# Patient Record
Sex: FEMALE | Race: WHITE | NOT HISPANIC OR LATINO | Employment: OTHER | URBAN - NONMETROPOLITAN AREA
[De-identification: names, ages, dates, MRNs, and addresses within clinical notes are randomized per-mention and may not be internally consistent; named-entity substitution may affect disease eponyms.]

---

## 2019-03-10 ENCOUNTER — APPOINTMENT (OUTPATIENT)
Dept: GENERAL RADIOLOGY | Facility: HOSPITAL | Age: 59
End: 2019-03-10

## 2019-03-10 ENCOUNTER — HOSPITAL ENCOUNTER (INPATIENT)
Facility: HOSPITAL | Age: 59
LOS: 15 days | Discharge: NURSING, REHAB OR LONG-TERM CARE | End: 2019-03-25
Attending: STUDENT IN AN ORGANIZED HEALTH CARE EDUCATION/TRAINING PROGRAM | Admitting: SURGERY

## 2019-03-10 ENCOUNTER — APPOINTMENT (OUTPATIENT)
Dept: CT IMAGING | Facility: HOSPITAL | Age: 59
End: 2019-03-10

## 2019-03-10 DIAGNOSIS — J96.01 ACUTE RESPIRATORY FAILURE WITH HYPOXIA AND HYPERCAPNIA (HCC): ICD-10-CM

## 2019-03-10 DIAGNOSIS — R13.12 OROPHARYNGEAL DYSPHAGIA: ICD-10-CM

## 2019-03-10 DIAGNOSIS — J96.02 ACUTE RESPIRATORY FAILURE WITH HYPOXIA AND HYPERCAPNIA (HCC): ICD-10-CM

## 2019-03-10 DIAGNOSIS — G93.1 ANOXIC ENCEPHALOPATHY (HCC): ICD-10-CM

## 2019-03-10 DIAGNOSIS — I46.9 CARDIOPULMONARY ARREST WITH SUCCESSFUL RESUSCITATION (HCC): Primary | ICD-10-CM

## 2019-03-10 PROBLEM — J93.83 OTHER PNEUMOTHORAX: Status: ACTIVE | Noted: 2019-03-10

## 2019-03-10 PROBLEM — J44.1 COPD WITH ACUTE EXACERBATION (HCC): Status: ACTIVE | Noted: 2019-03-10

## 2019-03-10 PROBLEM — E87.20 METABOLIC ACIDOSIS: Status: ACTIVE | Noted: 2019-03-10

## 2019-03-10 PROBLEM — R73.9 HYPERGLYCEMIA: Status: ACTIVE | Noted: 2019-03-10

## 2019-03-10 LAB
A-A DO2: ABNORMAL MMHG
A-A DO2: ABNORMAL MMHG
ALBUMIN SERPL-MCNC: 3.2 G/DL (ref 3.5–5)
ALBUMIN/GLOB SERPL: 1.5 G/DL (ref 1–2)
ALP SERPL-CCNC: 80 U/L (ref 38–126)
ALT SERPL W P-5'-P-CCNC: 134 U/L (ref 13–69)
AMPHET+METHAMPHET UR QL: NEGATIVE
AMPHETAMINES UR QL: NEGATIVE
ANION GAP SERPL CALCULATED.3IONS-SCNC: 22 MMOL/L (ref 10–20)
ARTERIAL PATENCY WRIST A: POSITIVE
ARTERIAL PATENCY WRIST A: POSITIVE
AST SERPL-CCNC: 309 U/L (ref 15–46)
ATMOSPHERIC PRESS: 730 MMHG
ATMOSPHERIC PRESS: 736 MMHG
BACTERIA UR QL AUTO: ABNORMAL /HPF
BARBITURATES UR QL SCN: NEGATIVE
BASE EXCESS BLDA CALC-SCNC: -1.3 MMOL/L (ref 0–2)
BASE EXCESS BLDA CALC-SCNC: -15 MMOL/L (ref 0–2)
BDY SITE: ABNORMAL
BDY SITE: ABNORMAL
BENZODIAZ UR QL SCN: NEGATIVE
BILIRUB SERPL-MCNC: 0.3 MG/DL (ref 0.2–1.3)
BILIRUB UR QL STRIP: NEGATIVE
BUN BLD-MCNC: 10 MG/DL (ref 7–20)
BUN/CREAT SERPL: 12.5 (ref 7.1–23.5)
BUPRENORPHINE SERPL-MCNC: POSITIVE NG/ML
CALCIUM SPEC-SCNC: 8.8 MG/DL (ref 8.4–10.2)
CANNABINOIDS SERPL QL: NEGATIVE
CHLORIDE SERPL-SCNC: 103 MMOL/L (ref 98–107)
CLARITY UR: CLEAR
CO2 SERPL-SCNC: 16 MMOL/L (ref 26–30)
COCAINE UR QL: NEGATIVE
COHGB MFR BLD: 1 % (ref 0–2)
COHGB MFR BLD: 2.2 % (ref 0–2)
COLOR UR: YELLOW
CREAT BLD-MCNC: 0.8 MG/DL (ref 0.6–1.3)
D-LACTATE SERPL-SCNC: 1.5 MMOL/L (ref 0.5–2)
D-LACTATE SERPL-SCNC: 13.6 MMOL/L (ref 0.5–2)
DEPRECATED RDW RBC AUTO: 59.7 FL (ref 37–54)
ERYTHROCYTE [DISTWIDTH] IN BLOOD BY AUTOMATED COUNT: 13.2 % (ref 11.5–14.5)
ETHANOL BLD-MCNC: <10 MG/DL
ETHANOL UR QL: <0.01 %
GFR SERPL CREATININE-BSD FRML MDRD: 59 ML/MIN/1.73
GFR SERPL CREATININE-BSD FRML MDRD: 71 ML/MIN/1.73
GLOBULIN UR ELPH-MCNC: 2.2 GM/DL
GLUCOSE BLD-MCNC: 384 MG/DL (ref 74–98)
GLUCOSE BLDC GLUCOMTR-MCNC: 107 MG/DL (ref 70–130)
GLUCOSE BLDC GLUCOMTR-MCNC: 154 MG/DL (ref 70–130)
GLUCOSE BLDC GLUCOMTR-MCNC: 194 MG/DL (ref 70–130)
GLUCOSE UR STRIP-MCNC: ABNORMAL MG/DL
HCO3 BLDA-SCNC: 14.5 MMOL/L (ref 22–28)
HCO3 BLDA-SCNC: 24.1 MMOL/L (ref 22–28)
HCT VFR BLD AUTO: 34.9 % (ref 37–47)
HCT VFR BLD CALC: 31 %
HCT VFR BLD CALC: 33 %
HGB BLD-MCNC: 10.7 G/DL (ref 12–16)
HGB BLDA-MCNC: 10.1 G/DL (ref 12–18)
HGB BLDA-MCNC: 10.8 G/DL (ref 12–18)
HGB UR QL STRIP.AUTO: ABNORMAL
HOLD SPECIMEN: NORMAL
HOROWITZ INDEX BLD+IHG-RTO: 40 %
HOROWITZ INDEX BLD+IHG-RTO: 50 %
HYALINE CASTS UR QL AUTO: ABNORMAL /LPF
KETONES UR QL STRIP: NEGATIVE
LEUKOCYTE ESTERASE UR QL STRIP.AUTO: NEGATIVE
LYMPHOCYTES # BLD MANUAL: 4.94 10*3/MM3 (ref 0.6–3.4)
LYMPHOCYTES NFR BLD MANUAL: 2 % (ref 5–12)
LYMPHOCYTES NFR BLD MANUAL: 66 % (ref 10–50)
MACROCYTES BLD QL SMEAR: ABNORMAL
MCH RBC QN AUTO: 37.3 PG (ref 27–31)
MCHC RBC AUTO-ENTMCNC: 30.7 G/DL (ref 30–37)
MCV RBC AUTO: 121.6 FL (ref 81–99)
METHADONE UR QL SCN: NEGATIVE
METHGB BLD QL: 0.9 % (ref 0–1.5)
METHGB BLD QL: 1.2 % (ref 0–1.5)
MODALITY: ABNORMAL
MODALITY: ABNORMAL
MONOCYTES # BLD AUTO: 0.15 10*3/MM3 (ref 0–0.9)
MUCOUS THREADS URNS QL MICRO: ABNORMAL /HPF
NEUTROPHILS # BLD AUTO: 2.39 10*3/MM3 (ref 2–6.9)
NEUTROPHILS NFR BLD MANUAL: 30 % (ref 37–80)
NEUTS BAND NFR BLD MANUAL: 2 % (ref 0–6)
NITRITE UR QL STRIP: NEGATIVE
NOTE: ABNORMAL
NOTE: ABNORMAL
OPIATES UR QL: NEGATIVE
OXYCODONE UR QL SCN: NEGATIVE
OXYHGB MFR BLDV: 94.9 % (ref 94–99)
OXYHGB MFR BLDV: 95.2 % (ref 94–99)
PCO2 BLDA: 42.2 MM HG (ref 35–45)
PCO2 BLDA: 48.6 MM HG (ref 35–45)
PCO2 TEMP ADJ BLD: ABNORMAL MM HG (ref 35–45)
PCO2 TEMP ADJ BLD: ABNORMAL MM HG (ref 35–45)
PCP UR QL SCN: NEGATIVE
PEEP RESPIRATORY: 5 CM[H2O]
PEEP RESPIRATORY: 5 CM[H2O]
PH BLDA: 7.08 PH UNITS (ref 7.3–7.5)
PH BLDA: 7.37 PH UNITS (ref 7.3–7.5)
PH UR STRIP.AUTO: 6.5 [PH] (ref 5–8)
PH, TEMP CORRECTED: ABNORMAL PH UNITS
PH, TEMP CORRECTED: ABNORMAL PH UNITS
PLATELET # BLD AUTO: 177 10*3/MM3 (ref 130–400)
PMV BLD AUTO: 11.8 FL (ref 6–12)
PO2 BLDA: 163 MM HG (ref 75–100)
PO2 BLDA: 78.4 MM HG (ref 75–100)
PO2 TEMP ADJ BLD: ABNORMAL MM HG (ref 83–108)
PO2 TEMP ADJ BLD: ABNORMAL MM HG (ref 83–108)
POTASSIUM BLD-SCNC: 5 MMOL/L (ref 3.5–5.1)
PROCALCITONIN SERPL-MCNC: 0.45 NG/ML
PROPOXYPH UR QL: NEGATIVE
PROT SERPL-MCNC: 5.4 G/DL (ref 6.3–8.2)
PROT UR QL STRIP: ABNORMAL
RBC # BLD AUTO: 2.87 10*6/MM3 (ref 4.2–5.4)
RBC # UR: ABNORMAL /HPF
REF LAB TEST METHOD: ABNORMAL
SAO2 % BLDCOA: 96.7 % (ref 94–100)
SAO2 % BLDCOA: 98.5 % (ref 94–100)
SCAN SLIDE: NORMAL
SET MECH RESP RATE: 16
SET MECH RESP RATE: 16
SMALL PLATELETS BLD QL SMEAR: ADEQUATE
SODIUM BLD-SCNC: 136 MMOL/L (ref 137–145)
SP GR UR STRIP: 1.01 (ref 1–1.03)
SQUAMOUS #/AREA URNS HPF: ABNORMAL /HPF
TRICYCLICS UR QL SCN: NEGATIVE
TROPONIN I SERPL-MCNC: 0.07 NG/ML (ref 0–0.03)
TROPONIN I SERPL-MCNC: 0.14 NG/ML (ref 0–0.03)
TROPONIN I SERPL-MCNC: 0.18 NG/ML (ref 0–0.03)
TROPONIN I SERPL-MCNC: 0.28 NG/ML (ref 0–0.03)
UROBILINOGEN UR QL STRIP: ABNORMAL
VENTILATOR MODE: AC
VENTILATOR MODE: AC
VT ON VENT VENT: 500 ML
VT ON VENT VENT: 500 ML
WBC MORPH BLD: NORMAL
WBC NRBC COR # BLD: 7.48 10*3/MM3 (ref 4.8–10.8)
WBC UR QL AUTO: ABNORMAL /HPF
WHOLE BLOOD HOLD SPECIMEN: NORMAL
WHOLE BLOOD HOLD SPECIMEN: NORMAL

## 2019-03-10 PROCEDURE — 06HY33Z INSERTION OF INFUSION DEVICE INTO LOWER VEIN, PERCUTANEOUS APPROACH: ICD-10-PCS | Performed by: INTERNAL MEDICINE

## 2019-03-10 PROCEDURE — 94799 UNLISTED PULMONARY SVC/PX: CPT

## 2019-03-10 PROCEDURE — 70450 CT HEAD/BRAIN W/O DYE: CPT

## 2019-03-10 PROCEDURE — C1751 CATH, INF, PER/CENT/MIDLINE: HCPCS

## 2019-03-10 PROCEDURE — 99291 CRITICAL CARE FIRST HOUR: CPT

## 2019-03-10 PROCEDURE — 25010000002 METHYLPREDNISOLONE PER 40 MG: Performed by: INTERNAL MEDICINE

## 2019-03-10 PROCEDURE — 99291 CRITICAL CARE FIRST HOUR: CPT | Performed by: INTERNAL MEDICINE

## 2019-03-10 PROCEDURE — 85060 BLOOD SMEAR INTERPRETATION: CPT | Performed by: STUDENT IN AN ORGANIZED HEALTH CARE EDUCATION/TRAINING PROGRAM

## 2019-03-10 PROCEDURE — 83605 ASSAY OF LACTIC ACID: CPT | Performed by: STUDENT IN AN ORGANIZED HEALTH CARE EDUCATION/TRAINING PROGRAM

## 2019-03-10 PROCEDURE — 94640 AIRWAY INHALATION TREATMENT: CPT

## 2019-03-10 PROCEDURE — 80307 DRUG TEST PRSMV CHEM ANLYZR: CPT | Performed by: STUDENT IN AN ORGANIZED HEALTH CARE EDUCATION/TRAINING PROGRAM

## 2019-03-10 PROCEDURE — 36600 WITHDRAWAL OF ARTERIAL BLOOD: CPT

## 2019-03-10 PROCEDURE — 87086 URINE CULTURE/COLONY COUNT: CPT | Performed by: INTERNAL MEDICINE

## 2019-03-10 PROCEDURE — 80053 COMPREHEN METABOLIC PANEL: CPT | Performed by: STUDENT IN AN ORGANIZED HEALTH CARE EDUCATION/TRAINING PROGRAM

## 2019-03-10 PROCEDURE — 99292 CRITICAL CARE ADDL 30 MIN: CPT

## 2019-03-10 PROCEDURE — 25010000003 LEVETIRACETAM IN NACL 0.75% 1000 MG/100ML SOLUTION: Performed by: INTERNAL MEDICINE

## 2019-03-10 PROCEDURE — 84484 ASSAY OF TROPONIN QUANT: CPT | Performed by: INTERNAL MEDICINE

## 2019-03-10 PROCEDURE — 25010000002 LORAZEPAM PER 2 MG

## 2019-03-10 PROCEDURE — 94002 VENT MGMT INPAT INIT DAY: CPT

## 2019-03-10 PROCEDURE — 80306 DRUG TEST PRSMV INSTRMNT: CPT | Performed by: STUDENT IN AN ORGANIZED HEALTH CARE EDUCATION/TRAINING PROGRAM

## 2019-03-10 PROCEDURE — 71045 X-RAY EXAM CHEST 1 VIEW: CPT

## 2019-03-10 PROCEDURE — 71275 CT ANGIOGRAPHY CHEST: CPT

## 2019-03-10 PROCEDURE — 25010000002 MIDAZOLAM 50 MG/10ML SOLUTION 10 ML VIAL: Performed by: INTERNAL MEDICINE

## 2019-03-10 PROCEDURE — 25010000002 FENTANYL CITRATE (PF) 100 MCG/2ML SOLUTION 20 ML VIAL: Performed by: STUDENT IN AN ORGANIZED HEALTH CARE EDUCATION/TRAINING PROGRAM

## 2019-03-10 PROCEDURE — 82375 ASSAY CARBOXYHB QUANT: CPT

## 2019-03-10 PROCEDURE — 84145 PROCALCITONIN (PCT): CPT | Performed by: INTERNAL MEDICINE

## 2019-03-10 PROCEDURE — 5A1955Z RESPIRATORY VENTILATION, GREATER THAN 96 CONSECUTIVE HOURS: ICD-10-PCS | Performed by: INTERNAL MEDICINE

## 2019-03-10 PROCEDURE — 83036 HEMOGLOBIN GLYCOSYLATED A1C: CPT | Performed by: INTERNAL MEDICINE

## 2019-03-10 PROCEDURE — 25010000002 VANCOMYCIN 1 G RECONSTITUTED SOLUTION 1 EACH VIAL: Performed by: STUDENT IN AN ORGANIZED HEALTH CARE EDUCATION/TRAINING PROGRAM

## 2019-03-10 PROCEDURE — 25010000002 PIPERACILLIN SOD-TAZOBACTAM PER 1 G: Performed by: INTERNAL MEDICINE

## 2019-03-10 PROCEDURE — 25010000002 VANCOMYCIN PER 500 MG: Performed by: INTERNAL MEDICINE

## 2019-03-10 PROCEDURE — 87040 BLOOD CULTURE FOR BACTERIA: CPT | Performed by: STUDENT IN AN ORGANIZED HEALTH CARE EDUCATION/TRAINING PROGRAM

## 2019-03-10 PROCEDURE — 83050 HGB METHEMOGLOBIN QUAN: CPT

## 2019-03-10 PROCEDURE — 94003 VENT MGMT INPAT SUBQ DAY: CPT

## 2019-03-10 PROCEDURE — 82805 BLOOD GASES W/O2 SATURATION: CPT

## 2019-03-10 PROCEDURE — 87205 SMEAR GRAM STAIN: CPT | Performed by: INTERNAL MEDICINE

## 2019-03-10 PROCEDURE — 25010000002 PIPERACILLIN SOD-TAZOBACTAM PER 1 G: Performed by: STUDENT IN AN ORGANIZED HEALTH CARE EDUCATION/TRAINING PROGRAM

## 2019-03-10 PROCEDURE — 93005 ELECTROCARDIOGRAM TRACING: CPT | Performed by: STUDENT IN AN ORGANIZED HEALTH CARE EDUCATION/TRAINING PROGRAM

## 2019-03-10 PROCEDURE — 85025 COMPLETE CBC W/AUTO DIFF WBC: CPT | Performed by: STUDENT IN AN ORGANIZED HEALTH CARE EDUCATION/TRAINING PROGRAM

## 2019-03-10 PROCEDURE — 63710000001 INSULIN ASPART PER 5 UNITS: Performed by: INTERNAL MEDICINE

## 2019-03-10 PROCEDURE — 84484 ASSAY OF TROPONIN QUANT: CPT | Performed by: STUDENT IN AN ORGANIZED HEALTH CARE EDUCATION/TRAINING PROGRAM

## 2019-03-10 PROCEDURE — 25010000002 IOPAMIDOL 61 % SOLUTION: Performed by: HOSPITALIST

## 2019-03-10 PROCEDURE — 25010000002 ENOXAPARIN PER 10 MG: Performed by: INTERNAL MEDICINE

## 2019-03-10 PROCEDURE — 81001 URINALYSIS AUTO W/SCOPE: CPT | Performed by: INTERNAL MEDICINE

## 2019-03-10 PROCEDURE — 99254 IP/OBS CNSLTJ NEW/EST MOD 60: CPT | Performed by: INTERNAL MEDICINE

## 2019-03-10 PROCEDURE — 25010000002 LORAZEPAM PER 2 MG: Performed by: INTERNAL MEDICINE

## 2019-03-10 PROCEDURE — 82962 GLUCOSE BLOOD TEST: CPT

## 2019-03-10 PROCEDURE — 85007 BL SMEAR W/DIFF WBC COUNT: CPT | Performed by: STUDENT IN AN ORGANIZED HEALTH CARE EDUCATION/TRAINING PROGRAM

## 2019-03-10 PROCEDURE — 80074 ACUTE HEPATITIS PANEL: CPT | Performed by: INTERNAL MEDICINE

## 2019-03-10 PROCEDURE — 87070 CULTURE OTHR SPECIMN AEROBIC: CPT | Performed by: INTERNAL MEDICINE

## 2019-03-10 RX ORDER — BISACODYL 10 MG
10 SUPPOSITORY, RECTAL RECTAL DAILY PRN
Status: DISCONTINUED | OUTPATIENT
Start: 2019-03-10 | End: 2019-03-25 | Stop reason: HOSPADM

## 2019-03-10 RX ORDER — L.ACID,PARA/B.BIFIDUM/S.THERM 8B CELL
1 CAPSULE ORAL DAILY
Status: DISCONTINUED | OUTPATIENT
Start: 2019-03-10 | End: 2019-03-25 | Stop reason: HOSPADM

## 2019-03-10 RX ORDER — LORAZEPAM 2 MG/ML
1 INJECTION INTRAMUSCULAR EVERY 4 HOURS PRN
Status: DISCONTINUED | OUTPATIENT
Start: 2019-03-10 | End: 2019-03-11

## 2019-03-10 RX ORDER — ACETAMINOPHEN 325 MG/1
650 TABLET ORAL EVERY 4 HOURS PRN
Status: DISCONTINUED | OUTPATIENT
Start: 2019-03-10 | End: 2019-03-25 | Stop reason: HOSPADM

## 2019-03-10 RX ORDER — LEVETIRACETAM 10 MG/ML
1000 INJECTION INTRAVASCULAR EVERY 12 HOURS SCHEDULED
Status: DISCONTINUED | OUTPATIENT
Start: 2019-03-10 | End: 2019-03-13

## 2019-03-10 RX ORDER — METHYLPREDNISOLONE SODIUM SUCCINATE 40 MG/ML
40 INJECTION, POWDER, LYOPHILIZED, FOR SOLUTION INTRAMUSCULAR; INTRAVENOUS EVERY 8 HOURS
Status: DISCONTINUED | OUTPATIENT
Start: 2019-03-10 | End: 2019-03-12

## 2019-03-10 RX ORDER — FAMOTIDINE 10 MG/ML
20 INJECTION, SOLUTION INTRAVENOUS EVERY 12 HOURS SCHEDULED
Status: DISCONTINUED | OUTPATIENT
Start: 2019-03-10 | End: 2019-03-25 | Stop reason: HOSPADM

## 2019-03-10 RX ORDER — LISINOPRIL 20 MG/1
20 TABLET ORAL DAILY
COMMUNITY
End: 2020-03-12

## 2019-03-10 RX ORDER — LORAZEPAM 2 MG/ML
INJECTION INTRAMUSCULAR
Status: COMPLETED
Start: 2019-03-10 | End: 2019-03-10

## 2019-03-10 RX ORDER — SODIUM CHLORIDE 0.9 % (FLUSH) 0.9 %
3 SYRINGE (ML) INJECTION EVERY 12 HOURS SCHEDULED
Status: DISCONTINUED | OUTPATIENT
Start: 2019-03-10 | End: 2019-03-20

## 2019-03-10 RX ORDER — IPRATROPIUM BROMIDE AND ALBUTEROL SULFATE 2.5; .5 MG/3ML; MG/3ML
3 SOLUTION RESPIRATORY (INHALATION)
Status: DISCONTINUED | OUTPATIENT
Start: 2019-03-10 | End: 2019-03-25 | Stop reason: HOSPADM

## 2019-03-10 RX ORDER — DEXTROSE MONOHYDRATE 25 G/50ML
25 INJECTION, SOLUTION INTRAVENOUS
Status: DISCONTINUED | OUTPATIENT
Start: 2019-03-10 | End: 2019-03-25 | Stop reason: HOSPADM

## 2019-03-10 RX ORDER — CHLORHEXIDINE GLUCONATE 0.12 MG/ML
15 RINSE ORAL EVERY 12 HOURS SCHEDULED
Status: DISCONTINUED | OUTPATIENT
Start: 2019-03-10 | End: 2019-03-25 | Stop reason: HOSPADM

## 2019-03-10 RX ORDER — BUDESONIDE 0.5 MG/2ML
0.5 INHALANT ORAL
Status: DISCONTINUED | OUTPATIENT
Start: 2019-03-10 | End: 2019-03-25 | Stop reason: HOSPADM

## 2019-03-10 RX ORDER — SODIUM CHLORIDE 0.9 % (FLUSH) 0.9 %
3-10 SYRINGE (ML) INJECTION AS NEEDED
Status: DISCONTINUED | OUTPATIENT
Start: 2019-03-10 | End: 2019-03-20

## 2019-03-10 RX ORDER — CARVEDILOL 12.5 MG/1
12.5 TABLET ORAL 2 TIMES DAILY
COMMUNITY

## 2019-03-10 RX ORDER — ONDANSETRON 2 MG/ML
4 INJECTION INTRAMUSCULAR; INTRAVENOUS EVERY 6 HOURS PRN
Status: DISCONTINUED | OUTPATIENT
Start: 2019-03-10 | End: 2019-03-25 | Stop reason: HOSPADM

## 2019-03-10 RX ORDER — NICOTINE POLACRILEX 4 MG
1 LOZENGE BUCCAL
Status: DISCONTINUED | OUTPATIENT
Start: 2019-03-10 | End: 2019-03-25 | Stop reason: HOSPADM

## 2019-03-10 RX ADMIN — LEVETIRACETAM 1000 MG: 10 INJECTION INTRAVENOUS at 21:05

## 2019-03-10 RX ADMIN — SODIUM CHLORIDE 1000 ML: 9 INJECTION, SOLUTION INTRAVENOUS at 10:00

## 2019-03-10 RX ADMIN — FENTANYL CITRATE 50 MCG/HR: 50 INJECTION INTRAVENOUS at 05:33

## 2019-03-10 RX ADMIN — TAZOBACTAM SODIUM AND PIPERACILLIN SODIUM 3.38 G: 375; 3 INJECTION, SOLUTION INTRAVENOUS at 07:35

## 2019-03-10 RX ADMIN — SODIUM BICARBONATE 100 ML/HR: 84 INJECTION, SOLUTION INTRAVENOUS at 11:32

## 2019-03-10 RX ADMIN — METHYLPREDNISOLONE SODIUM SUCCINATE 40 MG: 40 INJECTION, POWDER, FOR SOLUTION INTRAMUSCULAR; INTRAVENOUS at 11:54

## 2019-03-10 RX ADMIN — METHYLPREDNISOLONE SODIUM SUCCINATE 40 MG: 40 INJECTION, POWDER, FOR SOLUTION INTRAMUSCULAR; INTRAVENOUS at 18:11

## 2019-03-10 RX ADMIN — SODIUM BICARBONATE 100 ML/HR: 84 INJECTION, SOLUTION INTRAVENOUS at 23:46

## 2019-03-10 RX ADMIN — CHLORHEXIDINE GLUCONATE 0.12% ORAL RINSE 15 ML: 1.2 LIQUID ORAL at 11:54

## 2019-03-10 RX ADMIN — TAZOBACTAM SODIUM AND PIPERACILLIN SODIUM 3.38 G: 375; 3 INJECTION, SOLUTION INTRAVENOUS at 21:09

## 2019-03-10 RX ADMIN — IPRATROPIUM BROMIDE AND ALBUTEROL SULFATE 3 ML: .5; 3 SOLUTION RESPIRATORY (INHALATION) at 19:11

## 2019-03-10 RX ADMIN — FENTANYL CITRATE 300 MCG/HR: 50 INJECTION INTRAVENOUS at 22:05

## 2019-03-10 RX ADMIN — VANCOMYCIN HYDROCHLORIDE 500 MG: 500 INJECTION, POWDER, LYOPHILIZED, FOR SOLUTION INTRAVENOUS at 11:32

## 2019-03-10 RX ADMIN — LEVETIRACETAM 1000 MG: 10 INJECTION INTRAVENOUS at 11:32

## 2019-03-10 RX ADMIN — SODIUM CHLORIDE 1000 ML: 9 INJECTION, SOLUTION INTRAVENOUS at 04:17

## 2019-03-10 RX ADMIN — LORAZEPAM 1 MG: 2 INJECTION INTRAMUSCULAR; INTRAVENOUS at 08:52

## 2019-03-10 RX ADMIN — FENTANYL CITRATE 150 MCG/HR: 50 INJECTION INTRAVENOUS at 18:08

## 2019-03-10 RX ADMIN — IOPAMIDOL 100 ML: 612 INJECTION, SOLUTION INTRAVENOUS at 18:45

## 2019-03-10 RX ADMIN — ENOXAPARIN SODIUM 40 MG: 100 INJECTION SUBCUTANEOUS at 11:53

## 2019-03-10 RX ADMIN — BUDESONIDE 0.5 MG: 0.5 INHALANT RESPIRATORY (INHALATION) at 13:39

## 2019-03-10 RX ADMIN — TAZOBACTAM SODIUM AND PIPERACILLIN SODIUM 3.38 G: 375; 3 INJECTION, SOLUTION INTRAVENOUS at 15:45

## 2019-03-10 RX ADMIN — SODIUM CHLORIDE 1000 ML: 9 INJECTION, SOLUTION INTRAVENOUS at 05:56

## 2019-03-10 RX ADMIN — IPRATROPIUM BROMIDE AND ALBUTEROL SULFATE 3 ML: .5; 3 SOLUTION RESPIRATORY (INHALATION) at 13:39

## 2019-03-10 RX ADMIN — Medication 1 CAPSULE: at 11:54

## 2019-03-10 RX ADMIN — INSULIN ASPART 2 UNITS: 100 INJECTION, SOLUTION INTRAVENOUS; SUBCUTANEOUS at 21:06

## 2019-03-10 RX ADMIN — FAMOTIDINE 20 MG: 10 INJECTION, SOLUTION INTRAVENOUS at 11:54

## 2019-03-10 RX ADMIN — SODIUM CHLORIDE, PRESERVATIVE FREE 3 ML: 5 INJECTION INTRAVENOUS at 21:05

## 2019-03-10 RX ADMIN — MIDAZOLAM 2 MG/HR: 5 INJECTION INTRAMUSCULAR; INTRAVENOUS at 10:05

## 2019-03-10 RX ADMIN — LORAZEPAM 1 MG: 2 INJECTION INTRAMUSCULAR; INTRAVENOUS at 09:06

## 2019-03-10 RX ADMIN — SODIUM CHLORIDE, PRESERVATIVE FREE 3 ML: 5 INJECTION INTRAVENOUS at 11:35

## 2019-03-10 RX ADMIN — VANCOMYCIN HYDROCHLORIDE 1000 MG: 1 INJECTION, POWDER, LYOPHILIZED, FOR SOLUTION INTRAVENOUS at 08:32

## 2019-03-10 RX ADMIN — FAMOTIDINE 20 MG: 10 INJECTION, SOLUTION INTRAVENOUS at 21:05

## 2019-03-10 RX ADMIN — MIDAZOLAM 4 MG/HR: 5 INJECTION INTRAMUSCULAR; INTRAVENOUS at 23:54

## 2019-03-10 RX ADMIN — BUDESONIDE 0.5 MG: 0.5 INHALANT RESPIRATORY (INHALATION) at 19:12

## 2019-03-10 RX ADMIN — CHLORHEXIDINE GLUCONATE 0.12% ORAL RINSE 15 ML: 1.2 LIQUID ORAL at 21:04

## 2019-03-11 ENCOUNTER — APPOINTMENT (OUTPATIENT)
Dept: CARDIOLOGY | Facility: HOSPITAL | Age: 59
End: 2019-03-11

## 2019-03-11 ENCOUNTER — APPOINTMENT (OUTPATIENT)
Dept: ULTRASOUND IMAGING | Facility: HOSPITAL | Age: 59
End: 2019-03-11

## 2019-03-11 ENCOUNTER — APPOINTMENT (OUTPATIENT)
Dept: GENERAL RADIOLOGY | Facility: HOSPITAL | Age: 59
End: 2019-03-11

## 2019-03-11 LAB
A-A DO2: ABNORMAL MMHG
ALBUMIN SERPL-MCNC: 3.5 G/DL (ref 3.5–5)
ALBUMIN/GLOB SERPL: 1.3 G/DL (ref 1–2)
ALP SERPL-CCNC: 87 U/L (ref 38–126)
ALT SERPL W P-5'-P-CCNC: 132 U/L (ref 13–69)
ANION GAP SERPL CALCULATED.3IONS-SCNC: 10.4 MMOL/L (ref 10–20)
ANISOCYTOSIS BLD QL: NORMAL
ARTERIAL PATENCY WRIST A: POSITIVE
AST SERPL-CCNC: 249 U/L (ref 15–46)
ATMOSPHERIC PRESS: 741 MMHG
BASE EXCESS BLDA CALC-SCNC: 5.3 MMOL/L (ref 0–2)
BASOPHILS # BLD AUTO: 0.01 10*3/MM3 (ref 0–0.2)
BASOPHILS NFR BLD AUTO: 0.1 % (ref 0–2.5)
BDY SITE: ABNORMAL
BILIRUB SERPL-MCNC: 0.3 MG/DL (ref 0.2–1.3)
BUN BLD-MCNC: 11 MG/DL (ref 7–20)
BUN/CREAT SERPL: 18.3 (ref 7.1–23.5)
CALCIUM SPEC-SCNC: 8.8 MG/DL (ref 8.4–10.2)
CHLORIDE SERPL-SCNC: 104 MMOL/L (ref 98–107)
CO2 SERPL-SCNC: 28 MMOL/L (ref 26–30)
COHGB MFR BLD: 1 % (ref 0–2)
CREAT BLD-MCNC: 0.6 MG/DL (ref 0.6–1.3)
CYTOLOGIST CVX/VAG CYTO: NORMAL
DEPRECATED RDW RBC AUTO: 55.4 FL (ref 37–54)
EOSINOPHIL # BLD AUTO: 0 10*3/MM3 (ref 0–0.7)
EOSINOPHIL NFR BLD AUTO: 0 % (ref 0–7)
ERYTHROCYTE [DISTWIDTH] IN BLOOD BY AUTOMATED COUNT: 13.3 % (ref 11.5–14.5)
GFR SERPL CREATININE-BSD FRML MDRD: 102 ML/MIN/1.73
GLOBULIN UR ELPH-MCNC: 2.6 GM/DL
GLUCOSE BLD-MCNC: 187 MG/DL (ref 74–98)
GLUCOSE BLDC GLUCOMTR-MCNC: 164 MG/DL (ref 70–130)
GLUCOSE BLDC GLUCOMTR-MCNC: 171 MG/DL (ref 70–130)
GLUCOSE BLDC GLUCOMTR-MCNC: 191 MG/DL (ref 70–130)
GLUCOSE BLDC GLUCOMTR-MCNC: 199 MG/DL (ref 70–130)
HBA1C MFR BLD: 5.1 % (ref 3–6)
HCO3 BLDA-SCNC: 30 MMOL/L (ref 22–28)
HCT VFR BLD AUTO: 29.7 % (ref 37–47)
HCT VFR BLD CALC: 29.6 %
HGB BLD-MCNC: 9.8 G/DL (ref 12–16)
HGB BLDA-MCNC: 9.7 G/DL (ref 12–18)
HOROWITZ INDEX BLD+IHG-RTO: 30 %
IMM GRANULOCYTES # BLD AUTO: 0.1 10*3/MM3 (ref 0–0.06)
IMM GRANULOCYTES NFR BLD AUTO: 0.8 % (ref 0–0.6)
LYMPHOCYTES # BLD AUTO: 0.75 10*3/MM3 (ref 0.6–3.4)
LYMPHOCYTES NFR BLD AUTO: 6.3 % (ref 10–50)
MACROCYTES BLD QL SMEAR: NORMAL
MAGNESIUM SERPL-MCNC: 1.9 MG/DL (ref 1.6–2.3)
MAXIMAL PREDICTED HEART RATE: 160 BPM
MCH RBC QN AUTO: 37.1 PG (ref 27–31)
MCHC RBC AUTO-ENTMCNC: 33 G/DL (ref 30–37)
MCV RBC AUTO: 112.5 FL (ref 81–99)
METHGB BLD QL: 0.7 % (ref 0–1.5)
MODALITY: ABNORMAL
MONOCYTES # BLD AUTO: 0.29 10*3/MM3 (ref 0–0.9)
MONOCYTES NFR BLD AUTO: 2.5 % (ref 0–12)
NEUTROPHILS # BLD AUTO: 10.68 10*3/MM3 (ref 2–6.9)
NEUTROPHILS NFR BLD AUTO: 90.3 % (ref 37–80)
NOTE: ABNORMAL
NRBC BLD AUTO-RTO: 0 /100 WBC (ref 0–0)
OXYHGB MFR BLDV: 91.2 % (ref 94–99)
PATH INTERP BLD-IMP: NORMAL
PCO2 BLDA: 43.9 MM HG (ref 35–45)
PCO2 TEMP ADJ BLD: ABNORMAL MM HG (ref 35–45)
PEEP RESPIRATORY: 5 CM[H2O]
PH BLDA: 7.44 PH UNITS (ref 7.3–7.5)
PH, TEMP CORRECTED: ABNORMAL PH UNITS
PLATELET # BLD AUTO: 165 10*3/MM3 (ref 130–400)
PMV BLD AUTO: 11.4 FL (ref 6–12)
PO2 BLDA: 60.8 MM HG (ref 75–100)
PO2 TEMP ADJ BLD: ABNORMAL MM HG (ref 83–108)
POTASSIUM BLD-SCNC: 3.4 MMOL/L (ref 3.5–5.1)
PROCALCITONIN SERPL-MCNC: 6.49 NG/ML
PROT SERPL-MCNC: 6.1 G/DL (ref 6.3–8.2)
RBC # BLD AUTO: 2.64 10*6/MM3 (ref 4.2–5.4)
SAO2 % BLDCOA: 92.8 % (ref 94–100)
SET MECH RESP RATE: 16
SMALL PLATELETS BLD QL SMEAR: ADEQUATE
SODIUM BLD-SCNC: 139 MMOL/L (ref 137–145)
STRESS TARGET HR: 136 BPM
VENTILATOR MODE: AC
VT ON VENT VENT: 500 ML
WBC MORPH BLD: NORMAL
WBC NRBC COR # BLD: 11.83 10*3/MM3 (ref 4.8–10.8)

## 2019-03-11 PROCEDURE — 25010000002 METHYLPREDNISOLONE PER 40 MG: Performed by: INTERNAL MEDICINE

## 2019-03-11 PROCEDURE — 99254 IP/OBS CNSLTJ NEW/EST MOD 60: CPT | Performed by: PSYCHIATRY & NEUROLOGY

## 2019-03-11 PROCEDURE — 94799 UNLISTED PULMONARY SVC/PX: CPT

## 2019-03-11 PROCEDURE — 80053 COMPREHEN METABOLIC PANEL: CPT | Performed by: INTERNAL MEDICINE

## 2019-03-11 PROCEDURE — 3E0G76Z INTRODUCTION OF NUTRITIONAL SUBSTANCE INTO UPPER GI, VIA NATURAL OR ARTIFICIAL OPENING: ICD-10-PCS | Performed by: INTERNAL MEDICINE

## 2019-03-11 PROCEDURE — 71045 X-RAY EXAM CHEST 1 VIEW: CPT

## 2019-03-11 PROCEDURE — 25010000002 HYDRALAZINE PER 20 MG: Performed by: INTERNAL MEDICINE

## 2019-03-11 PROCEDURE — 94770: CPT

## 2019-03-11 PROCEDURE — 0DH67UZ INSERTION OF FEEDING DEVICE INTO STOMACH, VIA NATURAL OR ARTIFICIAL OPENING: ICD-10-PCS | Performed by: INTERNAL MEDICINE

## 2019-03-11 PROCEDURE — 36600 WITHDRAWAL OF ARTERIAL BLOOD: CPT

## 2019-03-11 PROCEDURE — 94003 VENT MGMT INPAT SUBQ DAY: CPT

## 2019-03-11 PROCEDURE — 76705 ECHO EXAM OF ABDOMEN: CPT

## 2019-03-11 PROCEDURE — 85025 COMPLETE CBC W/AUTO DIFF WBC: CPT | Performed by: INTERNAL MEDICINE

## 2019-03-11 PROCEDURE — 82805 BLOOD GASES W/O2 SATURATION: CPT

## 2019-03-11 PROCEDURE — 25010000002 PROPOFOL 1000 MG/ML EMULSION: Performed by: INTERNAL MEDICINE

## 2019-03-11 PROCEDURE — 63710000001 INSULIN ASPART PER 5 UNITS: Performed by: INTERNAL MEDICINE

## 2019-03-11 PROCEDURE — 82375 ASSAY CARBOXYHB QUANT: CPT

## 2019-03-11 PROCEDURE — 84145 PROCALCITONIN (PCT): CPT | Performed by: INTERNAL MEDICINE

## 2019-03-11 PROCEDURE — 99233 SBSQ HOSP IP/OBS HIGH 50: CPT | Performed by: INTERNAL MEDICINE

## 2019-03-11 PROCEDURE — 99291 CRITICAL CARE FIRST HOUR: CPT | Performed by: INTERNAL MEDICINE

## 2019-03-11 PROCEDURE — 82962 GLUCOSE BLOOD TEST: CPT

## 2019-03-11 PROCEDURE — 25010000002 MORPHINE PER 10 MG: Performed by: INTERNAL MEDICINE

## 2019-03-11 PROCEDURE — 93306 TTE W/DOPPLER COMPLETE: CPT

## 2019-03-11 PROCEDURE — 25010000002 VANCOMYCIN 1 G RECONSTITUTED SOLUTION 1 EACH VIAL: Performed by: INTERNAL MEDICINE

## 2019-03-11 PROCEDURE — 83050 HGB METHEMOGLOBIN QUAN: CPT

## 2019-03-11 PROCEDURE — 25010000003 LEVETIRACETAM IN NACL 0.75% 1000 MG/100ML SOLUTION: Performed by: INTERNAL MEDICINE

## 2019-03-11 PROCEDURE — 25010000002 LORAZEPAM PER 2 MG: Performed by: INTERNAL MEDICINE

## 2019-03-11 PROCEDURE — 83735 ASSAY OF MAGNESIUM: CPT | Performed by: INTERNAL MEDICINE

## 2019-03-11 PROCEDURE — 25010000002 PIPERACILLIN SOD-TAZOBACTAM PER 1 G: Performed by: INTERNAL MEDICINE

## 2019-03-11 PROCEDURE — 25010000002 FENTANYL CITRATE (PF) 100 MCG/2ML SOLUTION 20 ML VIAL: Performed by: STUDENT IN AN ORGANIZED HEALTH CARE EDUCATION/TRAINING PROGRAM

## 2019-03-11 PROCEDURE — 85007 BL SMEAR W/DIFF WBC COUNT: CPT | Performed by: INTERNAL MEDICINE

## 2019-03-11 PROCEDURE — 25010000002 ENOXAPARIN PER 10 MG: Performed by: INTERNAL MEDICINE

## 2019-03-11 RX ORDER — METOPROLOL TARTRATE 5 MG/5ML
5 INJECTION INTRAVENOUS EVERY 6 HOURS PRN
Status: DISCONTINUED | OUTPATIENT
Start: 2019-03-11 | End: 2019-03-11

## 2019-03-11 RX ORDER — MORPHINE SULFATE 2 MG/ML
2 INJECTION, SOLUTION INTRAMUSCULAR; INTRAVENOUS EVERY 4 HOURS PRN
Status: DISCONTINUED | OUTPATIENT
Start: 2019-03-11 | End: 2019-03-25 | Stop reason: HOSPADM

## 2019-03-11 RX ORDER — LORAZEPAM 2 MG/ML
1 INJECTION INTRAMUSCULAR EVERY 6 HOURS PRN
Status: DISCONTINUED | OUTPATIENT
Start: 2019-03-11 | End: 2019-03-11

## 2019-03-11 RX ORDER — ALBUTEROL SULFATE 90 UG/1
2 AEROSOL, METERED RESPIRATORY (INHALATION) EVERY 4 HOURS PRN
COMMUNITY
End: 2019-03-25 | Stop reason: HOSPADM

## 2019-03-11 RX ORDER — HYDRALAZINE HYDROCHLORIDE 20 MG/ML
20 INJECTION INTRAMUSCULAR; INTRAVENOUS EVERY 4 HOURS PRN
Status: DISCONTINUED | OUTPATIENT
Start: 2019-03-11 | End: 2019-03-25 | Stop reason: HOSPADM

## 2019-03-11 RX ORDER — LORAZEPAM 2 MG/ML
1 INJECTION INTRAMUSCULAR EVERY 4 HOURS PRN
Status: DISCONTINUED | OUTPATIENT
Start: 2019-03-11 | End: 2019-03-25 | Stop reason: HOSPADM

## 2019-03-11 RX ORDER — POTASSIUM CHLORIDE 1.5 G/1.77G
20 POWDER, FOR SOLUTION ORAL 2 TIMES DAILY
Status: COMPLETED | OUTPATIENT
Start: 2019-03-11 | End: 2019-03-11

## 2019-03-11 RX ORDER — LABETALOL HYDROCHLORIDE 5 MG/ML
20 INJECTION, SOLUTION INTRAVENOUS EVERY 4 HOURS PRN
Status: DISCONTINUED | OUTPATIENT
Start: 2019-03-11 | End: 2019-03-25 | Stop reason: HOSPADM

## 2019-03-11 RX ADMIN — LEVETIRACETAM 1000 MG: 10 INJECTION INTRAVENOUS at 21:16

## 2019-03-11 RX ADMIN — BUDESONIDE 0.5 MG: 0.5 INHALANT RESPIRATORY (INHALATION) at 06:33

## 2019-03-11 RX ADMIN — Medication 1 CAPSULE: at 09:31

## 2019-03-11 RX ADMIN — VANCOMYCIN HYDROCHLORIDE 1000 MG: 1 INJECTION, POWDER, LYOPHILIZED, FOR SOLUTION INTRAVENOUS at 23:27

## 2019-03-11 RX ADMIN — SODIUM CHLORIDE, PRESERVATIVE FREE 3 ML: 5 INJECTION INTRAVENOUS at 09:32

## 2019-03-11 RX ADMIN — VANCOMYCIN HYDROCHLORIDE 1000 MG: 1 INJECTION, POWDER, LYOPHILIZED, FOR SOLUTION INTRAVENOUS at 05:52

## 2019-03-11 RX ADMIN — METHYLPREDNISOLONE SODIUM SUCCINATE 40 MG: 40 INJECTION, POWDER, FOR SOLUTION INTRAMUSCULAR; INTRAVENOUS at 03:02

## 2019-03-11 RX ADMIN — IPRATROPIUM BROMIDE AND ALBUTEROL SULFATE 3 ML: .5; 3 SOLUTION RESPIRATORY (INHALATION) at 00:30

## 2019-03-11 RX ADMIN — HYDRALAZINE HYDROCHLORIDE 20 MG: 20 INJECTION INTRAMUSCULAR; INTRAVENOUS at 21:16

## 2019-03-11 RX ADMIN — TAZOBACTAM SODIUM AND PIPERACILLIN SODIUM 3.38 G: 375; 3 INJECTION, SOLUTION INTRAVENOUS at 21:16

## 2019-03-11 RX ADMIN — FAMOTIDINE 20 MG: 10 INJECTION, SOLUTION INTRAVENOUS at 21:16

## 2019-03-11 RX ADMIN — HUMAN INSULIN 2 UNITS: 100 INJECTION, SOLUTION SUBCUTANEOUS at 23:27

## 2019-03-11 RX ADMIN — BUDESONIDE 0.5 MG: 0.5 INHALANT RESPIRATORY (INHALATION) at 19:03

## 2019-03-11 RX ADMIN — SODIUM CHLORIDE, PRESERVATIVE FREE 20 ML: 5 INJECTION INTRAVENOUS at 21:16

## 2019-03-11 RX ADMIN — METHYLPREDNISOLONE SODIUM SUCCINATE 40 MG: 40 INJECTION, POWDER, FOR SOLUTION INTRAMUSCULAR; INTRAVENOUS at 12:32

## 2019-03-11 RX ADMIN — INSULIN ASPART 2 UNITS: 100 INJECTION, SOLUTION INTRAVENOUS; SUBCUTANEOUS at 06:52

## 2019-03-11 RX ADMIN — CHLORHEXIDINE GLUCONATE 0.12% ORAL RINSE 15 ML: 1.2 LIQUID ORAL at 21:16

## 2019-03-11 RX ADMIN — POTASSIUM CHLORIDE 20 MEQ: 1.5 POWDER, FOR SOLUTION ORAL at 21:16

## 2019-03-11 RX ADMIN — INSULIN ASPART 2 UNITS: 100 INJECTION, SOLUTION INTRAVENOUS; SUBCUTANEOUS at 12:31

## 2019-03-11 RX ADMIN — METOPROLOL TARTRATE 5 MG: 1 INJECTION, SOLUTION INTRAVENOUS at 20:11

## 2019-03-11 RX ADMIN — MORPHINE SULFATE 2 MG: 2 INJECTION, SOLUTION INTRAMUSCULAR; INTRAVENOUS at 23:58

## 2019-03-11 RX ADMIN — FAMOTIDINE 20 MG: 10 INJECTION, SOLUTION INTRAVENOUS at 09:32

## 2019-03-11 RX ADMIN — POTASSIUM CHLORIDE 20 MEQ: 1.5 POWDER, FOR SOLUTION ORAL at 12:32

## 2019-03-11 RX ADMIN — FENTANYL CITRATE 300 MCG/HR: 50 INJECTION INTRAVENOUS at 07:05

## 2019-03-11 RX ADMIN — CHLORHEXIDINE GLUCONATE 0.12% ORAL RINSE 15 ML: 1.2 LIQUID ORAL at 09:31

## 2019-03-11 RX ADMIN — LEVETIRACETAM 1000 MG: 10 INJECTION INTRAVENOUS at 09:32

## 2019-03-11 RX ADMIN — IPRATROPIUM BROMIDE AND ALBUTEROL SULFATE 3 ML: .5; 3 SOLUTION RESPIRATORY (INHALATION) at 12:43

## 2019-03-11 RX ADMIN — METHYLPREDNISOLONE SODIUM SUCCINATE 40 MG: 40 INJECTION, POWDER, FOR SOLUTION INTRAMUSCULAR; INTRAVENOUS at 19:06

## 2019-03-11 RX ADMIN — IPRATROPIUM BROMIDE AND ALBUTEROL SULFATE 3 ML: .5; 3 SOLUTION RESPIRATORY (INHALATION) at 06:33

## 2019-03-11 RX ADMIN — LABETALOL 20 MG/4 ML (5 MG/ML) INTRAVENOUS SYRINGE 20 MG: at 21:55

## 2019-03-11 RX ADMIN — PROPOFOL 5 MCG/KG/MIN: 10 INJECTION, EMULSION INTRAVENOUS at 21:33

## 2019-03-11 RX ADMIN — LORAZEPAM 1 MG: 2 INJECTION INTRAMUSCULAR; INTRAVENOUS at 23:27

## 2019-03-11 RX ADMIN — IPRATROPIUM BROMIDE AND ALBUTEROL SULFATE 3 ML: .5; 3 SOLUTION RESPIRATORY (INHALATION) at 19:03

## 2019-03-11 RX ADMIN — TAZOBACTAM SODIUM AND PIPERACILLIN SODIUM 3.38 G: 375; 3 INJECTION, SOLUTION INTRAVENOUS at 05:52

## 2019-03-11 RX ADMIN — ENOXAPARIN SODIUM 40 MG: 100 INJECTION SUBCUTANEOUS at 12:31

## 2019-03-11 RX ADMIN — FENTANYL CITRATE 150 MCG/HR: 50 INJECTION INTRAVENOUS at 02:44

## 2019-03-11 RX ADMIN — TAZOBACTAM SODIUM AND PIPERACILLIN SODIUM 3.38 G: 375; 3 INJECTION, SOLUTION INTRAVENOUS at 15:06

## 2019-03-12 ENCOUNTER — APPOINTMENT (OUTPATIENT)
Dept: GENERAL RADIOLOGY | Facility: HOSPITAL | Age: 59
End: 2019-03-12

## 2019-03-12 ENCOUNTER — APPOINTMENT (OUTPATIENT)
Dept: SLEEP MEDICINE | Facility: HOSPITAL | Age: 59
End: 2019-03-12

## 2019-03-12 LAB
ALBUMIN SERPL-MCNC: 3.4 G/DL (ref 3.5–5)
ALBUMIN/GLOB SERPL: 1.2 G/DL (ref 1–2)
ALP SERPL-CCNC: 86 U/L (ref 38–126)
ALT SERPL W P-5'-P-CCNC: 88 U/L (ref 13–69)
ANION GAP SERPL CALCULATED.3IONS-SCNC: 8.7 MMOL/L (ref 10–20)
AST SERPL-CCNC: 111 U/L (ref 15–46)
BACTERIA SPEC AEROBE CULT: NO GROWTH
BILIRUB SERPL-MCNC: 0.4 MG/DL (ref 0.2–1.3)
BUN BLD-MCNC: 15 MG/DL (ref 7–20)
BUN/CREAT SERPL: 21.4 (ref 7.1–23.5)
CALCIUM SPEC-SCNC: 9.2 MG/DL (ref 8.4–10.2)
CHLORIDE SERPL-SCNC: 106 MMOL/L (ref 98–107)
CO2 SERPL-SCNC: 30 MMOL/L (ref 26–30)
CREAT BLD-MCNC: 0.7 MG/DL (ref 0.6–1.3)
DEPRECATED RDW RBC AUTO: 59 FL (ref 37–54)
ERYTHROCYTE [DISTWIDTH] IN BLOOD BY AUTOMATED COUNT: 14.1 % (ref 11.5–14.5)
GFR SERPL CREATININE-BSD FRML MDRD: 85 ML/MIN/1.73
GLOBULIN UR ELPH-MCNC: 2.8 GM/DL
GLUCOSE BLD-MCNC: 145 MG/DL (ref 74–98)
GLUCOSE BLDC GLUCOMTR-MCNC: 132 MG/DL (ref 70–130)
GLUCOSE BLDC GLUCOMTR-MCNC: 144 MG/DL (ref 70–130)
GLUCOSE BLDC GLUCOMTR-MCNC: 167 MG/DL (ref 70–130)
GLUCOSE BLDC GLUCOMTR-MCNC: 223 MG/DL (ref 70–130)
HAV IGM SERPL QL IA: NEGATIVE
HBV CORE IGM SERPL QL IA: NEGATIVE
HBV SURFACE AG SERPL QL IA: NEGATIVE
HCT VFR BLD AUTO: 29.7 % (ref 37–47)
HCV AB S/CO SERPL IA: 0.2 S/CO RATIO (ref 0–0.9)
HGB BLD-MCNC: 9.8 G/DL (ref 12–16)
MAGNESIUM SERPL-MCNC: 2 MG/DL (ref 1.6–2.3)
MCH RBC QN AUTO: 37.4 PG (ref 27–31)
MCHC RBC AUTO-ENTMCNC: 33 G/DL (ref 30–37)
MCV RBC AUTO: 113.4 FL (ref 81–99)
PHOSPHATE SERPL-MCNC: 3.2 MG/DL (ref 2.5–4.5)
PLATELET # BLD AUTO: 180 10*3/MM3 (ref 130–400)
PMV BLD AUTO: 12.1 FL (ref 6–12)
POTASSIUM BLD-SCNC: 3.7 MMOL/L (ref 3.5–5.1)
PROT SERPL-MCNC: 6.2 G/DL (ref 6.3–8.2)
RBC # BLD AUTO: 2.62 10*6/MM3 (ref 4.2–5.4)
SODIUM BLD-SCNC: 141 MMOL/L (ref 137–145)
VANCOMYCIN TROUGH SERPL-MCNC: 10.78 MCG/ML (ref 5–15)
WBC NRBC COR # BLD: 17.42 10*3/MM3 (ref 4.8–10.8)

## 2019-03-12 PROCEDURE — 80053 COMPREHEN METABOLIC PANEL: CPT | Performed by: INTERNAL MEDICINE

## 2019-03-12 PROCEDURE — 25010000002 PROPOFOL 1000 MG/ML EMULSION: Performed by: INTERNAL MEDICINE

## 2019-03-12 PROCEDURE — 99233 SBSQ HOSP IP/OBS HIGH 50: CPT | Performed by: INTERNAL MEDICINE

## 2019-03-12 PROCEDURE — 25010000002 FUROSEMIDE PER 20 MG: Performed by: INTERNAL MEDICINE

## 2019-03-12 PROCEDURE — 84100 ASSAY OF PHOSPHORUS: CPT | Performed by: INTERNAL MEDICINE

## 2019-03-12 PROCEDURE — 82962 GLUCOSE BLOOD TEST: CPT

## 2019-03-12 PROCEDURE — 95816 EEG AWAKE AND DROWSY: CPT

## 2019-03-12 PROCEDURE — 25010000002 MORPHINE PER 10 MG: Performed by: INTERNAL MEDICINE

## 2019-03-12 PROCEDURE — 94799 UNLISTED PULMONARY SVC/PX: CPT

## 2019-03-12 PROCEDURE — 94003 VENT MGMT INPAT SUBQ DAY: CPT

## 2019-03-12 PROCEDURE — 25010000002 METHYLPREDNISOLONE PER 40 MG: Performed by: INTERNAL MEDICINE

## 2019-03-12 PROCEDURE — 25010000002 PIPERACILLIN SOD-TAZOBACTAM PER 1 G: Performed by: INTERNAL MEDICINE

## 2019-03-12 PROCEDURE — 80202 ASSAY OF VANCOMYCIN: CPT | Performed by: INTERNAL MEDICINE

## 2019-03-12 PROCEDURE — 25010000003 LEVETIRACETAM IN NACL 0.75% 1000 MG/100ML SOLUTION: Performed by: INTERNAL MEDICINE

## 2019-03-12 PROCEDURE — 25010000002 HYDRALAZINE PER 20 MG: Performed by: INTERNAL MEDICINE

## 2019-03-12 PROCEDURE — 83735 ASSAY OF MAGNESIUM: CPT | Performed by: INTERNAL MEDICINE

## 2019-03-12 PROCEDURE — 63710000001 INSULIN REGULAR HUMAN PER 5 UNITS: Performed by: HOSPITALIST

## 2019-03-12 PROCEDURE — 95816 EEG AWAKE AND DROWSY: CPT | Performed by: PSYCHIATRY & NEUROLOGY

## 2019-03-12 PROCEDURE — 25010000002 VANCOMYCIN 1 G RECONSTITUTED SOLUTION 1 EACH VIAL: Performed by: INTERNAL MEDICINE

## 2019-03-12 PROCEDURE — 85027 COMPLETE CBC AUTOMATED: CPT | Performed by: INTERNAL MEDICINE

## 2019-03-12 PROCEDURE — 94770: CPT

## 2019-03-12 PROCEDURE — 25010000002 LORAZEPAM PER 2 MG: Performed by: INTERNAL MEDICINE

## 2019-03-12 PROCEDURE — 25010000002 ENOXAPARIN PER 10 MG: Performed by: INTERNAL MEDICINE

## 2019-03-12 PROCEDURE — 99291 CRITICAL CARE FIRST HOUR: CPT | Performed by: INTERNAL MEDICINE

## 2019-03-12 PROCEDURE — 71045 X-RAY EXAM CHEST 1 VIEW: CPT

## 2019-03-12 RX ORDER — FUROSEMIDE 10 MG/ML
40 INJECTION INTRAMUSCULAR; INTRAVENOUS ONCE
Status: COMPLETED | OUTPATIENT
Start: 2019-03-12 | End: 2019-03-12

## 2019-03-12 RX ORDER — METHYLPREDNISOLONE SODIUM SUCCINATE 40 MG/ML
40 INJECTION, POWDER, LYOPHILIZED, FOR SOLUTION INTRAMUSCULAR; INTRAVENOUS EVERY 12 HOURS
Status: DISCONTINUED | OUTPATIENT
Start: 2019-03-12 | End: 2019-03-15

## 2019-03-12 RX ADMIN — LEVETIRACETAM 1000 MG: 10 INJECTION INTRAVENOUS at 09:55

## 2019-03-12 RX ADMIN — LORAZEPAM 1 MG: 2 INJECTION INTRAMUSCULAR; INTRAVENOUS at 12:19

## 2019-03-12 RX ADMIN — BUDESONIDE 0.5 MG: 0.5 INHALANT RESPIRATORY (INHALATION) at 18:58

## 2019-03-12 RX ADMIN — SODIUM CHLORIDE, PRESERVATIVE FREE 10 ML: 5 INJECTION INTRAVENOUS at 20:24

## 2019-03-12 RX ADMIN — PROPOFOL 50 MCG/KG/MIN: 10 INJECTION, EMULSION INTRAVENOUS at 01:35

## 2019-03-12 RX ADMIN — MORPHINE SULFATE 2 MG: 2 INJECTION, SOLUTION INTRAMUSCULAR; INTRAVENOUS at 23:50

## 2019-03-12 RX ADMIN — HUMAN INSULIN 2 UNITS: 100 INJECTION, SOLUTION SUBCUTANEOUS at 23:39

## 2019-03-12 RX ADMIN — CHLORHEXIDINE GLUCONATE 0.12% ORAL RINSE 15 ML: 1.2 LIQUID ORAL at 20:05

## 2019-03-12 RX ADMIN — HYDRALAZINE HYDROCHLORIDE 20 MG: 20 INJECTION INTRAMUSCULAR; INTRAVENOUS at 01:02

## 2019-03-12 RX ADMIN — MORPHINE SULFATE 2 MG: 2 INJECTION, SOLUTION INTRAMUSCULAR; INTRAVENOUS at 10:50

## 2019-03-12 RX ADMIN — IPRATROPIUM BROMIDE AND ALBUTEROL SULFATE 3 ML: .5; 3 SOLUTION RESPIRATORY (INHALATION) at 01:07

## 2019-03-12 RX ADMIN — SODIUM CHLORIDE, PRESERVATIVE FREE 20 ML: 5 INJECTION INTRAVENOUS at 04:14

## 2019-03-12 RX ADMIN — SODIUM CHLORIDE, PRESERVATIVE FREE 20 ML: 5 INJECTION INTRAVENOUS at 20:06

## 2019-03-12 RX ADMIN — BUDESONIDE 0.5 MG: 0.5 INHALANT RESPIRATORY (INHALATION) at 06:42

## 2019-03-12 RX ADMIN — ACETAMINOPHEN 650 MG: 325 TABLET, FILM COATED ORAL at 03:09

## 2019-03-12 RX ADMIN — VANCOMYCIN HYDROCHLORIDE 1000 MG: 1 INJECTION, POWDER, LYOPHILIZED, FOR SOLUTION INTRAVENOUS at 18:06

## 2019-03-12 RX ADMIN — IPRATROPIUM BROMIDE AND ALBUTEROL SULFATE 3 ML: .5; 3 SOLUTION RESPIRATORY (INHALATION) at 06:42

## 2019-03-12 RX ADMIN — PROPOFOL 50 MCG/KG/MIN: 10 INJECTION, EMULSION INTRAVENOUS at 15:40

## 2019-03-12 RX ADMIN — Medication 1 CAPSULE: at 09:55

## 2019-03-12 RX ADMIN — LEVETIRACETAM 1000 MG: 10 INJECTION INTRAVENOUS at 20:05

## 2019-03-12 RX ADMIN — FAMOTIDINE 20 MG: 10 INJECTION, SOLUTION INTRAVENOUS at 09:55

## 2019-03-12 RX ADMIN — PROPOFOL 50 MCG/KG/MIN: 10 INJECTION, EMULSION INTRAVENOUS at 12:19

## 2019-03-12 RX ADMIN — IPRATROPIUM BROMIDE AND ALBUTEROL SULFATE 3 ML: .5; 3 SOLUTION RESPIRATORY (INHALATION) at 12:14

## 2019-03-12 RX ADMIN — LABETALOL 20 MG/4 ML (5 MG/ML) INTRAVENOUS SYRINGE 20 MG: at 20:24

## 2019-03-12 RX ADMIN — LABETALOL 20 MG/4 ML (5 MG/ML) INTRAVENOUS SYRINGE 20 MG: at 02:54

## 2019-03-12 RX ADMIN — TAZOBACTAM SODIUM AND PIPERACILLIN SODIUM 3.38 G: 375; 3 INJECTION, SOLUTION INTRAVENOUS at 05:28

## 2019-03-12 RX ADMIN — METHYLPREDNISOLONE SODIUM SUCCINATE 40 MG: 40 INJECTION, POWDER, FOR SOLUTION INTRAMUSCULAR; INTRAVENOUS at 15:20

## 2019-03-12 RX ADMIN — MORPHINE SULFATE 2 MG: 2 INJECTION, SOLUTION INTRAMUSCULAR; INTRAVENOUS at 20:24

## 2019-03-12 RX ADMIN — SODIUM CHLORIDE, PRESERVATIVE FREE 10 ML: 5 INJECTION INTRAVENOUS at 23:52

## 2019-03-12 RX ADMIN — HYDRALAZINE HYDROCHLORIDE 20 MG: 20 INJECTION INTRAMUSCULAR; INTRAVENOUS at 23:45

## 2019-03-12 RX ADMIN — ENOXAPARIN SODIUM 40 MG: 100 INJECTION SUBCUTANEOUS at 10:03

## 2019-03-12 RX ADMIN — TAZOBACTAM SODIUM AND PIPERACILLIN SODIUM 3.38 G: 375; 3 INJECTION, SOLUTION INTRAVENOUS at 13:15

## 2019-03-12 RX ADMIN — FUROSEMIDE 40 MG: 10 INJECTION, SOLUTION INTRAMUSCULAR; INTRAVENOUS at 10:03

## 2019-03-12 RX ADMIN — HUMAN INSULIN 3 UNITS: 100 INJECTION, SOLUTION SUBCUTANEOUS at 13:15

## 2019-03-12 RX ADMIN — PROPOFOL 50 MCG/KG/MIN: 10 INJECTION, EMULSION INTRAVENOUS at 06:07

## 2019-03-12 RX ADMIN — CHLORHEXIDINE GLUCONATE 0.12% ORAL RINSE 15 ML: 1.2 LIQUID ORAL at 10:00

## 2019-03-12 RX ADMIN — IPRATROPIUM BROMIDE AND ALBUTEROL SULFATE 3 ML: .5; 3 SOLUTION RESPIRATORY (INHALATION) at 18:58

## 2019-03-12 RX ADMIN — PROPOFOL 50 MCG/KG/MIN: 10 INJECTION, EMULSION INTRAVENOUS at 19:33

## 2019-03-12 RX ADMIN — TAZOBACTAM SODIUM AND PIPERACILLIN SODIUM 3.38 G: 375; 3 INJECTION, SOLUTION INTRAVENOUS at 21:10

## 2019-03-12 RX ADMIN — MORPHINE SULFATE 2 MG: 2 INJECTION, SOLUTION INTRAMUSCULAR; INTRAVENOUS at 04:14

## 2019-03-12 RX ADMIN — METHYLPREDNISOLONE SODIUM SUCCINATE 40 MG: 40 INJECTION, POWDER, FOR SOLUTION INTRAMUSCULAR; INTRAVENOUS at 02:54

## 2019-03-12 RX ADMIN — SODIUM CHLORIDE, PRESERVATIVE FREE 3 ML: 5 INJECTION INTRAVENOUS at 10:00

## 2019-03-12 RX ADMIN — PROPOFOL 50 MCG/KG/MIN: 10 INJECTION, EMULSION INTRAVENOUS at 23:44

## 2019-03-12 RX ADMIN — FAMOTIDINE 20 MG: 10 INJECTION, SOLUTION INTRAVENOUS at 20:05

## 2019-03-12 RX ADMIN — Medication: at 18:11

## 2019-03-13 ENCOUNTER — APPOINTMENT (OUTPATIENT)
Dept: GENERAL RADIOLOGY | Facility: HOSPITAL | Age: 59
End: 2019-03-13

## 2019-03-13 LAB
A-A DO2: ABNORMAL MMHG
ALBUMIN SERPL-MCNC: 3.4 G/DL (ref 3.5–5)
ALBUMIN/GLOB SERPL: 1.3 G/DL (ref 1–2)
ALP SERPL-CCNC: 72 U/L (ref 38–126)
ALT SERPL W P-5'-P-CCNC: 60 U/L (ref 13–69)
ANION GAP SERPL CALCULATED.3IONS-SCNC: 11.4 MMOL/L (ref 10–20)
ARTERIAL PATENCY WRIST A: POSITIVE
AST SERPL-CCNC: 70 U/L (ref 15–46)
ATMOSPHERIC PRESS: 736 MMHG
BACTERIA SPEC RESP CULT: NORMAL
BASE EXCESS BLDA CALC-SCNC: 7.3 MMOL/L (ref 0–2)
BDY SITE: ABNORMAL
BILIRUB SERPL-MCNC: 0.4 MG/DL (ref 0.2–1.3)
BUN BLD-MCNC: 19 MG/DL (ref 7–20)
BUN/CREAT SERPL: 23.8 (ref 7.1–23.5)
CALCIUM SPEC-SCNC: 9.3 MG/DL (ref 8.4–10.2)
CHLORIDE SERPL-SCNC: 102 MMOL/L (ref 98–107)
CO2 SERPL-SCNC: 31 MMOL/L (ref 26–30)
COHGB MFR BLD: 0.8 % (ref 0–2)
CREAT BLD-MCNC: 0.8 MG/DL (ref 0.6–1.3)
DEPRECATED RDW RBC AUTO: 57.8 FL (ref 37–54)
ERYTHROCYTE [DISTWIDTH] IN BLOOD BY AUTOMATED COUNT: 14.1 % (ref 11.5–14.5)
GFR SERPL CREATININE-BSD FRML MDRD: 73 ML/MIN/1.73
GLOBULIN UR ELPH-MCNC: 2.7 GM/DL
GLUCOSE BLD-MCNC: 154 MG/DL (ref 74–98)
GLUCOSE BLDC GLUCOMTR-MCNC: 153 MG/DL (ref 70–130)
GLUCOSE BLDC GLUCOMTR-MCNC: 154 MG/DL (ref 70–130)
GLUCOSE BLDC GLUCOMTR-MCNC: 162 MG/DL (ref 70–130)
GRAM STN SPEC: NORMAL
HCO3 BLDA-SCNC: 31.4 MMOL/L (ref 22–28)
HCT VFR BLD AUTO: 28.2 % (ref 37–47)
HCT VFR BLD CALC: 28.9 %
HGB BLD-MCNC: 9.1 G/DL (ref 12–16)
HGB BLDA-MCNC: 9.4 G/DL (ref 12–18)
HOROWITZ INDEX BLD+IHG-RTO: 45 %
MAGNESIUM SERPL-MCNC: 2.3 MG/DL (ref 1.6–2.3)
MCH RBC QN AUTO: 36 PG (ref 27–31)
MCHC RBC AUTO-ENTMCNC: 32.3 G/DL (ref 30–37)
MCV RBC AUTO: 111.5 FL (ref 81–99)
METHGB BLD QL: 0.8 % (ref 0–1.5)
MODALITY: ABNORMAL
NOTE: ABNORMAL
OXYHGB MFR BLDV: 95.8 % (ref 94–99)
PCO2 BLDA: 41.8 MM HG (ref 35–45)
PCO2 TEMP ADJ BLD: ABNORMAL MM HG (ref 35–45)
PH BLDA: 7.48 PH UNITS (ref 7.3–7.5)
PH, TEMP CORRECTED: ABNORMAL PH UNITS
PHOSPHATE SERPL-MCNC: 4 MG/DL (ref 2.5–4.5)
PLATELET # BLD AUTO: 171 10*3/MM3 (ref 130–400)
PMV BLD AUTO: 12.2 FL (ref 6–12)
PO2 BLDA: 83.7 MM HG (ref 75–100)
PO2 TEMP ADJ BLD: ABNORMAL MM HG (ref 83–108)
POTASSIUM BLD-SCNC: 3.4 MMOL/L (ref 3.5–5.1)
PROCALCITONIN SERPL-MCNC: 2.55 NG/ML
PROT SERPL-MCNC: 6.1 G/DL (ref 6.3–8.2)
RBC # BLD AUTO: 2.53 10*6/MM3 (ref 4.2–5.4)
SAO2 % BLDCOA: 97.4 % (ref 94–100)
SODIUM BLD-SCNC: 141 MMOL/L (ref 137–145)
VENTILATOR MODE: ABNORMAL
WBC NRBC COR # BLD: 13.05 10*3/MM3 (ref 4.8–10.8)

## 2019-03-13 PROCEDURE — 25010000002 METHYLPREDNISOLONE PER 40 MG: Performed by: INTERNAL MEDICINE

## 2019-03-13 PROCEDURE — 99233 SBSQ HOSP IP/OBS HIGH 50: CPT | Performed by: INTERNAL MEDICINE

## 2019-03-13 PROCEDURE — 36600 WITHDRAWAL OF ARTERIAL BLOOD: CPT

## 2019-03-13 PROCEDURE — 25010000002 LORAZEPAM PER 2 MG: Performed by: INTERNAL MEDICINE

## 2019-03-13 PROCEDURE — 84145 PROCALCITONIN (PCT): CPT | Performed by: INTERNAL MEDICINE

## 2019-03-13 PROCEDURE — 94003 VENT MGMT INPAT SUBQ DAY: CPT

## 2019-03-13 PROCEDURE — 94799 UNLISTED PULMONARY SVC/PX: CPT

## 2019-03-13 PROCEDURE — 83050 HGB METHEMOGLOBIN QUAN: CPT

## 2019-03-13 PROCEDURE — 25010000002 PIPERACILLIN SOD-TAZOBACTAM PER 1 G: Performed by: INTERNAL MEDICINE

## 2019-03-13 PROCEDURE — 71045 X-RAY EXAM CHEST 1 VIEW: CPT

## 2019-03-13 PROCEDURE — 82962 GLUCOSE BLOOD TEST: CPT

## 2019-03-13 PROCEDURE — 63710000001 INSULIN REGULAR HUMAN PER 5 UNITS: Performed by: HOSPITALIST

## 2019-03-13 PROCEDURE — 82805 BLOOD GASES W/O2 SATURATION: CPT

## 2019-03-13 PROCEDURE — 25010000002 VANCOMYCIN 1 G RECONSTITUTED SOLUTION 1 EACH VIAL: Performed by: INTERNAL MEDICINE

## 2019-03-13 PROCEDURE — 25010000002 HYDRALAZINE PER 20 MG: Performed by: INTERNAL MEDICINE

## 2019-03-13 PROCEDURE — 85027 COMPLETE CBC AUTOMATED: CPT | Performed by: INTERNAL MEDICINE

## 2019-03-13 PROCEDURE — 82375 ASSAY CARBOXYHB QUANT: CPT

## 2019-03-13 PROCEDURE — 25010000002 FUROSEMIDE PER 20 MG: Performed by: INTERNAL MEDICINE

## 2019-03-13 PROCEDURE — 83735 ASSAY OF MAGNESIUM: CPT | Performed by: INTERNAL MEDICINE

## 2019-03-13 PROCEDURE — 25010000002 MORPHINE PER 10 MG: Performed by: INTERNAL MEDICINE

## 2019-03-13 PROCEDURE — 25010000002 PROPOFOL 1000 MG/ML EMULSION: Performed by: INTERNAL MEDICINE

## 2019-03-13 PROCEDURE — 25010000002 ENOXAPARIN PER 10 MG: Performed by: INTERNAL MEDICINE

## 2019-03-13 PROCEDURE — 94770: CPT

## 2019-03-13 PROCEDURE — 84100 ASSAY OF PHOSPHORUS: CPT | Performed by: INTERNAL MEDICINE

## 2019-03-13 PROCEDURE — 25010000003 LEVETIRACETAM IN NACL 0.75% 1000 MG/100ML SOLUTION: Performed by: INTERNAL MEDICINE

## 2019-03-13 PROCEDURE — 80053 COMPREHEN METABOLIC PANEL: CPT | Performed by: INTERNAL MEDICINE

## 2019-03-13 RX ORDER — POTASSIUM CHLORIDE 1.5 G/1.77G
20 POWDER, FOR SOLUTION ORAL 2 TIMES DAILY
Status: COMPLETED | OUTPATIENT
Start: 2019-03-13 | End: 2019-03-14

## 2019-03-13 RX ORDER — WHITE PETROLATUM 450 MG/G
1 STICK TOPICAL
Status: DISCONTINUED | OUTPATIENT
Start: 2019-03-13 | End: 2019-03-25 | Stop reason: HOSPADM

## 2019-03-13 RX ORDER — FUROSEMIDE 10 MG/ML
40 INJECTION INTRAMUSCULAR; INTRAVENOUS ONCE
Status: COMPLETED | OUTPATIENT
Start: 2019-03-13 | End: 2019-03-13

## 2019-03-13 RX ADMIN — SODIUM CHLORIDE, PRESERVATIVE FREE 3 ML: 5 INJECTION INTRAVENOUS at 08:36

## 2019-03-13 RX ADMIN — SODIUM CHLORIDE, PRESERVATIVE FREE 10 ML: 5 INJECTION INTRAVENOUS at 06:38

## 2019-03-13 RX ADMIN — VANCOMYCIN HYDROCHLORIDE 1000 MG: 1 INJECTION, POWDER, LYOPHILIZED, FOR SOLUTION INTRAVENOUS at 05:38

## 2019-03-13 RX ADMIN — Medication 1 CAPSULE: at 08:36

## 2019-03-13 RX ADMIN — SODIUM CHLORIDE, PRESERVATIVE FREE 10 ML: 5 INJECTION INTRAVENOUS at 06:05

## 2019-03-13 RX ADMIN — MORPHINE SULFATE 2 MG: 2 INJECTION, SOLUTION INTRAMUSCULAR; INTRAVENOUS at 20:19

## 2019-03-13 RX ADMIN — ENOXAPARIN SODIUM 40 MG: 100 INJECTION SUBCUTANEOUS at 11:04

## 2019-03-13 RX ADMIN — LABETALOL 20 MG/4 ML (5 MG/ML) INTRAVENOUS SYRINGE 20 MG: at 02:03

## 2019-03-13 RX ADMIN — SODIUM CHLORIDE, PRESERVATIVE FREE 3 ML: 5 INJECTION INTRAVENOUS at 21:17

## 2019-03-13 RX ADMIN — BUDESONIDE 0.5 MG: 0.5 INHALANT RESPIRATORY (INHALATION) at 18:42

## 2019-03-13 RX ADMIN — MORPHINE SULFATE 2 MG: 2 INJECTION, SOLUTION INTRAMUSCULAR; INTRAVENOUS at 06:38

## 2019-03-13 RX ADMIN — LEVETIRACETAM 1000 MG: 10 INJECTION INTRAVENOUS at 11:04

## 2019-03-13 RX ADMIN — HUMAN INSULIN 2 UNITS: 100 INJECTION, SOLUTION SUBCUTANEOUS at 05:11

## 2019-03-13 RX ADMIN — IPRATROPIUM BROMIDE AND ALBUTEROL SULFATE 3 ML: .5; 3 SOLUTION RESPIRATORY (INHALATION) at 06:59

## 2019-03-13 RX ADMIN — METHYLPREDNISOLONE SODIUM SUCCINATE 40 MG: 40 INJECTION, POWDER, FOR SOLUTION INTRAMUSCULAR; INTRAVENOUS at 14:05

## 2019-03-13 RX ADMIN — PROPOFOL 50 MCG/KG/MIN: 10 INJECTION, EMULSION INTRAVENOUS at 21:18

## 2019-03-13 RX ADMIN — TAZOBACTAM SODIUM AND PIPERACILLIN SODIUM 3.38 G: 375; 3 INJECTION, SOLUTION INTRAVENOUS at 21:10

## 2019-03-13 RX ADMIN — FUROSEMIDE 40 MG: 10 INJECTION, SOLUTION INTRAMUSCULAR; INTRAVENOUS at 17:41

## 2019-03-13 RX ADMIN — TAZOBACTAM SODIUM AND PIPERACILLIN SODIUM 3.38 G: 375; 3 INJECTION, SOLUTION INTRAVENOUS at 13:57

## 2019-03-13 RX ADMIN — CHLORHEXIDINE GLUCONATE 0.12% ORAL RINSE 15 ML: 1.2 LIQUID ORAL at 08:36

## 2019-03-13 RX ADMIN — TAZOBACTAM SODIUM AND PIPERACILLIN SODIUM 3.38 G: 375; 3 INJECTION, SOLUTION INTRAVENOUS at 05:11

## 2019-03-13 RX ADMIN — PROPOFOL 50 MCG/KG/MIN: 10 INJECTION, EMULSION INTRAVENOUS at 12:54

## 2019-03-13 RX ADMIN — FAMOTIDINE 20 MG: 10 INJECTION, SOLUTION INTRAVENOUS at 21:11

## 2019-03-13 RX ADMIN — HUMAN INSULIN 2 UNITS: 100 INJECTION, SOLUTION SUBCUTANEOUS at 11:30

## 2019-03-13 RX ADMIN — HUMAN INSULIN 2 UNITS: 100 INJECTION, SOLUTION SUBCUTANEOUS at 18:09

## 2019-03-13 RX ADMIN — MORPHINE SULFATE 2 MG: 2 INJECTION, SOLUTION INTRAMUSCULAR; INTRAVENOUS at 15:46

## 2019-03-13 RX ADMIN — SODIUM CHLORIDE, PRESERVATIVE FREE 10 ML: 5 INJECTION INTRAVENOUS at 00:18

## 2019-03-13 RX ADMIN — IPRATROPIUM BROMIDE AND ALBUTEROL SULFATE 3 ML: .5; 3 SOLUTION RESPIRATORY (INHALATION) at 18:42

## 2019-03-13 RX ADMIN — PROPOFOL 50 MCG/KG/MIN: 10 INJECTION, EMULSION INTRAVENOUS at 08:45

## 2019-03-13 RX ADMIN — HYDRALAZINE HYDROCHLORIDE 20 MG: 20 INJECTION INTRAMUSCULAR; INTRAVENOUS at 06:04

## 2019-03-13 RX ADMIN — FAMOTIDINE 20 MG: 10 INJECTION, SOLUTION INTRAVENOUS at 08:35

## 2019-03-13 RX ADMIN — BUDESONIDE 0.5 MG: 0.5 INHALANT RESPIRATORY (INHALATION) at 06:59

## 2019-03-13 RX ADMIN — PROPOFOL 50 MCG/KG/MIN: 10 INJECTION, EMULSION INTRAVENOUS at 17:41

## 2019-03-13 RX ADMIN — METHYLPREDNISOLONE SODIUM SUCCINATE 40 MG: 40 INJECTION, POWDER, FOR SOLUTION INTRAMUSCULAR; INTRAVENOUS at 02:00

## 2019-03-13 RX ADMIN — IPRATROPIUM BROMIDE AND ALBUTEROL SULFATE 3 ML: .5; 3 SOLUTION RESPIRATORY (INHALATION) at 00:34

## 2019-03-13 RX ADMIN — POTASSIUM CHLORIDE 20 MEQ: 1.5 POWDER, FOR SOLUTION ORAL at 21:11

## 2019-03-13 RX ADMIN — VANCOMYCIN HYDROCHLORIDE 1000 MG: 1 INJECTION, POWDER, LYOPHILIZED, FOR SOLUTION INTRAVENOUS at 17:43

## 2019-03-13 RX ADMIN — IPRATROPIUM BROMIDE AND ALBUTEROL SULFATE 3 ML: .5; 3 SOLUTION RESPIRATORY (INHALATION) at 13:19

## 2019-03-13 RX ADMIN — LORAZEPAM 1 MG: 2 INJECTION INTRAMUSCULAR; INTRAVENOUS at 00:18

## 2019-03-13 RX ADMIN — PROPOFOL 50 MCG/KG/MIN: 10 INJECTION, EMULSION INTRAVENOUS at 03:11

## 2019-03-13 RX ADMIN — SODIUM CHLORIDE, PRESERVATIVE FREE 20 ML: 5 INJECTION INTRAVENOUS at 04:35

## 2019-03-13 RX ADMIN — CHLORHEXIDINE GLUCONATE 0.12% ORAL RINSE 15 ML: 1.2 LIQUID ORAL at 21:13

## 2019-03-14 LAB
A-A DO2: ABNORMAL MMHG
ALBUMIN SERPL-MCNC: 3.4 G/DL (ref 3.5–5)
ALBUMIN/GLOB SERPL: 1.2 G/DL (ref 1–2)
ALP SERPL-CCNC: 71 U/L (ref 38–126)
ALT SERPL W P-5'-P-CCNC: 59 U/L (ref 13–69)
ANION GAP SERPL CALCULATED.3IONS-SCNC: 9.4 MMOL/L (ref 10–20)
ARTERIAL PATENCY WRIST A: POSITIVE
AST SERPL-CCNC: 76 U/L (ref 15–46)
ATMOSPHERIC PRESS: 731 MMHG
BASE EXCESS BLDA CALC-SCNC: 8.5 MMOL/L (ref 0–2)
BDY SITE: ABNORMAL
BILIRUB SERPL-MCNC: 0.5 MG/DL (ref 0.2–1.3)
BUN BLD-MCNC: 24 MG/DL (ref 7–20)
BUN/CREAT SERPL: 30 (ref 7.1–23.5)
CALCIUM SPEC-SCNC: 9.1 MG/DL (ref 8.4–10.2)
CHLORIDE SERPL-SCNC: 102 MMOL/L (ref 98–107)
CO2 SERPL-SCNC: 32 MMOL/L (ref 26–30)
COHGB MFR BLD: 1.1 % (ref 0–2)
CREAT BLD-MCNC: 0.8 MG/DL (ref 0.6–1.3)
DEPRECATED RDW RBC AUTO: 58.8 FL (ref 37–54)
ERYTHROCYTE [DISTWIDTH] IN BLOOD BY AUTOMATED COUNT: 14.2 % (ref 11.5–14.5)
GFR SERPL CREATININE-BSD FRML MDRD: 73 ML/MIN/1.73
GLOBULIN UR ELPH-MCNC: 2.8 GM/DL
GLUCOSE BLD-MCNC: 148 MG/DL (ref 74–98)
GLUCOSE BLDC GLUCOMTR-MCNC: 138 MG/DL (ref 70–130)
GLUCOSE BLDC GLUCOMTR-MCNC: 149 MG/DL (ref 70–130)
GLUCOSE BLDC GLUCOMTR-MCNC: 160 MG/DL (ref 70–130)
GLUCOSE BLDC GLUCOMTR-MCNC: 175 MG/DL (ref 70–130)
HCO3 BLDA-SCNC: 33.1 MMOL/L (ref 22–28)
HCT VFR BLD AUTO: 29.4 % (ref 37–47)
HCT VFR BLD CALC: 29.2 %
HGB BLD-MCNC: 9.6 G/DL (ref 12–16)
HGB BLDA-MCNC: 9.5 G/DL (ref 12–18)
HOROWITZ INDEX BLD+IHG-RTO: 45 %
MAGNESIUM SERPL-MCNC: 2.5 MG/DL (ref 1.6–2.3)
MCH RBC QN AUTO: 36.9 PG (ref 27–31)
MCHC RBC AUTO-ENTMCNC: 32.7 G/DL (ref 30–37)
MCV RBC AUTO: 113.1 FL (ref 81–99)
METHGB BLD QL: 0.5 % (ref 0–1.5)
MODALITY: ABNORMAL
NOTE: ABNORMAL
OXYHGB MFR BLDV: 94.8 % (ref 94–99)
PCO2 BLDA: 45.7 MM HG (ref 35–45)
PCO2 TEMP ADJ BLD: ABNORMAL MM HG (ref 35–45)
PEEP RESPIRATORY: 5 CM[H2O]
PH BLDA: 7.47 PH UNITS (ref 7.3–7.5)
PH, TEMP CORRECTED: ABNORMAL PH UNITS
PHOSPHATE SERPL-MCNC: 5 MG/DL (ref 2.5–4.5)
PLATELET # BLD AUTO: 182 10*3/MM3 (ref 130–400)
PMV BLD AUTO: 12 FL (ref 6–12)
PO2 BLDA: 77.6 MM HG (ref 75–100)
PO2 TEMP ADJ BLD: ABNORMAL MM HG (ref 83–108)
POTASSIUM BLD-SCNC: 3.4 MMOL/L (ref 3.5–5.1)
PROT SERPL-MCNC: 6.2 G/DL (ref 6.3–8.2)
RBC # BLD AUTO: 2.6 10*6/MM3 (ref 4.2–5.4)
SAO2 % BLDCOA: 96.3 % (ref 94–100)
SET MECH RESP RATE: 15
SODIUM BLD-SCNC: 140 MMOL/L (ref 137–145)
VENTILATOR MODE: AC
VT ON VENT VENT: 420 ML
WBC NRBC COR # BLD: 13.36 10*3/MM3 (ref 4.8–10.8)

## 2019-03-14 PROCEDURE — 80053 COMPREHEN METABOLIC PANEL: CPT | Performed by: INTERNAL MEDICINE

## 2019-03-14 PROCEDURE — 25010000002 ENOXAPARIN PER 10 MG: Performed by: INTERNAL MEDICINE

## 2019-03-14 PROCEDURE — 94003 VENT MGMT INPAT SUBQ DAY: CPT

## 2019-03-14 PROCEDURE — 82962 GLUCOSE BLOOD TEST: CPT

## 2019-03-14 PROCEDURE — 25010000002 MORPHINE PER 10 MG: Performed by: INTERNAL MEDICINE

## 2019-03-14 PROCEDURE — 25010000002 HYDRALAZINE PER 20 MG: Performed by: INTERNAL MEDICINE

## 2019-03-14 PROCEDURE — 25010000002 LORAZEPAM PER 2 MG: Performed by: INTERNAL MEDICINE

## 2019-03-14 PROCEDURE — 25010000002 PROPOFOL 1000 MG/ML EMULSION: Performed by: INTERNAL MEDICINE

## 2019-03-14 PROCEDURE — 25010000002 FUROSEMIDE PER 20 MG: Performed by: INTERNAL MEDICINE

## 2019-03-14 PROCEDURE — 94799 UNLISTED PULMONARY SVC/PX: CPT

## 2019-03-14 PROCEDURE — 84100 ASSAY OF PHOSPHORUS: CPT | Performed by: INTERNAL MEDICINE

## 2019-03-14 PROCEDURE — 25010000002 PIPERACILLIN SOD-TAZOBACTAM PER 1 G: Performed by: INTERNAL MEDICINE

## 2019-03-14 PROCEDURE — 36600 WITHDRAWAL OF ARTERIAL BLOOD: CPT

## 2019-03-14 PROCEDURE — 82375 ASSAY CARBOXYHB QUANT: CPT

## 2019-03-14 PROCEDURE — 25010000002 VANCOMYCIN 1 G RECONSTITUTED SOLUTION 1 EACH VIAL: Performed by: INTERNAL MEDICINE

## 2019-03-14 PROCEDURE — 82805 BLOOD GASES W/O2 SATURATION: CPT

## 2019-03-14 PROCEDURE — 83735 ASSAY OF MAGNESIUM: CPT | Performed by: INTERNAL MEDICINE

## 2019-03-14 PROCEDURE — 99233 SBSQ HOSP IP/OBS HIGH 50: CPT | Performed by: INTERNAL MEDICINE

## 2019-03-14 PROCEDURE — 63710000001 INSULIN REGULAR HUMAN PER 5 UNITS: Performed by: HOSPITALIST

## 2019-03-14 PROCEDURE — 85027 COMPLETE CBC AUTOMATED: CPT | Performed by: INTERNAL MEDICINE

## 2019-03-14 PROCEDURE — 83050 HGB METHEMOGLOBIN QUAN: CPT

## 2019-03-14 PROCEDURE — 25010000002 METHYLPREDNISOLONE PER 40 MG: Performed by: INTERNAL MEDICINE

## 2019-03-14 RX ORDER — FUROSEMIDE 10 MG/ML
40 INJECTION INTRAMUSCULAR; INTRAVENOUS ONCE
Status: COMPLETED | OUTPATIENT
Start: 2019-03-14 | End: 2019-03-14

## 2019-03-14 RX ORDER — POTASSIUM CHLORIDE 1.5 G/1.77G
20 POWDER, FOR SOLUTION ORAL 2 TIMES DAILY
Status: COMPLETED | OUTPATIENT
Start: 2019-03-14 | End: 2019-03-15

## 2019-03-14 RX ADMIN — HYDRALAZINE HYDROCHLORIDE 20 MG: 20 INJECTION INTRAMUSCULAR; INTRAVENOUS at 21:19

## 2019-03-14 RX ADMIN — SODIUM CHLORIDE, PRESERVATIVE FREE 3 ML: 5 INJECTION INTRAVENOUS at 09:06

## 2019-03-14 RX ADMIN — IPRATROPIUM BROMIDE AND ALBUTEROL SULFATE 3 ML: .5; 3 SOLUTION RESPIRATORY (INHALATION) at 19:01

## 2019-03-14 RX ADMIN — POTASSIUM CHLORIDE 20 MEQ: 1.5 POWDER, FOR SOLUTION ORAL at 20:26

## 2019-03-14 RX ADMIN — VANCOMYCIN HYDROCHLORIDE 1000 MG: 1 INJECTION, POWDER, LYOPHILIZED, FOR SOLUTION INTRAVENOUS at 05:47

## 2019-03-14 RX ADMIN — FAMOTIDINE 20 MG: 10 INJECTION, SOLUTION INTRAVENOUS at 20:26

## 2019-03-14 RX ADMIN — MORPHINE SULFATE 2 MG: 2 INJECTION, SOLUTION INTRAMUSCULAR; INTRAVENOUS at 00:00

## 2019-03-14 RX ADMIN — LORAZEPAM 1 MG: 2 INJECTION INTRAMUSCULAR; INTRAVENOUS at 23:16

## 2019-03-14 RX ADMIN — BUDESONIDE 0.5 MG: 0.5 INHALANT RESPIRATORY (INHALATION) at 07:31

## 2019-03-14 RX ADMIN — PROPOFOL 50 MCG/KG/MIN: 10 INJECTION, EMULSION INTRAVENOUS at 16:37

## 2019-03-14 RX ADMIN — ENOXAPARIN SODIUM 40 MG: 100 INJECTION SUBCUTANEOUS at 12:25

## 2019-03-14 RX ADMIN — WHITE PETROLATUM 1 EACH: 450 STICK TOPICAL at 04:49

## 2019-03-14 RX ADMIN — IPRATROPIUM BROMIDE AND ALBUTEROL SULFATE 3 ML: .5; 3 SOLUTION RESPIRATORY (INHALATION) at 07:31

## 2019-03-14 RX ADMIN — IPRATROPIUM BROMIDE AND ALBUTEROL SULFATE 3 ML: .5; 3 SOLUTION RESPIRATORY (INHALATION) at 13:15

## 2019-03-14 RX ADMIN — MORPHINE SULFATE 2 MG: 2 INJECTION, SOLUTION INTRAMUSCULAR; INTRAVENOUS at 02:11

## 2019-03-14 RX ADMIN — SODIUM CHLORIDE, PRESERVATIVE FREE 10 ML: 5 INJECTION INTRAVENOUS at 04:49

## 2019-03-14 RX ADMIN — TAZOBACTAM SODIUM AND PIPERACILLIN SODIUM 3.38 G: 375; 3 INJECTION, SOLUTION INTRAVENOUS at 14:55

## 2019-03-14 RX ADMIN — POTASSIUM CHLORIDE 20 MEQ: 1.5 POWDER, FOR SOLUTION ORAL at 08:14

## 2019-03-14 RX ADMIN — IPRATROPIUM BROMIDE AND ALBUTEROL SULFATE 3 ML: .5; 3 SOLUTION RESPIRATORY (INHALATION) at 01:01

## 2019-03-14 RX ADMIN — PROPOFOL 25 MCG/KG/MIN: 10 INJECTION, EMULSION INTRAVENOUS at 02:14

## 2019-03-14 RX ADMIN — FAMOTIDINE 20 MG: 10 INJECTION, SOLUTION INTRAVENOUS at 08:14

## 2019-03-14 RX ADMIN — MORPHINE SULFATE 2 MG: 2 INJECTION, SOLUTION INTRAMUSCULAR; INTRAVENOUS at 06:25

## 2019-03-14 RX ADMIN — PROPOFOL 50 MCG/KG/MIN: 10 INJECTION, EMULSION INTRAVENOUS at 08:36

## 2019-03-14 RX ADMIN — METHYLPREDNISOLONE SODIUM SUCCINATE 40 MG: 40 INJECTION, POWDER, FOR SOLUTION INTRAMUSCULAR; INTRAVENOUS at 14:55

## 2019-03-14 RX ADMIN — FUROSEMIDE 40 MG: 10 INJECTION, SOLUTION INTRAMUSCULAR; INTRAVENOUS at 17:38

## 2019-03-14 RX ADMIN — PROPOFOL 50 MCG/KG/MIN: 10 INJECTION, EMULSION INTRAVENOUS at 12:12

## 2019-03-14 RX ADMIN — SODIUM CHLORIDE, PRESERVATIVE FREE 3 ML: 5 INJECTION INTRAVENOUS at 20:26

## 2019-03-14 RX ADMIN — BUDESONIDE 0.5 MG: 0.5 INHALANT RESPIRATORY (INHALATION) at 19:01

## 2019-03-14 RX ADMIN — CHLORHEXIDINE GLUCONATE 0.12% ORAL RINSE 15 ML: 1.2 LIQUID ORAL at 20:26

## 2019-03-14 RX ADMIN — Medication 1 CAPSULE: at 08:14

## 2019-03-14 RX ADMIN — METHYLPREDNISOLONE SODIUM SUCCINATE 40 MG: 40 INJECTION, POWDER, FOR SOLUTION INTRAMUSCULAR; INTRAVENOUS at 02:15

## 2019-03-14 RX ADMIN — LABETALOL 20 MG/4 ML (5 MG/ML) INTRAVENOUS SYRINGE 20 MG: at 04:44

## 2019-03-14 RX ADMIN — TAZOBACTAM SODIUM AND PIPERACILLIN SODIUM 3.38 G: 375; 3 INJECTION, SOLUTION INTRAVENOUS at 05:47

## 2019-03-14 RX ADMIN — TAZOBACTAM SODIUM AND PIPERACILLIN SODIUM 3.38 G: 375; 3 INJECTION, SOLUTION INTRAVENOUS at 22:44

## 2019-03-14 RX ADMIN — HUMAN INSULIN 2 UNITS: 100 INJECTION, SOLUTION SUBCUTANEOUS at 06:00

## 2019-03-14 RX ADMIN — CHLORHEXIDINE GLUCONATE 0.12% ORAL RINSE 15 ML: 1.2 LIQUID ORAL at 08:14

## 2019-03-15 LAB
A-A DO2: ABNORMAL MMHG
ALBUMIN SERPL-MCNC: 2.9 G/DL (ref 3.5–5)
ALBUMIN/GLOB SERPL: 1.2 G/DL (ref 1–2)
ALP SERPL-CCNC: 60 U/L (ref 38–126)
ALT SERPL W P-5'-P-CCNC: 60 U/L (ref 13–69)
ANION GAP SERPL CALCULATED.3IONS-SCNC: 7.7 MMOL/L (ref 10–20)
ARTERIAL PATENCY WRIST A: POSITIVE
AST SERPL-CCNC: 57 U/L (ref 15–46)
ATMOSPHERIC PRESS: 731 MMHG
BACTERIA SPEC AEROBE CULT: NORMAL
BACTERIA SPEC AEROBE CULT: NORMAL
BASE EXCESS BLDA CALC-SCNC: 10.8 MMOL/L (ref 0–2)
BDY SITE: ABNORMAL
BILIRUB SERPL-MCNC: 0.4 MG/DL (ref 0.2–1.3)
BUN BLD-MCNC: 27 MG/DL (ref 7–20)
BUN/CREAT SERPL: 33.8 (ref 7.1–23.5)
CALCIUM SPEC-SCNC: 8.6 MG/DL (ref 8.4–10.2)
CHLORIDE SERPL-SCNC: 100 MMOL/L (ref 98–107)
CO2 SERPL-SCNC: 34 MMOL/L (ref 26–30)
COHGB MFR BLD: 0.9 % (ref 0–2)
CREAT BLD-MCNC: 0.8 MG/DL (ref 0.6–1.3)
DEPRECATED RDW RBC AUTO: 56.9 FL (ref 37–54)
ERYTHROCYTE [DISTWIDTH] IN BLOOD BY AUTOMATED COUNT: 13.7 % (ref 11.5–14.5)
GFR SERPL CREATININE-BSD FRML MDRD: 73 ML/MIN/1.73
GLOBULIN UR ELPH-MCNC: 2.4 GM/DL
GLUCOSE BLD-MCNC: 123 MG/DL (ref 74–98)
GLUCOSE BLDC GLUCOMTR-MCNC: 123 MG/DL (ref 70–130)
GLUCOSE BLDC GLUCOMTR-MCNC: 133 MG/DL (ref 70–130)
GLUCOSE BLDC GLUCOMTR-MCNC: 153 MG/DL (ref 70–130)
GLUCOSE BLDC GLUCOMTR-MCNC: 168 MG/DL (ref 70–130)
HCO3 BLDA-SCNC: 35.8 MMOL/L (ref 22–28)
HCT VFR BLD AUTO: 27.8 % (ref 37–47)
HCT VFR BLD CALC: 29.7 %
HGB BLD-MCNC: 9.1 G/DL (ref 12–16)
HGB BLDA-MCNC: 9.7 G/DL (ref 12–18)
MAGNESIUM SERPL-MCNC: 2.6 MG/DL (ref 1.6–2.3)
MCH RBC QN AUTO: 36.4 PG (ref 27–31)
MCHC RBC AUTO-ENTMCNC: 32.7 G/DL (ref 30–37)
MCV RBC AUTO: 111.2 FL (ref 81–99)
METHGB BLD QL: 0.5 % (ref 0–1.5)
MODALITY: ABNORMAL
NOTE: ABNORMAL
OXYHGB MFR BLDV: 97.5 % (ref 94–99)
PCO2 BLDA: 49.2 MM HG (ref 35–45)
PCO2 TEMP ADJ BLD: ABNORMAL MM HG (ref 35–45)
PEEP RESPIRATORY: 5 CM[H2O]
PH BLDA: 7.47 PH UNITS (ref 7.3–7.5)
PH, TEMP CORRECTED: ABNORMAL PH UNITS
PHOSPHATE SERPL-MCNC: 4.9 MG/DL (ref 2.5–4.5)
PLATELET # BLD AUTO: 178 10*3/MM3 (ref 130–400)
PMV BLD AUTO: 11.8 FL (ref 6–12)
PO2 BLDA: 109 MM HG (ref 75–100)
PO2 TEMP ADJ BLD: ABNORMAL MM HG (ref 83–108)
POTASSIUM BLD-SCNC: 3.7 MMOL/L (ref 3.5–5.1)
PROCALCITONIN SERPL-MCNC: 0.75 NG/ML
PROT SERPL-MCNC: 5.3 G/DL (ref 6.3–8.2)
RBC # BLD AUTO: 2.5 10*6/MM3 (ref 4.2–5.4)
SAO2 % BLDCOA: 98.9 % (ref 94–100)
SET MECH RESP RATE: 15
SODIUM BLD-SCNC: 138 MMOL/L (ref 137–145)
VENTILATOR MODE: AC
VT ON VENT VENT: 420 ML
WBC NRBC COR # BLD: 10.04 10*3/MM3 (ref 4.8–10.8)

## 2019-03-15 PROCEDURE — 25010000002 HYDRALAZINE PER 20 MG: Performed by: INTERNAL MEDICINE

## 2019-03-15 PROCEDURE — 94799 UNLISTED PULMONARY SVC/PX: CPT

## 2019-03-15 PROCEDURE — 36600 WITHDRAWAL OF ARTERIAL BLOOD: CPT

## 2019-03-15 PROCEDURE — 25010000002 METHYLPREDNISOLONE PER 40 MG: Performed by: INTERNAL MEDICINE

## 2019-03-15 PROCEDURE — 25010000002 ENOXAPARIN PER 10 MG: Performed by: INTERNAL MEDICINE

## 2019-03-15 PROCEDURE — 99233 SBSQ HOSP IP/OBS HIGH 50: CPT | Performed by: INTERNAL MEDICINE

## 2019-03-15 PROCEDURE — 84145 PROCALCITONIN (PCT): CPT | Performed by: INTERNAL MEDICINE

## 2019-03-15 PROCEDURE — 94003 VENT MGMT INPAT SUBQ DAY: CPT

## 2019-03-15 PROCEDURE — 25010000002 PROPOFOL 1000 MG/ML EMULSION: Performed by: INTERNAL MEDICINE

## 2019-03-15 PROCEDURE — 85027 COMPLETE CBC AUTOMATED: CPT | Performed by: INTERNAL MEDICINE

## 2019-03-15 PROCEDURE — 25010000002 PIPERACILLIN SOD-TAZOBACTAM PER 1 G: Performed by: INTERNAL MEDICINE

## 2019-03-15 PROCEDURE — 63710000001 INSULIN REGULAR HUMAN PER 5 UNITS: Performed by: HOSPITALIST

## 2019-03-15 PROCEDURE — 80053 COMPREHEN METABOLIC PANEL: CPT | Performed by: INTERNAL MEDICINE

## 2019-03-15 PROCEDURE — 83735 ASSAY OF MAGNESIUM: CPT | Performed by: INTERNAL MEDICINE

## 2019-03-15 PROCEDURE — 82805 BLOOD GASES W/O2 SATURATION: CPT

## 2019-03-15 PROCEDURE — 94770: CPT

## 2019-03-15 PROCEDURE — 82962 GLUCOSE BLOOD TEST: CPT

## 2019-03-15 PROCEDURE — 84100 ASSAY OF PHOSPHORUS: CPT | Performed by: INTERNAL MEDICINE

## 2019-03-15 PROCEDURE — 82375 ASSAY CARBOXYHB QUANT: CPT

## 2019-03-15 PROCEDURE — 25010000002 MORPHINE PER 10 MG: Performed by: INTERNAL MEDICINE

## 2019-03-15 PROCEDURE — 83050 HGB METHEMOGLOBIN QUAN: CPT

## 2019-03-15 RX ORDER — METHYLPREDNISOLONE SODIUM SUCCINATE 40 MG/ML
30 INJECTION, POWDER, LYOPHILIZED, FOR SOLUTION INTRAMUSCULAR; INTRAVENOUS EVERY 12 HOURS
Status: DISCONTINUED | OUTPATIENT
Start: 2019-03-15 | End: 2019-03-17

## 2019-03-15 RX ADMIN — HUMAN INSULIN 2 UNITS: 100 INJECTION, SOLUTION SUBCUTANEOUS at 12:32

## 2019-03-15 RX ADMIN — ACETAMINOPHEN 650 MG: 325 TABLET, FILM COATED ORAL at 14:29

## 2019-03-15 RX ADMIN — IPRATROPIUM BROMIDE AND ALBUTEROL SULFATE 3 ML: .5; 3 SOLUTION RESPIRATORY (INHALATION) at 19:18

## 2019-03-15 RX ADMIN — PROPOFOL 50 MCG/KG/MIN: 10 INJECTION, EMULSION INTRAVENOUS at 17:13

## 2019-03-15 RX ADMIN — TAZOBACTAM SODIUM AND PIPERACILLIN SODIUM 3.38 G: 375; 3 INJECTION, SOLUTION INTRAVENOUS at 07:26

## 2019-03-15 RX ADMIN — TAZOBACTAM SODIUM AND PIPERACILLIN SODIUM 3.38 G: 375; 3 INJECTION, SOLUTION INTRAVENOUS at 14:24

## 2019-03-15 RX ADMIN — CHLORHEXIDINE GLUCONATE 0.12% ORAL RINSE 15 ML: 1.2 LIQUID ORAL at 09:13

## 2019-03-15 RX ADMIN — BUDESONIDE 0.5 MG: 0.5 INHALANT RESPIRATORY (INHALATION) at 19:19

## 2019-03-15 RX ADMIN — PROPOFOL 50 MCG/KG/MIN: 10 INJECTION, EMULSION INTRAVENOUS at 22:44

## 2019-03-15 RX ADMIN — HYDRALAZINE HYDROCHLORIDE 20 MG: 20 INJECTION INTRAMUSCULAR; INTRAVENOUS at 11:28

## 2019-03-15 RX ADMIN — Medication 1 CAPSULE: at 09:14

## 2019-03-15 RX ADMIN — FAMOTIDINE 20 MG: 10 INJECTION, SOLUTION INTRAVENOUS at 20:58

## 2019-03-15 RX ADMIN — METHYLPREDNISOLONE SODIUM SUCCINATE 40 MG: 40 INJECTION, POWDER, FOR SOLUTION INTRAMUSCULAR; INTRAVENOUS at 05:37

## 2019-03-15 RX ADMIN — SODIUM CHLORIDE, PRESERVATIVE FREE 3 ML: 5 INJECTION INTRAVENOUS at 09:14

## 2019-03-15 RX ADMIN — PROPOFOL 50 MCG/KG/MIN: 10 INJECTION, EMULSION INTRAVENOUS at 07:27

## 2019-03-15 RX ADMIN — IPRATROPIUM BROMIDE AND ALBUTEROL SULFATE 3 ML: .5; 3 SOLUTION RESPIRATORY (INHALATION) at 07:15

## 2019-03-15 RX ADMIN — METHYLPREDNISOLONE SODIUM SUCCINATE 30 MG: 40 INJECTION, POWDER, FOR SOLUTION INTRAMUSCULAR; INTRAVENOUS at 16:53

## 2019-03-15 RX ADMIN — ENOXAPARIN SODIUM 40 MG: 100 INJECTION SUBCUTANEOUS at 10:56

## 2019-03-15 RX ADMIN — BUDESONIDE 0.5 MG: 0.5 INHALANT RESPIRATORY (INHALATION) at 07:15

## 2019-03-15 RX ADMIN — MORPHINE SULFATE 2 MG: 2 INJECTION, SOLUTION INTRAMUSCULAR; INTRAVENOUS at 21:14

## 2019-03-15 RX ADMIN — PROPOFOL 49.95 MCG/KG/MIN: 10 INJECTION, EMULSION INTRAVENOUS at 12:47

## 2019-03-15 RX ADMIN — PROPOFOL 50 MCG/KG/MIN: 10 INJECTION, EMULSION INTRAVENOUS at 00:00

## 2019-03-15 RX ADMIN — TAZOBACTAM SODIUM AND PIPERACILLIN SODIUM 3.38 G: 375; 3 INJECTION, SOLUTION INTRAVENOUS at 21:00

## 2019-03-15 RX ADMIN — POTASSIUM CHLORIDE 20 MEQ: 1.5 POWDER, FOR SOLUTION ORAL at 09:14

## 2019-03-15 RX ADMIN — CHLORHEXIDINE GLUCONATE 0.12% ORAL RINSE 15 ML: 1.2 LIQUID ORAL at 20:58

## 2019-03-15 RX ADMIN — IPRATROPIUM BROMIDE AND ALBUTEROL SULFATE 3 ML: .5; 3 SOLUTION RESPIRATORY (INHALATION) at 00:41

## 2019-03-15 RX ADMIN — IPRATROPIUM BROMIDE AND ALBUTEROL SULFATE 3 ML: .5; 3 SOLUTION RESPIRATORY (INHALATION) at 13:16

## 2019-03-15 RX ADMIN — PROPOFOL 50 MCG/KG/MIN: 10 INJECTION, EMULSION INTRAVENOUS at 02:56

## 2019-03-15 RX ADMIN — FAMOTIDINE 20 MG: 10 INJECTION, SOLUTION INTRAVENOUS at 09:14

## 2019-03-16 ENCOUNTER — APPOINTMENT (OUTPATIENT)
Dept: GENERAL RADIOLOGY | Facility: HOSPITAL | Age: 59
End: 2019-03-16

## 2019-03-16 LAB
ALBUMIN SERPL-MCNC: 3.3 G/DL (ref 3.5–5)
ALBUMIN/GLOB SERPL: 1.2 G/DL (ref 1–2)
ALP SERPL-CCNC: 66 U/L (ref 38–126)
ALT SERPL W P-5'-P-CCNC: 51 U/L (ref 13–69)
ANION GAP SERPL CALCULATED.3IONS-SCNC: 7.7 MMOL/L (ref 10–20)
AST SERPL-CCNC: 44 U/L (ref 15–46)
BILIRUB SERPL-MCNC: 0.4 MG/DL (ref 0.2–1.3)
BILIRUB UR QL STRIP: NEGATIVE
BUN BLD-MCNC: 30 MG/DL (ref 7–20)
BUN/CREAT SERPL: 37.5 (ref 7.1–23.5)
CALCIUM SPEC-SCNC: 9.1 MG/DL (ref 8.4–10.2)
CHLORIDE SERPL-SCNC: 99 MMOL/L (ref 98–107)
CLARITY UR: CLEAR
CO2 SERPL-SCNC: 34 MMOL/L (ref 26–30)
COLOR UR: YELLOW
CREAT BLD-MCNC: 0.8 MG/DL (ref 0.6–1.3)
DEPRECATED RDW RBC AUTO: 56.7 FL (ref 37–54)
ERYTHROCYTE [DISTWIDTH] IN BLOOD BY AUTOMATED COUNT: 13.6 % (ref 11.5–14.5)
GFR SERPL CREATININE-BSD FRML MDRD: 73 ML/MIN/1.73
GLOBULIN UR ELPH-MCNC: 2.7 GM/DL
GLUCOSE BLD-MCNC: 125 MG/DL (ref 74–98)
GLUCOSE BLDC GLUCOMTR-MCNC: 115 MG/DL (ref 70–130)
GLUCOSE BLDC GLUCOMTR-MCNC: 125 MG/DL (ref 70–130)
GLUCOSE BLDC GLUCOMTR-MCNC: 143 MG/DL (ref 70–130)
GLUCOSE BLDC GLUCOMTR-MCNC: 159 MG/DL (ref 70–130)
GLUCOSE UR STRIP-MCNC: NEGATIVE MG/DL
HCT VFR BLD AUTO: 32.2 % (ref 37–47)
HGB BLD-MCNC: 10.3 G/DL (ref 12–16)
HGB UR QL STRIP.AUTO: NEGATIVE
KETONES UR QL STRIP: NEGATIVE
LEUKOCYTE ESTERASE UR QL STRIP.AUTO: NEGATIVE
MAGNESIUM SERPL-MCNC: 2.9 MG/DL (ref 1.6–2.3)
MCH RBC QN AUTO: 35.8 PG (ref 27–31)
MCHC RBC AUTO-ENTMCNC: 32 G/DL (ref 30–37)
MCV RBC AUTO: 111.8 FL (ref 81–99)
NITRITE UR QL STRIP: NEGATIVE
PH UR STRIP.AUTO: 7 [PH] (ref 5–8)
PHOSPHATE SERPL-MCNC: 5.4 MG/DL (ref 2.5–4.5)
PLATELET # BLD AUTO: 216 10*3/MM3 (ref 130–400)
PMV BLD AUTO: 11.5 FL (ref 6–12)
POTASSIUM BLD-SCNC: 4.7 MMOL/L (ref 3.5–5.1)
PROT SERPL-MCNC: 6 G/DL (ref 6.3–8.2)
PROT UR QL STRIP: NEGATIVE
RBC # BLD AUTO: 2.88 10*6/MM3 (ref 4.2–5.4)
SODIUM BLD-SCNC: 136 MMOL/L (ref 137–145)
SP GR UR STRIP: >=1.03 (ref 1–1.03)
UROBILINOGEN UR QL STRIP: NORMAL
WBC NRBC COR # BLD: 15.61 10*3/MM3 (ref 4.8–10.8)

## 2019-03-16 PROCEDURE — 25010000002 METHYLPREDNISOLONE PER 40 MG: Performed by: INTERNAL MEDICINE

## 2019-03-16 PROCEDURE — 25010000002 ENOXAPARIN PER 10 MG: Performed by: INTERNAL MEDICINE

## 2019-03-16 PROCEDURE — 25010000002 LORAZEPAM PER 2 MG: Performed by: INTERNAL MEDICINE

## 2019-03-16 PROCEDURE — 71045 X-RAY EXAM CHEST 1 VIEW: CPT

## 2019-03-16 PROCEDURE — 83735 ASSAY OF MAGNESIUM: CPT | Performed by: INTERNAL MEDICINE

## 2019-03-16 PROCEDURE — 85027 COMPLETE CBC AUTOMATED: CPT | Performed by: INTERNAL MEDICINE

## 2019-03-16 PROCEDURE — 94799 UNLISTED PULMONARY SVC/PX: CPT

## 2019-03-16 PROCEDURE — 80053 COMPREHEN METABOLIC PANEL: CPT | Performed by: INTERNAL MEDICINE

## 2019-03-16 PROCEDURE — 99233 SBSQ HOSP IP/OBS HIGH 50: CPT | Performed by: INTERNAL MEDICINE

## 2019-03-16 PROCEDURE — 94003 VENT MGMT INPAT SUBQ DAY: CPT

## 2019-03-16 PROCEDURE — 63710000001 INSULIN REGULAR HUMAN PER 5 UNITS: Performed by: HOSPITALIST

## 2019-03-16 PROCEDURE — 25010000002 VANCOMYCIN 5 G RECONSTITUTED SOLUTION 5,000 MG VIAL: Performed by: INTERNAL MEDICINE

## 2019-03-16 PROCEDURE — 25010000002 MORPHINE PER 10 MG: Performed by: INTERNAL MEDICINE

## 2019-03-16 PROCEDURE — 87040 BLOOD CULTURE FOR BACTERIA: CPT | Performed by: INTERNAL MEDICINE

## 2019-03-16 PROCEDURE — 94770: CPT

## 2019-03-16 PROCEDURE — 82962 GLUCOSE BLOOD TEST: CPT

## 2019-03-16 PROCEDURE — 84100 ASSAY OF PHOSPHORUS: CPT | Performed by: INTERNAL MEDICINE

## 2019-03-16 PROCEDURE — 25010000002 PROPOFOL 1000 MG/ML EMULSION: Performed by: INTERNAL MEDICINE

## 2019-03-16 PROCEDURE — 81003 URINALYSIS AUTO W/O SCOPE: CPT | Performed by: INTERNAL MEDICINE

## 2019-03-16 PROCEDURE — 25010000002 PIPERACILLIN SOD-TAZOBACTAM PER 1 G: Performed by: INTERNAL MEDICINE

## 2019-03-16 PROCEDURE — 25010000002 MEROPENEM IN SODIUM CHLORIDE 0.9% 50 ML 1 GM/50ML RECONSTITUTED SOLUTION: Performed by: INTERNAL MEDICINE

## 2019-03-16 RX ORDER — MEROPENEM AND SODIUM CHLORIDE 1 G/50ML
1 INJECTION, SOLUTION INTRAVENOUS ONCE
Status: COMPLETED | OUTPATIENT
Start: 2019-03-16 | End: 2019-03-16

## 2019-03-16 RX ORDER — MEROPENEM AND SODIUM CHLORIDE 1 G/50ML
1 INJECTION, SOLUTION INTRAVENOUS EVERY 8 HOURS
Status: COMPLETED | OUTPATIENT
Start: 2019-03-16 | End: 2019-03-21

## 2019-03-16 RX ADMIN — BUDESONIDE 0.5 MG: 0.5 INHALANT RESPIRATORY (INHALATION) at 19:25

## 2019-03-16 RX ADMIN — MEROPENEM AND SODIUM CHLORIDE 1 G: 1 INJECTION, SOLUTION INTRAVENOUS at 11:44

## 2019-03-16 RX ADMIN — LORAZEPAM 1 MG: 2 INJECTION INTRAMUSCULAR; INTRAVENOUS at 21:25

## 2019-03-16 RX ADMIN — SODIUM CHLORIDE, PRESERVATIVE FREE 3 ML: 5 INJECTION INTRAVENOUS at 00:57

## 2019-03-16 RX ADMIN — MORPHINE SULFATE 2 MG: 2 INJECTION, SOLUTION INTRAMUSCULAR; INTRAVENOUS at 08:27

## 2019-03-16 RX ADMIN — PROPOFOL 50 MCG/KG/MIN: 10 INJECTION, EMULSION INTRAVENOUS at 12:16

## 2019-03-16 RX ADMIN — METHYLPREDNISOLONE SODIUM SUCCINATE 30 MG: 40 INJECTION, POWDER, FOR SOLUTION INTRAMUSCULAR; INTRAVENOUS at 16:07

## 2019-03-16 RX ADMIN — IPRATROPIUM BROMIDE AND ALBUTEROL SULFATE 3 ML: .5; 3 SOLUTION RESPIRATORY (INHALATION) at 07:10

## 2019-03-16 RX ADMIN — METHYLPREDNISOLONE SODIUM SUCCINATE 30 MG: 40 INJECTION, POWDER, FOR SOLUTION INTRAMUSCULAR; INTRAVENOUS at 02:37

## 2019-03-16 RX ADMIN — MEROPENEM AND SODIUM CHLORIDE 1 G: 1 INJECTION, SOLUTION INTRAVENOUS at 20:08

## 2019-03-16 RX ADMIN — SODIUM CHLORIDE, PRESERVATIVE FREE 3 ML: 5 INJECTION INTRAVENOUS at 20:09

## 2019-03-16 RX ADMIN — BUDESONIDE 0.5 MG: 0.5 INHALANT RESPIRATORY (INHALATION) at 07:10

## 2019-03-16 RX ADMIN — HUMAN INSULIN 2 UNITS: 100 INJECTION, SOLUTION SUBCUTANEOUS at 00:57

## 2019-03-16 RX ADMIN — IPRATROPIUM BROMIDE AND ALBUTEROL SULFATE 3 ML: .5; 3 SOLUTION RESPIRATORY (INHALATION) at 19:25

## 2019-03-16 RX ADMIN — ACETAMINOPHEN 650 MG: 325 TABLET, FILM COATED ORAL at 00:57

## 2019-03-16 RX ADMIN — TAZOBACTAM SODIUM AND PIPERACILLIN SODIUM 3.38 G: 375; 3 INJECTION, SOLUTION INTRAVENOUS at 05:00

## 2019-03-16 RX ADMIN — ENOXAPARIN SODIUM 40 MG: 100 INJECTION SUBCUTANEOUS at 11:43

## 2019-03-16 RX ADMIN — LORAZEPAM 1 MG: 2 INJECTION INTRAMUSCULAR; INTRAVENOUS at 02:39

## 2019-03-16 RX ADMIN — LABETALOL 20 MG/4 ML (5 MG/ML) INTRAVENOUS SYRINGE 20 MG: at 03:18

## 2019-03-16 RX ADMIN — SODIUM CHLORIDE, PRESERVATIVE FREE 3 ML: 5 INJECTION INTRAVENOUS at 08:28

## 2019-03-16 RX ADMIN — VANCOMYCIN HYDROCHLORIDE 1500 MG: 500 INJECTION, POWDER, LYOPHILIZED, FOR SOLUTION INTRAVENOUS at 12:16

## 2019-03-16 RX ADMIN — CHLORHEXIDINE GLUCONATE 0.12% ORAL RINSE 15 ML: 1.2 LIQUID ORAL at 08:27

## 2019-03-16 RX ADMIN — PROPOFOL 50 MCG/KG/MIN: 10 INJECTION, EMULSION INTRAVENOUS at 17:05

## 2019-03-16 RX ADMIN — FAMOTIDINE 20 MG: 10 INJECTION, SOLUTION INTRAVENOUS at 20:09

## 2019-03-16 RX ADMIN — FAMOTIDINE 20 MG: 10 INJECTION, SOLUTION INTRAVENOUS at 08:27

## 2019-03-16 RX ADMIN — PROPOFOL 50 MCG/KG/MIN: 10 INJECTION, EMULSION INTRAVENOUS at 03:18

## 2019-03-16 RX ADMIN — CHLORHEXIDINE GLUCONATE 0.12% ORAL RINSE 15 ML: 1.2 LIQUID ORAL at 20:09

## 2019-03-16 RX ADMIN — IPRATROPIUM BROMIDE AND ALBUTEROL SULFATE 3 ML: .5; 3 SOLUTION RESPIRATORY (INHALATION) at 12:24

## 2019-03-16 RX ADMIN — PROPOFOL 50 MCG/KG/MIN: 10 INJECTION, EMULSION INTRAVENOUS at 06:04

## 2019-03-16 RX ADMIN — Medication 1 CAPSULE: at 08:27

## 2019-03-16 RX ADMIN — MORPHINE SULFATE 2 MG: 2 INJECTION, SOLUTION INTRAMUSCULAR; INTRAVENOUS at 16:07

## 2019-03-17 LAB
ALBUMIN SERPL-MCNC: 3.2 G/DL (ref 3.5–5)
ALBUMIN/GLOB SERPL: 1.2 G/DL (ref 1–2)
ALP SERPL-CCNC: 58 U/L (ref 38–126)
ALT SERPL W P-5'-P-CCNC: 46 U/L (ref 13–69)
ANION GAP SERPL CALCULATED.3IONS-SCNC: 8.8 MMOL/L (ref 10–20)
AST SERPL-CCNC: 36 U/L (ref 15–46)
BILIRUB SERPL-MCNC: 0.4 MG/DL (ref 0.2–1.3)
BUN BLD-MCNC: 25 MG/DL (ref 7–20)
BUN/CREAT SERPL: 35.7 (ref 7.1–23.5)
CALCIUM SPEC-SCNC: 9 MG/DL (ref 8.4–10.2)
CHLORIDE SERPL-SCNC: 98 MMOL/L (ref 98–107)
CO2 SERPL-SCNC: 31 MMOL/L (ref 26–30)
CREAT BLD-MCNC: 0.7 MG/DL (ref 0.6–1.3)
DEPRECATED RDW RBC AUTO: 54.4 FL (ref 37–54)
ERYTHROCYTE [DISTWIDTH] IN BLOOD BY AUTOMATED COUNT: 13.2 % (ref 11.5–14.5)
GFR SERPL CREATININE-BSD FRML MDRD: 85 ML/MIN/1.73
GLOBULIN UR ELPH-MCNC: 2.6 GM/DL
GLUCOSE BLD-MCNC: 131 MG/DL (ref 74–98)
GLUCOSE BLDC GLUCOMTR-MCNC: 110 MG/DL (ref 70–130)
GLUCOSE BLDC GLUCOMTR-MCNC: 140 MG/DL (ref 70–130)
GLUCOSE BLDC GLUCOMTR-MCNC: 149 MG/DL (ref 70–130)
GLUCOSE BLDC GLUCOMTR-MCNC: 154 MG/DL (ref 70–130)
HCT VFR BLD AUTO: 30.2 % (ref 37–47)
HGB BLD-MCNC: 9.7 G/DL (ref 12–16)
MAGNESIUM SERPL-MCNC: 2.6 MG/DL (ref 1.6–2.3)
MCH RBC QN AUTO: 35.9 PG (ref 27–31)
MCHC RBC AUTO-ENTMCNC: 32.1 G/DL (ref 30–37)
MCV RBC AUTO: 111.9 FL (ref 81–99)
PHOSPHATE SERPL-MCNC: 4.7 MG/DL (ref 2.5–4.5)
PLATELET # BLD AUTO: 219 10*3/MM3 (ref 130–400)
PMV BLD AUTO: 11.5 FL (ref 6–12)
POTASSIUM BLD-SCNC: 4.8 MMOL/L (ref 3.5–5.1)
PROT SERPL-MCNC: 5.8 G/DL (ref 6.3–8.2)
RBC # BLD AUTO: 2.7 10*6/MM3 (ref 4.2–5.4)
SODIUM BLD-SCNC: 133 MMOL/L (ref 137–145)
WBC NRBC COR # BLD: 15.29 10*3/MM3 (ref 4.8–10.8)

## 2019-03-17 PROCEDURE — 25010000002 PROPOFOL 1000 MG/ML EMULSION: Performed by: INTERNAL MEDICINE

## 2019-03-17 PROCEDURE — 82962 GLUCOSE BLOOD TEST: CPT

## 2019-03-17 PROCEDURE — 94799 UNLISTED PULMONARY SVC/PX: CPT

## 2019-03-17 PROCEDURE — 83735 ASSAY OF MAGNESIUM: CPT | Performed by: INTERNAL MEDICINE

## 2019-03-17 PROCEDURE — 94003 VENT MGMT INPAT SUBQ DAY: CPT

## 2019-03-17 PROCEDURE — 25010000002 MEROPENEM IN SODIUM CHLORIDE 0.9% 50 ML 1 GM/50ML RECONSTITUTED SOLUTION: Performed by: INTERNAL MEDICINE

## 2019-03-17 PROCEDURE — 99233 SBSQ HOSP IP/OBS HIGH 50: CPT | Performed by: INTERNAL MEDICINE

## 2019-03-17 PROCEDURE — 84100 ASSAY OF PHOSPHORUS: CPT | Performed by: INTERNAL MEDICINE

## 2019-03-17 PROCEDURE — 85027 COMPLETE CBC AUTOMATED: CPT | Performed by: INTERNAL MEDICINE

## 2019-03-17 PROCEDURE — 25010000002 MORPHINE PER 10 MG: Performed by: INTERNAL MEDICINE

## 2019-03-17 PROCEDURE — 25010000002 VANCOMYCIN 5 G RECONSTITUTED SOLUTION 5,000 MG VIAL: Performed by: INTERNAL MEDICINE

## 2019-03-17 PROCEDURE — 25010000002 ENOXAPARIN PER 10 MG: Performed by: INTERNAL MEDICINE

## 2019-03-17 PROCEDURE — 80053 COMPREHEN METABOLIC PANEL: CPT | Performed by: INTERNAL MEDICINE

## 2019-03-17 PROCEDURE — 25010000002 METHYLPREDNISOLONE PER 40 MG: Performed by: INTERNAL MEDICINE

## 2019-03-17 RX ADMIN — IPRATROPIUM BROMIDE AND ALBUTEROL SULFATE 3 ML: .5; 3 SOLUTION RESPIRATORY (INHALATION) at 00:55

## 2019-03-17 RX ADMIN — SODIUM CHLORIDE, PRESERVATIVE FREE 3 ML: 5 INJECTION INTRAVENOUS at 20:39

## 2019-03-17 RX ADMIN — VANCOMYCIN HYDROCHLORIDE 1250 MG: 500 INJECTION, POWDER, LYOPHILIZED, FOR SOLUTION INTRAVENOUS at 06:28

## 2019-03-17 RX ADMIN — IPRATROPIUM BROMIDE AND ALBUTEROL SULFATE 3 ML: .5; 3 SOLUTION RESPIRATORY (INHALATION) at 08:30

## 2019-03-17 RX ADMIN — MEROPENEM AND SODIUM CHLORIDE 1 G: 1 INJECTION, SOLUTION INTRAVENOUS at 03:11

## 2019-03-17 RX ADMIN — MORPHINE SULFATE 2 MG: 2 INJECTION, SOLUTION INTRAMUSCULAR; INTRAVENOUS at 01:10

## 2019-03-17 RX ADMIN — BUDESONIDE 0.5 MG: 0.5 INHALANT RESPIRATORY (INHALATION) at 18:31

## 2019-03-17 RX ADMIN — MEROPENEM AND SODIUM CHLORIDE 1 G: 1 INJECTION, SOLUTION INTRAVENOUS at 11:33

## 2019-03-17 RX ADMIN — CHLORHEXIDINE GLUCONATE 0.12% ORAL RINSE 15 ML: 1.2 LIQUID ORAL at 20:39

## 2019-03-17 RX ADMIN — IPRATROPIUM BROMIDE AND ALBUTEROL SULFATE 3 ML: .5; 3 SOLUTION RESPIRATORY (INHALATION) at 12:56

## 2019-03-17 RX ADMIN — MEROPENEM AND SODIUM CHLORIDE 1 G: 1 INJECTION, SOLUTION INTRAVENOUS at 20:40

## 2019-03-17 RX ADMIN — FAMOTIDINE 20 MG: 10 INJECTION, SOLUTION INTRAVENOUS at 09:06

## 2019-03-17 RX ADMIN — PROPOFOL 30 MCG/KG/MIN: 10 INJECTION, EMULSION INTRAVENOUS at 18:55

## 2019-03-17 RX ADMIN — Medication 1 CAPSULE: at 09:06

## 2019-03-17 RX ADMIN — LABETALOL 20 MG/4 ML (5 MG/ML) INTRAVENOUS SYRINGE 20 MG: at 01:11

## 2019-03-17 RX ADMIN — IPRATROPIUM BROMIDE AND ALBUTEROL SULFATE 3 ML: .5; 3 SOLUTION RESPIRATORY (INHALATION) at 18:31

## 2019-03-17 RX ADMIN — CHLORHEXIDINE GLUCONATE 0.12% ORAL RINSE 15 ML: 1.2 LIQUID ORAL at 09:06

## 2019-03-17 RX ADMIN — FAMOTIDINE 20 MG: 10 INJECTION, SOLUTION INTRAVENOUS at 20:39

## 2019-03-17 RX ADMIN — BUDESONIDE 0.5 MG: 0.5 INHALANT RESPIRATORY (INHALATION) at 08:30

## 2019-03-17 RX ADMIN — PROPOFOL 40 MCG/KG/MIN: 10 INJECTION, EMULSION INTRAVENOUS at 06:29

## 2019-03-17 RX ADMIN — ENOXAPARIN SODIUM 40 MG: 100 INJECTION SUBCUTANEOUS at 11:02

## 2019-03-17 RX ADMIN — PROPOFOL 25 MCG/KG/MIN: 10 INJECTION, EMULSION INTRAVENOUS at 01:00

## 2019-03-17 RX ADMIN — MORPHINE SULFATE 2 MG: 2 INJECTION, SOLUTION INTRAMUSCULAR; INTRAVENOUS at 18:30

## 2019-03-17 RX ADMIN — MORPHINE SULFATE 2 MG: 2 INJECTION, SOLUTION INTRAMUSCULAR; INTRAVENOUS at 12:51

## 2019-03-17 RX ADMIN — PROPOFOL 30 MCG/KG/MIN: 10 INJECTION, EMULSION INTRAVENOUS at 13:21

## 2019-03-17 RX ADMIN — SODIUM CHLORIDE, PRESERVATIVE FREE 10 ML: 5 INJECTION INTRAVENOUS at 01:11

## 2019-03-17 RX ADMIN — METHYLPREDNISOLONE SODIUM SUCCINATE 30 MG: 40 INJECTION, POWDER, FOR SOLUTION INTRAMUSCULAR; INTRAVENOUS at 02:02

## 2019-03-17 RX ADMIN — ACETAMINOPHEN 650 MG: 325 TABLET, FILM COATED ORAL at 12:51

## 2019-03-18 LAB
ALBUMIN SERPL-MCNC: 2.9 G/DL (ref 3.5–5)
ALBUMIN/GLOB SERPL: 1.1 G/DL (ref 1–2)
ALP SERPL-CCNC: 54 U/L (ref 38–126)
ALT SERPL W P-5'-P-CCNC: 41 U/L (ref 13–69)
ANION GAP SERPL CALCULATED.3IONS-SCNC: 8.4 MMOL/L (ref 10–20)
AST SERPL-CCNC: 45 U/L (ref 15–46)
BILIRUB SERPL-MCNC: 0.4 MG/DL (ref 0.2–1.3)
BUN BLD-MCNC: 21 MG/DL (ref 7–20)
BUN/CREAT SERPL: 35 (ref 7.1–23.5)
CALCIUM SPEC-SCNC: 8.6 MG/DL (ref 8.4–10.2)
CHLORIDE SERPL-SCNC: 95 MMOL/L (ref 98–107)
CO2 SERPL-SCNC: 30 MMOL/L (ref 26–30)
CREAT BLD-MCNC: 0.6 MG/DL (ref 0.6–1.3)
DEPRECATED RDW RBC AUTO: 50.6 FL (ref 37–54)
ERYTHROCYTE [DISTWIDTH] IN BLOOD BY AUTOMATED COUNT: 12.9 % (ref 11.5–14.5)
GFR SERPL CREATININE-BSD FRML MDRD: 102 ML/MIN/1.73
GLOBULIN UR ELPH-MCNC: 2.6 GM/DL
GLUCOSE BLD-MCNC: 110 MG/DL (ref 74–98)
GLUCOSE BLDC GLUCOMTR-MCNC: 113 MG/DL (ref 70–130)
GLUCOSE BLDC GLUCOMTR-MCNC: 115 MG/DL (ref 70–130)
GLUCOSE BLDC GLUCOMTR-MCNC: 116 MG/DL (ref 70–130)
GLUCOSE BLDC GLUCOMTR-MCNC: 116 MG/DL (ref 70–130)
HCT VFR BLD AUTO: 27.8 % (ref 37–47)
HGB BLD-MCNC: 9.2 G/DL (ref 12–16)
MAGNESIUM SERPL-MCNC: 2.2 MG/DL (ref 1.6–2.3)
MCH RBC QN AUTO: 35.7 PG (ref 27–31)
MCHC RBC AUTO-ENTMCNC: 33.1 G/DL (ref 30–37)
MCV RBC AUTO: 107.8 FL (ref 81–99)
PHOSPHATE SERPL-MCNC: 4.1 MG/DL (ref 2.5–4.5)
PLATELET # BLD AUTO: 241 10*3/MM3 (ref 130–400)
PMV BLD AUTO: 11.3 FL (ref 6–12)
POTASSIUM BLD-SCNC: 4.4 MMOL/L (ref 3.5–5.1)
PROT SERPL-MCNC: 5.5 G/DL (ref 6.3–8.2)
RBC # BLD AUTO: 2.58 10*6/MM3 (ref 4.2–5.4)
SODIUM BLD-SCNC: 129 MMOL/L (ref 137–145)
WBC NRBC COR # BLD: 13.35 10*3/MM3 (ref 4.8–10.8)

## 2019-03-18 PROCEDURE — 80053 COMPREHEN METABOLIC PANEL: CPT | Performed by: INTERNAL MEDICINE

## 2019-03-18 PROCEDURE — 82962 GLUCOSE BLOOD TEST: CPT

## 2019-03-18 PROCEDURE — 25010000002 MEROPENEM IN SODIUM CHLORIDE 0.9% 50 ML 1 GM/50ML RECONSTITUTED SOLUTION: Performed by: INTERNAL MEDICINE

## 2019-03-18 PROCEDURE — 94799 UNLISTED PULMONARY SVC/PX: CPT

## 2019-03-18 PROCEDURE — 99254 IP/OBS CNSLTJ NEW/EST MOD 60: CPT | Performed by: INTERNAL MEDICINE

## 2019-03-18 PROCEDURE — 83735 ASSAY OF MAGNESIUM: CPT | Performed by: INTERNAL MEDICINE

## 2019-03-18 PROCEDURE — 84100 ASSAY OF PHOSPHORUS: CPT | Performed by: INTERNAL MEDICINE

## 2019-03-18 PROCEDURE — 99233 SBSQ HOSP IP/OBS HIGH 50: CPT | Performed by: INTERNAL MEDICINE

## 2019-03-18 PROCEDURE — 94003 VENT MGMT INPAT SUBQ DAY: CPT

## 2019-03-18 PROCEDURE — 25010000002 VANCOMYCIN 5 G RECONSTITUTED SOLUTION 5,000 MG VIAL: Performed by: INTERNAL MEDICINE

## 2019-03-18 PROCEDURE — 25010000002 MORPHINE PER 10 MG: Performed by: INTERNAL MEDICINE

## 2019-03-18 PROCEDURE — 99233 SBSQ HOSP IP/OBS HIGH 50: CPT | Performed by: PSYCHIATRY & NEUROLOGY

## 2019-03-18 PROCEDURE — 25010000002 ENOXAPARIN PER 10 MG: Performed by: INTERNAL MEDICINE

## 2019-03-18 PROCEDURE — 85027 COMPLETE CBC AUTOMATED: CPT | Performed by: INTERNAL MEDICINE

## 2019-03-18 PROCEDURE — 25010000002 PROPOFOL 1000 MG/ML EMULSION: Performed by: INTERNAL MEDICINE

## 2019-03-18 RX ORDER — NICOTINE 21 MG/24HR
1 PATCH, TRANSDERMAL 24 HOURS TRANSDERMAL
Status: DISCONTINUED | OUTPATIENT
Start: 2019-03-18 | End: 2019-03-25 | Stop reason: HOSPADM

## 2019-03-18 RX ORDER — SODIUM CHLORIDE 9 MG/ML
75 INJECTION, SOLUTION INTRAVENOUS CONTINUOUS
Status: DISCONTINUED | OUTPATIENT
Start: 2019-03-18 | End: 2019-03-22

## 2019-03-18 RX ADMIN — IPRATROPIUM BROMIDE AND ALBUTEROL SULFATE 3 ML: .5; 3 SOLUTION RESPIRATORY (INHALATION) at 12:56

## 2019-03-18 RX ADMIN — SODIUM CHLORIDE, PRESERVATIVE FREE 3 ML: 5 INJECTION INTRAVENOUS at 20:05

## 2019-03-18 RX ADMIN — PROPOFOL 25 MCG/KG/MIN: 10 INJECTION, EMULSION INTRAVENOUS at 08:45

## 2019-03-18 RX ADMIN — FAMOTIDINE 20 MG: 10 INJECTION, SOLUTION INTRAVENOUS at 08:02

## 2019-03-18 RX ADMIN — BUDESONIDE 0.5 MG: 0.5 INHALANT RESPIRATORY (INHALATION) at 07:08

## 2019-03-18 RX ADMIN — MEROPENEM AND SODIUM CHLORIDE 1 G: 1 INJECTION, SOLUTION INTRAVENOUS at 20:05

## 2019-03-18 RX ADMIN — IPRATROPIUM BROMIDE AND ALBUTEROL SULFATE 3 ML: .5; 3 SOLUTION RESPIRATORY (INHALATION) at 19:09

## 2019-03-18 RX ADMIN — ACETAMINOPHEN 650 MG: 325 TABLET, FILM COATED ORAL at 04:14

## 2019-03-18 RX ADMIN — SODIUM CHLORIDE 75 ML/HR: 9 INJECTION, SOLUTION INTRAVENOUS at 08:05

## 2019-03-18 RX ADMIN — BUDESONIDE 0.5 MG: 0.5 INHALANT RESPIRATORY (INHALATION) at 19:09

## 2019-03-18 RX ADMIN — CHLORHEXIDINE GLUCONATE 0.12% ORAL RINSE 15 ML: 1.2 LIQUID ORAL at 20:05

## 2019-03-18 RX ADMIN — SODIUM CHLORIDE, PRESERVATIVE FREE 3 ML: 5 INJECTION INTRAVENOUS at 08:03

## 2019-03-18 RX ADMIN — PROPOFOL 30 MCG/KG/MIN: 10 INJECTION, EMULSION INTRAVENOUS at 03:34

## 2019-03-18 RX ADMIN — IPRATROPIUM BROMIDE AND ALBUTEROL SULFATE 3 ML: .5; 3 SOLUTION RESPIRATORY (INHALATION) at 07:08

## 2019-03-18 RX ADMIN — CHLORHEXIDINE GLUCONATE 0.12% ORAL RINSE 15 ML: 1.2 LIQUID ORAL at 08:02

## 2019-03-18 RX ADMIN — FAMOTIDINE 20 MG: 10 INJECTION, SOLUTION INTRAVENOUS at 20:05

## 2019-03-18 RX ADMIN — IPRATROPIUM BROMIDE AND ALBUTEROL SULFATE 3 ML: .5; 3 SOLUTION RESPIRATORY (INHALATION) at 01:10

## 2019-03-18 RX ADMIN — ACETAMINOPHEN 650 MG: 325 TABLET, FILM COATED ORAL at 20:05

## 2019-03-18 RX ADMIN — MEROPENEM AND SODIUM CHLORIDE 1 G: 1 INJECTION, SOLUTION INTRAVENOUS at 11:49

## 2019-03-18 RX ADMIN — MEROPENEM AND SODIUM CHLORIDE 1 G: 1 INJECTION, SOLUTION INTRAVENOUS at 03:34

## 2019-03-18 RX ADMIN — NICOTINE 1 PATCH: 21 PATCH TRANSDERMAL at 20:05

## 2019-03-18 RX ADMIN — ENOXAPARIN SODIUM 40 MG: 100 INJECTION SUBCUTANEOUS at 11:49

## 2019-03-18 RX ADMIN — MORPHINE SULFATE 2 MG: 2 INJECTION, SOLUTION INTRAMUSCULAR; INTRAVENOUS at 18:42

## 2019-03-18 RX ADMIN — Medication 1 CAPSULE: at 08:03

## 2019-03-18 RX ADMIN — VANCOMYCIN HYDROCHLORIDE 1250 MG: 500 INJECTION, POWDER, LYOPHILIZED, FOR SOLUTION INTRAVENOUS at 01:19

## 2019-03-18 RX ADMIN — BISACODYL 10 MG: 10 SUPPOSITORY RECTAL at 20:06

## 2019-03-19 ENCOUNTER — APPOINTMENT (OUTPATIENT)
Dept: SLEEP MEDICINE | Facility: HOSPITAL | Age: 59
End: 2019-03-19

## 2019-03-19 PROBLEM — R13.12 OROPHARYNGEAL DYSPHAGIA: Status: ACTIVE | Noted: 2019-03-10

## 2019-03-19 LAB
ALBUMIN SERPL-MCNC: 3.2 G/DL (ref 3.5–5)
ALBUMIN/GLOB SERPL: 1.1 G/DL (ref 1–2)
ALP SERPL-CCNC: 55 U/L (ref 38–126)
ALT SERPL W P-5'-P-CCNC: 42 U/L (ref 13–69)
ANION GAP SERPL CALCULATED.3IONS-SCNC: 9.2 MMOL/L (ref 10–20)
AST SERPL-CCNC: 48 U/L (ref 15–46)
BILIRUB SERPL-MCNC: 0.4 MG/DL (ref 0.2–1.3)
BUN BLD-MCNC: 18 MG/DL (ref 7–20)
BUN/CREAT SERPL: 45 (ref 7.1–23.5)
CALCIUM SPEC-SCNC: 8.8 MG/DL (ref 8.4–10.2)
CHLORIDE SERPL-SCNC: 96 MMOL/L (ref 98–107)
CO2 SERPL-SCNC: 29 MMOL/L (ref 26–30)
CREAT BLD-MCNC: 0.4 MG/DL (ref 0.6–1.3)
DEPRECATED RDW RBC AUTO: 48.3 FL (ref 37–54)
ERYTHROCYTE [DISTWIDTH] IN BLOOD BY AUTOMATED COUNT: 12.4 % (ref 11.5–14.5)
GFR SERPL CREATININE-BSD FRML MDRD: >150 ML/MIN/1.73
GLOBULIN UR ELPH-MCNC: 2.8 GM/DL
GLUCOSE BLD-MCNC: 127 MG/DL (ref 74–98)
GLUCOSE BLDC GLUCOMTR-MCNC: 123 MG/DL (ref 70–130)
GLUCOSE BLDC GLUCOMTR-MCNC: 124 MG/DL (ref 70–130)
GLUCOSE BLDC GLUCOMTR-MCNC: 125 MG/DL (ref 70–130)
GLUCOSE BLDC GLUCOMTR-MCNC: 135 MG/DL (ref 70–130)
HCT VFR BLD AUTO: 28.2 % (ref 37–47)
HGB BLD-MCNC: 9.4 G/DL (ref 12–16)
MAGNESIUM SERPL-MCNC: 2.4 MG/DL (ref 1.6–2.3)
MCH RBC QN AUTO: 35.2 PG (ref 27–31)
MCHC RBC AUTO-ENTMCNC: 33.3 G/DL (ref 30–37)
MCV RBC AUTO: 105.6 FL (ref 81–99)
PHOSPHATE SERPL-MCNC: 3.9 MG/DL (ref 2.5–4.5)
PLATELET # BLD AUTO: 239 10*3/MM3 (ref 130–400)
PMV BLD AUTO: 11.7 FL (ref 6–12)
POTASSIUM BLD-SCNC: 4.2 MMOL/L (ref 3.5–5.1)
PROCALCITONIN SERPL-MCNC: 0.16 NG/ML
PROT SERPL-MCNC: 6 G/DL (ref 6.3–8.2)
RBC # BLD AUTO: 2.67 10*6/MM3 (ref 4.2–5.4)
SODIUM BLD-SCNC: 130 MMOL/L (ref 137–145)
WBC NRBC COR # BLD: 12.9 10*3/MM3 (ref 4.8–10.8)

## 2019-03-19 PROCEDURE — 25010000002 ENOXAPARIN PER 10 MG: Performed by: INTERNAL MEDICINE

## 2019-03-19 PROCEDURE — 05HY33Z INSERTION OF INFUSION DEVICE INTO UPPER VEIN, PERCUTANEOUS APPROACH: ICD-10-PCS | Performed by: INTERNAL MEDICINE

## 2019-03-19 PROCEDURE — 83735 ASSAY OF MAGNESIUM: CPT | Performed by: INTERNAL MEDICINE

## 2019-03-19 PROCEDURE — 82962 GLUCOSE BLOOD TEST: CPT

## 2019-03-19 PROCEDURE — 94799 UNLISTED PULMONARY SVC/PX: CPT

## 2019-03-19 PROCEDURE — 85027 COMPLETE CBC AUTOMATED: CPT | Performed by: INTERNAL MEDICINE

## 2019-03-19 PROCEDURE — 94003 VENT MGMT INPAT SUBQ DAY: CPT

## 2019-03-19 PROCEDURE — 25010000002 MEROPENEM IN SODIUM CHLORIDE 0.9% 50 ML 1 GM/50ML RECONSTITUTED SOLUTION: Performed by: INTERNAL MEDICINE

## 2019-03-19 PROCEDURE — 95819 EEG AWAKE AND ASLEEP: CPT | Performed by: PSYCHIATRY & NEUROLOGY

## 2019-03-19 PROCEDURE — 99254 IP/OBS CNSLTJ NEW/EST MOD 60: CPT | Performed by: SURGERY

## 2019-03-19 PROCEDURE — 95819 EEG AWAKE AND ASLEEP: CPT

## 2019-03-19 PROCEDURE — 99233 SBSQ HOSP IP/OBS HIGH 50: CPT | Performed by: INTERNAL MEDICINE

## 2019-03-19 PROCEDURE — 25010000002 LORAZEPAM PER 2 MG: Performed by: INTERNAL MEDICINE

## 2019-03-19 PROCEDURE — 84100 ASSAY OF PHOSPHORUS: CPT | Performed by: INTERNAL MEDICINE

## 2019-03-19 PROCEDURE — 94770: CPT

## 2019-03-19 PROCEDURE — 84145 PROCALCITONIN (PCT): CPT | Performed by: INTERNAL MEDICINE

## 2019-03-19 PROCEDURE — 25010000002 MORPHINE PER 10 MG: Performed by: INTERNAL MEDICINE

## 2019-03-19 PROCEDURE — 80053 COMPREHEN METABOLIC PANEL: CPT | Performed by: INTERNAL MEDICINE

## 2019-03-19 RX ADMIN — MORPHINE SULFATE 2 MG: 2 INJECTION, SOLUTION INTRAMUSCULAR; INTRAVENOUS at 16:03

## 2019-03-19 RX ADMIN — IPRATROPIUM BROMIDE AND ALBUTEROL SULFATE 3 ML: .5; 3 SOLUTION RESPIRATORY (INHALATION) at 00:28

## 2019-03-19 RX ADMIN — IPRATROPIUM BROMIDE AND ALBUTEROL SULFATE 3 ML: .5; 3 SOLUTION RESPIRATORY (INHALATION) at 12:34

## 2019-03-19 RX ADMIN — MEROPENEM AND SODIUM CHLORIDE 1 G: 1 INJECTION, SOLUTION INTRAVENOUS at 05:18

## 2019-03-19 RX ADMIN — LORAZEPAM 1 MG: 2 INJECTION INTRAMUSCULAR; INTRAVENOUS at 17:14

## 2019-03-19 RX ADMIN — SODIUM CHLORIDE 75 ML/HR: 9 INJECTION, SOLUTION INTRAVENOUS at 00:00

## 2019-03-19 RX ADMIN — Medication 1 CAPSULE: at 08:04

## 2019-03-19 RX ADMIN — SODIUM CHLORIDE 75 ML/HR: 9 INJECTION, SOLUTION INTRAVENOUS at 05:18

## 2019-03-19 RX ADMIN — ENOXAPARIN SODIUM 40 MG: 100 INJECTION SUBCUTANEOUS at 11:39

## 2019-03-19 RX ADMIN — CHLORHEXIDINE GLUCONATE 0.12% ORAL RINSE 15 ML: 1.2 LIQUID ORAL at 20:21

## 2019-03-19 RX ADMIN — BUDESONIDE 0.5 MG: 0.5 INHALANT RESPIRATORY (INHALATION) at 06:52

## 2019-03-19 RX ADMIN — MEROPENEM AND SODIUM CHLORIDE 1 G: 1 INJECTION, SOLUTION INTRAVENOUS at 11:39

## 2019-03-19 RX ADMIN — SODIUM CHLORIDE, PRESERVATIVE FREE 3 ML: 5 INJECTION INTRAVENOUS at 08:05

## 2019-03-19 RX ADMIN — IPRATROPIUM BROMIDE AND ALBUTEROL SULFATE 3 ML: .5; 3 SOLUTION RESPIRATORY (INHALATION) at 06:52

## 2019-03-19 RX ADMIN — CHLORHEXIDINE GLUCONATE 0.12% ORAL RINSE 15 ML: 1.2 LIQUID ORAL at 08:05

## 2019-03-19 RX ADMIN — SODIUM CHLORIDE, PRESERVATIVE FREE 3 ML: 5 INJECTION INTRAVENOUS at 20:21

## 2019-03-19 RX ADMIN — IPRATROPIUM BROMIDE AND ALBUTEROL SULFATE 3 ML: .5; 3 SOLUTION RESPIRATORY (INHALATION) at 19:10

## 2019-03-19 RX ADMIN — FAMOTIDINE 20 MG: 10 INJECTION, SOLUTION INTRAVENOUS at 20:21

## 2019-03-19 RX ADMIN — FAMOTIDINE 20 MG: 10 INJECTION, SOLUTION INTRAVENOUS at 08:04

## 2019-03-19 RX ADMIN — NICOTINE 1 PATCH: 21 PATCH TRANSDERMAL at 08:05

## 2019-03-19 RX ADMIN — BUDESONIDE 0.5 MG: 0.5 INHALANT RESPIRATORY (INHALATION) at 19:10

## 2019-03-19 RX ADMIN — MEROPENEM AND SODIUM CHLORIDE 1 G: 1 INJECTION, SOLUTION INTRAVENOUS at 20:21

## 2019-03-20 ENCOUNTER — APPOINTMENT (OUTPATIENT)
Dept: GENERAL RADIOLOGY | Facility: HOSPITAL | Age: 59
End: 2019-03-20

## 2019-03-20 ENCOUNTER — ANESTHESIA (OUTPATIENT)
Dept: PERIOP | Facility: HOSPITAL | Age: 59
End: 2019-03-20

## 2019-03-20 ENCOUNTER — ANESTHESIA EVENT (OUTPATIENT)
Dept: PERIOP | Facility: HOSPITAL | Age: 59
End: 2019-03-20

## 2019-03-20 LAB
A-A DO2: ABNORMAL MMHG
ALBUMIN SERPL-MCNC: 3.2 G/DL (ref 3.5–5)
ALBUMIN/GLOB SERPL: 1.2 G/DL (ref 1–2)
ALP SERPL-CCNC: 59 U/L (ref 38–126)
ALT SERPL W P-5'-P-CCNC: 43 U/L (ref 13–69)
ANION GAP SERPL CALCULATED.3IONS-SCNC: 10.4 MMOL/L (ref 10–20)
ARTERIAL PATENCY WRIST A: POSITIVE
AST SERPL-CCNC: 57 U/L (ref 15–46)
ATMOSPHERIC PRESS: 739 MMHG
BASE EXCESS BLDA CALC-SCNC: 5 MMOL/L (ref 0–2)
BASOPHILS # BLD AUTO: 0.03 10*3/MM3 (ref 0–0.2)
BASOPHILS NFR BLD AUTO: 0.2 % (ref 0–2.5)
BDY SITE: ABNORMAL
BILIRUB SERPL-MCNC: 0.3 MG/DL (ref 0.2–1.3)
BUN BLD-MCNC: 16 MG/DL (ref 7–20)
BUN/CREAT SERPL: 40 (ref 7.1–23.5)
CALCIUM SPEC-SCNC: 8.8 MG/DL (ref 8.4–10.2)
CHLORIDE SERPL-SCNC: 97 MMOL/L (ref 98–107)
CO2 SERPL-SCNC: 27 MMOL/L (ref 26–30)
COHGB MFR BLD: 0.9 % (ref 0–2)
CREAT BLD-MCNC: 0.4 MG/DL (ref 0.6–1.3)
DEPRECATED RDW RBC AUTO: 50.8 FL (ref 37–54)
EOSINOPHIL # BLD AUTO: 0.34 10*3/MM3 (ref 0–0.7)
EOSINOPHIL NFR BLD AUTO: 2.6 % (ref 0–7)
ERYTHROCYTE [DISTWIDTH] IN BLOOD BY AUTOMATED COUNT: 12.6 % (ref 11.5–14.5)
GFR SERPL CREATININE-BSD FRML MDRD: >150 ML/MIN/1.73
GLOBULIN UR ELPH-MCNC: 2.7 GM/DL
GLUCOSE BLD-MCNC: 115 MG/DL (ref 74–98)
GLUCOSE BLDC GLUCOMTR-MCNC: 102 MG/DL (ref 70–130)
GLUCOSE BLDC GLUCOMTR-MCNC: 109 MG/DL (ref 70–130)
GLUCOSE BLDC GLUCOMTR-MCNC: 110 MG/DL (ref 70–130)
GLUCOSE BLDC GLUCOMTR-MCNC: 127 MG/DL (ref 70–130)
HCO3 BLDA-SCNC: 29.2 MMOL/L (ref 22–28)
HCT VFR BLD AUTO: 28.2 % (ref 37–47)
HCT VFR BLD CALC: 29.6 %
HGB BLD-MCNC: 9.3 G/DL (ref 12–16)
HGB BLDA-MCNC: 9.7 G/DL (ref 12–18)
HOROWITZ INDEX BLD+IHG-RTO: 40 %
IMM GRANULOCYTES # BLD AUTO: 0.18 10*3/MM3 (ref 0–0.06)
IMM GRANULOCYTES NFR BLD AUTO: 1.4 % (ref 0–0.6)
LYMPHOCYTES # BLD AUTO: 1.08 10*3/MM3 (ref 0.6–3.4)
LYMPHOCYTES NFR BLD AUTO: 8.2 % (ref 10–50)
MACROCYTES BLD QL SMEAR: NORMAL
MAGNESIUM SERPL-MCNC: 2.4 MG/DL (ref 1.6–2.3)
MCH RBC QN AUTO: 36.2 PG (ref 27–31)
MCHC RBC AUTO-ENTMCNC: 33 G/DL (ref 30–37)
MCV RBC AUTO: 109.7 FL (ref 81–99)
METHGB BLD QL: 0.6 % (ref 0–1.5)
MODALITY: ABNORMAL
MONOCYTES # BLD AUTO: 1.18 10*3/MM3 (ref 0–0.9)
MONOCYTES NFR BLD AUTO: 8.9 % (ref 0–12)
NEUTROPHILS # BLD AUTO: 10.44 10*3/MM3 (ref 2–6.9)
NEUTROPHILS NFR BLD AUTO: 78.7 % (ref 37–80)
NOTE: ABNORMAL
NRBC BLD AUTO-RTO: 0 /100 WBC (ref 0–0)
OXYHGB MFR BLDV: 97.8 % (ref 94–99)
PCO2 BLDA: 40.3 MM HG (ref 35–45)
PCO2 TEMP ADJ BLD: ABNORMAL MM HG (ref 35–45)
PH BLDA: 7.47 PH UNITS (ref 7.3–7.5)
PH, TEMP CORRECTED: ABNORMAL PH UNITS
PHOSPHATE SERPL-MCNC: 3.5 MG/DL (ref 2.5–4.5)
PLATELET # BLD AUTO: 254 10*3/MM3 (ref 130–400)
PMV BLD AUTO: 11.2 FL (ref 6–12)
PO2 BLDA: 116 MM HG (ref 75–100)
PO2 TEMP ADJ BLD: ABNORMAL MM HG (ref 83–108)
POTASSIUM BLD-SCNC: 4.4 MMOL/L (ref 3.5–5.1)
PROT SERPL-MCNC: 5.9 G/DL (ref 6.3–8.2)
RBC # BLD AUTO: 2.57 10*6/MM3 (ref 4.2–5.4)
SAO2 % BLDCOA: 99.3 % (ref 94–100)
SMALL PLATELETS BLD QL SMEAR: ADEQUATE
SODIUM BLD-SCNC: 130 MMOL/L (ref 137–145)
VENTILATOR MODE: ABNORMAL
WBC MORPH BLD: NORMAL
WBC NRBC COR # BLD: 13.25 10*3/MM3 (ref 4.8–10.8)

## 2019-03-20 PROCEDURE — 25010000002 PROPOFOL 1000 MG/ML EMULSION: Performed by: NURSE ANESTHETIST, CERTIFIED REGISTERED

## 2019-03-20 PROCEDURE — C1751 CATH, INF, PER/CENT/MIDLINE: HCPCS

## 2019-03-20 PROCEDURE — 83735 ASSAY OF MAGNESIUM: CPT | Performed by: INTERNAL MEDICINE

## 2019-03-20 PROCEDURE — 85007 BL SMEAR W/DIFF WBC COUNT: CPT | Performed by: INTERNAL MEDICINE

## 2019-03-20 PROCEDURE — 0DH63UZ INSERTION OF FEEDING DEVICE INTO STOMACH, PERCUTANEOUS APPROACH: ICD-10-PCS | Performed by: SURGERY

## 2019-03-20 PROCEDURE — 84100 ASSAY OF PHOSPHORUS: CPT | Performed by: INTERNAL MEDICINE

## 2019-03-20 PROCEDURE — 80053 COMPREHEN METABOLIC PANEL: CPT | Performed by: INTERNAL MEDICINE

## 2019-03-20 PROCEDURE — 31600 PLANNED TRACHEOSTOMY: CPT | Performed by: SURGERY

## 2019-03-20 PROCEDURE — 71045 X-RAY EXAM CHEST 1 VIEW: CPT

## 2019-03-20 PROCEDURE — 99232 SBSQ HOSP IP/OBS MODERATE 35: CPT | Performed by: INTERNAL MEDICINE

## 2019-03-20 PROCEDURE — 82962 GLUCOSE BLOOD TEST: CPT

## 2019-03-20 PROCEDURE — 82805 BLOOD GASES W/O2 SATURATION: CPT

## 2019-03-20 PROCEDURE — 25010000002 MEROPENEM IN SODIUM CHLORIDE 0.9% 50 ML 1 GM/50ML RECONSTITUTED SOLUTION: Performed by: INTERNAL MEDICINE

## 2019-03-20 PROCEDURE — 36600 WITHDRAWAL OF ARTERIAL BLOOD: CPT

## 2019-03-20 PROCEDURE — 94003 VENT MGMT INPAT SUBQ DAY: CPT

## 2019-03-20 PROCEDURE — 25010000002 PROPOFOL 200 MG/20ML EMULSION: Performed by: NURSE ANESTHETIST, CERTIFIED REGISTERED

## 2019-03-20 PROCEDURE — 25010000002 MORPHINE PER 10 MG: Performed by: INTERNAL MEDICINE

## 2019-03-20 PROCEDURE — 94799 UNLISTED PULMONARY SVC/PX: CPT

## 2019-03-20 PROCEDURE — 25010000002 FENTANYL CITRATE (PF) 100 MCG/2ML SOLUTION: Performed by: NURSE ANESTHETIST, CERTIFIED REGISTERED

## 2019-03-20 PROCEDURE — 0B110F4 BYPASS TRACHEA TO CUTANEOUS WITH TRACHEOSTOMY DEVICE, OPEN APPROACH: ICD-10-PCS | Performed by: SURGERY

## 2019-03-20 PROCEDURE — 0DJ08ZZ INSPECTION OF UPPER INTESTINAL TRACT, VIA NATURAL OR ARTIFICIAL OPENING ENDOSCOPIC: ICD-10-PCS | Performed by: SURGERY

## 2019-03-20 PROCEDURE — 99233 SBSQ HOSP IP/OBS HIGH 50: CPT | Performed by: INTERNAL MEDICINE

## 2019-03-20 PROCEDURE — 43246 EGD PLACE GASTROSTOMY TUBE: CPT | Performed by: SURGERY

## 2019-03-20 PROCEDURE — 25010000002 ONDANSETRON PER 1 MG: Performed by: NURSE ANESTHETIST, CERTIFIED REGISTERED

## 2019-03-20 PROCEDURE — 82375 ASSAY CARBOXYHB QUANT: CPT

## 2019-03-20 PROCEDURE — C1894 INTRO/SHEATH, NON-LASER: HCPCS

## 2019-03-20 PROCEDURE — 83050 HGB METHEMOGLOBIN QUAN: CPT

## 2019-03-20 PROCEDURE — 85025 COMPLETE CBC W/AUTO DIFF WBC: CPT | Performed by: INTERNAL MEDICINE

## 2019-03-20 PROCEDURE — 25010000002 MORPHINE SULFATE (PF) 2 MG/ML SOLUTION: Performed by: INTERNAL MEDICINE

## 2019-03-20 RX ORDER — SODIUM CHLORIDE 0.9 % (FLUSH) 0.9 %
3 SYRINGE (ML) INJECTION EVERY 12 HOURS SCHEDULED
Status: DISCONTINUED | OUTPATIENT
Start: 2019-03-20 | End: 2019-03-20

## 2019-03-20 RX ORDER — SODIUM CHLORIDE 0.9 % (FLUSH) 0.9 %
10 SYRINGE (ML) INJECTION AS NEEDED
Status: DISCONTINUED | OUTPATIENT
Start: 2019-03-20 | End: 2019-03-25 | Stop reason: HOSPADM

## 2019-03-20 RX ORDER — SODIUM CHLORIDE 0.9 % (FLUSH) 0.9 %
10 SYRINGE (ML) INJECTION EVERY 12 HOURS SCHEDULED
Status: DISCONTINUED | OUTPATIENT
Start: 2019-03-20 | End: 2019-03-20

## 2019-03-20 RX ORDER — PROPOFOL 10 MG/ML
INJECTION, EMULSION INTRAVENOUS AS NEEDED
Status: DISCONTINUED | OUTPATIENT
Start: 2019-03-20 | End: 2019-03-20 | Stop reason: SURG

## 2019-03-20 RX ORDER — SODIUM CHLORIDE 0.9 % (FLUSH) 0.9 %
10 SYRINGE (ML) INJECTION EVERY 12 HOURS SCHEDULED
Status: DISCONTINUED | OUTPATIENT
Start: 2019-03-20 | End: 2019-03-25 | Stop reason: HOSPADM

## 2019-03-20 RX ORDER — SODIUM CHLORIDE 0.9 % (FLUSH) 0.9 %
3-10 SYRINGE (ML) INJECTION AS NEEDED
Status: DISCONTINUED | OUTPATIENT
Start: 2019-03-20 | End: 2019-03-20

## 2019-03-20 RX ORDER — BUPIVACAINE HCL/0.9 % NACL/PF 0.125 %
PLASTIC BAG, INJECTION (ML) EPIDURAL AS NEEDED
Status: DISCONTINUED | OUTPATIENT
Start: 2019-03-20 | End: 2019-03-20 | Stop reason: SURG

## 2019-03-20 RX ORDER — FENTANYL CITRATE 50 UG/ML
INJECTION, SOLUTION INTRAMUSCULAR; INTRAVENOUS AS NEEDED
Status: DISCONTINUED | OUTPATIENT
Start: 2019-03-20 | End: 2019-03-20 | Stop reason: SURG

## 2019-03-20 RX ORDER — ONDANSETRON 2 MG/ML
INJECTION INTRAMUSCULAR; INTRAVENOUS AS NEEDED
Status: DISCONTINUED | OUTPATIENT
Start: 2019-03-20 | End: 2019-03-20 | Stop reason: SURG

## 2019-03-20 RX ORDER — SODIUM CHLORIDE 0.9 % (FLUSH) 0.9 %
10 SYRINGE (ML) INJECTION AS NEEDED
Status: DISCONTINUED | OUTPATIENT
Start: 2019-03-20 | End: 2019-03-20

## 2019-03-20 RX ORDER — SODIUM CHLORIDE 0.9 % (FLUSH) 0.9 %
20 SYRINGE (ML) INJECTION AS NEEDED
Status: DISCONTINUED | OUTPATIENT
Start: 2019-03-20 | End: 2019-03-25 | Stop reason: HOSPADM

## 2019-03-20 RX ORDER — LIDOCAINE HYDROCHLORIDE 10 MG/ML
INJECTION, SOLUTION INFILTRATION; PERINEURAL AS NEEDED
Status: DISCONTINUED | OUTPATIENT
Start: 2019-03-20 | End: 2019-03-20 | Stop reason: HOSPADM

## 2019-03-20 RX ADMIN — CHLORHEXIDINE GLUCONATE 0.12% ORAL RINSE 15 ML: 1.2 LIQUID ORAL at 08:50

## 2019-03-20 RX ADMIN — IPRATROPIUM BROMIDE AND ALBUTEROL SULFATE 3 ML: .5; 3 SOLUTION RESPIRATORY (INHALATION) at 19:14

## 2019-03-20 RX ADMIN — LABETALOL 20 MG/4 ML (5 MG/ML) INTRAVENOUS SYRINGE 20 MG: at 09:12

## 2019-03-20 RX ADMIN — SODIUM CHLORIDE, PRESERVATIVE FREE 3 ML: 5 INJECTION INTRAVENOUS at 08:50

## 2019-03-20 RX ADMIN — Medication 1 CAPSULE: at 08:50

## 2019-03-20 RX ADMIN — FENTANYL CITRATE 50 MCG: 50 INJECTION, SOLUTION INTRAMUSCULAR; INTRAVENOUS at 15:19

## 2019-03-20 RX ADMIN — SODIUM CHLORIDE, PRESERVATIVE FREE 10 ML: 5 INJECTION INTRAVENOUS at 20:49

## 2019-03-20 RX ADMIN — PROPOFOL 30 MG: 10 INJECTION, EMULSION INTRAVENOUS at 15:11

## 2019-03-20 RX ADMIN — Medication 10 ML: at 14:54

## 2019-03-20 RX ADMIN — SODIUM CHLORIDE: 9 INJECTION, SOLUTION INTRAVENOUS at 15:40

## 2019-03-20 RX ADMIN — MEROPENEM AND SODIUM CHLORIDE 1 G: 1 INJECTION, SOLUTION INTRAVENOUS at 12:42

## 2019-03-20 RX ADMIN — IPRATROPIUM BROMIDE AND ALBUTEROL SULFATE 3 ML: .5; 3 SOLUTION RESPIRATORY (INHALATION) at 07:01

## 2019-03-20 RX ADMIN — FAMOTIDINE 20 MG: 10 INJECTION, SOLUTION INTRAVENOUS at 20:48

## 2019-03-20 RX ADMIN — Medication 200 MCG: at 15:50

## 2019-03-20 RX ADMIN — MORPHINE SULFATE 2 MG: 2 INJECTION, SOLUTION INTRAMUSCULAR; INTRAVENOUS at 03:35

## 2019-03-20 RX ADMIN — FENTANYL CITRATE 50 MCG: 50 INJECTION, SOLUTION INTRAMUSCULAR; INTRAVENOUS at 15:12

## 2019-03-20 RX ADMIN — BUDESONIDE 0.5 MG: 0.5 INHALANT RESPIRATORY (INHALATION) at 07:01

## 2019-03-20 RX ADMIN — MEROPENEM AND SODIUM CHLORIDE 1 G: 1 INJECTION, SOLUTION INTRAVENOUS at 20:48

## 2019-03-20 RX ADMIN — ONDANSETRON 4 MG: 2 INJECTION INTRAMUSCULAR; INTRAVENOUS at 15:11

## 2019-03-20 RX ADMIN — MEROPENEM AND SODIUM CHLORIDE 1 G: 1 INJECTION, SOLUTION INTRAVENOUS at 04:10

## 2019-03-20 RX ADMIN — FENTANYL CITRATE 50 MCG: 50 INJECTION, SOLUTION INTRAMUSCULAR; INTRAVENOUS at 15:07

## 2019-03-20 RX ADMIN — IPRATROPIUM BROMIDE AND ALBUTEROL SULFATE 3 ML: .5; 3 SOLUTION RESPIRATORY (INHALATION) at 12:51

## 2019-03-20 RX ADMIN — SODIUM CHLORIDE 75 ML/HR: 9 INJECTION, SOLUTION INTRAVENOUS at 20:49

## 2019-03-20 RX ADMIN — BUDESONIDE 0.5 MG: 0.5 INHALANT RESPIRATORY (INHALATION) at 19:14

## 2019-03-20 RX ADMIN — IPRATROPIUM BROMIDE AND ALBUTEROL SULFATE 3 ML: .5; 3 SOLUTION RESPIRATORY (INHALATION) at 01:00

## 2019-03-20 RX ADMIN — Medication 150 MCG: at 15:12

## 2019-03-20 RX ADMIN — SODIUM CHLORIDE, PRESERVATIVE FREE 10 ML: 5 INJECTION INTRAVENOUS at 14:54

## 2019-03-20 RX ADMIN — PROPOFOL 50 MG: 10 INJECTION, EMULSION INTRAVENOUS at 15:08

## 2019-03-20 RX ADMIN — PROPOFOL 75 MCG/KG/MIN: 10 INJECTION, EMULSION INTRAVENOUS at 15:08

## 2019-03-20 RX ADMIN — FAMOTIDINE 20 MG: 10 INJECTION, SOLUTION INTRAVENOUS at 08:50

## 2019-03-20 RX ADMIN — NICOTINE 1 PATCH: 21 PATCH TRANSDERMAL at 08:50

## 2019-03-20 RX ADMIN — CHLORHEXIDINE GLUCONATE 0.12% ORAL RINSE 15 ML: 1.2 LIQUID ORAL at 20:48

## 2019-03-20 RX ADMIN — MORPHINE SULFATE 2 MG: 2 INJECTION, SOLUTION INTRAMUSCULAR; INTRAVENOUS at 17:55

## 2019-03-20 RX ADMIN — FENTANYL CITRATE 50 MCG: 50 INJECTION, SOLUTION INTRAMUSCULAR; INTRAVENOUS at 15:44

## 2019-03-20 NOTE — ANESTHESIA PREPROCEDURE EVALUATION
Anesthesia Evaluation     Patient summary reviewed and Nursing notes reviewed   NPO Solid Status: > 8 hours  NPO Liquid Status: > 8 hours           Airway   Dental      Pulmonary    (+) pneumonia , a smoker Current Abstained day of surgery, COPD,     ROS comment: Acute resp failure arrest with resuscitation  Cardiovascular     ECG reviewed  Patient on routine beta blocker    (+) hypertension less than 2 medications, LAI,     ROS comment: Resuscitated cardiopulmonary arrest  Anoxic encephalopathy    Neuro/Psych  GI/Hepatic/Renal/Endo      Musculoskeletal     (+) arthralgias, myalgias,   Abdominal    Substance History      OB/GYN          Other        ROS/Med Hx Other: Labs reviewed  9/28 k 4.4  abg po2 116,  cxr  Mildly decreased lung volumes and bilateral interstitial disease  stable from prior.  ekg st rad  Mac Borderline LV size with mild reduction in LV systolic function ( EF is 45 to 50%)  2) Mild to moderate left atrial enlargement   3) Trace MR and TR with normal PA pressures   4) Borderline RV size with normal function   5) Global mild hypokinesis of the LV   6) Moderate calcific plaque along ascending aorta                 Anesthesia Plan    ASA 3     general   (Risks and benefits discussed including risk of aspiration, recall and dental damage. pt increased intraop and postop risk for cv, resp, and neuro events,All patient questions answered. Will continue with POC.)  intravenous induction   Anesthetic plan, all risks, benefits, and alternatives have been provided, discussed and informed consent has been obtained with: patient.

## 2019-03-20 NOTE — ANESTHESIA POSTPROCEDURE EVALUATION
Patient: Viji Vargas    Procedure Summary     Date:  03/20/19 Room / Location:   [facility]  /  [facility]    Anesthesia Start:  1506 Anesthesia Stop:  1630    Procedure:  TRACHEOSTOMY AND PERCUTANEOUS ENDOSCOPIC GASTROSTOMY TUBE INSERTION (N/A ) Diagnosis:       Oropharyngeal dysphagia      Acute respiratory failure with hypoxia and hypercapnia (CMS/HCC)      Anoxic encephalopathy (CMS/HCC)      (Oropharyngeal dysphagia [R13.12])      (Acute respiratory failure with hypoxia and hypercapnia (CMS/HCC) [J96.01, J96.02])      (Anoxic encephalopathy (CMS/HCC) [G93.1])    Surgeon:  Nadira Pandey MD Provider:  Elroy Do CRNA    Anesthesia Type:  general ASA Status:  3          Anesthesia Type: general  Last vitals  BP   143/76 @1640 3/20/2019   Temp 98.2   Pulse 84   Resp 15   SpO2 100%     Post Anesthesia Care and Evaluation    Patient location during evaluation: ICU  Patient participation: complete - patient cannot participate  Level of consciousness: obtunded/minimal responses (Pt with anoxic encephalopathy)  Pain scale: unable to determine.  Pain control: unable to determine.  Airway patency: patent  Anesthetic complications: No anesthetic complications  PONV Status: NA  Cardiovascular status: acceptable and hemodynamically stable  Respiratory status: acceptable, trach and ventilator  Hydration status: acceptable

## 2019-03-20 NOTE — ANESTHESIA PROCEDURE NOTES
Airway  Urgency: elective    Airway not difficult    General Information and Staff    Patient location during procedure: OR  CRNA: Elroy Do CRNA    Indications and Patient Condition  Indications for airway management: airway protection    Preoxygenated: yes  MILS maintained throughout  Mask difficulty assessment: 1 - vent by mask    Final Airway Details  Final airway type: endotracheal airway      Successful airway: ETT  Cuffed: yes   Intubation method: placed prior to arrival in OR.  Endotracheal tube insertion site: oral  Blade: Sage  Blade size: 3  ETT size (mm): 7.0  Placement verified by: chest auscultation and capnometry   Cuff volume (mL): 6  Measured from: teeth  ETT to teeth (cm): 23    Additional Comments  ETT present upon arrival in OR.

## 2019-03-21 ENCOUNTER — APPOINTMENT (OUTPATIENT)
Dept: GENERAL RADIOLOGY | Facility: HOSPITAL | Age: 59
End: 2019-03-21

## 2019-03-21 LAB
A-A DO2: ABNORMAL MMHG
ALBUMIN SERPL-MCNC: 3.3 G/DL (ref 3.5–5)
ALP SERPL-CCNC: 70 U/L (ref 38–126)
ALT SERPL W P-5'-P-CCNC: 43 U/L (ref 13–69)
ANION GAP SERPL CALCULATED.3IONS-SCNC: 8.8 MMOL/L (ref 10–20)
ARTERIAL PATENCY WRIST A: POSITIVE
AST SERPL-CCNC: 58 U/L (ref 15–46)
ATMOSPHERIC PRESS: 732 MMHG
BACTERIA SPEC AEROBE CULT: NORMAL
BACTERIA SPEC AEROBE CULT: NORMAL
BASE EXCESS BLDA CALC-SCNC: 4.9 MMOL/L (ref 0–2)
BDY SITE: ABNORMAL
BILIRUB CONJ SERPL-MCNC: 0.2 MG/DL (ref 0–0.4)
BILIRUB INDIRECT SERPL-MCNC: 0.1 MG/DL
BILIRUB SERPL-MCNC: 0.3 MG/DL (ref 0.2–1.3)
BUN BLD-MCNC: 13 MG/DL (ref 7–20)
BUN/CREAT SERPL: 43.3 (ref 7.1–23.5)
CALCIUM SPEC-SCNC: 9 MG/DL (ref 8.4–10.2)
CHLORIDE SERPL-SCNC: 99 MMOL/L (ref 98–107)
CO2 SERPL-SCNC: 28 MMOL/L (ref 26–30)
COHGB MFR BLD: 0.9 % (ref 0–2)
CREAT BLD-MCNC: 0.3 MG/DL (ref 0.6–1.3)
DEPRECATED RDW RBC AUTO: 50.2 FL (ref 37–54)
ERYTHROCYTE [DISTWIDTH] IN BLOOD BY AUTOMATED COUNT: 12.5 % (ref 11.5–14.5)
GFR SERPL CREATININE-BSD FRML MDRD: >150 ML/MIN/1.73
GLUCOSE BLD-MCNC: 103 MG/DL (ref 74–98)
GLUCOSE BLDC GLUCOMTR-MCNC: 102 MG/DL (ref 70–130)
GLUCOSE BLDC GLUCOMTR-MCNC: 110 MG/DL (ref 70–130)
GLUCOSE BLDC GLUCOMTR-MCNC: 114 MG/DL (ref 70–130)
GLUCOSE BLDC GLUCOMTR-MCNC: 123 MG/DL (ref 70–130)
HCO3 BLDA-SCNC: 29.2 MMOL/L (ref 22–28)
HCT VFR BLD AUTO: 27.7 % (ref 37–47)
HCT VFR BLD CALC: 27.6 %
HGB BLD-MCNC: 9.3 G/DL (ref 12–16)
HGB BLDA-MCNC: 9 G/DL (ref 12–18)
HOROWITZ INDEX BLD+IHG-RTO: 40 %
MAGNESIUM SERPL-MCNC: 2.3 MG/DL (ref 1.6–2.3)
MCH RBC QN AUTO: 36.8 PG (ref 27–31)
MCHC RBC AUTO-ENTMCNC: 33.6 G/DL (ref 30–37)
MCV RBC AUTO: 109.5 FL (ref 81–99)
METHGB BLD QL: 0.5 % (ref 0–1.5)
MODALITY: ABNORMAL
NOTE: ABNORMAL
OXYHGB MFR BLDV: 97.3 % (ref 94–99)
PCO2 BLDA: 41.3 MM HG (ref 35–45)
PCO2 TEMP ADJ BLD: ABNORMAL MM HG (ref 35–45)
PEEP RESPIRATORY: 5 CM[H2O]
PH BLDA: 7.46 PH UNITS (ref 7.3–7.5)
PH, TEMP CORRECTED: ABNORMAL PH UNITS
PHOSPHATE SERPL-MCNC: 3.1 MG/DL (ref 2.5–4.5)
PLATELET # BLD AUTO: 232 10*3/MM3 (ref 130–400)
PMV BLD AUTO: 11.7 FL (ref 6–12)
PO2 BLDA: 99.9 MM HG (ref 75–100)
PO2 TEMP ADJ BLD: ABNORMAL MM HG (ref 83–108)
POTASSIUM BLD-SCNC: 3.8 MMOL/L (ref 3.5–5.1)
PROT SERPL-MCNC: 6 G/DL (ref 6.3–8.2)
RBC # BLD AUTO: 2.53 10*6/MM3 (ref 4.2–5.4)
SAO2 % BLDCOA: 98.7 % (ref 94–100)
SET MECH RESP RATE: 15
SODIUM BLD-SCNC: 132 MMOL/L (ref 137–145)
VENTILATOR MODE: AC
VT ON VENT VENT: 420 ML
WBC NRBC COR # BLD: 14.41 10*3/MM3 (ref 4.8–10.8)

## 2019-03-21 PROCEDURE — 36592 COLLECT BLOOD FROM PICC: CPT

## 2019-03-21 PROCEDURE — 82375 ASSAY CARBOXYHB QUANT: CPT

## 2019-03-21 PROCEDURE — 82962 GLUCOSE BLOOD TEST: CPT

## 2019-03-21 PROCEDURE — 25010000002 MORPHINE PER 10 MG: Performed by: INTERNAL MEDICINE

## 2019-03-21 PROCEDURE — 94003 VENT MGMT INPAT SUBQ DAY: CPT

## 2019-03-21 PROCEDURE — 99232 SBSQ HOSP IP/OBS MODERATE 35: CPT | Performed by: INTERNAL MEDICINE

## 2019-03-21 PROCEDURE — 94799 UNLISTED PULMONARY SVC/PX: CPT

## 2019-03-21 PROCEDURE — 25010000002 ENOXAPARIN PER 10 MG: Performed by: SURGERY

## 2019-03-21 PROCEDURE — 71045 X-RAY EXAM CHEST 1 VIEW: CPT

## 2019-03-21 PROCEDURE — 82805 BLOOD GASES W/O2 SATURATION: CPT

## 2019-03-21 PROCEDURE — 80048 BASIC METABOLIC PNL TOTAL CA: CPT | Performed by: INTERNAL MEDICINE

## 2019-03-21 PROCEDURE — 99233 SBSQ HOSP IP/OBS HIGH 50: CPT | Performed by: INTERNAL MEDICINE

## 2019-03-21 PROCEDURE — 25010000002 MEROPENEM IN SODIUM CHLORIDE 0.9% 50 ML 1 GM/50ML RECONSTITUTED SOLUTION: Performed by: INTERNAL MEDICINE

## 2019-03-21 PROCEDURE — 83050 HGB METHEMOGLOBIN QUAN: CPT

## 2019-03-21 PROCEDURE — 36600 WITHDRAWAL OF ARTERIAL BLOOD: CPT

## 2019-03-21 PROCEDURE — 84100 ASSAY OF PHOSPHORUS: CPT | Performed by: INTERNAL MEDICINE

## 2019-03-21 PROCEDURE — 25010000002 HYDRALAZINE PER 20 MG: Performed by: INTERNAL MEDICINE

## 2019-03-21 PROCEDURE — 83735 ASSAY OF MAGNESIUM: CPT | Performed by: INTERNAL MEDICINE

## 2019-03-21 PROCEDURE — 80076 HEPATIC FUNCTION PANEL: CPT | Performed by: INTERNAL MEDICINE

## 2019-03-21 PROCEDURE — 85027 COMPLETE CBC AUTOMATED: CPT | Performed by: INTERNAL MEDICINE

## 2019-03-21 RX ADMIN — FAMOTIDINE 20 MG: 10 INJECTION, SOLUTION INTRAVENOUS at 08:30

## 2019-03-21 RX ADMIN — ENOXAPARIN SODIUM 40 MG: 40 INJECTION SUBCUTANEOUS at 08:30

## 2019-03-21 RX ADMIN — BUDESONIDE 0.5 MG: 0.5 INHALANT RESPIRATORY (INHALATION) at 07:26

## 2019-03-21 RX ADMIN — SODIUM CHLORIDE, PRESERVATIVE FREE 10 ML: 5 INJECTION INTRAVENOUS at 08:31

## 2019-03-21 RX ADMIN — BUDESONIDE 0.5 MG: 0.5 INHALANT RESPIRATORY (INHALATION) at 18:51

## 2019-03-21 RX ADMIN — IPRATROPIUM BROMIDE AND ALBUTEROL SULFATE 3 ML: .5; 3 SOLUTION RESPIRATORY (INHALATION) at 07:26

## 2019-03-21 RX ADMIN — IPRATROPIUM BROMIDE AND ALBUTEROL SULFATE 3 ML: .5; 3 SOLUTION RESPIRATORY (INHALATION) at 18:51

## 2019-03-21 RX ADMIN — IPRATROPIUM BROMIDE AND ALBUTEROL SULFATE 3 ML: .5; 3 SOLUTION RESPIRATORY (INHALATION) at 12:04

## 2019-03-21 RX ADMIN — MEROPENEM AND SODIUM CHLORIDE 1 G: 1 INJECTION, SOLUTION INTRAVENOUS at 13:53

## 2019-03-21 RX ADMIN — HYDRALAZINE HYDROCHLORIDE 20 MG: 20 INJECTION INTRAMUSCULAR; INTRAVENOUS at 00:53

## 2019-03-21 RX ADMIN — MORPHINE SULFATE 2 MG: 2 INJECTION, SOLUTION INTRAMUSCULAR; INTRAVENOUS at 00:52

## 2019-03-21 RX ADMIN — IPRATROPIUM BROMIDE AND ALBUTEROL SULFATE 3 ML: .5; 3 SOLUTION RESPIRATORY (INHALATION) at 00:03

## 2019-03-21 RX ADMIN — CHLORHEXIDINE GLUCONATE 0.12% ORAL RINSE 15 ML: 1.2 LIQUID ORAL at 08:30

## 2019-03-21 RX ADMIN — CHLORHEXIDINE GLUCONATE 0.12% ORAL RINSE 15 ML: 1.2 LIQUID ORAL at 20:52

## 2019-03-21 RX ADMIN — NICOTINE 1 PATCH: 21 PATCH TRANSDERMAL at 08:30

## 2019-03-21 RX ADMIN — SODIUM CHLORIDE 75 ML/HR: 9 INJECTION, SOLUTION INTRAVENOUS at 13:57

## 2019-03-21 RX ADMIN — MEROPENEM AND SODIUM CHLORIDE 1 G: 1 INJECTION, SOLUTION INTRAVENOUS at 03:06

## 2019-03-21 RX ADMIN — FAMOTIDINE 20 MG: 10 INJECTION, SOLUTION INTRAVENOUS at 20:52

## 2019-03-21 RX ADMIN — Medication 1 CAPSULE: at 08:33

## 2019-03-22 LAB
ALBUMIN SERPL-MCNC: 3 G/DL (ref 3.5–5)
ALBUMIN/GLOB SERPL: 1.1 G/DL (ref 1–2)
ALP SERPL-CCNC: 72 U/L (ref 38–126)
ALT SERPL W P-5'-P-CCNC: 35 U/L (ref 13–69)
ANION GAP SERPL CALCULATED.3IONS-SCNC: 8.5 MMOL/L (ref 10–20)
AST SERPL-CCNC: 51 U/L (ref 15–46)
BASOPHILS # BLD AUTO: 0.02 10*3/MM3 (ref 0–0.2)
BASOPHILS NFR BLD AUTO: 0.1 % (ref 0–2.5)
BILIRUB SERPL-MCNC: 0.2 MG/DL (ref 0.2–1.3)
BUN BLD-MCNC: 14 MG/DL (ref 7–20)
BUN/CREAT SERPL: 35 (ref 7.1–23.5)
CALCIUM SPEC-SCNC: 9 MG/DL (ref 8.4–10.2)
CHLORIDE SERPL-SCNC: 102 MMOL/L (ref 98–107)
CO2 SERPL-SCNC: 28 MMOL/L (ref 26–30)
CREAT BLD-MCNC: 0.4 MG/DL (ref 0.6–1.3)
DEPRECATED RDW RBC AUTO: 50.4 FL (ref 37–54)
EOSINOPHIL # BLD AUTO: 0.27 10*3/MM3 (ref 0–0.7)
EOSINOPHIL NFR BLD AUTO: 2 % (ref 0–7)
ERYTHROCYTE [DISTWIDTH] IN BLOOD BY AUTOMATED COUNT: 12.6 % (ref 11.5–14.5)
FERRITIN SERPL-MCNC: 487 NG/ML (ref 11.1–264)
FOLATE SERPL-MCNC: 6.48 NG/ML
GFR SERPL CREATININE-BSD FRML MDRD: >150 ML/MIN/1.73
GLOBULIN UR ELPH-MCNC: 2.7 GM/DL
GLUCOSE BLD-MCNC: 135 MG/DL (ref 74–98)
GLUCOSE BLDC GLUCOMTR-MCNC: 117 MG/DL (ref 70–130)
GLUCOSE BLDC GLUCOMTR-MCNC: 118 MG/DL (ref 70–130)
GLUCOSE BLDC GLUCOMTR-MCNC: 126 MG/DL (ref 70–130)
GLUCOSE BLDC GLUCOMTR-MCNC: 132 MG/DL (ref 70–130)
HCT VFR BLD AUTO: 25.1 % (ref 37–47)
HGB BLD-MCNC: 8.4 G/DL (ref 12–16)
IMM GRANULOCYTES # BLD AUTO: 0.09 10*3/MM3 (ref 0–0.06)
IMM GRANULOCYTES NFR BLD AUTO: 0.7 % (ref 0–0.6)
IRON 24H UR-MRATE: 33 MCG/DL (ref 37–181)
IRON SATN MFR SERPL: 14 % (ref 11–46)
LYMPHOCYTES # BLD AUTO: 1.03 10*3/MM3 (ref 0.6–3.4)
LYMPHOCYTES NFR BLD AUTO: 7.5 % (ref 10–50)
MACROCYTES BLD QL SMEAR: NORMAL
MAGNESIUM SERPL-MCNC: 2.3 MG/DL (ref 1.6–2.3)
MCH RBC QN AUTO: 36.8 PG (ref 27–31)
MCHC RBC AUTO-ENTMCNC: 33.5 G/DL (ref 30–37)
MCV RBC AUTO: 110.1 FL (ref 81–99)
MONOCYTES # BLD AUTO: 1.1 10*3/MM3 (ref 0–0.9)
MONOCYTES NFR BLD AUTO: 8 % (ref 0–12)
NEUTROPHILS # BLD AUTO: 11.24 10*3/MM3 (ref 2–6.9)
NEUTROPHILS NFR BLD AUTO: 81.7 % (ref 37–80)
NRBC BLD AUTO-RTO: 0 /100 WBC (ref 0–0)
PHOSPHATE SERPL-MCNC: 3.2 MG/DL (ref 2.5–4.5)
PLATELET # BLD AUTO: 267 10*3/MM3 (ref 130–400)
PMV BLD AUTO: 11.3 FL (ref 6–12)
POTASSIUM BLD-SCNC: 3.5 MMOL/L (ref 3.5–5.1)
PROT SERPL-MCNC: 5.7 G/DL (ref 6.3–8.2)
RBC # BLD AUTO: 2.28 10*6/MM3 (ref 4.2–5.4)
SMALL PLATELETS BLD QL SMEAR: ADEQUATE
SODIUM BLD-SCNC: 135 MMOL/L (ref 137–145)
TIBC SERPL-MCNC: 238 MCG/DL (ref 261–497)
VIT B12 BLD-MCNC: 288 PG/ML (ref 239–931)
WBC MORPH BLD: NORMAL
WBC NRBC COR # BLD: 13.75 10*3/MM3 (ref 4.8–10.8)

## 2019-03-22 PROCEDURE — 94799 UNLISTED PULMONARY SVC/PX: CPT

## 2019-03-22 PROCEDURE — 83540 ASSAY OF IRON: CPT | Performed by: INTERNAL MEDICINE

## 2019-03-22 PROCEDURE — 99233 SBSQ HOSP IP/OBS HIGH 50: CPT | Performed by: INTERNAL MEDICINE

## 2019-03-22 PROCEDURE — 82962 GLUCOSE BLOOD TEST: CPT

## 2019-03-22 PROCEDURE — 94003 VENT MGMT INPAT SUBQ DAY: CPT

## 2019-03-22 PROCEDURE — 25010000002 ENOXAPARIN PER 10 MG: Performed by: SURGERY

## 2019-03-22 PROCEDURE — 82607 VITAMIN B-12: CPT | Performed by: INTERNAL MEDICINE

## 2019-03-22 PROCEDURE — 25010000002 MORPHINE PER 10 MG: Performed by: INTERNAL MEDICINE

## 2019-03-22 PROCEDURE — 85007 BL SMEAR W/DIFF WBC COUNT: CPT | Performed by: INTERNAL MEDICINE

## 2019-03-22 PROCEDURE — 94770: CPT

## 2019-03-22 PROCEDURE — 83735 ASSAY OF MAGNESIUM: CPT | Performed by: INTERNAL MEDICINE

## 2019-03-22 PROCEDURE — 99232 SBSQ HOSP IP/OBS MODERATE 35: CPT | Performed by: INTERNAL MEDICINE

## 2019-03-22 PROCEDURE — 82728 ASSAY OF FERRITIN: CPT | Performed by: INTERNAL MEDICINE

## 2019-03-22 PROCEDURE — 84100 ASSAY OF PHOSPHORUS: CPT | Performed by: INTERNAL MEDICINE

## 2019-03-22 PROCEDURE — 83550 IRON BINDING TEST: CPT | Performed by: INTERNAL MEDICINE

## 2019-03-22 PROCEDURE — 25010000002 LORAZEPAM PER 2 MG: Performed by: INTERNAL MEDICINE

## 2019-03-22 PROCEDURE — 36592 COLLECT BLOOD FROM PICC: CPT

## 2019-03-22 PROCEDURE — 82746 ASSAY OF FOLIC ACID SERUM: CPT | Performed by: INTERNAL MEDICINE

## 2019-03-22 PROCEDURE — 85025 COMPLETE CBC W/AUTO DIFF WBC: CPT | Performed by: INTERNAL MEDICINE

## 2019-03-22 PROCEDURE — 80053 COMPREHEN METABOLIC PANEL: CPT | Performed by: INTERNAL MEDICINE

## 2019-03-22 RX ADMIN — IPRATROPIUM BROMIDE AND ALBUTEROL SULFATE 3 ML: .5; 3 SOLUTION RESPIRATORY (INHALATION) at 19:24

## 2019-03-22 RX ADMIN — SODIUM CHLORIDE 75 ML/HR: 9 INJECTION, SOLUTION INTRAVENOUS at 03:01

## 2019-03-22 RX ADMIN — LABETALOL 20 MG/4 ML (5 MG/ML) INTRAVENOUS SYRINGE 20 MG: at 03:30

## 2019-03-22 RX ADMIN — IPRATROPIUM BROMIDE AND ALBUTEROL SULFATE 3 ML: .5; 3 SOLUTION RESPIRATORY (INHALATION) at 06:48

## 2019-03-22 RX ADMIN — IPRATROPIUM BROMIDE AND ALBUTEROL SULFATE 3 ML: .5; 3 SOLUTION RESPIRATORY (INHALATION) at 12:52

## 2019-03-22 RX ADMIN — BUDESONIDE 0.5 MG: 0.5 INHALANT RESPIRATORY (INHALATION) at 06:48

## 2019-03-22 RX ADMIN — SODIUM CHLORIDE, PRESERVATIVE FREE 10 ML: 5 INJECTION INTRAVENOUS at 09:14

## 2019-03-22 RX ADMIN — LORAZEPAM 1 MG: 2 INJECTION INTRAMUSCULAR; INTRAVENOUS at 21:20

## 2019-03-22 RX ADMIN — IPRATROPIUM BROMIDE AND ALBUTEROL SULFATE 3 ML: .5; 3 SOLUTION RESPIRATORY (INHALATION) at 01:02

## 2019-03-22 RX ADMIN — ENOXAPARIN SODIUM 40 MG: 40 INJECTION SUBCUTANEOUS at 09:10

## 2019-03-22 RX ADMIN — BUDESONIDE 0.5 MG: 0.5 INHALANT RESPIRATORY (INHALATION) at 19:24

## 2019-03-22 RX ADMIN — Medication 1 CAPSULE: at 09:11

## 2019-03-22 RX ADMIN — CHLORHEXIDINE GLUCONATE 0.12% ORAL RINSE 15 ML: 1.2 LIQUID ORAL at 21:40

## 2019-03-22 RX ADMIN — MORPHINE SULFATE 2 MG: 2 INJECTION, SOLUTION INTRAMUSCULAR; INTRAVENOUS at 18:38

## 2019-03-22 RX ADMIN — FAMOTIDINE 20 MG: 10 INJECTION, SOLUTION INTRAVENOUS at 21:40

## 2019-03-22 RX ADMIN — FAMOTIDINE 20 MG: 10 INJECTION, SOLUTION INTRAVENOUS at 09:11

## 2019-03-22 RX ADMIN — NICOTINE 1 PATCH: 21 PATCH TRANSDERMAL at 09:11

## 2019-03-22 RX ADMIN — ACETAMINOPHEN 650 MG: 325 TABLET, FILM COATED ORAL at 16:22

## 2019-03-22 RX ADMIN — CHLORHEXIDINE GLUCONATE 0.12% ORAL RINSE 15 ML: 1.2 LIQUID ORAL at 09:13

## 2019-03-22 RX ADMIN — MORPHINE SULFATE 2 MG: 2 INJECTION, SOLUTION INTRAMUSCULAR; INTRAVENOUS at 03:31

## 2019-03-23 ENCOUNTER — APPOINTMENT (OUTPATIENT)
Dept: GENERAL RADIOLOGY | Facility: HOSPITAL | Age: 59
End: 2019-03-23

## 2019-03-23 LAB
ABO GROUP BLD: NORMAL
ABO GROUP BLD: NORMAL
ALBUMIN SERPL-MCNC: 3.2 G/DL (ref 3.5–5)
ANION GAP SERPL CALCULATED.3IONS-SCNC: 7.8 MMOL/L (ref 10–20)
ANISOCYTOSIS BLD QL: NORMAL
BASOPHILS # BLD AUTO: 0.03 10*3/MM3 (ref 0–0.2)
BASOPHILS NFR BLD AUTO: 0.2 % (ref 0–2.5)
BLD GP AB SCN SERPL QL: NEGATIVE
BUN BLD-MCNC: 17 MG/DL (ref 7–20)
BUN/CREAT SERPL: 56.7 (ref 7.1–23.5)
CALCIUM SPEC-SCNC: 9.3 MG/DL (ref 8.4–10.2)
CHLORIDE SERPL-SCNC: 100 MMOL/L (ref 98–107)
CO2 SERPL-SCNC: 31 MMOL/L (ref 26–30)
CREAT BLD-MCNC: 0.3 MG/DL (ref 0.6–1.3)
DEPRECATED RDW RBC AUTO: 49.5 FL (ref 37–54)
EOSINOPHIL # BLD AUTO: 0.27 10*3/MM3 (ref 0–0.7)
EOSINOPHIL NFR BLD AUTO: 2.2 % (ref 0–7)
ERYTHROCYTE [DISTWIDTH] IN BLOOD BY AUTOMATED COUNT: 12.5 % (ref 11.5–14.5)
GFR SERPL CREATININE-BSD FRML MDRD: >150 ML/MIN/1.73
GLUCOSE BLD-MCNC: 126 MG/DL (ref 74–98)
GLUCOSE BLDC GLUCOMTR-MCNC: 120 MG/DL (ref 70–130)
GLUCOSE BLDC GLUCOMTR-MCNC: 126 MG/DL (ref 70–130)
GLUCOSE BLDC GLUCOMTR-MCNC: 129 MG/DL (ref 70–130)
GLUCOSE BLDC GLUCOMTR-MCNC: 134 MG/DL (ref 70–130)
HCT VFR BLD AUTO: 24 % (ref 37–47)
HGB BLD-MCNC: 7.9 G/DL (ref 12–16)
IMM GRANULOCYTES # BLD AUTO: 0.09 10*3/MM3 (ref 0–0.06)
IMM GRANULOCYTES NFR BLD AUTO: 0.7 % (ref 0–0.6)
LYMPHOCYTES # BLD AUTO: 1.41 10*3/MM3 (ref 0.6–3.4)
LYMPHOCYTES NFR BLD AUTO: 11.6 % (ref 10–50)
MACROCYTES BLD QL SMEAR: NORMAL
MCH RBC QN AUTO: 35.3 PG (ref 27–31)
MCHC RBC AUTO-ENTMCNC: 32.9 G/DL (ref 30–37)
MCV RBC AUTO: 107.1 FL (ref 81–99)
MONOCYTES # BLD AUTO: 1.12 10*3/MM3 (ref 0–0.9)
MONOCYTES NFR BLD AUTO: 9.2 % (ref 0–12)
NEUTROPHILS # BLD AUTO: 9.23 10*3/MM3 (ref 2–6.9)
NEUTROPHILS NFR BLD AUTO: 76.1 % (ref 37–80)
NRBC BLD AUTO-RTO: 0 /100 WBC (ref 0–0)
PHOSPHATE SERPL-MCNC: 3.6 MG/DL (ref 2.5–4.5)
PLATELET # BLD AUTO: 286 10*3/MM3 (ref 130–400)
PMV BLD AUTO: 11.5 FL (ref 6–12)
POTASSIUM BLD-SCNC: 3.8 MMOL/L (ref 3.5–5.1)
RBC # BLD AUTO: 2.24 10*6/MM3 (ref 4.2–5.4)
RH BLD: POSITIVE
RH BLD: POSITIVE
SMALL PLATELETS BLD QL SMEAR: ADEQUATE
SODIUM BLD-SCNC: 135 MMOL/L (ref 137–145)
T&S EXPIRATION DATE: NORMAL
WBC MORPH BLD: NORMAL
WBC NRBC COR # BLD: 12.15 10*3/MM3 (ref 4.8–10.8)

## 2019-03-23 PROCEDURE — 82962 GLUCOSE BLOOD TEST: CPT

## 2019-03-23 PROCEDURE — 25010000002 MORPHINE PER 10 MG: Performed by: INTERNAL MEDICINE

## 2019-03-23 PROCEDURE — 86901 BLOOD TYPING SEROLOGIC RH(D): CPT

## 2019-03-23 PROCEDURE — 86900 BLOOD TYPING SEROLOGIC ABO: CPT

## 2019-03-23 PROCEDURE — 86900 BLOOD TYPING SEROLOGIC ABO: CPT | Performed by: INTERNAL MEDICINE

## 2019-03-23 PROCEDURE — 80069 RENAL FUNCTION PANEL: CPT | Performed by: INTERNAL MEDICINE

## 2019-03-23 PROCEDURE — 85025 COMPLETE CBC W/AUTO DIFF WBC: CPT | Performed by: INTERNAL MEDICINE

## 2019-03-23 PROCEDURE — P9016 RBC LEUKOCYTES REDUCED: HCPCS

## 2019-03-23 PROCEDURE — 36430 TRANSFUSION BLD/BLD COMPNT: CPT

## 2019-03-23 PROCEDURE — 71045 X-RAY EXAM CHEST 1 VIEW: CPT

## 2019-03-23 PROCEDURE — 99233 SBSQ HOSP IP/OBS HIGH 50: CPT | Performed by: INTERNAL MEDICINE

## 2019-03-23 PROCEDURE — 25010000002 ENOXAPARIN PER 10 MG: Performed by: SURGERY

## 2019-03-23 PROCEDURE — 25010000002 LORAZEPAM PER 2 MG: Performed by: INTERNAL MEDICINE

## 2019-03-23 PROCEDURE — 94799 UNLISTED PULMONARY SVC/PX: CPT

## 2019-03-23 PROCEDURE — 85007 BL SMEAR W/DIFF WBC COUNT: CPT | Performed by: INTERNAL MEDICINE

## 2019-03-23 PROCEDURE — 86850 RBC ANTIBODY SCREEN: CPT | Performed by: INTERNAL MEDICINE

## 2019-03-23 PROCEDURE — 86901 BLOOD TYPING SEROLOGIC RH(D): CPT | Performed by: INTERNAL MEDICINE

## 2019-03-23 PROCEDURE — 94002 VENT MGMT INPAT INIT DAY: CPT

## 2019-03-23 PROCEDURE — 94003 VENT MGMT INPAT SUBQ DAY: CPT

## 2019-03-23 PROCEDURE — 86920 COMPATIBILITY TEST SPIN: CPT

## 2019-03-23 RX ADMIN — SODIUM CHLORIDE, PRESERVATIVE FREE 10 ML: 5 INJECTION INTRAVENOUS at 22:13

## 2019-03-23 RX ADMIN — LORAZEPAM 1 MG: 2 INJECTION INTRAMUSCULAR; INTRAVENOUS at 16:03

## 2019-03-23 RX ADMIN — MORPHINE SULFATE 2 MG: 2 INJECTION, SOLUTION INTRAMUSCULAR; INTRAVENOUS at 22:29

## 2019-03-23 RX ADMIN — SODIUM CHLORIDE, PRESERVATIVE FREE 10 ML: 5 INJECTION INTRAVENOUS at 09:03

## 2019-03-23 RX ADMIN — BUDESONIDE 0.5 MG: 0.5 INHALANT RESPIRATORY (INHALATION) at 19:04

## 2019-03-23 RX ADMIN — FAMOTIDINE 20 MG: 10 INJECTION, SOLUTION INTRAVENOUS at 09:01

## 2019-03-23 RX ADMIN — MORPHINE SULFATE 2 MG: 2 INJECTION, SOLUTION INTRAMUSCULAR; INTRAVENOUS at 02:12

## 2019-03-23 RX ADMIN — LORAZEPAM 1 MG: 2 INJECTION INTRAMUSCULAR; INTRAVENOUS at 23:41

## 2019-03-23 RX ADMIN — FAMOTIDINE 20 MG: 10 INJECTION, SOLUTION INTRAVENOUS at 22:09

## 2019-03-23 RX ADMIN — Medication 1 CAPSULE: at 09:01

## 2019-03-23 RX ADMIN — IPRATROPIUM BROMIDE AND ALBUTEROL SULFATE 3 ML: .5; 3 SOLUTION RESPIRATORY (INHALATION) at 01:02

## 2019-03-23 RX ADMIN — IPRATROPIUM BROMIDE AND ALBUTEROL SULFATE 3 ML: .5; 3 SOLUTION RESPIRATORY (INHALATION) at 13:19

## 2019-03-23 RX ADMIN — BUDESONIDE 0.5 MG: 0.5 INHALANT RESPIRATORY (INHALATION) at 07:12

## 2019-03-23 RX ADMIN — IPRATROPIUM BROMIDE AND ALBUTEROL SULFATE 3 ML: .5; 3 SOLUTION RESPIRATORY (INHALATION) at 07:12

## 2019-03-23 RX ADMIN — CHLORHEXIDINE GLUCONATE 0.12% ORAL RINSE 15 ML: 1.2 LIQUID ORAL at 22:12

## 2019-03-23 RX ADMIN — NICOTINE 1 PATCH: 21 PATCH TRANSDERMAL at 09:05

## 2019-03-23 RX ADMIN — SODIUM CHLORIDE, PRESERVATIVE FREE 10 ML: 5 INJECTION INTRAVENOUS at 02:16

## 2019-03-23 RX ADMIN — SODIUM CHLORIDE, PRESERVATIVE FREE 10 ML: 5 INJECTION INTRAVENOUS at 22:10

## 2019-03-23 RX ADMIN — CHLORHEXIDINE GLUCONATE 0.12% ORAL RINSE 15 ML: 1.2 LIQUID ORAL at 09:01

## 2019-03-23 RX ADMIN — ENOXAPARIN SODIUM 40 MG: 40 INJECTION SUBCUTANEOUS at 09:01

## 2019-03-23 RX ADMIN — SODIUM CHLORIDE, PRESERVATIVE FREE 10 ML: 5 INJECTION INTRAVENOUS at 09:02

## 2019-03-23 RX ADMIN — IPRATROPIUM BROMIDE AND ALBUTEROL SULFATE 3 ML: .5; 3 SOLUTION RESPIRATORY (INHALATION) at 19:04

## 2019-03-24 LAB
ABO + RH BLD: NORMAL
ALBUMIN SERPL-MCNC: 3.4 G/DL (ref 3.5–5)
ANION GAP SERPL CALCULATED.3IONS-SCNC: 8.4 MMOL/L (ref 10–20)
BASOPHILS # BLD AUTO: 0.04 10*3/MM3 (ref 0–0.2)
BASOPHILS NFR BLD AUTO: 0.4 % (ref 0–2.5)
BH BB BLOOD EXPIRATION DATE: NORMAL
BH BB BLOOD TYPE BARCODE: 6200
BH BB DISPENSE STATUS: NORMAL
BH BB PRODUCT CODE: NORMAL
BH BB UNIT NUMBER: NORMAL
BUN BLD-MCNC: 19 MG/DL (ref 7–20)
BUN/CREAT SERPL: 47.5 (ref 7.1–23.5)
CALCIUM SPEC-SCNC: 9.4 MG/DL (ref 8.4–10.2)
CHLORIDE SERPL-SCNC: 97 MMOL/L (ref 98–107)
CO2 SERPL-SCNC: 32 MMOL/L (ref 26–30)
CREAT BLD-MCNC: 0.4 MG/DL (ref 0.6–1.3)
CROSSMATCH INTERPRETATION: NORMAL
DEPRECATED RDW RBC AUTO: 65.1 FL (ref 37–54)
EOSINOPHIL # BLD AUTO: 0.27 10*3/MM3 (ref 0–0.7)
EOSINOPHIL NFR BLD AUTO: 2.6 % (ref 0–7)
ERYTHROCYTE [DISTWIDTH] IN BLOOD BY AUTOMATED COUNT: 17.6 % (ref 11.5–14.5)
GFR SERPL CREATININE-BSD FRML MDRD: >150 ML/MIN/1.73
GLUCOSE BLD-MCNC: 119 MG/DL (ref 74–98)
GLUCOSE BLDC GLUCOMTR-MCNC: 112 MG/DL (ref 70–130)
GLUCOSE BLDC GLUCOMTR-MCNC: 114 MG/DL (ref 70–130)
GLUCOSE BLDC GLUCOMTR-MCNC: 116 MG/DL (ref 70–130)
GLUCOSE BLDC GLUCOMTR-MCNC: 119 MG/DL (ref 70–130)
GLUCOSE BLDC GLUCOMTR-MCNC: 119 MG/DL (ref 70–130)
HCT VFR BLD AUTO: 28.6 % (ref 37–47)
HGB BLD-MCNC: 9.7 G/DL (ref 12–16)
IMM GRANULOCYTES # BLD AUTO: 0.07 10*3/MM3 (ref 0–0.06)
IMM GRANULOCYTES NFR BLD AUTO: 0.7 % (ref 0–0.6)
LYMPHOCYTES # BLD AUTO: 1.42 10*3/MM3 (ref 0.6–3.4)
LYMPHOCYTES NFR BLD AUTO: 13.9 % (ref 10–50)
MCH RBC QN AUTO: 34.5 PG (ref 27–31)
MCHC RBC AUTO-ENTMCNC: 33.9 G/DL (ref 30–37)
MCV RBC AUTO: 101.8 FL (ref 81–99)
MONOCYTES # BLD AUTO: 1.17 10*3/MM3 (ref 0–0.9)
MONOCYTES NFR BLD AUTO: 11.4 % (ref 0–12)
NEUTROPHILS # BLD AUTO: 7.25 10*3/MM3 (ref 2–6.9)
NEUTROPHILS NFR BLD AUTO: 71 % (ref 37–80)
NRBC BLD AUTO-RTO: 0 /100 WBC (ref 0–0)
PHOSPHATE SERPL-MCNC: 4.4 MG/DL (ref 2.5–4.5)
PLATELET # BLD AUTO: 264 10*3/MM3 (ref 130–400)
PMV BLD AUTO: 10.6 FL (ref 6–12)
POTASSIUM BLD-SCNC: 4.4 MMOL/L (ref 3.5–5.1)
RBC # BLD AUTO: 2.81 10*6/MM3 (ref 4.2–5.4)
SODIUM BLD-SCNC: 133 MMOL/L (ref 137–145)
UNIT  ABO: NORMAL
UNIT  RH: NORMAL
WBC NRBC COR # BLD: 10.22 10*3/MM3 (ref 4.8–10.8)

## 2019-03-24 PROCEDURE — 85025 COMPLETE CBC W/AUTO DIFF WBC: CPT | Performed by: INTERNAL MEDICINE

## 2019-03-24 PROCEDURE — 94799 UNLISTED PULMONARY SVC/PX: CPT

## 2019-03-24 PROCEDURE — 99231 SBSQ HOSP IP/OBS SF/LOW 25: CPT | Performed by: INTERNAL MEDICINE

## 2019-03-24 PROCEDURE — 25010000002 ENOXAPARIN PER 10 MG: Performed by: SURGERY

## 2019-03-24 PROCEDURE — 82962 GLUCOSE BLOOD TEST: CPT

## 2019-03-24 PROCEDURE — 25010000002 LORAZEPAM PER 2 MG: Performed by: INTERNAL MEDICINE

## 2019-03-24 PROCEDURE — 99233 SBSQ HOSP IP/OBS HIGH 50: CPT | Performed by: INTERNAL MEDICINE

## 2019-03-24 PROCEDURE — 25010000002 MORPHINE PER 10 MG: Performed by: INTERNAL MEDICINE

## 2019-03-24 PROCEDURE — 94003 VENT MGMT INPAT SUBQ DAY: CPT

## 2019-03-24 PROCEDURE — 80069 RENAL FUNCTION PANEL: CPT | Performed by: INTERNAL MEDICINE

## 2019-03-24 RX ORDER — LORAZEPAM 2 MG/ML
1 INJECTION INTRAMUSCULAR EVERY 6 HOURS PRN
Start: 2019-03-24 | End: 2019-12-07 | Stop reason: HOSPADM

## 2019-03-24 RX ORDER — MORPHINE SULFATE 2 MG/ML
2 INJECTION, SOLUTION INTRAMUSCULAR; INTRAVENOUS EVERY 4 HOURS PRN
Start: 2019-03-24 | End: 2019-03-30

## 2019-03-24 RX ORDER — FAMOTIDINE 20 MG/1
20 TABLET, FILM COATED ORAL 2 TIMES DAILY
Status: ON HOLD
Start: 2019-03-24 | End: 2022-01-10

## 2019-03-24 RX ORDER — NICOTINE 21 MG/24HR
1 PATCH, TRANSDERMAL 24 HOURS TRANSDERMAL
Start: 2019-03-25 | End: 2020-03-12

## 2019-03-24 RX ORDER — CHLORHEXIDINE GLUCONATE 0.12 MG/ML
15 RINSE ORAL EVERY 12 HOURS SCHEDULED
Status: ON HOLD
Start: 2019-03-24 | End: 2022-01-10

## 2019-03-24 RX ORDER — ONDANSETRON 2 MG/ML
4 INJECTION INTRAMUSCULAR; INTRAVENOUS EVERY 6 HOURS PRN
Start: 2019-03-24 | End: 2019-12-07 | Stop reason: HOSPADM

## 2019-03-24 RX ORDER — BISACODYL 10 MG
10 SUPPOSITORY, RECTAL RECTAL DAILY PRN
Status: ON HOLD
Start: 2019-03-24 | End: 2022-01-10

## 2019-03-24 RX ORDER — LACTULOSE 20 G/30ML
20 SOLUTION ORAL ONCE
Status: COMPLETED | OUTPATIENT
Start: 2019-03-24 | End: 2019-03-24

## 2019-03-24 RX ORDER — HYDRALAZINE HYDROCHLORIDE 20 MG/ML
20 INJECTION INTRAMUSCULAR; INTRAVENOUS EVERY 4 HOURS PRN
Start: 2019-03-24 | End: 2019-12-07 | Stop reason: HOSPADM

## 2019-03-24 RX ORDER — ACETAMINOPHEN 325 MG/1
650 TABLET ORAL EVERY 4 HOURS PRN
Start: 2019-03-24 | End: 2020-03-12 | Stop reason: SDUPTHER

## 2019-03-24 RX ORDER — IPRATROPIUM BROMIDE AND ALBUTEROL SULFATE 2.5; .5 MG/3ML; MG/3ML
3 SOLUTION RESPIRATORY (INHALATION)
Qty: 360 ML
Start: 2019-03-24

## 2019-03-24 RX ORDER — BUDESONIDE 0.5 MG/2ML
0.5 INHALANT ORAL
Start: 2019-03-24

## 2019-03-24 RX ADMIN — SODIUM CHLORIDE, PRESERVATIVE FREE 10 ML: 5 INJECTION INTRAVENOUS at 09:29

## 2019-03-24 RX ADMIN — LORAZEPAM 1 MG: 2 INJECTION INTRAMUSCULAR; INTRAVENOUS at 16:19

## 2019-03-24 RX ADMIN — Medication 1 CAPSULE: at 09:29

## 2019-03-24 RX ADMIN — IPRATROPIUM BROMIDE AND ALBUTEROL SULFATE 3 ML: .5; 3 SOLUTION RESPIRATORY (INHALATION) at 01:00

## 2019-03-24 RX ADMIN — IPRATROPIUM BROMIDE AND ALBUTEROL SULFATE 3 ML: .5; 3 SOLUTION RESPIRATORY (INHALATION) at 12:45

## 2019-03-24 RX ADMIN — SODIUM CHLORIDE, PRESERVATIVE FREE 10 ML: 5 INJECTION INTRAVENOUS at 20:43

## 2019-03-24 RX ADMIN — BUDESONIDE 0.5 MG: 0.5 INHALANT RESPIRATORY (INHALATION) at 19:08

## 2019-03-24 RX ADMIN — BUDESONIDE 0.5 MG: 0.5 INHALANT RESPIRATORY (INHALATION) at 07:48

## 2019-03-24 RX ADMIN — LABETALOL 20 MG/4 ML (5 MG/ML) INTRAVENOUS SYRINGE 20 MG: at 20:09

## 2019-03-24 RX ADMIN — IPRATROPIUM BROMIDE AND ALBUTEROL SULFATE 3 ML: .5; 3 SOLUTION RESPIRATORY (INHALATION) at 19:08

## 2019-03-24 RX ADMIN — WHITE PETROLATUM 1 EACH: 450 STICK TOPICAL at 19:59

## 2019-03-24 RX ADMIN — LORAZEPAM 1 MG: 2 INJECTION INTRAMUSCULAR; INTRAVENOUS at 19:53

## 2019-03-24 RX ADMIN — FAMOTIDINE 20 MG: 10 INJECTION, SOLUTION INTRAVENOUS at 20:00

## 2019-03-24 RX ADMIN — ENOXAPARIN SODIUM 40 MG: 40 INJECTION SUBCUTANEOUS at 09:28

## 2019-03-24 RX ADMIN — MORPHINE SULFATE 2 MG: 2 INJECTION, SOLUTION INTRAMUSCULAR; INTRAVENOUS at 05:49

## 2019-03-24 RX ADMIN — IPRATROPIUM BROMIDE AND ALBUTEROL SULFATE 3 ML: .5; 3 SOLUTION RESPIRATORY (INHALATION) at 07:48

## 2019-03-24 RX ADMIN — FAMOTIDINE 20 MG: 10 INJECTION, SOLUTION INTRAVENOUS at 09:28

## 2019-03-24 RX ADMIN — CHLORHEXIDINE GLUCONATE 0.12% ORAL RINSE 15 ML: 1.2 LIQUID ORAL at 09:28

## 2019-03-24 RX ADMIN — LABETALOL 20 MG/4 ML (5 MG/ML) INTRAVENOUS SYRINGE 20 MG: at 13:42

## 2019-03-24 RX ADMIN — CHLORHEXIDINE GLUCONATE 0.12% ORAL RINSE 15 ML: 1.2 LIQUID ORAL at 20:00

## 2019-03-24 RX ADMIN — SODIUM CHLORIDE, PRESERVATIVE FREE 20 ML: 5 INJECTION INTRAVENOUS at 20:00

## 2019-03-24 RX ADMIN — LACTULOSE 20 G: 20 SOLUTION ORAL at 13:42

## 2019-03-24 RX ADMIN — LORAZEPAM 1 MG: 2 INJECTION INTRAMUSCULAR; INTRAVENOUS at 05:49

## 2019-03-25 ENCOUNTER — APPOINTMENT (OUTPATIENT)
Dept: GENERAL RADIOLOGY | Facility: HOSPITAL | Age: 59
End: 2019-03-25

## 2019-03-25 VITALS
BODY MASS INDEX: 29.76 KG/M2 | OXYGEN SATURATION: 99 % | RESPIRATION RATE: 16 BRPM | TEMPERATURE: 98.5 F | SYSTOLIC BLOOD PRESSURE: 154 MMHG | DIASTOLIC BLOOD PRESSURE: 90 MMHG | HEIGHT: 59 IN | HEART RATE: 93 BPM | WEIGHT: 147.6 LBS

## 2019-03-25 LAB
BACTERIA UR QL AUTO: ABNORMAL /HPF
BILIRUB UR QL STRIP: NEGATIVE
CLARITY UR: CLEAR
COLOR UR: YELLOW
GLUCOSE BLDC GLUCOMTR-MCNC: 116 MG/DL (ref 70–130)
GLUCOSE UR STRIP-MCNC: NEGATIVE MG/DL
HGB UR QL STRIP.AUTO: ABNORMAL
HYALINE CASTS UR QL AUTO: ABNORMAL /LPF
KETONES UR QL STRIP: NEGATIVE
LEUKOCYTE ESTERASE UR QL STRIP.AUTO: NEGATIVE
NITRITE UR QL STRIP: NEGATIVE
PH UR STRIP.AUTO: 8 [PH] (ref 5–8)
PROT UR QL STRIP: NEGATIVE
RBC # UR: ABNORMAL /HPF
REF LAB TEST METHOD: ABNORMAL
SP GR UR STRIP: 1.01 (ref 1–1.03)
SQUAMOUS #/AREA URNS HPF: ABNORMAL /HPF
UROBILINOGEN UR QL STRIP: ABNORMAL
WBC UR QL AUTO: ABNORMAL /HPF

## 2019-03-25 PROCEDURE — 94799 UNLISTED PULMONARY SVC/PX: CPT

## 2019-03-25 PROCEDURE — 94003 VENT MGMT INPAT SUBQ DAY: CPT

## 2019-03-25 PROCEDURE — 99238 HOSP IP/OBS DSCHRG MGMT 30/<: CPT | Performed by: INTERNAL MEDICINE

## 2019-03-25 PROCEDURE — 81001 URINALYSIS AUTO W/SCOPE: CPT | Performed by: INTERNAL MEDICINE

## 2019-03-25 PROCEDURE — 94770: CPT

## 2019-03-25 PROCEDURE — 25010000002 MORPHINE PER 10 MG: Performed by: INTERNAL MEDICINE

## 2019-03-25 PROCEDURE — 71045 X-RAY EXAM CHEST 1 VIEW: CPT

## 2019-03-25 PROCEDURE — 25010000002 ENOXAPARIN PER 10 MG: Performed by: SURGERY

## 2019-03-25 PROCEDURE — 99233 SBSQ HOSP IP/OBS HIGH 50: CPT | Performed by: INTERNAL MEDICINE

## 2019-03-25 PROCEDURE — 82962 GLUCOSE BLOOD TEST: CPT

## 2019-03-25 PROCEDURE — 25010000002 HYDRALAZINE PER 20 MG: Performed by: INTERNAL MEDICINE

## 2019-03-25 PROCEDURE — 87040 BLOOD CULTURE FOR BACTERIA: CPT | Performed by: INTERNAL MEDICINE

## 2019-03-25 RX ADMIN — HYDRALAZINE HYDROCHLORIDE 20 MG: 20 INJECTION INTRAMUSCULAR; INTRAVENOUS at 01:05

## 2019-03-25 RX ADMIN — CHLORHEXIDINE GLUCONATE 0.12% ORAL RINSE 15 ML: 1.2 LIQUID ORAL at 08:00

## 2019-03-25 RX ADMIN — IPRATROPIUM BROMIDE AND ALBUTEROL SULFATE 3 ML: .5; 3 SOLUTION RESPIRATORY (INHALATION) at 01:10

## 2019-03-25 RX ADMIN — SODIUM CHLORIDE, PRESERVATIVE FREE 10 ML: 5 INJECTION INTRAVENOUS at 01:06

## 2019-03-25 RX ADMIN — IPRATROPIUM BROMIDE AND ALBUTEROL SULFATE 3 ML: .5; 3 SOLUTION RESPIRATORY (INHALATION) at 07:00

## 2019-03-25 RX ADMIN — NICOTINE 1 PATCH: 21 PATCH TRANSDERMAL at 08:01

## 2019-03-25 RX ADMIN — SODIUM CHLORIDE, PRESERVATIVE FREE 10 ML: 5 INJECTION INTRAVENOUS at 08:01

## 2019-03-25 RX ADMIN — MORPHINE SULFATE 2 MG: 2 INJECTION, SOLUTION INTRAMUSCULAR; INTRAVENOUS at 11:05

## 2019-03-25 RX ADMIN — Medication 1 CAPSULE: at 08:01

## 2019-03-25 RX ADMIN — IBUPROFEN 600 MG: 100 SUSPENSION ORAL at 04:48

## 2019-03-25 RX ADMIN — FAMOTIDINE 20 MG: 10 INJECTION, SOLUTION INTRAVENOUS at 08:00

## 2019-03-25 RX ADMIN — BISACODYL 10 MG: 10 SUPPOSITORY RECTAL at 08:00

## 2019-03-25 RX ADMIN — ENOXAPARIN SODIUM 40 MG: 40 INJECTION SUBCUTANEOUS at 08:00

## 2019-03-25 RX ADMIN — ACETAMINOPHEN 650 MG: 325 TABLET, FILM COATED ORAL at 02:54

## 2019-03-25 RX ADMIN — MORPHINE SULFATE 2 MG: 2 INJECTION, SOLUTION INTRAMUSCULAR; INTRAVENOUS at 07:31

## 2019-03-25 RX ADMIN — BUDESONIDE 0.5 MG: 0.5 INHALANT RESPIRATORY (INHALATION) at 07:00

## 2019-03-30 LAB
BACTERIA SPEC AEROBE CULT: NORMAL
BACTERIA SPEC AEROBE CULT: NORMAL

## 2019-09-11 ENCOUNTER — NURSING HOME (OUTPATIENT)
Dept: INTERNAL MEDICINE | Facility: CLINIC | Age: 59
End: 2019-09-11

## 2019-09-11 DIAGNOSIS — G93.1 ANOXIC ENCEPHALOPATHY (HCC): ICD-10-CM

## 2019-09-11 DIAGNOSIS — I10 ESSENTIAL HYPERTENSION: ICD-10-CM

## 2019-09-11 DIAGNOSIS — I46.9 CARDIOPULMONARY ARREST WITH SUCCESSFUL RESUSCITATION (HCC): Primary | ICD-10-CM

## 2019-09-11 DIAGNOSIS — J44.1 COPD WITH ACUTE EXACERBATION (HCC): ICD-10-CM

## 2019-09-11 PROCEDURE — 99305 1ST NF CARE MODERATE MDM 35: CPT | Performed by: INTERNAL MEDICINE

## 2019-09-20 ENCOUNTER — NURSING HOME (OUTPATIENT)
Dept: FAMILY MEDICINE CLINIC | Facility: CLINIC | Age: 59
End: 2019-09-20

## 2019-09-20 DIAGNOSIS — Q32.1 ANOMALY OF TRACHEA: ICD-10-CM

## 2019-09-20 DIAGNOSIS — G93.1 ANOXIC ENCEPHALOPATHY (HCC): ICD-10-CM

## 2019-09-20 DIAGNOSIS — R13.12 OROPHARYNGEAL DYSPHAGIA: ICD-10-CM

## 2019-09-20 DIAGNOSIS — J39.8 INCREASED TRACHEAL SECRETIONS: ICD-10-CM

## 2019-09-20 PROCEDURE — 99309 SBSQ NF CARE MODERATE MDM 30: CPT | Performed by: NURSE PRACTITIONER

## 2019-09-23 NOTE — PROGRESS NOTES
Nursing Home Follow Up Note      Kemal Shaw DO []   FÉLIX Horne [x]  [address] [facility], [place] [address]  Phone: [phone]  Fax: [phone] Emperatriz Greene MD []    Jamie Brown DO []   [address] [place] [address]  Phone: [phone]  Fax: [phone]     PATIENT NAME: Viji Vargas                                                                          YOB: 1960           DATE OF SERVICE: 09/20/2019  FACILITY:  [][place]   [x] [place]   [] [facility]   [] [facility]   [] Other ______________________________________________________________________      CHIEF COMPLAINT:  Excessive tracheal secretions      HISTORY OF PRESENT ILLNESS:   Per staff, patient has increased tracheal secretions and they feels she needs something to decrease the secretions, because it increases her risks for aspiration.     PAST MEDICAL & SURGICAL HISTORY:   Past Medical History:   Diagnosis Date   • COPD (chronic obstructive pulmonary disease) (CMS/HCC)    • Hypertension    • Pneumonia       Past Surgical History:   Procedure Laterality Date   • HYSTERECTOMY      Partial    • TRACHEOSTOMY AND PEG TUBE INSERTION N/A 3/20/2019    Procedure: TRACHEOSTOMY AND PERCUTANEOUS ENDOSCOPIC GASTROSTOMY TUBE INSERTION;  Surgeon: Nadira Pandey MD;  Location: [facility];  Service: General         MEDICATIONS:  I have reviewed and reconciled the patients medication list in the patients chart at the skilled nursing facility today.      ALLERGIES:    No Known Allergies      SOCIAL HISTORY:    Social History     Socioeconomic History   • Marital status:      Spouse name: Not on file   • Number of children: Not on file   • Years of education: Not on file   • Highest education level: Not on file   Tobacco Use   • Smoking status: Current Every Day Smoker     Packs/day: 1.00     Types: Electronic Cigarette, Cigarettes   Substance and Sexual Activity   • Alcohol use: Yes     Comment: wine    •  Drug use: No       FAMILY HISTORY:    No family history on file.    REVIEW OF SYSTEMS:    Review of Systems   Constitutional: Negative for activity change, appetite change, chills, diaphoresis, fatigue, fever, unexpected weight gain and unexpected weight loss.   HENT: Positive for trouble swallowing. Negative for congestion, ear pain, mouth sores, nosebleeds, postnasal drip, rhinorrhea, sinus pressure, sneezing, sore throat and swollen glands.         Increased trach secretions   Eyes: Negative for blurred vision, pain, discharge, redness, itching and visual disturbance.   Respiratory: Negative for apnea, cough, choking, chest tightness, shortness of breath, wheezing and stridor.    Cardiovascular: Negative for chest pain, palpitations and leg swelling.   Gastrointestinal: Negative for abdominal distention, abdominal pain, blood in stool, constipation, diarrhea, nausea, vomiting, GERD and indigestion.   Endocrine: Negative for polydipsia and polyuria.   Genitourinary: Negative for decreased urine volume, difficulty urinating, dysuria, flank pain, frequency, hematuria, urgency and urinary incontinence.   Musculoskeletal: Positive for arthralgias. Negative for back pain, gait problem, joint swelling and myalgias.   Skin: Negative for color change, dry skin, rash and skin lesions.   Allergic/Immunologic: Negative for environmental allergies.   Neurological: Positive for speech difficulty (non verbal) and memory problem. Negative for dizziness, seizures, weakness and confusion.   Psychiatric/Behavioral: Negative for behavioral problems, dysphoric mood, hallucinations, sleep disturbance and depressed mood. The patient is not nervous/anxious.          PHYSICAL EXAMINATION:   VITAL SIGNS: BP: 134/76         HR: 82       RR: 18    T: 98.0      02: 94% 2 L trach collar    Physical Exam   Constitutional: She appears well-developed and well-nourished.   HENT:   Head: Normocephalic.   Right Ear: External ear normal.   Left Ear:  External ear normal.   Nose: Nose normal.   Eyes: Conjunctivae are normal.   Neck: Normal range of motion.       Cardiovascular: Normal rate, regular rhythm, normal heart sounds and intact distal pulses.   Pulmonary/Chest: Effort normal and breath sounds normal. No respiratory distress. She has no wheezes. She has no rales.   Abdominal: Soft. Bowel sounds are normal. She exhibits no distension and no mass. There is no tenderness. No hernia.   PEG tube intact, no s/s infection   Musculoskeletal: She exhibits no edema.   NROM all major joints   Neurological: She is alert.   Skin: Skin is warm and dry. No rash noted.   Psychiatric: She has a normal mood and affect. Her behavior is normal.   Nursing note and vitals reviewed.      RECORDS REVIEW:   I have reviewed and interpreted the following lab test results  obtained at the time of the visit today.     ASSESSMENT     Diagnoses and all orders for this visit:    Anomaly of trachea    Increased tracheal secretions    Anoxic encephalopathy (CMS/HCC)    Oropharyngeal dysphagia        PLAN  Scopolamine patch ordered today, to be applied q 72 hours, for increased tracheal secretions, This will decrease risks of aspiration.     PEG tube intact,with no s/s infection. No s/s aspiration.    Staff to continue supportive care for all ADLs.       [x]  Discussed Patient in detail with nursing/staff, addressed all needs today.     [x]  Plan of Care Reviewed   []  PT/OT Reviewed   [x]  Order Changes  []  Discharge Plans Reviewed  [x]  Advance Directive on file with Nursing Home.   [x]  POA on file with Nursing Home.   [x]  Code Status listed: []  Full Code   []  DNR              FÉLIX Walls.

## 2019-10-14 ENCOUNTER — NURSING HOME (OUTPATIENT)
Dept: INTERNAL MEDICINE | Facility: CLINIC | Age: 59
End: 2019-10-14

## 2019-10-14 DIAGNOSIS — Z93.1 PEG (PERCUTANEOUS ENDOSCOPIC GASTROSTOMY) STATUS (HCC): ICD-10-CM

## 2019-10-14 DIAGNOSIS — G93.1 ANOXIC ENCEPHALOPATHY (HCC): Primary | ICD-10-CM

## 2019-10-14 DIAGNOSIS — Z93.0 TRACHEOSTOMY IN PLACE (HCC): ICD-10-CM

## 2019-10-14 PROCEDURE — 99308 SBSQ NF CARE LOW MDM 20: CPT | Performed by: PHYSICIAN ASSISTANT

## 2019-10-16 VITALS
OXYGEN SATURATION: 98 % | TEMPERATURE: 97.6 F | DIASTOLIC BLOOD PRESSURE: 60 MMHG | RESPIRATION RATE: 18 BRPM | SYSTOLIC BLOOD PRESSURE: 104 MMHG | HEART RATE: 88 BPM

## 2019-10-16 NOTE — PROGRESS NOTES
Nursing Home Progress Note        Kemal Shaw, DO ?  Liya Campbell, APRN ?  [address] [facility] [facility], [place] [address]  Phone: [phone]  Fax: [phone] Emperatriz Greene MD ?  Jamie Brown, DO ?  Noemy Garcia PA-C [x]   [address] [place] [address]  Phone: [phone]  Fax: [phone]     PATIENT NAME: Viji Vargas                                                                          YOB: 1960           DATE OF SERVICE: 10/14/2019  FACILITY:  [] [place]   [x] [place]   [] [facility]   [] [facility]   [] Other ______________________________________________________________________     CHIEF COMPLAINT:  Follow up on fever      HISTORY OF PRESENT ILLNESS:   Ms. Vargas is a 59 y/o female with PMH significant for cardiac arrest resulting in anoxic brain injury.  She is non-verbal and does not follow commands at baseline.  PEG and tracheostomy are in place.  She presents today for follow up on infrequent fever.  Recently, staff noted patient would spike low grade temperature of 99 sporadically.  CBC, CMP, CXR, and UA were obtained, which did not show any acute changes.  Sputum culture was obtained from trach and noted for Seratia m. Per staff, patient has not had any additional episodes of fever.      PAST MEDICAL & SURGICAL HISTORY:   Past Medical History:   Diagnosis Date   • COPD (chronic obstructive pulmonary disease) (CMS/HCC)    • Hypertension    • Pneumonia       Past Surgical History:   Procedure Laterality Date   • HYSTERECTOMY      Partial    • TRACHEOSTOMY AND PEG TUBE INSERTION N/A 3/20/2019    Procedure: TRACHEOSTOMY AND PERCUTANEOUS ENDOSCOPIC GASTROSTOMY TUBE INSERTION;  Surgeon: Nadira Pandey MD;  Location: [facility];  Service: General         MEDICATIONS:  I have reviewed and reconciled the patients medication list in the patients chart at the skilled nursing facility today.      ALLERGIES:  No Known Allergies      SOCIAL HISTORY:  Social History      Socioeconomic History   • Marital status:      Spouse name: Not on file   • Number of children: Not on file   • Years of education: Not on file   • Highest education level: Not on file   Tobacco Use   • Smoking status: Current Every Day Smoker     Packs/day: 1.00     Types: Electronic Cigarette, Cigarettes   Substance and Sexual Activity   • Alcohol use: Yes     Comment: wine    • Drug use: No       FAMILY HISTORY:  No family history on file.    REVIEW OF SYSTEMS:  Review of Systems   Unable to perform ROS: Patient nonverbal (comatose. )   Constitutional: Positive for fever (infrequent. ).   Respiratory: Negative for cough and choking.    Cardiovascular: Negative for leg swelling.   Gastrointestinal: Negative for diarrhea and vomiting.   Psychiatric/Behavioral: Negative for agitation.          PHYSICAL EXAMINATION:     VITAL SIGNS:  /60   Pulse 88   Temp 97.6 °F (36.4 °C)   Resp 18   SpO2 98%     Physical Exam   Constitutional:  Non-toxic appearance. No distress.   Comatose.  Appears non-toxic lying in bed.     HENT:   Head: Normocephalic and atraumatic.   Eyes: Conjunctivae and EOM are normal. Pupils are equal, round, and reactive to light.   Neck: Normal range of motion. Neck supple. No JVD present.   Trach in place, no exudate or erythema appreciated.    Cardiovascular: Normal rate, regular rhythm, normal heart sounds and intact distal pulses.   Pulmonary/Chest: Effort normal and breath sounds normal. No respiratory distress. She has no wheezes. She has no rales.   Abdominal: Soft. She exhibits no distension. There is no tenderness.   PEG in place   Musculoskeletal: She exhibits no edema.   Does not move extremities.    Neurological:   Contractures noted.    Skin: Skin is warm and dry. Capillary refill takes less than 2 seconds. She is not diaphoretic.   Psychiatric: She has a normal mood and affect. Her behavior is normal.   Nursing note and vitals reviewed.      RECORDS REVIEW:   I have  reviewed and interpreted the following lab test results  today. UA, CMP, CBC, and CXR reviewed.  BUN 34, Cr. 0.5, WBC slightly elevated at 13.4.  Sputum culture positive for seratia m. CXR and UA negative.      ASSESSMENT     Diagnoses and all orders for this visit:    Anoxic encephalopathy (CMS/[facility])    Tracheostomy in place (CMS/[facility])    PEG (percutaneous endoscopic gastrostomy) status (CMS/[facility])      PLAN  Upon assessment today patient clinically stable and as per staff no reports of any recurrent fever.  Workup reviewed.  Will treat patient clinically and continue to monitor closely for any acute changes.  Hold off on any additional antibiotics at this time.  Advised staff to contact MD or myself if fever returns.  Will monitor vitals closely q shift for the next 7 days.         [x]  Discussed Patient in detail with nursing/staff, addressed all needs today.     [x]  Plan of Care Reviewed   []  PT/OT Reviewed   []  Order Changes  []  Discharge Plans Reviewed  [x]  Advance Directive on file with Nursing Home.   [x]  POA on file with Nursing Home.    [x]  Code Status listed:  [x]  Full Code   []  DNR         Noemy Garcia PA-C.  10/16/2019

## 2019-11-15 ENCOUNTER — NURSING HOME (OUTPATIENT)
Dept: INTERNAL MEDICINE | Facility: CLINIC | Age: 59
End: 2019-11-15

## 2019-11-15 DIAGNOSIS — G93.1 ANOXIC ENCEPHALOPATHY (HCC): Primary | ICD-10-CM

## 2019-11-15 DIAGNOSIS — R13.12 OROPHARYNGEAL DYSPHAGIA: ICD-10-CM

## 2019-11-15 DIAGNOSIS — Z93.1 PEG (PERCUTANEOUS ENDOSCOPIC GASTROSTOMY) STATUS (HCC): ICD-10-CM

## 2019-11-15 DIAGNOSIS — R53.81 PHYSICAL DECONDITIONING: ICD-10-CM

## 2019-11-15 DIAGNOSIS — J39.8 INCREASED TRACHEAL SECRETIONS: ICD-10-CM

## 2019-11-15 PROCEDURE — 99309 SBSQ NF CARE MODERATE MDM 30: CPT | Performed by: PHYSICIAN ASSISTANT

## 2019-11-18 VITALS
SYSTOLIC BLOOD PRESSURE: 100 MMHG | OXYGEN SATURATION: 94 % | RESPIRATION RATE: 20 BRPM | HEART RATE: 71 BPM | DIASTOLIC BLOOD PRESSURE: 68 MMHG

## 2019-11-22 NOTE — PROGRESS NOTES
Nursing Home Progress Note        Kemal Shaw, DO ?  Liya Campbell, APRN ?  [address] [facility], [place] [address]  Phone: [phone]  Fax: [phone] Emperatriz Greene MD ?  Jamie Brown, DO ?  Noemy Garcia PA-C [x]   [address] [place] [address]  Phone: [phone]  Fax: [phone]     PATIENT NAME: Viji Vargas                                                                          YOB: 1960           DATE OF SERVICE: 10/14/2019  FACILITY:  [] [place]   [x] [place]   [] [facility]   [] [facility]   [] Other ______________________________________________________________________     CHIEF COMPLAINT:  Follow up on fever      HISTORY OF PRESENT ILLNESS:   Ms. Vargas is a 59 y/o female with PMH significant for cardiac arrest resulting in anoxic brain injury.  She is non-verbal and does not follow commands at baseline.  PEG and tracheostomy are in place.  She presents today for follow up on infrequent fever.  Recently, staff noted patient would spike low grade temperature of 99 sporadically.  CBC, CMP, CXR, and UA were obtained, which did not show any acute changes.  Sputum culture was obtained from trach and noted for Seratia m. Per staff, patient has not had any additional episodes of fever.      PAST MEDICAL & SURGICAL HISTORY:   Past Medical History:   Diagnosis Date   • COPD (chronic obstructive pulmonary disease) (CMS/HCC)    • Hypertension    • Pneumonia       Past Surgical History:   Procedure Laterality Date   • HYSTERECTOMY      Partial    • TRACHEOSTOMY AND PEG TUBE INSERTION N/A 3/20/2019    Procedure: TRACHEOSTOMY AND PERCUTANEOUS ENDOSCOPIC GASTROSTOMY TUBE INSERTION;  Surgeon: Nadira Pandey MD;  Location: [facility];  Service: General         MEDICATIONS:  I have reviewed and reconciled the patients medication list in the patients chart at the skilled nursing facility today.      ALLERGIES:  No Known Allergies      SOCIAL HISTORY:  Social History      Socioeconomic History   • Marital status:      Spouse name: Not on file   • Number of children: Not on file   • Years of education: Not on file   • Highest education level: Not on file   Tobacco Use   • Smoking status: Current Every Day Smoker     Packs/day: 1.00     Types: Electronic Cigarette, Cigarettes   Substance and Sexual Activity   • Alcohol use: Yes     Comment: wine    • Drug use: No       FAMILY HISTORY:  No family history on file.    REVIEW OF SYSTEMS:  Review of Systems   Unable to perform ROS: Patient nonverbal (comatose. )   Constitutional: Negative for fever.   Respiratory: Negative for cough and choking.         Increased tracheal secretions   Cardiovascular: Negative for leg swelling.   Gastrointestinal: Negative for diarrhea and vomiting.   Psychiatric/Behavioral: Negative for agitation.        PHYSICAL EXAMINATION:     VITAL SIGNS:  /68   Pulse 71   Resp 20   SpO2 94%     Physical Exam   Constitutional:  Non-toxic appearance. No distress.   Nonverbal.   HENT:   Head: Normocephalic and atraumatic.   Eyes: Conjunctivae and EOM are normal. Pupils are equal, round, and reactive to light.   Neck: Normal range of motion. Neck supple. No JVD present.   Trach in place, no exudate or erythema appreciated.  Increased secretions noted.  They are clear, no signs or symptoms of infection.   Cardiovascular: Normal rate, regular rhythm, normal heart sounds and intact distal pulses.   Pulmonary/Chest: Effort normal and breath sounds normal. No respiratory distress. She has no wheezes. She has no rales.   Abdominal: Soft. She exhibits no distension. There is no tenderness.   PEG in place   Musculoskeletal: She exhibits no edema.   Does not move extremities.    Neurological:   Contractures noted.    Skin: Skin is warm and dry. Capillary refill takes less than 2 seconds. She is not diaphoretic.   Psychiatric: She has a normal mood and affect. Her behavior is normal.   Nursing note and vitals  reviewed.      RECORDS REVIEW:   N/A    ASSESSMENT     Diagnoses and all orders for this visit:    Anoxic encephalopathy (CMS/HCC)    Oropharyngeal dysphagia    Increased tracheal secretions    PEG (percutaneous endoscopic gastrostomy) status (CMS/HCC)    Physical deconditioning      PLAN  Discontinue scopolamine patch start hyascyamin 0.125 BID.  Continue to monitor for any acute changes ends of respiratory distress.  Continue with feedings as scheduled.  Continue with aspiration precautions.         [x]  Discussed Patient in detail with nursing/staff, addressed all needs today.     [x]  Plan of Care Reviewed   []  PT/OT Reviewed   []  Order Changes  []  Discharge Plans Reviewed  [x]  Advance Directive on file with Nursing Home.   [x]  POA on file with Nursing Home.    [x]  Code Status listed:  [x]  Full Code   []  DNR         Noemy Garcia PA-C.  11/21/2019

## 2019-12-05 ENCOUNTER — HOSPITAL ENCOUNTER (INPATIENT)
Facility: HOSPITAL | Age: 59
LOS: 2 days | Discharge: NURSING, REHAB OR LONG-TERM CARE | End: 2019-12-07
Attending: EMERGENCY MEDICINE | Admitting: INTERNAL MEDICINE

## 2019-12-05 ENCOUNTER — NURSING HOME (OUTPATIENT)
Dept: INTERNAL MEDICINE | Facility: CLINIC | Age: 59
End: 2019-12-05

## 2019-12-05 ENCOUNTER — APPOINTMENT (OUTPATIENT)
Dept: GENERAL RADIOLOGY | Facility: HOSPITAL | Age: 59
End: 2019-12-05

## 2019-12-05 DIAGNOSIS — I95.9 HYPOTENSION, UNSPECIFIED HYPOTENSION TYPE: ICD-10-CM

## 2019-12-05 DIAGNOSIS — A41.9 SEPSIS, DUE TO UNSPECIFIED ORGANISM, UNSPECIFIED WHETHER ACUTE ORGAN DYSFUNCTION PRESENT (HCC): Primary | ICD-10-CM

## 2019-12-05 DIAGNOSIS — N39.0 URINARY TRACT INFECTION WITHOUT HEMATURIA, SITE UNSPECIFIED: ICD-10-CM

## 2019-12-05 DIAGNOSIS — R50.9 FEVER, UNSPECIFIED FEVER CAUSE: Primary | ICD-10-CM

## 2019-12-05 DIAGNOSIS — Z93.0 TRACHEOSTOMY IN PLACE (HCC): ICD-10-CM

## 2019-12-05 DIAGNOSIS — Z43.1 PEG (PERCUTANEOUS ENDOSCOPIC GASTROSTOMY) ADJUSTMENT/REPLACEMENT/REMOVAL (HCC): ICD-10-CM

## 2019-12-05 LAB
ALBUMIN SERPL-MCNC: 2.9 G/DL (ref 3.5–5.2)
ALBUMIN/GLOB SERPL: 0.9 G/DL
ALP SERPL-CCNC: 70 U/L (ref 39–117)
ALT SERPL W P-5'-P-CCNC: 28 U/L (ref 1–33)
ANION GAP SERPL CALCULATED.3IONS-SCNC: 11.8 MMOL/L (ref 5–15)
APTT PPP: 29.2 SECONDS (ref 24.5–37.2)
AST SERPL-CCNC: 15 U/L (ref 1–32)
ATMOSPHERIC PRESS: 736 MMHG
BACTERIA UR QL AUTO: ABNORMAL /HPF
BASE EXCESS BLDV CALC-SCNC: 0.3 MMOL/L (ref 0–2)
BASOPHILS # BLD AUTO: 0.03 10*3/MM3 (ref 0–0.2)
BASOPHILS NFR BLD AUTO: 0.2 % (ref 0–1.5)
BDY SITE: ABNORMAL
BILIRUB SERPL-MCNC: 0.3 MG/DL (ref 0.2–1.2)
BILIRUB UR QL STRIP: NEGATIVE
BUN BLD-MCNC: 23 MG/DL (ref 8–23)
BUN/CREAT SERPL: 57.5 (ref 7–25)
CALCIUM SPEC-SCNC: 8.9 MG/DL (ref 8.6–10.5)
CHLORIDE SERPL-SCNC: 102 MMOL/L (ref 98–107)
CLARITY UR: ABNORMAL
CO2 SERPL-SCNC: 21.2 MMOL/L (ref 22–29)
COHGB MFR BLD: 1.1 % (ref 0–5)
COLOR UR: YELLOW
CREAT BLD-MCNC: 0.4 MG/DL (ref 0.57–1)
D-LACTATE SERPL-SCNC: 0.8 MMOL/L (ref 0.5–2)
DEPRECATED RDW RBC AUTO: 50.3 FL (ref 37–54)
EOSINOPHIL # BLD AUTO: 0.08 10*3/MM3 (ref 0–0.4)
EOSINOPHIL NFR BLD AUTO: 0.5 % (ref 0.3–6.2)
ERYTHROCYTE [DISTWIDTH] IN BLOOD BY AUTOMATED COUNT: 13.6 % (ref 12.3–15.4)
FLUAV AG NPH QL: NEGATIVE
FLUBV AG NPH QL IA: NEGATIVE
GAS FLOW AIRWAY: 6 LPM
GFR SERPL CREATININE-BSD FRML MDRD: >150 ML/MIN/1.73
GLOBULIN UR ELPH-MCNC: 3.4 GM/DL
GLUCOSE BLD-MCNC: 124 MG/DL (ref 65–99)
GLUCOSE UR STRIP-MCNC: NEGATIVE MG/DL
HCO3 BLDV-SCNC: 25.4 MMOL/L (ref 22–28)
HCT VFR BLD AUTO: 30.4 % (ref 34–46.6)
HGB BLD-MCNC: 9.6 G/DL (ref 12–15.9)
HGB BLDA-MCNC: 10 G/DL (ref 12–18)
HGB UR QL STRIP.AUTO: ABNORMAL
HOLD SPECIMEN: NORMAL
HOLD SPECIMEN: NORMAL
HOROWITZ INDEX BLD+IHG-RTO: 28 %
HYALINE CASTS UR QL AUTO: ABNORMAL /LPF
IMM GRANULOCYTES # BLD AUTO: 0.09 10*3/MM3 (ref 0–0.05)
IMM GRANULOCYTES NFR BLD AUTO: 0.6 % (ref 0–0.5)
INR PPP: 1.05 (ref 0.9–1.1)
KETONES UR QL STRIP: NEGATIVE
LEUKOCYTE ESTERASE UR QL STRIP.AUTO: ABNORMAL
LYMPHOCYTES # BLD AUTO: 2.42 10*3/MM3 (ref 0.7–3.1)
LYMPHOCYTES NFR BLD AUTO: 16.6 % (ref 19.6–45.3)
MAGNESIUM SERPL-MCNC: 2.4 MG/DL (ref 1.6–2.4)
MCH RBC QN AUTO: 31.9 PG (ref 26.6–33)
MCHC RBC AUTO-ENTMCNC: 31.6 G/DL (ref 31.5–35.7)
MCV RBC AUTO: 101 FL (ref 79–97)
METHGB BLD QL: 1 % (ref 0–3)
MODALITY: ABNORMAL
MONOCYTES # BLD AUTO: 1.19 10*3/MM3 (ref 0.1–0.9)
MONOCYTES NFR BLD AUTO: 8.2 % (ref 5–12)
NEUTROPHILS # BLD AUTO: 10.74 10*3/MM3 (ref 1.7–7)
NEUTROPHILS NFR BLD AUTO: 73.9 % (ref 42.7–76)
NITRITE UR QL STRIP: NEGATIVE
NOTE: ABNORMAL
NRBC BLD AUTO-RTO: 0 /100 WBC (ref 0–0.2)
OXYHGB MFR BLDV: 62.2 % (ref 40–70)
PCO2 BLDV: 42.1 MM HG (ref 40–50)
PH BLDV: 7.39 PH UNITS (ref 7.32–7.42)
PH UR STRIP.AUTO: 7.5 [PH] (ref 5–8)
PLATELET # BLD AUTO: 262 10*3/MM3 (ref 140–450)
PMV BLD AUTO: 12.1 FL (ref 6–12)
PO2 BLDV: 34.6 MM HG (ref 30–50)
POTASSIUM BLD-SCNC: 4.3 MMOL/L (ref 3.5–5.2)
PROCALCITONIN SERPL-MCNC: 0.08 NG/ML (ref 0.1–0.25)
PROT SERPL-MCNC: 6.3 G/DL (ref 6–8.5)
PROT UR QL STRIP: NEGATIVE
PROTHROMBIN TIME: 14 SECONDS (ref 12–15.1)
RBC # BLD AUTO: 3.01 10*6/MM3 (ref 3.77–5.28)
RBC # UR: ABNORMAL /HPF
REF LAB TEST METHOD: ABNORMAL
SAO2 % BLDCOV: 63.5 % (ref 45–75)
SODIUM BLD-SCNC: 135 MMOL/L (ref 136–145)
SP GR UR STRIP: 1.01 (ref 1–1.03)
SQUAMOUS #/AREA URNS HPF: ABNORMAL /HPF
UNIDENT CRYS URNS QL MICRO: ABNORMAL /HPF
UROBILINOGEN UR QL STRIP: ABNORMAL
VENTILATOR MODE: ABNORMAL
WBC NRBC COR # BLD: 14.55 10*3/MM3 (ref 3.4–10.8)
WBC UR QL AUTO: ABNORMAL /HPF
WHOLE BLOOD HOLD SPECIMEN: NORMAL
WHOLE BLOOD HOLD SPECIMEN: NORMAL

## 2019-12-05 PROCEDURE — 99310 SBSQ NF CARE HIGH MDM 45: CPT | Performed by: PHYSICIAN ASSISTANT

## 2019-12-05 PROCEDURE — 87804 INFLUENZA ASSAY W/OPTIC: CPT | Performed by: EMERGENCY MEDICINE

## 2019-12-05 PROCEDURE — 25010000002 ENOXAPARIN PER 10 MG: Performed by: INTERNAL MEDICINE

## 2019-12-05 PROCEDURE — 80053 COMPREHEN METABOLIC PANEL: CPT | Performed by: EMERGENCY MEDICINE

## 2019-12-05 PROCEDURE — 71045 X-RAY EXAM CHEST 1 VIEW: CPT

## 2019-12-05 PROCEDURE — 82805 BLOOD GASES W/O2 SATURATION: CPT

## 2019-12-05 PROCEDURE — 87081 CULTURE SCREEN ONLY: CPT | Performed by: INTERNAL MEDICINE

## 2019-12-05 PROCEDURE — P9612 CATHETERIZE FOR URINE SPEC: HCPCS

## 2019-12-05 PROCEDURE — 94640 AIRWAY INHALATION TREATMENT: CPT

## 2019-12-05 PROCEDURE — 93005 ELECTROCARDIOGRAM TRACING: CPT | Performed by: EMERGENCY MEDICINE

## 2019-12-05 PROCEDURE — 87070 CULTURE OTHR SPECIMN AEROBIC: CPT | Performed by: EMERGENCY MEDICINE

## 2019-12-05 PROCEDURE — 85610 PROTHROMBIN TIME: CPT | Performed by: EMERGENCY MEDICINE

## 2019-12-05 PROCEDURE — 94799 UNLISTED PULMONARY SVC/PX: CPT

## 2019-12-05 PROCEDURE — 87205 SMEAR GRAM STAIN: CPT | Performed by: EMERGENCY MEDICINE

## 2019-12-05 PROCEDURE — 83735 ASSAY OF MAGNESIUM: CPT | Performed by: EMERGENCY MEDICINE

## 2019-12-05 PROCEDURE — 85730 THROMBOPLASTIN TIME PARTIAL: CPT | Performed by: EMERGENCY MEDICINE

## 2019-12-05 PROCEDURE — 25010000002 PIPERACILLIN SOD-TAZOBACTAM PER 1 G: Performed by: EMERGENCY MEDICINE

## 2019-12-05 PROCEDURE — 82820 HEMOGLOBIN-OXYGEN AFFINITY: CPT

## 2019-12-05 PROCEDURE — 87040 BLOOD CULTURE FOR BACTERIA: CPT | Performed by: EMERGENCY MEDICINE

## 2019-12-05 PROCEDURE — 84145 PROCALCITONIN (PCT): CPT | Performed by: EMERGENCY MEDICINE

## 2019-12-05 PROCEDURE — 99222 1ST HOSP IP/OBS MODERATE 55: CPT | Performed by: INTERNAL MEDICINE

## 2019-12-05 PROCEDURE — C1751 CATH, INF, PER/CENT/MIDLINE: HCPCS

## 2019-12-05 PROCEDURE — 85025 COMPLETE CBC W/AUTO DIFF WBC: CPT | Performed by: EMERGENCY MEDICINE

## 2019-12-05 PROCEDURE — 99285 EMERGENCY DEPT VISIT HI MDM: CPT

## 2019-12-05 PROCEDURE — 81001 URINALYSIS AUTO W/SCOPE: CPT | Performed by: EMERGENCY MEDICINE

## 2019-12-05 PROCEDURE — 83605 ASSAY OF LACTIC ACID: CPT | Performed by: EMERGENCY MEDICINE

## 2019-12-05 PROCEDURE — 87086 URINE CULTURE/COLONY COUNT: CPT | Performed by: EMERGENCY MEDICINE

## 2019-12-05 RX ORDER — ONDANSETRON 2 MG/ML
4 INJECTION INTRAMUSCULAR; INTRAVENOUS EVERY 6 HOURS PRN
Status: DISCONTINUED | OUTPATIENT
Start: 2019-12-05 | End: 2019-12-07 | Stop reason: HOSPADM

## 2019-12-05 RX ORDER — HYDRALAZINE HYDROCHLORIDE 20 MG/ML
20 INJECTION INTRAMUSCULAR; INTRAVENOUS EVERY 4 HOURS PRN
Status: DISCONTINUED | OUTPATIENT
Start: 2019-12-05 | End: 2019-12-07 | Stop reason: HOSPADM

## 2019-12-05 RX ORDER — LORAZEPAM 2 MG/ML
1 INJECTION INTRAMUSCULAR EVERY 6 HOURS PRN
Status: DISCONTINUED | OUTPATIENT
Start: 2019-12-05 | End: 2019-12-07 | Stop reason: HOSPADM

## 2019-12-05 RX ORDER — HYOSCYAMINE SULFATE 0.125 MG
125 TABLET,DISINTEGRATING ORAL 2 TIMES DAILY
Status: ON HOLD | COMMUNITY
End: 2022-01-10

## 2019-12-05 RX ORDER — SODIUM PHOSPHATE,MONO-DIBASIC 19G-7G/118
1 ENEMA (ML) RECTAL DAILY PRN
Status: ON HOLD | COMMUNITY
End: 2020-03-26

## 2019-12-05 RX ORDER — ALPRAZOLAM 0.5 MG/1
0.5 TABLET ORAL EVERY 6 HOURS
COMMUNITY
End: 2019-12-09 | Stop reason: SDUPTHER

## 2019-12-05 RX ORDER — HYDRALAZINE HYDROCHLORIDE 10 MG/1
20 TABLET, FILM COATED ORAL EVERY 4 HOURS PRN
Status: ON HOLD | COMMUNITY
End: 2020-03-26

## 2019-12-05 RX ORDER — SODIUM CHLORIDE 0.9 % (FLUSH) 0.9 %
10 SYRINGE (ML) INJECTION AS NEEDED
Status: DISCONTINUED | OUTPATIENT
Start: 2019-12-05 | End: 2019-12-07 | Stop reason: HOSPADM

## 2019-12-05 RX ORDER — IPRATROPIUM BROMIDE AND ALBUTEROL SULFATE 2.5; .5 MG/3ML; MG/3ML
3 SOLUTION RESPIRATORY (INHALATION)
Status: DISCONTINUED | OUTPATIENT
Start: 2019-12-05 | End: 2019-12-07 | Stop reason: HOSPADM

## 2019-12-05 RX ORDER — ACETAMINOPHEN 325 MG/1
650 TABLET ORAL EVERY 4 HOURS PRN
Status: DISCONTINUED | OUTPATIENT
Start: 2019-12-05 | End: 2019-12-07 | Stop reason: HOSPADM

## 2019-12-05 RX ORDER — ONDANSETRON 2 MG/ML
4 INJECTION INTRAMUSCULAR; INTRAVENOUS EVERY 6 HOURS PRN
Status: DISCONTINUED | OUTPATIENT
Start: 2019-12-05 | End: 2019-12-05 | Stop reason: SDUPTHER

## 2019-12-05 RX ORDER — SODIUM CHLORIDE 0.9 % (FLUSH) 0.9 %
10 SYRINGE (ML) INJECTION AS NEEDED
Status: DISCONTINUED | OUTPATIENT
Start: 2019-12-05 | End: 2019-12-05 | Stop reason: SDUPTHER

## 2019-12-05 RX ORDER — LISINOPRIL 20 MG/1
20 TABLET ORAL DAILY
Status: DISCONTINUED | OUTPATIENT
Start: 2019-12-06 | End: 2019-12-07 | Stop reason: HOSPADM

## 2019-12-05 RX ORDER — ACETAMINOPHEN 650 MG/1
650 SUPPOSITORY RECTAL EVERY 4 HOURS PRN
Status: DISCONTINUED | OUTPATIENT
Start: 2019-12-05 | End: 2019-12-07 | Stop reason: HOSPADM

## 2019-12-05 RX ORDER — CHLORHEXIDINE GLUCONATE 0.12 MG/ML
15 RINSE ORAL EVERY 12 HOURS SCHEDULED
Status: DISCONTINUED | OUTPATIENT
Start: 2019-12-05 | End: 2019-12-07 | Stop reason: HOSPADM

## 2019-12-05 RX ORDER — ALBUTEROL SULFATE 2.5 MG/3ML
2.5 SOLUTION RESPIRATORY (INHALATION) EVERY 6 HOURS PRN
Status: ON HOLD | COMMUNITY
End: 2020-03-26

## 2019-12-05 RX ORDER — FAMOTIDINE 20 MG/1
20 TABLET, FILM COATED ORAL 2 TIMES DAILY
Status: DISCONTINUED | OUTPATIENT
Start: 2019-12-05 | End: 2019-12-06 | Stop reason: ALTCHOICE

## 2019-12-05 RX ORDER — ACETAMINOPHEN 500 MG
500 TABLET ORAL EVERY 4 HOURS PRN
Status: ON HOLD | COMMUNITY
End: 2019-12-06

## 2019-12-05 RX ORDER — BISACODYL 10 MG
10 SUPPOSITORY, RECTAL RECTAL DAILY PRN
Status: DISCONTINUED | OUTPATIENT
Start: 2019-12-05 | End: 2019-12-07 | Stop reason: HOSPADM

## 2019-12-05 RX ORDER — BACLOFEN 10 MG/1
10 TABLET ORAL EVERY 8 HOURS
COMMUNITY

## 2019-12-05 RX ORDER — ONDANSETRON 4 MG/1
4 TABLET, FILM COATED ORAL EVERY 6 HOURS PRN
Status: ON HOLD | COMMUNITY
End: 2020-03-26

## 2019-12-05 RX ORDER — ACETAMINOPHEN 160 MG/5ML
650 SOLUTION ORAL EVERY 4 HOURS PRN
Status: DISCONTINUED | OUTPATIENT
Start: 2019-12-05 | End: 2019-12-07 | Stop reason: HOSPADM

## 2019-12-05 RX ORDER — SODIUM CHLORIDE 0.9 % (FLUSH) 0.9 %
10 SYRINGE (ML) INJECTION EVERY 12 HOURS SCHEDULED
Status: DISCONTINUED | OUTPATIENT
Start: 2019-12-05 | End: 2019-12-07 | Stop reason: HOSPADM

## 2019-12-05 RX ORDER — BISACODYL 10 MG
10 SUPPOSITORY, RECTAL RECTAL DAILY PRN
Status: DISCONTINUED | OUTPATIENT
Start: 2019-12-05 | End: 2019-12-05 | Stop reason: SDUPTHER

## 2019-12-05 RX ORDER — CEFTRIAXONE 1 G/50ML
1 INJECTION, SOLUTION INTRAVENOUS EVERY 24 HOURS
Status: DISCONTINUED | OUTPATIENT
Start: 2019-12-06 | End: 2019-12-07 | Stop reason: HOSPADM

## 2019-12-05 RX ORDER — BUDESONIDE 0.5 MG/2ML
0.5 INHALANT ORAL
Status: DISCONTINUED | OUTPATIENT
Start: 2019-12-05 | End: 2019-12-07 | Stop reason: HOSPADM

## 2019-12-05 RX ORDER — ACETYLCYSTEINE 200 MG/ML
4 SOLUTION ORAL; RESPIRATORY (INHALATION) EVERY 6 HOURS PRN
COMMUNITY
End: 2020-03-12 | Stop reason: SDUPTHER

## 2019-12-05 RX ORDER — IPRATROPIUM BROMIDE AND ALBUTEROL SULFATE 2.5; .5 MG/3ML; MG/3ML
3 SOLUTION RESPIRATORY (INHALATION) EVERY 6 HOURS PRN
COMMUNITY
End: 2020-03-12 | Stop reason: SDUPTHER

## 2019-12-05 RX ORDER — NICOTINE 21 MG/24HR
1 PATCH, TRANSDERMAL 24 HOURS TRANSDERMAL
Status: DISCONTINUED | OUTPATIENT
Start: 2019-12-06 | End: 2019-12-07 | Stop reason: HOSPADM

## 2019-12-05 RX ADMIN — ACETAMINOPHEN 650 MG: 650 SUPPOSITORY RECTAL at 20:30

## 2019-12-05 RX ADMIN — CHLORHEXIDINE GLUCONATE 0.12% ORAL RINSE 15 ML: 1.2 LIQUID ORAL at 22:45

## 2019-12-05 RX ADMIN — ENOXAPARIN SODIUM 40 MG: 40 INJECTION SUBCUTANEOUS at 18:47

## 2019-12-05 RX ADMIN — SODIUM CHLORIDE 1000 ML: 9 INJECTION, SOLUTION INTRAVENOUS at 13:39

## 2019-12-05 RX ADMIN — TAZOBACTAM SODIUM AND PIPERACILLIN SODIUM 4.5 G: 500; 4 INJECTION, SOLUTION INTRAVENOUS at 15:25

## 2019-12-05 RX ADMIN — IPRATROPIUM BROMIDE AND ALBUTEROL SULFATE 3 ML: .5; 3 SOLUTION RESPIRATORY (INHALATION) at 18:31

## 2019-12-05 RX ADMIN — BUDESONIDE 0.5 MG: 0.5 INHALANT RESPIRATORY (INHALATION) at 18:31

## 2019-12-05 RX ADMIN — FAMOTIDINE 20 MG: 20 TABLET ORAL at 22:45

## 2019-12-05 NOTE — PLAN OF CARE
Problem: Fall Risk (Adult)  Goal: Absence of Fall  Outcome: Ongoing (interventions implemented as appropriate)   19 1851   Fall Risk (Adult)   Absence of Fall achieves outcome       Problem: Skin Injury Risk (Adult)  Goal: Identify Related Risk Factors and Signs and Symptoms  Outcome: Ongoing (interventions implemented as appropriate)      Problem: Sepsis (,NICU)  Goal: Signs and Symptoms of Listed Potential Problems Will be Absent, Minimized or Managed (Sepsis)  Outcome: Ongoing (interventions implemented as appropriate)      Problem: Urinary Tract Infection (Adult)  Goal: Signs and Symptoms of Listed Potential Problems Will be Absent, Minimized or Managed (Urinary Tract Infection)  Outcome: Ongoing (interventions implemented as appropriate)

## 2019-12-05 NOTE — ED PROVIDER NOTES
Subjective   History of Present Illness    Chief Complaint: Fever, low blood pressure  History of Present Illness: Patient is a 59-year-old female nursing home resident, history of tracheostomy secondary to anoxic brain injury with COPD, found to be hypotensive and febrile at nursing home.  IV fluids were started and patient received 550 mL bolus prior to arrival.  Full code per daughter.  Blood pressure reported 80s over 50s at nursing home.  Temp 100.3.  Onset: Today  Duration: Persistent  Exacerbating / Alleviating factors: None  Associated symptoms: None    Nurses Notes reviewed and agree, including vitals, allergies, social history and prior medical history.     REVIEW OF SYSTEMS: All systems reviewed and not pertinent unless noted.    Positive for: Reported fever and low blood pressure, chronic productive cough    Negative for: Unable to fully assess review of systems secondary to patient status, at baseline,  Review of Systems    Past Medical History:   Diagnosis Date   • COPD (chronic obstructive pulmonary disease) (CMS/HCC)    • Hypertension    • Pneumonia        No Known Allergies    Past Surgical History:   Procedure Laterality Date   • HYSTERECTOMY      Partial    • TRACHEOSTOMY AND PEG TUBE INSERTION N/A 3/20/2019    Procedure: TRACHEOSTOMY AND PERCUTANEOUS ENDOSCOPIC GASTROSTOMY TUBE INSERTION;  Surgeon: Nadira Pandey MD;  Location: [facility];  Service: General       History reviewed. No pertinent family history.    Social History     Socioeconomic History   • Marital status:      Spouse name: Not on file   • Number of children: Not on file   • Years of education: Not on file   • Highest education level: Not on file   Tobacco Use   • Smoking status: Current Every Day Smoker     Packs/day: 1.00     Types: Electronic Cigarette, Cigarettes   Substance and Sexual Activity   • Alcohol use: Yes     Comment: wine    • Drug use: No           Objective   Physical Exam      GENERAL APPEARANCE: Frail  59-year-old white female with chronic contractures and muscle wasting VITAL SIGNS: per nursing, reviewed and noted  SKIN: no rashes, ulcerations or petechiae.  Head: Normocephalic, atraumatic.   EYES: perrla. EOMI.  ENT:  TM clear, posterior pharynx patent.  LUNGS:  normal breath sounds. No retractions.   CARDIOVASCULAR:  regular rate and rhythm, no murmurs.  Good Peripheral pulses.  ABDOMEN: Soft, nontender, normal bowel sounds. No hernia. No ascites.  MUSCULOSKELETAL: Diffuse muscle wasting with contractures.  No cubitus ulcers, heel boots in place  nEUROLOGIC: Alert, nonverbal, at baseline per family   nECK: Supple, symmetric.  Tracheostomy in place with copious secretion   back: full rom, no paraspinal spasm. No CVA tenderness.   : no bladder tenderness or distention, no CVA tenderness      Central Line At Bedside  Date/Time: 12/5/2019 3:42 PM  Performed by: Janak Gutiérrez DO  Authorized by: Janak Gutiérrez DO     Consent:     Consent obtained:  Verbal    Risks discussed:  Arterial puncture, incorrect placement and nerve damage  Pre-procedure details:     Hand hygiene: Hand hygiene performed prior to insertion      Sterile barrier technique: All elements of maximal sterile technique followed      Skin preparation:  2% chlorhexidine    Skin preparation agent: Skin preparation agent completely dried prior to procedure    Anesthesia (see MAR for exact dosages):     Anesthesia method:  Local infiltration    Local anesthetic:  Lidocaine 1% w/o epi  Procedure details:     Location:  L femoral    Site selection rationale:  Patient tracheostomy tube straps are in place around the neck.  Poor venous access throughout.  Ultrasound bilateral femorals revealed larger, more superficial left femoral vein compared to the right    Patient position:  Flat    Procedural supplies:  Triple lumen    Catheter size:  7.5 Fr    Landmarks identified: yes      Ultrasound guidance: yes      Sterile ultrasound techniques: Sterile gel  and sterile probe covers were used      Number of attempts:  1    Successful placement: yes    Post-procedure details:     Post-procedure:  Dressing applied and line sutured    Assessment:  Blood return through all ports and free fluid flow    Patient tolerance of procedure:  Tolerated well, no immediate complications               ED Course  ED Course as of Dec 05 1547   Thu Dec 05, 2019   1343 EKG interpreted by me reveals sinus tachycardia rate 100.  Occasional PVC.  No ischemic changes.  [PF]   1539 WBC: (!) 14.55 [PF]   1539 Hemoglobin: (!) 9.6 [PF]   1539 Hematocrit: (!) 30.4 [PF]   1539 Platelets: 262 [PF]   1539 pH, Venous: 7.389 [PF]   1539 pCO2, Venous: 42.1 [PF]   1539 Influenza A Ag, EIA: Negative [PF]   1539 Influenza B Ag, EIA: Negative [PF]   1539 RBC, UA: (!) 3-5 [PF]   1539 WBC, UA: (!) 6-12 [PF]   1539 Bacteria, UA: (!) 3+ [PF]   1539 Squamous Epithelial Cells, UA: (!) 3-6 [PF]   1539 Leukocytes, UA: (!) Large (3+) [PF]   1539 Nitrite, UA: Negative [PF]      ED Course User Index  [PF] Janak Gutiérrez W, DO      Patient required deep suctioning to clear copious secretions.  Post suction patient has adequate oxygen saturation.  Blood pressure improved after 2 L IV bolus.  Patient is found to have evidence of urinary tract infection as a source of her sepsis.  Will admit to telemetry, inpatient, discussed with Dr. Reees, who will accept patient for admission            MDM  Number of Diagnoses or Management Options     Amount and/or Complexity of Data Reviewed  Clinical lab tests: reviewed  Tests in the radiology section of CPT®: ordered and reviewed  Tests in the medicine section of CPT®: ordered and reviewed  Obtain history from someone other than the patient: yes  Review and summarize past medical records: yes  Discuss the patient with other providers: yes  Independent visualization of images, tracings, or specimens: yes    Risk of Complications, Morbidity, and/or Mortality  Presenting problems:  high  Diagnostic procedures: moderate  Management options: high    Critical Care  Total time providing critical care: 30-74 minutes    Patient Progress  Patient progress: improved      Final diagnoses:   Sepsis, due to unspecified organism, unspecified whether acute organ dysfunction present (CMS/[facility])   Urinary tract infection without hematuria, site unspecified              Janak Gutiérrez, DO  12/05/19 1547       Janak Gutiérrez, DO  12/05/19 1548

## 2019-12-05 NOTE — H&P
[facility]      Name:  Viji Vargas   Age:  59 y.o.  Sex:  female  :  1960  MRN:  [id number]   Visit Number:  [id number]  Admission Date:  2019  Date Of Service:  19  Primary Care Physician:  Provider, No Known    Chief Complaint:     Hypotension    History Of Presenting Illness:      Patient is a 59-year-old female with history significant for COPD and anoxic brain injury secondary to cardiac arrest with subsequent tracheostomy and PEG tube who presents from a nursing home secondary to hypotension and fever.  Patient is nonverbal and no family was present at the time of my exam.  Therefore history has been obtained from previous hospital documentation.  Chart review reveals that patient was seen in 2019 for cardiac arrest with subsequent anoxic brain injury.  Dr. Bradford was consulted and felt that patient had a grave prognosis with anoxic encephalopathy which was confirmed on EEG. 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  Patient also underwent PEG and tracheostomy during the hospitalization.  Dr. dennis Maldonado was consulted and met with family.  They expressed that they wanted to proceed with full code and aggressive care.  Patient was then transferred to LTAC at [facility].  Today patient was brought in for reported fever of T-max 100.3.  Blood pressure was reportedly 80s over 50s at the nursing home.  In the emergency room, patient's blood pressure recovered after 2 L IV bolus of normal saline.  Her oxygen saturation remained appropriate and she was deep suctioned as she did have copious secretions from tracheostomy.  Chest x-ray was without acute process.  Urinalysis did show evidence of urinary tract infection.  Patient was preemptively started on Zosyn which will be transitioned to Rocephin on admission. Lactic acid was within normal limits.  Cultures  obtained.    Review Of Systems:     Unable to obtain.     Past Medical History:    Past Medical History:   Diagnosis Date   • COPD (chronic obstructive pulmonary disease) (CMS/HCC)    • Hypertension    • Pneumonia        Past Surgical history:    Past Surgical History:   Procedure Laterality Date   • HYSTERECTOMY      Partial    • TRACHEOSTOMY AND PEG TUBE INSERTION N/A 3/20/2019    Procedure: TRACHEOSTOMY AND PERCUTANEOUS ENDOSCOPIC GASTROSTOMY TUBE INSERTION;  Surgeon: Nadira Pandey MD;  Location: [facility];  Service: General       Social History:    Social History     Socioeconomic History   • Marital status:      Spouse name: Not on file   • Number of children: Not on file   • Years of education: Not on file   • Highest education level: Not on file   Tobacco Use   • Smoking status: Current Every Day Smoker     Packs/day: 1.00     Types: Electronic Cigarette, Cigarettes   Substance and Sexual Activity   • Alcohol use: Yes     Comment: wine    • Drug use: No       Family History:    History reviewed. No pertinent family history.    Allergies:      Patient has no known allergies.    Home Medications:    Prior to Admission Medications     Prescriptions Last Dose Informant Patient Reported? Taking?    acetaminophen (TYLENOL) 325 MG tablet   No No    Take 2 tablets by mouth Every 4 (Four) Hours As Needed for Mild Pain .    bisacodyl (DULCOLAX) 10 MG suppository   No No    Insert 1 suppository into the rectum Daily As Needed for Constipation.    budesonide (PULMICORT) 0.5 MG/2ML nebulizer solution   No No    Take 2 mL by nebulization 2 (Two) Times a Day.    carvedilol (COREG) 12.5 MG tablet   Yes No    Take 12.5 mg by mouth 2 (Two) Times a Day With Meals.    chlorhexidine (PERIDEX) 0.12 % solution   No No    Apply 15 mL to the mouth or throat Every 12 (Twelve) Hours.    famotidine (PEPCID) 20 MG tablet   No No    1 tablet by Per G Tube route 2 (Two) Times a Day.    hydrALAZINE (APRESOLINE) 20 MG/ML injection    No No    Infuse 1 mL into a venous catheter Every 4 (Four) Hours As Needed for High Blood Pressure.    ipratropium-albuterol (DUO-NEB) 0.5-2.5 mg/3 ml nebulizer   No No    Take 3 mL by nebulization Every 6 (Six) Hours.    lisinopril (PRINIVIL,ZESTRIL) 20 MG tablet  Pharmacy Yes No    Take 20 mg by mouth Daily.    LORazepam (ATIVAN) 2 MG/ML injection   No No    Infuse 0.5 mL into a venous catheter Every 6 (Six) Hours As Needed for Anxiety.    nicotine (NICODERM CQ) 21 MG/24HR patch   No No    Place 1 patch on the skin as directed by provider Daily.    ondansetron (ZOFRAN) 4 MG/2ML injection   No No    Infuse 2 mL into a venous catheter Every 6 (Six) Hours As Needed for Nausea or Vomiting.             ED Medications:    Medications   sodium chloride 0.9 % flush 10 mL (not administered)   piperacillin-tazobactam (ZOSYN) 4.5 g in iso-osmotic dextrose 100 mL IVPB (premix) (4.5 g Intravenous New Bag 12/5/19 1525)   sodium chloride 0.9 % bolus 2,040 mL (0 mL Intravenous Stopped 12/5/19 1445)       Vital Signs:    Temp:  [100.1 °F (37.8 °C)] 100.1 °F (37.8 °C)  Heart Rate:  [] 113  Resp:  [22] 22  BP: ()/(61-84) 112/76        12/05/19  1301   Weight: 68 kg (150 lb)       Body mass index is 30.28 kg/m².    Physical Exam:    General Appearance:   not in any acute distress.   Head:  Atraumatic and normocephalic, without obvious abnormality.   Eyes:          PERRLA, conjunctivae and sclerae normal, no Icterus. No pallor.        Throat: No oral lesions, no thrush, oral mucosa moist.   Neck: Supple, trachea midline, tracheostomy present       Lungs:   Chest shape is normal. Breath sounds heard bilaterally equally.  No crackles or wheezing.    Heart:  Normal S1 and S2, no murmur. No JVD.   Abdomen:   Soft, non-distended, PEG tube present.   Extremities: no edema, no cyanosis, no clubbing.   Pulses: Pulses palpable and equal bilaterally.   Skin: No bleeding, bruising or rash.   Neurologic:  Anoxic brain injury, does  not follow commands.     Laboratory data:    I have reviewed the labs done in the emergency room.          Invalid input(s): LABALBU, PROT  Results from last 7 days   Lab Units 12/05/19  1515   WBC 10*3/mm3 14.55*   HEMOGLOBIN g/dL 9.6*   HEMATOCRIT % 30.4*   PLATELETS 10*3/mm3 262                             Results from last 7 days   Lab Units 12/05/19  1316   COLOR UA  Yellow   GLUCOSE UA  Negative   KETONES UA  Negative   LEUKOCYTES UA  Large (3+)*   PH, URINE  7.5   BILIRUBIN UA  Negative   UROBILINOGEN UA  0.2 E.U./dL     Pain Management Panel     Pain Management Panel Latest Ref Rng & Units 3/10/2019    AMPHETAMINES SCREEN, URINE Negative Negative    BARBITURATES SCREEN Negative Negative    BENZODIAZEPINE SCREEN, URINE Negative Negative    BUPRENORPHINEUR Negative Positive(A)    COCAINE SCREEN, URINE Negative Negative    METHADONE SCREEN, URINE Negative Negative    METHAMPHETAMINEUR Negative Negative            EKG:  Personally reviewed and reveals sinus tachycardia with a rate greater than 90.  No signs of acute infarct or ischemia.      Radiology:    Imaging Results (Last 72 Hours)     Procedure Component Value Units Date/Time    XR Chest 1 View [[id number]] Collected:  12/05/19 1356     Updated:  12/05/19 1359    Narrative:       PROCEDURE: XR CHEST 1 VW-     HISTORY: Severe Sepsis Protocol     COMPARISON: 03/25/2019.     FINDINGS: The patient is status post tracheostomy placement. The heart  is normal in size. The mediastinum is unremarkable. There are low lung  volumes, however there are no focal opacities were effusions. There is  no pneumothorax.  There are no acute osseous abnormalities.       Impression:       No acute cardiopulmonary process.     Continued followup is recommended.     This report was finalized on 12/5/2019 1:57 PM by Zeyad Wahl M.D..            * No active hospital problems. *      Assessment:    UTI, POA  History of anoxic encephalopathy status post tracheostomy and  PEG  History of cardiac arrest  COPD      Plan:    We will admit patient to telemetry floor.  We will continue to monitor closely.  I do not believe that patient is truly septic at this time.  I believe that some of her hypotension could be related to the anoxic brain injury.  Will consult nutrition for tube feed recommendations.  We will continue with Rocephin for UTI.  Will provide Tylenol for fever.  Monitor blood cultures.  Continue supplemental oxygen as necessary to maintain saturation greater than 92%.  Continue home medications for her chronic conditions.  Suspect 1-2 midnight stay inpatient with disposition to return to nursing home.    Walter Reese DO  12/05/19  3:32 PM    Dictated utilizing Dragon dictation.

## 2019-12-06 VITALS
DIASTOLIC BLOOD PRESSURE: 60 MMHG | OXYGEN SATURATION: 94 % | TEMPERATURE: 99.4 F | SYSTOLIC BLOOD PRESSURE: 80 MMHG | HEART RATE: 104 BPM

## 2019-12-06 LAB
ANION GAP SERPL CALCULATED.3IONS-SCNC: 14.9 MMOL/L (ref 5–15)
BASOPHILS # BLD AUTO: 0.04 10*3/MM3 (ref 0–0.2)
BASOPHILS NFR BLD AUTO: 0.3 % (ref 0–1.5)
BUN BLD-MCNC: 15 MG/DL (ref 8–23)
BUN/CREAT SERPL: 38.5 (ref 7–25)
CALCIUM SPEC-SCNC: 9.7 MG/DL (ref 8.6–10.5)
CHLORIDE SERPL-SCNC: 103 MMOL/L (ref 98–107)
CO2 SERPL-SCNC: 21.1 MMOL/L (ref 22–29)
CREAT BLD-MCNC: 0.39 MG/DL (ref 0.57–1)
DEPRECATED RDW RBC AUTO: 49.8 FL (ref 37–54)
EOSINOPHIL # BLD AUTO: 0.25 10*3/MM3 (ref 0–0.4)
EOSINOPHIL NFR BLD AUTO: 1.8 % (ref 0.3–6.2)
ERYTHROCYTE [DISTWIDTH] IN BLOOD BY AUTOMATED COUNT: 13.3 % (ref 12.3–15.4)
GFR SERPL CREATININE-BSD FRML MDRD: >150 ML/MIN/1.73
GLUCOSE BLD-MCNC: 103 MG/DL (ref 65–99)
HCT VFR BLD AUTO: 32.4 % (ref 34–46.6)
HGB BLD-MCNC: 10.4 G/DL (ref 12–15.9)
IMM GRANULOCYTES # BLD AUTO: 0.07 10*3/MM3 (ref 0–0.05)
IMM GRANULOCYTES NFR BLD AUTO: 0.5 % (ref 0–0.5)
LYMPHOCYTES # BLD AUTO: 2.32 10*3/MM3 (ref 0.7–3.1)
LYMPHOCYTES NFR BLD AUTO: 16.5 % (ref 19.6–45.3)
MCH RBC QN AUTO: 32.4 PG (ref 26.6–33)
MCHC RBC AUTO-ENTMCNC: 32.1 G/DL (ref 31.5–35.7)
MCV RBC AUTO: 100.9 FL (ref 79–97)
MONOCYTES # BLD AUTO: 1.17 10*3/MM3 (ref 0.1–0.9)
MONOCYTES NFR BLD AUTO: 8.3 % (ref 5–12)
MRSA SPEC QL CULT: NORMAL
NEUTROPHILS # BLD AUTO: 10.24 10*3/MM3 (ref 1.7–7)
NEUTROPHILS NFR BLD AUTO: 72.6 % (ref 42.7–76)
NRBC BLD AUTO-RTO: 0 /100 WBC (ref 0–0.2)
PLATELET # BLD AUTO: 261 10*3/MM3 (ref 140–450)
PMV BLD AUTO: 11.9 FL (ref 6–12)
POTASSIUM BLD-SCNC: 4.1 MMOL/L (ref 3.5–5.2)
RBC # BLD AUTO: 3.21 10*6/MM3 (ref 3.77–5.28)
SODIUM BLD-SCNC: 139 MMOL/L (ref 136–145)
WBC NRBC COR # BLD: 14.09 10*3/MM3 (ref 3.4–10.8)

## 2019-12-06 PROCEDURE — 99232 SBSQ HOSP IP/OBS MODERATE 35: CPT | Performed by: INTERNAL MEDICINE

## 2019-12-06 PROCEDURE — 25010000002 ENOXAPARIN PER 10 MG: Performed by: INTERNAL MEDICINE

## 2019-12-06 PROCEDURE — 25010000002 AZITHROMYCIN: Performed by: INTERNAL MEDICINE

## 2019-12-06 PROCEDURE — 94799 UNLISTED PULMONARY SVC/PX: CPT

## 2019-12-06 PROCEDURE — 25010000002 CEFTRIAXONE SODIUM-DEXTROSE 1-3.74 GM-%(50ML) RECONSTITUTED SOLUTION: Performed by: INTERNAL MEDICINE

## 2019-12-06 PROCEDURE — 80048 BASIC METABOLIC PNL TOTAL CA: CPT | Performed by: INTERNAL MEDICINE

## 2019-12-06 PROCEDURE — 85025 COMPLETE CBC W/AUTO DIFF WBC: CPT | Performed by: INTERNAL MEDICINE

## 2019-12-06 RX ORDER — MINERAL OIL 100 G/100G
1 OIL RECTAL ONCE
COMMUNITY
End: 2020-03-12 | Stop reason: SDUPTHER

## 2019-12-06 RX ORDER — ACETAMINOPHEN 500 MG
500 TABLET ORAL EVERY 4 HOURS PRN
Status: ON HOLD | COMMUNITY
End: 2020-03-26

## 2019-12-06 RX ORDER — ACETYLCYSTEINE 100 MG/ML
4 SOLUTION ORAL; RESPIRATORY (INHALATION) EVERY 6 HOURS PRN
Status: ON HOLD | COMMUNITY
End: 2020-03-26

## 2019-12-06 RX ADMIN — LISINOPRIL 20 MG: 20 TABLET ORAL at 10:00

## 2019-12-06 RX ADMIN — IPRATROPIUM BROMIDE AND ALBUTEROL SULFATE 3 ML: .5; 3 SOLUTION RESPIRATORY (INHALATION) at 06:48

## 2019-12-06 RX ADMIN — SODIUM CHLORIDE, PRESERVATIVE FREE 10 ML: 5 INJECTION INTRAVENOUS at 10:00

## 2019-12-06 RX ADMIN — IPRATROPIUM BROMIDE AND ALBUTEROL SULFATE 3 ML: .5; 3 SOLUTION RESPIRATORY (INHALATION) at 00:22

## 2019-12-06 RX ADMIN — BUDESONIDE 0.5 MG: 0.5 INHALANT RESPIRATORY (INHALATION) at 06:48

## 2019-12-06 RX ADMIN — FAMOTIDINE 20 MG: 10 INJECTION, SOLUTION INTRAVENOUS at 10:00

## 2019-12-06 RX ADMIN — AZITHROMYCIN 500 MG: 500 INJECTION, POWDER, LYOPHILIZED, FOR SOLUTION INTRAVENOUS at 11:47

## 2019-12-06 RX ADMIN — SODIUM CHLORIDE, PRESERVATIVE FREE 10 ML: 5 INJECTION INTRAVENOUS at 21:54

## 2019-12-06 RX ADMIN — CHLORHEXIDINE GLUCONATE 0.12% ORAL RINSE 15 ML: 1.2 LIQUID ORAL at 21:54

## 2019-12-06 RX ADMIN — CEFTRIAXONE 1 G: 1 INJECTION, SOLUTION INTRAVENOUS at 00:10

## 2019-12-06 RX ADMIN — IPRATROPIUM BROMIDE AND ALBUTEROL SULFATE 3 ML: .5; 3 SOLUTION RESPIRATORY (INHALATION) at 13:49

## 2019-12-06 RX ADMIN — NICOTINE 1 PATCH: 21 PATCH TRANSDERMAL at 10:00

## 2019-12-06 RX ADMIN — ENOXAPARIN SODIUM 40 MG: 40 INJECTION SUBCUTANEOUS at 19:30

## 2019-12-06 RX ADMIN — FAMOTIDINE 20 MG: 10 INJECTION, SOLUTION INTRAVENOUS at 21:54

## 2019-12-06 RX ADMIN — IPRATROPIUM BROMIDE AND ALBUTEROL SULFATE 3 ML: .5; 3 SOLUTION RESPIRATORY (INHALATION) at 19:12

## 2019-12-06 RX ADMIN — BUDESONIDE 0.5 MG: 0.5 INHALANT RESPIRATORY (INHALATION) at 19:12

## 2019-12-06 NOTE — PROGRESS NOTES
[facility]    Name:  Viji Vargas   Age:  59 y.o.  Sex:  female  :  1960  MRN:  [id number]   Visit Number:  [id number]  Admission Date:  2019  Date Of Service:  19  Primary Care Physician:  Provider, No Known     LOS: 1 day :  Patient Care Team:  Provider, No Known as PCP - General:    Chief Complaint:      Hypotension, UTI    Subjective / Interval History:     Patient is a 59-year-old female with history significant for COPD and anoxic brain injury secondary to cardiac arrest with subsequent tracheostomy and PEG tube who presents from a nursing home secondary to hypotension and fever.  Patient is nonverbal and no family was present at the time of my exam.  Therefore subjective history cannot be obtained.  RT nursing staff report copious secretions.  Otherwise, no acute events from overnight.    Review of Systems:     Unable to obtain    Vital Signs:    Temp:  [99.1 °F (37.3 °C)-102.2 °F (39 °C)] 99.5 °F (37.5 °C)  Heart Rate:  [] 102  Resp:  [18-22] 20  BP: ()/(40-86) 110/86    Intake and output:    I/O last 3 completed shifts:  In: 1150 [IV Piggyback:1150]  Out: 600 [Urine:600]  No intake/output data recorded.    Physical Examination:    General Appearance:  Alert and cooperative, not in any acute distress.   Head:  Atraumatic and normocephalic, without obvious abnormality.   Eyes:          PERRLA, conjunctivae and sclerae normal, no Icterus. No pallor. .   Neck: Supple, trachea midline, tracheostomy   Lungs:   Chest shape is normal. Breath sounds heard bilaterally equally.  No crackles or wheezing.    Heart:  Normal S1 and S2, no murmur, No JVD   Abdomen:   Normal bowel sounds,  Soft,  non-distended, PEG tube present   Extremities: no edema, no cyanosis, no clubbing.   Skin: No bleeding, bruising or rash.   Neurologic:  Nonverbal, unable to follow commands.     Laboratory results:    Results from last 7 days   Lab Units  12/06/19  0558 12/05/19  1515   SODIUM mmol/L 139 135*   POTASSIUM mmol/L 4.1 4.3   CHLORIDE mmol/L 103 102   CO2 mmol/L 21.1* 21.2*   BUN mg/dL 15 23   CREATININE mg/dL 0.39* 0.40*   CALCIUM mg/dL 9.7 8.9   BILIRUBIN mg/dL  --  0.3   ALK PHOS U/L  --  70   ALT (SGPT) U/L  --  28   AST (SGOT) U/L  --  15   GLUCOSE mg/dL 103* 124*     Results from last 7 days   Lab Units 12/06/19  0558 12/05/19  1515   WBC 10*3/mm3 14.09* 14.55*   HEMOGLOBIN g/dL 10.4* 9.6*   HEMATOCRIT % 32.4* 30.4*   PLATELETS 10*3/mm3 261 262     Results from last 7 days   Lab Units 12/05/19  1515   INR  1.05         Results from last 7 days   Lab Units 12/05/19  1724 12/05/19  1316   URINECX   --  Growth present, too young to evaluate   MRSA SCREEN CX  No Methicillin Resistant Staphylococcus aureus isolated  --        I have reviewed the patient's laboratory results.    Radiology results:    Imaging Results (Last 24 Hours)     Procedure Component Value Units Date/Time    XR Chest 1 View [[id number]] Collected:  12/05/19 1356     Updated:  12/05/19 1359    Narrative:       PROCEDURE: XR CHEST 1 VW-     HISTORY: Severe Sepsis Protocol     COMPARISON: 03/25/2019.     FINDINGS: The patient is status post tracheostomy placement. The heart  is normal in size. The mediastinum is unremarkable. There are low lung  volumes, however there are no focal opacities were effusions. There is  no pneumothorax.  There are no acute osseous abnormalities.       Impression:       No acute cardiopulmonary process.     Continued followup is recommended.     This report was finalized on 12/5/2019 1:57 PM by Zeyad Wahl M.D..          I have reviewed the patient's radiology reports.    Medication Review:     I have reviewed the patients active and prn medications.       Sepsis (CMS/HCC)      Assessment:    UTI, POA  History of anoxic encephalopathy status post tracheostomy and PEG  History of cardiac arrest  COPD    Plan:    Continue to monitor patient on  telemetry.  Hypotension has resolved with IV fluid administration.  Continue with tube feeds per dietary recommendations.  Also continue with Rocephin for UTI.  Tylenol for fever.  Patient has had thick copious secretions.  We will continue with deep suctioning.  Will follow up on respiratory sputum culture results.  MRSA surveillance screen was negative.  Anticipate discharge within 1 to 2 days.    Walter Reese,   12/06/19  12:48 PM    Dictated utilizing Dragon dictation.

## 2019-12-06 NOTE — PLAN OF CARE
Problem: Fall Risk (Adult)  Goal: Identify Related Risk Factors and Signs and Symptoms  Outcome: Outcome(s) achieved Date Met: 12/06/19    Goal: Absence of Fall  Outcome: Ongoing (interventions implemented as appropriate)      Problem: Skin Injury Risk (Adult)  Goal: Identify Related Risk Factors and Signs and Symptoms  Outcome: Outcome(s) achieved Date Met: 12/06/19    Goal: Skin Health and Integrity  Outcome: Ongoing (interventions implemented as appropriate)      Problem: Urinary Tract Infection (Adult)  Goal: Signs and Symptoms of Listed Potential Problems Will be Absent, Minimized or Managed (Urinary Tract Infection)  Outcome: Ongoing (interventions implemented as appropriate)      Problem: Patient Care Overview  Goal: Plan of Care Review  Outcome: Ongoing (interventions implemented as appropriate)   12/05/19 2100   Coping/Psychosocial   Plan of Care Reviewed With patient   Plan of Care Review   Progress no change   OTHER   Outcome Summary new admit per 7A LARRY Rosario right before shift change- scheduled IV antibiotics per hospitalist's orders-monitor labs and continue to monitor patient-trach care per RT Colby Welch     Goal: Individualization and Mutuality  Outcome: Ongoing (interventions implemented as appropriate)    Goal: Discharge Needs Assessment  Outcome: Ongoing (interventions implemented as appropriate)    Goal: Interprofessional Rounds/Family Conf  Outcome: Ongoing (interventions implemented as appropriate)

## 2019-12-06 NOTE — PROGRESS NOTES
Discharge Planning Assessment  [facility]     Patient Name: Viji Vargas  MRN: [id number]  Today's Date: 12/6/2019    Admit Date: 12/5/2019    Discharge Needs Assessment     Row Name 12/06/19 0946       Living Environment    Lives With  facility resident    Current Living Arrangements  extended care facility LTC resident [facility]    Duration at Residence  [facility]    Potentially Unsafe Housing Conditions  -- no issues    Family Caregiver if Needed  none    Able to Return to Prior Arrangements  yes    Living Arrangement Comments  LTC resident of [facility] LT resident [facility]       Resource/Environmental Concerns    Resource/Environmental Concerns  none    Transportation Concerns  other (see comments) EMS transport        Transition Planning    Patient/Family Anticipates Transition to  long term care facility    Patient/Family Anticipated Services at Transition  none    Transportation Anticipated  public transportation EMS       Discharge Needs Assessment    Readmission Within the Last 30 Days  no previous admission in last 30 days    Equipment Currently Used at Home  oxygen;respiratory supplies;feeding device;suction equipment;trach supplies;nutrition supplies;colostomy/ostomy supplies    Anticipated Changes Related to Illness  inability to care for self    Outpatient/Agency/Support Group Needs  skilled nursing facility    Discharge Facility/Level of Care Needs  nursing facility, intermediate    Patient's Choice of Community Agency(s)  patient terryrent resident of [facility]        Discharge Plan     Row Name 12/06/19 0951       Plan    Plan  return ot [facility]    Plan Comments  Patient is a resident of [facility]  they will take her back,  patient has   trach  oxygen 1 liter continuous,  Peg tube  with feedings,    total care,  bed fast,    patient unalble to answer questions,   will need EMS  transpport to return to [facility], no advance directives,,, no POA on file        Destination      No service coordination in this encounter.      Durable Medical Equipment      No service coordination in this encounter.      Dialysis/Infusion      No service coordination in this encounter.      Home Medical Care      No service coordination in this encounter.      Therapy      No service coordination in this encounter.      Community Resources      No service coordination in this encounter.          Demographic Summary     Row Name 12/06/19 0942       General Information    Admission Type  inpatient    Referral Source  admission list    Reason for Consult  discharge planning    Preferred Language  English        Functional Status     Row Name 12/06/19 0942       Functional Status    Usual Activity Tolerance  poor    Current Activity Tolerance  poor    Functional Status Comments  -- patient total care bed fast       Functional Status, IADL    Medications  completely dependent    Meal Preparation  completely dependent    Housekeeping  completely dependent    Laundry  completely dependent       Mental Status    General Appearance  well-kept, clean        Psychosocial    No documentation.       Abuse/Neglect    No documentation.       Legal     Row Name 12/06/19 0945       Financial/Legal    Financial/Environmental Concerns  -- LTC resident [facility] and rehab    Who Manages Finances if Patient Unable  Aamir Vargas spouse        Substance Abuse    No documentation.       Patient Forms    No documentation.           Litzy Asher RN

## 2019-12-06 NOTE — CONSULTS
Adult Nutrition  Assessment/PES    Patient Name:  Viji Vargas  YOB: 1960  MRN: [id number]  Admit Date:  12/5/2019    Assessment Date:  12/6/2019    Comments:  RD consulted for tube feeding recommendations. Initiate tube feeding once medically feasible.   Rec #1: Peptamen AF @ 25mL/hr advancing 10mL/hr every 8 hours to goal rate of 56 mL/hr. TF to provide 1478 kcals, 93 grams protein, and 1000 mL tube feeding water.   Rec #2: Free water flushes 80 mL Q4 hours or six times daily.   Rec #3: Consider multivitamin with minerals daily.    RD to follow pt and adjust tube feeding based on tolerance and residuals. Consult RD PRN.       Reason for Assessment     Row Name 12/06/19 0915          Reason for Assessment    Reason For Assessment  diagnosis/disease state;TF/PN     Diagnosis  cardiac disease;pulmonary disease;infection/sepsis COPD, Hypotension, UTI, Anoxic brain injury     Identified At Risk by Screening Criteria  difficulty chewing/swallowing;MST SCORE 2+             Labs/Tests/Procedures/Meds     Row Name 12/06/19 0916          Labs/Procedures/Meds    Lab Results Reviewed  reviewed, pertinent     Lab Results Comments  Low: Cr, Alb, Procal High: Gluc        Medications    Pertinent Medications Reviewed  reviewed         Physical Findings     Row Name 12/06/19 0917          Physical Findings    Overall Physical Appearance  overweight         Estimated/Assessed Needs     Row Name 12/06/19 0917          Calculation Measurements    Weight Used For Calculations  50.9 kg (112 lb 2.5 oz) Adjusted BW        Estimated/Assessed Needs    Additional Documentation  Fluid Requirements (Group);[place]-St. Jeor Equation (Group);Protein Requirements (Group);Calorie Requirements (Group)        Calorie Requirements    Estimated Calorie Need Method  [place]-St Jeor     Estimated Calorie Requirement Comment  1300 - 1500 AF 1.3        [place]-St. Jeor Equation    RMR ([place]-St. Jeor Equation)  995.25        Protein  Requirements    Weight Used For Protein Calculations  50.9 kg (112 lb 2.5 oz) Adjusted BW     Est Protein Requirement Amount (gms/kg)  1.5 gm protein 62 - 76 gm     Estimated Protein Requirements (gms/day)  76.31        Fluid Requirements    Estimated Fluid Requirement Method  Ever-Segar Formula     Ever-Segar Method (over 20 kg)  2517.5         Nutrition Prescription Ordered     Row Name 12/06/19 0919          Nutrition Prescription PO    Current PO Diet  NPO         Evaluation of Received Nutrient/Fluid Intake     Row Name 12/06/19 0919          PO Evaluation    Number of Days PO Intake Evaluated  Insufficient Data Pt NPO with PEG               Problem/Interventions:  Problem 1     Row Name 12/06/19 0919          Nutrition Diagnoses Problem 1    Problem 1  Needs Alternate Route     Etiology (related to)  Medical Diagnosis     Neurological  Dysphagia     Signs/Symptoms (evidenced by)  Other (comment) Difficulty swallowing/PEG in place         Problem 2     Row Name 12/06/19 0920          Nutrition Diagnoses Problem 2    Problem 2  Increased Nutrient Needs     Macronutrient  Protein;Kcal     Etiology (related to)  Medical Diagnosis     Pulmonary/Critical Care  COPD     Signs/Symptoms (evidenced by)  Other (comment) Pulmonary dysfunction             Intervention Goal     Row Name 12/06/19 0920          Intervention Goal    General  Meet nutritional needs for age/condition     PO  Other (comment) Continue NPO diet order as pt receives EN     TF/PN  Inititiate TF/PN     Weight  Maintain weight         Nutrition Intervention     Row Name 12/06/19 0921          Nutrition Intervention    RD/Tech Action  Follow Tx progress;Recommend/ordered     Recommended/Ordered  EN         Nutrition Prescription     Row Name 12/06/19 0921          Nutrition Prescription PO    PO Prescription  Other (comment) Continue NPO diet order with EN via PEG     New PO Prescription Ordered?  No, recommended        Nutrition Prescription EN     Enteral Prescription  Enteral begin/change     Enteral Route  PEG     Product  Peptamen AF     TF Delivery Method  Continuous     Continuous TF Goal Rate (mL/hr)  56 mL/hr     Continuous TF Starting Rate (mL/hr)  25 mL/hr     Continuous TF Goal Volume (mL)  1232 mL     Continuous TF Starting Volume (mL)  550 mL     Water flush (mL)   80 mL     Water Flush Frequency  Every 4 hours     New EN Prescription Ordered?  Yes        Other Orders    Obtain Weight  Daily     Obtain Weight Ordered?  No, recommended     Supplement  Vitamin mineral supplement     Supplement Ordered?  No, recommended         Education/Evaluation     Row Name 12/06/19 0923          Education    Education  Education not appropriate at this time     Please explain  Patient nonverbal;Other (comment) Pt NH resident, receiving EN via PEG        Monitor/Evaluation    Monitor  Per protocol;I&O;Pertinent labs;TF delivery/tolerance;Weight;Skin status           Electronically signed by:  Flor Matias RD  12/06/19 9:26 AM

## 2019-12-07 VITALS
OXYGEN SATURATION: 95 % | RESPIRATION RATE: 22 BRPM | DIASTOLIC BLOOD PRESSURE: 53 MMHG | HEART RATE: 108 BPM | WEIGHT: 147.6 LBS | TEMPERATURE: 97.8 F | BODY MASS INDEX: 28.98 KG/M2 | HEIGHT: 60 IN | SYSTOLIC BLOOD PRESSURE: 95 MMHG

## 2019-12-07 LAB
ACINETOBACTER SCREEN CX: NORMAL
ANION GAP SERPL CALCULATED.3IONS-SCNC: 13.7 MMOL/L (ref 5–15)
BACTERIA SPEC RESP CULT: ABNORMAL
BACTERIA SPEC RESP CULT: ABNORMAL
BASOPHILS # BLD AUTO: 0.04 10*3/MM3 (ref 0–0.2)
BASOPHILS NFR BLD AUTO: 0.3 % (ref 0–1.5)
BUN BLD-MCNC: 11 MG/DL (ref 8–23)
BUN/CREAT SERPL: 28.2 (ref 7–25)
CALCIUM SPEC-SCNC: 9.8 MG/DL (ref 8.6–10.5)
CHLORIDE SERPL-SCNC: 99 MMOL/L (ref 98–107)
CO2 SERPL-SCNC: 22.3 MMOL/L (ref 22–29)
CREAT BLD-MCNC: 0.39 MG/DL (ref 0.57–1)
DEPRECATED RDW RBC AUTO: 47.6 FL (ref 37–54)
EOSINOPHIL # BLD AUTO: 0.31 10*3/MM3 (ref 0–0.4)
EOSINOPHIL NFR BLD AUTO: 2.5 % (ref 0.3–6.2)
ERYTHROCYTE [DISTWIDTH] IN BLOOD BY AUTOMATED COUNT: 13.1 % (ref 12.3–15.4)
GFR SERPL CREATININE-BSD FRML MDRD: >150 ML/MIN/1.73
GLUCOSE BLD-MCNC: 112 MG/DL (ref 65–99)
GLUCOSE BLDC GLUCOMTR-MCNC: 130 MG/DL (ref 70–130)
GRAM STN SPEC: ABNORMAL
HCT VFR BLD AUTO: 31.1 % (ref 34–46.6)
HGB BLD-MCNC: 10.3 G/DL (ref 12–15.9)
IMM GRANULOCYTES # BLD AUTO: 0.05 10*3/MM3 (ref 0–0.05)
IMM GRANULOCYTES NFR BLD AUTO: 0.4 % (ref 0–0.5)
LYMPHOCYTES # BLD AUTO: 2.28 10*3/MM3 (ref 0.7–3.1)
LYMPHOCYTES NFR BLD AUTO: 18.7 % (ref 19.6–45.3)
MCH RBC QN AUTO: 32.8 PG (ref 26.6–33)
MCHC RBC AUTO-ENTMCNC: 33.1 G/DL (ref 31.5–35.7)
MCV RBC AUTO: 99 FL (ref 79–97)
MONOCYTES # BLD AUTO: 1.03 10*3/MM3 (ref 0.1–0.9)
MONOCYTES NFR BLD AUTO: 8.5 % (ref 5–12)
NEUTROPHILS # BLD AUTO: 8.46 10*3/MM3 (ref 1.7–7)
NEUTROPHILS NFR BLD AUTO: 69.6 % (ref 42.7–76)
NRBC BLD AUTO-RTO: 0 /100 WBC (ref 0–0.2)
PLATELET # BLD AUTO: 286 10*3/MM3 (ref 140–450)
PMV BLD AUTO: 12.1 FL (ref 6–12)
POTASSIUM BLD-SCNC: 3.6 MMOL/L (ref 3.5–5.2)
RBC # BLD AUTO: 3.14 10*6/MM3 (ref 3.77–5.28)
SODIUM BLD-SCNC: 135 MMOL/L (ref 136–145)
VRE SPEC QL CULT: NORMAL
WBC NRBC COR # BLD: 12.17 10*3/MM3 (ref 3.4–10.8)

## 2019-12-07 PROCEDURE — 85025 COMPLETE CBC W/AUTO DIFF WBC: CPT | Performed by: INTERNAL MEDICINE

## 2019-12-07 PROCEDURE — 25010000002 AZITHROMYCIN: Performed by: INTERNAL MEDICINE

## 2019-12-07 PROCEDURE — 99239 HOSP IP/OBS DSCHRG MGMT >30: CPT | Performed by: INTERNAL MEDICINE

## 2019-12-07 PROCEDURE — 94799 UNLISTED PULMONARY SVC/PX: CPT

## 2019-12-07 PROCEDURE — 80048 BASIC METABOLIC PNL TOTAL CA: CPT | Performed by: INTERNAL MEDICINE

## 2019-12-07 PROCEDURE — 25010000002 CEFTRIAXONE SODIUM-DEXTROSE 1-3.74 GM-%(50ML) RECONSTITUTED SOLUTION: Performed by: INTERNAL MEDICINE

## 2019-12-07 PROCEDURE — 82962 GLUCOSE BLOOD TEST: CPT

## 2019-12-07 RX ORDER — CEFUROXIME AXETIL 250 MG/1
250 TABLET ORAL 2 TIMES DAILY
Qty: 8 TABLET | Refills: 0 | Status: SHIPPED | OUTPATIENT
Start: 2019-12-07 | End: 2019-12-11

## 2019-12-07 RX ORDER — AZITHROMYCIN 500 MG/1
500 TABLET, FILM COATED ORAL DAILY
Qty: 2 TABLET | Refills: 0 | Status: SHIPPED | OUTPATIENT
Start: 2019-12-07 | End: 2019-12-09

## 2019-12-07 RX ADMIN — SODIUM CHLORIDE, PRESERVATIVE FREE 10 ML: 5 INJECTION INTRAVENOUS at 09:11

## 2019-12-07 RX ADMIN — NICOTINE 1 PATCH: 21 PATCH TRANSDERMAL at 09:11

## 2019-12-07 RX ADMIN — BUDESONIDE 0.5 MG: 0.5 INHALANT RESPIRATORY (INHALATION) at 07:20

## 2019-12-07 RX ADMIN — LISINOPRIL 20 MG: 20 TABLET ORAL at 09:11

## 2019-12-07 RX ADMIN — CHLORHEXIDINE GLUCONATE 0.12% ORAL RINSE 15 ML: 1.2 LIQUID ORAL at 09:11

## 2019-12-07 RX ADMIN — IPRATROPIUM BROMIDE AND ALBUTEROL SULFATE 3 ML: .5; 3 SOLUTION RESPIRATORY (INHALATION) at 07:20

## 2019-12-07 RX ADMIN — CEFTRIAXONE 1 G: 1 INJECTION, SOLUTION INTRAVENOUS at 00:00

## 2019-12-07 RX ADMIN — FAMOTIDINE 20 MG: 10 INJECTION, SOLUTION INTRAVENOUS at 09:11

## 2019-12-07 RX ADMIN — AZITHROMYCIN 500 MG: 500 INJECTION, POWDER, LYOPHILIZED, FOR SOLUTION INTRAVENOUS at 11:18

## 2019-12-07 RX ADMIN — IPRATROPIUM BROMIDE AND ALBUTEROL SULFATE 3 ML: .5; 3 SOLUTION RESPIRATORY (INHALATION) at 00:41

## 2019-12-07 NOTE — PROGRESS NOTES
Case Management Discharge Note      Final Note: [facility]            Destination - Selection Complete      Service Provider Request Status Selected Services Address Phone Number Fax Number    [facility] Selected Intermediate Care [address] [place] [address] [phone]      Durable Medical Equipment      No service has been selected for the patient.      Dialysis/Infusion      No service has been selected for the patient.      Home Medical Care      No service has been selected for the patient.      Therapy      No service has been selected for the patient.      Community Resources      No service has been selected for the patient.             Final Discharge Disposition Code: 04 - intermediate care facility

## 2019-12-07 NOTE — NURSING NOTE
Increase tube feeding rate to 45mL/hr at 0500 as patient has had 0 residuals each residual check. Patient has appeared to be tolerating well at this time.  Discussed with oncoming nurse.

## 2019-12-07 NOTE — PLAN OF CARE
Patient experiencing copious amounts of secretions from tracheostomy.   No complications from PEG and continuous tube feedings. Unable to follow 1 step directions or verbalize any.  Contractures continue, repositioned and reapplied pillows often.

## 2019-12-07 NOTE — DISCHARGE SUMMARY
[facility]   DISCHARGE SUMMARY      Name:  Viji Vargas   Age:  59 y.o.  Sex:  female  :  1960  MRN:  [id number]   Visit Number:  [id number]    Admission Date:  2019  Date of Discharge:  2019  Primary Care Physician:  Provider, No Known        Discharge Diagnoses:       UTI, POA  Hypotension    Sepsis (CMS/[facility])      Presenting Problem:    Sepsis, due to unspecified organism, unspecified whether acute organ dysfunction present (CMS/[facility]) [A41.9]     Consults:     Consults     No orders found from 2019 to 2019.        Consulting Physician(s)             None            Procedures Performed:           History of presenting illness:    Patient is a 59-year-old female with history significant for COPD and anoxic brain injury secondary to cardiac arrest with subsequent tracheostomy and PEG tube who presents from a nursing home secondary to hypotension and fever.  Patient is nonverbal and no family was present at the time of my exam.  Therefore history has been obtained from previous hospital documentation.  Chart review reveals that patient was seen in 2019 for cardiac arrest with subsequent anoxic brain injury.  Dr. Bradford was consulted and felt that patient had a grave prognosis with anoxic encephalopathy which was confirmed on EEG. 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  Patient also underwent PEG and tracheostomy during the hospitalization.  Dr. dennis Maldonado was consulted and met with family.  They expressed that they wanted to proceed with full code and aggressive care.  Patient was then transferred to LTAC at [facility].  Today patient was brought in for reported fever of T-max 100.3.  Blood pressure was reportedly 80s over 50s at the nursing home.  In the emergency room, patient's blood pressure recovered after 2 L IV bolus of normal saline.  Her oxygen saturation  remained appropriate and she was deep suctioned as she did have copious secretions from tracheostomy.  Chest x-ray was without acute process.  Urinalysis did show evidence of urinary tract infection.  Patient was preemptively started on Zosyn which will be transitioned to Rocephin on admission. Lactic acid was within normal limits.  Cultures obtained.    Hospital Course:    Patient was admitted and monitored on telemetry floor.  Based upon physical exam and further laboratory data, I do not believe that patient is truly septic at this time.  I believe that some of her hypotension could have been related to the anoxic brain injury and patient received aggressive IV volume rehydration with normal saline.  Nutrition was consulted for their tube feed recommendations and tube feeds were started.  Urinalysis showed urinary tract infection for which patient was started on Rocephin.  At the course of her hospitalization, patient required minimal supplemental oxygen.  However she did require extensive deep suctioning for copious tracheostomy secretions.  We will need to continue tracheostomy suctioning every hour.  I did add on azithromycin to cover for any potential atypical infection.  Patient will receive a 3-day course of azithromycin and a 5-day course of cefuroxime at the time of discharge.  Blood cultures were without growth.  Home medications were continued for her chronic conditions.  Patient was returned to the nursing home.    Vital Signs:    Temp:  [97.8 °F (36.6 °C)-99.3 °F (37.4 °C)] 97.8 °F (36.6 °C)  Heart Rate:  [101-118] 108  Resp:  [19-22] 22  BP: ()/(53-85) 95/53    Physical Exam:    General Appearance:   Nonverbal,   Head:  Atraumatic and normocephalic, without obvious abnormality.   Eyes:          PERRLA, conjunctivae and sclerae normal, no Icterus. No pallor.            Neck: Supple, trachea midline, tracheostomy with copious secretions       Lungs:   Chest shape is normal. Breath sounds heard  bilaterally equally.  No crackles or wheezing.    Heart:  Normal S1 and S2, no murmur,  No JVD.   Abdomen:   Normal bowel sounds,  Soft, non-tender, non-distended, no guarding, no rebound tenderness.   Extremities: no edema, no cyanosis, no clubbing.   Pulses: Pulses palpable and equal bilaterally.   Skin: No bleeding, bruising or rash.       Neurologic: Nonverbal and unable to follow commands     Pertinent Lab Results:     Results from last 7 days   Lab Units 12/07/19  0548 12/06/19  0558 12/05/19  1515   SODIUM mmol/L 135* 139 135*   POTASSIUM mmol/L 3.6 4.1 4.3   CHLORIDE mmol/L 99 103 102   CO2 mmol/L 22.3 21.1* 21.2*   BUN mg/dL 11 15 23   CREATININE mg/dL 0.39* 0.39* 0.40*   CALCIUM mg/dL 9.8 9.7 8.9   BILIRUBIN mg/dL  --   --  0.3   ALK PHOS U/L  --   --  70   ALT (SGPT) U/L  --   --  28   AST (SGOT) U/L  --   --  15   GLUCOSE mg/dL 112* 103* 124*     Results from last 7 days   Lab Units 12/07/19  0548 12/06/19  0558 12/05/19  1515   WBC 10*3/mm3 12.17* 14.09* 14.55*   HEMOGLOBIN g/dL 10.3* 10.4* 9.6*   HEMATOCRIT % 31.1* 32.4* 30.4*   PLATELETS 10*3/mm3 286 261 262     Results from last 7 days   Lab Units 12/05/19  1515   INR  1.05                         Results from last 7 days   Lab Units 12/05/19  1316   COLOR UA  Yellow   GLUCOSE UA  Negative   KETONES UA  Negative   LEUKOCYTES UA  Large (3+)*   PH, URINE  7.5   BILIRUBIN UA  Negative   UROBILINOGEN UA  0.2 E.U./dL     Pain Management Panel     Pain Management Panel Latest Ref Rng & Units 3/10/2019    AMPHETAMINES SCREEN, URINE Negative Negative    BARBITURATES SCREEN Negative Negative    BENZODIAZEPINE SCREEN, URINE Negative Negative    BUPRENORPHINEUR Negative Positive(A)    COCAINE SCREEN, URINE Negative Negative    METHADONE SCREEN, URINE Negative Negative    METHAMPHETAMINEUR Negative Negative        Results from last 7 days   Lab Units 12/05/19  1724 12/05/19  1523 12/05/19  1326 12/05/19  1316   BLOODCX   --  No growth at 24 hours No growth at 24  hours  --    URINECX   --   --   --  50,000 CFU/mL Gram Positive Cocci*   MRSA SCREEN CX  No Methicillin Resistant Staphylococcus aureus isolated  --   --   --        Pertinent Radiology Results:    Imaging Results (All)     Procedure Component Value Units Date/Time    XR Chest 1 View [[id number]] Collected:  12/05/19 1356     Updated:  12/05/19 1359    Narrative:       PROCEDURE: XR CHEST 1 VW-     HISTORY: Severe Sepsis Protocol     COMPARISON: 03/25/2019.     FINDINGS: The patient is status post tracheostomy placement. The heart  is normal in size. The mediastinum is unremarkable. There are low lung  volumes, however there are no focal opacities were effusions. There is  no pneumothorax.  There are no acute osseous abnormalities.       Impression:       No acute cardiopulmonary process.     Continued followup is recommended.     This report was finalized on 12/5/2019 1:57 PM by Zeyad Wahl M.D..          Condition on Discharge:      Stable.    Code status during the hospital stay:    Code Status and Medical Interventions:   Ordered at: 12/05/19 1611     Code Status:    CPR     Medical Interventions (Level of Support Prior to Arrest):    Full       Discharge Disposition:    Skilled Nursing Facility (DC - External)    Discharge Medications:       Discharge Medications      New Medications      Instructions Start Date   azithromycin 500 MG tablet  Commonly known as:  ZITHROMAX   500 mg, Oral, Daily      cefuroxime 250 MG tablet  Commonly known as:  CEFTIN   250 mg, Oral, 2 Times Daily         Changes to Medications      Instructions Start Date   budesonide 0.5 MG/2ML nebulizer solution  Commonly known as:  PULMICORT  What changed:    · how much to take  · when to take this   0.5 mg, Nebulization, 2 Times Daily - RT      chlorhexidine 0.12 % solution  Commonly known as:  PERIDEX  What changed:  when to take this   15 mL, Mouth/Throat, Every 12 Hours Scheduled      hydrALAZINE 10 MG tablet  Commonly known as:   APRESOLINE  What changed:  Another medication with the same name was removed. Continue taking this medication, and follow the directions you see here.   20 mg, Per G Tube, Every 4 Hours PRN      ondansetron 4 MG tablet  Commonly known as:  ZOFRAN  What changed:  Another medication with the same name was removed. Continue taking this medication, and follow the directions you see here.   4 mg, Per G Tube, Every 6 Hours PRN         Continue These Medications      Instructions Start Date   acetaminophen 500 MG tablet  Commonly known as:  TYLENOL   500 mg, Per G Tube, Every 4 Hours PRN      acetaminophen 325 MG tablet  Commonly known as:  TYLENOL   650 mg, Oral, Every 4 Hours PRN      acetylcysteine 10 % nebulizer solution  Commonly known as:  MUCOMYST   4 mL, Nebulization, Every 6 Hours PRN      acetylcysteine 20 % syringe  Commonly known as:  MUCOMYST   4 mL, Nebulization, Every 6 Hours PRN      albuterol (2.5 MG/3ML) 0.083% nebulizer solution  Commonly known as:  PROVENTIL   2.5 mg, Nebulization, Every 6 Hours PRN      ALPRAZolam 0.5 MG tablet  Commonly known as:  XANAX   0.5 mg, Oral, Every 6 Hours      baclofen 10 MG tablet  Commonly known as:  LIORESAL   10 mg, Oral, Every 8 Hours      bisacodyl 10 MG suppository  Commonly known as:  DULCOLAX   10 mg, Rectal, Daily PRN      carvedilol 12.5 MG tablet  Commonly known as:  COREG   12.5 mg, Per G Tube, 2 Times Daily      CENTRUM FLAVOR BURST DRINK PO   175 mL, Per G Tube, Daily      famotidine 20 MG tablet  Commonly known as:  PEPCID   20 mg, Per G Tube, 2 Times Daily      Fleets enema enema   1 enema, Rectal, Daily PRN      hyoscyamine sulfate 0.125 MG tablet dispersible disintegrating tablet  Commonly known as:  ANASPAZ   125 mcg, Per G Tube, 2 Times Daily      ipratropium-albuterol 0.5-2.5 mg/3 ml nebulizer  Commonly known as:  DUO-NEB   3 mL, Nebulization, Every 6 Hours PRN, 1 unit every 6 hours PRN       ipratropium-albuterol 0.5-2.5 mg/3 ml nebulizer  Commonly  known as:  DUO-NEB   3 mL, Nebulization, Every 6 Hours - RT      lisinopril 20 MG tablet  Commonly known as:  PRINIVIL,ZESTRIL   20 mg, Per G Tube, Daily      mineral oil enema   1 enema, Rectal, Once, Daily as needed per facility MAR       nicotine 21 MG/24HR patch  Commonly known as:  NICODERM CQ   1 patch, Transdermal, Every 24 Hours Scheduled         Stop These Medications    LORazepam 2 MG/ML injection  Commonly known as:  ATIVAN            Discharge Diet:     Diet Instructions     NPO  Tube feeding               Activity at Discharge:     Activity Instructions     Bedrest               Follow-up Appointments:    Additional Instructions for the Follow-ups that You Need to Schedule     Discharge Follow-up with PCP   As directed       Currently Documented PCP:    Provider, No Known    PCP Phone Number:    [phone]     Follow Up Details:  1 week            Contact information for follow-up providers     Provider, No Known .    Why:  1 week  Contact information:  [facility] [id number] [phone]                   Contact information for after-discharge care     Destination     [facility] .    Service:  Intermediate Care  Contact information:  [address]  [facility] [address] [phone]                             Future Appointments   Date Time Provider Department Center   12/11/2019  8:00 AM Emperatriz Greene MD [facility] None       Additional Instructions for the Follow-ups that You Need to Schedule     Discharge Follow-up with PCP   As directed       Currently Documented PCP:    Provider, No Known    PCP Phone Number:    [phone]     Follow Up Details:  1 week               Test Results Pending at Discharge:     Order Current Status    Green Top (No Gel) In process    [place] Draw In process    Acinetobacter Screen - Swab, Axilla, Left Preliminary result    Blood Culture - Blood, Blood, Central Line Preliminary result    Blood Culture -  Blood, Hand, Left Preliminary result    Urine Culture - Urine, Urine, Catheter Preliminary result    VRE Culture - Swab, Per Rectum Preliminary result             Walter Reese DO  12/07/19  1:59 PM    Time spent: Greater than 30 minutes    Dictated utilizing Dragon dictation.

## 2019-12-07 NOTE — PLAN OF CARE
Patient ST with frequent PVC's. Nonverbal. No signs of distress noted. Trach collar with oxygenation of 30%. Severe contractures to all extremities. PEG tube feeding stopped for transport to [facility].

## 2019-12-08 LAB — BACTERIA SPEC AEROBE CULT: ABNORMAL

## 2019-12-09 DIAGNOSIS — J42 CHRONIC BRONCHITIS, UNSPECIFIED CHRONIC BRONCHITIS TYPE (HCC): Primary | ICD-10-CM

## 2019-12-09 RX ORDER — ALPRAZOLAM 0.5 MG/1
0.5 TABLET ORAL EVERY 6 HOURS
Qty: 120 TABLET | Refills: 1 | Status: SHIPPED | OUTPATIENT
Start: 2019-12-09 | End: 2020-02-13 | Stop reason: SDUPTHER

## 2019-12-10 LAB
BACTERIA SPEC AEROBE CULT: NORMAL
BACTERIA SPEC AEROBE CULT: NORMAL

## 2019-12-11 ENCOUNTER — NURSING HOME (OUTPATIENT)
Dept: INTERNAL MEDICINE | Facility: CLINIC | Age: 59
End: 2019-12-11

## 2019-12-11 DIAGNOSIS — G93.1 ANOXIC ENCEPHALOPATHY (HCC): Primary | ICD-10-CM

## 2019-12-11 DIAGNOSIS — J39.8 INCREASED TRACHEAL SECRETIONS: ICD-10-CM

## 2019-12-11 PROCEDURE — 99310 SBSQ NF CARE HIGH MDM 45: CPT | Performed by: INTERNAL MEDICINE

## 2019-12-12 ENCOUNTER — NURSING HOME (OUTPATIENT)
Dept: INTERNAL MEDICINE | Facility: CLINIC | Age: 59
End: 2019-12-12

## 2019-12-12 VITALS
OXYGEN SATURATION: 98 % | TEMPERATURE: 98.3 F | BODY MASS INDEX: 28.83 KG/M2 | RESPIRATION RATE: 20 BRPM | DIASTOLIC BLOOD PRESSURE: 58 MMHG | HEART RATE: 77 BPM | HEIGHT: 60 IN | SYSTOLIC BLOOD PRESSURE: 102 MMHG

## 2019-12-12 DIAGNOSIS — N39.0 URINARY TRACT INFECTION WITHOUT HEMATURIA, SITE UNSPECIFIED: Primary | ICD-10-CM

## 2019-12-12 DIAGNOSIS — Z93.0 TRACHEOSTOMY IN PLACE (HCC): ICD-10-CM

## 2019-12-12 DIAGNOSIS — R13.12 OROPHARYNGEAL DYSPHAGIA: ICD-10-CM

## 2019-12-12 DIAGNOSIS — I10 ESSENTIAL HYPERTENSION: ICD-10-CM

## 2019-12-12 DIAGNOSIS — Z93.1 PEG (PERCUTANEOUS ENDOSCOPIC GASTROSTOMY) STATUS (HCC): ICD-10-CM

## 2019-12-12 PROCEDURE — 99309 SBSQ NF CARE MODERATE MDM 30: CPT | Performed by: PHYSICIAN ASSISTANT

## 2019-12-14 NOTE — PROGRESS NOTES
Nursing Home Progress Note        Kemal Shaw, DO ?  Liya Campbell, APRN ?  [address] [facility], [place] [address]  Phone: [phone]  Fax: [phone] Emperatriz Greene MD ?  Jamie Brown, DO ?  Noemy Garcia PA-C [x]   [address] [place] [address]  Phone: [phone]  Fax: [phone]     PATIENT NAME: Viji Vargas                                                                          YOB: 1960           DATE OF SERVICE: 12/5/2019  FACILITY:  [] [facility]   [x] [place]   [] [facility]   [] [facility]   [] Other ______________________________________________________________________     CHIEF COMPLAINT:  Hypotension/fever      HISTORY OF PRESENT ILLNESS:   Ms. Vargas is a 57 y/o female with PMH significant for cardiac arrest resulting in anoxic brain injury.  She is non-verbal and does not follow commands at baseline.  PEG and tracheostomy are in place.  Patient presents today with hypotension and fever.  Upon assessment blood pressure noted to be 80/60.  Patient does receive antihypertensives.  She is also febrile at 99.4.  Per respiratory, patient has tolerated intermittent suctioning and nebulizer treatments.  She does continue to have increased tracheal secretions this has improved with hyoscyamine.  No additional acute complaints reported.  CODE STATUS reviewed, patient is a full code.    PAST MEDICAL & SURGICAL HISTORY:   Past Medical History:   Diagnosis Date   • COPD (chronic obstructive pulmonary disease) (CMS/HCC)    • Hypertension    • Pneumonia       Past Surgical History:   Procedure Laterality Date   • HYSTERECTOMY      Partial    • TRACHEOSTOMY AND PEG TUBE INSERTION N/A 3/20/2019    Procedure: TRACHEOSTOMY AND PERCUTANEOUS ENDOSCOPIC GASTROSTOMY TUBE INSERTION;  Surgeon: Nadira Pandey MD;  Location: [facility];  Service: General         MEDICATIONS:  I have reviewed and reconciled the patients medication list in the patients chart at the skilled  nursing facility today.      ALLERGIES:  No Known Allergies      SOCIAL HISTORY:  Social History     Socioeconomic History   • Marital status:      Spouse name: Not on file   • Number of children: Not on file   • Years of education: Not on file   • Highest education level: Not on file   Tobacco Use   • Smoking status: Current Every Day Smoker     Packs/day: 1.00     Types: Electronic Cigarette, Cigarettes   Substance and Sexual Activity   • Alcohol use: Yes     Comment: wine    • Drug use: No       FAMILY HISTORY:  No family history on file.    REVIEW OF SYSTEMS:  Review of Systems   Unable to perform ROS: Patient nonverbal (comatose. )   Constitutional: Positive for fever.   Respiratory: Negative for cough and choking.         Tracheal secretions   Cardiovascular: Negative for leg swelling.   Gastrointestinal: Negative for diarrhea and vomiting.   Psychiatric/Behavioral: Negative for agitation.        PHYSICAL EXAMINATION:     VITAL SIGNS:  BP (!) 80/60   Pulse 104   Temp 99.4 °F (37.4 °C)   SpO2 94%     Physical Exam   Constitutional:  Non-toxic appearance. She appears ill. No distress.   Nonverbal.   HENT:   Head: Normocephalic and atraumatic.   Eyes: Pupils are equal, round, and reactive to light. Conjunctivae and EOM are normal.   Neck: Normal range of motion. Neck supple. No JVD present.   Trach in place, no exudate or erythema appreciated.  Clear secretions noted at trach site.    Cardiovascular: Normal rate, regular rhythm, normal heart sounds and intact distal pulses.   Pulmonary/Chest: Effort normal. No respiratory distress. She has no wheezes. She has rales (scattered. ).   Abdominal: Soft. She exhibits no distension. There is no tenderness.   PEG in place, no erythema or exudate.    Musculoskeletal: She exhibits no edema.   Does not move extremities. Contractures noted.    Neurological:   Patient is non verbal.    Skin: Capillary refill takes less than 2 seconds. She is not diaphoretic.   Skin  is hot to touch, tacky.     Psychiatric: She has a normal mood and affect. Her behavior is normal.   Nursing note and vitals reviewed.      RECORDS REVIEW:   N/A    ASSESSMENT     Diagnoses and all orders for this visit:    Fever, unspecified fever cause    Hypotension, unspecified hypotension type    PEG (percutaneous endoscopic gastrostomy) adjustment/replacement/removal (CMS/[facility])    Tracheostomy in place (CMS/[facility])      PLAN  Suspect infectious process.  Patient has multiple comorbidities which could contribute to UTI, pneumonia, or other infectious process.  Respiratory status stable.  RT at bedside preformed suction which improved secretions.  Hold all anti-hypertensives and sedating medications.  IV access established and normal saline fluid bolus initiated.  Vitals repeated after bolus which showed minimal improvement in blood pressure.  Discussed patient condition with daughter on phone.  Aggressive measures are requested as well as transport to the hospital.  Arrange transport to [facility]- via ambulance.           [x]  Discussed Patient in detail with nursing/staff, addressed all needs today.     [x]  Plan of Care Reviewed   []  PT/OT Reviewed   []  Order Changes  []  Discharge Plans Reviewed  [x]  Advance Directive on file with Nursing Home.   [x]  POA on file with Nursing Home.    [x]  Code Status listed:  [x]  Full Code   []  DNR         Noemy Garcia PA-C.  12/14/2019

## 2019-12-17 NOTE — PROGRESS NOTES
Nursing Home Progress Note      Kemal Shaw, DO ?  Liya Campbell, APRN ?  [address] [place] [address]  Phone: [phone]  Fax: [phone] Emperatriz Greene MD[x]  Jamie Brown, DO ?   SHANIKA Blanco ?    [address] [place] [address]  Phone: [phone]  Fax: [phone]     PATIENT NAME: Viji Vargas                                                                          YOB: 1960           DATE OF SERVICE: 09/11/2019  FACILITY:   [] [place] [x] [place]  [] [place]    [] [place]      ______________________________________________________________________    CHIEF COMPLAINT:  Post re-hospitalization, acute urinary tract infection      HISTORY OF PRESENT ILLNESS:   Mrs. Clemente is a 58 years old female who was initially hospitalized at [facility] after sustaining cardiac arrest with successful resuscitation and mechanical ventilation.  COPD exacerbation and pneumonia were felt to be culprit.  Unfortunately, patient suffered significant anoxic encephalopathy secondary to above.  Subsequently, patient underwent PEG and tracheostomy placement she was then admitted to [facility] where she was successfully weaned off the ventilator.  On the other hand, patient did not have any meaningful neurological recovery.  She was transferred to this facility for skilled nursing care.    Since admission, patient continued to require total care, requires frequent tracheostomy care and suctioning of secretions.  On December 5, patient was noted to be febrile and hypotensive, therefore was sent to the emergency room and subsequently hospitalized with a diagnosis of acute urinary tract infection and possibly pneumonia.  Since return, she continued pulmonary secretions requiring suctioning; no further reported fever, chills or hypotension noted.    PAST MEDICAL & SURGICAL HISTORY:   Past Medical History:   Diagnosis Date   •  COPD (chronic obstructive pulmonary disease) (CMS/HCC)    • Hypertension    • Pneumonia    · Post cardiac arrest  · Anoxic encephalopathy  · Chronic essential hypertension    Past Surgical History:   Procedure Laterality Date   • HYSTERECTOMY      Partial    • TRACHEOSTOMY AND PEG TUBE INSERTION N/A 3/20/2019    Procedure: TRACHEOSTOMY AND PERCUTANEOUS ENDOSCOPIC GASTROSTOMY TUBE INSERTION;  Surgeon: Nadira Pandey MD;  Location: [facility];  Service: General         MEDICATIONS:  I have reviewed and reconciled the patients medication list in the patients chart at the skilled nursing facility today.      LABS:  Lab Results (last 7 days)     ** No results found for the last 168 hours. **          RADIOLOGY:  No radiology results for the last 7 days    ALLERGIES:  No Known Allergies      SOCIAL HISTORY:  Social History     Socioeconomic History   • Marital status:      Spouse name: Not on file   • Number of children: Not on file   • Years of education: Not on file   • Highest education level: Not on file   Tobacco Use   • Smoking status: Current Every Day Smoker     Packs/day: 1.00     Types: Electronic Cigarette, Cigarettes   Substance and Sexual Activity   • Alcohol use: Yes     Comment: wine    • Drug use: No       FAMILY HISTORY:  No family history on file.    REVIEW OF SYSTEMS:  Review of Systems   Unable to perform ROS: Other (Anoxic encephalopathy)     PHYSICAL EXAMINATION:   VITAL SIGNS: /68, HR 84/min, RR 18/min, temp 98.4 °F    Physical Exam   Constitutional: She appears well-developed and well-nourished.   HENT:   Head: Normocephalic and atraumatic.   Eyes: Pupils are equal, round, and reactive to light. EOM are normal.   Neck: Normal range of motion. Neck supple.   Tracheostomy site noted   Cardiovascular: Normal rate, regular rhythm and normal heart sounds.   Pulmonary/Chest: Effort normal and breath sounds normal.   Abdominal: Soft. Bowel sounds are normal.   Musculoskeletal: Normal range  of motion.   Neurological: She is alert.   Nonverbal   Skin: Skin is warm and dry.   Psychiatric: She has a normal mood and affect.     RECORDS REVIEW:   I have reviewed in detail available records including discharge summary and workup    ASSESSMENT:  · This post acute urinary tract infection with secondary sepsis   · Hypotension, differential diagnosis includes sepsis versus dehydration, improved   · Anoxic encephalopathy secondary to cardiac arrest  · Status post tracheostomy placement  · Status post gastrostomy placement  · Chronic essential hypertension, controlled    PLAN:  Patient completed cefuroxime course and is currently in stable condition.  Continue trach care with frequent suctioning, respiratory therapist and staff are following.  Patient is being monitored closely; she is at high risk for recurrent respiratory and other infections given her frail condition and immunocompromise state.  CODE STATUS remains full code and her family continue to request full medical treatment.  Will attempt meeting with them to discuss patient's condition, comorbidities, guarded prognosis and goals of care.    [x]  Discussed Patient in detail with nursing/staff, addressed all needs today.     [x]  Plan of Care Reviewed   [x]  PT/OT Reviewed     []  Wound assessment and management documentation reviewed    []  Antipsychotic medications and benzodiazepine addressed  []  Order Changes  []  Opioid medications addressed  []  Order Changes  []  Discharge Plans Reviewed   []  Advance Directive on file with Nursing Home.   []  POA on file with Nursing Home.   [x]  Code Status: Full code listed on patients chart at the nursing home facility.          Emperatriz Greene MD.  9/11/2019

## 2019-12-17 NOTE — PROGRESS NOTES
Nursing Home History and Physical        Kemalnorma Shaw, DO ?  Liya Campbell, APRN ?  [address] [place] [address]  Phone: [phone]  Fax: [phone] Emperatriz Greene MD[x]  Jamie Brown, DO ?   SHANIKA Blanco ?    [address] [place] [address]  Phone: [phone]  Fax: [phone]     PATIENT NAME: Viji Vargas                                                                          YOB: 1960           DATE OF SERVICE: 09/11/2019  FACILITY:   [] [place] [x] [place]  [] [place]    [] [place]      ______________________________________________________________________    CHIEF COMPLAINT:  Posthospitalization, acute on chronic hypoxic respiratory failure      HISTORY OF PRESENT ILLNESS:   Mrs. Cleemnte is a 58 years old female who was initially hospitalized at [facility] after sustaining cardiac arrest with successful resuscitation and mechanical ventilation.  COPD exacerbation and pneumonia were felt to be culprit.  Unfortunately, patient suffered significant anoxic encephalopathy secondary to above.  Subsequently, patient underwent PEG and tracheostomy placement she was then admitted to [facility] where she was successfully weaned off the ventilator.  On the other hand, patient did not have any meaningful neurological recovery.  She was transferred to this facility for skilled nursing care.    Since admission, patient remained in stable condition.  She is alert but unable to communicate verbally.  Of note, her daughter is her legal guardian; she attains FULL CODE STATUS and wishes to continue full medical management.    PAST MEDICAL & SURGICAL HISTORY:   Past Medical History:   Diagnosis Date   • COPD (chronic obstructive pulmonary disease) (CMS/[facility])    • Hypertension    • Pneumonia    · Post cardiac arrest  · Anoxic encephalopathy  · Chronic essential hypertension    Past Surgical History:   Procedure  Laterality Date   • HYSTERECTOMY      Partial    • TRACHEOSTOMY AND PEG TUBE INSERTION N/A 3/20/2019    Procedure: TRACHEOSTOMY AND PERCUTANEOUS ENDOSCOPIC GASTROSTOMY TUBE INSERTION;  Surgeon: Nadira Pandey MD;  Location: [facility];  Service: General         MEDICATIONS:  I have reviewed and reconciled the patients medication list in the patients chart at the skilled nursing facility today.      LABS:  Lab Results (last 7 days)     ** No results found for the last 168 hours. **          RADIOLOGY:  No radiology results for the last 7 days    ALLERGIES:  No Known Allergies      SOCIAL HISTORY:  Social History     Socioeconomic History   • Marital status:      Spouse name: Not on file   • Number of children: Not on file   • Years of education: Not on file   • Highest education level: Not on file   Tobacco Use   • Smoking status: Current Every Day Smoker     Packs/day: 1.00     Types: Electronic Cigarette, Cigarettes   Substance and Sexual Activity   • Alcohol use: Yes     Comment: wine    • Drug use: No       FAMILY HISTORY:  No family history on file.    REVIEW OF SYSTEMS:  Review of Systems   Unable to perform ROS: Other (Anoxic encephalopathy)     PHYSICAL EXAMINATION:   VITAL SIGNS: /72, HR 70/min, RR 18/min, temp 98.4 °F, O2 sat 91%     Physical Exam   Constitutional: She appears well-developed and well-nourished.   HENT:   Head: Normocephalic and atraumatic.   Eyes: Pupils are equal, round, and reactive to light. EOM are normal.   Neck: Normal range of motion. Neck supple.   Tracheostomy site noted   Cardiovascular: Normal rate, regular rhythm and normal heart sounds.   Pulmonary/Chest: Effort normal and breath sounds normal.   Abdominal: Soft. Bowel sounds are normal.   Musculoskeletal: Normal range of motion.   Neurological: She is alert.   Nonverbal   Skin: Skin is warm and dry.   Psychiatric: She has a normal mood and affect.     RECORDS REVIEW:   I have reviewed in detail available records  including discharge summary and workup    ASSESSMENT:  · Anoxic encephalopathy secondary to cardiac arrest  · Status post tracheostomy placement  · Status post gastrostomy placement  · Chronic essential hypertension, controlled    PLAN:  · Preadmission medications reviewed, reconciled and reviewed  · PT and OT as clinically indicated  · Fall and aspiration precautions   · Routine baseline labs  · Nutritional support recommended and monitored with interdisciplinary support  · Patient is being monitored closely, further plans were modified accordingly    [x]  Discussed Patient in detail with nursing/staff, addressed all needs today.     [x]  Plan of Care Reviewed   [x]  PT/OT Reviewed     []  Wound assessment and management documentation reviewed    []  Antipsychotic medications and benzodiazepine addressed  []  Order Changes  []  Opioid medications addressed  []  Order Changes  []  Discharge Plans Reviewed   []  Advance Directive on file with Nursing Home.   []  POA on file with Nursing Home.   [x]  Code Status: Full code listed on patients chart at the nursing home facility.          Emperatriz Greene MD.  9/11/2019

## 2019-12-19 ENCOUNTER — NURSING HOME (OUTPATIENT)
Dept: INTERNAL MEDICINE | Facility: CLINIC | Age: 59
End: 2019-12-19

## 2019-12-19 DIAGNOSIS — Z93.0 TRACHEOSTOMY IN PLACE (HCC): ICD-10-CM

## 2019-12-19 DIAGNOSIS — G93.1 ANOXIC ENCEPHALOPATHY (HCC): ICD-10-CM

## 2019-12-19 DIAGNOSIS — Z93.1 PEG (PERCUTANEOUS ENDOSCOPIC GASTROSTOMY) STATUS (HCC): ICD-10-CM

## 2019-12-19 DIAGNOSIS — R13.12 OROPHARYNGEAL DYSPHAGIA: ICD-10-CM

## 2019-12-19 DIAGNOSIS — N39.0 URINARY TRACT INFECTION WITHOUT HEMATURIA, SITE UNSPECIFIED: Primary | ICD-10-CM

## 2019-12-19 DIAGNOSIS — I10 ESSENTIAL HYPERTENSION: ICD-10-CM

## 2019-12-19 PROCEDURE — 99309 SBSQ NF CARE MODERATE MDM 30: CPT | Performed by: PHYSICIAN ASSISTANT

## 2019-12-20 VITALS
RESPIRATION RATE: 20 BRPM | SYSTOLIC BLOOD PRESSURE: 108 MMHG | DIASTOLIC BLOOD PRESSURE: 62 MMHG | BODY MASS INDEX: 28.83 KG/M2 | OXYGEN SATURATION: 93 % | HEART RATE: 88 BPM | HEIGHT: 60 IN | TEMPERATURE: 96.9 F

## 2019-12-22 NOTE — PROGRESS NOTES
Nursing Home Progress Note        Kemal Shaw, DO ?  Liya Campbell, APRN ?  [address] [place] [address]  Phone: [phone]  Fax: [phone] Emperatriz Greene MD ?  Jamie Brown, DO ?  Noemy Garcia PA-C [x]   [address] [place] [address]  Phone: [phone]  Fax: [phone]     PATIENT NAME: Viji Vargas                                                                          YOB: 1960           DATE OF SERVICE: 12/12/2019  FACILITY:  [] [place]   [x] [place]   [] [facility]   [] [facility]   [] Other ______________________________________________________________________     CHIEF COMPLAINT:  Follow-up on UTI.      HISTORY OF PRESENT ILLNESS:   Ms. Vargas is a 59 y/o female with PMH significant for cardiac arrest resulting in anoxic brain injury.  She is non-verbal and does not follow commands at baseline.  PEG and tracheostomy are in place.  Patient presents for follow-up on UTI with admission to [facility].  Labs and hospital stay reviewed. Per staff patient has remained in stable condition.  No reports of fever.  She continues to have trach care scheduled throughout the day due to increased secretions.     PAST MEDICAL & SURGICAL HISTORY:   Past Medical History:   Diagnosis Date   • COPD (chronic obstructive pulmonary disease) (CMS/HCC)    • Hypertension    • Pneumonia       Past Surgical History:   Procedure Laterality Date   • HYSTERECTOMY      Partial    • TRACHEOSTOMY AND PEG TUBE INSERTION N/A 3/20/2019    Procedure: TRACHEOSTOMY AND PERCUTANEOUS ENDOSCOPIC GASTROSTOMY TUBE INSERTION;  Surgeon: Nadira Pandey MD;  Location: [facility];  Service: General         MEDICATIONS:  I have reviewed and reconciled the patients medication list in the patients chart at the skilled nursing facility today.      ALLERGIES:  No Known Allergies      SOCIAL HISTORY:  Social History     Socioeconomic History   • Marital status:      Spouse  "name: Not on file   • Number of children: Not on file   • Years of education: Not on file   • Highest education level: Not on file   Tobacco Use   • Smoking status: Current Every Day Smoker     Packs/day: 1.00     Types: Electronic Cigarette, Cigarettes   Substance and Sexual Activity   • Alcohol use: Yes     Comment: wine    • Drug use: No       FAMILY HISTORY:  No family history on file.    REVIEW OF SYSTEMS:  Review of Systems   Unable to perform ROS: Patient nonverbal   Constitutional: Negative for fever.   Respiratory: Negative for cough and shortness of breath.         Tracheal secretions   Cardiovascular: Negative for leg swelling.   Gastrointestinal: Negative for diarrhea and vomiting.   Psychiatric/Behavioral: Negative for agitation.        PHYSICAL EXAMINATION:     VITAL SIGNS:  /58   Pulse 77   Temp 98.3 °F (36.8 °C)   Resp 20   Ht 152.4 cm (60\")   SpO2 98%   BMI 28.83 kg/m²     Physical Exam   Constitutional: She appears ill (chroinc). No distress.   Nonverbal.   HENT:   Head: Normocephalic and atraumatic.   Eyes: Pupils are equal, round, and reactive to light. Conjunctivae and EOM are normal.   Neck: Normal range of motion. Neck supple. No JVD present.   Trach in place, no exudate or erythema appreciated.  Clear secretions noted at trach site.    Cardiovascular: Normal rate, regular rhythm, normal heart sounds and intact distal pulses.   Pulmonary/Chest: Effort normal. No respiratory distress. She has no wheezes. She has no rhonchi.   Abdominal: Soft. She exhibits no distension. There is no tenderness.   PEG in place, no erythema or exudate.    Musculoskeletal: She exhibits no edema.   Does not move extremities. Contractures noted.    Neurological:   Patient is non verbal.    Skin: Skin is warm and dry. Capillary refill takes less than 2 seconds. She is not diaphoretic. No pallor.   Psychiatric: She has a normal mood and affect. Her behavior is normal.   Nursing note and vitals " reviewed.      RECORDS REVIEW:   N/A    ASSESSMENT     Diagnoses and all orders for this visit:    Urinary tract infection without hematuria, site unspecified    Essential hypertension    Tracheostomy in place (CMS/[facility])    PEG (percutaneous endoscopic gastrostomy) status (CMS/[facility])    Oropharyngeal dysphagia      PLAN  UTI: Resolved.  No reports of fever.  Continue to monitor.  Hypertension: Decrease lisinopril to 10 mg daily continue Coreg.  She does have hydralazine ordered as needed.  Staff report that they have never had to use this medication.  Can consider discontinuing.  Dysphasia with tracheostomy in place: Respiratory therapy continues to provide suction several times throughout the day.  Continue to monitor for any acute changes.  PEG: Patient continues with tube feedings as scheduled.  No signs or symptoms of infection at PEG site.  Continue to monitor closely.          [x]  Discussed Patient in detail with nursing/staff, addressed all needs today.     [x]  Plan of Care Reviewed   []  PT/OT Reviewed   []  Order Changes  []  Discharge Plans Reviewed  [x]  Advance Directive on file with Nursing Home.   [x]  POA on file with Nursing Home.    [x]  Code Status listed:  [x]  Full Code   []  DNR         Noemy Garcia PA-C.  12/22/2019

## 2019-12-27 NOTE — PROGRESS NOTES
Nursing Home Progress Note        Kemal Shaw, DO ?  FÉLIX Horne ?  [address] [facility], [place] [address]  Phone: [phone]  Fax: [phone] Emperatriz Greene MD ?  Jamie Brown, DO ?  Noemy Garcia PA-C [x]   [address] [place] [address]  Phone: [phone]  Fax: [phone]     PATIENT NAME: Viji Vargas                                                                          YOB: 1960           DATE OF SERVICE: 12/19/2019  FACILITY:  [] [place]   [x] [place]   [] [facility]   [] [facility]   [] Other ______________________________________________________________________     CHIEF COMPLAINT:  Hypertension.      HISTORY OF PRESENT ILLNESS:   Ms. Vargas is a 59 y/o female with PMH significant for cardiac arrest resulting in anoxic brain injury.  She is non-verbal and does not follow commands at baseline.  PEG and tracheostomy are in place.  Patient presents for follow-up on hypertension.  She is currently taking lisinopril and PRN hydralazine.  Staff have noted that patient's blood pressure continues to be lower then normal.  They have not had to use any hydralazine.  At last visit, Lisinopril dosage was decreased.  She continues to have systolic in the low 100's.  They deny any diaphoresis or shortness of breath.  She continues to require trach suctioning several times throughout the day.  She recently completed ABX for UTI.  She has remained in stable condition.  Staff do not note any acute changes or problems.      PAST MEDICAL & SURGICAL HISTORY:   Past Medical History:   Diagnosis Date   • COPD (chronic obstructive pulmonary disease) (CMS/HCC)    • Hypertension    • Pneumonia       Past Surgical History:   Procedure Laterality Date   • HYSTERECTOMY      Partial    • TRACHEOSTOMY AND PEG TUBE INSERTION N/A 3/20/2019    Procedure: TRACHEOSTOMY AND PERCUTANEOUS ENDOSCOPIC GASTROSTOMY TUBE INSERTION;  Surgeon: Nadira Pandey MD;  Location: [facility];   "Service: General         MEDICATIONS:  I have reviewed and reconciled the patients medication list in the patients chart at the skilled nursing facility today.      ALLERGIES:  No Known Allergies      SOCIAL HISTORY:  Social History     Socioeconomic History   • Marital status:      Spouse name: Not on file   • Number of children: Not on file   • Years of education: Not on file   • Highest education level: Not on file   Tobacco Use   • Smoking status: Current Every Day Smoker     Packs/day: 1.00     Types: Electronic Cigarette, Cigarettes   Substance and Sexual Activity   • Alcohol use: Yes     Comment: wine    • Drug use: No       FAMILY HISTORY:  No family history on file.    REVIEW OF SYSTEMS:  Review of Systems   Unable to perform ROS: Patient nonverbal   Constitutional: Negative for fever.   Respiratory: Negative for cough and shortness of breath.         Tracheal secretions   Cardiovascular: Negative for leg swelling.   Gastrointestinal: Negative for diarrhea and vomiting.   Psychiatric/Behavioral: Negative for agitation.        PHYSICAL EXAMINATION:     VITAL SIGNS:  /62   Pulse 88   Temp 96.9 °F (36.1 °C)   Resp 20   Ht 152.4 cm (60\")   SpO2 93%   BMI 28.83 kg/m²     Physical Exam   Constitutional: She appears ill (chroinc). No distress.   Nonverbal.   HENT:   Head: Normocephalic and atraumatic.   Eyes: Pupils are equal, round, and reactive to light. Conjunctivae and EOM are normal.   Neck: Normal range of motion. Neck supple. No JVD present.   Trach in place, no exudate or erythema appreciated.  Clear secretions noted at trach site.    Cardiovascular: Normal rate, regular rhythm, normal heart sounds and intact distal pulses.   Pulmonary/Chest: Effort normal. No respiratory distress. She has no wheezes. She has no rhonchi.   Abdominal: Soft. She exhibits no distension. There is no tenderness.   PEG in place, no erythema or exudate.    Musculoskeletal: She exhibits no edema.   Does not move " extremities. Contractures noted.    Neurological:   Patient is non verbal.    Skin: Skin is warm and dry. Capillary refill takes less than 2 seconds. She is not diaphoretic. No pallor.   Psychiatric: She has a normal mood and affect. Her behavior is normal.   Nursing note and vitals reviewed.      RECORDS REVIEW:   N/A    ASSESSMENT     Diagnoses and all orders for this visit:    Urinary tract infection without hematuria, site unspecified    Essential hypertension    Anoxic encephalopathy (CMS/[facility])    Oropharyngeal dysphagia    Tracheostomy in place (CMS/[facility])    PEG (percutaneous endoscopic gastrostomy) status (CMS/[facility])      PLAN  UTI: Resolved.  No reports of fever.  Continue to monitor.  Hypertension: Discontinue lisinopril, continue coreg.  Monitor blood pressure closely for the next several days.   Anoxic brain injury:  Patient bedridden, does not follow commands.  Continue supportive care at SNF for all ADLs.   Dysphasia with tracheostomy in place: Respiratory therapy continues to provide suction several times throughout the day.  Continue to monitor for any acute changes.  PEG: Patient continues with tube feedings as scheduled.  No signs or symptoms of infection at PEG site.  Continue to monitor closely.          [x]  Discussed Patient in detail with nursing/staff, addressed all needs today.     [x]  Plan of Care Reviewed   []  PT/OT Reviewed   []  Order Changes  []  Discharge Plans Reviewed  [x]  Advance Directive on file with Nursing Home.   [x]  POA on file with Nursing Home.    [x]  Code Status listed:  [x]  Full Code   []  DNR         Noemy Garcia PA-C.  12/27/2019

## 2020-01-10 ENCOUNTER — NURSING HOME (OUTPATIENT)
Dept: INTERNAL MEDICINE | Facility: CLINIC | Age: 60
End: 2020-01-10

## 2020-01-10 DIAGNOSIS — Z93.0 TRACHEOSTOMY IN PLACE (HCC): ICD-10-CM

## 2020-01-10 DIAGNOSIS — R13.12 OROPHARYNGEAL DYSPHAGIA: ICD-10-CM

## 2020-01-10 DIAGNOSIS — G93.1 ANOXIC BRAIN INJURY (HCC): Primary | ICD-10-CM

## 2020-01-10 DIAGNOSIS — I10 ESSENTIAL HYPERTENSION: ICD-10-CM

## 2020-01-10 DIAGNOSIS — Z93.1 PEG (PERCUTANEOUS ENDOSCOPIC GASTROSTOMY) STATUS (HCC): ICD-10-CM

## 2020-01-10 PROCEDURE — 99308 SBSQ NF CARE LOW MDM 20: CPT | Performed by: PHYSICIAN ASSISTANT

## 2020-01-18 VITALS
SYSTOLIC BLOOD PRESSURE: 104 MMHG | HEART RATE: 76 BPM | DIASTOLIC BLOOD PRESSURE: 68 MMHG | RESPIRATION RATE: 18 BRPM | OXYGEN SATURATION: 94 %

## 2020-01-19 NOTE — PROGRESS NOTES
Nursing Home Progress Note        Kemal Shaw, DO ?  Liya Campbell, APRN ?  [address] [facility] [address], [place] [address]  Phone: [phone]  Fax: [phone] Emperatriz Greene MD ?  Jamie Brown, DO ?  Noemy Garcia PA-C [x]   [address] [place] [address]  Phone: [phone]  Fax: [phone]     PATIENT NAME: Viji Vargas                                                                          YOB: 1960           DATE OF SERVICE: 1/10/2020  FACILITY:  [] [place]   [x] [place]   [] [facility]   [] [facility]   [] Other ______________________________________________________________________     CHIEF COMPLAINT:  Chronic medical management and long term care needs.       HISTORY OF PRESENT ILLNESS:   Ms. Vargas is a 57 y/o female with PMH significant for cardiac arrest resulting in anoxic brain injury.  She is non-verbal and does not follow commands at baseline.  PEG and tracheostomy are in place.  Patient presents for routine coordination of care and long term care needs.  Record reviewed and care discussed with staff.  They voice no acute concerns or complaints.  She continues to have tracheal secretions, which are regularly suctioned.  No reports of shortness of breath or increased O2 demands.  Upon assessment, she presents in no distress.      PAST MEDICAL & SURGICAL HISTORY:   Past Medical History:   Diagnosis Date   • COPD (chronic obstructive pulmonary disease) (CMS/HCC)    • Hypertension    • Pneumonia       Past Surgical History:   Procedure Laterality Date   • HYSTERECTOMY      Partial    • TRACHEOSTOMY AND PEG TUBE INSERTION N/A 3/20/2019    Procedure: TRACHEOSTOMY AND PERCUTANEOUS ENDOSCOPIC GASTROSTOMY TUBE INSERTION;  Surgeon: Nadira Pandey MD;  Location: [facility];  Service: General         MEDICATIONS:  I have reviewed and reconciled the patients medication list in the patients chart at the skilled nursing facility today.      ALLERGIES:  No Known Allergies       SOCIAL HISTORY:  Social History     Socioeconomic History   • Marital status:      Spouse name: Not on file   • Number of children: Not on file   • Years of education: Not on file   • Highest education level: Not on file   Tobacco Use   • Smoking status: Current Every Day Smoker     Packs/day: 1.00     Types: Electronic Cigarette, Cigarettes   Substance and Sexual Activity   • Alcohol use: Yes     Comment: wine    • Drug use: No       FAMILY HISTORY:  No family history on file.    REVIEW OF SYSTEMS:  Review of Systems   Unable to perform ROS: Patient nonverbal   Constitutional: Negative for fever.   Respiratory: Negative for cough and shortness of breath.         Tracheal secretions   Cardiovascular: Negative for leg swelling.   Gastrointestinal: Negative for diarrhea and vomiting.   Psychiatric/Behavioral: Negative for agitation.        PHYSICAL EXAMINATION:     VITAL SIGNS:  /68   Pulse 76   Resp 18   SpO2 94%     Physical Exam   Constitutional: She appears ill (chroinc). No distress.   Nonverbal.   HENT:   Head: Normocephalic and atraumatic.   Eyes: Pupils are equal, round, and reactive to light. Conjunctivae and EOM are normal.   Neck: Normal range of motion. Neck supple. No JVD present.   Trach in place, no exudate or erythema appreciated.  Clear secretions noted at trach site.    Cardiovascular: Normal rate, regular rhythm, normal heart sounds and intact distal pulses.   Pulmonary/Chest: Effort normal. No respiratory distress. She has no wheezes. She has rhonchi (scattered).   Abdominal: Soft. She exhibits no distension. There is no tenderness.   PEG in place, no erythema or exudate.    Musculoskeletal: She exhibits no edema.   Does not move extremities. Contractures noted.    Neurological:   Patient is non verbal.    Skin: Skin is warm and dry. Capillary refill takes less than 2 seconds. She is not diaphoretic. No pallor.   Psychiatric: She has a normal mood and affect. Her behavior is  normal.   Nursing note and vitals reviewed.      RECORDS REVIEW:   N/A    ASSESSMENT     Diagnoses and all orders for this visit:    Anoxic brain injury (CMS/[facility])    Essential hypertension    Oropharyngeal dysphagia    PEG (percutaneous endoscopic gastrostomy) status (CMS/[facility])    Tracheostomy in place (CMS/[facility])      PLAN  Hypertension: OK control.  Lisinopril has been discontinued.  Continue to monitor for hypotension.   Anoxic brain injury:  Patient bedridden, does not follow commands.  Continue supportive care at SNF for all ADLs.   Dysphasia with tracheostomy in place: Respiratory therapy continues to provide suction several times throughout the day.  Continue to monitor for any acute changes.  PEG: Patient continues with tube feedings as scheduled.  No signs or symptoms of infection at PEG site.  Continue to monitor closely.          [x]  Discussed Patient in detail with nursing/staff, addressed all needs today.     [x]  Plan of Care Reviewed   []  PT/OT Reviewed   []  Order Changes  []  Discharge Plans Reviewed  [x]  Advance Directive on file with Nursing Home.   [x]  POA on file with Nursing Home.    [x]  Code Status listed:  [x]  Full Code   []  DNR         Noemy Garcia PA-C.  1/18/2020

## 2020-02-13 RX ORDER — ALPRAZOLAM 0.5 MG/1
0.5 TABLET ORAL EVERY 6 HOURS
Qty: 120 TABLET | Refills: 3 | Status: SHIPPED | OUTPATIENT
Start: 2020-02-13 | End: 2020-07-29 | Stop reason: SDUPTHER

## 2020-02-19 ENCOUNTER — NURSING HOME (OUTPATIENT)
Dept: INTERNAL MEDICINE | Facility: CLINIC | Age: 60
End: 2020-02-19

## 2020-02-19 DIAGNOSIS — G93.1 ANOXIC BRAIN INJURY (HCC): Primary | ICD-10-CM

## 2020-02-19 DIAGNOSIS — Z93.1 PEG (PERCUTANEOUS ENDOSCOPIC GASTROSTOMY) STATUS (HCC): ICD-10-CM

## 2020-02-19 DIAGNOSIS — I10 ESSENTIAL HYPERTENSION: ICD-10-CM

## 2020-02-19 DIAGNOSIS — Z93.0 TRACHEOSTOMY IN PLACE (HCC): ICD-10-CM

## 2020-02-19 PROCEDURE — 99308 SBSQ NF CARE LOW MDM 20: CPT | Performed by: INTERNAL MEDICINE

## 2020-03-12 ENCOUNTER — NURSING HOME (OUTPATIENT)
Dept: INTERNAL MEDICINE | Facility: CLINIC | Age: 60
End: 2020-03-12

## 2020-03-12 DIAGNOSIS — J39.8 INCREASED TRACHEAL SECRETIONS: ICD-10-CM

## 2020-03-12 DIAGNOSIS — I10 ESSENTIAL HYPERTENSION: ICD-10-CM

## 2020-03-12 DIAGNOSIS — G93.1 ANOXIC ENCEPHALOPATHY (HCC): Primary | ICD-10-CM

## 2020-03-12 DIAGNOSIS — R13.12 OROPHARYNGEAL DYSPHAGIA: ICD-10-CM

## 2020-03-12 PROCEDURE — 99308 SBSQ NF CARE LOW MDM 20: CPT | Performed by: PHYSICIAN ASSISTANT

## 2020-03-13 VITALS
SYSTOLIC BLOOD PRESSURE: 112 MMHG | DIASTOLIC BLOOD PRESSURE: 56 MMHG | RESPIRATION RATE: 20 BRPM | TEMPERATURE: 97.6 F | OXYGEN SATURATION: 92 % | HEART RATE: 94 BPM

## 2020-03-13 NOTE — PROGRESS NOTES
Nursing Home Progress Note        Kemal Shaw, DO ?  Liya Campbell, APRN ?  [address] [facility], [place] [address]  Phone: [phone]  Fax: [phone] Emperatriz Greene MD ?  Jamie Brown, DO ?  Noemy Garcia PA-C [x]   [address] [place] [address]  Phone: [phone]  Fax: [phone]     PATIENT NAME: Viji Vargas                                                                          YOB: 1960           DATE OF SERVICE: 3/12/2020  FACILITY:  [] [place]   [x] [place]   [] [facility]   [] [facility]   [] Other ______________________________________________________________________     CHIEF COMPLAINT:  Chronic medical management and long term care needs.       HISTORY OF PRESENT ILLNESS:   Ms. Vargas is a 60 y/o female with PMH significant for cardiac arrest resulting in anoxic brain injury.  She is non-verbal and does not follow commands at baseline.  PEG and tracheostomy are in place.  She presents for routine coordination of care and long term care needs.  Record reviewed and care discussed with staff.  They voice no acute concerns or complaints.  She continues to have tracheal secretions, which are regularly suctioned.  No reports of hypoxia or respiratory distress.  Upon assessment, she is lying in bed, NAD.      PAST MEDICAL & SURGICAL HISTORY:   Past Medical History:   Diagnosis Date   • COPD (chronic obstructive pulmonary disease) (CMS/HCC)    • Hypertension    • Pneumonia       Past Surgical History:   Procedure Laterality Date   • HYSTERECTOMY      Partial    • TRACHEOSTOMY AND PEG TUBE INSERTION N/A 3/20/2019    Procedure: TRACHEOSTOMY AND PERCUTANEOUS ENDOSCOPIC GASTROSTOMY TUBE INSERTION;  Surgeon: Nadira Pandey MD;  Location: [facility];  Service: General         MEDICATIONS:  I have reviewed and reconciled the patients medication list in the patients chart at the skilled nursing facility today.      ALLERGIES:  No Known Allergies      SOCIAL  HISTORY:  Social History     Socioeconomic History   • Marital status:      Spouse name: Not on file   • Number of children: Not on file   • Years of education: Not on file   • Highest education level: Not on file   Tobacco Use   • Smoking status: Current Every Day Smoker     Packs/day: 1.00     Types: Electronic Cigarette, Cigarettes   Substance and Sexual Activity   • Alcohol use: Yes     Comment: wine    • Drug use: No       FAMILY HISTORY:  No family history on file.    REVIEW OF SYSTEMS:  Review of Systems   Unable to perform ROS: Patient nonverbal   Constitutional: Negative for fever.   Respiratory: Negative for cough and shortness of breath.         Tracheal secretions   Cardiovascular: Negative for leg swelling.   Gastrointestinal: Negative for diarrhea and vomiting.   Psychiatric/Behavioral: Negative for agitation.        PHYSICAL EXAMINATION:     VITAL SIGNS:  /56   Pulse 94   Temp 97.6 °F (36.4 °C)   Resp 20   SpO2 92%     Physical Exam   Constitutional: She appears ill (chroinc). No distress.   Nonverbal.   HENT:   Head: Normocephalic and atraumatic.   Eyes: Pupils are equal, round, and reactive to light. Conjunctivae and EOM are normal.   Neck: Normal range of motion. Neck supple. No JVD present.   Trach in place, no exudate or erythema appreciated.  Clear secretions noted at trach site.    Cardiovascular: Normal rate, regular rhythm, normal heart sounds and intact distal pulses.   Pulmonary/Chest: Effort normal. No respiratory distress. She has no wheezes. She has rhonchi (scattered).   Abdominal: Soft. She exhibits no distension. There is no tenderness.   PEG in place, no erythema or exudate.    Musculoskeletal: She exhibits no edema.   Does not move extremities. Contractures noted.    Neurological:   Patient is non verbal.    Skin: Skin is warm and dry. Capillary refill takes less than 2 seconds. She is not diaphoretic. No pallor.   Psychiatric: She has a normal mood and affect. Her  behavior is normal.   Nursing note and vitals reviewed.      RECORDS REVIEW:   N/A    ASSESSMENT     Diagnoses and all orders for this visit:    Anoxic encephalopathy (CMS/HCC)    Essential hypertension    Increased tracheal secretions    Oropharyngeal dysphagia      PLAN  Hypertension:  Controlled.  She continues on coreg.  Continue to monitor for hypotension.   Anoxic brain injury:  Patient bedridden, does not follow commands.  Continue supportive care at SNF for all ADLs.   Tracheal secretions:  Requested patient to be suctioned during assessment.  Noted improvement to rhonchi.  Heart rate and oxygen had some improvement as well.  Continue with routine monitoring and PRN.    Dysphasia:  Continue to monitor for any acute changes.  She continues with PEG feedings.          [x]  Discussed Patient in detail with nursing/staff, addressed all needs today.     [x]  Plan of Care Reviewed   []  PT/OT Reviewed   []  Order Changes  []  Discharge Plans Reviewed  [x]  Advance Directive on file with Nursing Home.   [x]  POA on file with Nursing Home.    [x]  Code Status listed:  [x]  Full Code   []  DNR         Noemy Garcia PA-C.  3/13/2020

## 2020-03-24 ENCOUNTER — APPOINTMENT (OUTPATIENT)
Dept: GENERAL RADIOLOGY | Facility: HOSPITAL | Age: 60
End: 2020-03-24

## 2020-03-24 ENCOUNTER — HOSPITAL ENCOUNTER (INPATIENT)
Facility: HOSPITAL | Age: 60
LOS: 7 days | Discharge: NURSING, REHAB OR LONG-TERM CARE | End: 2020-03-31
Attending: EMERGENCY MEDICINE | Admitting: INTERNAL MEDICINE

## 2020-03-24 ENCOUNTER — NURSING HOME (OUTPATIENT)
Dept: INTERNAL MEDICINE | Facility: CLINIC | Age: 60
End: 2020-03-24

## 2020-03-24 VITALS
SYSTOLIC BLOOD PRESSURE: 154 MMHG | TEMPERATURE: 98.4 F | HEART RATE: 102 BPM | DIASTOLIC BLOOD PRESSURE: 93 MMHG | OXYGEN SATURATION: 91 % | RESPIRATION RATE: 22 BRPM

## 2020-03-24 DIAGNOSIS — G93.1 ANOXIC ENCEPHALOPATHY (HCC): Primary | ICD-10-CM

## 2020-03-24 DIAGNOSIS — J44.1 COPD EXACERBATION (HCC): Primary | ICD-10-CM

## 2020-03-24 DIAGNOSIS — R09.02 HYPOXIA: ICD-10-CM

## 2020-03-24 DIAGNOSIS — J39.8 INCREASED TRACHEAL SECRETIONS: ICD-10-CM

## 2020-03-24 DIAGNOSIS — R06.89 ACUTE RESPIRATORY INSUFFICIENCY: ICD-10-CM

## 2020-03-24 DIAGNOSIS — J96.21 ACUTE ON CHRONIC RESPIRATORY FAILURE WITH HYPOXIA (HCC): ICD-10-CM

## 2020-03-24 DIAGNOSIS — B34.8 INFECTION DUE TO HUMAN METAPNEUMOVIRUS (HMPV): ICD-10-CM

## 2020-03-24 LAB
A-A DO2: 134.6 MMHG
ALBUMIN SERPL-MCNC: 3.4 G/DL (ref 3.5–5.2)
ALBUMIN/GLOB SERPL: 1 G/DL
ALP SERPL-CCNC: 81 U/L (ref 39–117)
ALT SERPL W P-5'-P-CCNC: 28 U/L (ref 1–33)
ANION GAP SERPL CALCULATED.3IONS-SCNC: 11.6 MMOL/L (ref 5–15)
ARTERIAL PATENCY WRIST A: ABNORMAL
AST SERPL-CCNC: 23 U/L (ref 1–32)
ATMOSPHERIC PRESS: 734 MMHG
B PERT DNA SPEC QL NAA+PROBE: NOT DETECTED
BASE EXCESS BLDA CALC-SCNC: 4.4 MMOL/L (ref 0–2)
BASOPHILS # BLD AUTO: 0.03 10*3/MM3 (ref 0–0.2)
BASOPHILS NFR BLD AUTO: 0.3 % (ref 0–1.5)
BDY SITE: ABNORMAL
BILIRUB SERPL-MCNC: 0.6 MG/DL (ref 0.2–1.2)
BUN BLD-MCNC: 20 MG/DL (ref 8–23)
BUN/CREAT SERPL: 43.5 (ref 7–25)
C PNEUM DNA NPH QL NAA+NON-PROBE: NOT DETECTED
CALCIUM SPEC-SCNC: 9.1 MG/DL (ref 8.6–10.5)
CHLORIDE SERPL-SCNC: 98 MMOL/L (ref 98–107)
CO2 SERPL-SCNC: 26.4 MMOL/L (ref 22–29)
COHGB MFR BLD: 1.2 % (ref 0–2)
CREAT BLD-MCNC: 0.46 MG/DL (ref 0.57–1)
CRP SERPL-MCNC: 9.17 MG/DL (ref 0–0.5)
D-LACTATE SERPL-SCNC: 0.8 MMOL/L (ref 0.5–2)
DEPRECATED RDW RBC AUTO: 49.5 FL (ref 37–54)
EOSINOPHIL # BLD AUTO: 0.05 10*3/MM3 (ref 0–0.4)
EOSINOPHIL NFR BLD AUTO: 0.4 % (ref 0.3–6.2)
ERYTHROCYTE [DISTWIDTH] IN BLOOD BY AUTOMATED COUNT: 13.4 % (ref 12.3–15.4)
FLUAV H1 2009 PAND RNA NPH QL NAA+PROBE: NOT DETECTED
FLUAV H1 HA GENE NPH QL NAA+PROBE: NOT DETECTED
FLUAV H3 RNA NPH QL NAA+PROBE: NOT DETECTED
FLUAV SUBTYP SPEC NAA+PROBE: NOT DETECTED
FLUBV RNA ISLT QL NAA+PROBE: NOT DETECTED
GAS FLOW AIRWAY: 6 LPM
GFR SERPL CREATININE-BSD FRML MDRD: 138 ML/MIN/1.73
GLOBULIN UR ELPH-MCNC: 3.4 GM/DL
GLUCOSE BLD-MCNC: 114 MG/DL (ref 65–99)
HADV DNA SPEC NAA+PROBE: NOT DETECTED
HCO3 BLDA-SCNC: 28.4 MMOL/L (ref 22–28)
HCOV 229E RNA SPEC QL NAA+PROBE: NOT DETECTED
HCOV HKU1 RNA SPEC QL NAA+PROBE: NOT DETECTED
HCOV NL63 RNA SPEC QL NAA+PROBE: NOT DETECTED
HCOV OC43 RNA SPEC QL NAA+PROBE: NOT DETECTED
HCT VFR BLD AUTO: 36.3 % (ref 34–46.6)
HCT VFR BLD CALC: 36.5 %
HGB BLD-MCNC: 12 G/DL (ref 12–15.9)
HGB BLDA-MCNC: 11.9 G/DL (ref 12–18)
HMPV RNA NPH QL NAA+NON-PROBE: DETECTED
HOROWITZ INDEX BLD+IHG-RTO: 35 %
HPIV1 RNA SPEC QL NAA+PROBE: NOT DETECTED
HPIV2 RNA SPEC QL NAA+PROBE: NOT DETECTED
HPIV3 RNA NPH QL NAA+PROBE: NOT DETECTED
HPIV4 P GENE NPH QL NAA+PROBE: NOT DETECTED
IMM GRANULOCYTES # BLD AUTO: 0.04 10*3/MM3 (ref 0–0.05)
IMM GRANULOCYTES NFR BLD AUTO: 0.3 % (ref 0–0.5)
LDH SERPL-CCNC: 204 U/L (ref 135–214)
LYMPHOCYTES # BLD AUTO: 2.33 10*3/MM3 (ref 0.7–3.1)
LYMPHOCYTES NFR BLD AUTO: 19.5 % (ref 19.6–45.3)
M PNEUMO IGG SER IA-ACNC: NOT DETECTED
MCH RBC QN AUTO: 32.7 PG (ref 26.6–33)
MCHC RBC AUTO-ENTMCNC: 33.1 G/DL (ref 31.5–35.7)
MCV RBC AUTO: 98.9 FL (ref 79–97)
METHGB BLD QL: 0.4 % (ref 0–1.5)
MODALITY: ABNORMAL
MONOCYTES # BLD AUTO: 0.84 10*3/MM3 (ref 0.1–0.9)
MONOCYTES NFR BLD AUTO: 7 % (ref 5–12)
NEUTROPHILS # BLD AUTO: 8.67 10*3/MM3 (ref 1.7–7)
NEUTROPHILS NFR BLD AUTO: 72.5 % (ref 42.7–76)
NOTE: ABNORMAL
NRBC BLD AUTO-RTO: 0 /100 WBC (ref 0–0.2)
OXYHGB MFR BLDV: 91.6 % (ref 94–99)
PCO2 BLDA: 39.4 MM HG (ref 35–45)
PCO2 TEMP ADJ BLD: ABNORMAL MM[HG]
PH BLDA: 7.47 PH UNITS (ref 7.3–7.5)
PH, TEMP CORRECTED: ABNORMAL
PLATELET # BLD AUTO: 199 10*3/MM3 (ref 140–450)
PMV BLD AUTO: 12.1 FL (ref 6–12)
PO2 BLDA: 62.2 MM HG (ref 75–100)
PO2 TEMP ADJ BLD: ABNORMAL MM[HG]
POTASSIUM BLD-SCNC: 4 MMOL/L (ref 3.5–5.2)
PROCALCITONIN SERPL-MCNC: 0.16 NG/ML (ref 0.1–0.25)
PROT SERPL-MCNC: 6.8 G/DL (ref 6–8.5)
RBC # BLD AUTO: 3.67 10*6/MM3 (ref 3.77–5.28)
RHINOVIRUS RNA SPEC NAA+PROBE: NOT DETECTED
RSV RNA NPH QL NAA+NON-PROBE: NOT DETECTED
SAO2 % BLDCOA: 93.1 % (ref 94–100)
SODIUM BLD-SCNC: 136 MMOL/L (ref 136–145)
VENTILATOR MODE: ABNORMAL
WBC NRBC COR # BLD: 11.96 10*3/MM3 (ref 3.4–10.8)

## 2020-03-24 PROCEDURE — 87641 MR-STAPH DNA AMP PROBE: CPT | Performed by: FAMILY MEDICINE

## 2020-03-24 PROCEDURE — 87635 SARS-COV-2 COVID-19 AMP PRB: CPT | Performed by: FAMILY MEDICINE

## 2020-03-24 PROCEDURE — 83605 ASSAY OF LACTIC ACID: CPT | Performed by: EMERGENCY MEDICINE

## 2020-03-24 PROCEDURE — 87081 CULTURE SCREEN ONLY: CPT | Performed by: FAMILY MEDICINE

## 2020-03-24 PROCEDURE — 85025 COMPLETE CBC W/AUTO DIFF WBC: CPT | Performed by: EMERGENCY MEDICINE

## 2020-03-24 PROCEDURE — U0001 2019-NCOV DIAGNOSTIC P: HCPCS | Performed by: FAMILY MEDICINE

## 2020-03-24 PROCEDURE — 86140 C-REACTIVE PROTEIN: CPT | Performed by: EMERGENCY MEDICINE

## 2020-03-24 PROCEDURE — 71045 X-RAY EXAM CHEST 1 VIEW: CPT

## 2020-03-24 PROCEDURE — 99285 EMERGENCY DEPT VISIT HI MDM: CPT

## 2020-03-24 PROCEDURE — 82375 ASSAY CARBOXYHB QUANT: CPT

## 2020-03-24 PROCEDURE — 84145 PROCALCITONIN (PCT): CPT | Performed by: EMERGENCY MEDICINE

## 2020-03-24 PROCEDURE — 94799 UNLISTED PULMONARY SVC/PX: CPT

## 2020-03-24 PROCEDURE — 25010000002 METHYLPREDNISOLONE PER 125 MG: Performed by: EMERGENCY MEDICINE

## 2020-03-24 PROCEDURE — 82805 BLOOD GASES W/O2 SATURATION: CPT

## 2020-03-24 PROCEDURE — 25010000002 PIPERACILLIN SOD-TAZOBACTAM PER 1 G: Performed by: FAMILY MEDICINE

## 2020-03-24 PROCEDURE — 87635 SARS-COV-2 COVID-19 AMP PRB: CPT | Performed by: EMERGENCY MEDICINE

## 2020-03-24 PROCEDURE — U0003 INFECTIOUS AGENT DETECTION BY NUCLEIC ACID (DNA OR RNA); SEVERE ACUTE RESPIRATORY SYNDROME CORONAVIRUS 2 (SARS-COV-2) (CORONAVIRUS DISEASE [COVID-19]), AMPLIFIED PROBE TECHNIQUE, MAKING USE OF HIGH THROUGHPUT TECHNOLOGIES AS DESCRIBED BY CMS-2020-01-R: HCPCS | Performed by: EMERGENCY MEDICINE

## 2020-03-24 PROCEDURE — 80053 COMPREHEN METABOLIC PANEL: CPT | Performed by: EMERGENCY MEDICINE

## 2020-03-24 PROCEDURE — 83615 LACTATE (LD) (LDH) ENZYME: CPT | Performed by: EMERGENCY MEDICINE

## 2020-03-24 PROCEDURE — 99222 1ST HOSP IP/OBS MODERATE 55: CPT | Performed by: FAMILY MEDICINE

## 2020-03-24 PROCEDURE — 36600 WITHDRAWAL OF ARTERIAL BLOOD: CPT

## 2020-03-24 PROCEDURE — 0099U HC BIOFIRE FILMARRAY RESP PANEL 1: CPT | Performed by: EMERGENCY MEDICINE

## 2020-03-24 PROCEDURE — 99309 SBSQ NF CARE MODERATE MDM 30: CPT | Performed by: PHYSICIAN ASSISTANT

## 2020-03-24 PROCEDURE — 83050 HGB METHEMOGLOBIN QUAN: CPT

## 2020-03-24 PROCEDURE — 25010000002 ENOXAPARIN PER 10 MG: Performed by: FAMILY MEDICINE

## 2020-03-24 RX ORDER — PREDNISONE 20 MG/1
20 TABLET ORAL 2 TIMES DAILY
Qty: 10 TABLET | Refills: 0 | Status: SHIPPED | OUTPATIENT
Start: 2020-03-24 | End: 2020-03-29

## 2020-03-24 RX ORDER — BUDESONIDE AND FORMOTEROL FUMARATE DIHYDRATE 160; 4.5 UG/1; UG/1
2 AEROSOL RESPIRATORY (INHALATION)
Status: DISCONTINUED | OUTPATIENT
Start: 2020-03-24 | End: 2020-03-26

## 2020-03-24 RX ORDER — FAMOTIDINE 20 MG/1
20 TABLET, FILM COATED ORAL 2 TIMES DAILY
Status: DISCONTINUED | OUTPATIENT
Start: 2020-03-24 | End: 2020-03-31 | Stop reason: HOSPADM

## 2020-03-24 RX ORDER — METHYLPREDNISOLONE SODIUM SUCCINATE 125 MG/2ML
125 INJECTION, POWDER, LYOPHILIZED, FOR SOLUTION INTRAMUSCULAR; INTRAVENOUS ONCE
Status: COMPLETED | OUTPATIENT
Start: 2020-03-24 | End: 2020-03-24

## 2020-03-24 RX ORDER — HYDRALAZINE HYDROCHLORIDE 10 MG/1
10 TABLET, FILM COATED ORAL EVERY 6 HOURS PRN
Status: DISCONTINUED | OUTPATIENT
Start: 2020-03-24 | End: 2020-03-31 | Stop reason: HOSPADM

## 2020-03-24 RX ORDER — METHYLPREDNISOLONE SODIUM SUCCINATE 125 MG/2ML
60 INJECTION, POWDER, LYOPHILIZED, FOR SOLUTION INTRAMUSCULAR; INTRAVENOUS EVERY 6 HOURS
Status: DISCONTINUED | OUTPATIENT
Start: 2020-03-24 | End: 2020-03-25

## 2020-03-24 RX ORDER — BISACODYL 10 MG
10 SUPPOSITORY, RECTAL RECTAL DAILY PRN
Status: DISCONTINUED | OUTPATIENT
Start: 2020-03-24 | End: 2020-03-31 | Stop reason: HOSPADM

## 2020-03-24 RX ORDER — SODIUM CHLORIDE 0.9 % (FLUSH) 0.9 %
10 SYRINGE (ML) INJECTION EVERY 12 HOURS SCHEDULED
Status: DISCONTINUED | OUTPATIENT
Start: 2020-03-24 | End: 2020-03-31 | Stop reason: HOSPADM

## 2020-03-24 RX ORDER — ACETYLCYSTEINE 200 MG/ML
2 SOLUTION ORAL; RESPIRATORY (INHALATION)
Status: CANCELLED | OUTPATIENT
Start: 2020-03-24

## 2020-03-24 RX ORDER — HYOSCYAMINE SULFATE 0.125 MG
125 TABLET,DISINTEGRATING ORAL 2 TIMES DAILY
Status: DISCONTINUED | OUTPATIENT
Start: 2020-03-24 | End: 2020-03-31 | Stop reason: HOSPADM

## 2020-03-24 RX ORDER — BACLOFEN 10 MG/1
10 TABLET ORAL EVERY 8 HOURS
Status: DISCONTINUED | OUTPATIENT
Start: 2020-03-24 | End: 2020-03-26

## 2020-03-24 RX ORDER — ONDANSETRON 4 MG/1
4 TABLET, FILM COATED ORAL EVERY 6 HOURS PRN
Status: DISCONTINUED | OUTPATIENT
Start: 2020-03-24 | End: 2020-03-31 | Stop reason: HOSPADM

## 2020-03-24 RX ORDER — BUDESONIDE 0.5 MG/2ML
1 INHALANT ORAL 2 TIMES DAILY
Status: CANCELLED | OUTPATIENT
Start: 2020-03-24

## 2020-03-24 RX ORDER — SODIUM CHLORIDE 0.9 % (FLUSH) 0.9 %
10 SYRINGE (ML) INJECTION AS NEEDED
Status: DISCONTINUED | OUTPATIENT
Start: 2020-03-24 | End: 2020-03-31 | Stop reason: HOSPADM

## 2020-03-24 RX ORDER — IPRATROPIUM BROMIDE AND ALBUTEROL SULFATE 2.5; .5 MG/3ML; MG/3ML
3 SOLUTION RESPIRATORY (INHALATION)
Status: CANCELLED | OUTPATIENT
Start: 2020-03-24

## 2020-03-24 RX ORDER — ACETAMINOPHEN 500 MG
500 TABLET ORAL EVERY 4 HOURS PRN
Status: DISCONTINUED | OUTPATIENT
Start: 2020-03-24 | End: 2020-03-31 | Stop reason: HOSPADM

## 2020-03-24 RX ORDER — SODIUM CHLORIDE, SODIUM LACTATE, POTASSIUM CHLORIDE, CALCIUM CHLORIDE 600; 310; 30; 20 MG/100ML; MG/100ML; MG/100ML; MG/100ML
30 INJECTION, SOLUTION INTRAVENOUS CONTINUOUS
Status: DISCONTINUED | OUTPATIENT
Start: 2020-03-24 | End: 2020-03-29

## 2020-03-24 RX ORDER — ALPRAZOLAM 0.5 MG/1
0.5 TABLET ORAL EVERY 6 HOURS
Status: DISCONTINUED | OUTPATIENT
Start: 2020-03-24 | End: 2020-03-26

## 2020-03-24 RX ADMIN — TAZOBACTAM SODIUM AND PIPERACILLIN SODIUM 3.38 G: 375; 3 INJECTION, SOLUTION INTRAVENOUS at 22:13

## 2020-03-24 RX ADMIN — SODIUM CHLORIDE, PRESERVATIVE FREE 10 ML: 5 INJECTION INTRAVENOUS at 21:48

## 2020-03-24 RX ADMIN — FAMOTIDINE 20 MG: 20 TABLET ORAL at 21:47

## 2020-03-24 RX ADMIN — ALPRAZOLAM 0.5 MG: 0.5 TABLET ORAL at 21:47

## 2020-03-24 RX ADMIN — SODIUM CHLORIDE, POTASSIUM CHLORIDE, SODIUM LACTATE AND CALCIUM CHLORIDE 100 ML/HR: 600; 310; 30; 20 INJECTION, SOLUTION INTRAVENOUS at 21:48

## 2020-03-24 RX ADMIN — HYOSCYAMINE SULFATE 0.12 MG: 0.12 TABLET, ORALLY DISINTEGRATING ORAL; SUBLINGUAL at 21:47

## 2020-03-24 RX ADMIN — BUDESONIDE AND FORMOTEROL FUMARATE DIHYDRATE 2 PUFF: 160; 4.5 AEROSOL RESPIRATORY (INHALATION) at 21:45

## 2020-03-24 RX ADMIN — METHYLPREDNISOLONE SODIUM SUCCINATE 125 MG: 125 INJECTION, POWDER, FOR SOLUTION INTRAMUSCULAR; INTRAVENOUS at 17:17

## 2020-03-24 RX ADMIN — SODIUM CHLORIDE 1000 ML: 9 INJECTION, SOLUTION INTRAVENOUS at 13:57

## 2020-03-24 RX ADMIN — BACLOFEN 10 MG: 10 TABLET ORAL at 21:47

## 2020-03-24 RX ADMIN — ENOXAPARIN SODIUM 40 MG: 40 INJECTION SUBCUTANEOUS at 21:46

## 2020-03-24 NOTE — PROGRESS NOTES
Nursing Home Progress Note        Kemal Shaw, DO ?  Liya Campbell, APRN ?  [address] [facility], [place] [address]  Phone: [phone]  Fax: [phone] Emperatriz Greene MD ?  Jamie Brown, DO ?  Noemy Garcia PA-C [x]   [address] [place] [address]  Phone: [phone]  Fax: [phone]     PATIENT NAME: Viji Vargas                                                                          YOB: 1960           DATE OF SERVICE: 3/24/2020  FACILITY:  [] [place]   [x] [place]   [] [facility]   [] [facility]   [] Other ______________________________________________________________________     CHIEF COMPLAINT:  Hypoxia and increased tracheal secretions.      HISTORY OF PRESENT ILLNESS:   Ms. Vargas is a 58 y/o female with PMH significant for cardiac arrest resulting in anoxic brain injury.  She is non-verbal and does not follow commands at baseline.  PEG and tracheostomy are in place.  She presents today at request from staff, as they have noted increased tracheal secretions and hypoxia.  She has required increased suctioning.   Secretions are noted to be opaque pale yellow.  RT at bedside report that hypoxia improves with suctioning.  No reports of fever.     PAST MEDICAL & SURGICAL HISTORY:   Past Medical History:   Diagnosis Date   • COPD (chronic obstructive pulmonary disease) (CMS/HCC)    • Hypertension    • Pneumonia       Past Surgical History:   Procedure Laterality Date   • HYSTERECTOMY      Partial    • TRACHEOSTOMY AND PEG TUBE INSERTION N/A 3/20/2019    Procedure: TRACHEOSTOMY AND PERCUTANEOUS ENDOSCOPIC GASTROSTOMY TUBE INSERTION;  Surgeon: Nadira Pandey MD;  Location: [facility];  Service: General         MEDICATIONS:  I have reviewed and reconciled the patients medication list in the patients chart at the skilled nursing facility today.      ALLERGIES:  No Known Allergies      SOCIAL HISTORY:  Social History     Socioeconomic History   • Marital status:       Spouse name: Not on file   • Number of children: Not on file   • Years of education: Not on file   • Highest education level: Not on file   Tobacco Use   • Smoking status: Current Every Day Smoker     Packs/day: 1.00     Types: Electronic Cigarette, Cigarettes   Substance and Sexual Activity   • Alcohol use: Yes     Comment: wine    • Drug use: No       FAMILY HISTORY:  No family history on file.    REVIEW OF SYSTEMS:  Review of Systems   Unable to perform ROS: Patient nonverbal   Constitutional: Negative for fever.   Respiratory: Negative for cough and shortness of breath.         Increased tracheal secretions, increased work of breathing.    Cardiovascular: Negative for leg swelling.   Gastrointestinal: Negative for diarrhea and vomiting.   Psychiatric/Behavioral: Negative for agitation.        PHYSICAL EXAMINATION:     VITAL SIGNS:  /93   Pulse 102   Temp 98.4 °F (36.9 °C)   Resp 22   SpO2 91%     Physical Exam   Constitutional: She appears ill (chroinc). No distress.   Nonverbal.   HENT:   Head: Normocephalic and atraumatic.   Eyes: Pupils are equal, round, and reactive to light. Conjunctivae and EOM are normal.   Neck: Normal range of motion. Neck supple. No JVD present.   Trach in place, increased yellow opaque secretions noted.    Cardiovascular: Normal rate, regular rhythm, normal heart sounds and intact distal pulses.   Pulmonary/Chest: Effort normal. She has no wheezes. She has rhonchi (coarse).   Increased work of breathing.    Abdominal: Soft. She exhibits no distension. There is no tenderness.   PEG in place, no erythema or exudate.    Musculoskeletal: She exhibits no edema.   Does not move extremities. Contractures noted.    Neurological:   Patient is non verbal.    Skin: Skin is warm and dry. Capillary refill takes less than 2 seconds. She is not diaphoretic. No pallor.   Psychiatric: She has a normal mood and affect. Her behavior is normal.   Nursing note and vitals  reviewed.      RECORDS REVIEW:   N/A    ASSESSMENT     Diagnoses and all orders for this visit:    Anoxic encephalopathy (CMS/HCC)    Acute on chronic respiratory failure with hypoxia (CMS/HCC)    Increased tracheal secretions      PLAN  Called to discuss goals of care with daughter, patient is FULL CODE.  She requests aggressive measures.  If patient condition deteriorates, she requests transfer to the hospital for acute interventions.  She is in agreement with obtaining diagnostics and beginning medications in the facility.  Will start Rocephin 1 gram IM x 5 days and Z-Pack.  STAT CXR 2 view.  STAT CBC and CMP.  Mucinex 1200 BID x 14 days.  Increase Hyoscyamine to every 6 hours x 14 days.  Monitor vitals closely for any acute changes.  Patient has history of sepsis, condition has deteriorated quickly in the past.            [x]  Discussed Patient in detail with nursing/staff, addressed all needs today.     [x]  Plan of Care Reviewed   []  PT/OT Reviewed   []  Order Changes  []  Discharge Plans Reviewed  [x]  Advance Directive on file with Nursing Home.   [x]  POA on file with Nursing Home.    [x]  Code Status listed:  [x]  Full Code   []  DNR         Noemy Garcia PA-C.  3/24/2020

## 2020-03-25 LAB
ANION GAP SERPL CALCULATED.3IONS-SCNC: 13 MMOL/L (ref 5–15)
BUN BLD-MCNC: 15 MG/DL (ref 8–23)
BUN/CREAT SERPL: 31.3 (ref 7–25)
CALCIUM SPEC-SCNC: 9.4 MG/DL (ref 8.6–10.5)
CHLORIDE SERPL-SCNC: 104 MMOL/L (ref 98–107)
CO2 SERPL-SCNC: 24 MMOL/L (ref 22–29)
CREAT BLD-MCNC: 0.48 MG/DL (ref 0.57–1)
CRP SERPL-MCNC: 8.75 MG/DL (ref 0–0.5)
D-LACTATE SERPL-SCNC: 0.7 MMOL/L (ref 0.5–2)
DEPRECATED RDW RBC AUTO: 49.2 FL (ref 37–54)
ERYTHROCYTE [DISTWIDTH] IN BLOOD BY AUTOMATED COUNT: 13.1 % (ref 12.3–15.4)
GFR SERPL CREATININE-BSD FRML MDRD: 131 ML/MIN/1.73
GLUCOSE BLD-MCNC: 151 MG/DL (ref 65–99)
HCT VFR BLD AUTO: 36.5 % (ref 34–46.6)
HGB BLD-MCNC: 11.5 G/DL (ref 12–15.9)
MCH RBC QN AUTO: 32.1 PG (ref 26.6–33)
MCHC RBC AUTO-ENTMCNC: 31.5 G/DL (ref 31.5–35.7)
MCV RBC AUTO: 102 FL (ref 79–97)
MRSA DNA SPEC QL NAA+PROBE: NORMAL
PLATELET # BLD AUTO: 195 10*3/MM3 (ref 140–450)
PMV BLD AUTO: 12.3 FL (ref 6–12)
POTASSIUM BLD-SCNC: 3.9 MMOL/L (ref 3.5–5.2)
PROCALCITONIN SERPL-MCNC: 0.13 NG/ML (ref 0.1–0.25)
RBC # BLD AUTO: 3.58 10*6/MM3 (ref 3.77–5.28)
SODIUM BLD-SCNC: 141 MMOL/L (ref 136–145)
WBC NRBC COR # BLD: 8.39 10*3/MM3 (ref 3.4–10.8)

## 2020-03-25 PROCEDURE — 85027 COMPLETE CBC AUTOMATED: CPT | Performed by: FAMILY MEDICINE

## 2020-03-25 PROCEDURE — 87040 BLOOD CULTURE FOR BACTERIA: CPT | Performed by: FAMILY MEDICINE

## 2020-03-25 PROCEDURE — 94799 UNLISTED PULMONARY SVC/PX: CPT

## 2020-03-25 PROCEDURE — 86140 C-REACTIVE PROTEIN: CPT | Performed by: FAMILY MEDICINE

## 2020-03-25 PROCEDURE — 76937 US GUIDE VASCULAR ACCESS: CPT | Performed by: INTERNAL MEDICINE

## 2020-03-25 PROCEDURE — 36556 INSERT NON-TUNNEL CV CATH: CPT | Performed by: INTERNAL MEDICINE

## 2020-03-25 PROCEDURE — 02HV33Z INSERTION OF INFUSION DEVICE INTO SUPERIOR VENA CAVA, PERCUTANEOUS APPROACH: ICD-10-PCS | Performed by: INTERNAL MEDICINE

## 2020-03-25 PROCEDURE — 84145 PROCALCITONIN (PCT): CPT | Performed by: FAMILY MEDICINE

## 2020-03-25 PROCEDURE — 80048 BASIC METABOLIC PNL TOTAL CA: CPT | Performed by: FAMILY MEDICINE

## 2020-03-25 PROCEDURE — 25010000002 VANCOMYCIN 5 G RECONSTITUTED SOLUTION 5,000 MG VIAL: Performed by: FAMILY MEDICINE

## 2020-03-25 PROCEDURE — 99223 1ST HOSP IP/OBS HIGH 75: CPT | Performed by: INTERNAL MEDICINE

## 2020-03-25 PROCEDURE — 25010000002 AZITHROMYCIN 500 MG/250 ML: Performed by: FAMILY MEDICINE

## 2020-03-25 PROCEDURE — 25010000002 PIPERACILLIN SOD-TAZOBACTAM PER 1 G: Performed by: FAMILY MEDICINE

## 2020-03-25 PROCEDURE — 25010000002 ENOXAPARIN PER 10 MG: Performed by: FAMILY MEDICINE

## 2020-03-25 PROCEDURE — 83605 ASSAY OF LACTIC ACID: CPT | Performed by: FAMILY MEDICINE

## 2020-03-25 RX ORDER — GUAIFENESIN 600 MG/1
1200 TABLET, EXTENDED RELEASE ORAL 2 TIMES DAILY
Status: ON HOLD | COMMUNITY
End: 2020-03-26

## 2020-03-25 RX ORDER — SODIUM CHLORIDE 0.9 % (FLUSH) 0.9 %
20 SYRINGE (ML) INJECTION AS NEEDED
Status: DISCONTINUED | OUTPATIENT
Start: 2020-03-25 | End: 2020-03-31 | Stop reason: HOSPADM

## 2020-03-25 RX ORDER — SODIUM CHLORIDE 0.9 % (FLUSH) 0.9 %
10 SYRINGE (ML) INJECTION AS NEEDED
Status: DISCONTINUED | OUTPATIENT
Start: 2020-03-25 | End: 2020-03-31 | Stop reason: HOSPADM

## 2020-03-25 RX ORDER — AMINO ACIDS/PROTEIN HYDROLYS 15G-100/30
30 LIQUID (ML) ORAL 2 TIMES DAILY
Status: DISCONTINUED | OUTPATIENT
Start: 2020-03-25 | End: 2020-03-31 | Stop reason: HOSPADM

## 2020-03-25 RX ORDER — PHENYLEPHRINE HCL IN 0.9% NACL 0.5 MG/5ML
.5-3 SYRINGE (ML) INTRAVENOUS
Status: DISCONTINUED | OUTPATIENT
Start: 2020-03-25 | End: 2020-03-27

## 2020-03-25 RX ORDER — SODIUM CHLORIDE 0.9 % (FLUSH) 0.9 %
10 SYRINGE (ML) INJECTION EVERY 12 HOURS SCHEDULED
Status: DISCONTINUED | OUTPATIENT
Start: 2020-03-25 | End: 2020-03-31 | Stop reason: HOSPADM

## 2020-03-25 RX ADMIN — BUDESONIDE AND FORMOTEROL FUMARATE DIHYDRATE 2 PUFF: 160; 4.5 AEROSOL RESPIRATORY (INHALATION) at 21:35

## 2020-03-25 RX ADMIN — BACLOFEN 10 MG: 10 TABLET ORAL at 05:34

## 2020-03-25 RX ADMIN — SODIUM CHLORIDE, PRESERVATIVE FREE 10 ML: 5 INJECTION INTRAVENOUS at 08:49

## 2020-03-25 RX ADMIN — SODIUM CHLORIDE, PRESERVATIVE FREE 10 ML: 5 INJECTION INTRAVENOUS at 14:20

## 2020-03-25 RX ADMIN — VANCOMYCIN HYDROCHLORIDE 1250 MG: 500 INJECTION, POWDER, LYOPHILIZED, FOR SOLUTION INTRAVENOUS at 08:49

## 2020-03-25 RX ADMIN — TAZOBACTAM SODIUM AND PIPERACILLIN SODIUM 3.38 G: 375; 3 INJECTION, SOLUTION INTRAVENOUS at 05:34

## 2020-03-25 RX ADMIN — TAZOBACTAM SODIUM AND PIPERACILLIN SODIUM 4.5 G: 500; 4 INJECTION, SOLUTION INTRAVENOUS at 13:06

## 2020-03-25 RX ADMIN — ALPRAZOLAM 0.5 MG: 0.5 TABLET ORAL at 22:04

## 2020-03-25 RX ADMIN — BACLOFEN 10 MG: 10 TABLET ORAL at 13:21

## 2020-03-25 RX ADMIN — FAMOTIDINE 20 MG: 20 TABLET ORAL at 22:04

## 2020-03-25 RX ADMIN — SODIUM CHLORIDE, PRESERVATIVE FREE 10 ML: 5 INJECTION INTRAVENOUS at 22:07

## 2020-03-25 RX ADMIN — SODIUM CHLORIDE, POTASSIUM CHLORIDE, SODIUM LACTATE AND CALCIUM CHLORIDE 100 ML/HR: 600; 310; 30; 20 INJECTION, SOLUTION INTRAVENOUS at 13:06

## 2020-03-25 RX ADMIN — ENOXAPARIN SODIUM 40 MG: 40 INJECTION SUBCUTANEOUS at 22:03

## 2020-03-25 RX ADMIN — FAMOTIDINE 20 MG: 20 TABLET ORAL at 08:48

## 2020-03-25 RX ADMIN — SODIUM CHLORIDE, PRESERVATIVE FREE 10 ML: 5 INJECTION INTRAVENOUS at 22:08

## 2020-03-25 RX ADMIN — ALPRAZOLAM 0.5 MG: 0.5 TABLET ORAL at 10:32

## 2020-03-25 RX ADMIN — ALPRAZOLAM 0.5 MG: 0.5 TABLET ORAL at 15:19

## 2020-03-25 RX ADMIN — BACLOFEN 10 MG: 10 TABLET ORAL at 22:04

## 2020-03-25 RX ADMIN — Medication 30 ML: at 22:03

## 2020-03-25 RX ADMIN — TAZOBACTAM SODIUM AND PIPERACILLIN SODIUM 4.5 G: 500; 4 INJECTION, SOLUTION INTRAVENOUS at 22:08

## 2020-03-25 RX ADMIN — HYOSCYAMINE SULFATE 0.12 MG: 0.12 TABLET, ORALLY DISINTEGRATING ORAL; SUBLINGUAL at 22:04

## 2020-03-25 RX ADMIN — AZITHROMYCIN 500 MG: 500 INJECTION, POWDER, LYOPHILIZED, FOR SOLUTION INTRAVENOUS at 10:35

## 2020-03-25 RX ADMIN — HYOSCYAMINE SULFATE 0.12 MG: 0.12 TABLET, ORALLY DISINTEGRATING ORAL; SUBLINGUAL at 08:48

## 2020-03-26 ENCOUNTER — APPOINTMENT (OUTPATIENT)
Dept: GENERAL RADIOLOGY | Facility: HOSPITAL | Age: 60
End: 2020-03-26

## 2020-03-26 PROBLEM — J18.9 HEALTHCARE-ASSOCIATED PNEUMONIA: Status: ACTIVE | Noted: 2020-03-26

## 2020-03-26 PROBLEM — J96.21 ACUTE ON CHRONIC RESPIRATORY FAILURE WITH HYPOXIA: Status: ACTIVE | Noted: 2020-03-26

## 2020-03-26 PROBLEM — A41.9 SHOCK, SEPTIC (HCC): Status: ACTIVE | Noted: 2020-03-26

## 2020-03-26 PROBLEM — R65.21 SHOCK, SEPTIC: Status: ACTIVE | Noted: 2020-03-26

## 2020-03-26 LAB
ACINETOBACTER SCREEN CX: NORMAL
ALBUMIN SERPL-MCNC: 3 G/DL (ref 3.5–5.2)
ALBUMIN/GLOB SERPL: 1.2 G/DL
ALP SERPL-CCNC: 61 U/L (ref 39–117)
ALT SERPL W P-5'-P-CCNC: 21 U/L (ref 1–33)
ANION GAP SERPL CALCULATED.3IONS-SCNC: 10.8 MMOL/L (ref 5–15)
AST SERPL-CCNC: 19 U/L (ref 1–32)
BASOPHILS # BLD AUTO: 0.01 10*3/MM3 (ref 0–0.2)
BASOPHILS NFR BLD AUTO: 0.1 % (ref 0–1.5)
BILIRUB SERPL-MCNC: 0.3 MG/DL (ref 0.2–1.2)
BUN BLD-MCNC: 19 MG/DL (ref 8–23)
BUN/CREAT SERPL: 45.2 (ref 7–25)
CALCIUM SPEC-SCNC: 8.9 MG/DL (ref 8.6–10.5)
CHLORIDE SERPL-SCNC: 108 MMOL/L (ref 98–107)
CO2 SERPL-SCNC: 26.2 MMOL/L (ref 22–29)
CREAT BLD-MCNC: 0.42 MG/DL (ref 0.57–1)
CRP SERPL-MCNC: 1.93 MG/DL (ref 0–0.5)
DEPRECATED RDW RBC AUTO: 49.3 FL (ref 37–54)
EOSINOPHIL # BLD AUTO: 0.01 10*3/MM3 (ref 0–0.4)
EOSINOPHIL NFR BLD AUTO: 0.1 % (ref 0.3–6.2)
ERYTHROCYTE [DISTWIDTH] IN BLOOD BY AUTOMATED COUNT: 13.1 % (ref 12.3–15.4)
GFR SERPL CREATININE-BSD FRML MDRD: >150 ML/MIN/1.73
GLOBULIN UR ELPH-MCNC: 2.6 GM/DL
GLUCOSE BLD-MCNC: 142 MG/DL (ref 65–99)
GLUCOSE BLDC GLUCOMTR-MCNC: 128 MG/DL (ref 70–130)
GLUCOSE BLDC GLUCOMTR-MCNC: 143 MG/DL (ref 70–130)
HCT VFR BLD AUTO: 31.2 % (ref 34–46.6)
HGB BLD-MCNC: 9.9 G/DL (ref 12–15.9)
IMM GRANULOCYTES # BLD AUTO: 0.04 10*3/MM3 (ref 0–0.05)
IMM GRANULOCYTES NFR BLD AUTO: 0.4 % (ref 0–0.5)
LYMPHOCYTES # BLD AUTO: 1.82 10*3/MM3 (ref 0.7–3.1)
LYMPHOCYTES NFR BLD AUTO: 17.5 % (ref 19.6–45.3)
MAGNESIUM SERPL-MCNC: 2 MG/DL (ref 1.6–2.4)
MCH RBC QN AUTO: 32.6 PG (ref 26.6–33)
MCHC RBC AUTO-ENTMCNC: 31.7 G/DL (ref 31.5–35.7)
MCV RBC AUTO: 102.6 FL (ref 79–97)
MONOCYTES # BLD AUTO: 0.76 10*3/MM3 (ref 0.1–0.9)
MONOCYTES NFR BLD AUTO: 7.3 % (ref 5–12)
NEUTROPHILS # BLD AUTO: 7.75 10*3/MM3 (ref 1.7–7)
NEUTROPHILS NFR BLD AUTO: 74.6 % (ref 42.7–76)
NRBC BLD AUTO-RTO: 0 /100 WBC (ref 0–0.2)
PLATELET # BLD AUTO: 202 10*3/MM3 (ref 140–450)
PMV BLD AUTO: 12.3 FL (ref 6–12)
POTASSIUM BLD-SCNC: 3.3 MMOL/L (ref 3.5–5.2)
PROCALCITONIN SERPL-MCNC: 0.3 NG/ML (ref 0.1–0.25)
PROT SERPL-MCNC: 5.6 G/DL (ref 6–8.5)
RBC # BLD AUTO: 3.04 10*6/MM3 (ref 3.77–5.28)
REF LAB TEST METHOD: NORMAL
SARS-COV-2 RNA RESP QL NAA+PROBE: NOT DETECTED
SODIUM BLD-SCNC: 145 MMOL/L (ref 136–145)
VRE SPEC QL CULT: NORMAL
WBC NRBC COR # BLD: 10.39 10*3/MM3 (ref 3.4–10.8)

## 2020-03-26 PROCEDURE — 71045 X-RAY EXAM CHEST 1 VIEW: CPT

## 2020-03-26 PROCEDURE — 94640 AIRWAY INHALATION TREATMENT: CPT

## 2020-03-26 PROCEDURE — 99232 SBSQ HOSP IP/OBS MODERATE 35: CPT | Performed by: FAMILY MEDICINE

## 2020-03-26 PROCEDURE — 99233 SBSQ HOSP IP/OBS HIGH 50: CPT | Performed by: INTERNAL MEDICINE

## 2020-03-26 PROCEDURE — 25010000002 ENOXAPARIN PER 10 MG: Performed by: FAMILY MEDICINE

## 2020-03-26 PROCEDURE — 94799 UNLISTED PULMONARY SVC/PX: CPT

## 2020-03-26 PROCEDURE — 84145 PROCALCITONIN (PCT): CPT | Performed by: FAMILY MEDICINE

## 2020-03-26 PROCEDURE — 25010000002 PIPERACILLIN SOD-TAZOBACTAM PER 1 G: Performed by: FAMILY MEDICINE

## 2020-03-26 PROCEDURE — 83735 ASSAY OF MAGNESIUM: CPT | Performed by: FAMILY MEDICINE

## 2020-03-26 PROCEDURE — 80053 COMPREHEN METABOLIC PANEL: CPT | Performed by: FAMILY MEDICINE

## 2020-03-26 PROCEDURE — 25010000002 AZITHROMYCIN 500 MG/250 ML: Performed by: FAMILY MEDICINE

## 2020-03-26 PROCEDURE — 86140 C-REACTIVE PROTEIN: CPT | Performed by: FAMILY MEDICINE

## 2020-03-26 PROCEDURE — 82962 GLUCOSE BLOOD TEST: CPT

## 2020-03-26 PROCEDURE — 85025 COMPLETE CBC W/AUTO DIFF WBC: CPT | Performed by: FAMILY MEDICINE

## 2020-03-26 RX ORDER — ALPRAZOLAM 0.25 MG/1
0.25 TABLET ORAL 3 TIMES DAILY
Status: DISCONTINUED | OUTPATIENT
Start: 2020-03-26 | End: 2020-03-31 | Stop reason: HOSPADM

## 2020-03-26 RX ORDER — BACLOFEN 10 MG/1
5 TABLET ORAL EVERY 8 HOURS
Status: DISCONTINUED | OUTPATIENT
Start: 2020-03-26 | End: 2020-03-27

## 2020-03-26 RX ORDER — BUDESONIDE 0.5 MG/2ML
1 INHALANT ORAL
Status: DISCONTINUED | OUTPATIENT
Start: 2020-03-26 | End: 2020-03-31 | Stop reason: HOSPADM

## 2020-03-26 RX ORDER — POTASSIUM CHLORIDE 1.5 G/1.77G
40 POWDER, FOR SOLUTION ORAL ONCE
Status: COMPLETED | OUTPATIENT
Start: 2020-03-26 | End: 2020-03-26

## 2020-03-26 RX ORDER — MULTIVIT WITH IRON,MINERALS
15 LIQUID (ML) ORAL DAILY
COMMUNITY

## 2020-03-26 RX ORDER — ARFORMOTEROL TARTRATE 15 UG/2ML
15 SOLUTION RESPIRATORY (INHALATION)
Status: DISCONTINUED | OUTPATIENT
Start: 2020-03-26 | End: 2020-03-31 | Stop reason: HOSPADM

## 2020-03-26 RX ADMIN — HYOSCYAMINE SULFATE 0.12 MG: 0.12 TABLET, ORALLY DISINTEGRATING ORAL; SUBLINGUAL at 09:28

## 2020-03-26 RX ADMIN — BUDESONIDE AND FORMOTEROL FUMARATE DIHYDRATE 2 PUFF: 160; 4.5 AEROSOL RESPIRATORY (INHALATION) at 07:26

## 2020-03-26 RX ADMIN — Medication 30 ML: at 09:28

## 2020-03-26 RX ADMIN — TAZOBACTAM SODIUM AND PIPERACILLIN SODIUM 4.5 G: 500; 4 INJECTION, SOLUTION INTRAVENOUS at 21:41

## 2020-03-26 RX ADMIN — SODIUM CHLORIDE, PRESERVATIVE FREE 10 ML: 5 INJECTION INTRAVENOUS at 21:39

## 2020-03-26 RX ADMIN — HYOSCYAMINE SULFATE 0.12 MG: 0.12 TABLET, ORALLY DISINTEGRATING ORAL; SUBLINGUAL at 21:42

## 2020-03-26 RX ADMIN — BACLOFEN 5 MG: 10 TABLET ORAL at 15:59

## 2020-03-26 RX ADMIN — TAZOBACTAM SODIUM AND PIPERACILLIN SODIUM 4.5 G: 500; 4 INJECTION, SOLUTION INTRAVENOUS at 04:11

## 2020-03-26 RX ADMIN — ALPRAZOLAM 0.25 MG: 0.25 TABLET ORAL at 21:42

## 2020-03-26 RX ADMIN — FAMOTIDINE 20 MG: 20 TABLET ORAL at 21:42

## 2020-03-26 RX ADMIN — FAMOTIDINE 20 MG: 20 TABLET ORAL at 09:29

## 2020-03-26 RX ADMIN — BACLOFEN 5 MG: 10 TABLET ORAL at 21:42

## 2020-03-26 RX ADMIN — SODIUM CHLORIDE, PRESERVATIVE FREE 10 ML: 5 INJECTION INTRAVENOUS at 21:41

## 2020-03-26 RX ADMIN — BACLOFEN 10 MG: 10 TABLET ORAL at 06:40

## 2020-03-26 RX ADMIN — ALPRAZOLAM 0.25 MG: 0.25 TABLET ORAL at 15:59

## 2020-03-26 RX ADMIN — SODIUM CHLORIDE, PRESERVATIVE FREE 10 ML: 5 INJECTION INTRAVENOUS at 09:28

## 2020-03-26 RX ADMIN — SODIUM CHLORIDE, PRESERVATIVE FREE 10 ML: 5 INJECTION INTRAVENOUS at 09:30

## 2020-03-26 RX ADMIN — Medication 30 ML: at 21:42

## 2020-03-26 RX ADMIN — AZITHROMYCIN 500 MG: 500 INJECTION, POWDER, LYOPHILIZED, FOR SOLUTION INTRAVENOUS at 09:50

## 2020-03-26 RX ADMIN — ENOXAPARIN SODIUM 40 MG: 40 INJECTION SUBCUTANEOUS at 21:41

## 2020-03-26 RX ADMIN — ARFORMOTEROL TARTRATE 15 MCG: 15 SOLUTION RESPIRATORY (INHALATION) at 12:52

## 2020-03-26 RX ADMIN — TAZOBACTAM SODIUM AND PIPERACILLIN SODIUM 4.5 G: 500; 4 INJECTION, SOLUTION INTRAVENOUS at 13:11

## 2020-03-26 RX ADMIN — BUDESONIDE 1 MG: 0.5 INHALANT RESPIRATORY (INHALATION) at 12:52

## 2020-03-26 RX ADMIN — ARFORMOTEROL TARTRATE 15 MCG: 15 SOLUTION RESPIRATORY (INHALATION) at 19:04

## 2020-03-26 RX ADMIN — SODIUM CHLORIDE, PRESERVATIVE FREE 10 ML: 5 INJECTION INTRAVENOUS at 21:40

## 2020-03-26 RX ADMIN — POTASSIUM CHLORIDE 40 MEQ: 1.5 POWDER, FOR SOLUTION ORAL at 09:28

## 2020-03-26 RX ADMIN — SODIUM CHLORIDE, PRESERVATIVE FREE 10 ML: 5 INJECTION INTRAVENOUS at 09:29

## 2020-03-26 RX ADMIN — BUDESONIDE 1 MG: 0.5 INHALANT RESPIRATORY (INHALATION) at 19:04

## 2020-03-27 PROCEDURE — 25010000002 CEFTRIAXONE SODIUM-DEXTROSE 2-2.22 GM-%(50ML) RECONSTITUTED SOLUTION: Performed by: FAMILY MEDICINE

## 2020-03-27 PROCEDURE — 94799 UNLISTED PULMONARY SVC/PX: CPT

## 2020-03-27 PROCEDURE — 99232 SBSQ HOSP IP/OBS MODERATE 35: CPT | Performed by: FAMILY MEDICINE

## 2020-03-27 PROCEDURE — 25010000002 AZITHROMYCIN 500 MG/250 ML: Performed by: FAMILY MEDICINE

## 2020-03-27 PROCEDURE — 99233 SBSQ HOSP IP/OBS HIGH 50: CPT | Performed by: INTERNAL MEDICINE

## 2020-03-27 PROCEDURE — 25010000002 PIPERACILLIN SOD-TAZOBACTAM PER 1 G: Performed by: FAMILY MEDICINE

## 2020-03-27 PROCEDURE — 25010000002 ENOXAPARIN PER 10 MG: Performed by: FAMILY MEDICINE

## 2020-03-27 RX ORDER — CARVEDILOL 6.25 MG/1
6.25 TABLET ORAL 2 TIMES DAILY WITH MEALS
Status: DISCONTINUED | OUTPATIENT
Start: 2020-03-27 | End: 2020-03-31 | Stop reason: HOSPADM

## 2020-03-27 RX ORDER — BACLOFEN 10 MG/1
5 TABLET ORAL 3 TIMES DAILY
Status: DISCONTINUED | OUTPATIENT
Start: 2020-03-27 | End: 2020-03-31 | Stop reason: HOSPADM

## 2020-03-27 RX ORDER — CEFTRIAXONE 2 G/50ML
2 INJECTION, SOLUTION INTRAVENOUS EVERY 24 HOURS
Status: COMPLETED | OUTPATIENT
Start: 2020-03-27 | End: 2020-03-28

## 2020-03-27 RX ORDER — MULTIVIT WITH IRON,MINERALS
15 LIQUID (ML) ORAL DAILY
Status: DISCONTINUED | OUTPATIENT
Start: 2020-03-27 | End: 2020-03-31 | Stop reason: HOSPADM

## 2020-03-27 RX ORDER — CHLORHEXIDINE GLUCONATE 0.12 MG/ML
15 RINSE ORAL EVERY 12 HOURS SCHEDULED
Status: DISCONTINUED | OUTPATIENT
Start: 2020-03-27 | End: 2020-03-31 | Stop reason: HOSPADM

## 2020-03-27 RX ORDER — L.ACID,PARA/B.BIFIDUM/S.THERM 8B CELL
1 CAPSULE ORAL 3 TIMES DAILY
Status: DISCONTINUED | OUTPATIENT
Start: 2020-03-27 | End: 2020-03-31 | Stop reason: HOSPADM

## 2020-03-27 RX ADMIN — SODIUM CHLORIDE, PRESERVATIVE FREE 10 ML: 5 INJECTION INTRAVENOUS at 22:13

## 2020-03-27 RX ADMIN — FAMOTIDINE 20 MG: 20 TABLET ORAL at 08:34

## 2020-03-27 RX ADMIN — ALPRAZOLAM 0.25 MG: 0.25 TABLET ORAL at 22:11

## 2020-03-27 RX ADMIN — CHLORHEXIDINE GLUCONATE 0.12% ORAL RINSE 15 ML: 1.2 LIQUID ORAL at 08:34

## 2020-03-27 RX ADMIN — AZITHROMYCIN 500 MG: 500 INJECTION, POWDER, LYOPHILIZED, FOR SOLUTION INTRAVENOUS at 08:34

## 2020-03-27 RX ADMIN — BUDESONIDE 1 MG: 0.5 INHALANT RESPIRATORY (INHALATION) at 18:54

## 2020-03-27 RX ADMIN — HYOSCYAMINE SULFATE 0.12 MG: 0.12 TABLET, ORALLY DISINTEGRATING ORAL; SUBLINGUAL at 08:34

## 2020-03-27 RX ADMIN — CHLORHEXIDINE GLUCONATE 0.12% ORAL RINSE 15 ML: 1.2 LIQUID ORAL at 22:11

## 2020-03-27 RX ADMIN — BACLOFEN 5 MG: 10 TABLET ORAL at 15:26

## 2020-03-27 RX ADMIN — BACLOFEN 5 MG: 10 TABLET ORAL at 05:29

## 2020-03-27 RX ADMIN — TAZOBACTAM SODIUM AND PIPERACILLIN SODIUM 4.5 G: 500; 4 INJECTION, SOLUTION INTRAVENOUS at 05:30

## 2020-03-27 RX ADMIN — Medication 1 CAPSULE: at 22:11

## 2020-03-27 RX ADMIN — Medication 30 ML: at 08:34

## 2020-03-27 RX ADMIN — ALPRAZOLAM 0.25 MG: 0.25 TABLET ORAL at 15:25

## 2020-03-27 RX ADMIN — SODIUM CHLORIDE, PRESERVATIVE FREE 10 ML: 5 INJECTION INTRAVENOUS at 08:35

## 2020-03-27 RX ADMIN — HYOSCYAMINE SULFATE 0.12 MG: 0.12 TABLET, ORALLY DISINTEGRATING ORAL; SUBLINGUAL at 23:00

## 2020-03-27 RX ADMIN — SODIUM CHLORIDE, PRESERVATIVE FREE 10 ML: 5 INJECTION INTRAVENOUS at 22:10

## 2020-03-27 RX ADMIN — Medication 1 CAPSULE: at 15:25

## 2020-03-27 RX ADMIN — SODIUM CHLORIDE, PRESERVATIVE FREE 10 ML: 5 INJECTION INTRAVENOUS at 22:14

## 2020-03-27 RX ADMIN — ALPRAZOLAM 0.25 MG: 0.25 TABLET ORAL at 08:34

## 2020-03-27 RX ADMIN — Medication 30 ML: at 22:12

## 2020-03-27 RX ADMIN — CARVEDILOL 6.25 MG: 6.25 TABLET, FILM COATED ORAL at 08:34

## 2020-03-27 RX ADMIN — ARFORMOTEROL TARTRATE 15 MCG: 15 SOLUTION RESPIRATORY (INHALATION) at 18:54

## 2020-03-27 RX ADMIN — BACLOFEN 5 MG: 10 TABLET ORAL at 22:11

## 2020-03-27 RX ADMIN — BUDESONIDE 1 MG: 0.5 INHALANT RESPIRATORY (INHALATION) at 06:54

## 2020-03-27 RX ADMIN — CEFTRIAXONE 2 G: 2 INJECTION, SOLUTION INTRAVENOUS at 13:59

## 2020-03-27 RX ADMIN — ENOXAPARIN SODIUM 40 MG: 40 INJECTION SUBCUTANEOUS at 22:11

## 2020-03-27 RX ADMIN — SODIUM CHLORIDE, PRESERVATIVE FREE 10 ML: 5 INJECTION INTRAVENOUS at 08:34

## 2020-03-27 RX ADMIN — FAMOTIDINE 20 MG: 20 TABLET ORAL at 22:12

## 2020-03-27 RX ADMIN — ARFORMOTEROL TARTRATE 15 MCG: 15 SOLUTION RESPIRATORY (INHALATION) at 06:54

## 2020-03-27 RX ADMIN — MULTIVITAMIN 15 ML: LIQUID ORAL at 08:34

## 2020-03-27 RX ADMIN — CARVEDILOL 6.25 MG: 6.25 TABLET, FILM COATED ORAL at 17:23

## 2020-03-28 LAB
ALBUMIN SERPL-MCNC: 2.8 G/DL (ref 3.5–5.2)
ALBUMIN/GLOB SERPL: 0.9 G/DL
ALP SERPL-CCNC: 63 U/L (ref 39–117)
ALT SERPL W P-5'-P-CCNC: 35 U/L (ref 1–33)
ANION GAP SERPL CALCULATED.3IONS-SCNC: 10.3 MMOL/L (ref 5–15)
AST SERPL-CCNC: 27 U/L (ref 1–32)
BASOPHILS # BLD AUTO: 0.01 10*3/MM3 (ref 0–0.2)
BASOPHILS NFR BLD AUTO: 0.1 % (ref 0–1.5)
BILIRUB SERPL-MCNC: 0.3 MG/DL (ref 0.2–1.2)
BUN BLD-MCNC: 15 MG/DL (ref 8–23)
BUN/CREAT SERPL: 48.4 (ref 7–25)
CALCIUM SPEC-SCNC: 9 MG/DL (ref 8.6–10.5)
CHLORIDE SERPL-SCNC: 104 MMOL/L (ref 98–107)
CO2 SERPL-SCNC: 26.7 MMOL/L (ref 22–29)
CREAT BLD-MCNC: 0.31 MG/DL (ref 0.57–1)
DEPRECATED RDW RBC AUTO: 47.6 FL (ref 37–54)
EOSINOPHIL # BLD AUTO: 0.12 10*3/MM3 (ref 0–0.4)
EOSINOPHIL NFR BLD AUTO: 1.5 % (ref 0.3–6.2)
ERYTHROCYTE [DISTWIDTH] IN BLOOD BY AUTOMATED COUNT: 13.1 % (ref 12.3–15.4)
GFR SERPL CREATININE-BSD FRML MDRD: >150 ML/MIN/1.73
GLOBULIN UR ELPH-MCNC: 3.1 GM/DL
GLUCOSE BLD-MCNC: 120 MG/DL (ref 65–99)
HCT VFR BLD AUTO: 32.2 % (ref 34–46.6)
HGB BLD-MCNC: 10.4 G/DL (ref 12–15.9)
IMM GRANULOCYTES # BLD AUTO: 0.03 10*3/MM3 (ref 0–0.05)
IMM GRANULOCYTES NFR BLD AUTO: 0.4 % (ref 0–0.5)
LYMPHOCYTES # BLD AUTO: 3.04 10*3/MM3 (ref 0.7–3.1)
LYMPHOCYTES NFR BLD AUTO: 38 % (ref 19.6–45.3)
MAGNESIUM SERPL-MCNC: 1.9 MG/DL (ref 1.6–2.4)
MCH RBC QN AUTO: 32.2 PG (ref 26.6–33)
MCHC RBC AUTO-ENTMCNC: 32.3 G/DL (ref 31.5–35.7)
MCV RBC AUTO: 99.7 FL (ref 79–97)
MONOCYTES # BLD AUTO: 0.6 10*3/MM3 (ref 0.1–0.9)
MONOCYTES NFR BLD AUTO: 7.5 % (ref 5–12)
NEUTROPHILS # BLD AUTO: 4.2 10*3/MM3 (ref 1.7–7)
NEUTROPHILS NFR BLD AUTO: 52.5 % (ref 42.7–76)
NRBC BLD AUTO-RTO: 0 /100 WBC (ref 0–0.2)
PLAT MORPH BLD: NORMAL
PLATELET # BLD AUTO: 194 10*3/MM3 (ref 140–450)
PMV BLD AUTO: 11.9 FL (ref 6–12)
POTASSIUM BLD-SCNC: 3.5 MMOL/L (ref 3.5–5.2)
PROCALCITONIN SERPL-MCNC: 0.19 NG/ML (ref 0.1–0.25)
PROT SERPL-MCNC: 5.9 G/DL (ref 6–8.5)
RBC # BLD AUTO: 3.23 10*6/MM3 (ref 3.77–5.28)
RBC MORPH BLD: NORMAL
SARS-COV-2 RNA RESP QL NAA+PROBE: NOT DETECTED
SODIUM BLD-SCNC: 141 MMOL/L (ref 136–145)
WBC MORPH BLD: NORMAL
WBC NRBC COR # BLD: 8 10*3/MM3 (ref 3.4–10.8)

## 2020-03-28 PROCEDURE — 94799 UNLISTED PULMONARY SVC/PX: CPT

## 2020-03-28 PROCEDURE — 25010000002 ENOXAPARIN PER 10 MG: Performed by: FAMILY MEDICINE

## 2020-03-28 PROCEDURE — 80053 COMPREHEN METABOLIC PANEL: CPT | Performed by: FAMILY MEDICINE

## 2020-03-28 PROCEDURE — 25010000002 CEFTRIAXONE SODIUM-DEXTROSE 2-2.22 GM-%(50ML) RECONSTITUTED SOLUTION: Performed by: FAMILY MEDICINE

## 2020-03-28 PROCEDURE — 99232 SBSQ HOSP IP/OBS MODERATE 35: CPT | Performed by: INTERNAL MEDICINE

## 2020-03-28 PROCEDURE — 84145 PROCALCITONIN (PCT): CPT | Performed by: FAMILY MEDICINE

## 2020-03-28 PROCEDURE — 83735 ASSAY OF MAGNESIUM: CPT | Performed by: FAMILY MEDICINE

## 2020-03-28 PROCEDURE — 85025 COMPLETE CBC W/AUTO DIFF WBC: CPT | Performed by: FAMILY MEDICINE

## 2020-03-28 PROCEDURE — 85007 BL SMEAR W/DIFF WBC COUNT: CPT | Performed by: FAMILY MEDICINE

## 2020-03-28 PROCEDURE — 99232 SBSQ HOSP IP/OBS MODERATE 35: CPT | Performed by: FAMILY MEDICINE

## 2020-03-28 RX ADMIN — BACLOFEN 5 MG: 10 TABLET ORAL at 10:05

## 2020-03-28 RX ADMIN — CARVEDILOL 6.25 MG: 6.25 TABLET, FILM COATED ORAL at 17:42

## 2020-03-28 RX ADMIN — ALPRAZOLAM 0.25 MG: 0.25 TABLET ORAL at 17:42

## 2020-03-28 RX ADMIN — FAMOTIDINE 20 MG: 20 TABLET ORAL at 23:15

## 2020-03-28 RX ADMIN — FAMOTIDINE 20 MG: 20 TABLET ORAL at 09:45

## 2020-03-28 RX ADMIN — Medication 1 CAPSULE: at 17:42

## 2020-03-28 RX ADMIN — CHLORHEXIDINE GLUCONATE 0.12% ORAL RINSE 15 ML: 1.2 LIQUID ORAL at 23:15

## 2020-03-28 RX ADMIN — BACLOFEN 5 MG: 10 TABLET ORAL at 17:42

## 2020-03-28 RX ADMIN — CEFTRIAXONE 2 G: 2 INJECTION, SOLUTION INTRAVENOUS at 14:21

## 2020-03-28 RX ADMIN — ALPRAZOLAM 0.25 MG: 0.25 TABLET ORAL at 09:46

## 2020-03-28 RX ADMIN — ARFORMOTEROL TARTRATE 15 MCG: 15 SOLUTION RESPIRATORY (INHALATION) at 06:53

## 2020-03-28 RX ADMIN — SODIUM CHLORIDE, POTASSIUM CHLORIDE, SODIUM LACTATE AND CALCIUM CHLORIDE 30 ML/HR: 600; 310; 30; 20 INJECTION, SOLUTION INTRAVENOUS at 06:34

## 2020-03-28 RX ADMIN — ACETAMINOPHEN 500 MG: 500 TABLET, FILM COATED ORAL at 14:21

## 2020-03-28 RX ADMIN — BUDESONIDE 1 MG: 0.5 INHALANT RESPIRATORY (INHALATION) at 06:53

## 2020-03-28 RX ADMIN — MULTIVITAMIN 15 ML: LIQUID ORAL at 09:51

## 2020-03-28 RX ADMIN — HYOSCYAMINE SULFATE 0.12 MG: 0.12 TABLET, ORALLY DISINTEGRATING ORAL; SUBLINGUAL at 12:18

## 2020-03-28 RX ADMIN — CHLORHEXIDINE GLUCONATE 0.12% ORAL RINSE 15 ML: 1.2 LIQUID ORAL at 10:05

## 2020-03-28 RX ADMIN — Medication 30 ML: at 09:50

## 2020-03-28 RX ADMIN — BACLOFEN 5 MG: 10 TABLET ORAL at 23:15

## 2020-03-28 RX ADMIN — ARFORMOTEROL TARTRATE 15 MCG: 15 SOLUTION RESPIRATORY (INHALATION) at 20:08

## 2020-03-28 RX ADMIN — SODIUM CHLORIDE, PRESERVATIVE FREE 10 ML: 5 INJECTION INTRAVENOUS at 23:15

## 2020-03-28 RX ADMIN — CARVEDILOL 6.25 MG: 6.25 TABLET, FILM COATED ORAL at 09:45

## 2020-03-28 RX ADMIN — SODIUM CHLORIDE, PRESERVATIVE FREE 10 ML: 5 INJECTION INTRAVENOUS at 09:50

## 2020-03-28 RX ADMIN — ALPRAZOLAM 0.25 MG: 0.25 TABLET ORAL at 23:15

## 2020-03-28 RX ADMIN — ENOXAPARIN SODIUM 40 MG: 40 INJECTION SUBCUTANEOUS at 23:15

## 2020-03-28 RX ADMIN — Medication 1 CAPSULE: at 09:46

## 2020-03-28 RX ADMIN — Medication 1 CAPSULE: at 23:15

## 2020-03-28 RX ADMIN — HYOSCYAMINE SULFATE 0.12 MG: 0.12 TABLET, ORALLY DISINTEGRATING ORAL; SUBLINGUAL at 23:15

## 2020-03-28 RX ADMIN — BUDESONIDE 1 MG: 0.5 INHALANT RESPIRATORY (INHALATION) at 20:07

## 2020-03-29 PROCEDURE — 25010000002 FUROSEMIDE PER 20 MG: Performed by: FAMILY MEDICINE

## 2020-03-29 PROCEDURE — 99232 SBSQ HOSP IP/OBS MODERATE 35: CPT | Performed by: FAMILY MEDICINE

## 2020-03-29 PROCEDURE — 25010000002 ENOXAPARIN PER 10 MG: Performed by: FAMILY MEDICINE

## 2020-03-29 PROCEDURE — 94799 UNLISTED PULMONARY SVC/PX: CPT

## 2020-03-29 RX ORDER — FUROSEMIDE 10 MG/ML
20 INJECTION INTRAMUSCULAR; INTRAVENOUS ONCE
Status: COMPLETED | OUTPATIENT
Start: 2020-03-29 | End: 2020-03-29

## 2020-03-29 RX ADMIN — BUDESONIDE 1 MG: 0.5 INHALANT RESPIRATORY (INHALATION) at 06:59

## 2020-03-29 RX ADMIN — ALPRAZOLAM 0.25 MG: 0.25 TABLET ORAL at 18:10

## 2020-03-29 RX ADMIN — SODIUM CHLORIDE, PRESERVATIVE FREE 10 ML: 5 INJECTION INTRAVENOUS at 08:57

## 2020-03-29 RX ADMIN — SODIUM CHLORIDE, PRESERVATIVE FREE 10 ML: 5 INJECTION INTRAVENOUS at 21:30

## 2020-03-29 RX ADMIN — HYOSCYAMINE SULFATE 0.12 MG: 0.12 TABLET, ORALLY DISINTEGRATING ORAL; SUBLINGUAL at 08:55

## 2020-03-29 RX ADMIN — BUDESONIDE 1 MG: 0.5 INHALANT RESPIRATORY (INHALATION) at 19:49

## 2020-03-29 RX ADMIN — Medication 1 CAPSULE: at 18:09

## 2020-03-29 RX ADMIN — ALPRAZOLAM 0.25 MG: 0.25 TABLET ORAL at 20:51

## 2020-03-29 RX ADMIN — MULTIVITAMIN 15 ML: LIQUID ORAL at 08:55

## 2020-03-29 RX ADMIN — SODIUM CHLORIDE, PRESERVATIVE FREE 10 ML: 5 INJECTION INTRAVENOUS at 08:56

## 2020-03-29 RX ADMIN — FAMOTIDINE 20 MG: 20 TABLET ORAL at 08:55

## 2020-03-29 RX ADMIN — SODIUM CHLORIDE, PRESERVATIVE FREE 10 ML: 5 INJECTION INTRAVENOUS at 21:36

## 2020-03-29 RX ADMIN — CARVEDILOL 6.25 MG: 6.25 TABLET, FILM COATED ORAL at 18:10

## 2020-03-29 RX ADMIN — SODIUM CHLORIDE, PRESERVATIVE FREE 10 ML: 5 INJECTION INTRAVENOUS at 08:55

## 2020-03-29 RX ADMIN — BACLOFEN 5 MG: 10 TABLET ORAL at 08:55

## 2020-03-29 RX ADMIN — FAMOTIDINE 20 MG: 20 TABLET ORAL at 20:51

## 2020-03-29 RX ADMIN — BACLOFEN 5 MG: 10 TABLET ORAL at 18:09

## 2020-03-29 RX ADMIN — HYOSCYAMINE SULFATE 0.12 MG: 0.12 TABLET, ORALLY DISINTEGRATING ORAL; SUBLINGUAL at 20:51

## 2020-03-29 RX ADMIN — CARVEDILOL 6.25 MG: 6.25 TABLET, FILM COATED ORAL at 08:55

## 2020-03-29 RX ADMIN — CHLORHEXIDINE GLUCONATE 0.12% ORAL RINSE 15 ML: 1.2 LIQUID ORAL at 20:52

## 2020-03-29 RX ADMIN — ENOXAPARIN SODIUM 40 MG: 40 INJECTION SUBCUTANEOUS at 22:16

## 2020-03-29 RX ADMIN — BACLOFEN 5 MG: 10 TABLET ORAL at 20:52

## 2020-03-29 RX ADMIN — Medication 30 ML: at 09:50

## 2020-03-29 RX ADMIN — Medication 1 CAPSULE: at 20:51

## 2020-03-29 RX ADMIN — ARFORMOTEROL TARTRATE 15 MCG: 15 SOLUTION RESPIRATORY (INHALATION) at 06:59

## 2020-03-29 RX ADMIN — SODIUM CHLORIDE, PRESERVATIVE FREE 10 ML: 5 INJECTION INTRAVENOUS at 21:34

## 2020-03-29 RX ADMIN — FUROSEMIDE 20 MG: 10 INJECTION, SOLUTION INTRAMUSCULAR; INTRAVENOUS at 12:23

## 2020-03-29 RX ADMIN — Medication 1 CAPSULE: at 08:55

## 2020-03-29 RX ADMIN — ALPRAZOLAM 0.25 MG: 0.25 TABLET ORAL at 08:55

## 2020-03-29 RX ADMIN — ARFORMOTEROL TARTRATE 15 MCG: 15 SOLUTION RESPIRATORY (INHALATION) at 19:49

## 2020-03-29 RX ADMIN — CHLORHEXIDINE GLUCONATE 0.12% ORAL RINSE 15 ML: 1.2 LIQUID ORAL at 08:54

## 2020-03-30 LAB
ANION GAP SERPL CALCULATED.3IONS-SCNC: 10.6 MMOL/L (ref 5–15)
BACTERIA SPEC AEROBE CULT: NORMAL
BACTERIA SPEC AEROBE CULT: NORMAL
BASOPHILS # BLD AUTO: 0.02 10*3/MM3 (ref 0–0.2)
BASOPHILS NFR BLD AUTO: 0.2 % (ref 0–1.5)
BUN BLD-MCNC: 15 MG/DL (ref 8–23)
BUN/CREAT SERPL: 44.1 (ref 7–25)
CALCIUM SPEC-SCNC: 9.3 MG/DL (ref 8.6–10.5)
CHLORIDE SERPL-SCNC: 101 MMOL/L (ref 98–107)
CO2 SERPL-SCNC: 28.4 MMOL/L (ref 22–29)
CREAT BLD-MCNC: 0.34 MG/DL (ref 0.57–1)
DEPRECATED RDW RBC AUTO: 47 FL (ref 37–54)
EOSINOPHIL # BLD AUTO: 0.52 10*3/MM3 (ref 0–0.4)
EOSINOPHIL NFR BLD AUTO: 5.4 % (ref 0.3–6.2)
ERYTHROCYTE [DISTWIDTH] IN BLOOD BY AUTOMATED COUNT: 13.2 % (ref 12.3–15.4)
GFR SERPL CREATININE-BSD FRML MDRD: >150 ML/MIN/1.73
GLUCOSE BLD-MCNC: 136 MG/DL (ref 65–99)
HCT VFR BLD AUTO: 32.5 % (ref 34–46.6)
HGB BLD-MCNC: 10.4 G/DL (ref 12–15.9)
IMM GRANULOCYTES # BLD AUTO: 0.09 10*3/MM3 (ref 0–0.05)
IMM GRANULOCYTES NFR BLD AUTO: 0.9 % (ref 0–0.5)
LYMPHOCYTES # BLD AUTO: 2.72 10*3/MM3 (ref 0.7–3.1)
LYMPHOCYTES NFR BLD AUTO: 28.2 % (ref 19.6–45.3)
MCH RBC QN AUTO: 31.5 PG (ref 26.6–33)
MCHC RBC AUTO-ENTMCNC: 32 G/DL (ref 31.5–35.7)
MCV RBC AUTO: 98.5 FL (ref 79–97)
MONOCYTES # BLD AUTO: 0.77 10*3/MM3 (ref 0.1–0.9)
MONOCYTES NFR BLD AUTO: 8 % (ref 5–12)
NEUTROPHILS # BLD AUTO: 5.51 10*3/MM3 (ref 1.7–7)
NEUTROPHILS NFR BLD AUTO: 57.3 % (ref 42.7–76)
NRBC BLD AUTO-RTO: 0 /100 WBC (ref 0–0.2)
PLATELET # BLD AUTO: 200 10*3/MM3 (ref 140–450)
PMV BLD AUTO: 11.8 FL (ref 6–12)
POTASSIUM BLD-SCNC: 3.4 MMOL/L (ref 3.5–5.2)
RBC # BLD AUTO: 3.3 10*6/MM3 (ref 3.77–5.28)
SODIUM BLD-SCNC: 140 MMOL/L (ref 136–145)
WBC NRBC COR # BLD: 9.63 10*3/MM3 (ref 3.4–10.8)

## 2020-03-30 PROCEDURE — 80048 BASIC METABOLIC PNL TOTAL CA: CPT | Performed by: FAMILY MEDICINE

## 2020-03-30 PROCEDURE — 94799 UNLISTED PULMONARY SVC/PX: CPT

## 2020-03-30 PROCEDURE — 85025 COMPLETE CBC W/AUTO DIFF WBC: CPT | Performed by: FAMILY MEDICINE

## 2020-03-30 PROCEDURE — 99232 SBSQ HOSP IP/OBS MODERATE 35: CPT | Performed by: INTERNAL MEDICINE

## 2020-03-30 PROCEDURE — 25010000002 ENOXAPARIN PER 10 MG: Performed by: FAMILY MEDICINE

## 2020-03-30 RX ADMIN — SODIUM CHLORIDE, PRESERVATIVE FREE 10 ML: 5 INJECTION INTRAVENOUS at 21:00

## 2020-03-30 RX ADMIN — MULTIVITAMIN 15 ML: LIQUID ORAL at 08:29

## 2020-03-30 RX ADMIN — SODIUM CHLORIDE, PRESERVATIVE FREE 10 ML: 5 INJECTION INTRAVENOUS at 08:30

## 2020-03-30 RX ADMIN — CHLORHEXIDINE GLUCONATE 0.12% ORAL RINSE 15 ML: 1.2 LIQUID ORAL at 21:03

## 2020-03-30 RX ADMIN — Medication 1 CAPSULE: at 08:29

## 2020-03-30 RX ADMIN — ENOXAPARIN SODIUM 40 MG: 40 INJECTION SUBCUTANEOUS at 21:03

## 2020-03-30 RX ADMIN — ALPRAZOLAM 0.25 MG: 0.25 TABLET ORAL at 08:29

## 2020-03-30 RX ADMIN — SODIUM CHLORIDE, PRESERVATIVE FREE 10 ML: 5 INJECTION INTRAVENOUS at 08:36

## 2020-03-30 RX ADMIN — ALPRAZOLAM 0.25 MG: 0.25 TABLET ORAL at 21:04

## 2020-03-30 RX ADMIN — BUDESONIDE 1 MG: 0.5 INHALANT RESPIRATORY (INHALATION) at 19:19

## 2020-03-30 RX ADMIN — ARFORMOTEROL TARTRATE 15 MCG: 15 SOLUTION RESPIRATORY (INHALATION) at 19:19

## 2020-03-30 RX ADMIN — FAMOTIDINE 20 MG: 20 TABLET ORAL at 21:04

## 2020-03-30 RX ADMIN — Medication 1 CAPSULE: at 21:04

## 2020-03-30 RX ADMIN — ARFORMOTEROL TARTRATE 15 MCG: 15 SOLUTION RESPIRATORY (INHALATION) at 06:35

## 2020-03-30 RX ADMIN — CARVEDILOL 6.25 MG: 6.25 TABLET, FILM COATED ORAL at 18:04

## 2020-03-30 RX ADMIN — BUDESONIDE 1 MG: 0.5 INHALANT RESPIRATORY (INHALATION) at 06:35

## 2020-03-30 RX ADMIN — Medication 1 CAPSULE: at 16:08

## 2020-03-30 RX ADMIN — ALPRAZOLAM 0.25 MG: 0.25 TABLET ORAL at 16:08

## 2020-03-30 RX ADMIN — Medication 30 ML: at 08:29

## 2020-03-30 RX ADMIN — BACLOFEN 5 MG: 10 TABLET ORAL at 21:04

## 2020-03-30 RX ADMIN — SODIUM CHLORIDE, PRESERVATIVE FREE 10 ML: 5 INJECTION INTRAVENOUS at 08:35

## 2020-03-30 RX ADMIN — CARVEDILOL 6.25 MG: 6.25 TABLET, FILM COATED ORAL at 08:29

## 2020-03-30 RX ADMIN — CHLORHEXIDINE GLUCONATE 0.12% ORAL RINSE 15 ML: 1.2 LIQUID ORAL at 08:29

## 2020-03-30 RX ADMIN — BACLOFEN 5 MG: 10 TABLET ORAL at 16:08

## 2020-03-30 RX ADMIN — HYOSCYAMINE SULFATE 0.12 MG: 0.12 TABLET, ORALLY DISINTEGRATING ORAL; SUBLINGUAL at 21:03

## 2020-03-30 RX ADMIN — BACLOFEN 5 MG: 10 TABLET ORAL at 08:30

## 2020-03-30 RX ADMIN — FAMOTIDINE 20 MG: 20 TABLET ORAL at 08:29

## 2020-03-30 RX ADMIN — HYOSCYAMINE SULFATE 0.12 MG: 0.12 TABLET, ORALLY DISINTEGRATING ORAL; SUBLINGUAL at 08:29

## 2020-03-30 RX ADMIN — Medication 30 ML: at 21:00

## 2020-03-31 ENCOUNTER — APPOINTMENT (OUTPATIENT)
Dept: GENERAL RADIOLOGY | Facility: HOSPITAL | Age: 60
End: 2020-03-31

## 2020-03-31 VITALS
BODY MASS INDEX: 26.13 KG/M2 | OXYGEN SATURATION: 94 % | HEIGHT: 63 IN | HEART RATE: 96 BPM | TEMPERATURE: 98.3 F | DIASTOLIC BLOOD PRESSURE: 72 MMHG | SYSTOLIC BLOOD PRESSURE: 129 MMHG | RESPIRATION RATE: 18 BRPM | WEIGHT: 147.5 LBS

## 2020-03-31 LAB
ANION GAP SERPL CALCULATED.3IONS-SCNC: 11.3 MMOL/L (ref 5–15)
BASOPHILS # BLD AUTO: 0.02 10*3/MM3 (ref 0–0.2)
BASOPHILS NFR BLD AUTO: 0.2 % (ref 0–1.5)
BUN BLD-MCNC: 15 MG/DL (ref 8–23)
BUN/CREAT SERPL: 45.5 (ref 7–25)
CALCIUM SPEC-SCNC: 9.4 MG/DL (ref 8.6–10.5)
CHLORIDE SERPL-SCNC: 103 MMOL/L (ref 98–107)
CO2 SERPL-SCNC: 27.7 MMOL/L (ref 22–29)
CREAT BLD-MCNC: 0.33 MG/DL (ref 0.57–1)
DEPRECATED RDW RBC AUTO: 49 FL (ref 37–54)
EOSINOPHIL # BLD AUTO: 0.44 10*3/MM3 (ref 0–0.4)
EOSINOPHIL NFR BLD AUTO: 5 % (ref 0.3–6.2)
ERYTHROCYTE [DISTWIDTH] IN BLOOD BY AUTOMATED COUNT: 13.2 % (ref 12.3–15.4)
GFR SERPL CREATININE-BSD FRML MDRD: >150 ML/MIN/1.73
GLUCOSE BLD-MCNC: 116 MG/DL (ref 65–99)
HCT VFR BLD AUTO: 33.8 % (ref 34–46.6)
HGB BLD-MCNC: 11 G/DL (ref 12–15.9)
IMM GRANULOCYTES # BLD AUTO: 0.08 10*3/MM3 (ref 0–0.05)
IMM GRANULOCYTES NFR BLD AUTO: 0.9 % (ref 0–0.5)
LYMPHOCYTES # BLD AUTO: 2.7 10*3/MM3 (ref 0.7–3.1)
LYMPHOCYTES NFR BLD AUTO: 30.4 % (ref 19.6–45.3)
MCH RBC QN AUTO: 32.7 PG (ref 26.6–33)
MCHC RBC AUTO-ENTMCNC: 32.5 G/DL (ref 31.5–35.7)
MCV RBC AUTO: 100.6 FL (ref 79–97)
MONOCYTES # BLD AUTO: 0.84 10*3/MM3 (ref 0.1–0.9)
MONOCYTES NFR BLD AUTO: 9.5 % (ref 5–12)
NEUTROPHILS # BLD AUTO: 4.8 10*3/MM3 (ref 1.7–7)
NEUTROPHILS NFR BLD AUTO: 54 % (ref 42.7–76)
NRBC BLD AUTO-RTO: 0 /100 WBC (ref 0–0.2)
PLATELET # BLD AUTO: 217 10*3/MM3 (ref 140–450)
PMV BLD AUTO: 12 FL (ref 6–12)
POTASSIUM BLD-SCNC: 3.5 MMOL/L (ref 3.5–5.2)
RBC # BLD AUTO: 3.36 10*6/MM3 (ref 3.77–5.28)
SODIUM BLD-SCNC: 142 MMOL/L (ref 136–145)
WBC NRBC COR # BLD: 8.88 10*3/MM3 (ref 3.4–10.8)

## 2020-03-31 PROCEDURE — 99239 HOSP IP/OBS DSCHRG MGMT >30: CPT | Performed by: INTERNAL MEDICINE

## 2020-03-31 PROCEDURE — 94799 UNLISTED PULMONARY SVC/PX: CPT

## 2020-03-31 PROCEDURE — 71045 X-RAY EXAM CHEST 1 VIEW: CPT

## 2020-03-31 PROCEDURE — 85025 COMPLETE CBC W/AUTO DIFF WBC: CPT | Performed by: INTERNAL MEDICINE

## 2020-03-31 PROCEDURE — 80048 BASIC METABOLIC PNL TOTAL CA: CPT | Performed by: INTERNAL MEDICINE

## 2020-03-31 RX ORDER — AMINO ACIDS/PROTEIN HYDROLYS 15G-100/30
30 LIQUID (ML) ORAL 2 TIMES DAILY
Qty: 887 ML | Refills: 0 | Status: SHIPPED | OUTPATIENT
Start: 2020-03-31

## 2020-03-31 RX ADMIN — CARVEDILOL 6.25 MG: 6.25 TABLET, FILM COATED ORAL at 10:17

## 2020-03-31 RX ADMIN — CHLORHEXIDINE GLUCONATE 0.12% ORAL RINSE 15 ML: 1.2 LIQUID ORAL at 10:18

## 2020-03-31 RX ADMIN — SODIUM CHLORIDE, PRESERVATIVE FREE 10 ML: 5 INJECTION INTRAVENOUS at 09:50

## 2020-03-31 RX ADMIN — MULTIVITAMIN 15 ML: LIQUID ORAL at 10:18

## 2020-03-31 RX ADMIN — BUDESONIDE 1 MG: 0.5 INHALANT RESPIRATORY (INHALATION) at 06:47

## 2020-03-31 RX ADMIN — HYOSCYAMINE SULFATE 0.12 MG: 0.12 TABLET, ORALLY DISINTEGRATING ORAL; SUBLINGUAL at 10:17

## 2020-03-31 RX ADMIN — Medication 1 CAPSULE: at 10:17

## 2020-03-31 RX ADMIN — ARFORMOTEROL TARTRATE 15 MCG: 15 SOLUTION RESPIRATORY (INHALATION) at 06:47

## 2020-03-31 RX ADMIN — ALPRAZOLAM 0.25 MG: 0.25 TABLET ORAL at 10:16

## 2020-03-31 RX ADMIN — Medication 30 ML: at 10:15

## 2020-03-31 RX ADMIN — BACLOFEN 5 MG: 10 TABLET ORAL at 10:17

## 2020-03-31 RX ADMIN — SODIUM CHLORIDE, PRESERVATIVE FREE 10 ML: 5 INJECTION INTRAVENOUS at 10:20

## 2020-03-31 RX ADMIN — SODIUM CHLORIDE, PRESERVATIVE FREE 10 ML: 5 INJECTION INTRAVENOUS at 10:18

## 2020-03-31 RX ADMIN — FAMOTIDINE 20 MG: 20 TABLET ORAL at 10:17

## 2020-04-01 NOTE — PROGRESS NOTES
Case Management Discharge Note      Final Note: [facility] by ambulance for long term care         Destination      No service has been selected for the patient.      Durable Medical Equipment      No service has been selected for the patient.      Dialysis/Infusion      No service has been selected for the patient.      Home Medical Care      No service has been selected for the patient.      Therapy      No service has been selected for the patient.      Community Resources      No service has been selected for the patient.        Transportation Services  Ambulance: [facility]    Final Discharge Disposition Code: 04 - intermediate care facility

## 2020-04-22 ENCOUNTER — NURSING HOME (OUTPATIENT)
Dept: INTERNAL MEDICINE | Facility: CLINIC | Age: 60
End: 2020-04-22

## 2020-04-22 DIAGNOSIS — G93.1 ANOXIC ENCEPHALOPATHY (HCC): Primary | ICD-10-CM

## 2020-04-22 DIAGNOSIS — Z93.0 TRACHEOSTOMY IN PLACE (HCC): ICD-10-CM

## 2020-04-22 DIAGNOSIS — Z93.1 PEG (PERCUTANEOUS ENDOSCOPIC GASTROSTOMY) STATUS (HCC): ICD-10-CM

## 2020-04-22 PROCEDURE — 99308 SBSQ NF CARE LOW MDM 20: CPT | Performed by: INTERNAL MEDICINE

## 2020-05-03 NOTE — PROGRESS NOTES
Nursing Home Progress Note      Kemal Shaw, DO ?  Liya Campbell, APRN ?  [address] [facility], [place] [address]  Phone: [phone]  Fax: [phone] Emperatriz Greene MD[x]  Jamie Brown, DO ?   SHANIKA Blanco ?    [address] [place] [address]  Phone: [phone]  Fax: [phone]     PATIENT NAME: Viji Vargas                                                                          YOB: 1960           DATE OF SERVICE: 2/19/2020  FACILITY:   [] [facility] [x] [facility]  [] [facility]    [] [facility]      ______________________________________________________________________    CHIEF COMPLAINT:  Follow-up visit, COPD      HISTORY OF PRESENT ILLNESS:   Mrs. Clemente is a 58 years old female who was initially hospitalized at [facility] after sustaining cardiac arrest with successful resuscitation and mechanical ventilation.  COPD exacerbation and pneumonia were felt to be culprit.  Unfortunately, patient suffered significant anoxic encephalopathy secondary to above.  Subsequently, patient underwent PEG and tracheostomy placement she was then admitted to [facility] where she was successfully weaned off the ventilator.  On the other hand, patient did not have any meaningful neurological recovery.  She was transferred to this facility for skilled nursing care.    Since her hospitalization in December 2019 with a diagnosis of sepsis, pneumonia and acute urinary tract infection, patient remained in stable condition; she however was diagnosed with acute urinary tract infection which was addressed with antibiotics and continues to require frequent suctioning of her trach my: Her sats are remaining in the 90s with trach collar at 1 L nasal cannula.    PAST MEDICAL & SURGICAL HISTORY:   Past Medical History:   Diagnosis Date   • COPD (chronic obstructive pulmonary disease) (CMS/[facility])    • Hypertension    • Pneumonia    · Post cardiac  arrest  · Anoxic encephalopathy  · Chronic essential hypertension    Past Surgical History:   Procedure Laterality Date   • HYSTERECTOMY      Partial    • TRACHEOSTOMY AND PEG TUBE INSERTION N/A 3/20/2019    Procedure: TRACHEOSTOMY AND PERCUTANEOUS ENDOSCOPIC GASTROSTOMY TUBE INSERTION;  Surgeon: Nadira Pandey MD;  Location: [facility];  Service: General         MEDICATIONS:  I have reviewed and reconciled the patients medication list in the patients chart at the skilled nursing facility today.        ALLERGIES:  No Known Allergies        REVIEW OF SYSTEMS:  Review of Systems   Unable to perform ROS: Other (Anoxic encephalopathy)     PHYSICAL EXAMINATION:   VITAL SIGNS: /68, HR 84/min, RR 18/min, temp 98.4 °F    Physical Exam   Constitutional: She appears well-developed and well-nourished.   HENT:   Head: Normocephalic and atraumatic.   Eyes: Pupils are equal, round, and reactive to light. EOM are normal.   Neck: Normal range of motion. Neck supple.   Tracheostomy site noted   Cardiovascular: Normal rate, regular rhythm and normal heart sounds.   Pulmonary/Chest: Effort normal and breath sounds normal.   Abdominal: Soft. Bowel sounds are normal.   Musculoskeletal: Normal range of motion.   Neurological: She is alert.   Nonverbal   Skin: Skin is warm and dry.   Psychiatric: She has a normal mood and affect.     RECORDS REVIEW:   I have reviewed in detail available records including discharge summary and workup    ASSESSMENT:  · Status post acute urinary tract infection clinically resolved  · Anoxic encephalopathy secondary to cardiac arrest  · Status post tracheostomy placement  · Status post gastrostomy placement  · Chronic essential hypertension, controlled    PLAN:  Continue trach care with frequent suctioning, respiratory therapist and staff are following.  Patient is being monitored closely; she is at high risk for recurrent respiratory and other infections given her frail condition and immunocompromise  state.  CODE STATUS remains FULL CODE and her family continue to request full medical treatment.  Will attempt meeting with them to discuss patient's condition, comorbidities, guarded prognosis and goals of care.    [x]  Discussed Patient in detail with nursing/staff, addressed all needs today.     [x]  Plan of Care Reviewed   [x]  PT/OT Reviewed     []  Wound assessment and management documentation reviewed    []  Antipsychotic medications and benzodiazepine addressed  []  Order Changes  []  Opioid medications addressed  []  Order Changes  []  Discharge Plans Reviewed   []  Advance Directive on file with Nursing Home.   []  POA on file with Nursing Home.   [x]  Code Status: Full code listed on patients chart at the nursing home facility.          Emperatriz Greene MD.  2/19/2020

## 2020-06-01 ENCOUNTER — NURSING HOME (OUTPATIENT)
Dept: INTERNAL MEDICINE | Facility: CLINIC | Age: 60
End: 2020-06-01

## 2020-06-01 VITALS
SYSTOLIC BLOOD PRESSURE: 102 MMHG | WEIGHT: 146.4 LBS | DIASTOLIC BLOOD PRESSURE: 77 MMHG | OXYGEN SATURATION: 97 % | TEMPERATURE: 98 F | BODY MASS INDEX: 25.93 KG/M2 | HEART RATE: 96 BPM | RESPIRATION RATE: 16 BRPM

## 2020-06-01 DIAGNOSIS — Z93.0 TRACHEOSTOMY IN PLACE (HCC): ICD-10-CM

## 2020-06-01 DIAGNOSIS — G93.1 BRAIN ANOXIC INJURY (HCC): Primary | ICD-10-CM

## 2020-06-01 DIAGNOSIS — Z93.1 PEG (PERCUTANEOUS ENDOSCOPIC GASTROSTOMY) STATUS (HCC): ICD-10-CM

## 2020-06-01 DIAGNOSIS — J44.9 CHRONIC OBSTRUCTIVE PULMONARY DISEASE, UNSPECIFIED COPD TYPE (HCC): ICD-10-CM

## 2020-06-01 DIAGNOSIS — J96.11 CHRONIC RESPIRATORY FAILURE WITH HYPOXIA (HCC): ICD-10-CM

## 2020-06-01 DIAGNOSIS — I10 ESSENTIAL HYPERTENSION: ICD-10-CM

## 2020-06-01 PROCEDURE — 99308 SBSQ NF CARE LOW MDM 20: CPT | Performed by: PHYSICIAN ASSISTANT

## 2020-06-01 NOTE — PROGRESS NOTES
Nursing Home Progress Note        Kemal Shaw, DO ?  Liya Campbell, APRN ?  [address] [facility], [place] [address]  Phone: [phone]  Fax: [phone] Emperatriz Greene MD ?  Jamie Brown, DO ?  Noemy Garcia PA-C [x]   [address] [place] [address]  Phone: [phone]  Fax: [phone]     PATIENT NAME: Viji Vargas                                                                          YOB: 1960           DATE OF SERVICE: 6//1/2020  FACILITY:  [] [facility]   [x] [facility]   [] [facility]   [] [facility]   [] Other ______________________________________________________________________     CHIEF COMPLAINT:  Chronic medical management and long-term care needs.      HISTORY OF PRESENT ILLNESS:   Ms. Vargas is a 58 y/o female with PMH significant for cardiac arrest resulting in anoxic brain injury.  She is non-verbal and does not follow commands at baseline.  PEG and tracheostomy are in place.  She presents today for routine coordination of care and long-term care needs.  Record reviewed and care discussed with staff.  They report no acute concerns or complaints.  RT continues to follow patient closely and she requires scheduled suctioning.  They deny any reports of hypoxia or increased tracheal secretions.  Upon assessment today she is resting, appears comfortable.  She awakens with verbal stimuli.  Contractures noted.      PAST MEDICAL & SURGICAL HISTORY:   Past Medical History:   Diagnosis Date   • COPD (chronic obstructive pulmonary disease) (CMS/HCC)    • Hypertension    • Pneumonia       Past Surgical History:   Procedure Laterality Date   • HYSTERECTOMY      Partial    • TRACHEOSTOMY AND PEG TUBE INSERTION N/A 3/20/2019    Procedure: TRACHEOSTOMY AND PERCUTANEOUS ENDOSCOPIC GASTROSTOMY TUBE INSERTION;  Surgeon: Nadira Pandey MD;  Location: [facility];  Service: General         MEDICATIONS:  I have reviewed and reconciled the patients medication list in the patients  chart at the skilled nursing facility today.      ALLERGIES:  No Known Allergies      SOCIAL HISTORY:  Social History     Socioeconomic History   • Marital status: Legally      Spouse name: Not on file   • Number of children: Not on file   • Years of education: Not on file   • Highest education level: Not on file   Tobacco Use   • Smoking status: Current Every Day Smoker     Packs/day: 1.00     Types: Electronic Cigarette, Cigarettes   Substance and Sexual Activity   • Alcohol use: Yes     Comment: wine    • Drug use: No       FAMILY HISTORY:  No family history on file.    REVIEW OF SYSTEMS:  Review of Systems   Unable to perform ROS: Patient nonverbal   Constitutional: Negative for diaphoresis and fever.   Respiratory: Negative for cough and shortness of breath.    Cardiovascular: Negative for leg swelling.   Gastrointestinal: Negative for abdominal distention, diarrhea and vomiting.   Genitourinary: Negative for decreased urine volume and hematuria.   Musculoskeletal:        Chronic contracture.   Neurological: Positive for weakness (generalized. ).   Psychiatric/Behavioral: Negative for agitation and sleep disturbance.        PHYSICAL EXAMINATION:     VITAL SIGNS:  /77   Pulse 96   Temp 98 °F (36.7 °C)   Resp 16   Wt 66.4 kg (146 lb 6.4 oz)   SpO2 97%   BMI 25.93 kg/m²     Physical Exam   Constitutional: No distress.   Nonverbal, chronically ill appearing.    HENT:   Head: Normocephalic and atraumatic.   Eyes: Pupils are equal, round, and reactive to light. Conjunctivae and EOM are normal.   Neck: Normal range of motion. Neck supple. No JVD present.   Trach in place, no erythema, edema, or exudate noted.    Cardiovascular: Normal rate, regular rhythm, normal heart sounds and intact distal pulses.   Pulmonary/Chest: Effort normal and breath sounds normal. She has no wheezes. Rhonchi: coarse.   Abdominal: Soft. She exhibits no distension. There is no tenderness.   PEG in place, no erythema or  exudate.    Musculoskeletal: She exhibits edema. She exhibits no tenderness.   Does not move extremities. Contractures noted.    Neurological:   Patient is non verbal.    Skin: Skin is warm and dry. Capillary refill takes less than 2 seconds. She is not diaphoretic. No pallor.   Psychiatric: She has a normal mood and affect. Her behavior is normal.   Nursing note and vitals reviewed.      RECORDS REVIEW:   N/A    ASSESSMENT     Diagnoses and all orders for this visit:    Brain anoxic injury (CMS/[facility])    Chronic obstructive pulmonary disease, unspecified COPD type (CMS/[facility])    Chronic respiratory failure with hypoxia (CMS/[facility])    Essential hypertension    PEG (percutaneous endoscopic gastrostomy) status (CMS/[facility])    Tracheostomy in place (CMS/[facility])      PLAN  Anoxic brain injury: Appears at baseline.  Clinically stable.  Continue to monitor  COPD with chronic respiratory failure: No reports of increased O2 demands or hypoxia.  She continues to require scheduled trach care.  Upon assessment today secretions are a mildly opaque-to clear white color.  Continue to monitor closely.  Hypertension: Listed as per patient's history.  I reviewed her documented blood pressures which have remained in the low 100s systolically.  No other blood pressure medications noted.  She does however have PRN hydralazine which has not been needed.  Will discontinue this medicine.  Continue to monitor blood pressure closely.  PEG: Site is clean and free of any exudate or erythema.  Continue to monitor.    [x]  Discussed Patient in detail with nursing/staff, addressed all needs today.     [x]  Plan of Care Reviewed   []  PT/OT Reviewed   []  Order Changes  []  Discharge Plans Reviewed  [x]  Advance Directive on file with Nursing Home.   [x]  POA on file with Nursing Home.    [x]  Code Status listed:  [x]  Full Code   []  DNR         Noemy Garcia PA-C.  6/1/2020

## 2020-06-05 ENCOUNTER — NURSING HOME (OUTPATIENT)
Dept: INTERNAL MEDICINE | Facility: CLINIC | Age: 60
End: 2020-06-05

## 2020-06-05 VITALS
RESPIRATION RATE: 18 BRPM | OXYGEN SATURATION: 98 % | SYSTOLIC BLOOD PRESSURE: 132 MMHG | DIASTOLIC BLOOD PRESSURE: 66 MMHG | TEMPERATURE: 96.3 F | HEART RATE: 60 BPM

## 2020-06-05 DIAGNOSIS — M24.532 CONTRACTURE OF BOTH WRIST JOINTS: ICD-10-CM

## 2020-06-05 DIAGNOSIS — B49 FUNGAL INFECTION: ICD-10-CM

## 2020-06-05 DIAGNOSIS — M24.531 CONTRACTURE OF BOTH WRIST JOINTS: ICD-10-CM

## 2020-06-05 DIAGNOSIS — G93.1 ANOXIC BRAIN INJURY (HCC): Primary | ICD-10-CM

## 2020-06-05 PROCEDURE — 99309 SBSQ NF CARE MODERATE MDM 30: CPT | Performed by: PHYSICIAN ASSISTANT

## 2020-06-05 NOTE — PROGRESS NOTES
Nursing Home Progress Note        Kemal Shaw, DO ?  Liya Campbell, APRN ?  [address] [facility], [place] [address]  Phone: [phone]  Fax: [phone] Emperatriz Greene MD ?  Jamie Brown, DO ?  Noemy Garcia PA-C [x]   [address] [place] [address]  Phone: [phone]  Fax: [phone]     PATIENT NAME: Viji Vargas                                                                          YOB: 1960           DATE OF SERVICE: 6//1/2020  FACILITY:  [] [place]   [x] [place]   [] [facility]   [] [facility]   [] Other ______________________________________________________________________     CHIEF COMPLAINT:  Nail problem      HISTORY OF PRESENT ILLNESS:   Ms. Vargas is a 60 y/o female with PMH significant for cardiac arrest resulting in anoxic brain injury.  She is non-verbal and does not follow commands at baseline.  PEG and tracheostomy are in place.  She has chronic history of contractures.  Per nursing staff, they have requested I evaluate patient today, as they have noticed her nail to have thick brittle appearance as well as some localized edema.         PAST MEDICAL & SURGICAL HISTORY:   Past Medical History:   Diagnosis Date   • COPD (chronic obstructive pulmonary disease) (CMS/HCC)    • Hypertension    • Pneumonia       Past Surgical History:   Procedure Laterality Date   • HYSTERECTOMY      Partial    • TRACHEOSTOMY AND PEG TUBE INSERTION N/A 3/20/2019    Procedure: TRACHEOSTOMY AND PERCUTANEOUS ENDOSCOPIC GASTROSTOMY TUBE INSERTION;  Surgeon: Nadira Pandey MD;  Location: [facility];  Service: General         MEDICATIONS:  I have reviewed and reconciled the patients medication list in the patients chart at the skilled nursing facility today.      ALLERGIES:  No Known Allergies      SOCIAL HISTORY:  Social History     Socioeconomic History   • Marital status: Legally      Spouse name: Not on file   • Number of children: Not on file   • Years of  education: Not on file   • Highest education level: Not on file   Tobacco Use   • Smoking status: Current Every Day Smoker     Packs/day: 1.00     Types: Electronic Cigarette, Cigarettes   Substance and Sexual Activity   • Alcohol use: Yes     Comment: wine    • Drug use: No       FAMILY HISTORY:  No family history on file.    REVIEW OF SYSTEMS:  Review of Systems   Unable to perform ROS: Patient nonverbal   Constitutional: Negative for diaphoresis and fever.   Respiratory: Negative for cough and shortness of breath.    Cardiovascular: Negative for leg swelling.   Gastrointestinal: Negative for abdominal distention, diarrhea and vomiting.   Genitourinary: Negative for decreased urine volume and hematuria.   Musculoskeletal:        Chronic contracture.   Skin: Positive for wound.   Neurological: Positive for weakness (generalized. ).   Psychiatric/Behavioral: Negative for agitation and sleep disturbance.        PHYSICAL EXAMINATION:     VITAL SIGNS:  /66   Pulse 60   Temp 96.3 °F (35.7 °C)   Resp 18   SpO2 98%     Physical Exam   Constitutional: No distress.   Nonverbal, chronically ill appearing.    HENT:   Head: Normocephalic and atraumatic.   Eyes: Pupils are equal, round, and reactive to light. Conjunctivae and EOM are normal.   Neck: Normal range of motion. Neck supple. No JVD present.   Trach in place, no erythema, edema, or exudate noted.    Cardiovascular: Normal rate, regular rhythm, normal heart sounds and intact distal pulses.   Pulmonary/Chest: Effort normal and breath sounds normal. She has no wheezes. Rhonchi: coarse.   Abdominal: Soft. She exhibits no distension. There is no tenderness.   PEG in place, no erythema or exudate.    Musculoskeletal: She exhibits edema. She exhibits no tenderness.   Does not move extremities. Contractures noted.    Neurological:   Patient is non verbal.    Skin: Skin is warm and dry. Capillary refill takes less than 2 seconds. She is not diaphoretic. No pallor.    Left lateral great thumb has small area of discoloration, skin has some localized mild edema.  The nail is thick, brittle.  Some odor noted.     Psychiatric: She has a normal mood and affect. Her behavior is normal.   Nursing note and vitals reviewed.      RECORDS REVIEW:   N/A    ASSESSMENT     Diagnoses and all orders for this visit:    Anoxic brain injury (CMS/HCC)    Contracture of both wrist joints    Fungal infection      PLAN  She appears chronically ill and at her baseline today.  Patient does have history of contractures of both wrist.  I did speak with therapy services today.  I have asked them to evaluate and adjust wrist splints.  Discussed with therapy services about minimizing pressure related injuries.  In addition, appears to be some moisture associated skin changes.  Many of her nails are thickened and brittle with a mild odor.  Could be fungal in nature.  Will cover with Diflucan.  Staff to apply barrier around the digits daily.  Skin-Prep to the dark discoloration on the left great thumb.  Monitor closely for any worsening signs or symptoms.  Wound care to follow.    [x]  Discussed Patient in detail with nursing/staff, addressed all needs today.     [x]  Plan of Care Reviewed   []  PT/OT Reviewed   []  Order Changes  []  Discharge Plans Reviewed  [x]  Advance Directive on file with Nursing Home.   [x]  POA on file with Nursing Home.    [x]  Code Status listed:  [x]  Full Code   []  DNR         Noemy Garcia PA-C.  6/8/2020

## 2020-07-13 ENCOUNTER — HOSPITAL ENCOUNTER (EMERGENCY)
Facility: HOSPITAL | Age: 60
Discharge: NURSING, REHAB OR LONG-TERM CARE | End: 2020-07-13
Attending: EMERGENCY MEDICINE | Admitting: EMERGENCY MEDICINE

## 2020-07-13 VITALS
TEMPERATURE: 98.8 F | HEART RATE: 105 BPM | DIASTOLIC BLOOD PRESSURE: 105 MMHG | OXYGEN SATURATION: 97 % | SYSTOLIC BLOOD PRESSURE: 131 MMHG | RESPIRATION RATE: 24 BRPM

## 2020-07-13 DIAGNOSIS — J95.00 COMPLICATION OF TRACHEOSTOMY TUBE (HCC): Primary | ICD-10-CM

## 2020-07-13 DIAGNOSIS — Z43.0 ENCOUNTER FOR TRACHEOSTOMY TUBE CHANGE (HCC): ICD-10-CM

## 2020-07-13 PROCEDURE — 94799 UNLISTED PULMONARY SVC/PX: CPT

## 2020-07-13 PROCEDURE — 99284 EMERGENCY DEPT VISIT MOD MDM: CPT

## 2020-07-13 NOTE — ED PROVIDER NOTES
Subjective   59-year-old female presents from her nursing home for chief complaint of trach tube change.  The patient presented after her tracheostomy tube was dislodged at the facility.  EMS was able to place a 5.5 ET tube in her stoma.  She is afebrile and hemodynamically stable.  Further history and review of systems is limited secondary to patient being trach dependent and nonverbal.          Review of Systems   Reason unable to perform ROS: Nonverbal, trach dependent.       Past Medical History:   Diagnosis Date   • COPD (chronic obstructive pulmonary disease) (CMS/HCC)    • Hypertension    • Pneumonia        No Known Allergies    Past Surgical History:   Procedure Laterality Date   • HYSTERECTOMY      Partial    • TRACHEOSTOMY AND PEG TUBE INSERTION N/A 3/20/2019    Procedure: TRACHEOSTOMY AND PERCUTANEOUS ENDOSCOPIC GASTROSTOMY TUBE INSERTION;  Surgeon: Nadira Pandey MD;  Location: [facility];  Service: General       History reviewed. No pertinent family history.    Social History     Socioeconomic History   • Marital status: Legally      Spouse name: Not on file   • Number of children: Not on file   • Years of education: Not on file   • Highest education level: Not on file   Tobacco Use   • Smoking status: Current Every Day Smoker     Packs/day: 1.00     Types: Electronic Cigarette, Cigarettes   Substance and Sexual Activity   • Alcohol use: Yes     Comment: wine    • Drug use: No           Objective   Physical Exam   Constitutional: No distress.   Chronically trach and PEG dependent global atrophy with chronic contractures of all 4 extremities   HENT:   Head: Normocephalic and atraumatic.   Nose: Nose normal.   Eyes: Conjunctivae and EOM are normal.   Cardiovascular: Normal rate, regular rhythm and intact distal pulses.   Pulmonary/Chest: No respiratory distress.   Tracheostomy site open coarse breath sounds with BVM bilaterally.  ET tube in place of tracheostomy   Abdominal: Soft. She exhibits  no distension. There is no tenderness. There is no guarding.   Musculoskeletal: She exhibits no edema or deformity.   Neurological:   Nonverbal   Skin: She is not diaphoretic.   Nursing note and vitals reviewed.      Procedures           ED Course      PULSE OXIMETRY INTERPRETATION  Patient had a pulse ox of 100% on BVM this is a normal pulse oximetry reading.     Tracheostomy tube replacement  Patient was in the supine position and a 6.0 cuffed Shiley was replaced through her stoma.  2 attempts without difficulty.  Patient tolerated the procedure well.                                MDM     Patient presented to the ED for tracheostomy tube change.  EMS had placed a 5.5 ET tube.  This was removed and a 6.0 Shiley was replaced without difficulty.  Patient tolerated procedure well.  Appropriate for discharge at this time.      Final diagnoses:   Complication of tracheostomy tube (CMS/[facility])   Encounter for tracheostomy tube change (CMS/[facility])            Sushant Holman, DO  07/13/20 0739

## 2020-07-31 RX ORDER — ALPRAZOLAM 0.5 MG/1
0.5 TABLET ORAL EVERY 6 HOURS
Qty: 120 TABLET | Refills: 3 | Status: SHIPPED | OUTPATIENT
Start: 2020-07-31 | End: 2020-11-30 | Stop reason: SDUPTHER

## 2020-08-04 ENCOUNTER — NURSING HOME (OUTPATIENT)
Dept: INTERNAL MEDICINE | Facility: CLINIC | Age: 60
End: 2020-08-04

## 2020-08-04 DIAGNOSIS — G93.1 ANOXIC BRAIN INJURY (HCC): ICD-10-CM

## 2020-08-04 DIAGNOSIS — J96.11 CHRONIC RESPIRATORY FAILURE WITH HYPOXIA (HCC): Primary | ICD-10-CM

## 2020-08-04 DIAGNOSIS — Z93.0 TRACHEOSTOMY PRESENT (HCC): ICD-10-CM

## 2020-08-04 DIAGNOSIS — Z93.1 PEG (PERCUTANEOUS ENDOSCOPIC GASTROSTOMY) STATUS (HCC): ICD-10-CM

## 2020-08-04 DIAGNOSIS — B35.9 TINEA: ICD-10-CM

## 2020-08-04 PROCEDURE — 99309 SBSQ NF CARE MODERATE MDM 30: CPT | Performed by: PHYSICIAN ASSISTANT

## 2020-08-07 VITALS
RESPIRATION RATE: 18 BRPM | DIASTOLIC BLOOD PRESSURE: 70 MMHG | HEART RATE: 89 BPM | SYSTOLIC BLOOD PRESSURE: 114 MMHG | TEMPERATURE: 97.9 F | OXYGEN SATURATION: 94 %

## 2020-08-07 NOTE — PROGRESS NOTES
Nursing Home Progress Note        Kemal Shaw, DO ?  Liya Campbell, APRN ?  [address] [facility], [place] [address]  Phone: [phone]  Fax: [phone] Emperatriz Greene MD ?  Jamie Brown, DO ?  Noemy Garcia PA-C [x]   [address] [place] [address]  Phone: [phone]  Fax: [phone]     PATIENT NAME: Viji Vargas                                                                          YOB: 1960           DATE OF SERVICE: 8/4/2020  FACILITY:  [] [place]   [x] [place]   [] [facility]   [] [facility]   [] Other ______________________________________________________________________     CHIEF COMPLAINT:  Chronic medical management and long term care needs.       HISTORY OF PRESENT ILLNESS:   Ms. Vargas is a 58 y/o female with PMH significant for cardiac arrest resulting in anoxic brain injury.  She is non-verbal and does not follow commands at baseline.  PEG and tracheostomy are in place.  She has chronic history of contractures.  She presents today for routine coordination of care and long-term care needs.  Record reviewed and care discussed with staff.  She is remained in stable condition since last assessment.  They continue to note her left thumbnail has thickened, brittle appearance.  Therapy services have been working with her in regards to contractures and changes were made to her splints.  Otherwise no acute concerns or complaints.           PAST MEDICAL & SURGICAL HISTORY:   Past Medical History:   Diagnosis Date   • COPD (chronic obstructive pulmonary disease) (CMS/HCC)    • Hypertension    • Pneumonia       Past Surgical History:   Procedure Laterality Date   • HYSTERECTOMY      Partial    • TRACHEOSTOMY AND PEG TUBE INSERTION N/A 3/20/2019    Procedure: TRACHEOSTOMY AND PERCUTANEOUS ENDOSCOPIC GASTROSTOMY TUBE INSERTION;  Surgeon: Nadira Pandey MD;  Location: [facility];  Service: General         MEDICATIONS:  I have reviewed and reconciled the patients  medication list in the patients chart at the skilled nursing facility today.      ALLERGIES:  No Known Allergies      SOCIAL HISTORY:  Social History     Socioeconomic History   • Marital status: Legally      Spouse name: Not on file   • Number of children: Not on file   • Years of education: Not on file   • Highest education level: Not on file   Tobacco Use   • Smoking status: Current Every Day Smoker     Packs/day: 1.00     Types: Electronic Cigarette, Cigarettes   Substance and Sexual Activity   • Alcohol use: Yes     Comment: wine    • Drug use: No       FAMILY HISTORY:  No family history on file.    REVIEW OF SYSTEMS:  Review of Systems   Unable to perform ROS: Patient nonverbal   Constitutional: Negative for diaphoresis and fever.   Respiratory: Negative for cough and shortness of breath.    Cardiovascular: Negative for leg swelling.   Gastrointestinal: Negative for abdominal distention, diarrhea and vomiting.   Genitourinary: Negative for decreased urine volume and hematuria.   Musculoskeletal:        Chronic contracture.   Skin:        Nail problem   Neurological: Positive for weakness (generalized. ).   Psychiatric/Behavioral: Negative for agitation and sleep disturbance.        PHYSICAL EXAMINATION:     VITAL SIGNS:  /70   Pulse 89   Temp 97.9 °F (36.6 °C)   Resp 18   SpO2 94%     Physical Exam   Constitutional: No distress.   Nonverbal, chronically ill appearing.    HENT:   Head: Normocephalic and atraumatic.   Eyes: Pupils are equal, round, and reactive to light. Conjunctivae and EOM are normal.   Neck: Normal range of motion. Neck supple. No JVD present.   Trach in place, no erythema, edema, or exudate noted.    Cardiovascular: Normal rate, regular rhythm, normal heart sounds and intact distal pulses.   Pulmonary/Chest: Effort normal and breath sounds normal. She has no wheezes. Rhonchi: coarse.   Abdominal: Soft. Bowel sounds are normal. She exhibits no distension. There is no  tenderness.   PEG in place, no erythema or exudate.    Musculoskeletal: She exhibits no edema or tenderness.   Does not move extremities. Contractures noted.    Neurological: She is alert.   Patient is non verbal.    Skin: Skin is warm and dry. Capillary refill takes less than 2 seconds. She is not diaphoretic. No pallor.   Left great thumb is thickened, discolored, brittle.  Separation noted from the nail bed.     Psychiatric: She has a normal mood and affect. Her behavior is normal.   Nursing note and vitals reviewed.      RECORDS REVIEW:   N/A    ASSESSMENT     Diagnoses and all orders for this visit:    Chronic respiratory failure with hypoxia (CMS/[facility])    Tracheostomy present (CMS/[facility])    PEG (percutaneous endoscopic gastrostomy) status (CMS/[facility])    Anoxic brain injury (CMS/[facility])    Tinea      PLAN  Chronic respiratory failure/tracheostomy: RT at bedside today upon assessment.  She reports patient has been stable.  Continue with scheduled nebulizer and trach care.  Continue to monitor closely for any acute changes.  No reports of hypoxia.  PEG: Chronic and stable.  Continue monitoring.  Anoxic brain injury: Chronic and stable.  Continue supportive care at SNF with ADLs.  Tinea: No longer malodorous.  Therapy services continue with support in regards to wrist splints.  Will start Lotrisone twice daily x10 days.  Continue with Minerva antifungal surrounding the nail bed.  Continue to monitor.    [x]  Discussed Patient in detail with nursing/staff, addressed all needs today.     [x]  Plan of Care Reviewed   []  PT/OT Reviewed   []  Order Changes  []  Discharge Plans Reviewed  [x]  Advance Directive on file with Nursing Home.   [x]  POA on file with Nursing Home.    [x]  Code Status listed:  [x]  Full Code   []  DNR         Noemy Garcia PA-C.  8/7/2020

## 2020-10-14 ENCOUNTER — NURSING HOME (OUTPATIENT)
Dept: INTERNAL MEDICINE | Facility: CLINIC | Age: 60
End: 2020-10-14

## 2020-10-14 VITALS
BODY MASS INDEX: 26.64 KG/M2 | WEIGHT: 150.4 LBS | TEMPERATURE: 97.7 F | SYSTOLIC BLOOD PRESSURE: 120 MMHG | HEART RATE: 78 BPM | DIASTOLIC BLOOD PRESSURE: 64 MMHG | RESPIRATION RATE: 18 BRPM | OXYGEN SATURATION: 96 %

## 2020-10-14 DIAGNOSIS — G93.1 ANOXIC BRAIN INJURY (HCC): ICD-10-CM

## 2020-10-14 DIAGNOSIS — J96.11 CHRONIC RESPIRATORY FAILURE WITH HYPOXIA (HCC): Primary | ICD-10-CM

## 2020-10-14 DIAGNOSIS — M24.531 CONTRACTURE OF BOTH WRIST JOINTS: ICD-10-CM

## 2020-10-14 DIAGNOSIS — M24.532 CONTRACTURE OF BOTH WRIST JOINTS: ICD-10-CM

## 2020-10-14 DIAGNOSIS — Z93.0 TRACHEOSTOMY PRESENT (HCC): ICD-10-CM

## 2020-10-14 DIAGNOSIS — I10 ESSENTIAL HYPERTENSION: ICD-10-CM

## 2020-10-14 DIAGNOSIS — R53.81 PHYSICAL DECONDITIONING: ICD-10-CM

## 2020-10-14 DIAGNOSIS — Z93.1 PEG (PERCUTANEOUS ENDOSCOPIC GASTROSTOMY) STATUS (HCC): ICD-10-CM

## 2020-10-14 PROCEDURE — 99309 SBSQ NF CARE MODERATE MDM 30: CPT | Performed by: PHYSICIAN ASSISTANT

## 2020-10-16 NOTE — PROGRESS NOTES
Nursing Home Progress Note        Kemal Shaw, DO ?  Liya Campbell, APRN ?  [address] [facility], [place] [address]  Phone: [phone]  Fax: [phone] Emperatriz Greene MD ?  Jamie Brown, DO ?  Noemy Garcia PA-C [x]   [address] [place] [address]  Phone: [phone]  Fax: [phone]     PATIENT NAME: Viji Vargas                                                                          YOB: 1960           DATE OF SERVICE: 10/14/2020  FACILITY:  [] [place]   [x] [place]   [] [facility]   [] [facility]   [] Other ______________________________________________________________________     CHIEF COMPLAINT:  Chronic medical management and long term care needs.       HISTORY OF PRESENT ILLNESS:   Ms. Vargas is a 60 y/o female with PMH significant for cardiac arrest resulting in anoxic brain injury.  She is non-verbal and does not follow commands at baseline.  PEG and tracheostomy are in place.  She has chronic history of contractures.  She presents today for routine coordination of care and long-term care needs.  Record reviewed and care discussed with staff.  Staff have noted increased tracheal secretions x 24 hours.  No acute change in sputum color.  They describe the secretions as thing, clear.  Per RT, humidity adjusted on O2.  No report of fever.  No hypoxia or increased O2 demands.  She has significant history of respiratory infection, requiring hospitalization.      PAST MEDICAL & SURGICAL HISTORY:   Past Medical History:   Diagnosis Date   • COPD (chronic obstructive pulmonary disease) (CMS/HCC)    • Hypertension    • Pneumonia       Past Surgical History:   Procedure Laterality Date   • HYSTERECTOMY      Partial    • TRACHEOSTOMY AND PEG TUBE INSERTION N/A 3/20/2019    Procedure: TRACHEOSTOMY AND PERCUTANEOUS ENDOSCOPIC GASTROSTOMY TUBE INSERTION;  Surgeon: Nadira Pandey MD;  Location: [facility];  Service: General         MEDICATIONS:  I have reviewed and  reconciled the patients medication list in the patients chart at the skilled nursing facility today.      ALLERGIES:  No Known Allergies      SOCIAL HISTORY:  Social History     Socioeconomic History   • Marital status: Legally      Spouse name: Not on file   • Number of children: Not on file   • Years of education: Not on file   • Highest education level: Not on file   Tobacco Use   • Smoking status: Current Every Day Smoker     Packs/day: 1.00     Types: Electronic Cigarette, Cigarettes   Substance and Sexual Activity   • Alcohol use: Yes     Comment: wine    • Drug use: No       FAMILY HISTORY:  No family history on file.    REVIEW OF SYSTEMS:  Review of Systems   Unable to perform ROS: Patient nonverbal        PHYSICAL EXAMINATION:     VITAL SIGNS:  /64   Pulse 78   Temp 97.7 °F (36.5 °C)   Resp 18   Wt 68.2 kg (150 lb 6.4 oz)   SpO2 96%   BMI 26.64 kg/m²     Physical Exam   Constitutional: No distress.   Nonverbal, chronically ill appearing.  Deconditioning noted.    HENT:   Head: Normocephalic and atraumatic.   Eyes: Pupils are equal, round, and reactive to light. Conjunctivae are normal.   Neck: Normal range of motion. Neck supple. No JVD present.   Trach in place, no erythema, edema, or exudate noted.     Cardiovascular: Normal rate, regular rhythm and normal heart sounds.   Pulmonary/Chest: Effort normal and breath sounds normal. She has no wheezes. Rhonchi: coarse. She has no rales.   Abdominal: Soft. Bowel sounds are normal. She exhibits no distension. There is no abdominal tenderness.   PEG in place, no erythema or exudate.    Musculoskeletal: No swelling or tenderness.      Comments: Does not move extremities. Contractures noted.    Neurological: She is alert.   Patient is non verbal.    Skin: Skin is warm and dry. Capillary refill takes less than 2 seconds. She is not diaphoretic.   Psychiatric: Her behavior is normal.   Nursing note and vitals reviewed.      RECORDS REVIEW:    N/A    ASSESSMENT     Diagnoses and all orders for this visit:    1. Chronic respiratory failure with hypoxia (CMS/HCC) (Primary)    2. Anoxic brain injury (CMS/HCC)    3. Essential hypertension    4. Tracheostomy present (CMS/HCC)    5. PEG (percutaneous endoscopic gastrostomy) status (CMS/HCC)    6. Contracture of both wrist joints    7. Physical deconditioning      PLAN  Chronic respiratory failure/tracheostomy: No report of hypoxia or increased O2 demands.  She has had increased tracheal secretions.   Per RT, humidified O2 adjusted.  With significant respiratory history, obtain CXR, CBC, CMP.  Monitor closely for acute changes in condition.   PEG: Chronic and stable.  Continue monitoring.  Anoxic brain injury/deconditioning: Chronic and stable.  Continue supportive care at SNF with ADLs.  Contractures:  Continue working along therapy services.  Utilizes splints as requested.       [x]  Discussed Patient in detail with nursing/staff, addressed all needs today.     [x]  Plan of Care Reviewed   []  PT/OT Reviewed   []  Order Changes  []  Discharge Plans Reviewed  [x]  Advance Directive on file with Nursing Home.   [x]  POA on file with Nursing Home.    [x]  Code Status listed:  [x]  Full Code   []  DNR         Noemy Garcia PA-C.  10/16/2020

## 2020-11-04 ENCOUNTER — TELEPHONE (OUTPATIENT)
Dept: INTERNAL MEDICINE | Facility: CLINIC | Age: 60
End: 2020-11-04

## 2020-11-04 ENCOUNTER — NURSING HOME (OUTPATIENT)
Dept: INTERNAL MEDICINE | Facility: CLINIC | Age: 60
End: 2020-11-04

## 2020-11-04 ENCOUNTER — LAB REQUISITION (OUTPATIENT)
Dept: LAB | Facility: HOSPITAL | Age: 60
End: 2020-11-04

## 2020-11-04 DIAGNOSIS — Z00.00 ROUTINE GENERAL MEDICAL EXAMINATION AT A HEALTH CARE FACILITY: ICD-10-CM

## 2020-11-04 DIAGNOSIS — J96.11 CHRONIC RESPIRATORY FAILURE WITH HYPOXIA (HCC): Primary | ICD-10-CM

## 2020-11-04 DIAGNOSIS — G93.1 ANOXIC BRAIN INJURY (HCC): ICD-10-CM

## 2020-11-04 DIAGNOSIS — J44.9 CHRONIC OBSTRUCTIVE PULMONARY DISEASE, UNSPECIFIED COPD TYPE (HCC): ICD-10-CM

## 2020-11-04 DIAGNOSIS — J39.8 INCREASED TRACHEAL SECRETIONS: ICD-10-CM

## 2020-11-04 DIAGNOSIS — Z93.1 PEG (PERCUTANEOUS ENDOSCOPIC GASTROSTOMY) STATUS (HCC): ICD-10-CM

## 2020-11-04 DIAGNOSIS — Z93.0 TRACHEOSTOMY PRESENT (HCC): ICD-10-CM

## 2020-11-04 LAB
ALBUMIN SERPL-MCNC: 3.4 G/DL (ref 3.5–5.2)
ALBUMIN/GLOB SERPL: 1.1 G/DL
ALP SERPL-CCNC: 107 U/L (ref 39–117)
ALT SERPL W P-5'-P-CCNC: 25 U/L (ref 1–33)
ANION GAP SERPL CALCULATED.3IONS-SCNC: 12 MMOL/L (ref 5–15)
AST SERPL-CCNC: 27 U/L (ref 1–32)
BASOPHILS # BLD AUTO: 0.05 10*3/MM3 (ref 0–0.2)
BASOPHILS NFR BLD AUTO: 0.4 % (ref 0–1.5)
BILIRUB SERPL-MCNC: 0.5 MG/DL (ref 0–1.2)
BUN SERPL-MCNC: 22 MG/DL (ref 8–23)
BUN/CREAT SERPL: 50 (ref 7–25)
CALCIUM SPEC-SCNC: 9 MG/DL (ref 8.6–10.5)
CHLORIDE SERPL-SCNC: 100 MMOL/L (ref 98–107)
CO2 SERPL-SCNC: 25 MMOL/L (ref 22–29)
CREAT SERPL-MCNC: 0.44 MG/DL (ref 0.57–1)
DEPRECATED RDW RBC AUTO: 47.5 FL (ref 37–54)
EOSINOPHIL # BLD AUTO: 0.19 10*3/MM3 (ref 0–0.4)
EOSINOPHIL NFR BLD AUTO: 1.5 % (ref 0.3–6.2)
ERYTHROCYTE [DISTWIDTH] IN BLOOD BY AUTOMATED COUNT: 12.7 % (ref 12.3–15.4)
GFR SERPL CREATININE-BSD FRML MDRD: 145 ML/MIN/1.73
GLOBULIN UR ELPH-MCNC: 3 GM/DL
GLUCOSE SERPL-MCNC: 81 MG/DL (ref 65–99)
HCT VFR BLD AUTO: 39 % (ref 34–46.6)
HGB BLD-MCNC: 12.2 G/DL (ref 12–15.9)
IMM GRANULOCYTES # BLD AUTO: 0.05 10*3/MM3 (ref 0–0.05)
IMM GRANULOCYTES NFR BLD AUTO: 0.4 % (ref 0–0.5)
LYMPHOCYTES # BLD AUTO: 2.58 10*3/MM3 (ref 0.7–3.1)
LYMPHOCYTES NFR BLD AUTO: 20.3 % (ref 19.6–45.3)
MCH RBC QN AUTO: 31.5 PG (ref 26.6–33)
MCHC RBC AUTO-ENTMCNC: 31.3 G/DL (ref 31.5–35.7)
MCV RBC AUTO: 100.8 FL (ref 79–97)
MONOCYTES # BLD AUTO: 1.02 10*3/MM3 (ref 0.1–0.9)
MONOCYTES NFR BLD AUTO: 8 % (ref 5–12)
NEUTROPHILS NFR BLD AUTO: 69.4 % (ref 42.7–76)
NEUTROPHILS NFR BLD AUTO: 8.81 10*3/MM3 (ref 1.7–7)
NRBC BLD AUTO-RTO: 0 /100 WBC (ref 0–0.2)
PLATELET # BLD AUTO: 221 10*3/MM3 (ref 140–450)
PMV BLD AUTO: 13.6 FL (ref 6–12)
POTASSIUM SERPL-SCNC: 4.7 MMOL/L (ref 3.5–5.2)
PROT SERPL-MCNC: 6.4 G/DL (ref 6–8.5)
RBC # BLD AUTO: 3.87 10*6/MM3 (ref 3.77–5.28)
SODIUM SERPL-SCNC: 137 MMOL/L (ref 136–145)
WBC # BLD AUTO: 12.7 10*3/MM3 (ref 3.4–10.8)

## 2020-11-04 PROCEDURE — 85025 COMPLETE CBC W/AUTO DIFF WBC: CPT

## 2020-11-04 PROCEDURE — 99309 SBSQ NF CARE MODERATE MDM 30: CPT | Performed by: INTERNAL MEDICINE

## 2020-11-04 PROCEDURE — 80053 COMPREHEN METABOLIC PANEL: CPT

## 2020-11-04 NOTE — TELEPHONE ENCOUNTER
SBAR FROM [facility]      LARGE AMOUNT OF LIGHT GREEN SECRETIONS FROM TRACH. LS WITH WHEEZING IN ALL LOBES, THROAT IS CLEAR WITH SUCTIONING, NO SIGNS OF DISTRESS NOTED. AFEBRILE 97.3

## 2020-11-05 VITALS
RESPIRATION RATE: 16 BRPM | OXYGEN SATURATION: 94 % | TEMPERATURE: 98 F | DIASTOLIC BLOOD PRESSURE: 74 MMHG | HEART RATE: 78 BPM | SYSTOLIC BLOOD PRESSURE: 109 MMHG

## 2020-11-06 ENCOUNTER — NURSING HOME (OUTPATIENT)
Dept: INTERNAL MEDICINE | Facility: CLINIC | Age: 60
End: 2020-11-06

## 2020-11-06 VITALS
TEMPERATURE: 97.2 F | DIASTOLIC BLOOD PRESSURE: 68 MMHG | OXYGEN SATURATION: 93 % | HEART RATE: 88 BPM | RESPIRATION RATE: 18 BRPM | SYSTOLIC BLOOD PRESSURE: 122 MMHG

## 2020-11-06 DIAGNOSIS — I10 ESSENTIAL HYPERTENSION: ICD-10-CM

## 2020-11-06 DIAGNOSIS — J96.11 CHRONIC RESPIRATORY FAILURE WITH HYPOXIA (HCC): ICD-10-CM

## 2020-11-06 DIAGNOSIS — G93.1 BRAIN ANOXIC INJURY (HCC): ICD-10-CM

## 2020-11-06 DIAGNOSIS — J44.1 COPD WITH ACUTE EXACERBATION (HCC): Primary | ICD-10-CM

## 2020-11-06 DIAGNOSIS — Z93.0 TRACHEOSTOMY PRESENT (HCC): ICD-10-CM

## 2020-11-06 PROCEDURE — 99308 SBSQ NF CARE LOW MDM 20: CPT | Performed by: PHYSICIAN ASSISTANT

## 2020-11-17 NOTE — PROGRESS NOTES
Nursing Home Progress Note        Kemal Shaw, DO ?  Liya Campbell, APRN ?  [address] [facility], [place] [address]  Phone: [phone]  Fax: [phone] Emperatriz Greene MD ?  Jamie Brown, DO ?  Noemy Garcia PA-C [x]   [address] [place] [address]  Phone: [phone]  Fax: [phone]     PATIENT NAME: Viji Vargas                                                                          YOB: 1960           DATE OF SERVICE: 11/6/2020  FACILITY:  [] [place]   [x] [place]   [] [facility]   [] [facility]   [] Other ______________________________________________________________________     CHIEF COMPLAINT:  Sputum color change.        HISTORY OF PRESENT ILLNESS:   Ms. Vargas is a 58 y/o female presents today for concerns for sputum color change.  Per respiratory therapy, patient was noted to have increased sputum production that had color change.  No reports of increased O2 demands.  Patient has history of recurrent respiratory infections due to chronic respiratory failure and tracheostomy.  Previous sputum culture shows Serratia marcencens, sensitivity to doxycycline.  Upon assessment, patient is non-toxic.  She is non-verbal at baseline.  No respiratory distress, afebrile.  Secretions are yellow.      PAST MEDICAL & SURGICAL HISTORY:   Past Medical History:   Diagnosis Date   • COPD (chronic obstructive pulmonary disease) (CMS/HCC)    • Hypertension    • Pneumonia       Past Surgical History:   Procedure Laterality Date   • HYSTERECTOMY      Partial    • TRACHEOSTOMY AND PEG TUBE INSERTION N/A 3/20/2019    Procedure: TRACHEOSTOMY AND PERCUTANEOUS ENDOSCOPIC GASTROSTOMY TUBE INSERTION;  Surgeon: Nadira Pandey MD;  Location: [facility];  Service: General         MEDICATIONS:  I have reviewed and reconciled the patients medication list in the patients chart at the skilled nursing facility today.      ALLERGIES:  No Known Allergies      SOCIAL HISTORY:  Social History      Socioeconomic History   • Marital status: Legally      Spouse name: Not on file   • Number of children: Not on file   • Years of education: Not on file   • Highest education level: Not on file   Tobacco Use   • Smoking status: Current Every Day Smoker     Packs/day: 1.00     Types: Electronic Cigarette, Cigarettes   Substance and Sexual Activity   • Alcohol use: Yes     Comment: wine    • Drug use: No       FAMILY HISTORY:  No family history on file.    REVIEW OF SYSTEMS:  Review of Systems   Unable to perform ROS: Patient nonverbal        PHYSICAL EXAMINATION:     VITAL SIGNS:  /68   Pulse 88   Temp 97.2 °F (36.2 °C)   Resp 18   SpO2 93%     Physical Exam   Constitutional: No distress.   Nonverbal, chronically ill appearing.  Deconditioning noted.    HENT:   Head: Normocephalic and atraumatic.   Eyes: Pupils are equal, round, and reactive to light. Conjunctivae are normal.   Neck: Normal range of motion. Neck supple. No JVD present.   Trach in place, no erythema, edema, or exudate noted.     Cardiovascular: Normal rate, regular rhythm and normal pulses.   Pulmonary/Chest: Effort normal. She has no wheezes. She has rhonchi (coarse). She has rales.   Abdominal: Soft. Bowel sounds are normal. She exhibits no distension. There is no abdominal tenderness.   PEG in place, no erythema or exudate.    Musculoskeletal: No swelling or tenderness.      Comments: Does not move extremities. Contractures noted.    Neurological: She is alert.   Patient is non verbal.    Skin: Skin is warm and dry. Capillary refill takes less than 2 seconds. She is not diaphoretic.   Nursing note and vitals reviewed.      RECORDS REVIEW:   WBC 12.7, hemoglobin 12.2, hematocrit 39.0, BUN 22, creatinine 0.44.    ASSESSMENT     Diagnoses and all orders for this visit:    1. COPD with acute exacerbation (CMS/HCC) (Primary)    2. Chronic respiratory failure with hypoxia (CMS/HCC)    3. Tracheostomy present (CMS/HCC)    4. Brain  anoxic injury (CMS/HCC)    5. Essential hypertension      PLAN  Nontoxic in appearance.  Tracheal secretions are noted to have color change.  They are thin and yellow.  No reports of fever or increased O2 demands.  We will follow previous sputum culture and start doxycycline twice daily for 10 days.  In addition prednisone 20 mg daily for 5 days.  Current sputum culture is still pending.  Will adjust antibiotics upon review.  Monitor closely for any worsening signs or symptoms.    [x]  Discussed Patient in detail with nursing/staff, addressed all needs today.     [x]  Plan of Care Reviewed   []  PT/OT Reviewed   []  Order Changes  []  Discharge Plans Reviewed  [x]  Advance Directive on file with Nursing Home.   [x]  POA on file with Nursing Home.    [x]  Code Status listed:  [x]  Full Code   []  DNR         Noemy Garcia PA-C.  11/17/2020

## 2020-11-30 RX ORDER — ALPRAZOLAM 0.5 MG/1
0.5 TABLET ORAL EVERY 6 HOURS
Qty: 120 TABLET | Refills: 3 | Status: SHIPPED | OUTPATIENT
Start: 2020-11-30 | End: 2021-05-12 | Stop reason: SDUPTHER

## 2020-11-30 NOTE — TELEPHONE ENCOUNTER
[employer] MEDICATION REFILL REQUEST FOR XANAX 0.5 MG.    DIRECTIONS: TAKE 1 TAB PER G TUBE Q 6 HRS.

## 2020-12-10 ENCOUNTER — NURSING HOME (OUTPATIENT)
Dept: INTERNAL MEDICINE | Facility: CLINIC | Age: 60
End: 2020-12-10

## 2020-12-10 VITALS
DIASTOLIC BLOOD PRESSURE: 64 MMHG | TEMPERATURE: 97.4 F | HEART RATE: 82 BPM | RESPIRATION RATE: 18 BRPM | OXYGEN SATURATION: 99 % | SYSTOLIC BLOOD PRESSURE: 110 MMHG

## 2020-12-10 DIAGNOSIS — G93.1 ANOXIC BRAIN INJURY (HCC): ICD-10-CM

## 2020-12-10 DIAGNOSIS — M24.531 CONTRACTURE OF BOTH WRIST JOINTS: ICD-10-CM

## 2020-12-10 DIAGNOSIS — G93.1 BRAIN ANOXIC INJURY (HCC): ICD-10-CM

## 2020-12-10 DIAGNOSIS — Z93.1 PEG (PERCUTANEOUS ENDOSCOPIC GASTROSTOMY) STATUS (HCC): ICD-10-CM

## 2020-12-10 DIAGNOSIS — R53.81 PHYSICAL DECONDITIONING: ICD-10-CM

## 2020-12-10 DIAGNOSIS — J96.11 CHRONIC RESPIRATORY FAILURE WITH HYPOXIA (HCC): Primary | ICD-10-CM

## 2020-12-10 DIAGNOSIS — M24.532 CONTRACTURE OF BOTH WRIST JOINTS: ICD-10-CM

## 2020-12-10 DIAGNOSIS — Z93.0 TRACHEOSTOMY PRESENT (HCC): ICD-10-CM

## 2020-12-10 PROCEDURE — 99308 SBSQ NF CARE LOW MDM 20: CPT | Performed by: PHYSICIAN ASSISTANT

## 2020-12-10 NOTE — PROGRESS NOTES
Nursing Home Progress Note        Kemal Shaw, DO ?  Liya Campbell, APRN ?  [address] [facility], [place] [address]  Phone: [phone]  Fax: [phone] Emperatriz Greene MD ?  Jamie Brown, DO ?  Noemy Garcia PA-C [x]   [address] [place] [address]  Phone: [phone]  Fax: [phone]     PATIENT NAME: Viji Vargas                                                                          YOB: 1960           DATE OF SERVICE: 12/10/2020  FACILITY:  [] [place]   [x] [place]   [] [facility]   [] [facility]   [] Other ______________________________________________________________________     CHIEF COMPLAINT:  Chronic medical management and long term care needs.       HISTORY OF PRESENT ILLNESS:   Ms. Vargas is a 58 y/o female with PMH significant for cardiac arrest resulting in anoxic brain injury.  She is non-verbal and does not follow commands at baseline.  PEG and tracheostomy are in place.  She has chronic history of contractures.  She presents today for routine coordination of care and long-term care needs.  Record reviewed and care discussed with staff.  Was noted to have sputum culture change, subsequently culture obtained and followed.  Antibiotics completed.  Per nursing she has remained in stable condition sputum color returned back to her baseline.  They report no hypoxia or fever.  At times sputum is very thin, given hyoscyamine which seems to have improved this.  Upon assessment she is lying in bed appearing at her baseline.         PAST MEDICAL & SURGICAL HISTORY:   Past Medical History:   Diagnosis Date   • COPD (chronic obstructive pulmonary disease) (CMS/HCC)    • Hypertension    • Pneumonia       Past Surgical History:   Procedure Laterality Date   • HYSTERECTOMY      Partial    • TRACHEOSTOMY AND PEG TUBE INSERTION N/A 3/20/2019    Procedure: TRACHEOSTOMY AND PERCUTANEOUS ENDOSCOPIC GASTROSTOMY TUBE INSERTION;  Surgeon: Nadira Pandey MD;  Location:  [facility] OR;  Service: General         MEDICATIONS:  I have reviewed and reconciled the patients medication list in the patients chart at the skilled nursing facility today.      ALLERGIES:  No Known Allergies      SOCIAL HISTORY:  Social History     Socioeconomic History   • Marital status: Legally      Spouse name: Not on file   • Number of children: Not on file   • Years of education: Not on file   • Highest education level: Not on file   Tobacco Use   • Smoking status: Current Every Day Smoker     Packs/day: 1.00     Types: Electronic Cigarette, Cigarettes   Substance and Sexual Activity   • Alcohol use: Yes     Comment: wine    • Drug use: No       FAMILY HISTORY:  No family history on file.    REVIEW OF SYSTEMS:  Review of Systems   Unable to perform ROS: Patient nonverbal        PHYSICAL EXAMINATION:     VITAL SIGNS:  /64   Pulse 82   Temp 97.4 °F (36.3 °C)   Resp 18   SpO2 99%     Physical Exam   Constitutional: No distress.   Nonverbal, chronically ill appearing.    HENT:   Head: Normocephalic and atraumatic.   Eyes: Pupils are equal, round, and reactive to light. Conjunctivae are normal.   Neck: Normal range of motion. Neck supple. No JVD present.   Trach in place, no erythema, edema, or exudate noted.    Cardiovascular: Normal rate, regular rhythm and normal heart sounds.   Pulmonary/Chest: Effort normal and breath sounds normal. She has no wheezes. Rhonchi: coarse.   Abdominal: Soft. Bowel sounds are normal. She exhibits no distension. There is no abdominal tenderness.   PEG in place, no erythema or exudate.    Musculoskeletal: No tenderness.      Comments: Does not move extremities. Contractures noted.    Neurological: She is alert.   Patient is non verbal.  Unable to follow commands.    Skin: Skin is warm and dry. Capillary refill takes less than 2 seconds. She is not diaphoretic. No pallor.   Psychiatric: Her behavior is normal.   Nursing note and vitals reviewed.      RECORDS REVIEW:    N/A.     ASSESSMENT     Diagnoses and all orders for this visit:    1. Chronic respiratory failure with hypoxia (CMS/HCC) (Primary)    2. Tracheostomy present (CMS/[facility])    3. Anoxic brain injury (CMS/HCC)    4. PEG (percutaneous endoscopic gastrostomy) status (CMS/HCC)    5. Brain anoxic injury (CMS/HCC)    6. Contracture of both wrist joints    7. Physical deconditioning      PLAN  Chronic respiratory failure/tracheostomy:   Stable.  Continue with scheduled nebulizer and trach care.  Continue to monitor closely for any acute changes.  No reports of hypoxia.  PEG: Chronic, stable.  Continue monitoring.  Anoxic brain injury: Chronic and stable.  Continue supportive care at SNF with ADLs.  Contractures/physical deconditioning: Patient is bedridden, nonverbal, and unable to follow commands.  Continue with supportive care at SNF with all ADLs.  Continue with pressure reduction, PT/OT to follow and management of contractures.      [x]  Discussed Patient in detail with nursing/staff, addressed all needs today.     [x]  Plan of Care Reviewed   []  PT/OT Reviewed   []  Order Changes  []  Discharge Plans Reviewed  [x]  Advance Directive on file with Nursing Home.   [x]  POA on file with Nursing Home.    [x]  Code Status listed:  [x]  Full Code   []  DNR         Noemy Garcia PA-C.  12/22/2020

## 2021-01-19 ENCOUNTER — PROCEDURE VISIT (OUTPATIENT)
Dept: SURGERY | Facility: CLINIC | Age: 61
End: 2021-01-19

## 2021-01-19 VITALS
HEIGHT: 63 IN | TEMPERATURE: 98.8 F | HEART RATE: 72 BPM | OXYGEN SATURATION: 100 % | DIASTOLIC BLOOD PRESSURE: 76 MMHG | SYSTOLIC BLOOD PRESSURE: 120 MMHG | WEIGHT: 147 LBS | BODY MASS INDEX: 26.05 KG/M2

## 2021-01-19 DIAGNOSIS — K94.23 MALFUNCTION OF PERCUTANEOUS ENDOSCOPIC GASTROSTOMY (PEG) TUBE (HCC): ICD-10-CM

## 2021-01-19 DIAGNOSIS — Z43.1 ATTENTION TO GASTROSTOMY TUBE (HCC): Primary | ICD-10-CM

## 2021-01-19 PROCEDURE — 99213 OFFICE O/P EST LOW 20 MIN: CPT | Performed by: SURGERY

## 2021-01-19 PROCEDURE — 49450 REPLACE G/C TUBE PERC: CPT | Performed by: SURGERY

## 2021-02-05 ENCOUNTER — NURSING HOME (OUTPATIENT)
Dept: INTERNAL MEDICINE | Facility: CLINIC | Age: 61
End: 2021-02-05

## 2021-02-05 VITALS
RESPIRATION RATE: 16 BRPM | TEMPERATURE: 98.2 F | HEART RATE: 73 BPM | DIASTOLIC BLOOD PRESSURE: 64 MMHG | SYSTOLIC BLOOD PRESSURE: 105 MMHG | OXYGEN SATURATION: 98 %

## 2021-02-05 DIAGNOSIS — J39.8 INCREASED TRACHEAL SECRETIONS: ICD-10-CM

## 2021-02-05 DIAGNOSIS — J96.11 CHRONIC RESPIRATORY FAILURE WITH HYPOXIA (HCC): ICD-10-CM

## 2021-02-05 DIAGNOSIS — Z93.0 TRACHEOSTOMY PRESENT (HCC): ICD-10-CM

## 2021-02-05 DIAGNOSIS — K94.23 GASTROSTOMY TUBE DYSFUNCTION (HCC): Primary | ICD-10-CM

## 2021-02-05 PROCEDURE — 99309 SBSQ NF CARE MODERATE MDM 30: CPT | Performed by: PHYSICIAN ASSISTANT

## 2021-02-09 NOTE — PROGRESS NOTES
Nursing Home Progress Note        Kemal Shaw, DO ?  Liya Campbell, APRN ?  [address] [facility], [place] [address]  Phone: [phone]  Fax: [phone] Emperatriz Greene MD ?  Jamie Brown, DO ?  Noemy Garcia PA-C [x]   [address] [place] [address]  Phone: [phone]  Fax: [phone]     PATIENT NAME: Viji Vargas                                                                          YOB: 1960           DATE OF SERVICE: 02/5/2021  FACILITY:  [] [facility]   [x] [facility]   [] [facility]   [] [facility]   [] Other ______________________________________________________________________     CHIEF COMPLAINT:  G-tube problem      HISTORY OF PRESENT ILLNESS:   Ms. Vargas is a 59 y/o female with PMH significant for cardiac arrest resulting in anoxic brain injury.  She is non-verbal and does not follow commands at baseline.  PEG and tracheostomy are in place.  Per nursing staff they noted dark red residual was drawn during routine G tube care.  No problems reported with tube feeding.  The abdomen has remained soft and nondistended.  No emesis reported.  Recently tube feeding changed as Glucerna was unavailable.  Temporarily changed to Pulmocare, as approved through dietary.  In addition patient has had increased trach care due to thickened secretions.  Upon assessment patient appears at baseline.  In no acute distress.         PAST MEDICAL & SURGICAL HISTORY:   Past Medical History:   Diagnosis Date   • COPD (chronic obstructive pulmonary disease) (CMS/HCC)    • Hypertension    • Pneumonia       Past Surgical History:   Procedure Laterality Date   • HYSTERECTOMY      Partial    • TRACHEOSTOMY AND PEG TUBE INSERTION N/A 3/20/2019    Procedure: TRACHEOSTOMY AND PERCUTANEOUS ENDOSCOPIC GASTROSTOMY TUBE INSERTION;  Surgeon: Nadira Pandey MD;  Location: [facility];  Service: General         MEDICATIONS:  I have reviewed and reconciled the patients medication list in the patients  chart at the skilled nursing facility today.      ALLERGIES:  No Known Allergies      SOCIAL HISTORY:  Social History     Socioeconomic History   • Marital status: Legally      Spouse name: Not on file   • Number of children: Not on file   • Years of education: Not on file   • Highest education level: Not on file   Tobacco Use   • Smoking status: Current Every Day Smoker     Packs/day: 1.00     Types: Electronic Cigarette, Cigarettes   • Smokeless tobacco: Never Used   Substance and Sexual Activity   • Alcohol use: Yes     Comment: wine    • Drug use: No   • Sexual activity: Defer       FAMILY HISTORY:  No family history on file.    REVIEW OF SYSTEMS:  Review of Systems   Unable to perform ROS: Patient nonverbal        PHYSICAL EXAMINATION:     VITAL SIGNS:  /64   Pulse 73   Temp 98.2 °F (36.8 °C)   Resp 16   SpO2 98%     Physical Exam   Constitutional: She appears ill (chronic). No distress.   Nonverbal, chronically ill appearing.    HENT:   Head: Normocephalic and atraumatic.   Eyes: Pupils are equal, round, and reactive to light. Conjunctivae are normal.   Neck: Normal range of motion. Neck supple. No JVD present.   Trach in place   Cardiovascular: Normal rate, regular rhythm and normal pulses.   Pulmonary/Chest: Effort normal. She has no wheezes. She has rhonchi (coarse).   Abdominal: Soft. Bowel sounds are normal. She exhibits no distension. There is no abdominal tenderness.   PEG in place, no erythema or exudate.  Fluid aspirated, clear with mucous.    Musculoskeletal: No tenderness.      Comments: Does not move extremities. Contractures noted.    Neurological: She is alert.   Patient is non verbal.  Unable to follow commands.    Skin: Skin is warm and dry. Capillary refill takes less than 2 seconds. She is not diaphoretic. No pallor.   Psychiatric: Her behavior is normal.   Nursing note and vitals reviewed.      RECORDS REVIEW:   N/A.     ASSESSMENT     Diagnoses and all orders for this  visit:    1. Gastrostomy tube dysfunction (CMS/HCC) (Primary)    2. Tracheostomy present (CMS/HCC)    3. Chronic respiratory failure with hypoxia (CMS/HCC)    4. Increased tracheal secretions      PLAN  No additional symptoms noted with G-tube.  Residual drawn is clear with some mucus present.  She has no localized symptoms such as erythema, distention, discomfort.  She did have recent changes to tube feedings.  In addition, has required increased trach care from thickened secretions.  Further work-up including imaging warranted if symptoms are persistent.  Discontinue hyoscyamine as she is no longer having increased tracheal secretions.  Discussed this with respiratory, who agree.  RT to continue monitoring closely for any worsening signs or symptoms.  Continue with trach care as scheduled.  No hypoxia reported.      [x]  Discussed Patient in detail with nursing/staff, addressed all needs today.     [x]  Plan of Care Reviewed   []  PT/OT Reviewed   []  Order Changes  []  Discharge Plans Reviewed  [x]  Advance Directive on file with Nursing Home.   [x]  POA on file with Nursing Home.    [x]  Code Status listed:  [x]  Full Code   []  DNR         Noemy Garcai PA-C.  2/9/2021

## 2021-02-16 ENCOUNTER — NURSING HOME (OUTPATIENT)
Dept: INTERNAL MEDICINE | Facility: CLINIC | Age: 61
End: 2021-02-16

## 2021-02-16 VITALS
SYSTOLIC BLOOD PRESSURE: 107 MMHG | OXYGEN SATURATION: 99 % | HEART RATE: 79 BPM | DIASTOLIC BLOOD PRESSURE: 63 MMHG | TEMPERATURE: 97.3 F | RESPIRATION RATE: 19 BRPM

## 2021-02-16 DIAGNOSIS — J96.11 CHRONIC RESPIRATORY FAILURE WITH HYPOXIA (HCC): ICD-10-CM

## 2021-02-16 DIAGNOSIS — J39.8 INCREASED TRACHEAL SECRETIONS: ICD-10-CM

## 2021-02-16 DIAGNOSIS — Z93.0 TRACHEOSTOMY PRESENT (HCC): ICD-10-CM

## 2021-02-16 DIAGNOSIS — G93.1 BRAIN ANOXIC INJURY (HCC): ICD-10-CM

## 2021-02-16 DIAGNOSIS — J44.9 CHRONIC OBSTRUCTIVE PULMONARY DISEASE, UNSPECIFIED COPD TYPE (HCC): Primary | ICD-10-CM

## 2021-02-16 PROCEDURE — 99309 SBSQ NF CARE MODERATE MDM 30: CPT | Performed by: PHYSICIAN ASSISTANT

## 2021-02-17 NOTE — PROGRESS NOTES
Nursing Home Progress Note        Kemal Shaw, DO ?  Liya Campbell, APRN ?  [address] [facility], [place] [address]  Phone: [phone]  Fax: [phone] Emperatriz Greene MD ?  Jamie Brown, DO ?  Noemy Garcia PA-C [x]   [address] [place] [address]  Phone: [phone]  Fax: [phone]     PATIENT NAME: Viji Vargas                                                                          YOB: 1960           DATE OF SERVICE: 02/16/2021  FACILITY:  [] [place]   [x] [place]   [] [facility]   [] [facility]   [] Other ______________________________________________________________________     CHIEF COMPLAINT:  Change in secretions noted by nursing.       HISTORY OF PRESENT ILLNESS:   Ms. Vargas is a 61 y/o female with PMH significant for cardiac arrest resulting in anoxic brain injury.  She is non-verbal and does not follow commands at baseline.  PEG and tracheostomy are in place.  She presents today at request of nursing as they reported a change in secretions while doing routine trach care.  Previously secretions were very thin and she was utilizing Levsin.  They noted secretions to be very thick and subsequently Levsin was discontinued.  Nursing have noted secretions are white and frothy at times difficult to suction.  No report of hypoxia.  No fever.    PAST MEDICAL & SURGICAL HISTORY:   Past Medical History:   Diagnosis Date   • COPD (chronic obstructive pulmonary disease) (CMS/HCC)    • Hypertension    • Pneumonia       Past Surgical History:   Procedure Laterality Date   • HYSTERECTOMY      Partial    • TRACHEOSTOMY AND PEG TUBE INSERTION N/A 3/20/2019    Procedure: TRACHEOSTOMY AND PERCUTANEOUS ENDOSCOPIC GASTROSTOMY TUBE INSERTION;  Surgeon: Nadira Pandey MD;  Location: [facility];  Service: General         MEDICATIONS:  I have reviewed and reconciled the patients medication list in the patients chart at the skilled nursing facility today.      ALLERGIES:  No  Known Allergies      SOCIAL HISTORY:  Social History     Socioeconomic History   • Marital status: Legally      Spouse name: Not on file   • Number of children: Not on file   • Years of education: Not on file   • Highest education level: Not on file   Tobacco Use   • Smoking status: Current Every Day Smoker     Packs/day: 1.00     Types: Electronic Cigarette, Cigarettes   • Smokeless tobacco: Never Used   Substance and Sexual Activity   • Alcohol use: Yes     Comment: wine    • Drug use: No   • Sexual activity: Defer       FAMILY HISTORY:  No family history on file.    REVIEW OF SYSTEMS:  Review of Systems   Unable to perform ROS: Patient nonverbal        PHYSICAL EXAMINATION:     VITAL SIGNS:  /63   Pulse 79   Temp 97.3 °F (36.3 °C)   Resp 19   SpO2 99%     Physical Exam   Constitutional: She appears ill (chronic). No distress.   Nonverbal, chronically ill appearing.    HENT:   Head: Normocephalic and atraumatic.   Eyes: Pupils are equal, round, and reactive to light. Conjunctivae are normal.   Neck: Normal range of motion. Neck supple. No JVD present.   Trach in place with moderately thick white secretions   Cardiovascular: Normal rate, regular rhythm and normal pulses.   Pulmonary/Chest: Effort normal and breath sounds normal. No respiratory distress. She has no wheezes. She has no rhonchi.   Abdominal: Soft. Bowel sounds are normal. She exhibits no distension. There is no abdominal tenderness.   S/p peg   Musculoskeletal: No swelling or tenderness.      Comments: Does not move extremities. Contractures noted.    Neurological: She is alert.   Patient is non verbal.  Unable to follow commands.    Skin: Skin is warm and dry. Capillary refill takes less than 2 seconds. She is not diaphoretic. No pallor.   Psychiatric: Her behavior is normal.   Nursing note and vitals reviewed.      RECORDS REVIEW:   2/16 WBCs 8.4, hemoglobin 11.2, hematocrit 33.4.  Previous WBCs on 2/1011.5.    ASSESSMENT      Diagnoses and all orders for this visit:    1. Chronic obstructive pulmonary disease, unspecified COPD type (CMS/[facility]) (Primary)    2. Chronic respiratory failure with hypoxia (CMS/[facility])    3. Increased tracheal secretions    4. Tracheostomy present (CMS/[facility])    5. Brain anoxic injury (CMS/[facility])      PLAN  Vitals are reassuring.  No report of hypoxia.  Will obtain chest x-ray as she does have history of recurrent pneumonia.  Continue with nebulizers as scheduled.  Secretions are thickened, no need for Levsin at this time.  Discussed with respiratory therapy, may benefit from Mucomyst.  Continue with scheduled trach care.  Monitor closely for any acute changes in condition.      [x]  Discussed Patient in detail with nursing/staff, addressed all needs today.     [x]  Plan of Care Reviewed   []  PT/OT Reviewed   []  Order Changes  []  Discharge Plans Reviewed  [x]  Advance Directive on file with Nursing Home.   [x]  POA on file with Nursing Home.    [x]  Code Status listed:  [x]  Full Code   []  DNR         Noemy Garcia PA-C.  2/17/2021

## 2021-02-23 ENCOUNTER — NURSING HOME (OUTPATIENT)
Dept: INTERNAL MEDICINE | Facility: CLINIC | Age: 61
End: 2021-02-23

## 2021-02-23 VITALS
OXYGEN SATURATION: 99 % | TEMPERATURE: 96.3 F | SYSTOLIC BLOOD PRESSURE: 100 MMHG | RESPIRATION RATE: 16 BRPM | DIASTOLIC BLOOD PRESSURE: 69 MMHG | HEART RATE: 80 BPM

## 2021-02-23 DIAGNOSIS — Z93.0 TRACHEOSTOMY PRESENT (HCC): ICD-10-CM

## 2021-02-23 DIAGNOSIS — J96.11 CHRONIC RESPIRATORY FAILURE WITH HYPOXIA (HCC): ICD-10-CM

## 2021-02-23 DIAGNOSIS — G93.1 BRAIN ANOXIC INJURY (HCC): ICD-10-CM

## 2021-02-23 DIAGNOSIS — J90 PLEURAL EFFUSION: ICD-10-CM

## 2021-02-23 DIAGNOSIS — J44.1 COPD WITH ACUTE EXACERBATION (HCC): Primary | ICD-10-CM

## 2021-02-23 DIAGNOSIS — J39.8 INCREASED TRACHEAL SECRETIONS: ICD-10-CM

## 2021-02-23 PROCEDURE — 99309 SBSQ NF CARE MODERATE MDM 30: CPT | Performed by: PHYSICIAN ASSISTANT

## 2021-02-24 NOTE — PROGRESS NOTES
Nursing Home Progress Note      Kemal Shaw DO []  FÉLIX Horne []  [address] [place] [address]  Phone: [phone]  Fax: [phone] Emperatriz Greene MD[x]   Noemy Garcia PA-C[]  Jamie Brown DO []  [address] [place] [address]  Phone: [phone]  Fax: [phone]       PATIENT NAME: Viji Vargas                                                                          YOB: 1960           DATE OF SERVICE: 11/4/2020  FACILITY:  [] [facility]   [x] [facility]   [] [facility]  [] [facility]  [] Other   ______________________________________________________________________       CHIEF COMPLAINT:  Follow-up visit, chronic respiratory failure      HISTORY OF PRESENT ILLNESS:   Patient evaluated, record reviewed, case discussed with staff.  As reported by staff, patient is noted to have increased secretions via trach despite frequent suctioning; secretions consist of yellowish frothy sputum.  Patient is hemodynamically stable, afebrile and sats are in the upper 90s on 4 L trach collar.  Her Covid tests are negative; labs including CBC and CMP, sputum culture and chest x-ray were ordered today.  During my assessment, patient was lying comfortably, above reported frothy sputum is noted.    PAST MEDICAL & SURGICAL HISTORY:   Past Medical History:   Diagnosis Date   • COPD (chronic obstructive pulmonary disease) (CMS/HCC)    • Hypertension    • Pneumonia       Past Surgical History:   Procedure Laterality Date   • HYSTERECTOMY      Partial    • TRACHEOSTOMY AND PEG TUBE INSERTION N/A 3/20/2019    Procedure: TRACHEOSTOMY AND PERCUTANEOUS ENDOSCOPIC GASTROSTOMY TUBE INSERTION;  Surgeon: Nadira Pandey MD;  Location: [facility];  Service: General         MEDICATIONS:  I have reviewed and reconciled the patients medication list in the patients chart at the skilled nursing facility today.      ALLERGIES:  No Known Allergies     REVIEW OF SYSTEMS:  Review of Systems    Constitutional: Positive for fatigue.   HENT: Negative.    Respiratory: Negative.         Increased secretions via tracheostomy   Cardiovascular: Negative.    Gastrointestinal: Negative.    Genitourinary: Negative.    Musculoskeletal:        Debility, bedbound status   Skin: Negative.    Neurological:        Anoxic encephalopathy   Psychiatric/Behavioral: Negative.        PHYSICAL EXAMINATION:   Vital signs: /68, HR 86/min, RR 18/min, temp 98.2 °F, O2 sat 95% on 4 L trach collar  Physical Exam   Constitutional: She appears well-developed and well-nourished.   Patient is unable to communicate verbally.   HENT:   Head: Normocephalic and atraumatic.   Mouth/Throat: Mucous membranes are dry.   Trach collar noted   Eyes: Pupils are equal, round, and reactive to light.   Cardiovascular: Normal rate and regular rhythm.   Pulmonary/Chest: She has rhonchi.   Abdominal: Soft.   G-tube site noted   Musculoskeletal:      Comments: Bedbound status, requires total care   Neurological: She is alert.   Nonverbal status   Skin: Skin is warm and dry.   Psychiatric:   Unable to assess       RECORDS REVIEW:   I have reviewed details of most recent hospitalization March 24, 2020    ASSESSMENT   · Increased secretions with poor clearance, suspected acute bronchitis versus pneumonia  · Anoxic encephalopathy secondary to cardiac arrest  · Status post tracheostomy placement  · Status post gastrostomy placement  · Chronic essential hypertension  · Status post trach placement    PLAN  · Continue current management as documented per record  · Follow chest x-ray and labs  · Antibiotics may be warranted based on work-up results  · Frequent tracheostomy suctioning  · Aspiration precautions  · Nutritional support and hydration  · Bowel hygiene  · Skin care, strict offloading and pressure relief  · PEG tube and tracheostomy care per facility protocol  · GDR as clinically indicated [x]  · Consultant follow as clinically indicated  [x]  · Patient will be monitored closely, further plans were modified accordingly    [x]  Discussed Patient in detail with nursing/staff, addressed all needs today.     [x]  Plan of Care Reviewed   []  PT/OT Reviewed   []  Order Changes  []  Discharge Plans Reviewed  [x]  Advance Directive on file with Nursing Home.   [x]  POA on file with Nursing Home.    [x]  Code Status listed on patients chart at the nursing home facility.         Emperatriz Greene MD.  11/4/2020

## 2021-03-01 ENCOUNTER — HOSPITAL ENCOUNTER (EMERGENCY)
Facility: HOSPITAL | Age: 61
Discharge: NURSING, REHAB OR LONG-TERM CARE | End: 2021-03-01
Attending: EMERGENCY MEDICINE | Admitting: EMERGENCY MEDICINE

## 2021-03-01 VITALS
OXYGEN SATURATION: 100 % | TEMPERATURE: 99.2 F | SYSTOLIC BLOOD PRESSURE: 125 MMHG | RESPIRATION RATE: 18 BRPM | HEART RATE: 79 BPM | DIASTOLIC BLOOD PRESSURE: 75 MMHG

## 2021-03-01 DIAGNOSIS — K94.23 PEG TUBE MALFUNCTION (HCC): Primary | ICD-10-CM

## 2021-03-01 PROCEDURE — 99283 EMERGENCY DEPT VISIT LOW MDM: CPT

## 2021-03-01 NOTE — ED PROVIDER NOTES
Subjective   History of Present Illness    Chief Complaint: G-tube malfunction  History of Present Illness: 58-year-old female nursing home resident presents with broken and G-tube.  Longstanding gastric tube placement.  History of COPD hypertension pneumonia tracheostomy and PEG tube insertion.  Arrives with broken NG tube at the connection site for tube feedings.  Onset: Today  Duration: Persist  Exacerbating / Alleviating factors: None  Associated symptoms: Unknown      Nurses Notes reviewed and agree, including vitals, allergies, social history and prior medical history.     REVIEW OF SYSTEMS: All systems reviewed and not pertinent unless noted.    Positive for: Malfunctioning G-tube    Negative for: Fever.  Patient is otherwise mute and is not able to provide much pertinent history  Review of Systems    Past Medical History:   Diagnosis Date   • COPD (chronic obstructive pulmonary disease) (CMS/[facility])    • Hypertension    • Pneumonia        No Known Allergies    Past Surgical History:   Procedure Laterality Date   • HYSTERECTOMY      Partial    • TRACHEOSTOMY AND PEG TUBE INSERTION N/A 3/20/2019    Procedure: TRACHEOSTOMY AND PERCUTANEOUS ENDOSCOPIC GASTROSTOMY TUBE INSERTION;  Surgeon: Nadira Pandey MD;  Location: [facility];  Service: General       History reviewed. No pertinent family history.    Social History     Socioeconomic History   • Marital status: Legally      Spouse name: Not on file   • Number of children: Not on file   • Years of education: Not on file   • Highest education level: Not on file   Tobacco Use   • Smoking status: Current Every Day Smoker     Packs/day: 1.00     Types: Electronic Cigarette, Cigarettes   • Smokeless tobacco: Never Used   Substance and Sexual Activity   • Alcohol use: Yes     Comment: wine    • Drug use: No   • Sexual activity: Defer           Objective   Physical Exam    GENERAL APPEARANCE: Well developed, 60-year-old chronically ill-appearing white female,   in cute distress.  VITAL SIGNS: per nursing, reviewed and noted  SKIN: exposed skin with no rashes, ulcerations or petechiae.  Head: Normocephalic, atraumatic.   EYES: perrla. EOMI.  ENT: Normal voice.  Patient maintained wearing a mask throughout patient encounter due to coronavirus pandemic  LUNGS:  No increased work of breathing. No retractions.   CARDIOVASCULAR:  regular rate and rhythm, no murmurs.  Good Peripheral pulses. Good cap refill to extremities.   ABDOMEN: Soft, nontender, normal bowel sounds. No hernia. No ascites.  Gastric tube in position left upper abdomen without cellulitis.  Tube in stoma.    MUSCULOSKELETAL:  No tenderness.  NEUROLOGIC: Flat affect nonverbal  NECK: Supple, symmetric. No tenderness,  PSYCH: Flat affect  : no bladder tenderness or distention, no CVA tenderness      Procedures     Gastric tube replacement  Indications malfunctioning gastric tube  Implied consent, patient placed in flat positioning.  Deflated prior bulb by removing 7 mL of fluid from the lower lock,  I removed initial G-tube without complication  Used  intubating trach tube stylette for rigid support of G-tube and replaced G-tube with 20 Yoruba G-tube evident across.  Good flush and flow.  No bleeding no complication tolerated well without complication.      ED Course                                           MDM  Patient presents with G-tube malfunction, successful placement in ER.  Discharged in stable condition with outpatient follow return precautions discussed.  Final diagnoses:   PEG tube malfunction (CMS/[facility])            Janak Gutiérrez, DO  03/01/21 7704

## 2021-03-01 NOTE — ED NOTES
G tube placement confirmed via auscultation/ joselo syringe aspiration of gastric contents.     Twin Morris RN  03/01/21 [id number]

## 2021-03-02 NOTE — PROGRESS NOTES
Nursing Home Progress Note        Kemal Shaw, DO ?  Liya Campbell, APRN ?  [address] [facility], [place] [address]  Phone: [phone]  Fax: [phone] Emperatriz Greene MD ?  Jamie Brown, DO ?  Noemy Garcia PA-C [x]   [address] [place] [address]  Phone: [phone]  Fax: [phone]     PATIENT NAME: Viji Vargas                                                                          YOB: 1960           DATE OF SERVICE: 02/23/2021  FACILITY:  [] [facility]   [x] [place]   [] [facility]   [] [facility]   [] Other ______________________________________________________________________     CHIEF COMPLAINT:  Malodorous sputum      HISTORY OF PRESENT ILLNESS:   Ms. Vargas is a 61 y/o female with PMH significant for cardiac arrest resulting in anoxic brain injury.  She is non-verbal and does not follow commands at baseline.  PEG and tracheostomy are in place.  She presents today at request of nursing as they reported a change in secretions, describing them as malodorous and thick.  No report of fever or hypoxia.  There has been some associated color change, describing yellow-colored sputum.  Prior chest x-ray showed a small left pleural effusion otherwise unremarkable.      PAST MEDICAL & SURGICAL HISTORY:   Past Medical History:   Diagnosis Date   • COPD (chronic obstructive pulmonary disease) (CMS/HCC)    • Hypertension    • Pneumonia       Past Surgical History:   Procedure Laterality Date   • HYSTERECTOMY      Partial    • TRACHEOSTOMY AND PEG TUBE INSERTION N/A 3/20/2019    Procedure: TRACHEOSTOMY AND PERCUTANEOUS ENDOSCOPIC GASTROSTOMY TUBE INSERTION;  Surgeon: Nadira Pandey MD;  Location: [facility];  Service: General         MEDICATIONS:  I have reviewed and reconciled the patients medication list in the patients chart at the skilled nursing facility today.      ALLERGIES:  No Known Allergies      SOCIAL HISTORY:  Social History     Socioeconomic History   •  Marital status: Legally      Spouse name: Not on file   • Number of children: Not on file   • Years of education: Not on file   • Highest education level: Not on file   Tobacco Use   • Smoking status: Current Every Day Smoker     Packs/day: 1.00     Types: Electronic Cigarette, Cigarettes   • Smokeless tobacco: Never Used   Substance and Sexual Activity   • Alcohol use: Yes     Comment: wine    • Drug use: No   • Sexual activity: Defer       FAMILY HISTORY:  No family history on file.    REVIEW OF SYSTEMS:  Review of Systems   Unable to perform ROS: Patient nonverbal        PHYSICAL EXAMINATION:     VITAL SIGNS:  /69   Pulse 80   Temp 96.3 °F (35.7 °C)   Resp 16   SpO2 99%     Physical Exam   Constitutional: She appears ill (chronic). No distress.   Nonverbal, chronically ill appearing.    HENT:   Head: Normocephalic and atraumatic.   Eyes: Pupils are equal, round, and reactive to light. Conjunctivae are normal.   Neck: Normal range of motion. Neck supple. No JVD present.   Trach in place with moderately thick yellow colored secretions.    Cardiovascular: Normal rate, regular rhythm and normal pulses.   Pulmonary/Chest: Effort normal and breath sounds normal. No respiratory distress. She has no wheezes. She has no rhonchi.   Abdominal: Soft. Bowel sounds are normal. She exhibits no distension. There is no abdominal tenderness.   S/p peg   Musculoskeletal: No swelling or tenderness.      Comments: Does not move extremities. Contractures noted.    Neurological: She is alert.   Patient is non verbal.  Unable to follow commands.    Skin: Skin is warm and dry. Capillary refill takes less than 2 seconds. She is not diaphoretic. No pallor.   Psychiatric: Her behavior is normal.   Nursing note and vitals reviewed.      RECORDS REVIEW:   2/16 WBCs 8.4, hemoglobin 11.2, hematocrit 33.4.  Previous sputum culture reviewed    ASSESSMENT     Diagnoses and all orders for this visit:    1. COPD with acute  exacerbation (CMS/HCC) (Primary)    2. Chronic respiratory failure with hypoxia (CMS/HCC)    3. Increased tracheal secretions    4. Tracheostomy present (CMS/HCC)    5. Brain anoxic injury (CMS/HCC)    6. Pleural effusion      PLAN  Patient has significant history of pneumonia and sepsis.  Will go ahead and cover with antibiotic per previous sputum culture.  Start doxycycline twice daily x7 days.  We will also start prednisone 20 mg daily for 4 days.  Mucinex 600 mg twice a day.  Obtain sputum culture and adjust antibiotics per sensitivities.  Continue to follow alongside respiratory therapy for routine trach care.  Continue with scheduled nebulizer treatments.  Contact myself or MD with any acute changes in condition.      [x]  Discussed Patient in detail with nursing/staff, addressed all needs today.     [x]  Plan of Care Reviewed   []  PT/OT Reviewed   []  Order Changes  []  Discharge Plans Reviewed  [x]  Advance Directive on file with Nursing Home.   [x]  POA on file with Nursing Home.    [x]  Code Status listed:  [x]  Full Code   []  DNR         Noemy Garcia PA-C.  3/1/2021

## 2021-03-05 ENCOUNTER — NURSING HOME (OUTPATIENT)
Dept: INTERNAL MEDICINE | Facility: CLINIC | Age: 61
End: 2021-03-05

## 2021-03-05 VITALS
SYSTOLIC BLOOD PRESSURE: 132 MMHG | HEART RATE: 68 BPM | TEMPERATURE: 97.2 F | DIASTOLIC BLOOD PRESSURE: 70 MMHG | OXYGEN SATURATION: 98 % | RESPIRATION RATE: 20 BRPM

## 2021-03-05 DIAGNOSIS — J44.1 COPD WITH ACUTE EXACERBATION (HCC): Primary | ICD-10-CM

## 2021-03-05 DIAGNOSIS — J96.11 CHRONIC RESPIRATORY FAILURE WITH HYPOXIA (HCC): ICD-10-CM

## 2021-03-05 DIAGNOSIS — J39.8 INCREASED TRACHEAL SECRETIONS: ICD-10-CM

## 2021-03-05 DIAGNOSIS — J90 PLEURAL EFFUSION: ICD-10-CM

## 2021-03-05 PROCEDURE — 99309 SBSQ NF CARE MODERATE MDM 30: CPT | Performed by: PHYSICIAN ASSISTANT

## 2021-03-06 NOTE — PROGRESS NOTES
Nursing Home Progress Note        Kemal Shaw, DO ?  Liya Campbell, APRN ?  [address], [place] [address]  Phone: [phone]  Fax: [phone] Emperatriz Greene MD ?  Jamie Brown, DO ?  Noemy Garcia PA-C [x]   [address] [place] [address]  Phone: [phone]  Fax: [phone]     PATIENT NAME: Viji Vargas                                                                          YOB: 1960           DATE OF SERVICE: 03/5/2021  FACILITY:  [] [place]   [x] [place]   [] [facility]   [] [facility]   [] Other ______________________________________________________________________     CHIEF COMPLAINT:  Follow up on sputum culture, COPD exacerbation.       HISTORY OF PRESENT ILLNESS:   Ms. Vargas is a 60-year of age female who presents today for follow-up on COPD exacerbation.  Previously sputum culture obtained notable for Pseudomonas and Serratia subsequently treated with doxycycline and Invanz.  She was also given a short course of steroids, duo nebs, and Mucinex.  She is status post trach and PEG tube.  Chest x-ray obtained on 2/16 showed a small left-sided pleural effusion.  Respiratory therapy continues to work alongside closely performing trach care often.  They report secretions wax and wane but overall have improved.    PAST MEDICAL & SURGICAL HISTORY:   Past Medical History:   Diagnosis Date   • COPD (chronic obstructive pulmonary disease) (CMS/HCC)    • Hypertension    • Pneumonia       Past Surgical History:   Procedure Laterality Date   • HYSTERECTOMY      Partial    • TRACHEOSTOMY AND PEG TUBE INSERTION N/A 3/20/2019    Procedure: TRACHEOSTOMY AND PERCUTANEOUS ENDOSCOPIC GASTROSTOMY TUBE INSERTION;  Surgeon: Nadira Pandey MD;  Location: [facility];  Service: General         MEDICATIONS:  I have reviewed and reconciled the patients medication list in the patients chart at the skilled nursing facility today.      ALLERGIES:  No Known Allergies       SOCIAL HISTORY:  Social History     Socioeconomic History   • Marital status: Legally      Spouse name: Not on file   • Number of children: Not on file   • Years of education: Not on file   • Highest education level: Not on file   Tobacco Use   • Smoking status: Current Every Day Smoker     Packs/day: 1.00     Types: Electronic Cigarette, Cigarettes   • Smokeless tobacco: Never Used   Substance and Sexual Activity   • Alcohol use: Yes     Comment: wine    • Drug use: No   • Sexual activity: Defer       FAMILY HISTORY:  No family history on file.    REVIEW OF SYSTEMS:  Review of Systems   Unable to perform ROS: Patient nonverbal        PHYSICAL EXAMINATION:     VITAL SIGNS:  /70   Pulse 68   Temp 97.2 °F (36.2 °C)   Resp 20   SpO2 98%     Physical Exam   Constitutional: She appears ill (chronic). No distress.   Nonverbal, chronically ill appearing.    HENT:   Head: Normocephalic and atraumatic.   Eyes: Pupils are equal, round, and reactive to light. Conjunctivae are normal.   Neck: Normal range of motion. Neck supple. No JVD present.   Trach in place without secretion, no odor.    Cardiovascular: Normal rate, regular rhythm and normal pulses.   Pulmonary/Chest: Effort normal. No respiratory distress. She has no wheezes. She has rhonchi (course throughout. ).   Abdominal: Soft. Bowel sounds are normal. She exhibits no distension. There is no abdominal tenderness.   S/p peg   Musculoskeletal: No swelling or tenderness.      Comments: Does not move extremities. Contractures noted.    Neurological: She is alert.   Patient is non verbal.  Unable to follow commands.    Skin: Skin is warm and dry. Capillary refill takes less than 2 seconds. She is not diaphoretic. No pallor.   Psychiatric: Her behavior is normal.   Nursing note and vitals reviewed.      RECORDS REVIEW:   Sputum culture reviewed.     ASSESSMENT     Diagnoses and all orders for this visit:    1. COPD with acute exacerbation (CMS/[facility])  (Primary)    2. Chronic respiratory failure with hypoxia (CMS/HCC)    3. Increased tracheal secretions    4. Pleural effusion      PLAN  Continue on doxycyline and Invanz.  Overall secretions and odor have resolved.  She continues to have some course rhonchi.  Will repeat CXR which was initially completed on 2/16 which noted small left sided pleural effusion.  No reports of hypoxia.  Will also increase mucinex to 1200 mg BID x 10 days, then return to previous dosing. Monitor for any acute changes in condition.  RT to continue with current plan as discussed.         [x]  Discussed Patient in detail with nursing/staff, addressed all needs today.     [x]  Plan of Care Reviewed   []  PT/OT Reviewed   []  Order Changes  []  Discharge Plans Reviewed  [x]  Advance Directive on file with Nursing Home.   [x]  POA on file with Nursing Home.    [x]  Code Status listed:  [x]  Full Code   []  DNR         Noemy Garcia PA-C.  3/6/2021

## 2021-03-26 ENCOUNTER — NURSING HOME (OUTPATIENT)
Dept: INTERNAL MEDICINE | Facility: CLINIC | Age: 61
End: 2021-03-26

## 2021-03-26 DIAGNOSIS — J96.11 CHRONIC RESPIRATORY FAILURE WITH HYPOXIA (HCC): Primary | ICD-10-CM

## 2021-03-26 DIAGNOSIS — J44.9 CHRONIC OBSTRUCTIVE PULMONARY DISEASE, UNSPECIFIED COPD TYPE (HCC): ICD-10-CM

## 2021-03-26 DIAGNOSIS — Z93.0 TRACHEOSTOMY PRESENT (HCC): ICD-10-CM

## 2021-03-26 PROCEDURE — 99309 SBSQ NF CARE MODERATE MDM 30: CPT | Performed by: PHYSICIAN ASSISTANT

## 2021-04-07 VITALS
RESPIRATION RATE: 20 BRPM | HEART RATE: 70 BPM | SYSTOLIC BLOOD PRESSURE: 104 MMHG | DIASTOLIC BLOOD PRESSURE: 68 MMHG | TEMPERATURE: 96.7 F | OXYGEN SATURATION: 98 %

## 2021-04-07 NOTE — PROGRESS NOTES
Nursing Home Progress Note        Kemal Shaw, DO ?  Liya Campbell, APRN ?  [address] [facility], [place] [address]  Phone: [phone]  Fax: [phone] Emperatriz Greene MD ?  Jamie Brown, DO ?  Noemy Garcia PA-C [x]   [address] [place] [address]  Phone: [phone]  Fax: [phone]     PATIENT NAME: Viji Vargas                                                                          YOB: 1960           DATE OF SERVICE: 03/26/2021  FACILITY:  [] [place]   [x] [place]   [] [facility]   [] [facility]   [] Other ______________________________________________________________________     CHIEF COMPLAINT:  Increased secretions.       HISTORY OF PRESENT ILLNESS:   Ms. Vargas is a 60-year of age female who presents today at request of nursing staff as there has noted patient has had increased secretions.  They describe them as white frothy, having to suction often.  No report of hypoxia or increased O2 demands is currently on 4 L via trach collar.  She did have COPD exacerbation earlier this month which improved with short course of steroids and antibiotics.      PAST MEDICAL & SURGICAL HISTORY:   Past Medical History:   Diagnosis Date   • COPD (chronic obstructive pulmonary disease) (CMS/HCC)    • Hypertension    • Pneumonia       Past Surgical History:   Procedure Laterality Date   • HYSTERECTOMY      Partial    • TRACHEOSTOMY AND PEG TUBE INSERTION N/A 3/20/2019    Procedure: TRACHEOSTOMY AND PERCUTANEOUS ENDOSCOPIC GASTROSTOMY TUBE INSERTION;  Surgeon: Nadira Pandey MD;  Location: [facility];  Service: General         MEDICATIONS:  I have reviewed and reconciled the patients medication list in the patients chart at the skilled nursing facility today.      ALLERGIES:  No Known Allergies      SOCIAL HISTORY:  Social History     Socioeconomic History   • Marital status: Legally      Spouse name: Not on file   • Number of children: Not on file   • Years of  education: Not on file   • Highest education level: Not on file   Tobacco Use   • Smoking status: Current Every Day Smoker     Packs/day: 1.00     Types: Electronic Cigarette, Cigarettes   • Smokeless tobacco: Never Used   Vaping Use   • Vaping Use: Never used   Substance and Sexual Activity   • Alcohol use: Yes     Comment: wine    • Drug use: No   • Sexual activity: Defer       FAMILY HISTORY:  No family history on file.    REVIEW OF SYSTEMS:  Review of Systems   Unable to perform ROS: Patient nonverbal        PHYSICAL EXAMINATION:     VITAL SIGNS:  /68   Pulse 70   Temp 96.7 °F (35.9 °C)   Resp 20   SpO2 98%     Physical Exam   Constitutional: She appears ill (chronic). No distress.   Nonverbal, chronically ill appearing.    HENT:   Head: Normocephalic and atraumatic.   Eyes: Pupils are equal, round, and reactive to light. Conjunctivae are normal.   Neck: No JVD present.   Trach in place, secretions are noted.  No foul odor, they appear clear.   Cardiovascular: Normal rate, regular rhythm and normal pulses.   Pulmonary/Chest: Effort normal. No respiratory distress. She has no wheezes. She has no rhonchi.   Abdominal: Soft. Bowel sounds are normal. She exhibits no distension. There is no abdominal tenderness.   S/p peg   Musculoskeletal: No swelling or tenderness.      Comments: Does not move extremities. Contractures noted.    Neurological: She is alert.   Patient is non verbal.  Unable to follow commands.    Skin: Skin is warm and dry. Capillary refill takes less than 2 seconds. She is not diaphoretic. No pallor.   Psychiatric: Her behavior is normal.   Nursing note and vitals reviewed.      RECORDS REVIEW:   N/A    ASSESSMENT     Diagnoses and all orders for this visit:    1. Chronic respiratory failure with hypoxia (CMS/HCC) (Primary)    2. Chronic obstructive pulmonary disease, unspecified COPD type (CMS/HCC)    3. Tracheostomy present (CMS/[facility])      PLAN  Continue working with RT in regards to  trach care and scheduled nebulizers.  She was previously on Levsin for secretions which was discontinued as she did not require this anymore.  She has recently completed steroids and antibiotics.  Will obtain chest x-ray.  Restart Levsin low dose 0.125 mg every 6 as needed.  Will also obtain respiratory panel.  Patient has chronic history of waxing and waning with increased and decreased secretions.  She appears non-toxic, vitals reassuring.  Continue to monitor condition closely for any worsening signs or symptoms.      [x]  Discussed Patient in detail with nursing/staff, addressed all needs today.     [x]  Plan of Care Reviewed   []  PT/OT Reviewed   []  Order Changes  []  Discharge Plans Reviewed  [x]  Advance Directive on file with Nursing Home.   [x]  POA on file with Nursing Home.    [x]  Code Status listed:  [x]  Full Code   []  DNR         Noemy Garcia PA-C.  4/7/2021

## 2021-04-20 ENCOUNTER — NURSING HOME (OUTPATIENT)
Dept: INTERNAL MEDICINE | Facility: CLINIC | Age: 61
End: 2021-04-20

## 2021-04-20 VITALS
TEMPERATURE: 97.8 F | DIASTOLIC BLOOD PRESSURE: 72 MMHG | HEART RATE: 70 BPM | SYSTOLIC BLOOD PRESSURE: 123 MMHG | BODY MASS INDEX: 26.76 KG/M2 | WEIGHT: 151 LBS | OXYGEN SATURATION: 98 % | RESPIRATION RATE: 18 BRPM

## 2021-04-20 DIAGNOSIS — J96.11 CHRONIC RESPIRATORY FAILURE WITH HYPOXIA (HCC): ICD-10-CM

## 2021-04-20 DIAGNOSIS — G93.1 BRAIN ANOXIC INJURY (HCC): ICD-10-CM

## 2021-04-20 DIAGNOSIS — R53.81 PHYSICAL DECONDITIONING: ICD-10-CM

## 2021-04-20 DIAGNOSIS — I10 ESSENTIAL HYPERTENSION: ICD-10-CM

## 2021-04-20 DIAGNOSIS — M24.532 CONTRACTURE OF BOTH WRIST JOINTS: ICD-10-CM

## 2021-04-20 DIAGNOSIS — M24.531 CONTRACTURE OF BOTH WRIST JOINTS: ICD-10-CM

## 2021-04-20 DIAGNOSIS — Z93.0 TRACHEOSTOMY PRESENT (HCC): ICD-10-CM

## 2021-04-20 DIAGNOSIS — J44.9 CHRONIC OBSTRUCTIVE PULMONARY DISEASE, UNSPECIFIED COPD TYPE (HCC): Primary | ICD-10-CM

## 2021-04-20 DIAGNOSIS — J39.8 INCREASED TRACHEAL SECRETIONS: ICD-10-CM

## 2021-04-20 DIAGNOSIS — Z93.1 PEG (PERCUTANEOUS ENDOSCOPIC GASTROSTOMY) STATUS (HCC): ICD-10-CM

## 2021-04-20 PROCEDURE — 99308 SBSQ NF CARE LOW MDM 20: CPT | Performed by: PHYSICIAN ASSISTANT

## 2021-04-20 NOTE — PROGRESS NOTES
Nursing Home Progress Note        Kemal Shaw DO []  FÉLIX Horne []  [address] [place] [address]  Phone: [phone]  Fax: [phone] Emperatriz Greene MD []  Jamie Brown DO []  Noemy Garcia PA-C [x]   [address] [place] [address]  Phone: [phone]  Fax: [phone]     PATIENT NAME: Viji Vargas                                                                          YOB: 1960           DATE OF SERVICE: 04/20/2021  FACILITY:  [] [place]   [x] [facility]   [] [facility]   [] [facility]   [] [place]    CHIEF COMPLAINT:  Chronic medical management and long term care needs.      HISTORY OF PRESENT ILLNESS:   Ms. Vargas is a 61 y/o female who presents today for routine coordination of care and long term care needs.  Record reviewed and care discussed with staff.  Patient has remained in stable condition since last assessment.  RT continues working with patient, patient continues to have waxing and waning with clear increased secretions.  They are utilizing Levsin occasionally.  No report of increased O2 demands.  No hypoxia.  No fever.   She recently completed Doxycyline for respiratory infection.     PAST MEDICAL & SURGICAL HISTORY:   Past Medical History:   Diagnosis Date   • COPD (chronic obstructive pulmonary disease) (CMS/HCC)    • Hypertension    • Pneumonia       Past Surgical History:   Procedure Laterality Date   • HYSTERECTOMY      Partial    • TRACHEOSTOMY AND PEG TUBE INSERTION N/A 3/20/2019    Procedure: TRACHEOSTOMY AND PERCUTANEOUS ENDOSCOPIC GASTROSTOMY TUBE INSERTION;  Surgeon: Nadira Pandey MD;  Location: [facility];  Service: General         MEDICATIONS:  I have reviewed and reconciled the patients medication list in the patients chart at the skilled nursing facility today.      ALLERGIES:  No Known Allergies      SOCIAL HISTORY:  Social History     Socioeconomic History   • Marital status: Legally      Spouse name: Not  on file   • Number of children: Not on file   • Years of education: Not on file   • Highest education level: Not on file   Tobacco Use   • Smoking status: Current Every Day Smoker     Packs/day: 1.00     Types: Electronic Cigarette, Cigarettes   • Smokeless tobacco: Never Used   Vaping Use   • Vaping Use: Never used   Substance and Sexual Activity   • Alcohol use: Yes     Comment: wine    • Drug use: No   • Sexual activity: Defer       FAMILY HISTORY:  No family history on file.    REVIEW OF SYSTEMS:    Unable to perform ROS due to patient nonverbal    PHYSICAL EXAMINATION:     VITAL SIGNS:  /72   Pulse 70   Temp 97.8 °F (36.6 °C)   Resp 18   Wt 68.5 kg (151 lb)   SpO2 98%   BMI 26.76 kg/m²     Constitutional: Chronically ill-appearing elderly female.  Nonverbal at baseline.  No acute distress  HENT:   Head: Normocephalic and atraumatic.   Eyes: Conjunctivae and EOM are normal. Pupils are equal, round, and reactive to light.   Neck: Normal range of motion.   Status post trach.  Clear secretions noted, suctioning performed at bedside.  No JVD present.   Cardiovascular: Normal rate, regular rhythm.  Pulmonary/Chest:  Diffuse rhonchi. No respiratory distress.   Abdominal: Soft. Bowel sounds are normal. No distention or tenderness.  Status post PEG  Musculoskeletal:  Contractures noted.  No edema.  Neurological: Alert nonverbal at baseline.  Does not follow commands.  Skin: Skin is warm and dry.   Psychiatric: Normal mood and affect.  Normal behavior.   Nursing note and vitals reviewed.    RECORDS REVIEW:   N/A.     ASSESSMENT     Diagnoses and all orders for this visit:    1. Chronic obstructive pulmonary disease, unspecified COPD type (CMS/HCC) (Primary)    2. Chronic respiratory failure with hypoxia (CMS/HCC)    3. Tracheostomy present (CMS/HCC)    4. Increased tracheal secretions    5. Brain anoxic injury (CMS/HCC)    6. PEG (percutaneous endoscopic gastrostomy) status (CMS/HCC)    7. Physical  deconditioning    8. Contracture of both wrist joints    9. Essential hypertension        PLAN  COPD with chronic respiratory failure: Continues to work alongside with RT.  They report waxing and waning symptoms of increased secretions.  No report of color change.  No increased O2 demands or fever reported.  We will add Singulair at night.  Continue current medications.  She also recently completed doxycycline.  Continue to utilize Levsin for increased secretions.    Anoxic brain injury: Clinically stable.  Continue with supportive care.    PEG: Tolerating tube feeding well.  Continue to monitor weights and hydration.  Can continue with consultation with dietary.    Deconditioning and contracture: Continue supportive care at SNF with all ADLs.  Patient is bedridden, nonverbal, unable to follow any commands.    Hypertension: Controlled.  Continue current medications.    [x]  Discussed Patient in detail with nursing/staff, addressed all needs today.     [x]  Plan of Care Reviewed   []  PT/OT Reviewed   []  Order Changes  []  Discharge Plans Reviewed  [x]  Advance Directive on file with Nursing Home.   [x]  POA on file with Nursing Home.    [x]  Code Status listed:  [x]  Full Code   []  DNR         Noemy Garcia PA-C.  4/21/2021

## 2021-05-12 RX ORDER — ALPRAZOLAM 0.5 MG/1
0.5 TABLET ORAL EVERY 8 HOURS SCHEDULED
Qty: 90 TABLET | Refills: 3 | Status: SHIPPED | OUTPATIENT
Start: 2021-05-12 | End: 2021-09-08 | Stop reason: SDUPTHER

## 2021-05-12 NOTE — TELEPHONE ENCOUNTER
[facility] NEW MEDICATION REQUEST FOR ALPRAZOLAM 0.5 MG.    DIRECTIONS: TAKE 1 TAB PER GTUBE Q 8 HRS SCHEDULED.

## 2021-06-22 ENCOUNTER — NURSING HOME (OUTPATIENT)
Dept: INTERNAL MEDICINE | Facility: CLINIC | Age: 61
End: 2021-06-22

## 2021-06-22 VITALS
DIASTOLIC BLOOD PRESSURE: 78 MMHG | SYSTOLIC BLOOD PRESSURE: 121 MMHG | OXYGEN SATURATION: 100 % | HEART RATE: 93 BPM | RESPIRATION RATE: 20 BRPM | BODY MASS INDEX: 27.36 KG/M2 | TEMPERATURE: 97.9 F | WEIGHT: 154.44 LBS

## 2021-06-22 DIAGNOSIS — Z93.0 TRACHEOSTOMY PRESENT (HCC): ICD-10-CM

## 2021-06-22 DIAGNOSIS — J96.11 CHRONIC RESPIRATORY FAILURE WITH HYPOXIA (HCC): Primary | ICD-10-CM

## 2021-06-22 DIAGNOSIS — E66.3 OVERWEIGHT (BMI 25.0-29.9): ICD-10-CM

## 2021-06-22 DIAGNOSIS — J39.8 INCREASED TRACHEAL SECRETIONS: ICD-10-CM

## 2021-06-22 DIAGNOSIS — R13.12 OROPHARYNGEAL DYSPHAGIA: ICD-10-CM

## 2021-06-22 DIAGNOSIS — R53.81 PHYSICAL DECONDITIONING: ICD-10-CM

## 2021-06-22 DIAGNOSIS — I10 ESSENTIAL HYPERTENSION: ICD-10-CM

## 2021-06-22 DIAGNOSIS — Z93.1 PEG (PERCUTANEOUS ENDOSCOPIC GASTROSTOMY) STATUS (HCC): ICD-10-CM

## 2021-06-22 DIAGNOSIS — J44.9 CHRONIC OBSTRUCTIVE PULMONARY DISEASE, UNSPECIFIED COPD TYPE (HCC): ICD-10-CM

## 2021-06-22 DIAGNOSIS — G93.1 BRAIN ANOXIC INJURY (HCC): ICD-10-CM

## 2021-06-22 PROCEDURE — 99309 SBSQ NF CARE MODERATE MDM 30: CPT | Performed by: PHYSICIAN ASSISTANT

## 2021-08-17 ENCOUNTER — NURSING HOME (OUTPATIENT)
Dept: INTERNAL MEDICINE | Facility: CLINIC | Age: 61
End: 2021-08-17

## 2021-08-17 VITALS
HEART RATE: 82 BPM | TEMPERATURE: 97.6 F | SYSTOLIC BLOOD PRESSURE: 110 MMHG | DIASTOLIC BLOOD PRESSURE: 58 MMHG | RESPIRATION RATE: 20 BRPM | OXYGEN SATURATION: 96 %

## 2021-08-17 DIAGNOSIS — Z93.0 TRACHEOSTOMY PRESENT (HCC): ICD-10-CM

## 2021-08-17 DIAGNOSIS — R13.12 OROPHARYNGEAL DYSPHAGIA: ICD-10-CM

## 2021-08-17 DIAGNOSIS — J96.11 CHRONIC RESPIRATORY FAILURE WITH HYPOXIA (HCC): ICD-10-CM

## 2021-08-17 DIAGNOSIS — J44.1 COPD WITH ACUTE EXACERBATION (HCC): Primary | ICD-10-CM

## 2021-08-17 DIAGNOSIS — G93.1 BRAIN ANOXIC INJURY (HCC): ICD-10-CM

## 2021-08-17 PROCEDURE — 99309 SBSQ NF CARE MODERATE MDM 30: CPT | Performed by: PHYSICIAN ASSISTANT

## 2021-08-22 ENCOUNTER — HOSPITAL ENCOUNTER (EMERGENCY)
Facility: HOSPITAL | Age: 61
Discharge: HOME | End: 2021-08-22
Attending: EMERGENCY MEDICINE | Admitting: EMERGENCY MEDICINE

## 2021-08-22 ENCOUNTER — APPOINTMENT (OUTPATIENT)
Dept: GENERAL RADIOLOGY | Facility: HOSPITAL | Age: 61
End: 2021-08-22

## 2021-08-22 VITALS
SYSTOLIC BLOOD PRESSURE: 113 MMHG | DIASTOLIC BLOOD PRESSURE: 67 MMHG | WEIGHT: 159.4 LBS | HEART RATE: 80 BPM | BODY MASS INDEX: 28.24 KG/M2 | HEIGHT: 63 IN | RESPIRATION RATE: 20 BRPM | TEMPERATURE: 98.7 F | OXYGEN SATURATION: 92 %

## 2021-08-22 DIAGNOSIS — T17.908A ASPIRATION INTO AIRWAY, INITIAL ENCOUNTER: Primary | ICD-10-CM

## 2021-08-22 LAB
A-A DO2: ABNORMAL
ALBUMIN SERPL-MCNC: 3.9 G/DL (ref 3.5–5.2)
ALBUMIN/GLOB SERPL: 1.1 G/DL
ALP SERPL-CCNC: 89 U/L (ref 39–117)
ALT SERPL W P-5'-P-CCNC: 21 U/L (ref 1–33)
ANION GAP SERPL CALCULATED.3IONS-SCNC: 10.4 MMOL/L (ref 5–15)
ARTERIAL PATENCY WRIST A: ABNORMAL
AST SERPL-CCNC: 16 U/L (ref 1–32)
ATMOSPHERIC PRESS: 733 MMHG
B PARAPERT DNA SPEC QL NAA+PROBE: NOT DETECTED
B PERT DNA SPEC QL NAA+PROBE: NOT DETECTED
BASE EXCESS BLDA CALC-SCNC: 4 MMOL/L (ref 0–2)
BASOPHILS # BLD AUTO: 0.02 10*3/MM3 (ref 0–0.2)
BASOPHILS NFR BLD AUTO: 0.1 % (ref 0–1.5)
BDY SITE: ABNORMAL
BILIRUB SERPL-MCNC: 0.3 MG/DL (ref 0–1.2)
BUN SERPL-MCNC: 21 MG/DL (ref 8–23)
BUN/CREAT SERPL: 43.8 (ref 7–25)
C PNEUM DNA NPH QL NAA+NON-PROBE: NOT DETECTED
CALCIUM SPEC-SCNC: 9.6 MG/DL (ref 8.6–10.5)
CHLORIDE SERPL-SCNC: 101 MMOL/L (ref 98–107)
CO2 SERPL-SCNC: 26.6 MMOL/L (ref 22–29)
COHGB MFR BLD: 0.7 % (ref 0–2)
CREAT SERPL-MCNC: 0.48 MG/DL (ref 0.57–1)
CRP SERPL-MCNC: 3.62 MG/DL (ref 0–0.5)
DEPRECATED RDW RBC AUTO: 43.5 FL (ref 37–54)
EOSINOPHIL # BLD AUTO: 0.01 10*3/MM3 (ref 0–0.4)
EOSINOPHIL NFR BLD AUTO: 0.1 % (ref 0.3–6.2)
ERYTHROCYTE [DISTWIDTH] IN BLOOD BY AUTOMATED COUNT: 12.4 % (ref 12.3–15.4)
FLUAV SUBTYP SPEC NAA+PROBE: NOT DETECTED
FLUBV RNA ISLT QL NAA+PROBE: NOT DETECTED
GAS FLOW AIRWAY: 4 LPM
GFR SERPL CREATININE-BSD FRML MDRD: 131 ML/MIN/1.73
GLOBULIN UR ELPH-MCNC: 3.6 GM/DL
GLUCOSE SERPL-MCNC: 145 MG/DL (ref 65–99)
HADV DNA SPEC NAA+PROBE: NOT DETECTED
HCO3 BLDA-SCNC: 28.3 MMOL/L (ref 22–28)
HCOV 229E RNA SPEC QL NAA+PROBE: NOT DETECTED
HCOV HKU1 RNA SPEC QL NAA+PROBE: NOT DETECTED
HCOV NL63 RNA SPEC QL NAA+PROBE: NOT DETECTED
HCOV OC43 RNA SPEC QL NAA+PROBE: NOT DETECTED
HCT VFR BLD AUTO: 37.5 % (ref 34–46.6)
HCT VFR BLD CALC: 35.7 %
HGB BLD-MCNC: 12.2 G/DL (ref 12–15.9)
HMPV RNA NPH QL NAA+NON-PROBE: NOT DETECTED
HPIV1 RNA SPEC QL NAA+PROBE: NOT DETECTED
HPIV2 RNA SPEC QL NAA+PROBE: NOT DETECTED
HPIV3 RNA NPH QL NAA+PROBE: NOT DETECTED
HPIV4 P GENE NPH QL NAA+PROBE: NOT DETECTED
IMM GRANULOCYTES # BLD AUTO: 0.08 10*3/MM3 (ref 0–0.05)
IMM GRANULOCYTES NFR BLD AUTO: 0.6 % (ref 0–0.5)
LYMPHOCYTES # BLD AUTO: 1.31 10*3/MM3 (ref 0.7–3.1)
LYMPHOCYTES NFR BLD AUTO: 9.8 % (ref 19.6–45.3)
Lab: ABNORMAL
M PNEUMO IGG SER IA-ACNC: NOT DETECTED
MCH RBC QN AUTO: 31.3 PG (ref 26.6–33)
MCHC RBC AUTO-ENTMCNC: 32.5 G/DL (ref 31.5–35.7)
MCV RBC AUTO: 96.2 FL (ref 79–97)
METHGB BLD QL: 0.6 % (ref 0–1.5)
MODALITY: ABNORMAL
MONOCYTES # BLD AUTO: 0.21 10*3/MM3 (ref 0.1–0.9)
MONOCYTES NFR BLD AUTO: 1.6 % (ref 5–12)
NEUTROPHILS NFR BLD AUTO: 11.8 10*3/MM3 (ref 1.7–7)
NEUTROPHILS NFR BLD AUTO: 87.8 % (ref 42.7–76)
NOTE: ABNORMAL
NRBC BLD AUTO-RTO: 0 /100 WBC (ref 0–0.2)
OXYHGB MFR BLDV: 98.6 % (ref 94–99)
PCO2 BLDA: 40.4 MM HG (ref 35–45)
PCO2 TEMP ADJ BLD: ABNORMAL MM[HG]
PH BLDA: 7.45 PH UNITS (ref 7.35–7.45)
PH, TEMP CORRECTED: ABNORMAL
PLATELET # BLD AUTO: 318 10*3/MM3 (ref 140–450)
PMV BLD AUTO: 12.2 FL (ref 6–12)
PO2 BLDA: 143 MM HG (ref 75–100)
PO2 TEMP ADJ BLD: ABNORMAL MM[HG]
POTASSIUM SERPL-SCNC: 4.2 MMOL/L (ref 3.5–5.2)
PROCALCITONIN SERPL-MCNC: 0.08 NG/ML (ref 0–0.25)
PROT SERPL-MCNC: 7.5 G/DL (ref 6–8.5)
RBC # BLD AUTO: 3.9 10*6/MM3 (ref 3.77–5.28)
RHINOVIRUS RNA SPEC NAA+PROBE: NOT DETECTED
RSV RNA NPH QL NAA+NON-PROBE: NOT DETECTED
SAO2 % BLDCOA: 99.9 % (ref 94–100)
SARS-COV-2 RNA NPH QL NAA+NON-PROBE: NOT DETECTED
SODIUM SERPL-SCNC: 138 MMOL/L (ref 136–145)
TROPONIN T SERPL-MCNC: <0.01 NG/ML (ref 0–0.03)
VENTILATOR MODE: ABNORMAL
WBC # BLD AUTO: 13.43 10*3/MM3 (ref 3.4–10.8)

## 2021-08-22 PROCEDURE — 25010000002 METHYLPREDNISOLONE PER 125 MG: Performed by: EMERGENCY MEDICINE

## 2021-08-22 PROCEDURE — 99283 EMERGENCY DEPT VISIT LOW MDM: CPT

## 2021-08-22 PROCEDURE — 93005 ELECTROCARDIOGRAM TRACING: CPT | Performed by: EMERGENCY MEDICINE

## 2021-08-22 PROCEDURE — 36600 WITHDRAWAL OF ARTERIAL BLOOD: CPT

## 2021-08-22 PROCEDURE — 96374 THER/PROPH/DIAG INJ IV PUSH: CPT

## 2021-08-22 PROCEDURE — 71045 X-RAY EXAM CHEST 1 VIEW: CPT

## 2021-08-22 PROCEDURE — 86140 C-REACTIVE PROTEIN: CPT | Performed by: EMERGENCY MEDICINE

## 2021-08-22 PROCEDURE — 85025 COMPLETE CBC W/AUTO DIFF WBC: CPT | Performed by: EMERGENCY MEDICINE

## 2021-08-22 PROCEDURE — 82805 BLOOD GASES W/O2 SATURATION: CPT

## 2021-08-22 PROCEDURE — 84484 ASSAY OF TROPONIN QUANT: CPT | Performed by: EMERGENCY MEDICINE

## 2021-08-22 PROCEDURE — 83050 HGB METHEMOGLOBIN QUAN: CPT

## 2021-08-22 PROCEDURE — 0202U NFCT DS 22 TRGT SARS-COV-2: CPT | Performed by: EMERGENCY MEDICINE

## 2021-08-22 PROCEDURE — 82375 ASSAY CARBOXYHB QUANT: CPT

## 2021-08-22 PROCEDURE — 80053 COMPREHEN METABOLIC PANEL: CPT | Performed by: EMERGENCY MEDICINE

## 2021-08-22 PROCEDURE — 84145 PROCALCITONIN (PCT): CPT | Performed by: EMERGENCY MEDICINE

## 2021-08-22 PROCEDURE — 87040 BLOOD CULTURE FOR BACTERIA: CPT | Performed by: EMERGENCY MEDICINE

## 2021-08-22 RX ORDER — AMOXICILLIN AND CLAVULANATE POTASSIUM 875; 125 MG/1; MG/1
1 TABLET, FILM COATED ORAL ONCE
Status: COMPLETED | OUTPATIENT
Start: 2021-08-22 | End: 2021-08-22

## 2021-08-22 RX ORDER — METHYLPREDNISOLONE SODIUM SUCCINATE 125 MG/2ML
125 INJECTION, POWDER, LYOPHILIZED, FOR SOLUTION INTRAMUSCULAR; INTRAVENOUS ONCE
Status: COMPLETED | OUTPATIENT
Start: 2021-08-22 | End: 2021-08-22

## 2021-08-22 RX ORDER — AMOXICILLIN AND CLAVULANATE POTASSIUM 875; 125 MG/1; MG/1
1 TABLET, FILM COATED ORAL 2 TIMES DAILY
Qty: 20 TABLET | Refills: 0 | OUTPATIENT
Start: 2021-08-22 | End: 2021-09-29

## 2021-08-22 RX ORDER — PREDNISONE 20 MG/1
20 TABLET ORAL 2 TIMES DAILY
Qty: 10 TABLET | Refills: 0 | Status: ON HOLD | OUTPATIENT
Start: 2021-08-22 | End: 2022-01-10

## 2021-08-22 RX ORDER — DOXYCYCLINE 100 MG/1
100 CAPSULE ORAL 2 TIMES DAILY
Qty: 20 CAPSULE | Refills: 0 | OUTPATIENT
Start: 2021-08-22 | End: 2021-09-29

## 2021-08-22 RX ORDER — DOXYCYCLINE 100 MG/1
100 CAPSULE ORAL ONCE
Status: COMPLETED | OUTPATIENT
Start: 2021-08-22 | End: 2021-08-22

## 2021-08-22 RX ADMIN — AMOXICILLIN AND CLAVULANATE POTASSIUM 1 TABLET: 875; 125 TABLET, FILM COATED ORAL at 18:59

## 2021-08-22 RX ADMIN — DOXYCYCLINE 100 MG: 100 CAPSULE ORAL at 18:59

## 2021-08-22 RX ADMIN — METHYLPREDNISOLONE SODIUM SUCCINATE 125 MG: 125 INJECTION, POWDER, FOR SOLUTION INTRAMUSCULAR; INTRAVENOUS at 17:23

## 2021-08-22 NOTE — ED PROVIDER NOTES
TRIAGE CHIEF COMPLAINT:     Nursing and triage notes reviewed    Chief Complaint   Patient presents with   • Aspiration      HPI: Viji Vargas is a 60 y.o. female who presents to the emergency department complaining of possible aspiration.  Patient was found lower than her 30 degrees on the bed and has had a temperature at the nursing home as high as 100.4.  They state they have had to deep suction the patient multiple times today as well.  Patient has a history of an anoxic brain injury and is nonverbal at baseline.  They state patient had some increased work of breathing.  They are concerned that patient had aspirated.  Further history is somewhat limited given patient's chronic mental status changes.    REVIEW OF SYSTEMS: All other systems reviewed and are negative (obtained as best as possible given patient's mental status)    PAST MEDICAL HISTORY:   Past Medical History:   Diagnosis Date   • COPD (chronic obstructive pulmonary disease) (CMS/[facility])    • Hypertension    • Pneumonia         FAMILY HISTORY:   History reviewed. No pertinent family history.     SOCIAL HISTORY:   Social History     Socioeconomic History   • Marital status: Legally      Spouse name: Not on file   • Number of children: Not on file   • Years of education: Not on file   • Highest education level: Not on file   Tobacco Use   • Smoking status: Current Every Day Smoker     Packs/day: 1.00     Types: Electronic Cigarette, Cigarettes   • Smokeless tobacco: Never Used   Vaping Use   • Vaping Use: Unknown   Substance and Sexual Activity   • Alcohol use: Yes     Comment: wine    • Drug use: No   • Sexual activity: Defer        SURGICAL HISTORY:   Past Surgical History:   Procedure Laterality Date   • HYSTERECTOMY      Partial    • TRACHEOSTOMY AND PEG TUBE INSERTION N/A 3/20/2019    Procedure: TRACHEOSTOMY AND PERCUTANEOUS ENDOSCOPIC GASTROSTOMY TUBE INSERTION;  Surgeon: Nadira Pandey MD;  Location: [facility];  Service: General         CURRENT MEDICATIONS:      Medication List      ASK your doctor about these medications    ALPRAZolam 0.5 MG tablet  Commonly known as: XANAX  1 tablet by Per G Tube route Every 8 (Eight) Hours.     baclofen 10 MG tablet  Commonly known as: LIORESAL     bisacodyl 10 MG suppository  Commonly known as: DULCOLAX  Insert 1 suppository into the rectum Daily As Needed for Constipation.     budesonide 0.5 MG/2ML nebulizer solution  Commonly known as: PULMICORT  Take 2 mL by nebulization 2 (Two) Times a Day.     carvedilol 12.5 MG tablet  Commonly known as: COREG     chlorhexidine 0.12 % solution  Commonly known as: PERIDEX  Apply 15 mL to the mouth or throat Every 12 (Twelve) Hours.     famotidine 20 MG tablet  Commonly known as: Pepcid  1 tablet by Per G Tube route 2 (Two) Times a Day.     hyoscyamine sulfate 0.125 MG tablet dispersible disintegrating tablet  Commonly known as: ANASPAZ     ipratropium-albuterol 0.5-2.5 mg/3 ml nebulizer  Commonly known as: DUO-NEB  Take 3 mL by nebulization Every 6 (Six) Hours.     multivitamin with iron-minerals liquid     PRO-STAT liquid oral liquid  30 mL by Per G Tube route 2 (Two) Times a Day.             ALLERGIES: Patient has no known allergies.     PHYSICAL EXAM:   VITAL SIGNS:   Vitals:    08/22/21 1642   BP: 110/81   Pulse: 107   Resp: 20   Temp: 98.7 °F (37.1 °C)   SpO2: 98%      CONSTITUTIONAL: Awake, appears non-toxic, currently in no respiratory distress  HENT: Atraumatic, normocephalic, oral mucosa pink and moist, airway patent. Nares patent without drainage. External ears normal.   EYES: Conjunctiva clear   NECK: Trachea midline, tracheostomy in place.  CARDIOVASCULAR: Normal heart rate, Normal rhythm, No murmurs, rubs, gallops   PULMONARY/CHEST: Some scattered coarse lung sounds with a few scattered wheezes.  No increased work of breathing currently.  ABDOMINAL: Non-distended, soft, non-tender - no rebound or guarding   NEUROLOGIC: Chronic contractions of the upper  extremities.  Patient not currently following commands.  EXTREMITIES: No clubbing, cyanosis, or edema   SKIN: Warm, Dry, No erythema, No rash     ED COURSE / MEDICAL DECISION MAKING:   Viji Vargas is a 60 y.o. female who presents to the emergency department for evaluation of possible aspiration.  Patient deep suctioned immediately on arrival with a large amount of mucus obtained from suctioning.  Patient is breathing on her home 4 L of oxygen.  Will obtain labs and imaging for further evaluation of her symptoms.    EKG interpreted by me reveals sinus rhythm with a rate of 95 bpm.  There is low QRS voltage in chest leads.  This is an atypical appearing EKG.    Chest x-ray per my interpretation reveals low lung volumes, possible atelectasis versus infiltrates in the bilateral lower lobes.    Patient has a mild leukocytosis with a white blood cell count of 13.  Cardiac enzymes and procalcitonin within the normal range.  Respiratory panel is negative in the emergency department.    Patient was suctioned by respiratory therapy several times with a large amount of mucus returned.    Patient breathing comfortably on her home 4 L of oxygen.    Will obtain blood cultures.  Started patient on antibiotics for presumed aspiration.    Given that the patient appears to be stable at this time will treat with antibiotics and discharged with strict return precautions.  We will have her continue suction and aspiration precautions as an outpatient.  We will have her return if her respiratory status worsens.    DECISION TO DISCHARGE/ADMIT: see ED care timeline     FINAL IMPRESSION:   1 --aspiration into airway  2 --   3 --     Electronically signed by: Marlene Nolen MD, 8/22/2021 16:53 Marlene nIfante MD  08/22/21 3911

## 2021-08-24 ENCOUNTER — NURSING HOME (OUTPATIENT)
Dept: INTERNAL MEDICINE | Facility: CLINIC | Age: 61
End: 2021-08-24

## 2021-08-24 VITALS
HEART RATE: 91 BPM | WEIGHT: 155.38 LBS | SYSTOLIC BLOOD PRESSURE: 114 MMHG | BODY MASS INDEX: 27.52 KG/M2 | DIASTOLIC BLOOD PRESSURE: 64 MMHG | TEMPERATURE: 97.4 F | RESPIRATION RATE: 18 BRPM | OXYGEN SATURATION: 98 %

## 2021-08-24 DIAGNOSIS — R53.81 PHYSICAL DECONDITIONING: ICD-10-CM

## 2021-08-24 DIAGNOSIS — G93.1 BRAIN ANOXIC INJURY (HCC): ICD-10-CM

## 2021-08-24 DIAGNOSIS — Z93.0 TRACHEOSTOMY PRESENT (HCC): ICD-10-CM

## 2021-08-24 DIAGNOSIS — J69.0 ASPIRATION PNEUMONITIS (HCC): Primary | ICD-10-CM

## 2021-08-24 DIAGNOSIS — J96.11 CHRONIC RESPIRATORY FAILURE WITH HYPOXIA (HCC): ICD-10-CM

## 2021-08-24 DIAGNOSIS — Z93.1 PEG (PERCUTANEOUS ENDOSCOPIC GASTROSTOMY) STATUS (HCC): ICD-10-CM

## 2021-08-24 DIAGNOSIS — R13.12 OROPHARYNGEAL DYSPHAGIA: ICD-10-CM

## 2021-08-24 DIAGNOSIS — J44.9 CHRONIC OBSTRUCTIVE PULMONARY DISEASE, UNSPECIFIED COPD TYPE (HCC): ICD-10-CM

## 2021-08-24 PROCEDURE — 99308 SBSQ NF CARE LOW MDM 20: CPT | Performed by: PHYSICIAN ASSISTANT

## 2021-08-25 NOTE — PROGRESS NOTES
Nursing Home Progress Note        Kemal Shaw DO []  FÉLIX Horne []  [address] [place] [address]  Phone: [phone]  Fax: [phone] Emperatriz Greene MD []  Jamie Brown DO []  Noemy Garcia PA-C [x]   [address] [place] [address]  Phone: [phone]  Fax: [phone]     PATIENT NAME: Viji Vargas                                                                          YOB: 1960           DATE OF SERVICE: 8/24/2021  FACILITY: [facility]    CHIEF COMPLAINT:  Chronic medical management and long term care needs.       HISTORY OF PRESENT ILLNESS:   Ms. Vargas is a 61 y/o female who presents for routine coordination of care and long term care needs.  She has history of anoxic brain injury, nonverbal and unable to follow commands at baseline.  Previously patient started on steroid burst and doxycycline for COPD exacerbation type symptoms.  Respiratory reported increased thick/white secretions.  No hypoxia.  CXR obtained and was negative.  Unfortunately over the weekend patient developed dark copious secretions with associated fever and she was transferred to [facility] for further evaluation.  Work up consistent with aspiration, and patient subsequently discharged with antibiotics and steroids.  Per nursing, no report of any recurrent fever.  COVID ruled out as per facility protocol.  Secretions continue to be thick, no longer discolored.     PAST MEDICAL & SURGICAL HISTORY:   Past Medical History:   Diagnosis Date   • COPD (chronic obstructive pulmonary disease) (CMS/HCC)    • Hypertension    • Pneumonia       Past Surgical History:   Procedure Laterality Date   • HYSTERECTOMY      Partial    • TRACHEOSTOMY AND PEG TUBE INSERTION N/A 3/20/2019    Procedure: TRACHEOSTOMY AND PERCUTANEOUS ENDOSCOPIC GASTROSTOMY TUBE INSERTION;  Surgeon: Nadira Pandey MD;  Location: [facility];  Service: General         MEDICATIONS:  I have reviewed and reconciled  the patients medication list in the patients chart at the skilled nursing facility today.      ALLERGIES:  No Known Allergies      SOCIAL HISTORY:  Social History     Socioeconomic History   • Marital status: Legally      Spouse name: Not on file   • Number of children: Not on file   • Years of education: Not on file   • Highest education level: Not on file   Tobacco Use   • Smoking status: Current Every Day Smoker     Packs/day: 1.00     Types: Electronic Cigarette, Cigarettes   • Smokeless tobacco: Never Used   Vaping Use   • Vaping Use: Unknown   Substance and Sexual Activity   • Alcohol use: Yes     Comment: wine    • Drug use: No   • Sexual activity: Defer       FAMILY HISTORY:  No family history on file.    REVIEW OF SYSTEMS:    Unable to preform ROS due to nonverbal at baseline, unable to follow commands.      PHYSICAL EXAMINATION:     VITAL SIGNS:  /64   Pulse 91   Temp 97.4 °F (36.3 °C)   Resp 18   Wt 70.5 kg (155 lb 6 oz)   SpO2 98% Comment: 4L  BMI 27.52 kg/m²     Nursing notes and vital signs reviewed.   General Appearance:  Chronically ill appearing female, nonverbal.  Does not appear in distress, non-toxic.    Head: Normocephalic and atraumatic, without obvious abnormality     Eyes: PERRLA.  Conjunctivae and sclerae normal.  EOM are within normal limits.    Neck: Neck supple. No JVD present. S/p trach with thickened white secretions present.    Cardiovascular: Normal rate, regular rhythm.  Pulses palpable equal bilaterally.    Pulmonary/Chest: Effort normal.  Diffuse rhonchi. No respiratory distress.   Abdominal: Soft. Bowel sounds are normal. No distention or tenderness.   S/p PEG.    Musculoskeletal: Patient unable to follow commands, bedridden.  Contractures noted to wrists.  Left gt thumb has chronic nail deformity.  No edema.   Neurological: Alert and oriented at baseline.    Skin: Skin is warm and dry.   Psychiatric:  Normal mood and affect. Normal behavior     RECORDS  REVIEW:   ED visit reviewed.     ASSESSMENT     Diagnoses and all orders for this visit:    1. Aspiration pneumonitis (CMS/HCC) (Primary)    2. Chronic obstructive pulmonary disease, unspecified COPD type (CMS/HCC)    3. Chronic respiratory failure with hypoxia (CMS/HCC)    4. Tracheostomy present (CMS/HCC)    5. Brain anoxic injury (CMS/HCC)    6. Oropharyngeal dysphagia    7. PEG (percutaneous endoscopic gastrostomy) status (CMS/[facility])    8. Physical deconditioning        PLAN  Aspiration pneumonia/COPD/chronic respiratory failure status post trach: Previously taking doxycycline, which was continued after her ER visit.  It appears they also prescribed prednisone which was continued for the past several days.  Will discontinue, as she has completed a steroid burst.      Anoxic brain injury/physical deconditioning: Patient bedridden and unable to follow commands.  Continue with supportive care at SNF with all ADLs.    Dysphagia with PEG: Continue to practice aspiration precautions.  Patient followed closely by dietary services.  Continue with tube feeding for total nutrition.    [x]  Discussed Patient in detail with nursing/staff, addressed all needs today.     [x]  Plan of Care Reviewed   []  PT/OT Reviewed   []  Order Changes  []  Discharge Plans Reviewed  [x]  Advance Directive on file with Nursing Home.   [x]  POA on file with Nursing Home.    [x]  Code Status: FULL          Noemy Garcia PA-C.  8/30/2021

## 2021-08-27 LAB
BACTERIA SPEC AEROBE CULT: NORMAL
BACTERIA SPEC AEROBE CULT: NORMAL

## 2021-08-30 NOTE — PROGRESS NOTES
Nursing Home Progress Note        Kemal Shaw DO []  FÉLIX Horne []  [address] [place] [address]  Phone: [phone]  Fax: [phone] Emperatriz Greene MD []  Jamie Brown DO []  Noemy Garcia PA-C [x]   [address] [place] [address]  Phone: [phone]  Fax: [phone]     PATIENT NAME: Viji Vargas                                                                          YOB: 1960           DATE OF SERVICE: 8/17/2021  FACILITY: [facility]    CHIEF COMPLAINT:  Increased secretions.        HISTORY OF PRESENT ILLNESS:   Ms. Vargas is a chronically ill female, s/p trach, nonverbal, requiring tube feeding for nutrition.  She is bedridden, unable to follow commands at baseline.  Nursing shared that patient has had increased thick and copious secretions.  No report of fever.  No increased O2 demands.  She has had to have frequent trach care and suctioning due to her secretions.  Upon assessment she has thick white sputum at trach site.  She does not appear in respiratory distress.      PAST MEDICAL & SURGICAL HISTORY:   Past Medical History:   Diagnosis Date   • COPD (chronic obstructive pulmonary disease) (CMS/HCC)    • Hypertension    • Pneumonia       Past Surgical History:   Procedure Laterality Date   • HYSTERECTOMY      Partial    • TRACHEOSTOMY AND PEG TUBE INSERTION N/A 3/20/2019    Procedure: TRACHEOSTOMY AND PERCUTANEOUS ENDOSCOPIC GASTROSTOMY TUBE INSERTION;  Surgeon: Nadira Pandey MD;  Location: [facility];  Service: General         MEDICATIONS:  I have reviewed and reconciled the patients medication list in the patients chart at the skilled nursing facility today.      ALLERGIES:  No Known Allergies      SOCIAL HISTORY:  Social History     Socioeconomic History   • Marital status: Legally      Spouse name: Not on file   • Number of children: Not on file   • Years of education: Not on file   • Highest education level: Not  on file   Tobacco Use   • Smoking status: Current Every Day Smoker     Packs/day: 1.00     Types: Electronic Cigarette, Cigarettes   • Smokeless tobacco: Never Used   Vaping Use   • Vaping Use: Unknown   Substance and Sexual Activity   • Alcohol use: Yes     Comment: wine    • Drug use: No   • Sexual activity: Defer       FAMILY HISTORY:  No family history on file.    REVIEW OF SYSTEMS:    Unable to preform ROS due to patient non verbal.     PHYSICAL EXAMINATION:     VITAL SIGNS:  /58   Pulse 82   Temp 97.6 °F (36.4 °C)   Resp 20   SpO2 96%     Nursing notes and vital signs reviewed.   General Appearance:  Chronically ill appearing female, bedridden/nonverbal.    Head: Normocephalic and atraumatic, without obvious abnormality     Eyes: PERRLA.  Conjunctivae and sclerae normal.     Neck: Neck supple. No JVD present. S/P tracheostomy.     Cardiovascular: Normal rate, regular rhythm.  Pulses palpable equal bilaterally.    Pulmonary/Chest: Diffuse rhonchi.  Thick/white secretions noted.  No respiratory distress.   Abdominal: Soft. Bowel sounds are normal. No distention or tenderness.  S/P peg.    Musculoskeletal: No edema.  Deconditioning/contractures noted.  Left gt thumb has thickened discolored nail.  No drainage or surrounding erythema.    Neurological: Alert, does not follow commands.    Skin: Skin is warm and dry.   Psychiatric:  Does not appear agitated or restless.        RECORDS REVIEW:   8/16: WBCs 11.7, hemoglobin 11.2, hematocrit 34.4, BUN 17, creatinine 0.5, sodium 140, potassium 4.0    ASSESSMENT     Diagnoses and all orders for this visit:    1. COPD with acute exacerbation (CMS/HCC) (Primary)    2. Chronic respiratory failure with hypoxia (CMS/HCC)    3. Tracheostomy present (CMS/HCC)    4. Brain anoxic injury (CMS/HCC)    5. Oropharyngeal dysphagia        PLAN  Patient has history of recurrent symptoms which are similar in presentation.  She does not appear toxic, no hypoxia or increased O2  demands.  She is requiring more frequent suctioning.  Recent labs reassuring, no leukocytosis.  Will obtain chest x-ray.  Empirically start prednisone 40 mg daily for the next 4 days and doxycycline 100 mg twice daily for 7 days.  Monitor respiratory status closely, contact myself or MD with any acute changes.    [x]  Discussed Patient in detail with nursing/staff, addressed all needs today.     [x]  Plan of Care Reviewed   []  PT/OT Reviewed   []  Order Changes  []  Discharge Plans Reviewed  [x]  Advance Directive on file with Nursing Home.   [x]  POA on file with Nursing Home.    [x]  Code Status: FULL          Noemy Garcia PA-C.  8/30/2021

## 2021-09-08 RX ORDER — ALPRAZOLAM 0.5 MG/1
0.5 TABLET ORAL EVERY 8 HOURS SCHEDULED
Qty: 90 TABLET | Refills: 3 | Status: SHIPPED | OUTPATIENT
Start: 2021-09-08 | End: 2022-01-14 | Stop reason: SDUPTHER

## 2021-09-24 ENCOUNTER — NURSING HOME (OUTPATIENT)
Dept: INTERNAL MEDICINE | Facility: CLINIC | Age: 61
End: 2021-09-24

## 2021-09-24 DIAGNOSIS — J96.11 CHRONIC RESPIRATORY FAILURE WITH HYPOXIA (HCC): ICD-10-CM

## 2021-09-24 DIAGNOSIS — J44.9 CHRONIC OBSTRUCTIVE PULMONARY DISEASE, UNSPECIFIED COPD TYPE (HCC): ICD-10-CM

## 2021-09-24 DIAGNOSIS — R13.12 OROPHARYNGEAL DYSPHAGIA: ICD-10-CM

## 2021-09-24 DIAGNOSIS — Z93.0 TRACHEOSTOMY PRESENT (HCC): ICD-10-CM

## 2021-09-24 DIAGNOSIS — Z93.1 PEG (PERCUTANEOUS ENDOSCOPIC GASTROSTOMY) STATUS (HCC): ICD-10-CM

## 2021-09-24 DIAGNOSIS — J39.8 INCREASED TRACHEAL SECRETIONS: ICD-10-CM

## 2021-09-24 DIAGNOSIS — G93.1 BRAIN ANOXIC INJURY (HCC): Primary | ICD-10-CM

## 2021-09-24 PROCEDURE — 99309 SBSQ NF CARE MODERATE MDM 30: CPT | Performed by: PHYSICIAN ASSISTANT

## 2021-09-28 ENCOUNTER — NURSING HOME (OUTPATIENT)
Dept: INTERNAL MEDICINE | Facility: CLINIC | Age: 61
End: 2021-09-28

## 2021-09-28 VITALS
RESPIRATION RATE: 18 BRPM | TEMPERATURE: 102.3 F | SYSTOLIC BLOOD PRESSURE: 109 MMHG | HEART RATE: 100 BPM | DIASTOLIC BLOOD PRESSURE: 72 MMHG | OXYGEN SATURATION: 97 %

## 2021-09-28 DIAGNOSIS — U07.1 COVID-19: Primary | ICD-10-CM

## 2021-09-28 DIAGNOSIS — J44.9 CHRONIC OBSTRUCTIVE PULMONARY DISEASE, UNSPECIFIED COPD TYPE (HCC): ICD-10-CM

## 2021-09-28 DIAGNOSIS — Z93.0 TRACHEOSTOMY PRESENT (HCC): ICD-10-CM

## 2021-09-28 DIAGNOSIS — I10 ESSENTIAL HYPERTENSION: ICD-10-CM

## 2021-09-28 DIAGNOSIS — G93.1 BRAIN ANOXIC INJURY (HCC): ICD-10-CM

## 2021-09-28 DIAGNOSIS — J96.11 CHRONIC RESPIRATORY FAILURE WITH HYPOXIA (HCC): ICD-10-CM

## 2021-09-28 PROCEDURE — 99309 SBSQ NF CARE MODERATE MDM 30: CPT | Performed by: PHYSICIAN ASSISTANT

## 2021-09-29 ENCOUNTER — APPOINTMENT (OUTPATIENT)
Dept: GENERAL RADIOLOGY | Facility: HOSPITAL | Age: 61
End: 2021-09-29

## 2021-09-29 ENCOUNTER — HOSPITAL ENCOUNTER (EMERGENCY)
Facility: HOSPITAL | Age: 61
Discharge: HOME | End: 2021-09-29
Attending: EMERGENCY MEDICINE | Admitting: EMERGENCY MEDICINE

## 2021-09-29 VITALS
RESPIRATION RATE: 19 BRPM | DIASTOLIC BLOOD PRESSURE: 59 MMHG | OXYGEN SATURATION: 99 % | HEART RATE: 112 BPM | BODY MASS INDEX: 27.46 KG/M2 | SYSTOLIC BLOOD PRESSURE: 109 MMHG | WEIGHT: 155 LBS | TEMPERATURE: 102.2 F

## 2021-09-29 DIAGNOSIS — R11.10 VOMITING, INTRACTABILITY OF VOMITING NOT SPECIFIED, PRESENCE OF NAUSEA NOT SPECIFIED, UNSPECIFIED VOMITING TYPE: ICD-10-CM

## 2021-09-29 DIAGNOSIS — N39.0 URINARY TRACT INFECTION WITHOUT HEMATURIA, SITE UNSPECIFIED: Primary | ICD-10-CM

## 2021-09-29 LAB
ALBUMIN SERPL-MCNC: 3.8 G/DL (ref 3.5–5.2)
ALBUMIN/GLOB SERPL: 1.5 G/DL
ALP SERPL-CCNC: 72 U/L (ref 39–117)
ALT SERPL W P-5'-P-CCNC: 22 U/L (ref 1–33)
ANION GAP SERPL CALCULATED.3IONS-SCNC: 10 MMOL/L (ref 5–15)
AST SERPL-CCNC: 26 U/L (ref 1–32)
BACTERIA UR QL AUTO: ABNORMAL /HPF
BASOPHILS # BLD AUTO: 0.02 10*3/MM3 (ref 0–0.2)
BASOPHILS NFR BLD AUTO: 0.2 % (ref 0–1.5)
BILIRUB SERPL-MCNC: 0.3 MG/DL (ref 0–1.2)
BILIRUB UR QL STRIP: NEGATIVE
BUN SERPL-MCNC: 16 MG/DL (ref 8–23)
BUN/CREAT SERPL: 30.2 (ref 7–25)
CALCIUM SPEC-SCNC: 8.8 MG/DL (ref 8.6–10.5)
CHLORIDE SERPL-SCNC: 95 MMOL/L (ref 98–107)
CLARITY UR: ABNORMAL
CO2 SERPL-SCNC: 29 MMOL/L (ref 22–29)
COLOR UR: YELLOW
CREAT SERPL-MCNC: 0.53 MG/DL (ref 0.57–1)
D-LACTATE SERPL-SCNC: 1.3 MMOL/L (ref 0.5–2)
DEPRECATED RDW RBC AUTO: 46.1 FL (ref 37–54)
EOSINOPHIL # BLD AUTO: 0 10*3/MM3 (ref 0–0.4)
EOSINOPHIL NFR BLD AUTO: 0 % (ref 0.3–6.2)
ERYTHROCYTE [DISTWIDTH] IN BLOOD BY AUTOMATED COUNT: 13.2 % (ref 12.3–15.4)
GFR SERPL CREATININE-BSD FRML MDRD: 117 ML/MIN/1.73
GLOBULIN UR ELPH-MCNC: 2.5 GM/DL
GLUCOSE SERPL-MCNC: 123 MG/DL (ref 65–99)
GLUCOSE UR STRIP-MCNC: NEGATIVE MG/DL
HCT VFR BLD AUTO: 33.2 % (ref 34–46.6)
HGB BLD-MCNC: 10.8 G/DL (ref 12–15.9)
HGB UR QL STRIP.AUTO: NEGATIVE
HYALINE CASTS UR QL AUTO: ABNORMAL /LPF
IMM GRANULOCYTES # BLD AUTO: 0.05 10*3/MM3 (ref 0–0.05)
IMM GRANULOCYTES NFR BLD AUTO: 0.5 % (ref 0–0.5)
KETONES UR QL STRIP: ABNORMAL
LEUKOCYTE ESTERASE UR QL STRIP.AUTO: ABNORMAL
LIPASE SERPL-CCNC: 24 U/L (ref 13–60)
LYMPHOCYTES # BLD AUTO: 0.95 10*3/MM3 (ref 0.7–3.1)
LYMPHOCYTES NFR BLD AUTO: 10.3 % (ref 19.6–45.3)
MCH RBC QN AUTO: 31 PG (ref 26.6–33)
MCHC RBC AUTO-ENTMCNC: 32.5 G/DL (ref 31.5–35.7)
MCV RBC AUTO: 95.4 FL (ref 79–97)
MONOCYTES # BLD AUTO: 0.77 10*3/MM3 (ref 0.1–0.9)
MONOCYTES NFR BLD AUTO: 8.4 % (ref 5–12)
NEUTROPHILS NFR BLD AUTO: 7.42 10*3/MM3 (ref 1.7–7)
NEUTROPHILS NFR BLD AUTO: 80.6 % (ref 42.7–76)
NITRITE UR QL STRIP: POSITIVE
NRBC BLD AUTO-RTO: 0 /100 WBC (ref 0–0.2)
PH UR STRIP.AUTO: 8 [PH] (ref 5–8)
PLATELET # BLD AUTO: 264 10*3/MM3 (ref 140–450)
PMV BLD AUTO: 11.6 FL (ref 6–12)
POTASSIUM SERPL-SCNC: 4.4 MMOL/L (ref 3.5–5.2)
PROT SERPL-MCNC: 6.3 G/DL (ref 6–8.5)
PROT UR QL STRIP: ABNORMAL
RBC # BLD AUTO: 3.48 10*6/MM3 (ref 3.77–5.28)
RBC # UR: ABNORMAL /HPF
REF LAB TEST METHOD: ABNORMAL
SODIUM SERPL-SCNC: 134 MMOL/L (ref 136–145)
SP GR UR STRIP: 1.02 (ref 1–1.03)
SQUAMOUS #/AREA URNS HPF: ABNORMAL /HPF
TRI-PHOS CRY URNS QL MICRO: ABNORMAL /HPF
UROBILINOGEN UR QL STRIP: ABNORMAL
WBC # BLD AUTO: 9.21 10*3/MM3 (ref 3.4–10.8)
WBC UR QL AUTO: ABNORMAL /HPF

## 2021-09-29 PROCEDURE — 83605 ASSAY OF LACTIC ACID: CPT | Performed by: EMERGENCY MEDICINE

## 2021-09-29 PROCEDURE — 81001 URINALYSIS AUTO W/SCOPE: CPT | Performed by: EMERGENCY MEDICINE

## 2021-09-29 PROCEDURE — 99284 EMERGENCY DEPT VISIT MOD MDM: CPT

## 2021-09-29 PROCEDURE — 80053 COMPREHEN METABOLIC PANEL: CPT | Performed by: EMERGENCY MEDICINE

## 2021-09-29 PROCEDURE — 25010000002 CEFTRIAXONE SODIUM-DEXTROSE 1-3.74 GM-%(50ML) RECONSTITUTED SOLUTION: Performed by: EMERGENCY MEDICINE

## 2021-09-29 PROCEDURE — 96375 TX/PRO/DX INJ NEW DRUG ADDON: CPT

## 2021-09-29 PROCEDURE — 71045 X-RAY EXAM CHEST 1 VIEW: CPT

## 2021-09-29 PROCEDURE — 83690 ASSAY OF LIPASE: CPT | Performed by: EMERGENCY MEDICINE

## 2021-09-29 PROCEDURE — 85025 COMPLETE CBC W/AUTO DIFF WBC: CPT | Performed by: EMERGENCY MEDICINE

## 2021-09-29 PROCEDURE — 25010000002 ONDANSETRON PER 1 MG: Performed by: EMERGENCY MEDICINE

## 2021-09-29 PROCEDURE — 96365 THER/PROPH/DIAG IV INF INIT: CPT

## 2021-09-29 PROCEDURE — 96374 THER/PROPH/DIAG INJ IV PUSH: CPT

## 2021-09-29 RX ORDER — ONDANSETRON 2 MG/ML
8 INJECTION INTRAMUSCULAR; INTRAVENOUS ONCE
Status: COMPLETED | OUTPATIENT
Start: 2021-09-29 | End: 2021-09-29

## 2021-09-29 RX ORDER — ACETAMINOPHEN 500 MG
1000 TABLET ORAL ONCE
Status: COMPLETED | OUTPATIENT
Start: 2021-09-29 | End: 2021-09-29

## 2021-09-29 RX ORDER — CEFTRIAXONE 1 G/50ML
1 INJECTION, SOLUTION INTRAVENOUS ONCE
Status: COMPLETED | OUTPATIENT
Start: 2021-09-29 | End: 2021-09-29

## 2021-09-29 RX ORDER — CEFDINIR 300 MG/1
300 CAPSULE ORAL 2 TIMES DAILY
Qty: 14 CAPSULE | Refills: 0 | Status: SHIPPED | OUTPATIENT
Start: 2021-09-29 | End: 2021-10-06

## 2021-09-29 RX ORDER — ONDANSETRON 4 MG/1
4 TABLET, ORALLY DISINTEGRATING ORAL EVERY 6 HOURS PRN
Qty: 10 TABLET | Refills: 0 | Status: SHIPPED | OUTPATIENT
Start: 2021-09-29

## 2021-09-29 RX ADMIN — ACETAMINOPHEN 1000 MG: 500 TABLET ORAL at 02:43

## 2021-09-29 RX ADMIN — CEFTRIAXONE 1 G: 1 INJECTION, SOLUTION INTRAVENOUS at 03:00

## 2021-09-29 RX ADMIN — SODIUM CHLORIDE 1000 ML: 9 INJECTION, SOLUTION INTRAVENOUS at 01:02

## 2021-09-29 RX ADMIN — ONDANSETRON 8 MG: 2 INJECTION INTRAMUSCULAR; INTRAVENOUS at 01:02

## 2021-10-05 ENCOUNTER — NURSING HOME (OUTPATIENT)
Dept: INTERNAL MEDICINE | Facility: CLINIC | Age: 61
End: 2021-10-05

## 2021-10-05 VITALS
OXYGEN SATURATION: 90 % | HEART RATE: 68 BPM | SYSTOLIC BLOOD PRESSURE: 110 MMHG | DIASTOLIC BLOOD PRESSURE: 74 MMHG | TEMPERATURE: 97.3 F | RESPIRATION RATE: 18 BRPM

## 2021-10-05 DIAGNOSIS — U07.1 COVID-19 VIRUS DETECTED: Primary | ICD-10-CM

## 2021-10-05 DIAGNOSIS — Z93.0 TRACHEOSTOMY PRESENT (HCC): ICD-10-CM

## 2021-10-05 DIAGNOSIS — Z93.1 PEG (PERCUTANEOUS ENDOSCOPIC GASTROSTOMY) STATUS (HCC): ICD-10-CM

## 2021-10-05 DIAGNOSIS — J96.11 CHRONIC RESPIRATORY FAILURE WITH HYPOXIA (HCC): ICD-10-CM

## 2021-10-05 DIAGNOSIS — G93.1 ANOXIC BRAIN INJURY (HCC): ICD-10-CM

## 2021-10-05 DIAGNOSIS — J44.9 CHRONIC OBSTRUCTIVE PULMONARY DISEASE, UNSPECIFIED COPD TYPE (HCC): ICD-10-CM

## 2021-10-05 DIAGNOSIS — J39.8 INCREASED TRACHEAL SECRETIONS: ICD-10-CM

## 2021-10-05 PROCEDURE — 99308 SBSQ NF CARE LOW MDM 20: CPT | Performed by: PHYSICIAN ASSISTANT

## 2021-10-06 NOTE — PROGRESS NOTES
Nursing Home Progress Note        Kemal Shaw DO []  FÉLIX Horne []  [address] [place] [address]  Phone: [phone]  Fax: [phone] Emperatriz Greene MD []  Jamie Brown DO []  Noemy Garcia PA-C [x]   [address] [place] [address]  Phone: [phone]  Fax: [phone]     PATIENT NAME: Viji Vargas                                                                          YOB: 1960           DATE OF SERVICE: 10/5/2021  FACILITY: [facility]    CHIEF COMPLAINT:  COVID virus detected, follow up UTI.      HISTORY OF PRESENT ILLNESS:   Ms. Vargas is a 59 y/o female who presents for follow up.  Patient continues under isolation/droplet precautions for COVID 19 virus.  She was transferred to [facility] due to vomiting and fever.  Workup largely reassuring, concerns for UTI and she was given Cefdinir.   she was transferred back to this facility for continued nursing care.  Patient was already receiving Ceftriaxone and azithromycin for COVID 19 respiratory infection, therefore cefdinir discontinued.  Per nursing, patient has had no recurrent fevers.  Vomiting resolved.  They have noted significant improvement to her chronic secretions with use of scopolamine patch.  They have also noted less regurgitation of tube feed with Protonix.  Upon assessment, she appears comfortable, without distress.      PAST MEDICAL & SURGICAL HISTORY:   Past Medical History:   Diagnosis Date   • COPD (chronic obstructive pulmonary disease) (CMS/HCC)    • Hypertension    • Pneumonia       Past Surgical History:   Procedure Laterality Date   • HYSTERECTOMY      Partial    • TRACHEOSTOMY AND PEG TUBE INSERTION N/A 3/20/2019    Procedure: TRACHEOSTOMY AND PERCUTANEOUS ENDOSCOPIC GASTROSTOMY TUBE INSERTION;  Surgeon: Ndaira Pandey MD;  Location: [facility];  Service: General         MEDICATIONS:  I have reviewed and reconciled the patients medication list in the patients chart  at the skilled nursing facility today.      ALLERGIES:  No Known Allergies      SOCIAL HISTORY:  Social History     Socioeconomic History   • Marital status: Legally      Spouse name: Not on file   • Number of children: Not on file   • Years of education: Not on file   • Highest education level: Not on file   Tobacco Use   • Smoking status: Current Every Day Smoker     Packs/day: 1.00     Types: Electronic Cigarette, Cigarettes   • Smokeless tobacco: Never Used   Vaping Use   • Vaping Use: Unknown   Substance and Sexual Activity   • Alcohol use: Yes     Comment: wine    • Drug use: No   • Sexual activity: Defer       FAMILY HISTORY:  No family history on file.    REVIEW OF SYSTEMS:    Unable to preform ROS due to nonverbal at baseline, unable to follow commands.      PHYSICAL EXAMINATION:     VITAL SIGNS:  /74   Pulse 68   Temp 97.3 °F (36.3 °C)   Resp 18   SpO2 90%     Nursing notes and vital signs reviewed.   General Appearance:  Chronically ill appearing female, nonverbal.  Does not appear in distress, non-toxic.    Head: Normocephalic and atraumatic, without obvious abnormality     Eyes: PERRLA.  Conjunctivae and sclerae normal.  EOM are within normal limits.    Neck: Neck supple. No JVD present. S/p trach, secretions improved.   Cardiovascular: Normal rate, regular rhythm.  Pulses palpable equal bilaterally.    Pulmonary/Chest: Effort normal.  Scattered wheezes. No respiratory distress.   Abdominal: Soft. Bowel sounds are normal. No distention or tenderness.   S/p PEG.    Musculoskeletal: Patient unable to follow commands, bedridden.  Contractures noted to wrists.     Neurological: Alert, non-verbal.  Does not follow commands.    Skin: Skin is warm and dry.   Psychiatric:  Does not appear agitated or restless.      RECORDS REVIEW:   ED visit reviewed.   10/1:  Na 132, K 4.5, BUN 21, Cr. 0.5, WBC 4.8, Hgb 9.3, Hct 28.2, Ferritin 664, Procal 0.12    ASSESSMENT     Diagnoses and all orders for  this visit:    1. COVID-19 virus detected (Primary)    2. Chronic respiratory failure with hypoxia (HCC)    3. Chronic obstructive pulmonary disease, unspecified COPD type (HCC)    4. Tracheostomy present (HCC)    5. PEG (percutaneous endoscopic gastrostomy) status (HCC)    6. Increased tracheal secretions    7. Anoxic brain injury (HCC)        PLAN  Continue to monitor patient closely under isolation/droplet precautions.  She has completed course of ABX.  Continue steroid course.  No leukocytosis and procal is low.  Secretions have significantly improved.  Continue working alongside respiratory therapy, suctioning/inhalers as indicated.  No additional episodes of vomiting, continue Protonix and antiemetics as indicated.  Follow up labs scheduled 10/11.  Monitor closely, contact myself or MD with any acute changes in condition.     [x]  Discussed Patient in detail with nursing/staff, addressed all needs today.     [x]  Plan of Care Reviewed   []  PT/OT Reviewed   []  Order Changes  []  Discharge Plans Reviewed  [x]  Advance Directive on file with Nursing Home.   [x]  POA on file with Nursing Home.    [x]  Code Status: FULL          Noemy Garcia PA-C.  10/5/2021

## 2021-10-22 ENCOUNTER — HOSPITAL ENCOUNTER (EMERGENCY)
Facility: HOSPITAL | Age: 61
Discharge: NURSING, REHAB OR LONG-TERM CARE | End: 2021-10-22
Attending: EMERGENCY MEDICINE | Admitting: EMERGENCY MEDICINE

## 2021-10-22 VITALS
HEART RATE: 96 BPM | WEIGHT: 155 LBS | RESPIRATION RATE: 20 BRPM | TEMPERATURE: 98.6 F | DIASTOLIC BLOOD PRESSURE: 72 MMHG | OXYGEN SATURATION: 99 % | HEIGHT: 63 IN | SYSTOLIC BLOOD PRESSURE: 122 MMHG | BODY MASS INDEX: 27.46 KG/M2

## 2021-10-22 VITALS
TEMPERATURE: 98.5 F | RESPIRATION RATE: 20 BRPM | DIASTOLIC BLOOD PRESSURE: 76 MMHG | SYSTOLIC BLOOD PRESSURE: 118 MMHG | OXYGEN SATURATION: 97 % | HEART RATE: 88 BPM

## 2021-10-22 DIAGNOSIS — T85.598A FEEDING TUBE DYSFUNCTION, INITIAL ENCOUNTER: Primary | ICD-10-CM

## 2021-10-22 PROCEDURE — 99283 EMERGENCY DEPT VISIT LOW MDM: CPT

## 2021-10-22 NOTE — PROGRESS NOTES
Nursing Home Progress Note        Kemal Shaw DO []  FÉLIX Horne []  [address] [place] [address]  Phone: [phone]  Fax: [phone] Emperatriz Greene MD []  Jamie Brown DO []  Noemy Garcia PA-C [x]   [address] [place] [address]  Phone: [phone]  Fax: [phone]     PATIENT NAME: Viji Vargas                                                                          YOB: 1960           DATE OF SERVICE: 9/24/2021  FACILITY: [facility]    CHIEF COMPLAINT:  Increased tracheal secretions.      HISTORY OF PRESENT ILLNESS:   Ms. Vargas is a 59 y/o female who presents today at request of nursing staff for increased tracheal secretions.  Patient has history of chronic secretions, requiring frequent suctioning.  RT following closely to provide care, have expressed concerns for aspiration risk and secretions.  They noted secretions at times appeared to be the color of tube feeding.  She is remained afebrile, no increased O2 demands.  Nursing staff continue to practice aspiration precautions.  Upon assessment patient appears nontoxic, at her baseline.  Unable to contribute any reliable history at baseline.    PAST MEDICAL & SURGICAL HISTORY:   Past Medical History:   Diagnosis Date   • COPD (chronic obstructive pulmonary disease) (HCC)    • Hypertension    • Pneumonia       Past Surgical History:   Procedure Laterality Date   • HYSTERECTOMY      Partial    • TRACHEOSTOMY AND PEG TUBE INSERTION N/A 3/20/2019    Procedure: TRACHEOSTOMY AND PERCUTANEOUS ENDOSCOPIC GASTROSTOMY TUBE INSERTION;  Surgeon: Nadira Pandey MD;  Location: [facility];  Service: General         MEDICATIONS:  I have reviewed and reconciled the patients medication list in the patients chart at the skilled nursing facility today.      ALLERGIES:  No Known Allergies      SOCIAL HISTORY:  Social History     Socioeconomic History   • Marital status: Legally    Tobacco  Use   • Smoking status: Current Every Day Smoker     Packs/day: 1.00     Types: Electronic Cigarette, Cigarettes   • Smokeless tobacco: Never Used   Vaping Use   • Vaping Use: Unknown   Substance and Sexual Activity   • Alcohol use: Yes     Comment: wine    • Drug use: No   • Sexual activity: Defer       FAMILY HISTORY:  No family history on file.    REVIEW OF SYSTEMS:    Unable to preform ROS due to nonverbal at baseline, unable to follow commands.      PHYSICAL EXAMINATION:     VITAL SIGNS:  /76   Pulse 88   Temp 98.5 °F (36.9 °C)   Resp 20   SpO2 97%     Nursing notes and vital signs reviewed.   General Appearance:  Chronically ill appearing female, nonverbal.  Does not appear in distress, non-toxic.    Head: Normocephalic and atraumatic, without obvious abnormality     Eyes: PERRLA.  Conjunctivae and sclerae normal.  EOM are within normal limits.    Neck: Neck supple. No JVD present. S/p trach with thickened white secretions present.    Cardiovascular: Normal rate, regular rhythm.  Pulses palpable equal bilaterally.    Pulmonary/Chest: Effort normal.  Scant rhonchi. No respiratory distress.   Abdominal: Soft. Bowel sounds are normal. No distention or tenderness.   S/p PEG.    Musculoskeletal: Patient unable to follow commands, bedridden.  Contractures noted to wrists.    Neurological: Alert, nonverbal at baseline.    Skin: Skin is warm and dry.   Psychiatric:  Normal mood and affect. Normal behavior     RECORDS REVIEW:   N/A    ASSESSMENT     Diagnoses and all orders for this visit:    1. Brain anoxic injury (HCC) (Primary)    2. Oropharyngeal dysphagia    3. Chronic respiratory failure with hypoxia (HCC)    4. Chronic obstructive pulmonary disease, unspecified COPD type ([facility])    5. Tracheostomy present ([facility])    6. PEG (percutaneous endoscopic gastrostomy) status ([facility])    7. Increased tracheal secretions        PLAN  Patient has remained hemodynamically stable, afebrile, appears at her baseline.  She  did recently complete a course of antibiotics for presumed aspiration pneumonia.  Due to her increased risk for aspiration and reports of tube feeding with suctioning, will start patient on Protonix 40 mg daily.  Continue with strict aspiration precautions, with attention to positioning.  Will attempt to decrease secretions with a scopolamine patch 1 mg every 3 days, DC Levsin.  Continue to monitor patient condition closely, contact myself or MD with any acute changes.      [x]  Discussed Patient in detail with nursing/staff, addressed all needs today.     [x]  Plan of Care Reviewed   []  PT/OT Reviewed   []  Order Changes  []  Discharge Plans Reviewed  [x]  Advance Directive on file with Nursing Home.   [x]  POA on file with Nursing Home.    [x]  Code Status: FULL          Noemy Garcia PA-C.  10/22/2021

## 2021-10-22 NOTE — ED PROVIDER NOTES
Subjective   60-year-old female presenting with G-tube dislodgment.  Patient is nonverbal so provides no history.  Per nursing home they found patient this morning with her G-tube dislodged.  She was sent here for evaluation.  No other complaints noted.          Review of Systems   Unable to perform ROS: Patient nonverbal       Past Medical History:   Diagnosis Date   • COPD (chronic obstructive pulmonary disease) (HCC)    • Hypertension    • Pneumonia        No Known Allergies    Past Surgical History:   Procedure Laterality Date   • HYSTERECTOMY      Partial    • TRACHEOSTOMY AND PEG TUBE INSERTION N/A 3/20/2019    Procedure: TRACHEOSTOMY AND PERCUTANEOUS ENDOSCOPIC GASTROSTOMY TUBE INSERTION;  Surgeon: Nadira Pandey MD;  Location: [facility];  Service: General       History reviewed. No pertinent family history.    Social History     Socioeconomic History   • Marital status: Legally    Tobacco Use   • Smoking status: Current Every Day Smoker     Packs/day: 1.00     Types: Electronic Cigarette, Cigarettes   • Smokeless tobacco: Never Used   Vaping Use   • Vaping Use: Unknown   Substance and Sexual Activity   • Alcohol use: Yes     Comment: wine    • Drug use: No   • Sexual activity: Defer           Objective   Physical Exam  Constitutional:       General: She is not in acute distress.     Appearance: She is not toxic-appearing or diaphoretic.      Comments: Chronically ill-appearing   HENT:      Head: Normocephalic and atraumatic.      Right Ear: External ear normal.      Left Ear: External ear normal.      Nose: Nose normal.   Eyes:      Extraocular Movements: Extraocular movements intact.   Neck:      Comments: Tracheostomy present  Cardiovascular:      Rate and Rhythm: Normal rate and regular rhythm.      Pulses: Normal pulses.      Heart sounds: Normal heart sounds.   Pulmonary:      Effort: Pulmonary effort is normal.   Abdominal:      General: Bowel sounds are normal. There is no distension.       Tenderness: There is no abdominal tenderness.   Musculoskeletal:      Cervical back: Normal range of motion.   Skin:     General: Skin is warm and dry.      Capillary Refill: Capillary refill takes less than 2 seconds.      Findings: No rash.   Neurological:      Mental Status: She is alert.      Comments: Contractures present   Psychiatric:      Comments: Cannot assess         Feeding Tube Replacement    Date/Time: 10/22/2021 9:45 AM  Performed by: Jose Dukes MD  Authorized by: Jose Dukes MD     Consent:     Consent obtained:  Verbal    Consent given by:  Patient    Risks discussed:  Bleeding, infection and pain    Alternatives discussed:  No treatment  Pre-procedure details:     Old tube type:  Gastrostomy  Anesthesia (see MAR for exact dosages):     Anesthesia method:  None  Procedure details:     Patient position:  Supine    Procedure type:  Replacement    Tube type:  Gastrostomy    Tube size:  18 Fr    Bulb inflation volume:  8    Bulb inflation fluid:  Normal saline  Post-procedure details:     Placement/position confirmation:  Gastric contents aspirated    Placement difficulty:  None    Bleeding:  None    Patient tolerance of procedure:  Tolerated well, no immediate complications               ED Course                                           MDM  Number of Diagnoses or Management Options  Feeding tube dysfunction, initial encounter  Diagnosis management comments: 60-year-old female here for G-tube displacement.  Chronically ill-appearing lady in no distress with exam as above.  Tube was replaced at bedside with no difficulty.  Gastric contents were aspirated and fluid was flushed with no difficulty.  Will discharge back to the nursing home.    DDx: G-tube displacement      Final diagnoses:   Feeding tube dysfunction, initial encounter          Jose Dukes MD  10/22/21 0999

## 2021-10-25 NOTE — PROGRESS NOTES
Nursing Home Progress Note        Kemal Shaw DO []  FÉLIX Horne []  [address] [place] [address]  Phone: [phone]  Fax: [phone] Emperatriz Greene MD []  Jamie Brown DO []  Noemy Garcia PA-C [x]   [address] [place] [address]  Phone: [phone]  Fax: [phone]     PATIENT NAME: Viji Vargas                                                                          YOB: 1960           DATE OF SERVICE: 9/28/2021  FACILITY: [facility]    CHIEF COMPLAINT:  COVID-19      HISTORY OF PRESENT ILLNESS:   Ms. Vargas is a 61 y/o female who presents today at request of nursing staff with reports of fever.  She was recently diagnosed with COVID-19.  No increased O2 demands.  Patient does have significant history of tracheostomy, is nonverbal and unable to follow commands at baseline.  T-max 102.3, given Tylenol with improvement repeat 98.5.  She has been tachycardic.  Previously started on Rocephin and azithromycin and dexamethasone.  She continues under isolation/droplet precautions as per facility protocol.    PAST MEDICAL & SURGICAL HISTORY:   Past Medical History:   Diagnosis Date   • COPD (chronic obstructive pulmonary disease) (HCC)    • Hypertension    • Pneumonia       Past Surgical History:   Procedure Laterality Date   • HYSTERECTOMY      Partial    • TRACHEOSTOMY AND PEG TUBE INSERTION N/A 3/20/2019    Procedure: TRACHEOSTOMY AND PERCUTANEOUS ENDOSCOPIC GASTROSTOMY TUBE INSERTION;  Surgeon: Nadira Pandey MD;  Location: [facility];  Service: General         MEDICATIONS:  I have reviewed and reconciled the patients medication list in the patients chart at the skilled nursing facility today.      ALLERGIES:  No Known Allergies      SOCIAL HISTORY:  Social History     Socioeconomic History   • Marital status: Legally    Tobacco Use   • Smoking status: Current Every Day Smoker     Packs/day: 1.00     Types: Electronic Cigarette,  Cigarettes   • Smokeless tobacco: Never Used   Vaping Use   • Vaping Use: Unknown   Substance and Sexual Activity   • Alcohol use: Yes     Comment: wine    • Drug use: No   • Sexual activity: Defer       FAMILY HISTORY:  No family history on file.    REVIEW OF SYSTEMS:    Unable to preform ROS due to nonverbal at baseline, unable to follow commands.      PHYSICAL EXAMINATION:     VITAL SIGNS:  /72   Pulse 100   Temp (!) 102.3 °F (39.1 °C)   Resp 18   SpO2 97%     Nursing notes and vital signs reviewed.   General Appearance:   Acutely ill appearing female, nonverbal.   Head: Normocephalic and atraumatic, without obvious abnormality     Eyes: PERRLA.  Conjunctivae and sclerae normal.  EOM are within normal limits.    Neck: Neck supple. No JVD present. S/p trach, previous secretions improved.   Cardiovascular:  Tachycardic. regular rhythm.  Pulses palpable equal bilaterally.    Pulmonary/Chest: Effort normal.  Diffuse rhonchi.  No respiratory distress.   Abdominal: Soft. Bowel sounds are normal. No distention or tenderness.   S/p PEG.    Musculoskeletal: Patient unable to follow commands, bedridden.  Contractures noted to wrists.    Neurological: Alert, nonverbal at baseline.    Skin: Skin is warm and dry.   Psychiatric:  Normal mood and affect. Normal behavior     RECORDS REVIEW:   N/A    ASSESSMENT     Diagnoses and all orders for this visit:    1. COVID-19 (Primary)    2. Chronic respiratory failure with hypoxia (HCC)    3. Chronic obstructive pulmonary disease, unspecified COPD type (HCC)    4. Tracheostomy present (HCC)    5. Brain anoxic injury (HCC)    6. Essential hypertension        PLAN  Patient hemodynamically stable.  She does appear acutely ill, discussed with nursing to monitor very closely.  Continue with nutrition via tube feeds.  Temperature has improved with use of Tylenol, now 98.5 degrees.  Suspect inflammatory response syndrome, increase dexamethasone to 6 mg twice daily.  Obtain CBC,  CMP, procalcitonin, ferritin.  Continue on antibiotic course.    Discussed goals of care with power of , Liliya Epperson.  Reviewed CODE STATUS, full code.  Any acute changes patient family have requested transfer to ER for evaluation.  Will continue to monitor closely.    [x]  Discussed Patient in detail with nursing/staff, addressed all needs today.     [x]  Plan of Care Reviewed   []  PT/OT Reviewed   []  Order Changes  []  Discharge Plans Reviewed  [x]  Advance Directive on file with Nursing Home.   [x]  POA on file with Nursing Home.    [x]  Code Status: FULL          Noemy Garcia PA-C.  10/24/2021

## 2021-12-14 ENCOUNTER — NURSING HOME (OUTPATIENT)
Dept: INTERNAL MEDICINE | Facility: CLINIC | Age: 61
End: 2021-12-14

## 2021-12-14 VITALS
TEMPERATURE: 97.6 F | SYSTOLIC BLOOD PRESSURE: 142 MMHG | OXYGEN SATURATION: 99 % | RESPIRATION RATE: 18 BRPM | DIASTOLIC BLOOD PRESSURE: 86 MMHG | HEART RATE: 76 BPM

## 2021-12-14 DIAGNOSIS — Z93.0 TRACHEOSTOMY PRESENT: ICD-10-CM

## 2021-12-14 DIAGNOSIS — M24.531 CONTRACTURE OF BOTH WRIST JOINTS: ICD-10-CM

## 2021-12-14 DIAGNOSIS — I10 ESSENTIAL HYPERTENSION: ICD-10-CM

## 2021-12-14 DIAGNOSIS — J44.9 CHRONIC OBSTRUCTIVE PULMONARY DISEASE, UNSPECIFIED COPD TYPE: ICD-10-CM

## 2021-12-14 DIAGNOSIS — Z93.1 PEG (PERCUTANEOUS ENDOSCOPIC GASTROSTOMY) STATUS: ICD-10-CM

## 2021-12-14 DIAGNOSIS — G93.1 ANOXIC BRAIN INJURY: Primary | ICD-10-CM

## 2021-12-14 DIAGNOSIS — M24.532 CONTRACTURE OF BOTH WRIST JOINTS: ICD-10-CM

## 2021-12-14 DIAGNOSIS — J96.11 CHRONIC RESPIRATORY FAILURE WITH HYPOXIA: ICD-10-CM

## 2021-12-14 PROCEDURE — 99308 SBSQ NF CARE LOW MDM 20: CPT | Performed by: PHYSICIAN ASSISTANT

## 2021-12-30 ENCOUNTER — APPOINTMENT (OUTPATIENT)
Dept: GENERAL RADIOLOGY | Facility: HOSPITAL | Age: 61
End: 2021-12-30

## 2021-12-30 ENCOUNTER — DOCUMENTATION (OUTPATIENT)
Dept: INTERNAL MEDICINE | Facility: CLINIC | Age: 61
End: 2021-12-30

## 2021-12-30 ENCOUNTER — HOSPITAL ENCOUNTER (EMERGENCY)
Facility: HOSPITAL | Age: 61
Discharge: HOME | End: 2021-12-30
Attending: EMERGENCY MEDICINE | Admitting: EMERGENCY MEDICINE

## 2021-12-30 ENCOUNTER — HOSPITAL ENCOUNTER (EMERGENCY)
Facility: HOSPITAL | Age: 61
Discharge: NURSING, REHAB OR LONG-TERM CARE | End: 2021-12-30
Attending: EMERGENCY MEDICINE | Admitting: EMERGENCY MEDICINE

## 2021-12-30 ENCOUNTER — APPOINTMENT (OUTPATIENT)
Dept: CT IMAGING | Facility: HOSPITAL | Age: 61
End: 2021-12-30

## 2021-12-30 VITALS
BODY MASS INDEX: 27.46 KG/M2 | WEIGHT: 155 LBS | RESPIRATION RATE: 24 BRPM | DIASTOLIC BLOOD PRESSURE: 58 MMHG | HEART RATE: 101 BPM | HEIGHT: 63 IN | SYSTOLIC BLOOD PRESSURE: 129 MMHG | TEMPERATURE: 98.3 F | OXYGEN SATURATION: 90 %

## 2021-12-30 VITALS
BODY MASS INDEX: 27.46 KG/M2 | HEART RATE: 88 BPM | TEMPERATURE: 99 F | SYSTOLIC BLOOD PRESSURE: 123 MMHG | OXYGEN SATURATION: 98 % | WEIGHT: 155 LBS | RESPIRATION RATE: 20 BRPM | DIASTOLIC BLOOD PRESSURE: 76 MMHG

## 2021-12-30 DIAGNOSIS — J95.00 TRACHEOSTOMY COMPLICATION, UNSPECIFIED COMPLICATION TYPE: Primary | ICD-10-CM

## 2021-12-30 DIAGNOSIS — R05.9 COUGH: Primary | ICD-10-CM

## 2021-12-30 DIAGNOSIS — Z43.0 ENCOUNTER FOR TRACHEOSTOMY TUBE CHANGE: ICD-10-CM

## 2021-12-30 DIAGNOSIS — K20.90 ESOPHAGITIS: ICD-10-CM

## 2021-12-30 LAB
ALBUMIN SERPL-MCNC: 3.7 G/DL (ref 3.5–5.2)
ALBUMIN/GLOB SERPL: 1.1 G/DL
ALP SERPL-CCNC: 91 U/L (ref 39–117)
ALT SERPL W P-5'-P-CCNC: 15 U/L (ref 1–33)
ANION GAP SERPL CALCULATED.3IONS-SCNC: 11.8 MMOL/L (ref 5–15)
AST SERPL-CCNC: 15 U/L (ref 1–32)
BASOPHILS # BLD AUTO: 0.03 10*3/MM3 (ref 0–0.2)
BASOPHILS NFR BLD AUTO: 0.2 % (ref 0–1.5)
BILIRUB SERPL-MCNC: 0.3 MG/DL (ref 0–1.2)
BUN SERPL-MCNC: 17 MG/DL (ref 8–23)
BUN/CREAT SERPL: 37.8 (ref 7–25)
CALCIUM SPEC-SCNC: 9.3 MG/DL (ref 8.6–10.5)
CHLORIDE SERPL-SCNC: 101 MMOL/L (ref 98–107)
CO2 SERPL-SCNC: 25.2 MMOL/L (ref 22–29)
CREAT SERPL-MCNC: 0.45 MG/DL (ref 0.57–1)
D-LACTATE SERPL-SCNC: 0.6 MMOL/L (ref 0.5–2)
DEPRECATED RDW RBC AUTO: 47.9 FL (ref 37–54)
EOSINOPHIL # BLD AUTO: 0.03 10*3/MM3 (ref 0–0.4)
EOSINOPHIL NFR BLD AUTO: 0.2 % (ref 0.3–6.2)
ERYTHROCYTE [DISTWIDTH] IN BLOOD BY AUTOMATED COUNT: 13.5 % (ref 12.3–15.4)
FLUAV RNA RESP QL NAA+PROBE: NOT DETECTED
FLUBV RNA RESP QL NAA+PROBE: NOT DETECTED
GFR SERPL CREATININE-BSD FRML MDRD: 141 ML/MIN/1.73
GLOBULIN UR ELPH-MCNC: 3.3 GM/DL
GLUCOSE SERPL-MCNC: 136 MG/DL (ref 65–99)
HCT VFR BLD AUTO: 33.9 % (ref 34–46.6)
HGB BLD-MCNC: 10.6 G/DL (ref 12–15.9)
IMM GRANULOCYTES # BLD AUTO: 0.06 10*3/MM3 (ref 0–0.05)
IMM GRANULOCYTES NFR BLD AUTO: 0.5 % (ref 0–0.5)
LYMPHOCYTES # BLD AUTO: 1.57 10*3/MM3 (ref 0.7–3.1)
LYMPHOCYTES NFR BLD AUTO: 11.9 % (ref 19.6–45.3)
MCH RBC QN AUTO: 30.4 PG (ref 26.6–33)
MCHC RBC AUTO-ENTMCNC: 31.3 G/DL (ref 31.5–35.7)
MCV RBC AUTO: 97.1 FL (ref 79–97)
MONOCYTES # BLD AUTO: 0.7 10*3/MM3 (ref 0.1–0.9)
MONOCYTES NFR BLD AUTO: 5.3 % (ref 5–12)
NEUTROPHILS NFR BLD AUTO: 10.84 10*3/MM3 (ref 1.7–7)
NEUTROPHILS NFR BLD AUTO: 81.9 % (ref 42.7–76)
NRBC BLD AUTO-RTO: 0 /100 WBC (ref 0–0.2)
PLATELET # BLD AUTO: 251 10*3/MM3 (ref 140–450)
PMV BLD AUTO: 12.2 FL (ref 6–12)
POTASSIUM SERPL-SCNC: 4 MMOL/L (ref 3.5–5.2)
PROCALCITONIN SERPL-MCNC: 0.06 NG/ML (ref 0–0.25)
PROT SERPL-MCNC: 7 G/DL (ref 6–8.5)
RBC # BLD AUTO: 3.49 10*6/MM3 (ref 3.77–5.28)
SARS-COV-2 RNA RESP QL NAA+PROBE: NOT DETECTED
SODIUM SERPL-SCNC: 138 MMOL/L (ref 136–145)
TROPONIN T SERPL-MCNC: <0.01 NG/ML (ref 0–0.03)
WBC NRBC COR # BLD: 13.23 10*3/MM3 (ref 3.4–10.8)

## 2021-12-30 PROCEDURE — 80053 COMPREHEN METABOLIC PANEL: CPT | Performed by: EMERGENCY MEDICINE

## 2021-12-30 PROCEDURE — 71275 CT ANGIOGRAPHY CHEST: CPT

## 2021-12-30 PROCEDURE — 99283 EMERGENCY DEPT VISIT LOW MDM: CPT

## 2021-12-30 PROCEDURE — 87147 CULTURE TYPE IMMUNOLOGIC: CPT | Performed by: EMERGENCY MEDICINE

## 2021-12-30 PROCEDURE — 25010000002 CEFTRIAXONE SODIUM-DEXTROSE 1-3.74 GM-%(50ML) RECONSTITUTED SOLUTION: Performed by: EMERGENCY MEDICINE

## 2021-12-30 PROCEDURE — 83605 ASSAY OF LACTIC ACID: CPT | Performed by: EMERGENCY MEDICINE

## 2021-12-30 PROCEDURE — 25010000002 IOPAMIDOL 61 % SOLUTION: Performed by: EMERGENCY MEDICINE

## 2021-12-30 PROCEDURE — 87040 BLOOD CULTURE FOR BACTERIA: CPT | Performed by: EMERGENCY MEDICINE

## 2021-12-30 PROCEDURE — 87636 SARSCOV2 & INF A&B AMP PRB: CPT | Performed by: EMERGENCY MEDICINE

## 2021-12-30 PROCEDURE — 36415 COLL VENOUS BLD VENIPUNCTURE: CPT

## 2021-12-30 PROCEDURE — 87150 DNA/RNA AMPLIFIED PROBE: CPT | Performed by: EMERGENCY MEDICINE

## 2021-12-30 PROCEDURE — 85025 COMPLETE CBC W/AUTO DIFF WBC: CPT | Performed by: EMERGENCY MEDICINE

## 2021-12-30 PROCEDURE — 84484 ASSAY OF TROPONIN QUANT: CPT | Performed by: EMERGENCY MEDICINE

## 2021-12-30 PROCEDURE — 71045 X-RAY EXAM CHEST 1 VIEW: CPT

## 2021-12-30 PROCEDURE — 84145 PROCALCITONIN (PCT): CPT | Performed by: EMERGENCY MEDICINE

## 2021-12-30 PROCEDURE — 96375 TX/PRO/DX INJ NEW DRUG ADDON: CPT

## 2021-12-30 PROCEDURE — 96365 THER/PROPH/DIAG IV INF INIT: CPT

## 2021-12-30 RX ORDER — CEFTRIAXONE 1 G/50ML
1 INJECTION, SOLUTION INTRAVENOUS ONCE
Status: COMPLETED | OUTPATIENT
Start: 2021-12-30 | End: 2021-12-30

## 2021-12-30 RX ORDER — PANTOPRAZOLE SODIUM 20 MG/1
20 TABLET, DELAYED RELEASE ORAL DAILY
Qty: 30 TABLET | Refills: 0 | Status: SHIPPED | OUTPATIENT
Start: 2021-12-30

## 2021-12-30 RX ORDER — DOXYCYCLINE 100 MG/1
100 CAPSULE ORAL 2 TIMES DAILY
Qty: 20 CAPSULE | Refills: 0 | Status: SHIPPED | OUTPATIENT
Start: 2021-12-30 | End: 2022-01-13 | Stop reason: HOSPADM

## 2021-12-30 RX ORDER — CEFUROXIME AXETIL 500 MG/1
500 TABLET ORAL 2 TIMES DAILY
Qty: 20 TABLET | Refills: 0 | Status: SHIPPED | OUTPATIENT
Start: 2021-12-30 | End: 2022-01-13 | Stop reason: HOSPADM

## 2021-12-30 RX ORDER — SUCRALFATE 1 G/1
1 TABLET ORAL 4 TIMES DAILY
Qty: 40 TABLET | Refills: 0 | Status: SHIPPED | OUTPATIENT
Start: 2021-12-30 | End: 2022-01-13 | Stop reason: HOSPADM

## 2021-12-30 RX ADMIN — GLYCOPYRROLATE 0.1 MG: 0.2 INJECTION, SOLUTION INTRAMUSCULAR; INTRAVENOUS at 10:18

## 2021-12-30 RX ADMIN — SODIUM CHLORIDE 1000 ML: 9 INJECTION, SOLUTION INTRAVENOUS at 08:29

## 2021-12-30 RX ADMIN — CEFTRIAXONE 1 G: 1 INJECTION, SOLUTION INTRAVENOUS at 09:13

## 2021-12-30 RX ADMIN — IOPAMIDOL 100 ML: 612 INJECTION, SOLUTION INTRAVENOUS at 10:06

## 2021-12-31 LAB — BACTERIA BLD CULT: ABNORMAL

## 2022-01-01 LAB
BACTERIA SPEC AEROBE CULT: ABNORMAL
GRAM STN SPEC: ABNORMAL
GRAM STN SPEC: ABNORMAL
ISOLATED FROM: ABNORMAL

## 2022-01-04 LAB — BACTERIA SPEC AEROBE CULT: NORMAL

## 2022-01-09 ENCOUNTER — HOSPITAL ENCOUNTER (INPATIENT)
Facility: HOSPITAL | Age: 62
LOS: 3 days | Discharge: NURSING, REHAB OR LONG-TERM CARE | End: 2022-01-13
Attending: FAMILY MEDICINE | Admitting: HOSPITALIST

## 2022-01-09 ENCOUNTER — APPOINTMENT (OUTPATIENT)
Dept: GENERAL RADIOLOGY | Facility: HOSPITAL | Age: 62
End: 2022-01-09

## 2022-01-09 DIAGNOSIS — J96.02 ACUTE RESPIRATORY FAILURE WITH HYPOXIA AND HYPERCAPNIA: Primary | ICD-10-CM

## 2022-01-09 DIAGNOSIS — J96.01 ACUTE RESPIRATORY FAILURE WITH HYPOXIA AND HYPERCAPNIA: Primary | ICD-10-CM

## 2022-01-09 DIAGNOSIS — U07.1 COVID-19 VIRUS INFECTION: ICD-10-CM

## 2022-01-09 LAB
A-A DO2: 89.3 MMHG
ALBUMIN SERPL-MCNC: 3.7 G/DL (ref 3.5–5.2)
ALBUMIN/GLOB SERPL: 1.2 G/DL
ALP SERPL-CCNC: 75 U/L (ref 39–117)
ALT SERPL W P-5'-P-CCNC: 16 U/L (ref 1–33)
ANION GAP SERPL CALCULATED.3IONS-SCNC: 9.2 MMOL/L (ref 5–15)
ARTERIAL PATENCY WRIST A: ABNORMAL
AST SERPL-CCNC: 13 U/L (ref 1–32)
ATMOSPHERIC PRESS: 746 MMHG
BASE EXCESS BLDA CALC-SCNC: 6.6 MMOL/L (ref 0–2)
BASOPHILS # BLD AUTO: 0.05 10*3/MM3 (ref 0–0.2)
BASOPHILS NFR BLD AUTO: 0.5 % (ref 0–1.5)
BDY SITE: ABNORMAL
BILIRUB SERPL-MCNC: 0.2 MG/DL (ref 0–1.2)
BUN SERPL-MCNC: 14 MG/DL (ref 8–23)
BUN/CREAT SERPL: 34.1 (ref 7–25)
CALCIUM SPEC-SCNC: 9.4 MG/DL (ref 8.6–10.5)
CHLORIDE SERPL-SCNC: 101 MMOL/L (ref 98–107)
CO2 SERPL-SCNC: 28.8 MMOL/L (ref 22–29)
COHGB MFR BLD: 0.9 % (ref 0–2)
CREAT SERPL-MCNC: 0.41 MG/DL (ref 0.57–1)
CRP SERPL-MCNC: 1.93 MG/DL (ref 0–0.5)
D-LACTATE SERPL-SCNC: 0.7 MMOL/L (ref 0.5–2)
DEPRECATED RDW RBC AUTO: 45.5 FL (ref 37–54)
EOSINOPHIL # BLD AUTO: 0.21 10*3/MM3 (ref 0–0.4)
EOSINOPHIL NFR BLD AUTO: 2 % (ref 0.3–6.2)
ERYTHROCYTE [DISTWIDTH] IN BLOOD BY AUTOMATED COUNT: 12.8 % (ref 12.3–15.4)
FLUAV RNA RESP QL NAA+PROBE: NOT DETECTED
FLUBV RNA RESP QL NAA+PROBE: NOT DETECTED
GFR SERPL CREATININE-BSD FRML MDRD: >150 ML/MIN/1.73
GLOBULIN UR ELPH-MCNC: 3.1 GM/DL
GLUCOSE SERPL-MCNC: 110 MG/DL (ref 65–99)
HCO3 BLDA-SCNC: 31.9 MMOL/L (ref 22–28)
HCT VFR BLD AUTO: 32.3 % (ref 34–46.6)
HCT VFR BLD CALC: 30.7 %
HGB BLD-MCNC: 10.3 G/DL (ref 12–15.9)
IMM GRANULOCYTES # BLD AUTO: 0.05 10*3/MM3 (ref 0–0.05)
IMM GRANULOCYTES NFR BLD AUTO: 0.5 % (ref 0–0.5)
INHALED O2 CONCENTRATION: 28 %
LYMPHOCYTES # BLD AUTO: 1.85 10*3/MM3 (ref 0.7–3.1)
LYMPHOCYTES NFR BLD AUTO: 18 % (ref 19.6–45.3)
MCH RBC QN AUTO: 30.9 PG (ref 26.6–33)
MCHC RBC AUTO-ENTMCNC: 31.9 G/DL (ref 31.5–35.7)
MCV RBC AUTO: 97 FL (ref 79–97)
METHGB BLD QL: 0.5 % (ref 0–1.5)
MODALITY: ABNORMAL
MONOCYTES # BLD AUTO: 0.67 10*3/MM3 (ref 0.1–0.9)
MONOCYTES NFR BLD AUTO: 6.5 % (ref 5–12)
NEUTROPHILS NFR BLD AUTO: 7.47 10*3/MM3 (ref 1.7–7)
NEUTROPHILS NFR BLD AUTO: 72.5 % (ref 42.7–76)
NOTE: ABNORMAL
NRBC BLD AUTO-RTO: 0 /100 WBC (ref 0–0.2)
NT-PROBNP SERPL-MCNC: 594.2 PG/ML (ref 0–900)
OXYHGB MFR BLDV: 87.7 % (ref 94–99)
PCO2 BLDA: 48.1 MM HG (ref 35–45)
PCO2 TEMP ADJ BLD: ABNORMAL MM[HG]
PH BLDA: 7.43 PH UNITS (ref 7.35–7.45)
PH, TEMP CORRECTED: ABNORMAL
PLATELET # BLD AUTO: 255 10*3/MM3 (ref 140–450)
PMV BLD AUTO: 12.2 FL (ref 6–12)
PO2 BLDA: 52.5 MM HG (ref 75–100)
PO2 TEMP ADJ BLD: ABNORMAL MM[HG]
POTASSIUM SERPL-SCNC: 4.4 MMOL/L (ref 3.5–5.2)
PROT SERPL-MCNC: 6.8 G/DL (ref 6–8.5)
RBC # BLD AUTO: 3.33 10*6/MM3 (ref 3.77–5.28)
SAO2 % BLDCOA: 88.9 % (ref 94–100)
SARS-COV-2 RNA RESP QL NAA+PROBE: DETECTED
SODIUM SERPL-SCNC: 139 MMOL/L (ref 136–145)
TROPONIN T SERPL-MCNC: <0.01 NG/ML (ref 0–0.03)
VENTILATOR MODE: ABNORMAL
WBC NRBC COR # BLD: 10.3 10*3/MM3 (ref 3.4–10.8)

## 2022-01-09 PROCEDURE — 25010000002 DEXAMETHASONE SODIUM PHOSPHATE 10 MG/ML SOLUTION: Performed by: FAMILY MEDICINE

## 2022-01-09 PROCEDURE — 71045 X-RAY EXAM CHEST 1 VIEW: CPT

## 2022-01-09 PROCEDURE — 87040 BLOOD CULTURE FOR BACTERIA: CPT | Performed by: FAMILY MEDICINE

## 2022-01-09 PROCEDURE — 99285 EMERGENCY DEPT VISIT HI MDM: CPT

## 2022-01-09 PROCEDURE — 87636 SARSCOV2 & INF A&B AMP PRB: CPT | Performed by: FAMILY MEDICINE

## 2022-01-09 PROCEDURE — 36415 COLL VENOUS BLD VENIPUNCTURE: CPT

## 2022-01-09 PROCEDURE — 84484 ASSAY OF TROPONIN QUANT: CPT | Performed by: FAMILY MEDICINE

## 2022-01-09 PROCEDURE — 80053 COMPREHEN METABOLIC PANEL: CPT | Performed by: FAMILY MEDICINE

## 2022-01-09 PROCEDURE — 83050 HGB METHEMOGLOBIN QUAN: CPT

## 2022-01-09 PROCEDURE — 02HV33Z INSERTION OF INFUSION DEVICE INTO SUPERIOR VENA CAVA, PERCUTANEOUS APPROACH: ICD-10-PCS | Performed by: FAMILY MEDICINE

## 2022-01-09 PROCEDURE — C1751 CATH, INF, PER/CENT/MIDLINE: HCPCS

## 2022-01-09 PROCEDURE — 93005 ELECTROCARDIOGRAM TRACING: CPT | Performed by: FAMILY MEDICINE

## 2022-01-09 PROCEDURE — 82805 BLOOD GASES W/O2 SATURATION: CPT

## 2022-01-09 PROCEDURE — 82375 ASSAY CARBOXYHB QUANT: CPT

## 2022-01-09 PROCEDURE — 83605 ASSAY OF LACTIC ACID: CPT | Performed by: FAMILY MEDICINE

## 2022-01-09 PROCEDURE — 83880 ASSAY OF NATRIURETIC PEPTIDE: CPT | Performed by: FAMILY MEDICINE

## 2022-01-09 PROCEDURE — 86140 C-REACTIVE PROTEIN: CPT | Performed by: FAMILY MEDICINE

## 2022-01-09 PROCEDURE — 85025 COMPLETE CBC W/AUTO DIFF WBC: CPT | Performed by: FAMILY MEDICINE

## 2022-01-09 RX ORDER — DEXAMETHASONE SODIUM PHOSPHATE 10 MG/ML
10 INJECTION, SOLUTION INTRAMUSCULAR; INTRAVENOUS ONCE
Status: COMPLETED | OUTPATIENT
Start: 2022-01-09 | End: 2022-01-09

## 2022-01-09 RX ADMIN — DEXAMETHASONE SODIUM PHOSPHATE 10 MG: 10 INJECTION, SOLUTION INTRAMUSCULAR; INTRAVENOUS at 23:27

## 2022-01-10 ENCOUNTER — APPOINTMENT (OUTPATIENT)
Dept: GENERAL RADIOLOGY | Facility: HOSPITAL | Age: 62
End: 2022-01-10

## 2022-01-10 LAB
ALBUMIN SERPL-MCNC: 3.1 G/DL (ref 3.5–5.2)
ALP SERPL-CCNC: 64 U/L (ref 39–117)
ALT SERPL W P-5'-P-CCNC: 12 U/L (ref 1–33)
ANION GAP SERPL CALCULATED.3IONS-SCNC: 11.1 MMOL/L (ref 5–15)
AST SERPL-CCNC: 11 U/L (ref 1–32)
B PARAPERT DNA SPEC QL NAA+PROBE: NOT DETECTED
B PERT DNA SPEC QL NAA+PROBE: NOT DETECTED
BASOPHILS # BLD AUTO: 0.01 10*3/MM3 (ref 0–0.2)
BASOPHILS NFR BLD AUTO: 0.2 % (ref 0–1.5)
BILIRUB CONJ SERPL-MCNC: <0.2 MG/DL (ref 0–0.3)
BILIRUB INDIRECT SERPL-MCNC: ABNORMAL MG/DL
BILIRUB SERPL-MCNC: 0.2 MG/DL (ref 0–1.2)
BUN SERPL-MCNC: 12 MG/DL (ref 8–23)
BUN/CREAT SERPL: 36.4 (ref 7–25)
C PNEUM DNA NPH QL NAA+NON-PROBE: NOT DETECTED
CALCIUM SPEC-SCNC: 8.7 MG/DL (ref 8.6–10.5)
CHLORIDE SERPL-SCNC: 106 MMOL/L (ref 98–107)
CO2 SERPL-SCNC: 21.9 MMOL/L (ref 22–29)
CREAT SERPL-MCNC: 0.33 MG/DL (ref 0.57–1)
CRP SERPL-MCNC: 1.53 MG/DL (ref 0–0.5)
DEPRECATED RDW RBC AUTO: 44.9 FL (ref 37–54)
EOSINOPHIL # BLD AUTO: 0 10*3/MM3 (ref 0–0.4)
EOSINOPHIL NFR BLD AUTO: 0 % (ref 0.3–6.2)
ERYTHROCYTE [DISTWIDTH] IN BLOOD BY AUTOMATED COUNT: 12.8 % (ref 12.3–15.4)
FERRITIN SERPL-MCNC: 128.9 NG/ML (ref 13–150)
FLUAV SUBTYP SPEC NAA+PROBE: NOT DETECTED
FLUBV RNA ISLT QL NAA+PROBE: NOT DETECTED
GFR SERPL CREATININE-BSD FRML MDRD: >150 ML/MIN/1.73
GLUCOSE SERPL-MCNC: 158 MG/DL (ref 65–99)
HADV DNA SPEC NAA+PROBE: NOT DETECTED
HCOV 229E RNA SPEC QL NAA+PROBE: NOT DETECTED
HCOV HKU1 RNA SPEC QL NAA+PROBE: NOT DETECTED
HCOV NL63 RNA SPEC QL NAA+PROBE: NOT DETECTED
HCOV OC43 RNA SPEC QL NAA+PROBE: NOT DETECTED
HCT VFR BLD AUTO: 27 % (ref 34–46.6)
HGB BLD-MCNC: 8.7 G/DL (ref 12–15.9)
HMPV RNA NPH QL NAA+NON-PROBE: NOT DETECTED
HPIV1 RNA ISLT QL NAA+PROBE: NOT DETECTED
HPIV2 RNA SPEC QL NAA+PROBE: NOT DETECTED
HPIV3 RNA NPH QL NAA+PROBE: NOT DETECTED
HPIV4 P GENE NPH QL NAA+PROBE: NOT DETECTED
IMM GRANULOCYTES # BLD AUTO: 0.04 10*3/MM3 (ref 0–0.05)
IMM GRANULOCYTES NFR BLD AUTO: 0.6 % (ref 0–0.5)
LYMPHOCYTES # BLD AUTO: 0.71 10*3/MM3 (ref 0.7–3.1)
LYMPHOCYTES NFR BLD AUTO: 10.7 % (ref 19.6–45.3)
M PNEUMO IGG SER IA-ACNC: NOT DETECTED
MCH RBC QN AUTO: 31.3 PG (ref 26.6–33)
MCHC RBC AUTO-ENTMCNC: 32.2 G/DL (ref 31.5–35.7)
MCV RBC AUTO: 97.1 FL (ref 79–97)
MONOCYTES # BLD AUTO: 0.09 10*3/MM3 (ref 0.1–0.9)
MONOCYTES NFR BLD AUTO: 1.4 % (ref 5–12)
MRSA DNA SPEC QL NAA+PROBE: NORMAL
NEUTROPHILS NFR BLD AUTO: 5.77 10*3/MM3 (ref 1.7–7)
NEUTROPHILS NFR BLD AUTO: 87.1 % (ref 42.7–76)
NRBC BLD AUTO-RTO: 0 /100 WBC (ref 0–0.2)
PLATELET # BLD AUTO: 219 10*3/MM3 (ref 140–450)
PMV BLD AUTO: 12.8 FL (ref 6–12)
POTASSIUM SERPL-SCNC: 4.1 MMOL/L (ref 3.5–5.2)
PROT SERPL-MCNC: 5.9 G/DL (ref 6–8.5)
RBC # BLD AUTO: 2.78 10*6/MM3 (ref 3.77–5.28)
RHINOVIRUS RNA SPEC NAA+PROBE: NOT DETECTED
RSV RNA NPH QL NAA+NON-PROBE: NOT DETECTED
SARS-COV-2 RNA NPH QL NAA+NON-PROBE: NOT DETECTED
SODIUM SERPL-SCNC: 139 MMOL/L (ref 136–145)
WBC NRBC COR # BLD: 6.62 10*3/MM3 (ref 3.4–10.8)

## 2022-01-10 PROCEDURE — 87102 FUNGUS ISOLATION CULTURE: CPT | Performed by: HOSPITALIST

## 2022-01-10 PROCEDURE — 94799 UNLISTED PULMONARY SVC/PX: CPT

## 2022-01-10 PROCEDURE — 25010000002 DEXAMETHASONE PER 1 MG: Performed by: HOSPITALIST

## 2022-01-10 PROCEDURE — C1751 CATH, INF, PER/CENT/MIDLINE: HCPCS

## 2022-01-10 PROCEDURE — 25010000002 HEPARIN (PORCINE) PER 1000 UNITS: Performed by: HOSPITALIST

## 2022-01-10 PROCEDURE — 85025 COMPLETE CBC W/AUTO DIFF WBC: CPT | Performed by: HOSPITALIST

## 2022-01-10 PROCEDURE — 87081 CULTURE SCREEN ONLY: CPT | Performed by: HOSPITALIST

## 2022-01-10 PROCEDURE — XW033E5 INTRODUCTION OF REMDESIVIR ANTI-INFECTIVE INTO PERIPHERAL VEIN, PERCUTANEOUS APPROACH, NEW TECHNOLOGY GROUP 5: ICD-10-PCS | Performed by: HOSPITALIST

## 2022-01-10 PROCEDURE — 86140 C-REACTIVE PROTEIN: CPT | Performed by: HOSPITALIST

## 2022-01-10 PROCEDURE — 74018 RADEX ABDOMEN 1 VIEW: CPT

## 2022-01-10 PROCEDURE — 80076 HEPATIC FUNCTION PANEL: CPT | Performed by: HOSPITALIST

## 2022-01-10 PROCEDURE — 87641 MR-STAPH DNA AMP PROBE: CPT | Performed by: HOSPITALIST

## 2022-01-10 PROCEDURE — 82728 ASSAY OF FERRITIN: CPT | Performed by: HOSPITALIST

## 2022-01-10 PROCEDURE — 99222 1ST HOSP IP/OBS MODERATE 55: CPT | Performed by: HOSPITALIST

## 2022-01-10 PROCEDURE — 0202U NFCT DS 22 TRGT SARS-COV-2: CPT | Performed by: NURSE PRACTITIONER

## 2022-01-10 PROCEDURE — 80048 BASIC METABOLIC PNL TOTAL CA: CPT | Performed by: HOSPITALIST

## 2022-01-10 RX ORDER — PANTOPRAZOLE SODIUM 40 MG/1
40 TABLET, DELAYED RELEASE ORAL DAILY
Status: DISCONTINUED | OUTPATIENT
Start: 2022-01-10 | End: 2022-01-10 | Stop reason: ALTCHOICE

## 2022-01-10 RX ORDER — SUCRALFATE 1 G/1
1 TABLET ORAL 4 TIMES DAILY
Status: DISCONTINUED | OUTPATIENT
Start: 2022-01-10 | End: 2022-01-13 | Stop reason: HOSPADM

## 2022-01-10 RX ORDER — SUCRALFATE ORAL 1 G/10ML
1 SUSPENSION ORAL 4 TIMES DAILY
COMMUNITY
End: 2022-01-13 | Stop reason: HOSPADM

## 2022-01-10 RX ORDER — MONTELUKAST SODIUM 10 MG/1
10 TABLET ORAL NIGHTLY
Status: DISCONTINUED | OUTPATIENT
Start: 2022-01-10 | End: 2022-01-13 | Stop reason: HOSPADM

## 2022-01-10 RX ORDER — SODIUM CHLORIDE 0.9 % (FLUSH) 0.9 %
10 SYRINGE (ML) INJECTION AS NEEDED
Status: DISCONTINUED | OUTPATIENT
Start: 2022-01-10 | End: 2022-01-13 | Stop reason: HOSPADM

## 2022-01-10 RX ORDER — ONDANSETRON 2 MG/ML
4 INJECTION INTRAMUSCULAR; INTRAVENOUS EVERY 6 HOURS PRN
Status: DISCONTINUED | OUTPATIENT
Start: 2022-01-10 | End: 2022-01-13 | Stop reason: HOSPADM

## 2022-01-10 RX ORDER — ACETAMINOPHEN 160 MG/5ML
650 SOLUTION ORAL EVERY 4 HOURS PRN
Status: DISCONTINUED | OUTPATIENT
Start: 2022-01-10 | End: 2022-01-13 | Stop reason: HOSPADM

## 2022-01-10 RX ORDER — SODIUM CHLORIDE 0.9 % (FLUSH) 0.9 %
10 SYRINGE (ML) INJECTION EVERY 12 HOURS SCHEDULED
Status: DISCONTINUED | OUTPATIENT
Start: 2022-01-10 | End: 2022-01-13 | Stop reason: HOSPADM

## 2022-01-10 RX ORDER — CHLORHEXIDINE GLUCONATE 0.12 MG/ML
15 RINSE ORAL EVERY 12 HOURS SCHEDULED
Status: DISCONTINUED | OUTPATIENT
Start: 2022-01-10 | End: 2022-01-13 | Stop reason: HOSPADM

## 2022-01-10 RX ORDER — BUDESONIDE 0.5 MG/2ML
0.5 INHALANT ORAL
Status: DISCONTINUED | OUTPATIENT
Start: 2022-01-10 | End: 2022-01-10 | Stop reason: ALTCHOICE

## 2022-01-10 RX ORDER — SODIUM CHLORIDE 9 MG/ML
125 INJECTION, SOLUTION INTRAVENOUS CONTINUOUS
Status: ACTIVE | OUTPATIENT
Start: 2022-01-10 | End: 2022-01-10

## 2022-01-10 RX ORDER — PANTOPRAZOLE SODIUM 40 MG/10ML
40 INJECTION, POWDER, LYOPHILIZED, FOR SOLUTION INTRAVENOUS
Status: DISCONTINUED | OUTPATIENT
Start: 2022-01-10 | End: 2022-01-13 | Stop reason: HOSPADM

## 2022-01-10 RX ORDER — BACLOFEN 10 MG/1
10 TABLET ORAL EVERY 8 HOURS
Status: DISCONTINUED | OUTPATIENT
Start: 2022-01-10 | End: 2022-01-13 | Stop reason: HOSPADM

## 2022-01-10 RX ORDER — MULTIVIT WITH IRON,MINERALS
15 LIQUID (ML) ORAL DAILY
Status: DISCONTINUED | OUTPATIENT
Start: 2022-01-10 | End: 2022-01-13 | Stop reason: HOSPADM

## 2022-01-10 RX ORDER — ACETAMINOPHEN 325 MG/1
650 TABLET ORAL EVERY 4 HOURS PRN
Status: DISCONTINUED | OUTPATIENT
Start: 2022-01-10 | End: 2022-01-13 | Stop reason: HOSPADM

## 2022-01-10 RX ORDER — BISACODYL 10 MG
10 SUPPOSITORY, RECTAL RECTAL DAILY PRN
Status: DISCONTINUED | OUTPATIENT
Start: 2022-01-10 | End: 2022-01-13 | Stop reason: HOSPADM

## 2022-01-10 RX ORDER — DEXAMETHASONE SODIUM PHOSPHATE 4 MG/ML
6 INJECTION, SOLUTION INTRA-ARTICULAR; INTRALESIONAL; INTRAMUSCULAR; INTRAVENOUS; SOFT TISSUE EVERY 12 HOURS
Status: DISCONTINUED | OUTPATIENT
Start: 2022-01-10 | End: 2022-01-13 | Stop reason: HOSPADM

## 2022-01-10 RX ORDER — SCOLOPAMINE TRANSDERMAL SYSTEM 1 MG/1
1 PATCH, EXTENDED RELEASE TRANSDERMAL
COMMUNITY

## 2022-01-10 RX ORDER — HEPARIN SODIUM 5000 [USP'U]/ML
5000 INJECTION, SOLUTION INTRAVENOUS; SUBCUTANEOUS EVERY 12 HOURS SCHEDULED
Status: DISCONTINUED | OUTPATIENT
Start: 2022-01-10 | End: 2022-01-13 | Stop reason: HOSPADM

## 2022-01-10 RX ORDER — CETIRIZINE HYDROCHLORIDE 10 MG/1
10 TABLET ORAL DAILY
COMMUNITY

## 2022-01-10 RX ORDER — MONTELUKAST SODIUM 10 MG/1
10 TABLET ORAL NIGHTLY
COMMUNITY

## 2022-01-10 RX ORDER — BUDESONIDE AND FORMOTEROL FUMARATE DIHYDRATE 160; 4.5 UG/1; UG/1
2 AEROSOL RESPIRATORY (INHALATION)
Status: DISCONTINUED | OUTPATIENT
Start: 2022-01-10 | End: 2022-01-13 | Stop reason: HOSPADM

## 2022-01-10 RX ORDER — ACETAMINOPHEN 650 MG/1
650 SUPPOSITORY RECTAL EVERY 4 HOURS PRN
Status: DISCONTINUED | OUTPATIENT
Start: 2022-01-10 | End: 2022-01-13 | Stop reason: HOSPADM

## 2022-01-10 RX ORDER — CETIRIZINE HYDROCHLORIDE 10 MG/1
10 TABLET ORAL DAILY
Status: DISCONTINUED | OUTPATIENT
Start: 2022-01-10 | End: 2022-01-13 | Stop reason: HOSPADM

## 2022-01-10 RX ORDER — AMINO ACIDS/PROTEIN HYDROLYS 15G-100/30
30 LIQUID (ML) ORAL 2 TIMES DAILY
Status: DISCONTINUED | OUTPATIENT
Start: 2022-01-10 | End: 2022-01-13 | Stop reason: HOSPADM

## 2022-01-10 RX ORDER — IPRATROPIUM BROMIDE AND ALBUTEROL SULFATE 2.5; .5 MG/3ML; MG/3ML
3 SOLUTION RESPIRATORY (INHALATION)
Status: DISCONTINUED | OUTPATIENT
Start: 2022-01-10 | End: 2022-01-10 | Stop reason: ALTCHOICE

## 2022-01-10 RX ORDER — CARVEDILOL 12.5 MG/1
12.5 TABLET ORAL 2 TIMES DAILY
Status: DISCONTINUED | OUTPATIENT
Start: 2022-01-10 | End: 2022-01-13 | Stop reason: HOSPADM

## 2022-01-10 RX ORDER — SODIUM CHLORIDE 9 MG/ML
125 INJECTION, SOLUTION INTRAVENOUS CONTINUOUS
Status: DISCONTINUED | OUTPATIENT
Start: 2022-01-10 | End: 2022-01-10

## 2022-01-10 RX ORDER — ALPRAZOLAM 0.5 MG/1
0.5 TABLET ORAL EVERY 8 HOURS SCHEDULED
Status: DISCONTINUED | OUTPATIENT
Start: 2022-01-10 | End: 2022-01-13 | Stop reason: HOSPADM

## 2022-01-10 RX ADMIN — ALPRAZOLAM 0.5 MG: 0.5 TABLET ORAL at 23:35

## 2022-01-10 RX ADMIN — CHLORHEXIDINE GLUCONATE 0.12% ORAL RINSE 15 ML: 1.2 LIQUID ORAL at 23:35

## 2022-01-10 RX ADMIN — Medication 30 ML: at 10:47

## 2022-01-10 RX ADMIN — MULTIVITAMIN 15 ML: LIQUID ORAL at 10:45

## 2022-01-10 RX ADMIN — HEPARIN SODIUM 5000 UNITS: 5000 INJECTION INTRAVENOUS; SUBCUTANEOUS at 23:35

## 2022-01-10 RX ADMIN — ALPRAZOLAM 0.5 MG: 0.5 TABLET ORAL at 06:02

## 2022-01-10 RX ADMIN — BUDESONIDE AND FORMOTEROL FUMARATE DIHYDRATE 2 PUFF: 160; 4.5 AEROSOL RESPIRATORY (INHALATION) at 10:46

## 2022-01-10 RX ADMIN — SUCRALFATE 1 G: 1 TABLET ORAL at 19:25

## 2022-01-10 RX ADMIN — TIOTROPIUM BROMIDE INHALATION SPRAY 2 PUFF: 3.12 SPRAY, METERED RESPIRATORY (INHALATION) at 10:46

## 2022-01-10 RX ADMIN — SODIUM CHLORIDE 125 ML/HR: 9 INJECTION, SOLUTION INTRAVENOUS at 03:43

## 2022-01-10 RX ADMIN — BACLOFEN 10 MG: 10 TABLET ORAL at 06:02

## 2022-01-10 RX ADMIN — SUCRALFATE 1 G: 1 TABLET ORAL at 12:43

## 2022-01-10 RX ADMIN — BACLOFEN 10 MG: 10 TABLET ORAL at 15:08

## 2022-01-10 RX ADMIN — CARVEDILOL 12.5 MG: 12.5 TABLET, FILM COATED ORAL at 23:35

## 2022-01-10 RX ADMIN — REMDESIVIR 200 MG: 100 INJECTION, POWDER, LYOPHILIZED, FOR SOLUTION INTRAVENOUS at 05:54

## 2022-01-10 RX ADMIN — CARVEDILOL 12.5 MG: 12.5 TABLET, FILM COATED ORAL at 10:44

## 2022-01-10 RX ADMIN — CETIRIZINE HYDROCHLORIDE 10 MG: 10 TABLET, FILM COATED ORAL at 10:44

## 2022-01-10 RX ADMIN — CHLORHEXIDINE GLUCONATE 0.12% ORAL RINSE 15 ML: 1.2 LIQUID ORAL at 10:45

## 2022-01-10 RX ADMIN — BACLOFEN 10 MG: 10 TABLET ORAL at 23:35

## 2022-01-10 RX ADMIN — DEXAMETHASONE SODIUM PHOSPHATE 6 MG: 4 INJECTION, SOLUTION INTRA-ARTICULAR; INTRALESIONAL; INTRAMUSCULAR; INTRAVENOUS; SOFT TISSUE at 19:25

## 2022-01-10 RX ADMIN — DEXAMETHASONE SODIUM PHOSPHATE 6 MG: 4 INJECTION, SOLUTION INTRA-ARTICULAR; INTRALESIONAL; INTRAMUSCULAR; INTRAVENOUS; SOFT TISSUE at 05:57

## 2022-01-10 RX ADMIN — SUCRALFATE 1 G: 1 TABLET ORAL at 10:44

## 2022-01-10 RX ADMIN — MONTELUKAST 10 MG: 10 TABLET, FILM COATED ORAL at 23:35

## 2022-01-10 RX ADMIN — PANTOPRAZOLE SODIUM 40 MG: 40 INJECTION, POWDER, FOR SOLUTION INTRAVENOUS at 10:44

## 2022-01-10 RX ADMIN — SODIUM CHLORIDE 125 ML/HR: 9 INJECTION, SOLUTION INTRAVENOUS at 05:55

## 2022-01-10 RX ADMIN — SODIUM CHLORIDE 1000 ML: 9 INJECTION, SOLUTION INTRAVENOUS at 01:54

## 2022-01-10 RX ADMIN — SODIUM CHLORIDE, PRESERVATIVE FREE 10 ML: 5 INJECTION INTRAVENOUS at 23:40

## 2022-01-10 RX ADMIN — SODIUM CHLORIDE, PRESERVATIVE FREE 10 ML: 5 INJECTION INTRAVENOUS at 10:45

## 2022-01-10 RX ADMIN — HEPARIN SODIUM 5000 UNITS: 5000 INJECTION INTRAVENOUS; SUBCUTANEOUS at 10:44

## 2022-01-10 RX ADMIN — SUCRALFATE 1 G: 1 TABLET ORAL at 23:35

## 2022-01-10 RX ADMIN — ALPRAZOLAM 0.5 MG: 0.5 TABLET ORAL at 15:08

## 2022-01-10 NOTE — PROGRESS NOTES
[facility]    Name:  Viji Vargas   Age:  61 y.o.  Sex:  female  :  1960  MRN:  [id number]   Visit Number:  [id number]  Admission Date:  2022  Date Of Service:  01/10/22  Primary Care Physician:  Emperatriz Greene MD    Patient was seen and examined. Pertinent laboratory and radiology data were reviewed.    Vital signs:    Vital Signs (last 24 hours)        0700  01/10 0659 01/10 0700  01/10 1207   Most Recent      Temp (°F) 97.2 -  98.2    96.8 -  97.4     96.8 (36) 01/10 1054    Heart Rate 69 -  109    61 -  88     88 01/10 1100    Resp 24 -  28      22     22 01/10 1054    BP 87/51 -  135/79    106/67 -  112/61     112/61 01/10 1054    SpO2 (%) 84 -  100    98 -  99     99 01/10 1100        Acute on chronic hypoxic respiratory failure with +Covid 19 test:  Will continue with Decadron for now, as well as Remdesivir, however, I have ordered a Respiratory Panel for today with Covid 19 testing as well.  Patient had Covid approximately 90 days ago and could still be testing positive without current infection.  Discussed with Dr. Rivera.  Continue nebulizers.  Suction care as needed per respiratory.  Continue home medications as reconciled.      Physical Exam  Vitals and nursing note reviewed.   Constitutional:       Appearance: She is ill-appearing and nonverbal at baseline.  HENT:      Comments: Tracheostomy present  Cardiovascular:      Rate and Rhythm: Normal rate.   Pulmonary:      Effort: Pulmonary effort is normal, currently utilizing 6L oxygen via trach collar   Abdominal:      General: Abdomen is flat.      Palpations: Abdomen is soft.   Musculoskeletal:         General: Contractures present.   Skin:     General: Skin is warm.      Capillary Refill: Capillary refill takes less than 2 seconds.   Neurological:      Mental Status: Mental status is at baseline      FÉLIX Perez  01/10/22  12:07 EST    Dictated utilizing Dragon  dictation.

## 2022-01-10 NOTE — ED NOTES
House Supervisor called to change patient's room assignment.  Patient now going to room 328.  RN notified.     Luther April Ashley  01/10/22 0156

## 2022-01-10 NOTE — CONSULTS
"Adult Nutrition  Assessment/PES    Patient Name:  Viji Vargas  YOB: 1960  MRN: [id number]  Admit Date:  1/9/2022    Assessment Date:  1/10/2022    Comments:  RD consulted for tube feeding recommendations. Initiate tube feeding once medically feasible.     Rec #1: Isosource 1.5 @20mL/hr advancing 10mL/hr every 8 hours to goal rate of 51mL/hr. TF to provide 1683 kcals, 76 grams protein, and 857 mL tube feeding water.   Rec #2: RD notes pt is receiving Prostat BID via PEG tube. Prostat adds additional 200 kcal/d and 34g pro.   Rec #3: Free water flushes 140mL Q4 hours or six times daily.     RD to follow pt and adjust tube feeding based on tolerance and residuals. Consult RD PRN.      Reason for Assessment     Row Name 01/10/22 0856          Reason for Assessment    Reason For Assessment identified at risk by screening criteria; diagnosis/disease state; physician consult; TF/PN     Diagnosis infection/sepsis; pulmonary disease     Identified At Risk by Screening Criteria tube feeding or parenteral nutrition                  Anthropometrics     Row Name 01/10/22 0859 01/10/22 0300       Anthropometrics    Height 160 cm (63\") 160 cm (63\")    Weight -- 70.6 kg (155 lb 10.3 oz)       Ideal Body Weight (IBW)    Ideal Body Weight (IBW) (kg) 52.72 52.72    % Ideal Body Weight -- 133.92       Body Mass Index (BMI)    BMI (kg/m2) -- 27.63               Labs/Tests/Procedures/Meds     Row Name 01/10/22 0857          Labs/Procedures/Meds    Lab Results Reviewed reviewed, pertinent     Lab Results Comments Low: Crt, Alb; High: CRP, Glu            Medications    Pertinent Medications Reviewed reviewed, pertinent     Pertinent Medications Comments baclofen, Peridex, Decadron, heparin, Singulair, MVI w/ min, Protonix, Prostat, NaCl, remdesivir, NaCl, Carafate, tiotropium                Physical Findings     Row Name 01/10/22 0859          Physical Findings    Overall Physical Appearance shortness of breath; on " "oxygen therapy     Gastrointestinal feeding tube     Tubes gastrostomy tube; PEG     Oral/Mouth Cavity poor dentition                Estimated/Assessed Needs     Row Name 01/10/22 0859 01/10/22 0300       Calculation Measurements    Weight Used For Calculations 70.6 kg (155 lb 10.3 oz)  ABW --    Height 160 cm (63\") 160 cm (63\")       Estimated/Assessed Needs    Additional Documentation Calorie Requirements (Group); KCAL/KG (Group); Protein Requirements (Group); Shelby-St. Jeor Equation (Group); Fluid Requirements (Group) --       Calorie Requirements    Weight Used For Calorie Calculations 70.6 kg (155 lb 10.3 oz) --    Estimated Calorie Requirement (kcal/day) 1700 --    Estimated Calorie Need Method kcal/kg; Shelby-St Jeor --    Measured Resting Energy Expenditure (MREE) -- --    Estimated Calorie Requirement Comment 6928-4144 kcal/d --       KCAL/KG    KCAL/KG 30 Kcal/Kg (kcal); 35 Kcal/Kg (kcal); 25 Kcal/Kg (kcal); 20 Kcal/Kg (kcal) --    20 Kcal/Kg (kcal) 1412 --    25 Kcal/Kg (kcal) 1765 --    30 Kcal/Kg (kcal) 2118 --    35 Kcal/Kg (kcal) 2471 --       Shelby-St. Jeor Equation    RMR (Shelby-St. Jeor Equation) 1230.125 --    Shelby-St. Jeor Activity Factors 1.6 - 1.7 --    Activity Factors (Shelby-St. Jeor) 1968.2 - 2091.2125 --       Protein Requirements    Weight Used For Protein Calculations 70.6 kg (155 lb 10.3 oz) --    Est Protein Requirement Amount (gms/kg) 1.2 gm protein  70g-105g/d; (1.0g-1.5g/kg) --    Estimated Protein Requirements (gms/day) 84.72 --       Fluid Requirements    Fluid Requirements (mL/day) 1700 --    Estimated Fluid Requirement Method other (see comments)  1 mL/kcal --    RDA Method (mL) 1700 --               Nutrition Prescription Ordered     Row Name 01/10/22 0908          Nutrition Prescription PO    Current PO Diet NPO            Nutrition Prescription EN    Enteral Route PEG     Modulars Liquid Protein (15 gm/30 mL)     Liquid Protein (15 gm/30 mL) 30 mL/1 packet     " Protein Liquid Frequency 2 times a day                       Problem/Interventions:   Problem 1     Row Name 01/10/22 0909          Nutrition Diagnoses Problem 1    Problem 1 Needs Alternate Route     Etiology (related to) Medical Diagnosis; MNT for Treatment/Condition     Pulmonary/Critical Care A/C respiratory failure; COPD; Other (comment)  tracheostomy, covid     Signs/Symptoms (evidenced by) NPO; Report/Observation     Reported/Observed By RD/Tech                      Intervention Goal     Row Name 01/10/22 0910          Intervention Goal    General Maintain nutrition; Nutrition support treatment; Meet nutritional needs for age/condition; Disease management/therapy     TF/PN Inititiate TF/PN; Establish TF tolerance; Tolerate TF at goal     Weight Maintain weight                Nutrition Intervention     Row Name 01/10/22 0911          Nutrition Intervention    RD/Tech Action Follow Tx progress; Care plan reviewd; Recommend/ordered     Recommended/Ordered EN                Nutrition Prescription     Row Name 01/10/22 0911          Nutrition Prescription PO    PO Prescription Other (comment)  continue NPO while TF     New PO Prescription Ordered? No, recommended            Nutrition Prescription EN    Enteral Prescription Enteral begin/change     Enteral Route Gastrostomy     Product Isosource 1.5 hardik     Modulars Liquid Protein (15 gm/30 mL)     Liquid Protein (15 gm/30 mL) 30 mL/1 packet     Protein Liquid Frequency 2 times a day     TF Delivery Method Continuous     Continuous TF Goal Rate (mL/hr) 51 mL/hr     Continuous TF Starting Rate (mL/hr) 20 mL/hr     Continuous TF Goal Volume (mL) 1122 mL     Continuous TF Starting Volume (mL) 440 mL     Water flush (mL)  140 mL     Water Flush Frequency Every 4 hours     New EN Prescription Ordered? No, recommended            Other Orders    Obtain Weight Daily     Obtain Weight Ordered? No, recommended     Supplement Vitamin mineral supplement     Supplement Ordered?  No, recommended     Other continue to monitor and replace electrolytes PRN                Education/Evaluation     Row Name 01/10/22 0933          Education    Education Education not appropriate at this time     Please explain Patient nonverbal            Monitor/Evaluation    Monitor Per protocol; I&O; Pertinent labs; Weight; Skin status; TF delivery/tolerance; Symptoms                 Electronically signed by:  Ev Coelho RD  01/10/22 09:46 EST

## 2022-01-10 NOTE — ED PROVIDER NOTES
Subjective   61-year-old female with past medical history of COPD, HTN chronic respiratory failure s/p tracheostomy, dementia resents via EMS from the emergency department.  Patient is nonverbal at baseline.  Patient was brought here because EMS as they were called to the nursing home facility because patient was having respiratory distress and her oxygen saturation on her trach collar at 2 L which is at her baseline was 80%.  Patient was deep suctioned by EMS and put on 4 L and upon arrival was satting 90% but still very tachypneic and looked distraught.      History provided by:  Nursing home and EMS personnel  Shortness of Breath  Severity:  Moderate  Onset quality:  Unable to specify  Timing:  Unable to specify  Progression:  Unable to specify  Chronicity:  Recurrent  Relieved by:  Oxygen and position changes  Worsened by:  Coughing  Ineffective treatments:  Oxygen, rest and position changes  Associated symptoms: cough and sputum production    Associated symptoms: no abdominal pain, no chest pain and no fever    Risk factors: obesity and prolonged immobilization        Review of Systems   Constitutional: Negative.  Negative for fever.   HENT: Negative.    Respiratory: Positive for cough, sputum production and shortness of breath.    Cardiovascular: Negative.  Negative for chest pain.   Gastrointestinal: Negative.  Negative for abdominal pain.   Endocrine: Negative.    Genitourinary: Negative.  Negative for dysuria.   Skin: Negative.    Neurological: Negative.    Psychiatric/Behavioral: Negative.    All other systems reviewed and are negative.      Past Medical History:   Diagnosis Date   • COPD (chronic obstructive pulmonary disease) (HCC)    • Hypertension    • Pneumonia        No Known Allergies    Past Surgical History:   Procedure Laterality Date   • HYSTERECTOMY      Partial    • TRACHEOSTOMY AND PEG TUBE INSERTION N/A 3/20/2019    Procedure: TRACHEOSTOMY AND PERCUTANEOUS ENDOSCOPIC GASTROSTOMY TUBE  INSERTION;  Surgeon: Nadira Pandey MD;  Location: [facility];  Service: General       History reviewed. No pertinent family history.    Social History     Socioeconomic History   • Marital status: Legally    Tobacco Use   • Smoking status: Current Every Day Smoker     Packs/day: 1.00     Types: Electronic Cigarette, Cigarettes   • Smokeless tobacco: Never Used   Vaping Use   • Vaping Use: Unknown   Substance and Sexual Activity   • Alcohol use: Not Currently     Comment: wine    • Drug use: No   • Sexual activity: Defer           Objective   Physical Exam  Vitals and nursing note reviewed.   Constitutional:       General: She is in acute distress.      Appearance: She is ill-appearing.   HENT:      Head: Normocephalic and atraumatic.   Eyes:      Pupils: Pupils are equal, round, and reactive to light.   Cardiovascular:      Rate and Rhythm: Normal rate and regular rhythm.   Pulmonary:      Effort: Tachypnea present.      Breath sounds: Decreased breath sounds and wheezing present.   Musculoskeletal:      Cervical back: Normal range of motion.      Comments: Severe upper extremity and lower extremity muscle atrophy secondary to contractures and being bedbound.   Skin:     General: Skin is warm.   Neurological:      General: No focal deficit present.      Mental Status: She is alert.         Central Line At Bedside    Date/Time: 1/10/2022 3:23 AM  Performed by: Shaye De La Cruz DO  Authorized by: Shaye De La Cruz DO     Consent:     Consent obtained:  Emergent situation    Consent given by:  Healthcare agent    Risks discussed:  Arterial puncture, bleeding, incorrect placement, nerve damage, pneumothorax and infection    Alternatives discussed:  Delayed treatment  Pre-procedure details:     Hand hygiene: Hand hygiene performed prior to insertion      Sterile barrier technique: All elements of maximal sterile technique followed      Skin preparation:  2% chlorhexidine    Skin preparation  agent: Skin preparation agent completely dried prior to procedure    Anesthesia (see MAR for exact dosages):     Anesthesia method:  Local infiltration    Local anesthetic:  Lidocaine 1% w/o epi  Procedure details:     Location:  L femoral    Site selection rationale:  Due to pt contractures and trach collar best site for access    Patient position:  Flat    Procedural supplies:  Triple lumen    Catheter size:  7 Fr    Landmarks identified: yes      Ultrasound guidance: yes      Sterile ultrasound techniques: Sterile gel and sterile probe covers were used      Number of attempts:  2    Successful placement: yes    Post-procedure details:     Post-procedure:  Dressing applied and line sutured    Assessment:  Blood return through all ports, no pneumothorax on x-ray and placement verified by x-ray    Patient tolerance of procedure:  Tolerated well, no immediate complications               ED Course  ED Course as of 01/10/22 0534   Sun Jan 09, 2022   2230 EKG interpretation time is 2159 sinus rhythm 92 bpm QRS duration 74  QTC is 407 this is an abnormal EKG but there is no acute ST elevation at this time. [MH]   Mon Jeff 10, 2022   0030 Patient is considered high risk for respiratory decompensation patient is already end-stage COPD on a trach collar is found to be hypercapnic and hypoxic on blood gas and is Covid positive felt like with her increased demands for oxygen she would benefit from inpatient admission and remdesivir.  I discussed the case with the hospitalist who graciously accepts to their service for further evaluation and treatment. [MH]   0419 Patient due to body habitus and contractures has limited availability for IV access.  Staff was able to get an IV in her left chest wall.  Patient starts to have lower blood pressures I discussed with Dr. Cardenas will place a central line for better access. []      ED Course User Index  [MH] Shaye De La Cruz DO                                                  MDM  Number of Diagnoses or Management Options  Acute respiratory failure with hypoxia and hypercapnia (HCC): new and requires workup  COVID-19 virus infection: new and requires workup     Amount and/or Complexity of Data Reviewed  Clinical lab tests: reviewed and ordered  Tests in the radiology section of CPT®: reviewed and ordered  Tests in the medicine section of CPT®: reviewed and ordered  Decide to obtain previous medical records or to obtain history from someone other than the patient: yes  Obtain history from someone other than the patient: yes  Discuss the patient with other providers: yes  Independent visualization of images, tracings, or specimens: yes    Risk of Complications, Morbidity, and/or Mortality  Presenting problems: high  Diagnostic procedures: high  Management options: high        Final diagnoses:   Acute respiratory failure with hypoxia and hypercapnia (HCC)   COVID-19 virus infection       ED Disposition  ED Disposition     ED Disposition Condition Comment    Decision to Admit  Level of Care: Telemetry [5]   Diagnosis: Acute respiratory failure with hypoxia and hypercapnia (HCC) [[id number]]   Admitting Physician: KEN PRUITT [[id number]]   Attending Physician: KEN PRUITT [[id number]]   Isolate for COVID?: Yes [1]   Certification: I Certify That Inpatient Hospital Services Are Medically Necessary For Greater Than 2 Midnights            No follow-up provider specified.       Medication List      No changes were made to your prescriptions during this visit.          Shaye De La Cruz DO  01/10/22 0534

## 2022-01-10 NOTE — CASE MANAGEMENT/SOCIAL WORK
Discharge Planning Assessment  [facility]     Patient Name: Viji Vargas  MRN: [id number]  Today's Date: 1/10/2022    Admit Date: 1/9/2022     Discharge Needs Assessment     Row Name 01/10/22 1150       Living Environment    Lives With --  [facility]    Provides Primary Care For no one, unable/limited ability to care for self    Caregiving Concerns Pt is LTC resident at [facility]       Resource/Environmental Concerns    Resource/Environmental Concerns none       Discharge Needs Assessment    Readmission Within the Last 30 Days no previous admission in last 30 days    Concerns to be Addressed no discharge needs identified    Anticipated Changes Related to Illness none    Equipment Needed After Discharge none    Provided Post Acute Provider List? N/A    N/A Provider List Comment No needs    Provided Post Acute Provider Quality & Resource List? N/A    N/A Quality & Resource List Comment No Needs    Current Discharge Risk chronically ill; physical impairment    Discharge Coordination/Progress Will need to call May at [facility] to coordinate transfer back to the facility    Row Name 01/10/22 1115       Living Environment    Current Living Arrangements residential facility  [facility]    Duration at Residence 2.5 years    Primary Care Provided by other (see comments)  Staff at [facility]    Provides Primary Care For no one    Family Caregiver if Needed none    Quality of Family Relationships unable to assess    Able to Return to Prior Arrangements yes    Living Arrangement Comments LTC resident at [facility] and is still       Resource/Environmental Concerns    Resource/Environmental Concerns none               Discharge Plan     Row Name 01/10/22 1156       Plan    Plan PT is in Covid isolation.  Is a LTC resident at [facility].  Called to May at [facility] and pt is a LTC total care pt there.  All needs are met by staff, has been a resident there for 2.5 years.  Daughter Liliya Epperson is  her daughter and POA.  Papers received from [facility] and Faxed to HIM.  Pt is total care, total lift with Peg feedings and totally non verbal. Gets Pulmocare 800 mls daily of tube feeding and usually uses 4 liters of o2 continuously.  Per May she can come back at discharge but will need to know if she is still Covid Positive.  Will have to rearrange room assignment. Cm will continue to follow.                Continued Care and Services - Admitted Since 1/9/2022    Coordination has not been started for this encounter.          Demographic Summary     Row Name 01/10/22 1149       General Information    Preferred Language English    Row Name 01/10/22 1110       General Information    Admission Type inpatient    Arrived From emergency department    Required Notices Provided Important Message from Medicare    Expected Length of Stay (LOS) 5 days    Referral Source admission list    Reason for Consult discharge planning    Preferred Language English     Used During This Interaction no               Functional Status     Row Name 01/10/22 1149       Functional Status    Usual Activity Tolerance poor    Current Activity Tolerance poor    Row Name 01/10/22 1111       Functional Status    Usual Activity Tolerance poor    Current Activity Tolerance poor    Functional Status Comments Pt is non verbal and total care at [facility]       Functional Status, IADL    Medications completely dependent    Meal Preparation completely dependent    Housekeeping completely dependent    Laundry completely dependent    Shopping completely dependent    IADL Comments Total care all needs met by staff       Mental Status    General Appearance WDL --  Non Verbal cannot express any needs       Mental Status Summary    Recent Changes in Mental Status/Cognitive Functioning unable to assess    Mental Status Comments Pt is totally non verbal                      Dayan Pompa RN

## 2022-01-10 NOTE — PLAN OF CARE
Goal Outcome Evaluation:  Plan of Care Reviewed With: patient        Progress: no change  Outcome Summary: NEW ADMISSION,  BP AND HR STABLE,  O2 SATS >90% WITH O2 PER TRACH COLLAR AT 6 LITERS AT 35% O2.  TOTAL CARE PER NURSING STAFF.

## 2022-01-11 LAB
ALBUMIN SERPL-MCNC: 3.6 G/DL (ref 3.5–5.2)
ALBUMIN/GLOB SERPL: 1.2 G/DL
ALP SERPL-CCNC: 81 U/L (ref 39–117)
ALT SERPL W P-5'-P-CCNC: 14 U/L (ref 1–33)
ANION GAP SERPL CALCULATED.3IONS-SCNC: 10.2 MMOL/L (ref 5–15)
AST SERPL-CCNC: 12 U/L (ref 1–32)
BASOPHILS # BLD AUTO: 0.01 10*3/MM3 (ref 0–0.2)
BASOPHILS NFR BLD AUTO: 0.1 % (ref 0–1.5)
BILIRUB CONJ SERPL-MCNC: <0.2 MG/DL (ref 0–0.3)
BILIRUB SERPL-MCNC: 0.2 MG/DL (ref 0–1.2)
BUN SERPL-MCNC: 15 MG/DL (ref 8–23)
BUN/CREAT SERPL: 38.5 (ref 7–25)
CALCIUM SPEC-SCNC: 9.1 MG/DL (ref 8.6–10.5)
CHLORIDE SERPL-SCNC: 108 MMOL/L (ref 98–107)
CO2 SERPL-SCNC: 23.8 MMOL/L (ref 22–29)
CREAT SERPL-MCNC: 0.39 MG/DL (ref 0.57–1)
DEPRECATED RDW RBC AUTO: 44.5 FL (ref 37–54)
EOSINOPHIL # BLD AUTO: 0 10*3/MM3 (ref 0–0.4)
EOSINOPHIL NFR BLD AUTO: 0 % (ref 0.3–6.2)
ERYTHROCYTE [DISTWIDTH] IN BLOOD BY AUTOMATED COUNT: 12.9 % (ref 12.3–15.4)
GFR SERPL CREATININE-BSD FRML MDRD: >150 ML/MIN/1.73
GLOBULIN UR ELPH-MCNC: 3 GM/DL
GLUCOSE SERPL-MCNC: 137 MG/DL (ref 65–99)
HCT VFR BLD AUTO: 29.1 % (ref 34–46.6)
HGB BLD-MCNC: 9.4 G/DL (ref 12–15.9)
IMM GRANULOCYTES # BLD AUTO: 0.05 10*3/MM3 (ref 0–0.05)
IMM GRANULOCYTES NFR BLD AUTO: 0.5 % (ref 0–0.5)
LYMPHOCYTES # BLD AUTO: 1.12 10*3/MM3 (ref 0.7–3.1)
LYMPHOCYTES NFR BLD AUTO: 12.1 % (ref 19.6–45.3)
MCH RBC QN AUTO: 31.1 PG (ref 26.6–33)
MCHC RBC AUTO-ENTMCNC: 32.3 G/DL (ref 31.5–35.7)
MCV RBC AUTO: 96.4 FL (ref 79–97)
MONOCYTES # BLD AUTO: 0.56 10*3/MM3 (ref 0.1–0.9)
MONOCYTES NFR BLD AUTO: 6.1 % (ref 5–12)
NEUTROPHILS NFR BLD AUTO: 7.48 10*3/MM3 (ref 1.7–7)
NEUTROPHILS NFR BLD AUTO: 81.2 % (ref 42.7–76)
NRBC BLD AUTO-RTO: 0 /100 WBC (ref 0–0.2)
PLATELET # BLD AUTO: 261 10*3/MM3 (ref 140–450)
PMV BLD AUTO: 13 FL (ref 6–12)
POTASSIUM SERPL-SCNC: 4.3 MMOL/L (ref 3.5–5.2)
PROT SERPL-MCNC: 6.6 G/DL (ref 6–8.5)
RBC # BLD AUTO: 3.02 10*6/MM3 (ref 3.77–5.28)
SODIUM SERPL-SCNC: 142 MMOL/L (ref 136–145)
WBC NRBC COR # BLD: 9.22 10*3/MM3 (ref 3.4–10.8)

## 2022-01-11 PROCEDURE — 82248 BILIRUBIN DIRECT: CPT | Performed by: HOSPITALIST

## 2022-01-11 PROCEDURE — 25010000002 DEXAMETHASONE PER 1 MG: Performed by: HOSPITALIST

## 2022-01-11 PROCEDURE — U0004 COV-19 TEST NON-CDC HGH THRU: HCPCS | Performed by: NURSE PRACTITIONER

## 2022-01-11 PROCEDURE — 80053 COMPREHEN METABOLIC PANEL: CPT | Performed by: NURSE PRACTITIONER

## 2022-01-11 PROCEDURE — 25010000002 HEPARIN (PORCINE) PER 1000 UNITS: Performed by: HOSPITALIST

## 2022-01-11 PROCEDURE — 94799 UNLISTED PULMONARY SVC/PX: CPT

## 2022-01-11 PROCEDURE — 99232 SBSQ HOSP IP/OBS MODERATE 35: CPT | Performed by: NURSE PRACTITIONER

## 2022-01-11 PROCEDURE — 85025 COMPLETE CBC W/AUTO DIFF WBC: CPT | Performed by: NURSE PRACTITIONER

## 2022-01-11 RX ORDER — SCOLOPAMINE TRANSDERMAL SYSTEM 1 MG/1
1 PATCH, EXTENDED RELEASE TRANSDERMAL
Status: DISCONTINUED | OUTPATIENT
Start: 2022-01-11 | End: 2022-01-13 | Stop reason: HOSPADM

## 2022-01-11 RX ADMIN — MONTELUKAST 10 MG: 10 TABLET, FILM COATED ORAL at 22:25

## 2022-01-11 RX ADMIN — CETIRIZINE HYDROCHLORIDE 10 MG: 10 TABLET, FILM COATED ORAL at 08:56

## 2022-01-11 RX ADMIN — CARVEDILOL 12.5 MG: 12.5 TABLET, FILM COATED ORAL at 08:56

## 2022-01-11 RX ADMIN — SODIUM CHLORIDE, PRESERVATIVE FREE 10 ML: 5 INJECTION INTRAVENOUS at 09:22

## 2022-01-11 RX ADMIN — PANTOPRAZOLE SODIUM 40 MG: 40 INJECTION, POWDER, FOR SOLUTION INTRAVENOUS at 06:15

## 2022-01-11 RX ADMIN — HEPARIN SODIUM 5000 UNITS: 5000 INJECTION INTRAVENOUS; SUBCUTANEOUS at 09:19

## 2022-01-11 RX ADMIN — MULTIVITAMIN 15 ML: LIQUID ORAL at 08:56

## 2022-01-11 RX ADMIN — SUCRALFATE 1 G: 1 TABLET ORAL at 08:56

## 2022-01-11 RX ADMIN — REMDESIVIR 100 MG: 100 INJECTION, POWDER, LYOPHILIZED, FOR SOLUTION INTRAVENOUS at 06:15

## 2022-01-11 RX ADMIN — BACLOFEN 10 MG: 10 TABLET ORAL at 06:15

## 2022-01-11 RX ADMIN — DEXAMETHASONE SODIUM PHOSPHATE 6 MG: 4 INJECTION, SOLUTION INTRA-ARTICULAR; INTRALESIONAL; INTRAMUSCULAR; INTRAVENOUS; SOFT TISSUE at 17:08

## 2022-01-11 RX ADMIN — SCOLOPAMINE TRANSDERMAL SYSTEM 1 PATCH: 1 PATCH, EXTENDED RELEASE TRANSDERMAL at 11:44

## 2022-01-11 RX ADMIN — SODIUM CHLORIDE, PRESERVATIVE FREE 10 ML: 5 INJECTION INTRAVENOUS at 09:23

## 2022-01-11 RX ADMIN — Medication 30 ML: at 08:55

## 2022-01-11 RX ADMIN — TIOTROPIUM BROMIDE INHALATION SPRAY 2 PUFF: 3.12 SPRAY, METERED RESPIRATORY (INHALATION) at 09:13

## 2022-01-11 RX ADMIN — CARVEDILOL 12.5 MG: 12.5 TABLET, FILM COATED ORAL at 22:25

## 2022-01-11 RX ADMIN — SODIUM CHLORIDE, PRESERVATIVE FREE 10 ML: 5 INJECTION INTRAVENOUS at 22:25

## 2022-01-11 RX ADMIN — SUCRALFATE 1 G: 1 TABLET ORAL at 11:44

## 2022-01-11 RX ADMIN — Medication 30 ML: at 22:25

## 2022-01-11 RX ADMIN — ALPRAZOLAM 0.5 MG: 0.5 TABLET ORAL at 14:26

## 2022-01-11 RX ADMIN — SUCRALFATE 1 G: 1 TABLET ORAL at 22:25

## 2022-01-11 RX ADMIN — CHLORHEXIDINE GLUCONATE 0.12% ORAL RINSE 15 ML: 1.2 LIQUID ORAL at 22:25

## 2022-01-11 RX ADMIN — DEXAMETHASONE SODIUM PHOSPHATE 6 MG: 4 INJECTION, SOLUTION INTRA-ARTICULAR; INTRALESIONAL; INTRAMUSCULAR; INTRAVENOUS; SOFT TISSUE at 06:15

## 2022-01-11 RX ADMIN — BACLOFEN 10 MG: 10 TABLET ORAL at 22:25

## 2022-01-11 RX ADMIN — SODIUM CHLORIDE, PRESERVATIVE FREE 10 ML: 5 INJECTION INTRAVENOUS at 06:16

## 2022-01-11 RX ADMIN — SODIUM CHLORIDE, PRESERVATIVE FREE 10 ML: 5 INJECTION INTRAVENOUS at 09:25

## 2022-01-11 RX ADMIN — HEPARIN SODIUM 5000 UNITS: 5000 INJECTION INTRAVENOUS; SUBCUTANEOUS at 22:25

## 2022-01-11 RX ADMIN — SUCRALFATE 1 G: 1 TABLET ORAL at 17:08

## 2022-01-11 RX ADMIN — ALPRAZOLAM 0.5 MG: 0.5 TABLET ORAL at 06:15

## 2022-01-11 RX ADMIN — BUDESONIDE AND FORMOTEROL FUMARATE DIHYDRATE 2 PUFF: 160; 4.5 AEROSOL RESPIRATORY (INHALATION) at 09:15

## 2022-01-11 RX ADMIN — BACLOFEN 10 MG: 10 TABLET ORAL at 14:26

## 2022-01-11 RX ADMIN — ALPRAZOLAM 0.5 MG: 0.5 TABLET ORAL at 22:25

## 2022-01-11 RX ADMIN — CHLORHEXIDINE GLUCONATE 0.12% ORAL RINSE 15 ML: 1.2 LIQUID ORAL at 08:55

## 2022-01-11 NOTE — CASE MANAGEMENT/SOCIAL WORK
Continued Stay Note  [facility]     Patient Name: Viji Vargas  MRN: [id number]  Today's Date: 1/11/2022    Admit Date: 1/9/2022     Discharge Plan     Row Name 01/11/22 1027       Plan    Plan PT had a neg Covid test, 1/10/22, spoke to May at [place] and will accept pt back when ever she is medically ready.               Discharge Codes    No documentation.                     Dayan Pompa RN

## 2022-01-11 NOTE — PLAN OF CARE
"Goal Outcome Evaluation:           Progress: improving  Outcome Summary: Patient on 28% humidified trach mask. Needs humidifaction for secretions. Suction frequently for white, frothy secretions. Attempted to put inner cannula in but patient made \"whistling\" sound through inner cannula and decided to leave out. Had no inner cannula on admission. Repeat COVID swab done. Tolerating TF at 51 mL\hr.  "

## 2022-01-11 NOTE — PLAN OF CARE
Goal Outcome Evaluation:  Plan of Care Reviewed With: patient        Progress: no change  Outcome Summary: ptotal care patient from [facility] with tube feeding going @ 40 ml/hr (goal rate)-Isosource 1.5 with tap water flushed-scheduled Remdesivir per orders-Liya HEARD saw patient on 10th-monitor labs and continue to monitor patient-turn every 2 hours PRN-triple lumen left groin-saline lock-goal rate is 51-increased rate to 40 @ 0600-increase to goal rate in 8 hours

## 2022-01-11 NOTE — PROGRESS NOTES
[facility]    Name:  Viji Vargas   Age:  61 y.o.  Sex:  female  :  1960  MRN:  [id number]   Visit Number:  [id number]  Admission Date:  2022  Date Of Service:  22  Primary Care Physician:  Emperatriz Greene MD     LOS: 1 day :    Chief Complaint:      Shortness of breath    Subjective:    Patient seen and evaluated today at bedside with no overnight events noted.  Patient is found on 5L via trach collar with saturations at 97%.  She is unlabored on exam.  Patient is nonverbal at baseline.  Patient is found laying in bed on exam with TV on.  Discussed with APRN at [facility] who advised patient had Covid  and has also had her booster.  She advised that there were currently no residents who were Covid +, but they did have some staff that were positive and that NH staff tested patient prior to sending out with a negative result.    Hospital Course:    61-year-old female who is a long-term resident of nursing home with history of anoxic injury status post tracheostomy, COPD, status post PEG tube placement, hypertension, contractures who was brought here for shortness of breath.  Patient is unable to provide history and is nonverbal at baseline. History is obtained from ER attending. Patient tested positive for COVID-19 and was noted to have increased oxygen requirement from her baseline 2L. Request was made for admission.    Review of Systems:     All systems were reviewed and negative except as mentioned in subjective, assessment and plan.    Vital Signs:    Temp:  [95.4 °F (35.2 °C)-98.5 °F (36.9 °C)] 96.4 °F (35.8 °C)  Heart Rate:  [76-90] 83  Resp:  [20-22] 20  BP: (102-131)/(60-85) 131/60    Intake and output:    I/O last 3 completed shifts:  In: 1733 [Other:422; NG/GT:311; IV Piggyback:1000]  Out: 350 [Urine:350]  I/O this shift:  In: 120 [NG/GT:120]  Out: -     Physical Examination:    General Appearance:  Alert and cooperative,  ill-appearing and nonverbal at baseline with no acute distress on exam.   Head:  Atraumatic and normocephalic. Tracheostomy is present.   Eyes: Conjunctivae and sclerae normal, no icterus. No pallor.   Throat: No oral lesions, no thrush, oral mucosa moist.   Neck: Supple, trachea midline   Lungs:   Breath sounds heard bilaterally equally.  No wheezing or crackles. Utilizing 5L via trach collar with stable saturations.   Heart:  Normal S1 and S2, no murmur, no gallop, no rub. No JVD.   Abdomen:   Normal bowel sounds, no masses, no organomegaly. Soft, nontender, nondistended, no rebound tenderness.   Extremities: Supple, no edema, no cyanosis, no clubbing, contractures present to all extremities.   Skin: No bleeding or rash.   Neurologic: Alert and nonverbal at baseline due to anoxic brain injury.  No facial asymmetry.      Laboratory results:    Results from last 7 days   Lab Units 01/11/22  0643 01/10/22  0710 01/09/22  2222   SODIUM mmol/L 142 139 139   POTASSIUM mmol/L 4.3 4.1 4.4   CHLORIDE mmol/L 108* 106 101   CO2 mmol/L 23.8 21.9* 28.8   BUN mg/dL 15 12 14   CREATININE mg/dL 0.39* 0.33* 0.41*   CALCIUM mg/dL 9.1 8.7 9.4   BILIRUBIN mg/dL 0.2 0.2 0.2   ALK PHOS U/L 81 64 75   ALT (SGPT) U/L 14 12 16   AST (SGOT) U/L 12 11 13   GLUCOSE mg/dL 137* 158* 110*     Results from last 7 days   Lab Units 01/11/22  0643 01/10/22  0711 01/09/22  2222   WBC 10*3/mm3 9.22 6.62 10.30   HEMOGLOBIN g/dL 9.4* 8.7* 10.3*   HEMATOCRIT % 29.1* 27.0* 32.3*   PLATELETS 10*3/mm3 261 219 255         Results from last 7 days   Lab Units 01/09/22  2222   TROPONIN T ng/mL <0.010     Results from last 7 days   Lab Units 01/09/22  2241 01/09/22  2225   BLOODCX  No growth at 24 hours No growth at 24 hours     Results from last 7 days   Lab Units 01/09/22  2319   PH, ARTERIAL pH units 7.429   PO2 ART mm Hg 52.5*   PCO2, ARTERIAL mm Hg 48.1*   HCO3 ART mmol/L 31.9*     I have reviewed the patient's laboratory results.    Radiology  results:    XR Chest 1 View    Result Date: 1/10/2022  PROCEDURE: XR CHEST 1 VW-  HISTORY: sob  COMPARISON: 12/30/2021.  FINDINGS: A tracheostomy tube is in place. The heart is normal in size. The mediastinum is unremarkable. There are low lung volumes with scarring present in the left lung base. There is no pneumothorax.  There are no acute osseous abnormalities.      Impression: No acute cardiopulmonary process.  Continued followup is recommended.  This report was finalized on 1/10/2022 7:56 AM by Zeyad Wahl M.D..    XR Abdomen KUB    Result Date: 1/10/2022  FINAL REPORT TECHNIQUE: null CLINICAL HISTORY: left femoral line COMPARISON: null FINDINGS: 1 view abdomen Comparison: None Findings: No pneumoperitoneum or pneumatosis. No abnormal calcifications. No acute fractures.     Impression: IMPRESSION: Left femoral vascular catheter tip is in the region of the left iliac vessels. Authenticated by Desi Weaver MD on 01/10/2022 04:18:42 AM    I have reviewed the patient's radiology reports.    Medication Review:     I have reviewed the patient's active and prn medications.     Problem List:      Acute respiratory failure with hypoxia and hypercapnia (HCC)    Anoxic encephalopathy (HCC)    Oropharyngeal dysphagia      Assessment:    1. COVID-19 infection?  2. Acute on chronic hypoxic respiratory failure, present on admission  3. History of anoxic brain injury status post tracheostomy  4. Status post PEG tube placement      Plan:    Will continue with Decadron 6mg BID.  Questionable Covid 19 infection at this time, however, patient with history of advanced COPD so steroid therapy is appropriate.  Will continue Remdesivir for now.  Initial Covd+FLU testing positive, respiratory panel checked yesterday and was negative.  Continue PEG tube feedings.  Continue home medications and nebs PRN.  Awaiting further guidance from Infectious Disease regarding further repeat Covid testing and isolation.  Will send out  Covid pcr per ID recommendation regarding Covid status.  Spoke with nursing to titrate oxygen as tolerated.  Lab work is stable.     DVT Prophylaxis: Heparin  Code Status: Full Code  Diet: NPO--tube feeds  Discharge Plan: Back to LTC when stable    FÉLIX Perez  01/11/22  11:26 EST    Dictated utilizing Dragon dictation.

## 2022-01-12 ENCOUNTER — APPOINTMENT (OUTPATIENT)
Dept: GENERAL RADIOLOGY | Facility: HOSPITAL | Age: 62
End: 2022-01-12

## 2022-01-12 LAB
ACINETOBACTER SCREEN CX: NORMAL
ALBUMIN SERPL-MCNC: 3.2 G/DL (ref 3.5–5.2)
ALP SERPL-CCNC: 66 U/L (ref 39–117)
ALT SERPL W P-5'-P-CCNC: 14 U/L (ref 1–33)
ANION GAP SERPL CALCULATED.3IONS-SCNC: 10 MMOL/L (ref 5–15)
AST SERPL-CCNC: 12 U/L (ref 1–32)
BILIRUB CONJ SERPL-MCNC: <0.2 MG/DL (ref 0–0.3)
BILIRUB INDIRECT SERPL-MCNC: ABNORMAL MG/DL
BILIRUB SERPL-MCNC: <0.2 MG/DL (ref 0–1.2)
BUN SERPL-MCNC: 26 MG/DL (ref 8–23)
BUN/CREAT SERPL: 68.4 (ref 7–25)
CALCIUM SPEC-SCNC: 8.5 MG/DL (ref 8.6–10.5)
CHLORIDE SERPL-SCNC: 106 MMOL/L (ref 98–107)
CO2 SERPL-SCNC: 23 MMOL/L (ref 22–29)
CREAT SERPL-MCNC: 0.38 MG/DL (ref 0.57–1)
DEPRECATED RDW RBC AUTO: 46.5 FL (ref 37–54)
ERYTHROCYTE [DISTWIDTH] IN BLOOD BY AUTOMATED COUNT: 13.1 % (ref 12.3–15.4)
GFR SERPL CREATININE-BSD FRML MDRD: >150 ML/MIN/1.73
GLUCOSE SERPL-MCNC: 146 MG/DL (ref 65–99)
HCT VFR BLD AUTO: 27.6 % (ref 34–46.6)
HGB BLD-MCNC: 8.7 G/DL (ref 12–15.9)
MCH RBC QN AUTO: 31 PG (ref 26.6–33)
MCHC RBC AUTO-ENTMCNC: 31.5 G/DL (ref 31.5–35.7)
MCV RBC AUTO: 98.2 FL (ref 79–97)
PLATELET # BLD AUTO: 245 10*3/MM3 (ref 140–450)
PMV BLD AUTO: 12.9 FL (ref 6–12)
POTASSIUM SERPL-SCNC: 4 MMOL/L (ref 3.5–5.2)
PROT SERPL-MCNC: 5.7 G/DL (ref 6–8.5)
RBC # BLD AUTO: 2.81 10*6/MM3 (ref 3.77–5.28)
SARS-COV-2 RNA NOSE QL NAA+PROBE: NOT DETECTED
SODIUM SERPL-SCNC: 139 MMOL/L (ref 136–145)
VRE SPEC QL CULT: NORMAL
WBC NRBC COR # BLD: 9.22 10*3/MM3 (ref 3.4–10.8)

## 2022-01-12 PROCEDURE — 80076 HEPATIC FUNCTION PANEL: CPT | Performed by: HOSPITALIST

## 2022-01-12 PROCEDURE — 94799 UNLISTED PULMONARY SVC/PX: CPT

## 2022-01-12 PROCEDURE — 25010000002 DEXAMETHASONE PER 1 MG: Performed by: HOSPITALIST

## 2022-01-12 PROCEDURE — 85027 COMPLETE CBC AUTOMATED: CPT | Performed by: NURSE PRACTITIONER

## 2022-01-12 PROCEDURE — 99232 SBSQ HOSP IP/OBS MODERATE 35: CPT | Performed by: NURSE PRACTITIONER

## 2022-01-12 PROCEDURE — 71045 X-RAY EXAM CHEST 1 VIEW: CPT

## 2022-01-12 PROCEDURE — 80048 BASIC METABOLIC PNL TOTAL CA: CPT | Performed by: NURSE PRACTITIONER

## 2022-01-12 PROCEDURE — 25010000002 HEPARIN (PORCINE) PER 1000 UNITS: Performed by: HOSPITALIST

## 2022-01-12 RX ADMIN — PANTOPRAZOLE SODIUM 40 MG: 40 INJECTION, POWDER, FOR SOLUTION INTRAVENOUS at 06:00

## 2022-01-12 RX ADMIN — SUCRALFATE 1 G: 1 TABLET ORAL at 10:20

## 2022-01-12 RX ADMIN — BACLOFEN 10 MG: 10 TABLET ORAL at 06:00

## 2022-01-12 RX ADMIN — SODIUM CHLORIDE, PRESERVATIVE FREE 10 ML: 5 INJECTION INTRAVENOUS at 10:20

## 2022-01-12 RX ADMIN — ALPRAZOLAM 0.5 MG: 0.5 TABLET ORAL at 13:48

## 2022-01-12 RX ADMIN — CARVEDILOL 12.5 MG: 12.5 TABLET, FILM COATED ORAL at 10:20

## 2022-01-12 RX ADMIN — SUCRALFATE 1 G: 1 TABLET ORAL at 21:08

## 2022-01-12 RX ADMIN — TIOTROPIUM BROMIDE INHALATION SPRAY 2 PUFF: 3.12 SPRAY, METERED RESPIRATORY (INHALATION) at 11:47

## 2022-01-12 RX ADMIN — MONTELUKAST 10 MG: 10 TABLET, FILM COATED ORAL at 21:08

## 2022-01-12 RX ADMIN — BUDESONIDE AND FORMOTEROL FUMARATE DIHYDRATE 2 PUFF: 160; 4.5 AEROSOL RESPIRATORY (INHALATION) at 11:47

## 2022-01-12 RX ADMIN — BUDESONIDE AND FORMOTEROL FUMARATE DIHYDRATE 2 PUFF: 160; 4.5 AEROSOL RESPIRATORY (INHALATION) at 21:10

## 2022-01-12 RX ADMIN — ALPRAZOLAM 0.5 MG: 0.5 TABLET ORAL at 06:00

## 2022-01-12 RX ADMIN — CARVEDILOL 12.5 MG: 12.5 TABLET, FILM COATED ORAL at 21:09

## 2022-01-12 RX ADMIN — HEPARIN SODIUM 5000 UNITS: 5000 INJECTION INTRAVENOUS; SUBCUTANEOUS at 21:09

## 2022-01-12 RX ADMIN — REMDESIVIR 100 MG: 100 INJECTION, POWDER, LYOPHILIZED, FOR SOLUTION INTRAVENOUS at 06:00

## 2022-01-12 RX ADMIN — CHLORHEXIDINE GLUCONATE 0.12% ORAL RINSE 15 ML: 1.2 LIQUID ORAL at 10:19

## 2022-01-12 RX ADMIN — Medication 30 ML: at 10:19

## 2022-01-12 RX ADMIN — HEPARIN SODIUM 5000 UNITS: 5000 INJECTION INTRAVENOUS; SUBCUTANEOUS at 10:20

## 2022-01-12 RX ADMIN — Medication 30 ML: at 21:09

## 2022-01-12 RX ADMIN — SODIUM CHLORIDE, PRESERVATIVE FREE 10 ML: 5 INJECTION INTRAVENOUS at 06:00

## 2022-01-12 RX ADMIN — SUCRALFATE 1 G: 1 TABLET ORAL at 17:08

## 2022-01-12 RX ADMIN — MULTIVITAMIN 15 ML: LIQUID ORAL at 10:19

## 2022-01-12 RX ADMIN — BACLOFEN 10 MG: 10 TABLET ORAL at 13:48

## 2022-01-12 RX ADMIN — CHLORHEXIDINE GLUCONATE 0.12% ORAL RINSE 15 ML: 1.2 LIQUID ORAL at 21:09

## 2022-01-12 RX ADMIN — ALPRAZOLAM 0.5 MG: 0.5 TABLET ORAL at 21:09

## 2022-01-12 RX ADMIN — BACLOFEN 10 MG: 10 TABLET ORAL at 21:15

## 2022-01-12 RX ADMIN — DEXAMETHASONE SODIUM PHOSPHATE 6 MG: 4 INJECTION, SOLUTION INTRA-ARTICULAR; INTRALESIONAL; INTRAMUSCULAR; INTRAVENOUS; SOFT TISSUE at 06:00

## 2022-01-12 RX ADMIN — CETIRIZINE HYDROCHLORIDE 10 MG: 10 TABLET, FILM COATED ORAL at 10:19

## 2022-01-12 RX ADMIN — DEXAMETHASONE SODIUM PHOSPHATE 6 MG: 4 INJECTION, SOLUTION INTRA-ARTICULAR; INTRALESIONAL; INTRAMUSCULAR; INTRAVENOUS; SOFT TISSUE at 17:15

## 2022-01-12 RX ADMIN — SODIUM CHLORIDE, PRESERVATIVE FREE 10 ML: 5 INJECTION INTRAVENOUS at 21:09

## 2022-01-12 NOTE — PLAN OF CARE
Goal Outcome Evaluation:  Plan of Care Reviewed With: patient        Progress: no change  Outcome Summary: patient from [facility] facility-patient has PEG tube with Isosource 1.5 delivered at goal rate of 51-scheduled Remdesivir IVPB per orders-Liya HEARD saw patient today-monitor labs and continue to monitor patient-non-verbal with contractures bilateral arms & hands-bilateral legs and feet

## 2022-01-12 NOTE — PLAN OF CARE
Goal Outcome Evaluation:  Plan of Care Reviewed With: patient        Progress: no change  Outcome Summary: VSS. Remains on 28% humidified trach mask. Copious amounts of white frothy sputum today requiring frequent suctioning. Tolerating tube feeding. Appears comfortable.

## 2022-01-12 NOTE — PROGRESS NOTES
"Adult Nutrition  Assessment/PES    Patient Name:  Viji Vargas  YOB: 1960  MRN: [id number]  Admit Date:  1/9/2022    Assessment Date:  1/12/2022    Comments:      Recommend continue same TF protocol as medically appropriate and tolerated.     Rec #1: Isosource 1.5 @ goal rate of 51mL/hr. TF to provide 1683 kcals, 76 grams protein, and 857 mL tube feeding water.   Rec #2: RD notes pt is receiving Prostat BID via PEG tube. Prostat adds additional 200 kcal/d and 34g pro.   Rec #3: Free water flushes 140mL Q4 hours or six times daily.      RD to follow pt and adjust tube feeding based on tolerance and residuals. Consult RD PRN.      Reason for Assessment     Row Name 01/12/22 1500          Reason for Assessment    Reason For Assessment diagnosis/disease state; TF/PN; follow-up protocol     Diagnosis infection/sepsis; pulmonary disease     Identified At Risk by Screening Criteria tube feeding or parenteral nutrition                  Anthropometrics     Row Name 01/12/22 1502          Anthropometrics    Height 160 cm (63\")            Ideal Body Weight (IBW)    Ideal Body Weight (IBW) (kg) 52.72                Labs/Tests/Procedures/Meds     Row Name 01/12/22 1501          Labs/Procedures/Meds    Lab Results Reviewed reviewed, pertinent     Lab Results Comments Low: Crt, alb; High: Glu, CRP, BUN            Medications    Pertinent Medications Reviewed reviewed, pertinent     Pertinent Medications Comments baclofen, carvedilol, Peridex, Decadron, heparin, Singulair, MVI w/ min, Prostat, pantoprazole, Prostat, remdesivir, NaCl, sucralfate                Physical Findings     Row Name 01/12/22 1502          Physical Findings    Overall Physical Appearance shortness of breath; on oxygen therapy     Gastrointestinal feeding tube     Tubes gastrostomy tube; PEG     Oral/Mouth Cavity poor dentition                Estimated/Assessed Needs     Row Name 01/12/22 1502          Calculation Measurements    Weight " "Used For Calculations 70.6 kg (155 lb 10.3 oz)     Height 160 cm (63\")            Estimated/Assessed Needs    Additional Documentation Calorie Requirements (Group); KCAL/KG (Group); Protein Requirements (Group); [place] Jeor Equation (Group); Fluid Requirements (Group)            Calorie Requirements    Weight Used For Calorie Calculations 70.6 kg (155 lb 10.3 oz)     Estimated Calorie Requirement (kcal/day) 1700     Estimated Calorie Need Method kcal/kg; [place] Jeor            KCAL/KG    KCAL/KG 20 Kcal/Kg (kcal); 25 Kcal/Kg (kcal); 30 Kcal/Kg (kcal); 35 Kcal/Kg (kcal)     20 Kcal/Kg (kcal) 1412     25 Kcal/Kg (kcal) 1765     30 Kcal/Kg (kcal) 2118     35 Kcal/Kg (kcal) 2471            [place] Jeor Equation    RMR ([place] Jeor Equation) 1230.125     [place] Jeor Activity Factors 1.4 - 1.5     Activity Factors ([place] Jeor) 1722.175 - 1845.1875            Protein Requirements    Weight Used For Protein Calculations 70.6 kg (155 lb 10.3 oz)     Est Protein Requirement Amount (gms/kg) 1.2 gm protein  70g-105g/d; (1.0g-1.5g/kg)     Estimated Protein Requirements (gms/day) 84.72            Fluid Requirements    Fluid Requirements (mL/day) 1700     Estimated Fluid Requirement Method other (see comments)  1 mL/kcal     RDA Method (mL) 1700                Nutrition Prescription Ordered     Row Name 01/12/22 1504          Nutrition Prescription PO    Current PO Diet NPO            Nutrition Prescription EN    Enteral Route PEG     Product Isosource 1.5 (Jevity 1.5)     Modulars Liquid Protein (15 gm/30 mL)     Liquid Protein (15 gm/30 mL) 30 mL/1 packet     Protein Liquid Frequency 2 times a day     TF Delivery Method Continuous     Continuous TF Goal Rate (mL/hr) 51 mL/hr     Continuous TF Current Rate (mL/hr) 51 mL/hr     Continuous TF Goal Volume (mL) 1122 mL     Continuous TF Current Volume (mL) 1122 mL     Water flush (mL)  140 mL     Water Flush Frequency Every 4 hours                " Evaluation of Received Nutrient/Fluid Intake     Row Name 01/12/22 1502          EN Evaluation    Number of Days EN Intake Evaluated 2 days     EN Average Volume Delivered (mL/day) 1002 mL/day     % Goal Volume  89 %     TF Changes Rate increased     HOB Greater than or equal to 30 degress                     Problem/Interventions:   Problem 1     Row Name 01/12/22 1505          Nutrition Diagnoses Problem 1    Problem 1 Needs Alternate Route     Etiology (related to) Medical Diagnosis; MNT for Treatment/Condition     Pulmonary/Critical Care A/C respiratory failure; COPD; Other (comment)  tracheostomy, covid     Signs/Symptoms (evidenced by) NPO; Report/Observation     Reported/Observed By RD/Tech                      Intervention Goal     Row Name 01/12/22 1505          Intervention Goal    General Maintain nutrition; Nutrition support treatment; Meet nutritional needs for age/condition; Disease management/therapy     TF/PN Tolerate TF at goal     Transition TF to PO     Weight Maintain weight                Nutrition Intervention     Row Name 01/12/22 1505          Nutrition Intervention    RD/Tech Action Follow Tx progress; Care plan reviewd; Recommend/ordered     Recommended/Ordered EN                Nutrition Prescription     Row Name 01/12/22 1506          Nutrition Prescription PO    PO Prescription Other (comment)  continue NPO while TF     New PO Prescription Ordered? No, recommended            Nutrition Prescription EN    Enteral Prescription Continue same protocol     Enteral Route Gastrostomy     Product Isosource 1.5 hardik     Modulars Liquid Protein (15 gm/30 mL)     Liquid Protein (15 gm/30 mL) 30 mL/1 packet     Protein Liquid Frequency 2 times a day     TF Delivery Method Continuous     Continuous TF Goal Rate (mL/hr) 51 mL/hr     Continuous TF Starting Rate (mL/hr) 51 mL/hr     Continuous TF Goal Volume (mL) 1122 mL     Continuous TF Starting Volume (mL) 1122 mL     Water flush (mL)  140 mL     Water  Flush Frequency Every 4 hours     New EN Prescription Ordered? No, recommended            Other Orders    Obtain Weight Daily     Obtain Weight Ordered? No, recommended     Supplement Vitamin mineral supplement     Supplement Ordered? No, recommended     Other continue to monitor and replace electrolytes PRN                Education/Evaluation     Row Name 01/12/22 1507          Education    Education Education not appropriate at this time     Please explain Patient nonverbal            Monitor/Evaluation    Monitor Per protocol; I&O; Pertinent labs; TF delivery/tolerance; Weight; Skin status; Symptoms                 Electronically signed by:  Ev Coelho RD  01/12/22 15:08 EST

## 2022-01-12 NOTE — PROGRESS NOTES
[facility]    Name:  Viji Vargas   Age:  61 y.o.  Sex:  female  :  1960  MRN:  [id number]   Visit Number:  [id number]  Admission Date:  2022  Date Of Service:  22  Primary Care Physician:  Emperatriz Greene MD    LOS:  3 days    Chief Complaint:      Shortness of breath    Subjective:    Patient seen and evaluated today at bedside with no overnight events noted.  Patient is found on 5L via trach collar 28% with saturations at 97%, which is her baseline oxygen use.  She is unlabored on exam.  Patient is nonverbal at baseline.  Patient is found laying in bed on exam with TV on, nursing at bedside for morning meds.  Patient does have thick secretions from trach noted with nursing suctioning.      Hospital Course:    61-year-old female who is a long-term resident of nursing home with history of anoxic injury status post tracheostomy, COPD, status post PEG tube placement, hypertension, contractures who was brought here for shortness of breath.  Patient is unable to provide history and is nonverbal at baseline. History is obtained from ER attending. Patient tested positive for COVID-19 and was noted to have increased oxygen requirement from her baseline 2L. Request was made for admission.    Review of Systems:     All systems were reviewed and negative except as mentioned in subjective, assessment and plan.    Vital Signs:    Temp:  [96.7 °F (35.9 °C)-98.6 °F (37 °C)] 98.6 °F (37 °C)  Heart Rate:  [63-79] 70  Resp:  [18-20] 20  BP: (106-125)/(58-73) 107/68    Intake and output:    I/O last 3 completed shifts:  In: 2737 [Other:867; NG/GT:1600; IV Piggyback:270]  Out: 750 [Urine:750]  I/O this shift:  In: 509 [Other:140; NG/GT:369]  Out: -     Physical Examination:    General Appearance:  Alert and cooperative, ill-appearing and nonverbal at baseline with no acute distress on exam.   Head:  Atraumatic and normocephalic. Tracheostomy is present.    Eyes: Conjunctivae and sclerae normal, no icterus. No pallor.   Throat: No oral lesions, no thrush, oral mucosa moist.   Neck: Supple, trachea midline   Lungs:   Breath sounds heard bilaterally equally.  No wheezing or crackles. Utilizing 5L via trach collar at 28% with stable saturations which is her baseline.   Heart:  Normal S1 and S2, no murmur, no gallop, no rub. No JVD.   Abdomen:   Normal bowel sounds, no masses, no organomegaly. Soft, nontender, nondistended, no rebound tenderness.   Extremities: Supple, no edema, no cyanosis, no clubbing, contractures present to all extremities.   Skin: No bleeding or rash.   Neurologic: Alert and nonverbal at baseline due to anoxic brain injury.  No facial asymmetry.      Laboratory results:    Results from last 7 days   Lab Units 01/12/22  0704 01/11/22  0643 01/10/22  0710   SODIUM mmol/L 139 142 139   POTASSIUM mmol/L 4.0 4.3 4.1   CHLORIDE mmol/L 106 108* 106   CO2 mmol/L 23.0 23.8 21.9*   BUN mg/dL 26* 15 12   CREATININE mg/dL 0.38* 0.39* 0.33*   CALCIUM mg/dL 8.5* 9.1 8.7   BILIRUBIN mg/dL <0.2 0.2 0.2   ALK PHOS U/L 66 81 64   ALT (SGPT) U/L 14 14 12   AST (SGOT) U/L 12 12 11   GLUCOSE mg/dL 146* 137* 158*     Results from last 7 days   Lab Units 01/12/22  0703 01/11/22  0643 01/10/22  0711   WBC 10*3/mm3 9.22 9.22 6.62   HEMOGLOBIN g/dL 8.7* 9.4* 8.7*   HEMATOCRIT % 27.6* 29.1* 27.0*   PLATELETS 10*3/mm3 245 261 219         Results from last 7 days   Lab Units 01/09/22  2222   TROPONIN T ng/mL <0.010     Results from last 7 days   Lab Units 01/09/22  2241 01/09/22  2225   BLOODCX  No growth at 2 days No growth at 2 days     Results from last 7 days   Lab Units 01/09/22  2319   PH, ARTERIAL pH units 7.429   PO2 ART mm Hg 52.5*   PCO2, ARTERIAL mm Hg 48.1*   HCO3 ART mmol/L 31.9*     I have reviewed the patient's laboratory results.    Radiology results:    XR Chest 1 View    Result Date: 1/12/2022  PROCEDURE: XR CHEST 1 VW-  HISTORY: increased secretions with  trach; J96.01-Acute respiratory failure with hypoxia; J96.02-Acute respiratory failure with hypercapnia; U07.1-COVID-19  COMPARISON: 1/9/2022.  FINDINGS: A tracheostomy tube is in place. The heart is normal in size. The mediastinum is unremarkable. There are low lung volumes with bibasilar atelectasis. No significant effusions are evident. There is no pneumothorax.  There are no acute osseous abnormalities.      Impression: Low lung volumes with bibasilar atelectasis.  Continued followup is recommended.  This report was finalized on 1/12/2022 11:16 AM by Zeyad Wahl M.D..    I have reviewed the patient's radiology reports.    Medication Review:     I have reviewed the patient's active and prn medications.     Problem List:      Acute respiratory failure with hypoxia and hypercapnia (HCC)    Anoxic encephalopathy (HCC)    Oropharyngeal dysphagia      Assessment:    1. COVID-19 infection?  2. Acute on chronic hypoxic respiratory failure, present on admission  3. History of anoxic brain injury status post tracheostomy  4. Status post PEG tube placement      Plan:    Will continue with Decadron 6mg BID.  Questionable Covid 19 infection at this time, however, patient with history of advanced COPD so steroid therapy is appropriate.  Will continue Remdesivir for now, pending pcr Covid results.  Initial Covd+FLU testing positive, respiratory panel checked 1/10/2022 and was negative.  Continue PEG tube feedings.  Continue home medications and nebs PRN.  Covid pcr sent per ID recommendation regarding Covid status.  Spoke with nursing to titrate oxygen as tolerated and patient is currently on baseline O2 use with 5L trach collar 28%.  Lab work is stable.  Continue to monitor closely.  Repeat CXR done today and noted low lung volumes with bibasilar atelectasis.     DVT Prophylaxis: Heparin  Code Status: Full Code  Diet: NPO--tube feeds  Discharge Plan: Back to LTC when stable    Liya Zavala, APRN  01/12/22  12:15  EST    Dictated utilizing Dragon dictation.

## 2022-01-13 VITALS
DIASTOLIC BLOOD PRESSURE: 73 MMHG | BODY MASS INDEX: 29.02 KG/M2 | OXYGEN SATURATION: 96 % | TEMPERATURE: 97.9 F | RESPIRATION RATE: 20 BRPM | HEART RATE: 89 BPM | SYSTOLIC BLOOD PRESSURE: 128 MMHG | HEIGHT: 63 IN | WEIGHT: 163.8 LBS

## 2022-01-13 LAB
ALBUMIN SERPL-MCNC: 3.2 G/DL (ref 3.5–5.2)
ALP SERPL-CCNC: 63 U/L (ref 39–117)
ALT SERPL W P-5'-P-CCNC: 17 U/L (ref 1–33)
ANION GAP SERPL CALCULATED.3IONS-SCNC: 8.8 MMOL/L (ref 5–15)
AST SERPL-CCNC: 13 U/L (ref 1–32)
BILIRUB CONJ SERPL-MCNC: <0.2 MG/DL (ref 0–0.3)
BILIRUB INDIRECT SERPL-MCNC: ABNORMAL MG/DL
BILIRUB SERPL-MCNC: 0.2 MG/DL (ref 0–1.2)
BUN SERPL-MCNC: 30 MG/DL (ref 8–23)
BUN/CREAT SERPL: 73.2 (ref 7–25)
CALCIUM SPEC-SCNC: 8.7 MG/DL (ref 8.6–10.5)
CHLORIDE SERPL-SCNC: 105 MMOL/L (ref 98–107)
CO2 SERPL-SCNC: 23.2 MMOL/L (ref 22–29)
CREAT SERPL-MCNC: 0.41 MG/DL (ref 0.57–1)
CRP SERPL-MCNC: 0.31 MG/DL (ref 0–0.5)
DEPRECATED RDW RBC AUTO: 46.9 FL (ref 37–54)
ERYTHROCYTE [DISTWIDTH] IN BLOOD BY AUTOMATED COUNT: 13.2 % (ref 12.3–15.4)
GFR SERPL CREATININE-BSD FRML MDRD: >150 ML/MIN/1.73
GLUCOSE SERPL-MCNC: 158 MG/DL (ref 65–99)
HCT VFR BLD AUTO: 28.3 % (ref 34–46.6)
HGB BLD-MCNC: 9 G/DL (ref 12–15.9)
MCH RBC QN AUTO: 30.8 PG (ref 26.6–33)
MCHC RBC AUTO-ENTMCNC: 31.8 G/DL (ref 31.5–35.7)
MCV RBC AUTO: 96.9 FL (ref 79–97)
PLATELET # BLD AUTO: 253 10*3/MM3 (ref 140–450)
PMV BLD AUTO: 12.7 FL (ref 6–12)
POTASSIUM SERPL-SCNC: 4.3 MMOL/L (ref 3.5–5.2)
PROCALCITONIN SERPL-MCNC: 0.2 NG/ML (ref 0–0.25)
PROT SERPL-MCNC: 5.9 G/DL (ref 6–8.5)
RBC # BLD AUTO: 2.92 10*6/MM3 (ref 3.77–5.28)
SODIUM SERPL-SCNC: 137 MMOL/L (ref 136–145)
WBC NRBC COR # BLD: 10.82 10*3/MM3 (ref 3.4–10.8)

## 2022-01-13 PROCEDURE — 80048 BASIC METABOLIC PNL TOTAL CA: CPT | Performed by: NURSE PRACTITIONER

## 2022-01-13 PROCEDURE — 86140 C-REACTIVE PROTEIN: CPT | Performed by: NURSE PRACTITIONER

## 2022-01-13 PROCEDURE — 80076 HEPATIC FUNCTION PANEL: CPT | Performed by: HOSPITALIST

## 2022-01-13 PROCEDURE — 25010000002 DEXAMETHASONE PER 1 MG: Performed by: HOSPITALIST

## 2022-01-13 PROCEDURE — 99238 HOSP IP/OBS DSCHRG MGMT 30/<: CPT | Performed by: NURSE PRACTITIONER

## 2022-01-13 PROCEDURE — 85027 COMPLETE CBC AUTOMATED: CPT | Performed by: NURSE PRACTITIONER

## 2022-01-13 PROCEDURE — 94761 N-INVAS EAR/PLS OXIMETRY MLT: CPT

## 2022-01-13 PROCEDURE — 94799 UNLISTED PULMONARY SVC/PX: CPT

## 2022-01-13 PROCEDURE — 25010000002 HEPARIN (PORCINE) PER 1000 UNITS: Performed by: HOSPITALIST

## 2022-01-13 PROCEDURE — 84145 PROCALCITONIN (PCT): CPT | Performed by: NURSE PRACTITIONER

## 2022-01-13 RX ORDER — DEXAMETHASONE 6 MG/1
6 TABLET ORAL
Qty: 3 TABLET | Refills: 0 | Status: SHIPPED | OUTPATIENT
Start: 2022-01-14 | End: 2022-01-17

## 2022-01-13 RX ADMIN — MULTIVITAMIN 15 ML: LIQUID ORAL at 08:07

## 2022-01-13 RX ADMIN — ALPRAZOLAM 0.5 MG: 0.5 TABLET ORAL at 13:32

## 2022-01-13 RX ADMIN — SODIUM CHLORIDE, PRESERVATIVE FREE 10 ML: 5 INJECTION INTRAVENOUS at 08:07

## 2022-01-13 RX ADMIN — CARVEDILOL 12.5 MG: 12.5 TABLET, FILM COATED ORAL at 08:06

## 2022-01-13 RX ADMIN — BACLOFEN 10 MG: 10 TABLET ORAL at 13:32

## 2022-01-13 RX ADMIN — SUCRALFATE 1 G: 1 TABLET ORAL at 11:53

## 2022-01-13 RX ADMIN — SUCRALFATE 1 G: 1 TABLET ORAL at 08:07

## 2022-01-13 RX ADMIN — REMDESIVIR 100 MG: 100 INJECTION, POWDER, LYOPHILIZED, FOR SOLUTION INTRAVENOUS at 06:33

## 2022-01-13 RX ADMIN — CHLORHEXIDINE GLUCONATE 0.12% ORAL RINSE 15 ML: 1.2 LIQUID ORAL at 08:07

## 2022-01-13 RX ADMIN — PANTOPRAZOLE SODIUM 40 MG: 40 INJECTION, POWDER, FOR SOLUTION INTRAVENOUS at 05:23

## 2022-01-13 RX ADMIN — Medication 30 ML: at 08:08

## 2022-01-13 RX ADMIN — BUDESONIDE AND FORMOTEROL FUMARATE DIHYDRATE 2 PUFF: 160; 4.5 AEROSOL RESPIRATORY (INHALATION) at 08:30

## 2022-01-13 RX ADMIN — DEXAMETHASONE SODIUM PHOSPHATE 6 MG: 4 INJECTION, SOLUTION INTRA-ARTICULAR; INTRALESIONAL; INTRAMUSCULAR; INTRAVENOUS; SOFT TISSUE at 05:23

## 2022-01-13 RX ADMIN — CETIRIZINE HYDROCHLORIDE 10 MG: 10 TABLET, FILM COATED ORAL at 08:07

## 2022-01-13 RX ADMIN — TIOTROPIUM BROMIDE INHALATION SPRAY 2 PUFF: 3.12 SPRAY, METERED RESPIRATORY (INHALATION) at 08:30

## 2022-01-13 RX ADMIN — ALPRAZOLAM 0.5 MG: 0.5 TABLET ORAL at 05:24

## 2022-01-13 RX ADMIN — BACLOFEN 10 MG: 10 TABLET ORAL at 05:24

## 2022-01-13 RX ADMIN — HEPARIN SODIUM 5000 UNITS: 5000 INJECTION INTRAVENOUS; SUBCUTANEOUS at 08:07

## 2022-01-13 NOTE — PLAN OF CARE
Goal Outcome Evaluation:           Progress: no change  Outcome Summary: VSS.  Oxygenation maintained via humidified trach mask @ 28%.  Tube feeding continued as ordered.  Continued cardiac monitoring and intake/output as ordered.  Isolation precautions maintained per protocol.  Patient required intermittent trach suction due to copious amounts of white frothy sputum.  Oral care provided.  No acute events noted during shift.  Will continue to monitor patient.

## 2022-01-13 NOTE — PLAN OF CARE
Goal Outcome Evaluation:      Patient vs stable, femoral line pulled a couple hours ago, site continues to remain clean and dry. Report given to Misa ch. Tried contacting daughter and grandchild daughters number saying it had been disconnected and grandchild's number went straight to voicemail.

## 2022-01-13 NOTE — DISCHARGE SUMMARY
[facility]   DISCHARGE SUMMARY      Name:  Viji Vargas   Age:  61 y.o.  Sex:  female  :  1960  MRN:  [id number]   Visit Number:  [id number]    Admission Date:  2022  Date of Discharge:  2022  Primary Care Physician:  Emperatriz Greene MD    Important issues to note:    1. Patient was found at nursing home with respiratory distress and low oxygen saturations.  Deep suctioned by EMS with improvement, but brought to the hospital for further evaluation.    2.   Admitted for acute respiratory failure with hypoxia and hypercapnia with a positive Covid 19 test in the Emergency Department.  Due to patient's fragile state at baseline patient was admitted and started on Remdesivir and Decadron.    3.  After reviewing patient's recent Covid illness (about 3 months ago) and being fully vaccinated with booster from nursing home with marginal increase in oxygen from baseline additional respiratory panel was ordered which yielded a negative Covid 19 test.  [facility] send out Covid 19 test then sent for final determination of Covid 19 status which was also negative.      4.  Patient returned to baseline oxygen demands and has remained stable since being admitted.  Noted to have secretions from trach during admission, however, afebrile, with CXR done 2022 noting atelectasis.  Patient stable to return to the nursing home today.  Will give additional 3 days of Decadron 6mg daily at discharge for COPD exacerbation/ possible pneumonitis.    Discharge Diagnoses:     1. COVID-19 infection--Ruled out, 2 negative tests  2. Acute on chronic hypoxic respiratory failure, present on admission  3. History of anoxic brain injury status post tracheostomy  4. Status post PEG tube placement      Problem List:     Active Hospital Problems    Diagnosis  POA   • **Acute respiratory failure with hypoxia and hypercapnia (HCC) [J96.01, J96.02]  Yes   • Acute on chronic respiratory failure with  hypoxia (HCC) [J96.21]  Yes   • Oropharyngeal dysphagia [R13.12]  Yes   • Anoxic encephalopathy (HCC) [G93.1]  Yes   • Cardiopulmonary arrest with successful resuscitation (HCC) [I46.9]  Yes      Resolved Hospital Problems   No resolved problems to display.     Presenting Problem:    Chief Complaint   Patient presents with   • Shortness of Breath      Consults:     Consulting Physician(s)             None        History of presenting illness/Hospital Course:    Patient is a 61-year-old female who is a long-term resident of nursing home with history of anoxic injury status post tracheostomy, COPD, status post PEG tube placement, hypertension, contractures who was brought here for shortness of breath.  Patient is unable to provide history and is nonverbal at baseline. History is obtained from ER attending. Patient tested positive for COVID-19 and was noted to have increased oxygen requirement from her baseline 2L. Request was made for admission due to frail state at baseline and Covid-19 diagnosis.    During admission patient was started on Decadron 6mg IV BID and Remdesivir.  Due to patient's recent Covid 19 illness (approximately 3 months ago) and being fully vaccinated with booster a Respiratory panel was sent out which resulted Covid-19 negative.  The whole respiratory panel was negative for any viral illness.  [facility] send out for Covid 19 was then obtained and resulted in a negative test.  Patient did not have current Covid 19 infection.  Remdesivir was stopped.  Patient was weaned back to baseline oxygen needs.  Patient had routine suctioning to tracheostomy.  Blood cultures have been negative.  Patient has been afebrile with stable vital signs.  Lab work has been reassuring.  Patient is stable for discharge back to the Nursing Home.  Patient likely had COPD exacerbation vs pneumonitis vs mucous plug that was resolved with steroid administration.  Patient does not have current Covid 19 infection.  Will continue  with Decadron 6mg daily for an additional 3 days on discharge as patient did return to respiratory baseline for suspected COPD exacerbation vs pneumonitis.  Return to the hospital with any problems.        Vital Signs:    Temp:  [97 °F (36.1 °C)-97.9 °F (36.6 °C)] 97.9 °F (36.6 °C)  Heart Rate:  [] 89  Resp:  [19-20] 20  BP: (103-138)/(50-76) 128/73    Physical Exam:    General Appearance:  Alert and cooperative, ill-appearing and nonverbal at baseline with no acute distress on exam   Head:  Atraumatic and normocephalic, Tracheostomy is present   Eyes: Conjunctivae and sclerae normal, no icterus. No pallor.   Ears:  Ears with no abnormalities noted.   Throat: No oral lesions, no thrush, oral mucosa moist.   Neck: Supple, trachea midline   Back:   No kyphoscoliosis present. No tenderness to palpation.   Lungs:   Breath sounds heard bilaterally equally.  No crackles or wheezing. Utilizing 5L via trach collar at 28% with stable saturations which is her baseline.   Heart:  Normal S1 and S2, no murmur, no gallop, no rub. No JVD.   Abdomen:   Normal bowel sounds, no masses, no organomegaly. Soft, nontender, nondistended, no rebound tenderness.   Extremities: Supple, no edema, no cyanosis, no clubbing, contractures present to all extremities   Pulses: Pulses palpable bilaterally.   Skin: No bleeding or rash.   Neurologic: Alert and nonverbal at baseline due to anoxic brain injury.  No facial asymmetry.      Pertinent Lab Results:     Results from last 7 days   Lab Units 01/13/22  0654 01/12/22  0704 01/11/22  0643   SODIUM mmol/L 137 139 142   POTASSIUM mmol/L 4.3 4.0 4.3   CHLORIDE mmol/L 105 106 108*   CO2 mmol/L 23.2 23.0 23.8   BUN mg/dL 30* 26* 15   CREATININE mg/dL 0.41* 0.38* 0.39*   CALCIUM mg/dL 8.7 8.5* 9.1   BILIRUBIN mg/dL 0.2 <0.2 0.2   ALK PHOS U/L 63 66 81   ALT (SGPT) U/L 17 14 14   AST (SGOT) U/L 13 12 12   GLUCOSE mg/dL 158* 146* 137*     Results from last 7 days   Lab Units 01/13/22  0675  01/12/22  0703 01/11/22  0643   WBC 10*3/mm3 10.82* 9.22 9.22   HEMOGLOBIN g/dL 9.0* 8.7* 9.4*   HEMATOCRIT % 28.3* 27.6* 29.1*   PLATELETS 10*3/mm3 253 245 261         Results from last 7 days   Lab Units 01/09/22  2222   TROPONIN T ng/mL <0.010     Results from last 7 days   Lab Units 01/09/22  2222   PROBNP pg/mL 594.2             Results from last 7 days   Lab Units 01/09/22  2319   PH, ARTERIAL pH units 7.429   PO2 ART mm Hg 52.5*   PCO2, ARTERIAL mm Hg 48.1*   HCO3 ART mmol/L 31.9*     Results from last 7 days   Lab Units 01/09/22  2241 01/09/22  2225   BLOODCX  No growth at 3 days No growth at 3 days       Pertinent Radiology Results:    Imaging Results (All)     Procedure Component Value Units Date/Time    XR Chest 1 View [[id number]] Collected: 01/12/22 1115     Updated: 01/12/22 1128    Narrative:      PROCEDURE: XR CHEST 1 VW-     HISTORY: increased secretions with trach; J96.01-Acute respiratory  failure with hypoxia; J96.02-Acute respiratory failure with hypercapnia;  U07.1-COVID-19     COMPARISON: 1/9/2022.     FINDINGS: A tracheostomy tube is in place. The heart is normal in size.  The mediastinum is unremarkable. There are low lung volumes with  bibasilar atelectasis. No significant effusions are evident. There is no  pneumothorax.  There are no acute osseous abnormalities.       Impression:      Low lung volumes with bibasilar atelectasis.     Continued followup is recommended.     This report was finalized on 1/12/2022 11:16 AM by Zeyad Wahl M.D..    XR Chest 1 View [[id number]] Collected: 01/10/22 0755     Updated: 01/10/22 0758    Narrative:      PROCEDURE: XR CHEST 1 VW-     HISTORY: sob     COMPARISON: 12/30/2021.     FINDINGS: A tracheostomy tube is in place. The heart is normal in size.  The mediastinum is unremarkable. There are low lung volumes with  scarring present in the left lung base. There is no pneumothorax.  There  are no acute osseous abnormalities.       Impression:      No  acute cardiopulmonary process.     Continued followup is recommended.     This report was finalized on 1/10/2022 7:56 AM by Zeyad Wahl M.D..    XR Abdomen KUB [[id number]] Collected: 01/10/22 0418     Updated: 01/10/22 0420    Narrative:      FINAL REPORT    TECHNIQUE:  null    CLINICAL HISTORY:  left femoral line    COMPARISON:  null    FINDINGS:  1 view abdomen    Comparison: None    Findings:    No pneumoperitoneum or pneumatosis.    No abnormal calcifications.    No acute fractures.      Impression:      IMPRESSION:    Left femoral vascular catheter tip is in the region of the left iliac vessels.    Authenticated by Desi Weaver MD on 01/10/2022 04:18:42 AM          Echo:    Results for orders placed during the hospital encounter of 03/10/19    Transthoracic Echo Complete With Contrast if Necessary Per Protocol    Interpretation Summary  Technically difficult study  1) Borderline LV size with mild reduction in LV systolic function ( EF is 45 to 50%)  2) Mild to moderate left atrial enlargement  3) Trace MR and TR with normal PA pressures  4) Borderline RV size with normal function  5) Global mild hypokinesis of the LV  6) Moderate calcific plaque along ascending aorta    Condition on Discharge:      Stable.    Code status during the hospital stay:    Code Status and Medical Interventions:   Ordered at: 01/10/22 0523     Code Status (Patient has no pulse and is not breathing):    CPR (Attempt to Resuscitate)     Medical Interventions (Patient has pulse or is breathing):    Full Support     Discharge Disposition:    Skilled Nursing Facility (DC - External)    Discharge Medications:       Discharge Medications      New Medications      Instructions Start Date   dexamethasone 6 MG tablet  Commonly known as: DECADRON   6 mg, Oral, Daily With Breakfast   Start Date: January 14, 2022        Changes to Medications      Instructions Start Date   ipratropium-albuterol 0.5-2.5 mg/3 ml nebulizer  Commonly  known as: DUO-NEB  What changed:   · when to take this  · additional instructions   3 mL, Nebulization, Every 6 Hours - RT      pantoprazole 20 MG EC tablet  Commonly known as: PROTONIX  What changed: how much to take   20 mg, Oral, Daily         Continue These Medications      Instructions Start Date   ALPRAZolam 0.5 MG tablet  Commonly known as: XANAX   0.5 mg, Per G Tube, Every 8 Hours Scheduled      baclofen 10 MG tablet  Commonly known as: LIORESAL   10 mg, Per G Tube, Every 8 Hours      budesonide 0.5 MG/2ML nebulizer solution  Commonly known as: PULMICORT   0.5 mg, Nebulization, 2 Times Daily - RT      carvedilol 12.5 MG tablet  Commonly known as: COREG   12.5 mg, Per G Tube, 2 Times Daily      cetirizine 10 MG tablet  Commonly known as: zyrTEC   10 mg, Per G Tube, Daily      montelukast 10 MG tablet  Commonly known as: SINGULAIR   10 mg, Per G Tube, Nightly      multivitamin with iron-minerals (CENTRUM) liquid   15 mL, Oral, Daily      ondansetron ODT 4 MG disintegrating tablet  Commonly known as: ZOFRAN-ODT   4 mg, Sublingual, Every 6 Hours PRN      PRO-STAT liquid oral liquid   30 mL, Per G Tube, 2 Times Daily      Scopolamine 1 MG/3DAYS patch   1 patch, Transdermal, Every 72 Hours         Stop These Medications    cefuroxime 500 MG tablet  Commonly known as: CEFTIN     doxycycline 100 MG capsule  Commonly known as: MONODOX     sucralfate 1 g tablet  Commonly known as: CARAFATE     sucralfate 1 GM/10ML suspension  Commonly known as: CARAFATE          Discharge Diet:     Diet Instructions     Diet: Tube Feeding; Continuous; Isosource 1.5      Discharge Diet: Tube Feeding    Feeding Type: Continuous    Formula & Rate: Isosource 1.5        Activity at Discharge:     Activity Instructions     Other Activity Instructions      Activity Instructions: Bedrest        Follow-up Appointments:     Follow-up Information     Emperatriz Greene MD .    Specialties: Internal Medicine, Hospice and Palliative  Medicine  Contact information:  [address]  [facility] [place] [facility] [place] [id number] [phone]             Noemy Garcia PA-C .    Specialty: Physician Assistant  Contact information:  [address]  [facility] [place] [facility] [place] [address] [phone]                       No future appointments.  Test Results Pending at Discharge:    Pending Labs     Order Current Status    Blood Culture - Blood, Arm, Right Preliminary result    Blood Culture - Blood, Wrist, Left Preliminary result    CANDIDA AURIS SCREEN - Swab, Axilla Right, Axilla Left and Groin Preliminary result             Liya Zavala, APRN  01/13/22  13:23 EST    Time: I spent 25 minutes on this discharge activity which included: face-to-face encounter with the patient, reviewing the data in the system, coordination of the care with the nursing staff as well as consultants, documentation, and entering orders.     Dictated utilizing Dragon dictation.

## 2022-01-13 NOTE — CASE MANAGEMENT/SOCIAL WORK
Discharge Planning Assessment  [facility]     Patient Name: Viji Vargas  MRN: [id number]  Today's Date: 1/13/2022    Admit Date: 1/9/2022     Discharge Needs Assessment    No documentation.                Discharge Plan     Row Name 01/13/22 1546       Plan    Plan Patient transferred back to [facility] today. Transferred Annalee from [facility] to 3rd floor nursing station to [place] to get report.   Confirmed with [facility] coordinator that they do not need anything else for pt. Returning to them today.           Continued Care and Services - Discharged on 1/13/2022 Admission date: 1/9/2022 - Discharge disposition: Skilled Nursing Facility (DC - External)   Coordination has not been started for this encounter.       Expected Discharge Date and Time     Expected Discharge Date Expected Discharge Time    Jan 13, 2022          Demographic Summary    No documentation.                Functional Status    No documentation.                Psychosocial    No documentation.                Abuse/Neglect    No documentation.                Legal    No documentation.                Substance Abuse    No documentation.                Patient Forms    No documentation.                   Angela Manzanares LCSW

## 2022-01-14 LAB
BACTERIA SPEC AEROBE CULT: NORMAL
BACTERIA SPEC AEROBE CULT: NORMAL

## 2022-01-14 NOTE — CASE MANAGEMENT/SOCIAL WORK
Case Management Discharge Note           Provided Post Acute Provider List?: N/A  N/A Provider List Comment: No needs  Provided Post Acute Provider Quality & Resource List?: N/A  N/A Quality & Resource List Comment: No Needs    Selected Continued Care - Discharged on 1/13/2022 Admission date: 1/9/2022 - Discharge disposition: Skilled Nursing Facility (DC - External)    Destination    No services have been selected for the patient.              Durable Medical Equipment    No services have been selected for the patient.              Dialysis/Infusion    No services have been selected for the patient.              Home Medical Care    No services have been selected for the patient.              Therapy    No services have been selected for the patient.              Community Resources    No services have been selected for the patient.              Community & DME    No services have been selected for the patient.                       Final Discharge Disposition Code: 64 - nursing facility under Medicaid

## 2022-01-15 LAB — BACTERIA ISLT: NORMAL

## 2022-01-15 RX ORDER — ALPRAZOLAM 0.5 MG/1
0.5 TABLET ORAL EVERY 8 HOURS SCHEDULED
Qty: 90 TABLET | Refills: 3 | Status: SHIPPED | OUTPATIENT
Start: 2022-01-15 | End: 2022-05-26 | Stop reason: SDUPTHER

## 2022-01-21 ENCOUNTER — NURSING HOME (OUTPATIENT)
Dept: INTERNAL MEDICINE | Facility: CLINIC | Age: 62
End: 2022-01-21

## 2022-01-21 VITALS
OXYGEN SATURATION: 100 % | SYSTOLIC BLOOD PRESSURE: 102 MMHG | RESPIRATION RATE: 20 BRPM | TEMPERATURE: 97 F | HEART RATE: 65 BPM | DIASTOLIC BLOOD PRESSURE: 54 MMHG

## 2022-01-21 DIAGNOSIS — M24.532 CONTRACTURE OF BOTH WRIST JOINTS: ICD-10-CM

## 2022-01-21 DIAGNOSIS — M24.531 CONTRACTURE OF BOTH WRIST JOINTS: ICD-10-CM

## 2022-01-21 DIAGNOSIS — G93.1 ANOXIC BRAIN INJURY: ICD-10-CM

## 2022-01-21 DIAGNOSIS — J96.11 CHRONIC RESPIRATORY FAILURE WITH HYPOXIA: Primary | ICD-10-CM

## 2022-01-21 DIAGNOSIS — I10 ESSENTIAL HYPERTENSION: ICD-10-CM

## 2022-01-21 DIAGNOSIS — J44.9 CHRONIC OBSTRUCTIVE PULMONARY DISEASE, UNSPECIFIED COPD TYPE: ICD-10-CM

## 2022-01-21 DIAGNOSIS — Z93.1 PEG (PERCUTANEOUS ENDOSCOPIC GASTROSTOMY) STATUS: ICD-10-CM

## 2022-01-21 DIAGNOSIS — Z93.0 TRACHEOSTOMY PRESENT: ICD-10-CM

## 2022-01-21 PROCEDURE — 99305 1ST NF CARE MODERATE MDM 35: CPT | Performed by: PHYSICIAN ASSISTANT

## 2022-01-24 ENCOUNTER — HOSPITAL ENCOUNTER (EMERGENCY)
Facility: HOSPITAL | Age: 62
Discharge: NURSING, REHAB OR LONG-TERM CARE | End: 2022-01-24
Attending: EMERGENCY MEDICINE | Admitting: EMERGENCY MEDICINE

## 2022-01-24 VITALS
DIASTOLIC BLOOD PRESSURE: 76 MMHG | WEIGHT: 175 LBS | BODY MASS INDEX: 32.2 KG/M2 | SYSTOLIC BLOOD PRESSURE: 118 MMHG | OXYGEN SATURATION: 99 % | HEART RATE: 66 BPM | RESPIRATION RATE: 18 BRPM | HEIGHT: 62 IN | TEMPERATURE: 97.4 F

## 2022-01-24 DIAGNOSIS — Z43.0 ENCOUNTER FOR ATTENTION TO TRACHEOSTOMY: Primary | ICD-10-CM

## 2022-01-24 PROCEDURE — 99283 EMERGENCY DEPT VISIT LOW MDM: CPT

## 2022-01-24 NOTE — ED NOTES
Contacted [facility] to request EMS transport back to [facility].     Luther April Ashley  01/24/22 2385

## 2022-01-24 NOTE — ED PROVIDER NOTES
Subjective   61-year-old chronically ill female who is tracheostomy and PEG tube dependent presents to the ED from her nursing facility as the night staff at her facility felt like she needed to be evaluated and have her cuffed tracheostomy tube changed to an uncuffed.  Patient has not been hypoxic.  She has not had any fever.  EMS indicate that the nursing staff would not suction the patient's tracheostomy tube and were requesting for it to be changed.  Patient is nonverbal.  Further history review of systems on her secondary to patient's chronic conditions.          Review of Systems   Unable to perform ROS: Patient nonverbal   All other systems reviewed and are negative.      Past Medical History:   Diagnosis Date   • COPD (chronic obstructive pulmonary disease) (HCC)    • Hypertension    • Pneumonia        No Known Allergies    Past Surgical History:   Procedure Laterality Date   • HYSTERECTOMY      Partial    • TRACHEOSTOMY AND PEG TUBE INSERTION N/A 3/20/2019    Procedure: TRACHEOSTOMY AND PERCUTANEOUS ENDOSCOPIC GASTROSTOMY TUBE INSERTION;  Surgeon: Nadira Pandey MD;  Location: [facility];  Service: General       History reviewed. No pertinent family history.    Social History     Socioeconomic History   • Marital status: Legally    Tobacco Use   • Smoking status: Current Every Day Smoker     Packs/day: 1.00     Types: Electronic Cigarette, Cigarettes   • Smokeless tobacco: Never Used   Vaping Use   • Vaping Use: Unknown   Substance and Sexual Activity   • Alcohol use: Not Currently     Comment: wine    • Drug use: No   • Sexual activity: Defer           Objective   Physical Exam  Vitals and nursing note reviewed.   Constitutional:       General: She is not in acute distress.     Appearance: She is well-developed. She is not diaphoretic.      Comments: Chronically ill-appearing.   HENT:      Head: Normocephalic and atraumatic.      Nose: Nose normal.   Eyes:      Conjunctiva/sclera: Conjunctivae  normal.   Neck:      Comments: Tracheostomy site present with tracheostomy tube positioned correctly  Cardiovascular:      Rate and Rhythm: Normal rate and regular rhythm.   Pulmonary:      Effort: No respiratory distress.      Comments: Coarse breath sounds bilaterally.  Abdominal:      General: There is no distension.      Palpations: Abdomen is soft.      Tenderness: There is no abdominal tenderness. There is no guarding.      Comments: PEG tube in place   Musculoskeletal:         General: No deformity.      Comments: Global atrophy   Neurological:      Mental Status: Mental status is at baseline.      Comments: Eyes are open.  Nonverbal.         Procedures           ED Course                                                 [facility]  Nursing facility sent this patient to the ED to be evaluated for a tracheostomy tube change from cuff to uncuffed.  Patient's pulse ox is 100% on her baseline O2 requirements.  Breath sounds are coarse bilaterally likely chronically.  Normal respiratory effort.  No signs of sepsis or cute infection.  There is no indication to change this patient's current tracheostomy tube and risk further complications.  She is appropriate for discharge back to her skilled nursing facility.      Final diagnoses:   Encounter for attention to tracheostomy (HCC)       ED Disposition  ED Disposition     ED Disposition Condition Comment    Discharge Stable           Emperatriz Greene MD  [address] [place] [address] [phone]               Medication List      Changed    ipratropium-albuterol 0.5-2.5 mg/3 ml nebulizer  Commonly known as: DUO-NEB  Take 3 mL by nebulization Every 6 (Six) Hours.  What changed:   · when to take this  · additional instructions     pantoprazole 20 MG EC tablet  Commonly known as: PROTONIX  Take 1 tablet by mouth Daily.  What changed: how much to take             Sushant Holman,   01/24/22 0155

## 2022-01-28 NOTE — PROGRESS NOTES
Nursing Home History and Physical Note      Kemal Shaw DO  []  FÉLIX Horne  []  [address] [place] [address]  Phone: [phone]  Fax: [phone] Emperatriz Greene MD  []  Jamie Brown DO  []  Noemy Garcia PA-C  [x]  [address] [place] [address]  Phone: [phone]  Fax: [phone]     PATIENT NAME: Viji Vargas                                                                          YOB: 1960            DATE OF SERVICE: 01/21/2022  FACILITY: [facility]    CHIEF COMPLAINT:   Readmission; acute on chronic respiratory failure with hypoxia.       HISTORY OF PRESENT ILLNESS:   Ms. Gaspar is a 61-year of age female well-known to me, long-term care resident of this SNF.  She has history of anoxic brain injury, status post tracheostomy, requiring tube feeding for nutrition.  She has history of COPD, hypertension, unable to follow commands and bedridden at baseline.  She developed shortness of breath requiring increased O2 supplement, subsequently transferred to [facility] for further evaluation.  At that time she was tested for COVID-19 and found to have positive result.  She was admitted to the hospitalist service.  Initiated on Decadron and remdesivir.  Due to her recent COVID-19 illness approximately 3 months ago and being fully vaccinated and booster, respiratory PCR panel sent out which resulted COVID-19 negative.  Remdesivir was discontinued.  She continued to work with respiratory therapy, wean back down to her baseline oxygen needs.  She has required routine suctioning to tracheostomy.  [place] her symptoms were most likely related to COPD exacerbation versus pneumonitis versus mucous plugging, resolving with steroid administration.  She was discharged back to this facility for continued long-term care.  Recommended to continue a additional 3 days of Decadron 6 mg daily.    Nursing shared that patient has remained in stable  condition since readmission.  No hypoxia or increased O2 demands.  She continues to require frequent suctioning due to increased secretions.  She has completed recommended additional steroids.  Tolerating tube feeding, aspiration precautions in place.  Patient is nonverbal at baseline and unable to contribute any history.    PAST MEDICAL & SURGICAL HISTORY:   Past Medical History:   Diagnosis Date   • COPD (chronic obstructive pulmonary disease) (HCC)    • Hypertension    • Pneumonia       Past Surgical History:   Procedure Laterality Date   • HYSTERECTOMY      Partial    • TRACHEOSTOMY AND PEG TUBE INSERTION N/A 3/20/2019    Procedure: TRACHEOSTOMY AND PERCUTANEOUS ENDOSCOPIC GASTROSTOMY TUBE INSERTION;  Surgeon: Nadira Pandey MD;  Location: [facility];  Service: General          MEDICATIONS:  I have reviewed and reconciled the patients medication list in the patients chart at the skilled nursing facility today.      ALLERGIES:  No Known Allergies      SOCIAL HISTORY:  Social History     Socioeconomic History   • Marital status: Legally    Tobacco Use   • Smoking status: Current Every Day Smoker     Packs/day: 1.00     Types: Electronic Cigarette, Cigarettes   • Smokeless tobacco: Never Used   Vaping Use   • Vaping Use: Unknown   Substance and Sexual Activity   • Alcohol use: Not Currently     Comment: wine    • Drug use: No   • Sexual activity: Defer       FAMILY HISTORY:  No family history on file.      REVIEW OF SYSTEMS:    Unable to perform ROS due to nonverbal.      PHYSICAL EXAMINATION:     VITAL SIGNS:  /54   Pulse 65   Temp 97 °F (36.1 °C)   Resp 20   SpO2 100%     Nursing notes and vital signs reviewed.     General Appearance:  Chronically ill appearing female lying in bed.  NAD.    Head: Normocephalic and atraumatic, without obvious abnormality     Eyes: PERRLA.  Conjunctivae and sclerae normal.  EOM are within normal limits.    Neck: Normal range of motion. Neck supple. s/p trach,  with thick secretions noted.    Cardiovascular: Normal rate, regular rhythm.  Pulses palpable equal bilaterally.    Pulmonary/Chest: Effort normal, no respiratory distress.  Bilateral coarse rhonchi noted   Abdominal: Soft. Bowel sounds are normal. No distention or tenderness.  S/p PEG, no exudate or surrounding erythema.    Musculoskeletal: Chronic contractures noted with hand splints in place.  No edema.   Neurological: Alert, does not follow commands at baseline.  Non-verbal.    Skin: Skin is warm and dry.   Psychiatric:  Does not appear agitated or restless.        RECORDS REVIEW:   DC summary reviewed.     ASSESSMENT   Diagnoses and all orders for this visit:    1. Chronic respiratory failure with hypoxia ([facility]) (Primary)    2. Chronic obstructive pulmonary disease, unspecified COPD type (HCC)    3. Anoxic brain injury ([facility])    4. Tracheostomy present ([facility])    5. PEG (percutaneous endoscopic gastrostomy) status ([facility])    6. Essential hypertension    7. Contracture of both wrist joints        PLAN  Chronic respiratory failure with hypoxia/COPD: She has completed recommended course of steroids.  She continues to have tracheal secretions, continue scopolamine patch.  Routine trach care and close monitoring.  Patient has had several recurrent episodes of hypoxia requiring ER evaluations over the year and subsequently hospitalization.  Previous goals of care discussion with daughter, patient has continued with full CODE STATUS.  We will continue to follow and discuss goals of care, patient is at high risk for deterioration and recurrent hospitalization.    Anoxic brain injury/tracheostomy/PEG: Tolerating tube feeding, dietary managing and recommendations appreciated.  Continue with routine tracheostomy care, requires frequent suctioning.  Continue with aspiration precautions, will increase her Protonix to 40 twice daily.  Patient is nonverbal, unable to follow commands, bedridden.  She requires assistance with all  ADLs, continue with supportive care.  Monitor weight and continue with wound prevention protocol.  Contact myself or MD with any acute changes in condition.    Hypertension: Controlled.  Consider decreasing Coreg if systolic remains on the low end.    Contractures: Continue with supportive care, splinting, PT/OT as indicated.      []  Discussed Patient in detail with nursing/staff, addressed all needs today.     []  Plan of Care Reviewed   []  PT/OT Reviewed   []  Order Changes  []  Discharge Plans Reviewed  [x]  Advance Directive on file with Nursing Home.   [x]  POA on file with Nursing Home.   [x]  Code Status: FULL       Noemy Garcia PA-C

## 2022-02-08 ENCOUNTER — NURSING HOME (OUTPATIENT)
Dept: INTERNAL MEDICINE | Facility: CLINIC | Age: 62
End: 2022-02-08

## 2022-02-08 DIAGNOSIS — J44.9 CHRONIC OBSTRUCTIVE PULMONARY DISEASE, UNSPECIFIED COPD TYPE: ICD-10-CM

## 2022-02-08 DIAGNOSIS — Z93.0 TRACHEOSTOMY PRESENT: ICD-10-CM

## 2022-02-08 DIAGNOSIS — M24.532 CONTRACTURE OF BOTH WRIST JOINTS: ICD-10-CM

## 2022-02-08 DIAGNOSIS — M24.531 CONTRACTURE OF BOTH WRIST JOINTS: ICD-10-CM

## 2022-02-08 DIAGNOSIS — Z93.1 PEG (PERCUTANEOUS ENDOSCOPIC GASTROSTOMY) STATUS: ICD-10-CM

## 2022-02-08 DIAGNOSIS — I10 ESSENTIAL HYPERTENSION: ICD-10-CM

## 2022-02-08 DIAGNOSIS — J96.11 CHRONIC RESPIRATORY FAILURE WITH HYPOXIA: ICD-10-CM

## 2022-02-08 DIAGNOSIS — G93.1 ANOXIC BRAIN INJURY: Primary | ICD-10-CM

## 2022-02-08 PROCEDURE — 99309 SBSQ NF CARE MODERATE MDM 30: CPT | Performed by: PHYSICIAN ASSISTANT

## 2022-02-22 ENCOUNTER — HOSPITAL ENCOUNTER (EMERGENCY)
Facility: HOSPITAL | Age: 62
Discharge: NURSING, REHAB OR LONG-TERM CARE | End: 2022-02-22
Attending: EMERGENCY MEDICINE | Admitting: EMERGENCY MEDICINE

## 2022-02-22 VITALS
OXYGEN SATURATION: 92 % | DIASTOLIC BLOOD PRESSURE: 76 MMHG | BODY MASS INDEX: 29.88 KG/M2 | HEIGHT: 64 IN | WEIGHT: 175.04 LBS | TEMPERATURE: 98.4 F | RESPIRATION RATE: 20 BRPM | HEART RATE: 75 BPM | SYSTOLIC BLOOD PRESSURE: 108 MMHG

## 2022-02-22 DIAGNOSIS — K94.23 MALFUNCTION OF PERCUTANEOUS ENDOSCOPIC GASTROSTOMY (PEG) TUBE: Primary | ICD-10-CM

## 2022-02-22 PROCEDURE — 94799 UNLISTED PULMONARY SVC/PX: CPT

## 2022-02-22 PROCEDURE — 99283 EMERGENCY DEPT VISIT LOW MDM: CPT

## 2022-02-22 NOTE — ED NOTES
Contacted [facility] for transport to [facility].     Rama Baumann  02/22/22 5723

## 2022-02-22 NOTE — ED PROVIDER NOTES
Subjective   Patient is a 61-year-old female who presents to the emergency department via EMS from nursing home for complications with PEG tube.  Patient is nonverbal and unresponsive, with intact trach.  Patient has PEG tube in place reportedly from 2009 per medical records.  Per nursing home, PEG tube has been clogged, and was unclogged on Sunday.  PEG tube has since become clogged again with resistance being felt upon feedings.      History provided by:  Nursing home  History limited by:  Patient nonverbal and patient unresponsive   used: No        Review of Systems   Unable to perform ROS: Patient unresponsive       Past Medical History:   Diagnosis Date   • COPD (chronic obstructive pulmonary disease) (HCC)    • Hypertension    • Pneumonia        No Known Allergies    Past Surgical History:   Procedure Laterality Date   • HYSTERECTOMY      Partial    • TRACHEOSTOMY AND PEG TUBE INSERTION N/A 3/20/2019    Procedure: TRACHEOSTOMY AND PERCUTANEOUS ENDOSCOPIC GASTROSTOMY TUBE INSERTION;  Surgeon: Nadira Pandey MD;  Location: [facility];  Service: General       No family history on file.    Social History     Socioeconomic History   • Marital status: Legally    Tobacco Use   • Smoking status: Current Every Day Smoker     Packs/day: 1.00     Types: Electronic Cigarette, Cigarettes   • Smokeless tobacco: Never Used   Vaping Use   • Vaping Use: Unknown   Substance and Sexual Activity   • Alcohol use: Not Currently     Comment: wine    • Drug use: No   • Sexual activity: Defer           Objective   Physical Exam  Vitals and nursing note reviewed.   Constitutional:       Appearance: She is well-developed.   Neck:      Trachea: Tracheostomy present.   Cardiovascular:      Rate and Rhythm: Normal rate and regular rhythm.   Pulmonary:      Effort: Pulmonary effort is normal.      Breath sounds: Normal breath sounds.   Abdominal:      General: Bowel sounds are normal.      Palpations: Abdomen  is soft.      Comments: PEG tube in place epigastric area.   Musculoskeletal:         General: Normal range of motion.      Cervical back: Normal range of motion and neck supple.   Skin:     General: Skin is warm and dry.   Neurological:      Mental Status: She is alert and oriented to person, place, and time.      Deep Tendon Reflexes: Reflexes are normal and symmetric.         Procedures           ED Course  ED Course as of 02/22/22 1400   e Feb 22, 2022   1358 G-tube unclogged at the bedside by bedside nurse. [CS]      ED Course User Index  [CS] Kristopher Malone Jr., PA-C                                                 MDM  Number of Diagnoses or Management Options  Malfunction of percutaneous endoscopic gastrostomy (PEG) tube (HCC): new and requires workup  Risk of Complications, Morbidity, and/or Mortality  Presenting problems: minimal  Management options: minimal    Patient Progress  Patient progress: stable      Final diagnoses:   Malfunction of percutaneous endoscopic gastrostomy (PEG) tube (HCC)       ED Disposition  ED Disposition     ED Disposition Condition Comment    Discharge Stable           [facility] Emergency Department  [address] [facility] [address] [phone]    If symptoms worsen         Medication List      Changed    ipratropium-albuterol 0.5-2.5 mg/3 ml nebulizer  Commonly known as: DUO-NEB  Take 3 mL by nebulization Every 6 (Six) Hours.  What changed:   · when to take this  · additional instructions     pantoprazole 20 MG EC tablet  Commonly known as: PROTONIX  Take 1 tablet by mouth Daily.  What changed: how much to take             Kristopher Malone Jr., PA-C  02/22/22 1400

## 2022-02-22 NOTE — ED NOTES
Used coke and [place] syrine - after about 10 minutes with coke - reapplied new coke then let set- after about 30 minutes of push and placing new coke- g-tube is cleared     Halina Cano, LARRY  02/22/22 0129

## 2022-02-25 VITALS
DIASTOLIC BLOOD PRESSURE: 68 MMHG | OXYGEN SATURATION: 98 % | TEMPERATURE: 97.1 F | HEART RATE: 80 BPM | SYSTOLIC BLOOD PRESSURE: 108 MMHG | RESPIRATION RATE: 18 BRPM

## 2022-02-25 NOTE — PROGRESS NOTES
Nursing Home Progress Note        Kemal Shaw DO []  FÉLIX Horne []  [address] [place] [address]  Phone: [phone]  Fax: [phone] Emperatriz Greene MD []  Jamie Brown DO []  Noemy Garcia PA-C [x]   [address] [place] [address]  Phone: [phone]  Fax: [phone]     PATIENT NAME: Viji Vargas                                                                          YOB: 1960           DATE OF SERVICE: 2/8/2022  FACILITY: [facility]    CHIEF COMPLAINT:  Chronic medical management long-term care needs.      HISTORY OF PRESENT ILLNESS:   Ms. Vargas is a 60 y/o female who presents today for routine coordination of care and long term care needs.  Record reviewed and care discussed with staff.  Patient continues to require frequent trach care due to secretion.  She has remained afebrile, no increased O2 demands.  She requires tube feeding for nutrition, is bedridden/nonverbal at baseline, unable to follow commands or contribute history.  Upon assessment, patient has thick/yellow tracheal secretions noted to stoma with diffuse rhonchi.  She does not appear in respiratory distress.      PAST MEDICAL & SURGICAL HISTORY:   Past Medical History:   Diagnosis Date   • COPD (chronic obstructive pulmonary disease) (HCC)    • Hypertension    • Pneumonia       Past Surgical History:   Procedure Laterality Date   • HYSTERECTOMY      Partial    • TRACHEOSTOMY AND PEG TUBE INSERTION N/A 3/20/2019    Procedure: TRACHEOSTOMY AND PERCUTANEOUS ENDOSCOPIC GASTROSTOMY TUBE INSERTION;  Surgeon: Nadira Pandey MD;  Location: [facility];  Service: General         MEDICATIONS:  I have reviewed and reconciled the patients medication list in the patients chart at the skilled nursing facility today.      ALLERGIES:  No Known Allergies      SOCIAL HISTORY:  Social History     Socioeconomic History   • Marital status: Legally    Tobacco Use   • Smoking  status: Current Every Day Smoker     Packs/day: 1.00     Types: Electronic Cigarette, Cigarettes   • Smokeless tobacco: Never Used   Vaping Use   • Vaping Use: Unknown   Substance and Sexual Activity   • Alcohol use: Not Currently     Comment: wine    • Drug use: No   • Sexual activity: Defer       FAMILY HISTORY:  No family history on file.    REVIEW OF SYSTEMS:    Unable to obtain ROS, nonverbal    PHYSICAL EXAMINATION:     VITAL SIGNS:  /68   Pulse 80   Temp 97.1 °F (36.2 °C)   Resp 18   SpO2 98%     Nursing notes and vital signs reviewed.   General Appearance:  Chronically ill appearing female lying in bed.  NAD.    Head: Normocephalic and atraumatic, without obvious abnormality     Eyes: PERRLA.  Conjunctivae and sclerae normal.  EOM are within normal limits.    Neck: Normal range of motion. Neck supple. s/p trach, thick yellow secretions.    Cardiovascular: Normal rate, regular rhythm.  Pulses palpable equal bilaterally.    Pulmonary/Chest: Effort normal, no respiratory distress.  Bilateral rhonchi noted, no wheezes.    Abdominal: Soft. Bowel sounds are normal. No distention or tenderness.  S/p PEG, no exudate or surrounding erythema.    Musculoskeletal: Chronic contractures noted with hand splints in place.  No edema.   Neurological: Alert, does not follow commands at baseline.  Non-verbal.    Skin: Skin is warm and dry.   Psychiatric:  Does not appear agitated or restless.      RECORDS REVIEW:   N/A    ASSESSMENT     Diagnoses and all orders for this visit:    1. Anoxic brain injury (HCC) (Primary)    2. Chronic respiratory failure with hypoxia (HCC)    3. Chronic obstructive pulmonary disease, unspecified COPD type (HCC)    4. Tracheostomy present (HCC)    5. PEG (percutaneous endoscopic gastrostomy) status ([facility])    6. Essential hypertension    7. Contracture of both wrist joints        PLAN  Anoxic brain injury: Chronic, no acute changes noted.  She is bedridden, non-verbal, and unable to follow  simple commands; long term prognosis poor, continue with GOC discussion with family.  Code status reviewed; FULL CODE.  Continue with total supportive care.    Chronic respiratory failure with hypoxia/COPD: Previous scopolamine patch aiding in secretions.  Given history of COPD and high risk for aspiration, will start course of Augmentin with prednisone burst.  In addition start Guaifenesin TID to thin secretions.  Monitor closely for any acute changes in worsening symptoms.      PEG: Continue with aspiration precautions, RD to follow as per protocol.      Hypertension: Controlled, continue current medication.    Wrist contracture: splinting in place, OT as indicated, ROM often.        [x]  Discussed Patient in detail with nursing/staff, addressed all needs today.     [x]  Plan of Care Reviewed   []  PT/OT Reviewed   []  Order Changes  []  Discharge Plans Reviewed  [x]  Advance Directive on file with Nursing Home.   [x]  POA on file with Nursing Home.    [x]  Code Status: FULL      Advance Care Planning   ACP discussion was declined by the patient. Advanced directive on file with SNF; CODE STATUS reviewed; FULL CODE         Noemy Garcia PA-C.  2/25/2022

## 2022-03-30 ENCOUNTER — HOSPITAL ENCOUNTER (EMERGENCY)
Facility: HOSPITAL | Age: 62
Discharge: NURSING, REHAB OR LONG-TERM CARE | End: 2022-03-30
Attending: EMERGENCY MEDICINE | Admitting: EMERGENCY MEDICINE

## 2022-03-30 VITALS
RESPIRATION RATE: 18 BRPM | HEIGHT: 64 IN | TEMPERATURE: 98.6 F | SYSTOLIC BLOOD PRESSURE: 127 MMHG | BODY MASS INDEX: 29.88 KG/M2 | DIASTOLIC BLOOD PRESSURE: 75 MMHG | HEART RATE: 81 BPM | OXYGEN SATURATION: 99 % | WEIGHT: 175 LBS

## 2022-03-30 DIAGNOSIS — R68.89 COMPLAINT ASSOCIATED WITH GASTRIC TUBE: Primary | ICD-10-CM

## 2022-03-30 DIAGNOSIS — Z93.1 COMPLAINT ASSOCIATED WITH GASTRIC TUBE: Primary | ICD-10-CM

## 2022-03-30 PROCEDURE — 99283 EMERGENCY DEPT VISIT LOW MDM: CPT

## 2022-03-31 ENCOUNTER — HOSPITAL ENCOUNTER (EMERGENCY)
Facility: HOSPITAL | Age: 62
Discharge: HOME | End: 2022-03-31
Attending: EMERGENCY MEDICINE | Admitting: EMERGENCY MEDICINE

## 2022-03-31 ENCOUNTER — APPOINTMENT (OUTPATIENT)
Dept: CT IMAGING | Facility: HOSPITAL | Age: 62
End: 2022-03-31

## 2022-03-31 VITALS
OXYGEN SATURATION: 100 % | DIASTOLIC BLOOD PRESSURE: 73 MMHG | WEIGHT: 175 LBS | HEART RATE: 74 BPM | SYSTOLIC BLOOD PRESSURE: 125 MMHG | TEMPERATURE: 98.7 F | RESPIRATION RATE: 18 BRPM | HEIGHT: 60 IN | BODY MASS INDEX: 34.36 KG/M2

## 2022-03-31 DIAGNOSIS — K56.41 FECAL IMPACTION: Primary | ICD-10-CM

## 2022-03-31 LAB
ABO GROUP BLD: NORMAL
ALBUMIN SERPL-MCNC: 3.8 G/DL (ref 3.5–5.2)
ALBUMIN/GLOB SERPL: 1.4 G/DL
ALP SERPL-CCNC: 99 U/L (ref 39–117)
ALT SERPL W P-5'-P-CCNC: 17 U/L (ref 1–33)
ANION GAP SERPL CALCULATED.3IONS-SCNC: 10.9 MMOL/L (ref 5–15)
AST SERPL-CCNC: 14 U/L (ref 1–32)
BASOPHILS # BLD AUTO: 0.04 10*3/MM3 (ref 0–0.2)
BASOPHILS NFR BLD AUTO: 0.5 % (ref 0–1.5)
BILIRUB SERPL-MCNC: 0.5 MG/DL (ref 0–1.2)
BLD GP AB SCN SERPL QL: NEGATIVE
BUN SERPL-MCNC: 18 MG/DL (ref 8–23)
BUN/CREAT SERPL: 35.3 (ref 7–25)
CALCIUM SPEC-SCNC: 9.4 MG/DL (ref 8.6–10.5)
CHLORIDE SERPL-SCNC: 103 MMOL/L (ref 98–107)
CO2 SERPL-SCNC: 26.1 MMOL/L (ref 22–29)
CREAT SERPL-MCNC: 0.51 MG/DL (ref 0.57–1)
DEPRECATED RDW RBC AUTO: 47.1 FL (ref 37–54)
EGFRCR SERPLBLD CKD-EPI 2021: 105 ML/MIN/1.73
EOSINOPHIL # BLD AUTO: 0.35 10*3/MM3 (ref 0–0.4)
EOSINOPHIL NFR BLD AUTO: 4.7 % (ref 0.3–6.2)
ERYTHROCYTE [DISTWIDTH] IN BLOOD BY AUTOMATED COUNT: 13.3 % (ref 12.3–15.4)
GLOBULIN UR ELPH-MCNC: 2.8 GM/DL
GLUCOSE SERPL-MCNC: 110 MG/DL (ref 65–99)
HCT VFR BLD AUTO: 34 % (ref 34–46.6)
HGB BLD-MCNC: 10.9 G/DL (ref 12–15.9)
IMM GRANULOCYTES # BLD AUTO: 0.02 10*3/MM3 (ref 0–0.05)
IMM GRANULOCYTES NFR BLD AUTO: 0.3 % (ref 0–0.5)
INR PPP: 0.98 (ref 0.9–1.1)
LYMPHOCYTES # BLD AUTO: 2.08 10*3/MM3 (ref 0.7–3.1)
LYMPHOCYTES NFR BLD AUTO: 27.9 % (ref 19.6–45.3)
MCH RBC QN AUTO: 30.6 PG (ref 26.6–33)
MCHC RBC AUTO-ENTMCNC: 32.1 G/DL (ref 31.5–35.7)
MCV RBC AUTO: 95.5 FL (ref 79–97)
MONOCYTES # BLD AUTO: 0.72 10*3/MM3 (ref 0.1–0.9)
MONOCYTES NFR BLD AUTO: 9.7 % (ref 5–12)
NEUTROPHILS NFR BLD AUTO: 4.24 10*3/MM3 (ref 1.7–7)
NEUTROPHILS NFR BLD AUTO: 56.9 % (ref 42.7–76)
NRBC BLD AUTO-RTO: 0 /100 WBC (ref 0–0.2)
PLATELET # BLD AUTO: 60 10*3/MM3 (ref 140–450)
PMV BLD AUTO: 12.9 FL (ref 6–12)
POTASSIUM SERPL-SCNC: 3.9 MMOL/L (ref 3.5–5.2)
PROT SERPL-MCNC: 6.6 G/DL (ref 6–8.5)
PROTHROMBIN TIME: 13.3 SECONDS (ref 12.5–14.5)
RBC # BLD AUTO: 3.56 10*6/MM3 (ref 3.77–5.28)
RBC MORPH BLD: NORMAL
RH BLD: POSITIVE
SMALL PLATELETS BLD QL SMEAR: NORMAL
SODIUM SERPL-SCNC: 140 MMOL/L (ref 136–145)
T&S EXPIRATION DATE: NORMAL
WBC MORPH BLD: NORMAL
WBC NRBC COR # BLD: 7.45 10*3/MM3 (ref 3.4–10.8)

## 2022-03-31 PROCEDURE — 85025 COMPLETE CBC W/AUTO DIFF WBC: CPT | Performed by: EMERGENCY MEDICINE

## 2022-03-31 PROCEDURE — 74176 CT ABD & PELVIS W/O CONTRAST: CPT

## 2022-03-31 PROCEDURE — 86901 BLOOD TYPING SEROLOGIC RH(D): CPT | Performed by: EMERGENCY MEDICINE

## 2022-03-31 PROCEDURE — 86900 BLOOD TYPING SEROLOGIC ABO: CPT | Performed by: EMERGENCY MEDICINE

## 2022-03-31 PROCEDURE — 85610 PROTHROMBIN TIME: CPT | Performed by: EMERGENCY MEDICINE

## 2022-03-31 PROCEDURE — 36415 COLL VENOUS BLD VENIPUNCTURE: CPT

## 2022-03-31 PROCEDURE — 80053 COMPREHEN METABOLIC PANEL: CPT | Performed by: EMERGENCY MEDICINE

## 2022-03-31 PROCEDURE — 99283 EMERGENCY DEPT VISIT LOW MDM: CPT

## 2022-03-31 PROCEDURE — 85007 BL SMEAR W/DIFF WBC COUNT: CPT | Performed by: EMERGENCY MEDICINE

## 2022-03-31 PROCEDURE — 86850 RBC ANTIBODY SCREEN: CPT | Performed by: EMERGENCY MEDICINE

## 2022-04-01 ENCOUNTER — NURSING HOME (OUTPATIENT)
Dept: INTERNAL MEDICINE | Facility: CLINIC | Age: 62
End: 2022-04-01

## 2022-04-01 VITALS
DIASTOLIC BLOOD PRESSURE: 70 MMHG | SYSTOLIC BLOOD PRESSURE: 113 MMHG | RESPIRATION RATE: 20 BRPM | HEART RATE: 78 BPM | OXYGEN SATURATION: 95 % | TEMPERATURE: 98.4 F

## 2022-04-01 DIAGNOSIS — Z93.1 PEG (PERCUTANEOUS ENDOSCOPIC GASTROSTOMY) STATUS: ICD-10-CM

## 2022-04-01 DIAGNOSIS — G93.1 ANOXIC BRAIN INJURY: ICD-10-CM

## 2022-04-01 DIAGNOSIS — K59.00 CONSTIPATION, UNSPECIFIED CONSTIPATION TYPE: Primary | ICD-10-CM

## 2022-04-01 PROCEDURE — 99308 SBSQ NF CARE LOW MDM 20: CPT | Performed by: PHYSICIAN ASSISTANT

## 2022-04-05 NOTE — PROGRESS NOTES
Nursing Home Progress Note        Kemal Shaw DO []  FÉLIX Horne []  [address] [place] [address]  Phone: [phone]  Fax: [phone] Emperatriz Greene MD []  Jamie Brown DO []  Noemy Garcia PA-C [x]   [address] [place] [address]  Phone: [phone]  Fax: [phone]     PATIENT NAME: Viji Vargas                                                                          YOB: 1960           DATE OF SERVICE: 4/1/2022  FACILITY: [facility]    CHIEF COMPLAINT:  PEG dysfunction and abdominal distention.      HISTORY OF PRESENT ILLNESS:   Ms. Vargas is a 62 y/o female who presents today at request of nursing with concern for Peg tube dysfunction.  Patient required replacement of PEG a few days ago, confirmed placement clinically and with follow-up KUB.  She was restarted on tube feeding with no aspiration signs or emesis.  During routine care they aspirated PEG noting dark-colored fluid, she was subsequently transferred to [facility] for further evaluation.  Work-up largely reassuring and she was transferred back to this facility for continued skilled nursing care.  Nursing reported concerns for abdominal pain, for which she was transferred back to [facility] the next day for additional evaluation.  Imaging obtained during that visit concerning for large amount of stool in the rectal vault.  Otherwise unremarkable.  She was transferred back to this facility, where she was given bowel preparation for her constipation.  She is tolerating tube feeding without difficulty.  Upon assessment she appears at her baseline, nonverbal and unable to follow commands due to history of anoxic brain injury.    PAST MEDICAL & SURGICAL HISTORY:   Past Medical History:   Diagnosis Date   • COPD (chronic obstructive pulmonary disease) (HCC)    • Hypertension    • Pneumonia       Past Surgical History:   Procedure Laterality Date   •  HYSTERECTOMY      Partial    • TRACHEOSTOMY AND PEG TUBE INSERTION N/A 3/20/2019    Procedure: TRACHEOSTOMY AND PERCUTANEOUS ENDOSCOPIC GASTROSTOMY TUBE INSERTION;  Surgeon: Nadira Pandey MD;  Location: [facility];  Service: General         MEDICATIONS:  I have reviewed and reconciled the patients medication list in the patients chart at the skilled nursing facility today.      ALLERGIES:  No Known Allergies      SOCIAL HISTORY:  Social History     Socioeconomic History   • Marital status: Legally    Tobacco Use   • Smoking status: Current Every Day Smoker     Packs/day: 1.00     Types: Electronic Cigarette, Cigarettes   • Smokeless tobacco: Never Used   Vaping Use   • Vaping Use: Unknown   Substance and Sexual Activity   • Alcohol use: Not Currently     Comment: wine    • Drug use: No   • Sexual activity: Defer       FAMILY HISTORY:  No family history on file.    REVIEW OF SYSTEMS:    Unable to obtain ROS, nonverbal    PHYSICAL EXAMINATION:     VITAL SIGNS:  /70   Pulse 78   Temp 98.4 °F (36.9 °C)   Resp 20   SpO2 95%     Nursing notes and vital signs reviewed.   General Appearance:  Chronically ill appearing female lying in bed.  NAD.    Head: Normocephalic and atraumatic, without obvious abnormality     Eyes: PERRLA.  Conjunctivae and sclerae normal.  EOM are within normal limits.    Neck: Normal range of motion. Neck supple. s/p trach.    Cardiovascular: Normal rate, regular rhythm.  Pulses palpable equal bilaterally.    Pulmonary/Chest: Effort normal, no respiratory distress.  Bilateral rhonchi noted, no wheezes.    Abdominal: Soft, nondistended. Bowel sounds are normal.  S/p PEG, no exudate or surrounding erythema.    :  Rectal vault with large amounts of stool, disimpacted.   Musculoskeletal: Chronic contractures noted with hand splints in place.  No edema.   Neurological: Alert, does not follow commands at baseline.  Non-verbal.    Skin: Skin is warm and dry.   Psychiatric:  Does not  appear agitated or restless.      RECORDS REVIEW:   [facility] ER documentation, imaging, and labs reviewed.    ASSESSMENT     Diagnoses and all orders for this visit:    1. Constipation, unspecified constipation type (Primary)    2. Anoxic brain injury (HCC)    3. PEG (percutaneous endoscopic gastrostomy) status (HCC)        PLAN  Given that patient is bedridden and nonambulatory, continue with aggressive bowel prep.  She has been given senna and suppository today.  Large amount of stool removed from the rectal vault.  No signs of an acute abdomen.  No grimacing with assessment.  Continue with close monitoring, encouraged nursing to contact myself or MD with any acute changes in condition.  Continue with tube feeding but previous weight, dietary to follow.      [x]  Discussed Patient in detail with nursing/staff, addressed all needs today.     [x]  Plan of Care Reviewed   []  PT/OT Reviewed   []  Order Changes  []  Discharge Plans Reviewed  [x]  Advance Directive on file with Nursing Home.   [x]  POA on file with Nursing Home.    [x]  Code Status: FULL           Noemy Garcia PA-C.  4/5/2022

## 2022-04-10 ENCOUNTER — HOSPITAL ENCOUNTER (EMERGENCY)
Facility: HOSPITAL | Age: 62
Discharge: NURSING, REHAB OR LONG-TERM CARE | End: 2022-04-10
Attending: EMERGENCY MEDICINE | Admitting: EMERGENCY MEDICINE

## 2022-04-10 ENCOUNTER — APPOINTMENT (OUTPATIENT)
Dept: GENERAL RADIOLOGY | Facility: HOSPITAL | Age: 62
End: 2022-04-10

## 2022-04-10 VITALS
HEART RATE: 64 BPM | RESPIRATION RATE: 18 BRPM | WEIGHT: 175 LBS | OXYGEN SATURATION: 100 % | BODY MASS INDEX: 34.36 KG/M2 | HEIGHT: 60 IN | SYSTOLIC BLOOD PRESSURE: 108 MMHG | DIASTOLIC BLOOD PRESSURE: 69 MMHG | TEMPERATURE: 98.5 F

## 2022-04-10 DIAGNOSIS — J44.9 CHRONIC OBSTRUCTIVE PULMONARY DISEASE, UNSPECIFIED COPD TYPE: Primary | ICD-10-CM

## 2022-04-10 LAB
ALBUMIN SERPL-MCNC: 3.8 G/DL (ref 3.5–5.2)
ALBUMIN/GLOB SERPL: 1.2 G/DL
ALP SERPL-CCNC: 105 U/L (ref 39–117)
ALT SERPL W P-5'-P-CCNC: 16 U/L (ref 1–33)
ANION GAP SERPL CALCULATED.3IONS-SCNC: 10.4 MMOL/L (ref 5–15)
AST SERPL-CCNC: 10 U/L (ref 1–32)
BASOPHILS # BLD AUTO: 0.03 10*3/MM3 (ref 0–0.2)
BASOPHILS NFR BLD AUTO: 0.3 % (ref 0–1.5)
BILIRUB SERPL-MCNC: 0.5 MG/DL (ref 0–1.2)
BUN SERPL-MCNC: 15 MG/DL (ref 8–23)
BUN/CREAT SERPL: 34.9 (ref 7–25)
CALCIUM SPEC-SCNC: 9.3 MG/DL (ref 8.6–10.5)
CHLORIDE SERPL-SCNC: 103 MMOL/L (ref 98–107)
CO2 SERPL-SCNC: 25.6 MMOL/L (ref 22–29)
CREAT SERPL-MCNC: 0.43 MG/DL (ref 0.57–1)
DEPRECATED RDW RBC AUTO: 45.1 FL (ref 37–54)
EGFRCR SERPLBLD CKD-EPI 2021: 109.4 ML/MIN/1.73
EOSINOPHIL # BLD AUTO: 0.12 10*3/MM3 (ref 0–0.4)
EOSINOPHIL NFR BLD AUTO: 1.1 % (ref 0.3–6.2)
ERYTHROCYTE [DISTWIDTH] IN BLOOD BY AUTOMATED COUNT: 13.2 % (ref 12.3–15.4)
GLOBULIN UR ELPH-MCNC: 3.1 GM/DL
GLUCOSE SERPL-MCNC: 118 MG/DL (ref 65–99)
HCT VFR BLD AUTO: 33 % (ref 34–46.6)
HGB BLD-MCNC: 10.8 G/DL (ref 12–15.9)
IMM GRANULOCYTES # BLD AUTO: 0.05 10*3/MM3 (ref 0–0.05)
IMM GRANULOCYTES NFR BLD AUTO: 0.4 % (ref 0–0.5)
LYMPHOCYTES # BLD AUTO: 2.18 10*3/MM3 (ref 0.7–3.1)
LYMPHOCYTES NFR BLD AUTO: 19.5 % (ref 19.6–45.3)
MCH RBC QN AUTO: 30.6 PG (ref 26.6–33)
MCHC RBC AUTO-ENTMCNC: 32.7 G/DL (ref 31.5–35.7)
MCV RBC AUTO: 93.5 FL (ref 79–97)
MONOCYTES # BLD AUTO: 0.94 10*3/MM3 (ref 0.1–0.9)
MONOCYTES NFR BLD AUTO: 8.4 % (ref 5–12)
NEUTROPHILS NFR BLD AUTO: 7.88 10*3/MM3 (ref 1.7–7)
NEUTROPHILS NFR BLD AUTO: 70.3 % (ref 42.7–76)
NRBC BLD AUTO-RTO: 0 /100 WBC (ref 0–0.2)
PLATELET # BLD AUTO: 279 10*3/MM3 (ref 140–450)
PMV BLD AUTO: 12.3 FL (ref 6–12)
POTASSIUM SERPL-SCNC: 3.9 MMOL/L (ref 3.5–5.2)
PROT SERPL-MCNC: 6.9 G/DL (ref 6–8.5)
RBC # BLD AUTO: 3.53 10*6/MM3 (ref 3.77–5.28)
SODIUM SERPL-SCNC: 139 MMOL/L (ref 136–145)
WBC NRBC COR # BLD: 11.2 10*3/MM3 (ref 3.4–10.8)

## 2022-04-10 PROCEDURE — 36415 COLL VENOUS BLD VENIPUNCTURE: CPT

## 2022-04-10 PROCEDURE — 80053 COMPREHEN METABOLIC PANEL: CPT | Performed by: PHYSICIAN ASSISTANT

## 2022-04-10 PROCEDURE — 85025 COMPLETE CBC W/AUTO DIFF WBC: CPT | Performed by: PHYSICIAN ASSISTANT

## 2022-04-10 PROCEDURE — 71045 X-RAY EXAM CHEST 1 VIEW: CPT

## 2022-04-10 PROCEDURE — 93005 ELECTROCARDIOGRAM TRACING: CPT | Performed by: EMERGENCY MEDICINE

## 2022-04-10 PROCEDURE — 99284 EMERGENCY DEPT VISIT MOD MDM: CPT

## 2022-04-10 NOTE — ED PROVIDER NOTES
Subjective   History of Present Illness  Chief Complaint: Shortness of breath  History of Present Illness: This is a 61-year-old female nonverbal at baseline history of end-stage COPD on a tracheostomy collar at 2 L of oxygen at all times presents via nursing home for evaluation of shortness of breath, nursing home staff says it feels like her lungs are full of fluid.  Patient is 100% on her normal oxygen via tracheostomy collar, does not appear to be any distress at this time.  Onset: Today  Duration: Continuous  Exacerbating / Alleviating factors: None  Associated symptoms: None    Nurses Notes reviewed and agree, including vitals, allergies, social history and prior medical history.    REVIEW OF SYSTEMS: All systems reviewed and not pertinent unless noted.    Positive for: Shortness of breath    Negative for: Limited evaluation of review of systems due to patient being nonverbal  Review of Systems    Past Medical History:   Diagnosis Date   • COPD (chronic obstructive pulmonary disease) (HCC)    • Hypertension    • Pneumonia        No Known Allergies    Past Surgical History:   Procedure Laterality Date   • HYSTERECTOMY      Partial    • TRACHEOSTOMY AND PEG TUBE INSERTION N/A 3/20/2019    Procedure: TRACHEOSTOMY AND PERCUTANEOUS ENDOSCOPIC GASTROSTOMY TUBE INSERTION;  Surgeon: Nadira Pandey MD;  Location: [facility];  Service: General       History reviewed. No pertinent family history.    Social History     Socioeconomic History   • Marital status: Legally    Tobacco Use   • Smoking status: Current Every Day Smoker     Packs/day: 1.00     Types: Electronic Cigarette, Cigarettes   • Smokeless tobacco: Never Used   Vaping Use   • Vaping Use: Unknown   Substance and Sexual Activity   • Alcohol use: Not Currently     Comment: wine    • Drug use: No   • Sexual activity: Defer           Objective   Physical Exam  CONSTITUTIONAL: Nonverbal, bedbound with tracheostomy collar, appears to be in no acute  distress.  VITAL SIGNS: Per nursing, reviewed and noted  SKIN: Exposed skin with no rashes, ulcerations or petechiae.  EYES: PERRLA. EOMI.  ENT: Tracheostomy appears to be intact and proper positioning  RESPIRATORY: Patient has coarse breath sounds bilaterally, slightly worse on the right base.  CARDIOVASCULAR:  Regular rate and rhythm, no murmurs.  Good Peripheral pulses. Good cap refill to extremities.  GI: Abdomen soft, nontender, normal bowel sounds. No hernia. No ascites.  PSYCH: Patient is a baseline functioning status    Procedures           ED Course  ED Course as of 04/10/22 1041   Sun Apr 10, 2022   0939 SpO2: 100 % [CC]   0939 Resp: 18 [CC]   0939 Heart Rate: 74 [CC]   0939 Temp: 98.5 °F (36.9 °C) [CC]   0939 BP: 104/71 [CC]   0939 Device (Oxygen Therapy): tracheostomy collar [CC]   0946 Although patient is nonverbal at baseline, she does not appear to be in any acute respiratory distress, she is 100% on her normal oxygen and tracheostomy collar, obtain basic labs and chest x-ray and reassess. [CC]   1000 EKG interpreted by me: Sinus rhythm, normal rate, PVC, no acute ST/T changes, this is a normal EKG [MP]      ED Course User Index  [CC] Jairo Key PA  [MP] Jose Dukes MD                                                 MDM  Number of Diagnoses or Management Options  Diagnosis management comments: 61-year-old nonverbal patient with history of COPD and tracheostomy collar presented via EMS from nursing home for evaluation of possible shortness of breath.  Patient is 100% on her baseline oxygen via tracheostomy collar, labs are otherwise unremarkable.  Chest x-ray shows no change from chronic atelectasis findings, basic labs done showed no abnormalities.  Tracheostomy is in place without abnormalities, patient is in no acute respiratory distress.  Patient stable to be discharged back to nursing home.       Amount and/or Complexity of Data Reviewed  Clinical lab tests: reviewed  Tests in the  radiology section of CPT®: reviewed    Risk of Complications, Morbidity, and/or Mortality  Presenting problems: low  Diagnostic procedures: low  Management options: low        Final diagnoses:   Chronic obstructive pulmonary disease, unspecified COPD type (HCC)       ED Disposition  ED Disposition     ED Disposition   Discharge    Condition   Stable    Comment   --             Emperatriz Greene MD  [address]  [facility] [address]  [phone]    Go to   As needed, If symptoms worsen    [facility]  [address] [address] [phone]  Go to   As needed, If symptoms worsen         Medication List      Changed    ipratropium-albuterol 0.5-2.5 mg/3 ml nebulizer  Commonly known as: DUO-NEB  Take 3 mL by nebulization Every 6 (Six) Hours.  What changed:   · when to take this  · additional instructions     pantoprazole 20 MG EC tablet  Commonly known as: PROTONIX  Take 1 tablet by mouth Daily.  What changed: how much to take             Jairo Key PA  04/10/22 1041

## 2022-04-12 ENCOUNTER — NURSING HOME (OUTPATIENT)
Dept: INTERNAL MEDICINE | Facility: CLINIC | Age: 62
End: 2022-04-12

## 2022-04-12 VITALS
OXYGEN SATURATION: 97 % | RESPIRATION RATE: 18 BRPM | HEART RATE: 85 BPM | SYSTOLIC BLOOD PRESSURE: 130 MMHG | DIASTOLIC BLOOD PRESSURE: 70 MMHG | TEMPERATURE: 97.8 F

## 2022-04-12 DIAGNOSIS — J96.11 CHRONIC RESPIRATORY FAILURE WITH HYPOXIA: ICD-10-CM

## 2022-04-12 DIAGNOSIS — Z93.1 PEG (PERCUTANEOUS ENDOSCOPIC GASTROSTOMY) STATUS: ICD-10-CM

## 2022-04-12 DIAGNOSIS — G93.1 ANOXIC BRAIN INJURY: Primary | ICD-10-CM

## 2022-04-12 DIAGNOSIS — J44.9 CHRONIC OBSTRUCTIVE PULMONARY DISEASE, UNSPECIFIED COPD TYPE: ICD-10-CM

## 2022-04-12 DIAGNOSIS — Z93.0 TRACHEOSTOMY PRESENT: ICD-10-CM

## 2022-04-12 PROCEDURE — 99308 SBSQ NF CARE LOW MDM 20: CPT | Performed by: PHYSICIAN ASSISTANT

## 2022-04-13 ENCOUNTER — NURSING HOME (OUTPATIENT)
Dept: INTERNAL MEDICINE | Facility: CLINIC | Age: 62
End: 2022-04-13

## 2022-04-13 DIAGNOSIS — J96.11 CHRONIC RESPIRATORY FAILURE WITH HYPOXIA: ICD-10-CM

## 2022-04-13 DIAGNOSIS — K59.00 CONSTIPATION, UNSPECIFIED CONSTIPATION TYPE: ICD-10-CM

## 2022-04-13 DIAGNOSIS — Z93.1 PEG (PERCUTANEOUS ENDOSCOPIC GASTROSTOMY) STATUS: ICD-10-CM

## 2022-04-13 DIAGNOSIS — I10 ESSENTIAL HYPERTENSION: ICD-10-CM

## 2022-04-13 DIAGNOSIS — Z93.0 TRACHEOSTOMY PRESENT: ICD-10-CM

## 2022-04-13 DIAGNOSIS — G93.1 ANOXIC BRAIN INJURY: Primary | ICD-10-CM

## 2022-04-13 PROCEDURE — 99309 SBSQ NF CARE MODERATE MDM 30: CPT | Performed by: INTERNAL MEDICINE

## 2022-04-13 NOTE — PROGRESS NOTES
Nursing Home Progress Note        Kemal Shaw DO []  FÉLIX Horne []  [address] [place] [address]  Phone: [phone]  Fax: [phone] Emperatriz Greene MD []  Jamie Brown DO []  Noemy Garcia PA-C [x]   [address] [place] [address]  Phone: [phone]  Fax: [phone]     PATIENT NAME: Viji Vargas                                                                          YOB: 1960           DATE OF SERVICE: 4/12/2022  FACILITY: [facility]    CHIEF COMPLAINT:  Aspiration concerns      HISTORY OF PRESENT ILLNESS:   Ms. Vargas is a 60 y/o female who presents today at request of nursing with concern for aspiration.  Patient was noted to have increased congestion/respiratory changes and was subsequently transferred to [facility] for urgent evaluation.  Work up largely reassuring, CXR clear.  She was transferred back to this facility for continued care.  Nursing do not report any hypoxia or shortness of breath.  She has remained afebrile.  She is tolerating tube feeding without acute changes.  Patient with history of myoclonic jerking, which has been more present today.  No seizure activity reported.      PAST MEDICAL & SURGICAL HISTORY:   Past Medical History:   Diagnosis Date   • COPD (chronic obstructive pulmonary disease) (HCC)    • Hypertension    • Pneumonia       Past Surgical History:   Procedure Laterality Date   • HYSTERECTOMY      Partial    • TRACHEOSTOMY AND PEG TUBE INSERTION N/A 3/20/2019    Procedure: TRACHEOSTOMY AND PERCUTANEOUS ENDOSCOPIC GASTROSTOMY TUBE INSERTION;  Surgeon: Nadira Pandey MD;  Location: [facility];  Service: General         MEDICATIONS:  I have reviewed and reconciled the patients medication list in the patients chart at the skilled nursing facility today.      ALLERGIES:  No Known Allergies      SOCIAL HISTORY:  Social History     Socioeconomic History   • Marital status: Legally    Tobacco Use   •  Smoking status: Current Every Day Smoker     Packs/day: 1.00     Types: Electronic Cigarette, Cigarettes   • Smokeless tobacco: Never Used   Vaping Use   • Vaping Use: Unknown   Substance and Sexual Activity   • Alcohol use: Not Currently     Comment: wine    • Drug use: No   • Sexual activity: Defer       FAMILY HISTORY:  No family history on file.    REVIEW OF SYSTEMS:    Unable to obtain ROS, nonverbal    PHYSICAL EXAMINATION:     VITAL SIGNS:  /70   Pulse 85   Temp 97.8 °F (36.6 °C)   Resp 18   SpO2 97%     Nursing notes and vital signs reviewed.   General Appearance:  Chronically ill appearing female lying in bed.  NAD.    Head: Normocephalic and atraumatic, without obvious abnormality     Eyes: PERRLA.  Conjunctivae and sclerae normal.     Neck: Neck supple. s/p trach.    Cardiovascular: Normal rate, regular rhythm.  Pulses palpable equal bilaterally.    Pulmonary/Chest: Effort normal, no respiratory distress.  CTAB.   Abdominal: Soft, nondistended. Bowel sounds are normal.  S/p PEG, no exudate or surrounding erythema.    Musculoskeletal: Chronic contractures noted with hand splints in place.  No edema.   Neurological: Alert, does not follow commands at baseline.  Non-verbal.    Skin: Skin is warm and dry.   Psychiatric:  Does not appear agitated or restless.      RECORDS REVIEW:   [facility] ER documentation, imaging, and labs reviewed.    ASSESSMENT     Diagnoses and all orders for this visit:    1. Anoxic brain injury (HCC) (Primary)    2. PEG (percutaneous endoscopic gastrostomy) status (HCC)    3. Chronic respiratory failure with hypoxia (HCC)    4. Chronic obstructive pulmonary disease, unspecified COPD type ([facility])    5. Tracheostomy present ([facility])        PLAN  Patient appears at her baseline, nontoxic and hemodynamically stable.  She is tolerating tube feeding without any acute adverse effect.  No report of fever, hypoxia, or respiratory distress.  She did have some mild leukocytosis on 4/10, will  repeat CBC and a BMP in the morning.  Continue with close monitoring, aspiration precautions, encouraging nursing staff to contact myself or MD with any acute changes in condition.    [x]  Discussed Patient in detail with nursing/staff, addressed all needs today.     [x]  Plan of Care Reviewed   []  PT/OT Reviewed   []  Order Changes  []  Discharge Plans Reviewed  [x]  Advance Directive on file with Nursing Home.   [x]  POA on file with Nursing Home.    [x]  Code Status: FULL           Noemy Garcia PA-C.  4/13/2022

## 2022-04-16 NOTE — PROGRESS NOTES
Nursing Home Progress Note      Kemal Shaw DO []  FÉLIX Horne []  [address] [place] [address]  Phone: [phone]  Fax: [phone] Emperatriz Greene MD[x]   Noemy Garcia PA-C[]  Jamie Brown DO []  [address] [place] [address]  Phone: [phone]  Fax: [phone]       PATIENT NAME: Viji Vargas                                                                          YOB: 1960           DATE OF SERVICE: 4/13/2022  FACILITY:  [] [place]   [x] [facility]   [] [place]  [] [facility]  [] Other   ______________________________________________________________________       CHIEF COMPLAINT:  Follow-up visit, chronic anoxic brain injury      HISTORY OF PRESENT ILLNESS:   Patient evaluated, record reviewed, case discussed with staff.  The regular medical oversights, patient's clinical condition was followed closely.  For the most part, she remained in stable condition except for episodic increased secretions requiring repeated suctioning.  Additionally, patient was sent to the emergency room twice earlier this month due to concerns for PEG tube dysfunction.  Abdomen CT scan revealed large amount of stools, otherwise no acute findings noted.  Was able to tolerate enteral feeding; observance of bowel hygiene was emphasized.  During my visit today, patient was lying comfortably, she remains nonverbal.    PAST MEDICAL & SURGICAL HISTORY:   Past Medical History:   Diagnosis Date   • COPD (chronic obstructive pulmonary disease) (HCC)    • Hypertension    • Pneumonia       Past Surgical History:   Procedure Laterality Date   • HYSTERECTOMY      Partial    • TRACHEOSTOMY AND PEG TUBE INSERTION N/A 3/20/2019    Procedure: TRACHEOSTOMY AND PERCUTANEOUS ENDOSCOPIC GASTROSTOMY TUBE INSERTION;  Surgeon: Nadira Pandey MD;  Location: [facility];  Service: General         MEDICATIONS:  I have reviewed and reconciled the patients medication list in the patients chart  at the skilled nursing facility today.      ALLERGIES:  No Known Allergies     REVIEW OF SYSTEMS:  Review of Systems   Constitutional: Positive for fatigue.   HENT: Negative.    Respiratory: Negative.         Increased secretions via tracheostomy   Cardiovascular: Negative.    Gastrointestinal: Positive for constipation.   Genitourinary: Negative.    Musculoskeletal:        Debility, bedbound status   Skin: Negative.    Neurological:        Anoxic encephalopathy   Psychiatric/Behavioral: Negative.        PHYSICAL EXAMINATION:   Vital signs: /62, HR 67/min, RR 18/min, temp 97.5 °F, O2 sat 95% on 4 L nasal cannula.  Physical Exam   Constitutional: She appears well-developed and well-nourished.   Patient is unable to communicate verbally.   HENT:   Head: Normocephalic and atraumatic.   Mouth/Throat: Mucous membranes are dry.   Trach collar noted   Eyes: Pupils are equal, round, and reactive to light.   Cardiovascular: Normal rate and regular rhythm.   Abdominal: Soft.   G-tube site noted   Musculoskeletal:      Comments: Bedbound status, requires total care   Neurological: She is alert.   Nonverbal status   Skin: Skin is warm and dry.   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  · Chronic hypoxic respiratory failure, requiring supplemental oxygen at 4 L NC; continue Pulmicort nebulizer treatment twice daily along with albuterol nebulizer treatments as needed  · Status post tracheostomy placement, stable with no signs of dysfunction or infection  · Status post gastrostomy placement; on enteral feeding in the form of Pulmocare 48 mL/h with 200 mL H2O flush every 4 hours  · History of recurrent increased secretions, controlled with scopolamine transdermal patch  · Chronic essential  hypertension, controlled on carvedilol  · Constipation, on senna 2 tablets daily along with MiraLAX as needed.    Chronic medical management:  · Continue current management as documented per record  · Frequent tracheostomy suctioning  · Aspiration precautions  · Nutritional support and hydration  · Bowel hygiene  · Skin care, strict offloading and pressure relief  · PEG tube and tracheostomy care per facility protocol  · Patient will be monitored closely, further plans were modified accordingly    [x]  Discussed Patient in detail with nursing/staff, addressed all needs today.     [x]  Plan of Care Reviewed   [x]  POA on file with Nursing Home.    [x]  Code Status listed on patients chart at the nursing home facility.         Emperatriz Greene MD.

## 2022-05-26 RX ORDER — ALPRAZOLAM 0.5 MG/1
0.5 TABLET ORAL 2 TIMES DAILY
Qty: 60 TABLET | Refills: 5 | Status: SHIPPED | OUTPATIENT
Start: 2022-05-26

## 2022-06-14 ENCOUNTER — NURSING HOME (OUTPATIENT)
Dept: INTERNAL MEDICINE | Facility: CLINIC | Age: 62
End: 2022-06-14

## 2022-06-14 VITALS
DIASTOLIC BLOOD PRESSURE: 60 MMHG | SYSTOLIC BLOOD PRESSURE: 105 MMHG | TEMPERATURE: 97.1 F | OXYGEN SATURATION: 94 % | HEART RATE: 73 BPM | RESPIRATION RATE: 16 BRPM

## 2022-06-14 DIAGNOSIS — I10 ESSENTIAL HYPERTENSION: ICD-10-CM

## 2022-06-14 DIAGNOSIS — J44.9 CHRONIC OBSTRUCTIVE PULMONARY DISEASE, UNSPECIFIED COPD TYPE: ICD-10-CM

## 2022-06-14 DIAGNOSIS — Z93.1 PEG (PERCUTANEOUS ENDOSCOPIC GASTROSTOMY) STATUS: ICD-10-CM

## 2022-06-14 DIAGNOSIS — Z93.0 TRACHEOSTOMY PRESENT: ICD-10-CM

## 2022-06-14 DIAGNOSIS — J96.11 CHRONIC RESPIRATORY FAILURE WITH HYPOXIA: ICD-10-CM

## 2022-06-14 DIAGNOSIS — G93.1 ANOXIC BRAIN INJURY: Primary | ICD-10-CM

## 2022-06-14 PROCEDURE — 99308 SBSQ NF CARE LOW MDM 20: CPT | Performed by: PHYSICIAN ASSISTANT

## 2022-06-28 NOTE — PROGRESS NOTES
Nursing Home Progress Note        Kemal Shaw DO []  FÉLIX Horne []  [address] [place] [address]  Phone: [phone]  Fax: [phone] Emperatriz Greene MD []  Jamie Brown DO []  Noemy Garcia PA-C [x]   [address] [place] [address]  Phone: [phone]  Fax: [phone]     PATIENT NAME: Viji Vargas                                                                          YOB: 1960           DATE OF SERVICE: 6/14/2022  FACILITY: [facility]    CHIEF COMPLAINT:  Chronic medical management long-term care needs.      HISTORY OF PRESENT ILLNESS:   Ms. Vargas is a 60 y/o female who presents today for routine coordination of care and long term care needs.  Record reviewed and care discussed with staff.  Patient continues with chronic secretions from trach site.  No increased O2 demands.  Secretions are clear, sometimes foamy.  At baseline patient is nonverbal, bedridden, requires tube feeding for nutrition.  Nursing shares she has remained in stable condition since last assessment with no acute changes.  Upon assessment she appears at her baseline, does not follow commands.    PAST MEDICAL & SURGICAL HISTORY:   Past Medical History:   Diagnosis Date   • COPD (chronic obstructive pulmonary disease) (HCC)    • Hypertension    • Pneumonia       Past Surgical History:   Procedure Laterality Date   • HYSTERECTOMY      Partial    • TRACHEOSTOMY AND PEG TUBE INSERTION N/A 3/20/2019    Procedure: TRACHEOSTOMY AND PERCUTANEOUS ENDOSCOPIC GASTROSTOMY TUBE INSERTION;  Surgeon: Nadira Pandey MD;  Location: [facility];  Service: General         MEDICATIONS:  I have reviewed and reconciled the patients medication list in the patients chart at the skilled nursing facility today.      ALLERGIES:  No Known Allergies      SOCIAL HISTORY:  Social History     Socioeconomic History   • Marital status: Legally    Tobacco Use   • Smoking status: Current Every  Day Smoker     Packs/day: 1.00     Types: Electronic Cigarette, Cigarettes   • Smokeless tobacco: Never Used   Vaping Use   • Vaping Use: Unknown   Substance and Sexual Activity   • Alcohol use: Not Currently     Comment: wine    • Drug use: No   • Sexual activity: Defer       FAMILY HISTORY:  No family history on file.    REVIEW OF SYSTEMS:    Unable to obtain ROS, nonverbal    PHYSICAL EXAMINATION:     VITAL SIGNS:  /60   Pulse 73   Temp 97.1 °F (36.2 °C)   Resp 16   SpO2 94%     Nursing notes and vital signs reviewed.   General Appearance:  Chronically ill appearing female lying in bed.  NAD.    Head: Normocephalic and atraumatic, without obvious abnormality     Eyes: PERRLA.  Conjunctivae and sclerae normal.  EOM are within normal limits.    Neck: Normal range of motion. Neck supple. s/p trach, clear secretions noted.  Skin surrounding trach site is intact.     Cardiovascular: Normal rate, regular rhythm.  Pulses palpable equal bilaterally.    Pulmonary/Chest: Effort normal, no respiratory distress.  Bilateral rhonchi noted, no wheezes.    Abdominal: Soft. Bowel sounds are normal. No distention or tenderness.  S/p PEG, no exudate or surrounding erythema.    Musculoskeletal: Chronic contractures noted with hand splints in place.  No edema.   Neurological: Alert, does not follow commands at baseline.  Non-verbal.    Skin: Skin is warm and dry.   Psychiatric:  Does not appear agitated or restless.      RECORDS REVIEW:   Most recent labs 5/16    ASSESSMENT     Diagnoses and all orders for this visit:    1. Anoxic brain injury (HCC) (Primary)    2. PEG (percutaneous endoscopic gastrostomy) status (HCC)    3. Tracheostomy present (HCC)    4. Essential hypertension    5. Chronic obstructive pulmonary disease, unspecified COPD type (HCC)    6. Chronic respiratory failure with hypoxia (HCC)        PLAN  Anoxic brain injury/PEG/Trach: Chronic, no acute changes noted.  She is bedridden, non-verbal, and unable to  follow simple commands; continue with supportive care at SNF with all ADLs.  Aspiration precautions in place.  Patient is at high risk for skin breakdown/pressure wounds, continue with offloading and pressure reduction.  Dietary to follow, recommendations appreciated.  Continue to monitor hydration and nutrition as per protocol.    Chronic respiratory failure with hypoxia/COPD: Patient with history of aspiration and acute exacerbation, continue to monitor closely.  Continue with aspiration precautions.  RT following, continue with scheduled nebulizer.    Hypertension: Controlled, continue current medication.      [x]  Discussed Patient in detail with nursing/staff, addressed all needs today.     [x]  Plan of Care Reviewed   []  PT/OT Reviewed   []  Order Changes  []  Discharge Plans Reviewed  [x]  Advance Directive on file with Nursing Home.   [x]  POA on file with Nursing Home.    [x]  Code Status: FULL             Noemy Garcia PA-C.  6/28/2022

## 2022-08-13 ENCOUNTER — NURSING HOME (OUTPATIENT)
Dept: INTERNAL MEDICINE | Facility: CLINIC | Age: 62
End: 2022-08-13

## 2022-08-13 DIAGNOSIS — G93.1 ANOXIC BRAIN INJURY: Primary | ICD-10-CM

## 2022-08-13 DIAGNOSIS — J44.9 CHRONIC OBSTRUCTIVE PULMONARY DISEASE, UNSPECIFIED COPD TYPE: ICD-10-CM

## 2022-08-13 DIAGNOSIS — Z93.0 TRACHEOSTOMY PRESENT: ICD-10-CM

## 2022-08-13 DIAGNOSIS — Z93.1 PEG (PERCUTANEOUS ENDOSCOPIC GASTROSTOMY) STATUS: ICD-10-CM

## 2022-08-13 DIAGNOSIS — I10 ESSENTIAL HYPERTENSION: ICD-10-CM

## 2022-08-13 DIAGNOSIS — U07.1 COVID-19 VIRUS DETECTED: ICD-10-CM

## 2022-08-13 DIAGNOSIS — J96.11 CHRONIC RESPIRATORY FAILURE WITH HYPOXIA: ICD-10-CM

## 2022-08-13 PROCEDURE — 99309 SBSQ NF CARE MODERATE MDM 30: CPT | Performed by: INTERNAL MEDICINE

## 2022-08-15 VITALS
RESPIRATION RATE: 18 BRPM | DIASTOLIC BLOOD PRESSURE: 70 MMHG | TEMPERATURE: 97.6 F | SYSTOLIC BLOOD PRESSURE: 122 MMHG | HEART RATE: 76 BPM | OXYGEN SATURATION: 96 % | BODY MASS INDEX: 30.35 KG/M2 | WEIGHT: 155.4 LBS

## 2022-09-10 ENCOUNTER — HOSPITAL ENCOUNTER (EMERGENCY)
Facility: HOSPITAL | Age: 62
Discharge: HOME | End: 2022-09-10
Attending: EMERGENCY MEDICINE | Admitting: EMERGENCY MEDICINE

## 2022-09-10 ENCOUNTER — APPOINTMENT (OUTPATIENT)
Dept: GENERAL RADIOLOGY | Facility: HOSPITAL | Age: 62
End: 2022-09-10

## 2022-09-10 VITALS
SYSTOLIC BLOOD PRESSURE: 96 MMHG | HEART RATE: 86 BPM | TEMPERATURE: 99.2 F | DIASTOLIC BLOOD PRESSURE: 78 MMHG | RESPIRATION RATE: 20 BRPM | OXYGEN SATURATION: 100 %

## 2022-09-10 DIAGNOSIS — Z93.1 PEG (PERCUTANEOUS ENDOSCOPIC GASTROSTOMY) STATUS: Primary | ICD-10-CM

## 2022-09-10 PROCEDURE — 74018 RADEX ABDOMEN 1 VIEW: CPT

## 2022-09-10 PROCEDURE — 99283 EMERGENCY DEPT VISIT LOW MDM: CPT

## 2022-09-10 PROCEDURE — 0 DIATRIZOATE MEGLUMINE & SODIUM PER 1 ML: Performed by: PHYSICIAN ASSISTANT

## 2022-09-10 RX ADMIN — DIATRIZOATE MEGLUMINE AND DIATRIZOATE SODIUM 30 ML: 660; 100 LIQUID ORAL; RECTAL at 18:27

## 2022-09-10 NOTE — DISCHARGE INSTRUCTIONS
PEG tube appears to be in place per x-ray.  Follow-up with the patient's PCP for further outpatient evaluation as needed.  Return to the ER for new or worsening symptoms or acute concerns.

## 2022-09-10 NOTE — ED NOTES
Report to Surekha at [facility].

## 2022-09-10 NOTE — ED PROVIDER NOTES
Subjective   History of Present Illness   Patient is a 61-year-old female with history of COPD, hypertension, pneumonia presenting to the ER from the nursing home for an abdominal x-ray to confirm proper placement of PEG tube.  Patient is nonverbal at baseline and has tracheostomy tube in place.  Respiratory therapy is at bedside upon my arrival as they were called to evaluate the patient by nursing staff.  Patient has 100% oxygen saturation upon my evaluation.  She is on 2 L oxygen via tracheostomy at all times.  Per nursing staff, patient was only sent here for confirmation of proper placement of PEG tube.  Per review of records, patient has had the PEG tube since 2009.  Nursing staff reports that the nursing home told them that they had aspirated gastric contents but wanted an x-ray to confirm placement since they replaced the tube today.    Review of Systems   Gastrointestinal:        Need to confirm proper placement of PEG tube   All other systems reviewed and are negative.      Past Medical History:   Diagnosis Date   • COPD (chronic obstructive pulmonary disease) (HCC)    • Hypertension    • Pneumonia        No Known Allergies    Past Surgical History:   Procedure Laterality Date   • HYSTERECTOMY      Partial    • TRACHEOSTOMY AND PEG TUBE INSERTION N/A 3/20/2019    Procedure: TRACHEOSTOMY AND PERCUTANEOUS ENDOSCOPIC GASTROSTOMY TUBE INSERTION;  Surgeon: Nadira Pandey MD;  Location: [facility];  Service: General       History reviewed. No pertinent family history.    Social History     Socioeconomic History   • Marital status: Legally    Tobacco Use   • Smoking status: Current Every Day Smoker     Packs/day: 1.00     Types: Electronic Cigarette, Cigarettes   • Smokeless tobacco: Never Used   Vaping Use   • Vaping Use: Unknown   Substance and Sexual Activity   • Alcohol use: Not Currently     Comment: wine    • Drug use: No   • Sexual activity: Defer           Objective   Physical Exam  Vitals and  nursing note reviewed.   Constitutional:       General: She is not in acute distress.     Appearance: She is not toxic-appearing.   HENT:      Head: Normocephalic and atraumatic.      Right Ear: External ear normal.      Left Ear: External ear normal.      Nose: Nose normal.   Eyes:      Extraocular Movements: Extraocular movements intact.      Conjunctiva/sclera: Conjunctivae normal.   Cardiovascular:      Rate and Rhythm: Normal rate.   Pulmonary:      Effort: Pulmonary effort is normal. No respiratory distress.   Abdominal:      General: There is no distension.      Palpations: Abdomen is soft.      Comments: PEG tube appears in place   Musculoskeletal:         General: Normal range of motion.      Cervical back: Normal range of motion and neck supple.   Skin:     General: Skin is warm and dry.   Neurological:      General: No focal deficit present.      Mental Status: She is alert and oriented to person, place, and time.   Psychiatric:         Mood and Affect: Mood normal.         Behavior: Behavior normal.         Procedures           ED Course                                           MDM   Patient was evaluated in the ER after PEG replacement at nursing home as they wanted to have an x-ray performed to ensure proper placement.  Patient is hemodynamically stable, nontoxic-appearing on exam.  Nonverbal at baseline so unable to provide any history.  Gastrografin was administered and KUB was performed and found to be in place per radiology.  Patient was discharged in stable condition to nursing home.  Follow-up with PCP as needed was advised. Precautions were given for return to the ER for any new or worsening symptoms.    Final diagnoses:   PEG (percutaneous endoscopic gastrostomy) status (HCC)       ED Disposition  ED Disposition     ED Disposition   Discharge    Condition   Stable    Comment   --             Emperatriz Greene MD  [address] [place] [address] [phone]    Call   As  needed    [facility]  [address] [facility] [address] [phone]  Go to   As needed, If symptoms worsen         Medication List      Changed    ipratropium-albuterol 0.5-2.5 mg/3 ml nebulizer  Commonly known as: DUO-NEB  Take 3 mL by nebulization Every 6 (Six) Hours.  What changed:   · when to take this  · additional instructions     pantoprazole 20 MG EC tablet  Commonly known as: PROTONIX  Take 1 tablet by mouth Daily.  What changed: how much to take             Shellie Banks, VEE  09/10/22 1822

## 2022-09-25 ENCOUNTER — NURSING HOME (OUTPATIENT)
Dept: INTERNAL MEDICINE | Facility: CLINIC | Age: 62
End: 2022-09-25

## 2022-09-25 DIAGNOSIS — Z93.1 PEG (PERCUTANEOUS ENDOSCOPIC GASTROSTOMY) STATUS: ICD-10-CM

## 2022-09-25 DIAGNOSIS — G93.1 ANOXIC BRAIN INJURY: Primary | ICD-10-CM

## 2022-09-25 DIAGNOSIS — U07.1 COVID-19 VIRUS DETECTED: ICD-10-CM

## 2022-09-25 DIAGNOSIS — I10 ESSENTIAL HYPERTENSION: ICD-10-CM

## 2022-09-25 DIAGNOSIS — Z93.0 TRACHEOSTOMY PRESENT: ICD-10-CM

## 2022-09-25 DIAGNOSIS — J96.11 CHRONIC RESPIRATORY FAILURE WITH HYPOXIA: ICD-10-CM

## 2022-09-25 PROCEDURE — 99309 SBSQ NF CARE MODERATE MDM 30: CPT | Performed by: INTERNAL MEDICINE

## 2022-09-25 NOTE — PROGRESS NOTES
Nursing Home Progress Note      Kemal Shaw DO []  [facility] []  [address] [place] [address]  Phone: [phone]  Fax: [phone] Emperatriz Greene MD[x]   Noemy Garcia PA-C[]  Jamie Brown DO []  [address] [place] [address]  Phone: [phone]  Fax: [phone]       PATIENT NAME: Viji Vargas                                                                          YOB: 1960           DATE OF SERVICE: 08/13/2022  FACILITY:  [] [facility]   [x] [facility]   [] [facility]  [] [facility]  [] Other   ______________________________________________________________________       CHIEF COMPLAINT:  Follow-up visit, chronic anoxic brain injury      HISTORY OF PRESENT ILLNESS:   Patient evaluated, record reviewed, case discussed with staff.  The regular medical oversights, patient's clinical condition was followed closely.  For the most part, she remained in stable condition except for episodic increased secretions requiring repeated suctioning.  Earlier this month, patient was diagnosed with COVID-19 infection confirmed by PCR.  She remained in stable condition while receiving Rocephin, Z-Leonard and dexamethasone.  During my visit, patient was lying as usual in her bed, well-groomed and comfortable.  Awake, but unable to elicit response which is her baseline.    PAST MEDICAL & SURGICAL HISTORY:   Past Medical History:   Diagnosis Date   • COPD (chronic obstructive pulmonary disease) (HCC)    • Hypertension    • Pneumonia       Past Surgical History:   Procedure Laterality Date   • HYSTERECTOMY      Partial    • TRACHEOSTOMY AND PEG TUBE INSERTION N/A 3/20/2019    Procedure: TRACHEOSTOMY AND PERCUTANEOUS ENDOSCOPIC GASTROSTOMY TUBE INSERTION;  Surgeon: Nadira Pandey MD;  Location: [facility];  Service: General         MEDICATIONS:  I have reviewed and reconciled the patients medication list in the patients chart at the skilled nursing facility today.       ALLERGIES:  No Known Allergies     REVIEW OF SYSTEMS:  Review of Systems   Constitutional: Positive for fatigue.   HENT: Negative.    Respiratory: Negative.         Increased secretions via tracheostomy   Cardiovascular: Negative.    Gastrointestinal: Positive for constipation.   Genitourinary: Negative.    Musculoskeletal:        Debility, bedbound status   Skin: Negative.    Neurological:        Anoxic encephalopathy   Psychiatric/Behavioral: Negative.        PHYSICAL EXAMINATION:   Vital signs: /72, HR 68/min, RR 18/min, temp 97.4 °F, O2 sat 95% on 4 L nasal cannula.  Physical Exam   Constitutional: She appears well-developed and well-nourished.   Patient is unable to communicate verbally.   HENT:   Head: Normocephalic and atraumatic.   Mouth/Throat: Mucous membranes are dry.   Trach collar noted   Eyes: Pupils are equal, round, and reactive to light.   Cardiovascular: Normal rate and regular rhythm.   Abdominal: Soft.   G-tube site noted   Musculoskeletal:      Comments: Bedbound status, requires total care   Neurological: She is alert.   Nonverbal status   Skin: Skin is warm and dry.   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  · Chronic hypoxic respiratory failure, requiring supplemental oxygen at 4 L NC; continue Pulmicort nebulizer treatment twice daily along with albuterol nebulizer treatments as needed  · Status post tracheostomy placement, stable with no signs of dysfunction or infection  · Status post gastrostomy placement; on enteral feeding in the form of Pulmocare 48 mL/h with 200 mL H2O flush every 4 hours  · History of recurrent increased secretions, controlled with scopolamine transdermal patch  · Chronic essential hypertension, controlled on carvedilol  · Constipation, on  senna 2 tablets daily along with MiraLAX as needed.    Chronic medical management:  · Continue current management as documented per record  · Frequent tracheostomy suctioning  · Aspiration precautions  · Nutritional support and hydration  · Bowel hygiene  · Skin care, strict offloading and pressure relief  · PEG tube and tracheostomy care per facility protocol  · Patient will be monitored closely, further plans were modified accordingly    [x]  Discussed Patient in detail with nursing/staff, addressed all needs today.     [x]  Plan of Care Reviewed   [x]  POA on file with Nursing Home.    [x]  Code Status: FULL CODE STATUS listed on patients chart at the nursing home facility.         Emperatriz Greene MD.

## 2022-10-16 NOTE — PROGRESS NOTES
Nursing Home Progress Note      Kemal Shaw DO []  [facility] []  [address] [place] [address]  Phone: [phone]  Fax: [phone] Emperatriz Greene MD[x]   Noemy Garcia PA-C[]  Jamie Brown DO []  [address] [place] [address]  Phone: [phone]  Fax: [phone]       PATIENT NAME: Viji Vargas                                                                          YOB: 1960           DATE OF SERVICE: 9/25/2022  FACILITY:  [] [facility]   [x] [facility]   [] [facility]  [] [facility]  [] Other   ______________________________________________________________________       CHIEF COMPLAINT:  Follow-up visit, chronic anoxic brain injury      HISTORY OF PRESENT ILLNESS:   Patient evaluated, record reviewed, case discussed with staff.  The regular medical oversights, patient's clinical condition was followed closely.  For the most part, she remained in stable condition except for episodic increased secretions requiring repeated suctioning.  In August 2022, patient was diagnosed with COVID-19 infection confirmed by PCR.  She remained in stable condition while receiving Rocephin, Z-Leonard and dexamethasone.  Of note, patient was sent to the emergency room September 10 due to G-tube dysfunction; work-up ruled out displacement; she was sent back to the facility in stable condition.  During my visit, patient was lying as usual in her bed, well-groomed and comfortable.  Awake, but unable to elicit response which is her baseline.    PAST MEDICAL & SURGICAL HISTORY:   Past Medical History:   Diagnosis Date   • COPD (chronic obstructive pulmonary disease) (HCC)    • Hypertension    • Pneumonia       Past Surgical History:   Procedure Laterality Date   • HYSTERECTOMY      Partial    • TRACHEOSTOMY AND PEG TUBE INSERTION N/A 3/20/2019    Procedure: TRACHEOSTOMY AND PERCUTANEOUS ENDOSCOPIC GASTROSTOMY TUBE INSERTION;  Surgeon: Nadira Pandey MD;  Location: [facility];   Service: General         MEDICATIONS:  I have reviewed and reconciled the patients medication list in the patients chart at the skilled nursing facility today.      ALLERGIES:  No Known Allergies     REVIEW OF SYSTEMS:  Review of Systems   Constitutional: Positive for fatigue.   HENT: Negative.    Respiratory: Negative.         Increased secretions via tracheostomy   Cardiovascular: Negative.    Gastrointestinal: Positive for constipation.   Genitourinary: Negative.    Musculoskeletal:        Debility, bedbound status   Skin: Negative.    Neurological:        Anoxic encephalopathy   Psychiatric/Behavioral: Negative.        PHYSICAL EXAMINATION:   Vital signs: /70, HR 92/min, temp 97.2 °F, RR 22/min, O2 sat 100%  Physical Exam   Constitutional: She appears well-developed and well-nourished.   Patient is unable to communicate verbally.   HENT:   Head: Normocephalic and atraumatic.   Mouth/Throat: Mucous membranes are dry.   Trach collar noted   Eyes: Pupils are equal, round, and reactive to light.   Cardiovascular: Normal rate and regular rhythm.   Abdominal: Soft.   G-tube site noted   Musculoskeletal:      Comments: Bedbound status, requires total care   Neurological: She is alert.   Nonverbal status   Skin: Skin is warm and dry.   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  · Chronic hypoxic respiratory failure, requiring supplemental oxygen at 4 L NC; continue Pulmicort nebulizer treatment twice daily along with albuterol nebulizer treatments as needed  · Status post tracheostomy placement, stable with no signs of dysfunction or infection;  continue scopolamine patches due to increased secretions along with nebulizer treatments on a as needed basi LomediaStatus post gastrostomy placement; on enteral feeding in the form of Pulmocare 48 mL/h with 200 mL H2O flush every 4 hours  · History of recurrent increased secretions, controlled with scopolamine transdermal patch  · Chronic essential hypertension, controlled on carvedilol  · Constipation, on senna 2 tablets daily along with MiraLAX as needed.  · COVID-19 infection, remained asymptomatic and in stable condition    Chronic medical management:  · Continue current management as documented per record  · Frequent tracheostomy suctioning  · Aspiration precautions  · Nutritional support and hydration  · Bowel hygiene  · Skin care, strict offloading and pressure relief  · PEG tube and tracheostomy care per facility protocol  · Patient will be monitored closely, further plans were modified accordingly    [x]  Discussed Patient in detail with nursing/staff, addressed all needs today.     [x]  Plan of Care Reviewed   [x]  POA on file with Nursing Home.    [x]  Code Status: FULL CODE STATUS listed on patients chart at the nursing home facility.         Emperatriz Greene MD.

## 2022-10-22 ENCOUNTER — APPOINTMENT (OUTPATIENT)
Dept: GENERAL RADIOLOGY | Facility: HOSPITAL | Age: 62
End: 2022-10-22

## 2022-10-22 ENCOUNTER — HOSPITAL ENCOUNTER (EMERGENCY)
Facility: HOSPITAL | Age: 62
Discharge: NURSING, REHAB OR LONG-TERM CARE | End: 2022-10-22
Attending: EMERGENCY MEDICINE | Admitting: EMERGENCY MEDICINE

## 2022-10-22 VITALS
BODY MASS INDEX: 29.29 KG/M2 | SYSTOLIC BLOOD PRESSURE: 117 MMHG | HEART RATE: 77 BPM | OXYGEN SATURATION: 100 % | TEMPERATURE: 97.8 F | WEIGHT: 150 LBS | DIASTOLIC BLOOD PRESSURE: 67 MMHG | RESPIRATION RATE: 20 BRPM

## 2022-10-22 DIAGNOSIS — K94.23 GASTROSTOMY TUBE DYSFUNCTION: Primary | ICD-10-CM

## 2022-10-22 PROCEDURE — 74018 RADEX ABDOMEN 1 VIEW: CPT

## 2022-10-22 PROCEDURE — 99283 EMERGENCY DEPT VISIT LOW MDM: CPT

## 2022-10-22 NOTE — ED PROVIDER NOTES
TRIAGE CHIEF COMPLAINT:     Nursing and triage notes reviewed    Chief Complaint   Patient presents with   • Tube Change      HPI: Viji Vargas is a 61 y.o. female who presents to the emergency department complaining of a G-tube malfunction.  Part of patient's G-tube broke.  Patient was sent in to have the G-tube replaced.  No other complaints currently.  Patient has a history of multiple medical conditions.  Patient is nonverbal at baseline.  Patient has had of the G-tube in place for several years.    REVIEW OF SYSTEMS: All other systems reviewed and are negative     PAST MEDICAL HISTORY:   Past Medical History:   Diagnosis Date   • COPD (chronic obstructive pulmonary disease) (HCC)    • Hypertension    • Pneumonia         FAMILY HISTORY:   History reviewed. No pertinent family history.     SOCIAL HISTORY:   Social History     Socioeconomic History   • Marital status: Legally    Tobacco Use   • Smoking status: Every Day     Packs/day: 1.00     Types: Electronic Cigarette, Cigarettes   • Smokeless tobacco: Never   Vaping Use   • Vaping Use: Unknown   Substance and Sexual Activity   • Alcohol use: Not Currently     Comment: wine    • Drug use: No   • Sexual activity: Defer        SURGICAL HISTORY:   Past Surgical History:   Procedure Laterality Date   • HYSTERECTOMY      Partial    • TRACHEOSTOMY AND PEG TUBE INSERTION N/A 3/20/2019    Procedure: TRACHEOSTOMY AND PERCUTANEOUS ENDOSCOPIC GASTROSTOMY TUBE INSERTION;  Surgeon: Nadira Pandey MD;  Location: [facility];  Service: General        CURRENT MEDICATIONS:      Medication List      ASK your doctor about these medications    ALPRAZolam 0.5 MG tablet  Commonly known as: XANAX  Administer 1 tablet per G tube 2 (Two) Times a Day.     baclofen 10 MG tablet  Commonly known as: LIORESAL     budesonide 0.5 MG/2ML nebulizer solution  Commonly known as: PULMICORT  Take 2 mL by nebulization 2 (Two) Times a Day.     carvedilol 12.5 MG tablet  Commonly known  as: COREG     cetirizine 10 MG tablet  Commonly known as: zyrTEC     ipratropium-albuterol 0.5-2.5 mg/3 ml nebulizer  Commonly known as: DUO-NEB  Take 3 mL by nebulization Every 6 (Six) Hours.     montelukast 10 MG tablet  Commonly known as: SINGULAIR     multivitamin with iron-minerals (CENTRUM) liquid     ondansetron ODT 4 MG disintegrating tablet  Commonly known as: ZOFRAN-ODT  Place 1 tablet under the tongue Every 6 (Six) Hours As Needed for Nausea or Vomiting.     pantoprazole 20 MG EC tablet  Commonly known as: PROTONIX  Take 1 tablet by mouth Daily.     PRO-STAT liquid oral liquid  30 mL by Per G Tube route 2 (Two) Times a Day.     Scopolamine 1 MG/3DAYS patch             ALLERGIES: Patient has no known allergies.     PHYSICAL EXAM:   VITAL SIGNS:   Vitals:    10/22/22 0356   BP: 114/79   Pulse: 77   Resp: 20   Temp: 97.8 °F (36.6 °C)   SpO2: 100%      CONSTITUTIONAL: Awake, appears nontoxic   HENT: Atraumatic, normocephalic, tracheostomy in place. External ears normal.   EYES: Conjunctivae clear  NECK: Trachea midline  CARDIOVASCULAR: Normal heart rate, Normal rhythm, No murmurs, rubs, gallops   PULMONARY/CHEST: Clear to auscultation, no rhonchi, wheezes, or rales. Symmetrical breath sounds.  ABDOMINAL: Nondistended, soft, nontender - no rebound or guarding.  NEUROLOGIC: Nonverbal, no focal deficits  EXTREMITIES: No clubbing, cyanosis, or edema   SKIN: Warm, Dry, No erythema, No rash     ED COURSE / MEDICAL DECISION MAKING:   Viji Vargas is a 61 y.o. female who presents to the emergency department for evaluation of replacement of his G-tube.  With nursing staff assistance a similar size G-tube was gently inserted into the open stoma.  Placement confirmed with x-ray with Gastrografin.  Will discharge with return precautions.    DECISION TO DISCHARGE/ADMIT: see ED care timeline     FINAL IMPRESSION:   1 --gastrostomy tube dysfunction  2 --   3 --     Electronically signed by: Marlene Nolen MD, 10/22/2022  04:22 Marlene Infante MD  10/22/22 0501

## 2022-11-12 ENCOUNTER — APPOINTMENT (OUTPATIENT)
Dept: GENERAL RADIOLOGY | Facility: HOSPITAL | Age: 62
End: 2022-11-12

## 2022-11-12 ENCOUNTER — HOSPITAL ENCOUNTER (EMERGENCY)
Facility: HOSPITAL | Age: 62
Discharge: NURSING, REHAB OR LONG-TERM CARE | End: 2022-11-13
Attending: FAMILY MEDICINE | Admitting: FAMILY MEDICINE

## 2022-11-12 DIAGNOSIS — J18.9 PNEUMONIA DUE TO INFECTIOUS ORGANISM, UNSPECIFIED LATERALITY, UNSPECIFIED PART OF LUNG: Primary | ICD-10-CM

## 2022-11-12 LAB
ALBUMIN SERPL-MCNC: 3.6 G/DL (ref 3.5–5.2)
ALBUMIN/GLOB SERPL: 1.2 G/DL
ALP SERPL-CCNC: 82 U/L (ref 39–117)
ALT SERPL W P-5'-P-CCNC: 19 U/L (ref 1–33)
AMORPH URATE CRY URNS QL MICRO: ABNORMAL /HPF
ANION GAP SERPL CALCULATED.3IONS-SCNC: 11.6 MMOL/L (ref 5–15)
AST SERPL-CCNC: 13 U/L (ref 1–32)
BACTERIA UR QL AUTO: ABNORMAL /HPF
BASOPHILS # BLD AUTO: 0.05 10*3/MM3 (ref 0–0.2)
BASOPHILS NFR BLD AUTO: 0.4 % (ref 0–1.5)
BILIRUB SERPL-MCNC: 0.2 MG/DL (ref 0–1.2)
BILIRUB UR QL STRIP: NEGATIVE
BUN SERPL-MCNC: 18 MG/DL (ref 8–23)
BUN/CREAT SERPL: 42.9 (ref 7–25)
CALCIUM SPEC-SCNC: 8.7 MG/DL (ref 8.6–10.5)
CHLORIDE SERPL-SCNC: 96 MMOL/L (ref 98–107)
CLARITY UR: CLEAR
CO2 SERPL-SCNC: 27.4 MMOL/L (ref 22–29)
COLOR UR: YELLOW
CREAT SERPL-MCNC: 0.42 MG/DL (ref 0.57–1)
DEPRECATED RDW RBC AUTO: 45 FL (ref 37–54)
EGFRCR SERPLBLD CKD-EPI 2021: 110.1 ML/MIN/1.73
EOSINOPHIL # BLD AUTO: 0.19 10*3/MM3 (ref 0–0.4)
EOSINOPHIL NFR BLD AUTO: 1.7 % (ref 0.3–6.2)
ERYTHROCYTE [DISTWIDTH] IN BLOOD BY AUTOMATED COUNT: 13.2 % (ref 12.3–15.4)
GLOBULIN UR ELPH-MCNC: 3.1 GM/DL
GLUCOSE SERPL-MCNC: 128 MG/DL (ref 65–99)
GLUCOSE UR STRIP-MCNC: NEGATIVE MG/DL
HCT VFR BLD AUTO: 32.3 % (ref 34–46.6)
HGB BLD-MCNC: 10.6 G/DL (ref 12–15.9)
HGB UR QL STRIP.AUTO: NEGATIVE
HYALINE CASTS UR QL AUTO: ABNORMAL /LPF
IMM GRANULOCYTES # BLD AUTO: 0.04 10*3/MM3 (ref 0–0.05)
IMM GRANULOCYTES NFR BLD AUTO: 0.4 % (ref 0–0.5)
KETONES UR QL STRIP: NEGATIVE
LEUKOCYTE ESTERASE UR QL STRIP.AUTO: ABNORMAL
LYMPHOCYTES # BLD AUTO: 2.08 10*3/MM3 (ref 0.7–3.1)
LYMPHOCYTES NFR BLD AUTO: 18.6 % (ref 19.6–45.3)
MCH RBC QN AUTO: 30.6 PG (ref 26.6–33)
MCHC RBC AUTO-ENTMCNC: 32.8 G/DL (ref 31.5–35.7)
MCV RBC AUTO: 93.4 FL (ref 79–97)
MONOCYTES # BLD AUTO: 0.68 10*3/MM3 (ref 0.1–0.9)
MONOCYTES NFR BLD AUTO: 6.1 % (ref 5–12)
NEUTROPHILS NFR BLD AUTO: 72.8 % (ref 42.7–76)
NEUTROPHILS NFR BLD AUTO: 8.15 10*3/MM3 (ref 1.7–7)
NITRITE UR QL STRIP: NEGATIVE
NRBC BLD AUTO-RTO: 0 /100 WBC (ref 0–0.2)
PH UR STRIP.AUTO: 6 [PH] (ref 5–8)
PLATELET # BLD AUTO: 231 10*3/MM3 (ref 140–450)
PMV BLD AUTO: 12.9 FL (ref 6–12)
POTASSIUM SERPL-SCNC: 3.9 MMOL/L (ref 3.5–5.2)
PROT SERPL-MCNC: 6.7 G/DL (ref 6–8.5)
PROT UR QL STRIP: NEGATIVE
RBC # BLD AUTO: 3.46 10*6/MM3 (ref 3.77–5.28)
RBC # UR STRIP: ABNORMAL /HPF
REF LAB TEST METHOD: ABNORMAL
SODIUM SERPL-SCNC: 135 MMOL/L (ref 136–145)
SP GR UR STRIP: 1.01 (ref 1–1.03)
SQUAMOUS #/AREA URNS HPF: ABNORMAL /HPF
UROBILINOGEN UR QL STRIP: ABNORMAL
WBC # UR STRIP: ABNORMAL /HPF
WBC NRBC COR # BLD: 11.19 10*3/MM3 (ref 3.4–10.8)

## 2022-11-12 PROCEDURE — 93005 ELECTROCARDIOGRAM TRACING: CPT | Performed by: NURSE PRACTITIONER

## 2022-11-12 PROCEDURE — 87205 SMEAR GRAM STAIN: CPT | Performed by: NURSE PRACTITIONER

## 2022-11-12 PROCEDURE — 85025 COMPLETE CBC W/AUTO DIFF WBC: CPT | Performed by: NURSE PRACTITIONER

## 2022-11-12 PROCEDURE — 81001 URINALYSIS AUTO W/SCOPE: CPT | Performed by: NURSE PRACTITIONER

## 2022-11-12 PROCEDURE — 80053 COMPREHEN METABOLIC PANEL: CPT | Performed by: NURSE PRACTITIONER

## 2022-11-12 PROCEDURE — P9612 CATHETERIZE FOR URINE SPEC: HCPCS

## 2022-11-12 PROCEDURE — 87077 CULTURE AEROBIC IDENTIFY: CPT | Performed by: NURSE PRACTITIONER

## 2022-11-12 PROCEDURE — 87070 CULTURE OTHR SPECIMN AEROBIC: CPT | Performed by: NURSE PRACTITIONER

## 2022-11-12 PROCEDURE — 99284 EMERGENCY DEPT VISIT MOD MDM: CPT

## 2022-11-12 PROCEDURE — 87040 BLOOD CULTURE FOR BACTERIA: CPT | Performed by: NURSE PRACTITIONER

## 2022-11-12 PROCEDURE — 87186 SC STD MICRODIL/AGAR DIL: CPT | Performed by: NURSE PRACTITIONER

## 2022-11-12 PROCEDURE — 71045 X-RAY EXAM CHEST 1 VIEW: CPT

## 2022-11-12 PROCEDURE — 36415 COLL VENOUS BLD VENIPUNCTURE: CPT

## 2022-11-12 RX ORDER — SODIUM CHLORIDE 0.9 % (FLUSH) 0.9 %
10 SYRINGE (ML) INJECTION AS NEEDED
Status: DISCONTINUED | OUTPATIENT
Start: 2022-11-12 | End: 2022-11-13 | Stop reason: HOSPADM

## 2022-11-13 VITALS
RESPIRATION RATE: 22 BRPM | OXYGEN SATURATION: 100 % | HEIGHT: 64 IN | WEIGHT: 154 LBS | SYSTOLIC BLOOD PRESSURE: 135 MMHG | BODY MASS INDEX: 26.29 KG/M2 | TEMPERATURE: 98.8 F | HEART RATE: 91 BPM | DIASTOLIC BLOOD PRESSURE: 75 MMHG

## 2022-11-13 RX ORDER — AZITHROMYCIN 250 MG/1
TABLET, FILM COATED ORAL
Qty: 6 TABLET | Refills: 0 | Status: SHIPPED | OUTPATIENT
Start: 2022-11-13

## 2022-11-13 NOTE — ED PROVIDER NOTES
Subjective  History of Present Illness:    Chief Complaint: Shortness of breath, vomiting  History of Present Illness: This is a 61-year-old female patient comes into the ED via EMS.  Patient is a resident of a local skilled nursing facility who was sent to the emergency room for shortness of breath, and coffee-ground emesis.  Patient has severe dementia and has tracheostomy and PEG tube.      Nurses Notes reviewed and agree, including vitals, allergies, social history and prior medical history.       Allergies:    Patient has no known allergies.      Past Surgical History:   Procedure Laterality Date   • HYSTERECTOMY      Partial    • TRACHEOSTOMY AND PEG TUBE INSERTION N/A 3/20/2019    Procedure: TRACHEOSTOMY AND PERCUTANEOUS ENDOSCOPIC GASTROSTOMY TUBE INSERTION;  Surgeon: Nadira Pandey MD;  Location: [facility];  Service: General         Social History     Socioeconomic History   • Marital status: Legally    Tobacco Use   • Smoking status: Every Day     Packs/day: 1.00     Types: Electronic Cigarette, Cigarettes   • Smokeless tobacco: Never   Vaping Use   • Vaping Use: Unknown   Substance and Sexual Activity   • Alcohol use: Not Currently     Comment: wine    • Drug use: No   • Sexual activity: Defer         History reviewed. No pertinent family history.    REVIEW OF SYSTEMS: All systems reviewed and not pertinent unless noted.    Review of Systems   Unable to perform ROS: Dementia       Objective    Physical Exam  Constitutional:       Appearance: She is ill-appearing.   Eyes:      Extraocular Movements: Extraocular movements intact.   Cardiovascular:      Rate and Rhythm: Normal rate and regular rhythm.   Pulmonary:      Breath sounds: Examination of the right-upper field reveals decreased breath sounds. Examination of the left-upper field reveals decreased breath sounds. Decreased breath sounds present.      Comments: Expiratory rhonchi throughout  Skin:     General: Skin is warm and dry.       Capillary Refill: Capillary refill takes less than 2 seconds.   Neurological:      Mental Status: Mental status is at baseline. She is lethargic.           Procedures    ED Course:    ED Course as of 11/13/22 0022   Sat Nov 12, 2022 2247 EKG interpretation time is 2221 sinus rhythm 86 bpm QRS duration 74 QT is 354 QTC is 397 no acute ST elevation or depression. [MH]      ED Course User Index  [MH] Shaye De La Cruz Gina,        Lab Results (last 24 hours)     Procedure Component Value Units Date/Time    Respiratory Culture - Lavage, NT Suction [[id number]] Collected: 11/12/22 2215    Specimen: Lavage from NT Suction Updated: 11/12/22 2224    CBC & Differential [[id number]]  (Abnormal) Collected: 11/12/22 2223    Specimen: Blood Updated: 11/12/22 2244    Narrative:      The following orders were created for panel order CBC & Differential.  Procedure                               Abnormality         Status                     ---------                               -----------         ------                     CBC Auto Differential[[id number]]        Abnormal            Final result                 Please view results for these tests on the individual orders.    Comprehensive Metabolic Panel [[id number]]  (Abnormal) Collected: 11/12/22 2223    Specimen: Blood Updated: 11/12/22 2259     Glucose 128 mg/dL      BUN 18 mg/dL      Creatinine 0.42 mg/dL      Sodium 135 mmol/L      Potassium 3.9 mmol/L      Chloride 96 mmol/L      CO2 27.4 mmol/L      Calcium 8.7 mg/dL      Total Protein 6.7 g/dL      Albumin 3.60 g/dL      ALT (SGPT) 19 U/L      AST (SGOT) 13 U/L      Alkaline Phosphatase 82 U/L      Total Bilirubin 0.2 mg/dL      Globulin 3.1 gm/dL      A/G Ratio 1.2 g/dL      BUN/Creatinine Ratio 42.9     Anion Gap 11.6 mmol/L      eGFR 110.1 mL/min/1.73      Comment: National Kidney Foundation and American Society of Nephrology (ASN) Task Force recommended calculation based on the Chronic Kidney Disease Epidemiology  Collaboration (CKD-EPI) equation refit without adjustment for race.       Narrative:      GFR Normal >60  Chronic Kidney Disease <60  Kidney Failure <15      Blood Culture - Blood, Hand, Right [[id number]] Collected: 11/12/22 2223    Specimen: Blood from Hand, Right Updated: 11/12/22 2242    CBC Auto Differential [[id number]]  (Abnormal) Collected: 11/12/22 2223    Specimen: Blood Updated: 11/12/22 2244     WBC 11.19 10*3/mm3      RBC 3.46 10*6/mm3      Hemoglobin 10.6 g/dL      Hematocrit 32.3 %      MCV 93.4 fL      MCH 30.6 pg      MCHC 32.8 g/dL      RDW 13.2 %      RDW-SD 45.0 fl      MPV 12.9 fL      Platelets 231 10*3/mm3      Neutrophil % 72.8 %      Lymphocyte % 18.6 %      Monocyte % 6.1 %      Eosinophil % 1.7 %      Basophil % 0.4 %      Immature Grans % 0.4 %      Neutrophils, Absolute 8.15 10*3/mm3      Lymphocytes, Absolute 2.08 10*3/mm3      Monocytes, Absolute 0.68 10*3/mm3      Eosinophils, Absolute 0.19 10*3/mm3      Basophils, Absolute 0.05 10*3/mm3      Immature Grans, Absolute 0.04 10*3/mm3      nRBC 0.0 /100 WBC     Blood Culture - Blood, Hand, Left [[id number]] Collected: 11/12/22 2233    Specimen: Blood from Hand, Left Updated: 11/12/22 2241    Urinalysis With Culture If Indicated - Urine, Catheter [[id number]]  (Abnormal) Collected: 11/12/22 2329    Specimen: Urine, Catheter Updated: 11/12/22 2341     Color, UA Yellow     Appearance, UA Clear     pH, UA 6.0     Specific Gravity, UA 1.011     Glucose, UA Negative     Ketones, UA Negative     Bilirubin, UA Negative     Blood, UA Negative     Protein, UA Negative     Leuk Esterase, UA Small (1+)     Nitrite, UA Negative     Urobilinogen, UA 0.2 E.U./dL    Narrative:      In absence of clinical symptoms, the presence of pyuria, bacteria, and/or nitrites on the urinalysis result does not correlate with infection.    Urinalysis, Microscopic Only - Urine, Catheter [[id number]]  (Abnormal) Collected: 11/12/22 2329    Specimen: Urine, Catheter Updated:  11/12/22 2341     RBC, UA None Seen /HPF      WBC, UA 3-5 /HPF      Comment: Urine culture not indicated.        Bacteria, UA Trace /HPF      Squamous Epithelial Cells, UA None Seen /HPF      Hyaline Casts, UA None Seen /LPF      Amorphous Crystals, UA Small/1+ /HPF      Methodology Manual Light Microscopy           No radiology results from the last 24 hrs       MDM      Final diagnoses:   Pneumonia due to infectious organism, unspecified laterality, unspecified part of lung        Manoj Lubin, APRN  11/13/22 0022

## 2022-11-13 NOTE — ED NOTES
Attempted to call report to Misa De Oliveira at this time. No answer, voicemail left to return call to ER. Will attempt to call again at later time.

## 2022-11-16 ENCOUNTER — TELEPHONE (OUTPATIENT)
Dept: EMERGENCY DEPT | Facility: HOSPITAL | Age: 62
End: 2022-11-16

## 2022-11-16 LAB
BACTERIA SPEC RESP CULT: ABNORMAL
BACTERIA SPEC RESP CULT: ABNORMAL
GRAM STN SPEC: ABNORMAL

## 2022-11-17 LAB
BACTERIA SPEC AEROBE CULT: NORMAL
BACTERIA SPEC AEROBE CULT: NORMAL

## 2022-11-22 ENCOUNTER — HOSPITAL ENCOUNTER (EMERGENCY)
Facility: HOSPITAL | Age: 62
Discharge: NURSING, REHAB OR LONG-TERM CARE | End: 2022-11-22
Attending: EMERGENCY MEDICINE | Admitting: EMERGENCY MEDICINE

## 2022-11-22 ENCOUNTER — APPOINTMENT (OUTPATIENT)
Dept: GENERAL RADIOLOGY | Facility: HOSPITAL | Age: 62
End: 2022-11-22

## 2022-11-22 VITALS
BODY MASS INDEX: 26.29 KG/M2 | HEIGHT: 64 IN | WEIGHT: 154 LBS | DIASTOLIC BLOOD PRESSURE: 64 MMHG | SYSTOLIC BLOOD PRESSURE: 101 MMHG | TEMPERATURE: 98.5 F | RESPIRATION RATE: 18 BRPM | HEART RATE: 82 BPM | OXYGEN SATURATION: 100 %

## 2022-11-22 DIAGNOSIS — K94.23 PEG TUBE MALFUNCTION: Primary | ICD-10-CM

## 2022-11-22 PROCEDURE — 99283 EMERGENCY DEPT VISIT LOW MDM: CPT

## 2022-11-22 PROCEDURE — 0 DIATRIZOATE MEGLUMINE & SODIUM PER 1 ML: Performed by: EMERGENCY MEDICINE

## 2022-11-22 PROCEDURE — 99284 EMERGENCY DEPT VISIT MOD MDM: CPT

## 2022-11-22 PROCEDURE — 74018 RADEX ABDOMEN 1 VIEW: CPT

## 2022-11-22 RX ADMIN — DIATRIZOATE MEGLUMINE AND DIATRIZOATE SODIUM 30 ML: 660; 100 LIQUID ORAL; RECTAL at 16:16

## 2022-11-22 NOTE — ED PROVIDER NOTES
TRIAGE CHIEF COMPLAINT:     Nursing and triage notes reviewed    Chief Complaint   Patient presents with   • Tube Change   • G-Tube      HPI: Viji Vargas is a 61 y.o. female who presents to the emergency department complaining of a displaced G-tube.  Patient has indwelling NG tube in the nursing home that sent patient and states that they were changing the dressing when the G-tube accidentally came out.  This happened shortly prior to arrival.  Patient has had the G-tube in place for a long time and it has had to be replaced multiple times in the emergency department for similar issues.    REVIEW OF SYSTEMS: All other systems reviewed and are negative     PAST MEDICAL HISTORY:   Past Medical History:   Diagnosis Date   • COPD (chronic obstructive pulmonary disease) (HCC)    • Hypertension    • Pneumonia         FAMILY HISTORY:   History reviewed. No pertinent family history.     SOCIAL HISTORY:   Social History     Socioeconomic History   • Marital status: Legally    Tobacco Use   • Smoking status: Every Day     Packs/day: 1.00     Types: Electronic Cigarette, Cigarettes   • Smokeless tobacco: Never   Vaping Use   • Vaping Use: Unknown   Substance and Sexual Activity   • Alcohol use: Not Currently     Comment: wine    • Drug use: No   • Sexual activity: Defer        SURGICAL HISTORY:   Past Surgical History:   Procedure Laterality Date   • HYSTERECTOMY      Partial    • TRACHEOSTOMY AND PEG TUBE INSERTION N/A 3/20/2019    Procedure: TRACHEOSTOMY AND PERCUTANEOUS ENDOSCOPIC GASTROSTOMY TUBE INSERTION;  Surgeon: Nadira Pandey MD;  Location: [facility];  Service: General        CURRENT MEDICATIONS:      Medication List      ASK your doctor about these medications    ALPRAZolam 0.5 MG tablet  Commonly known as: XANAX  Administer 1 tablet per G tube 2 (Two) Times a Day.     azithromycin 250 MG tablet  Commonly known as: Zithromax Z-Leonard  Take 2 tablets by mouth on day 1, then 1 tablet daily on days 2-5      baclofen 10 MG tablet  Commonly known as: LIORESAL     budesonide 0.5 MG/2ML nebulizer solution  Commonly known as: PULMICORT  Take 2 mL by nebulization 2 (Two) Times a Day.     carvedilol 12.5 MG tablet  Commonly known as: COREG     cetirizine 10 MG tablet  Commonly known as: zyrTEC     ipratropium-albuterol 0.5-2.5 mg/3 ml nebulizer  Commonly known as: DUO-NEB  Take 3 mL by nebulization Every 6 (Six) Hours.     montelukast 10 MG tablet  Commonly known as: SINGULAIR     multivitamin with iron-minerals (CENTRUM) liquid     ondansetron ODT 4 MG disintegrating tablet  Commonly known as: ZOFRAN-ODT  Place 1 tablet under the tongue Every 6 (Six) Hours As Needed for Nausea or Vomiting.     pantoprazole 20 MG EC tablet  Commonly known as: PROTONIX  Take 1 tablet by mouth Daily.     PRO-STAT liquid oral liquid  30 mL by Per G Tube route 2 (Two) Times a Day.     Scopolamine 1 MG/3DAYS patch             ALLERGIES: Patient has no known allergies.     PHYSICAL EXAM:   VITAL SIGNS:   Vitals:    11/22/22 1630   BP:    Pulse: 82   Resp:    Temp:    SpO2:       CONSTITUTIONAL: Awake, appears nontoxic   HENT: Atraumatic, normocephalic, oral mucosa pink and moist    EYES: Conjunctivae clear   NECK: Trachea midline, tracheostomy in place  CARDIOVASCULAR: Normal heart rate, Normal rhythm, No murmurs, rubs, gallops   PULMONARY/CHEST: Clear to auscultation, no rhonchi, wheezes, or rales. Symmetrical breath sounds.  ABDOMINAL: Nondistended, soft, nontender.  There is an open stoma in the mid abdomen from previous G-tube  NEUROLOGIC: Nonfocal, at baseline  EXTREMITIES: No clubbing, cyanosis, or edema   SKIN: Warm, Dry, No erythema, No rash     ED COURSE / MEDICAL DECISION MAKING:   Viji Vargas is a 61 y.o. female who presents to the emergency department for evaluation of displaced G-tube.  Patient nondistressed on arrival in the emergency department.  Vital signs are stable.  Examination reveals noninfected appearing G-tube site.   Please see procedure note below for replacement of G-tube.    G-tube replacement procedure note: Indication for procedure is dislodged G-tube.  An 18 [place] G-tube was gently inserted into the open stoma with gentle pressure.  The tube went in without significant resistance.  There was no bleeding.  Bulb was filled with 9 cc of saline.  KUB obtained post procedure with Gastrografin and appropriate placement per my interpretation.    DECISION TO DISCHARGE/ADMIT: see ED care timeline     FINAL IMPRESSION:   1 --peg tube malfunction  2 --   3 --     Electronically signed by: Malrene Nolen MD, 11/22/2022 16:43 Marlene Patricia MD  11/22/22 0846

## 2022-11-22 NOTE — ED NOTES
Report called to Rama JUAREZ at [facility]

## 2023-01-16 ENCOUNTER — HOSPITAL ENCOUNTER (EMERGENCY)
Facility: HOSPITAL | Age: 63
Discharge: NURSING, REHAB OR LONG-TERM CARE | End: 2023-01-16
Attending: EMERGENCY MEDICINE | Admitting: EMERGENCY MEDICINE
Payer: MEDICAID

## 2023-01-16 VITALS
SYSTOLIC BLOOD PRESSURE: 115 MMHG | OXYGEN SATURATION: 99 % | HEART RATE: 87 BPM | RESPIRATION RATE: 20 BRPM | TEMPERATURE: 98.8 F | DIASTOLIC BLOOD PRESSURE: 78 MMHG

## 2023-01-16 DIAGNOSIS — Z93.1 GASTROINTESTINAL TUBE PRESENT: Primary | ICD-10-CM

## 2023-01-16 PROCEDURE — 99283 EMERGENCY DEPT VISIT LOW MDM: CPT

## 2023-01-16 NOTE — ED NOTES
House supervisor contacted about locating the g-tube that the [facility] facility is requesting. House supervisor states that we do not stock this type of g-tube. [facility] facility contacted and notified of the issue with replacing the g-tube with a slip tip at this time. [facility] facility requesting that we send luer lock syringes. MD notified of LT facility request.

## 2023-01-16 NOTE — ED PROVIDER NOTES
HPI: Viji Vargas is a 62 y.o. female who presents to the emergency department from nursing home for G-tube replacement.  Patient is nonverbal and provides no history.  Per report facility sent patient here to have her G-tube exchanged because they wanted a different kind of attachment mechanism.  Apparently the G-tube is functioning as normal.  No other issues reported.      REVIEW OF SYSTEMS: Unable to obtain secondary to nonverbal  PAST MEDICAL HISTORY:   Past Medical History:   Diagnosis Date   • COPD (chronic obstructive pulmonary disease) (HCC)    • Hypertension    • Pneumonia         FAMILY HISTORY:   History reviewed. No pertinent family history.     SOCIAL HISTORY:   Social History     Socioeconomic History   • Marital status: Legally    Tobacco Use   • Smoking status: Every Day     Packs/day: 1.00     Types: Electronic Cigarette, Cigarettes   • Smokeless tobacco: Never   Vaping Use   • Vaping Use: Unknown   Substance and Sexual Activity   • Alcohol use: Not Currently     Comment: wine    • Drug use: No   • Sexual activity: Defer        SURGICAL HISTORY:   Past Surgical History:   Procedure Laterality Date   • HYSTERECTOMY      Partial    • TRACHEOSTOMY AND PEG TUBE INSERTION N/A 3/20/2019    Procedure: TRACHEOSTOMY AND PERCUTANEOUS ENDOSCOPIC GASTROSTOMY TUBE INSERTION;  Surgeon: Nadira Pandey MD;  Location: [facility];  Service: General        ALLERGIES: Patient has no known allergies.       PHYSICAL EXAM:   VITAL SIGNS:   Vitals:    01/16/23 0946   BP: 113/89   Pulse: 98   Resp: 18   Temp: 98.9 °F (37.2 °C)   SpO2: 99%      CONSTITUTIONAL: Chronically ill-appearing  HENT: Atraumatic, normocephalic, oral mucosa moist, airway patent. Nares patent without drainage. External ears normal.   EYES: Conjunctivae clear, EOMI  NECK: Trachea midline, nontender, supple   CARDIOVASCULAR: Normal heart rate, Normal rhythm.  PULMONARY/CHEST: Normal work of breathing. Clear to auscultation, no rhonchi,  wheezes, or rales.  ABDOMINAL: Nondistended, soft, nontender, no rebound or guarding.  G-tube in place.  NEUROLOGIC: Nonverbal, contractures present  EXTREMITIES: No edema   SKIN: Warm, Dry, No erythema, No rash       ED COURSE / MEDICAL DECISION MAKING:     Viji Vargas is a 62 y.o. female who presents to the emergency department for G-tube replacement.  Chronically ill-appearing lady with exam as above.  Her vital signs are normal.  Her oxygen saturation is normal on room air at 99%.  Apparently we do not have the supplies to switch the G-tube that the nursing home wanted.  We will give them syringes to use the current G-tube and discharge patient back to the nursing home.    Differential diagnosis includes G-tube evaluation among other etiologies.      Final diagnoses:   Gastrointestinal tube present (HCC)        Jose Dukes MD  01/16/23 1020

## 2023-01-22 ENCOUNTER — APPOINTMENT (OUTPATIENT)
Dept: GENERAL RADIOLOGY | Facility: HOSPITAL | Age: 63
End: 2023-01-22
Payer: MEDICAID

## 2023-01-22 ENCOUNTER — APPOINTMENT (OUTPATIENT)
Dept: CT IMAGING | Facility: HOSPITAL | Age: 63
End: 2023-01-22
Payer: MEDICAID

## 2023-01-22 ENCOUNTER — HOSPITAL ENCOUNTER (EMERGENCY)
Facility: HOSPITAL | Age: 63
Discharge: NURSING, REHAB OR LONG-TERM CARE | End: 2023-01-22
Attending: EMERGENCY MEDICINE | Admitting: EMERGENCY MEDICINE
Payer: MEDICAID

## 2023-01-22 VITALS
SYSTOLIC BLOOD PRESSURE: 114 MMHG | HEART RATE: 87 BPM | OXYGEN SATURATION: 96 % | TEMPERATURE: 100 F | WEIGHT: 154 LBS | DIASTOLIC BLOOD PRESSURE: 63 MMHG | BODY MASS INDEX: 26.43 KG/M2 | RESPIRATION RATE: 20 BRPM

## 2023-01-22 DIAGNOSIS — K56.41 FECAL IMPACTION: ICD-10-CM

## 2023-01-22 DIAGNOSIS — N39.0 URINARY TRACT INFECTION WITHOUT HEMATURIA, SITE UNSPECIFIED: ICD-10-CM

## 2023-01-22 DIAGNOSIS — R11.10 VOMITING, UNSPECIFIED VOMITING TYPE, UNSPECIFIED WHETHER NAUSEA PRESENT: Primary | ICD-10-CM

## 2023-01-22 LAB
ALBUMIN SERPL-MCNC: 3.7 G/DL (ref 3.5–5.2)
ALBUMIN/GLOB SERPL: 1.1 G/DL
ALP SERPL-CCNC: 85 U/L (ref 39–117)
ALT SERPL W P-5'-P-CCNC: 33 U/L (ref 1–33)
ANION GAP SERPL CALCULATED.3IONS-SCNC: 7.6 MMOL/L (ref 5–15)
AST SERPL-CCNC: 21 U/L (ref 1–32)
BACTERIA UR QL AUTO: ABNORMAL /HPF
BASOPHILS # BLD AUTO: 0.03 10*3/MM3 (ref 0–0.2)
BASOPHILS NFR BLD AUTO: 0.3 % (ref 0–1.5)
BILIRUB SERPL-MCNC: 0.3 MG/DL (ref 0–1.2)
BILIRUB UR QL STRIP: NEGATIVE
BUN SERPL-MCNC: 14 MG/DL (ref 8–23)
BUN/CREAT SERPL: 28 (ref 7–25)
CALCIUM SPEC-SCNC: 9.1 MG/DL (ref 8.6–10.5)
CHLORIDE SERPL-SCNC: 98 MMOL/L (ref 98–107)
CLARITY UR: ABNORMAL
CO2 SERPL-SCNC: 30.4 MMOL/L (ref 22–29)
COLOR UR: YELLOW
CREAT SERPL-MCNC: 0.5 MG/DL (ref 0.57–1)
D-LACTATE SERPL-SCNC: 0.7 MMOL/L (ref 0.5–2)
DEPRECATED RDW RBC AUTO: 43.7 FL (ref 37–54)
EGFRCR SERPLBLD CKD-EPI 2021: 104.9 ML/MIN/1.73
EOSINOPHIL # BLD AUTO: 0.06 10*3/MM3 (ref 0–0.4)
EOSINOPHIL NFR BLD AUTO: 0.6 % (ref 0.3–6.2)
ERYTHROCYTE [DISTWIDTH] IN BLOOD BY AUTOMATED COUNT: 12.7 % (ref 12.3–15.4)
FLUAV RNA RESP QL NAA+PROBE: NOT DETECTED
FLUBV RNA RESP QL NAA+PROBE: NOT DETECTED
GLOBULIN UR ELPH-MCNC: 3.3 GM/DL
GLUCOSE SERPL-MCNC: 129 MG/DL (ref 65–99)
GLUCOSE UR STRIP-MCNC: NEGATIVE MG/DL
HCT VFR BLD AUTO: 32.6 % (ref 34–46.6)
HGB BLD-MCNC: 10.6 G/DL (ref 12–15.9)
HGB UR QL STRIP.AUTO: ABNORMAL
HOLD SPECIMEN: NORMAL
HOLD SPECIMEN: NORMAL
HYALINE CASTS UR QL AUTO: ABNORMAL /LPF
IMM GRANULOCYTES # BLD AUTO: 0.04 10*3/MM3 (ref 0–0.05)
IMM GRANULOCYTES NFR BLD AUTO: 0.4 % (ref 0–0.5)
KETONES UR QL STRIP: NEGATIVE
LEUKOCYTE ESTERASE UR QL STRIP.AUTO: ABNORMAL
LIPASE SERPL-CCNC: 14 U/L (ref 13–60)
LYMPHOCYTES # BLD AUTO: 1.86 10*3/MM3 (ref 0.7–3.1)
LYMPHOCYTES NFR BLD AUTO: 17.6 % (ref 19.6–45.3)
MCH RBC QN AUTO: 30.7 PG (ref 26.6–33)
MCHC RBC AUTO-ENTMCNC: 32.5 G/DL (ref 31.5–35.7)
MCV RBC AUTO: 94.5 FL (ref 79–97)
MONOCYTES # BLD AUTO: 0.73 10*3/MM3 (ref 0.1–0.9)
MONOCYTES NFR BLD AUTO: 6.9 % (ref 5–12)
MUCOUS THREADS URNS QL MICRO: ABNORMAL /HPF
NEUTROPHILS NFR BLD AUTO: 7.87 10*3/MM3 (ref 1.7–7)
NEUTROPHILS NFR BLD AUTO: 74.2 % (ref 42.7–76)
NITRITE UR QL STRIP: NEGATIVE
NRBC BLD AUTO-RTO: 0 /100 WBC (ref 0–0.2)
PH UR STRIP.AUTO: 8 [PH] (ref 5–8)
PLATELET # BLD AUTO: 236 10*3/MM3 (ref 140–450)
PMV BLD AUTO: 12.3 FL (ref 6–12)
POTASSIUM SERPL-SCNC: 4.3 MMOL/L (ref 3.5–5.2)
PROCALCITONIN SERPL-MCNC: 0.04 NG/ML (ref 0–0.25)
PROT SERPL-MCNC: 7 G/DL (ref 6–8.5)
PROT UR QL STRIP: NEGATIVE
RBC # BLD AUTO: 3.45 10*6/MM3 (ref 3.77–5.28)
RBC # UR STRIP: ABNORMAL /HPF
REF LAB TEST METHOD: ABNORMAL
SARS-COV-2 RNA RESP QL NAA+PROBE: NOT DETECTED
SODIUM SERPL-SCNC: 136 MMOL/L (ref 136–145)
SP GR UR STRIP: 1.01 (ref 1–1.03)
SQUAMOUS #/AREA URNS HPF: ABNORMAL /HPF
UROBILINOGEN UR QL STRIP: ABNORMAL
WBC # UR STRIP: ABNORMAL /HPF
WBC NRBC COR # BLD: 10.59 10*3/MM3 (ref 3.4–10.8)
WHOLE BLOOD HOLD COAG: NORMAL
WHOLE BLOOD HOLD SPECIMEN: NORMAL

## 2023-01-22 PROCEDURE — 96375 TX/PRO/DX INJ NEW DRUG ADDON: CPT

## 2023-01-22 PROCEDURE — 87086 URINE CULTURE/COLONY COUNT: CPT | Performed by: EMERGENCY MEDICINE

## 2023-01-22 PROCEDURE — 83690 ASSAY OF LIPASE: CPT

## 2023-01-22 PROCEDURE — 25010000002 CEFTRIAXONE SODIUM-DEXTROSE 1-3.74 GM-%(50ML) RECONSTITUTED SOLUTION: Performed by: EMERGENCY MEDICINE

## 2023-01-22 PROCEDURE — 84145 PROCALCITONIN (PCT): CPT | Performed by: EMERGENCY MEDICINE

## 2023-01-22 PROCEDURE — 25010000002 PROCHLORPERAZINE 10 MG/2ML SOLUTION: Performed by: EMERGENCY MEDICINE

## 2023-01-22 PROCEDURE — 96365 THER/PROPH/DIAG IV INF INIT: CPT

## 2023-01-22 PROCEDURE — 87077 CULTURE AEROBIC IDENTIFY: CPT | Performed by: EMERGENCY MEDICINE

## 2023-01-22 PROCEDURE — P9612 CATHETERIZE FOR URINE SPEC: HCPCS

## 2023-01-22 PROCEDURE — 99284 EMERGENCY DEPT VISIT MOD MDM: CPT

## 2023-01-22 PROCEDURE — 87636 SARSCOV2 & INF A&B AMP PRB: CPT | Performed by: EMERGENCY MEDICINE

## 2023-01-22 PROCEDURE — 81001 URINALYSIS AUTO W/SCOPE: CPT

## 2023-01-22 PROCEDURE — 71045 X-RAY EXAM CHEST 1 VIEW: CPT

## 2023-01-22 PROCEDURE — 87186 SC STD MICRODIL/AGAR DIL: CPT | Performed by: EMERGENCY MEDICINE

## 2023-01-22 PROCEDURE — 96361 HYDRATE IV INFUSION ADD-ON: CPT

## 2023-01-22 PROCEDURE — 36415 COLL VENOUS BLD VENIPUNCTURE: CPT

## 2023-01-22 PROCEDURE — 83605 ASSAY OF LACTIC ACID: CPT

## 2023-01-22 PROCEDURE — 87040 BLOOD CULTURE FOR BACTERIA: CPT | Performed by: EMERGENCY MEDICINE

## 2023-01-22 PROCEDURE — 80053 COMPREHEN METABOLIC PANEL: CPT

## 2023-01-22 PROCEDURE — 85025 COMPLETE CBC W/AUTO DIFF WBC: CPT

## 2023-01-22 PROCEDURE — 74176 CT ABD & PELVIS W/O CONTRAST: CPT

## 2023-01-22 RX ORDER — PROMETHAZINE HYDROCHLORIDE 25 MG/1
25 TABLET ORAL EVERY 6 HOURS PRN
Qty: 12 TABLET | Refills: 0 | Status: SHIPPED | OUTPATIENT
Start: 2023-01-22

## 2023-01-22 RX ORDER — POLYETHYLENE GLYCOL 3350 17 G/17G
17 POWDER, FOR SOLUTION ORAL 2 TIMES DAILY
Qty: 60 EACH | Refills: 0 | Status: SHIPPED | OUTPATIENT
Start: 2023-01-22

## 2023-01-22 RX ORDER — CEFTRIAXONE 1 G/50ML
1 INJECTION, SOLUTION INTRAVENOUS ONCE
Status: COMPLETED | OUTPATIENT
Start: 2023-01-22 | End: 2023-01-22

## 2023-01-22 RX ORDER — PROCHLORPERAZINE EDISYLATE 5 MG/ML
5 INJECTION INTRAMUSCULAR; INTRAVENOUS ONCE
Status: COMPLETED | OUTPATIENT
Start: 2023-01-22 | End: 2023-01-22

## 2023-01-22 RX ORDER — SODIUM CHLORIDE 0.9 % (FLUSH) 0.9 %
10 SYRINGE (ML) INJECTION AS NEEDED
Status: DISCONTINUED | OUTPATIENT
Start: 2023-01-22 | End: 2023-01-22 | Stop reason: HOSPADM

## 2023-01-22 RX ORDER — CEFUROXIME AXETIL 500 MG/1
500 TABLET ORAL 2 TIMES DAILY
Qty: 20 TABLET | Refills: 0 | Status: SHIPPED | OUTPATIENT
Start: 2023-01-22

## 2023-01-22 RX ORDER — DOCUSATE SODIUM 100 MG/1
100 CAPSULE, LIQUID FILLED ORAL 2 TIMES DAILY
Qty: 20 CAPSULE | Refills: 0 | Status: SHIPPED | OUTPATIENT
Start: 2023-01-22

## 2023-01-22 RX ORDER — DOXYCYCLINE 100 MG/1
100 CAPSULE ORAL 2 TIMES DAILY
Qty: 20 CAPSULE | Refills: 0 | Status: SHIPPED | OUTPATIENT
Start: 2023-01-22

## 2023-01-22 RX ADMIN — CEFTRIAXONE 1 G: 1 INJECTION, SOLUTION INTRAVENOUS at 08:04

## 2023-01-22 RX ADMIN — SODIUM CHLORIDE 1000 ML: 9 INJECTION, SOLUTION INTRAVENOUS at 06:51

## 2023-01-22 RX ADMIN — PROCHLORPERAZINE EDISYLATE 5 MG: 5 INJECTION INTRAMUSCULAR; INTRAVENOUS at 06:51

## 2023-01-22 NOTE — ED PROVIDER NOTES
Subjective  History of Present Illness:    Chief Complaint: Vomiting  History of Present Illness: 62-year-old female nonverbal history of tracheostomy, G-tube, COPD stroke pretension here with reported vomiting at nursing home.  Had Zofran which did not help, patient is unable to provide any meaningful history.  History obtained from nurses note, EMS report  Onset: Tonight    Nurses Notes reviewed and agree, including vitals, allergies, social history and prior medical history.     REVIEW OF SYSTEMS: All systems reviewed and not pertinent unless noted.  Review of Systems      Positive for: Vomiting,    Negative for: Unable to negative review of systems secondary to patient status on arrival    Past Medical History:   Diagnosis Date   • COPD (chronic obstructive pulmonary disease) (HCC)    • Hypertension    • Pneumonia    • Stroke (HCC)        Allergies:    Patient has no known allergies.      Past Surgical History:   Procedure Laterality Date   • HYSTERECTOMY      Partial    • TRACHEOSTOMY AND PEG TUBE INSERTION N/A 3/20/2019    Procedure: TRACHEOSTOMY AND PERCUTANEOUS ENDOSCOPIC GASTROSTOMY TUBE INSERTION;  Surgeon: Nadira Pandey MD;  Location: [facility];  Service: General         Social History     Socioeconomic History   • Marital status: Legally    Tobacco Use   • Smoking status: Former     Packs/day: 1.00     Types: Electronic Cigarette, Cigarettes   • Smokeless tobacco: Never   Vaping Use   • Vaping Use: Unknown   Substance and Sexual Activity   • Alcohol use: Not Currently     Comment: wine    • Drug use: No   • Sexual activity: Defer         History reviewed. No pertinent family history.    Objective  Physical Exam:  /63   Pulse 87   Temp 100 °F (37.8 °C) (Axillary)   Resp 20   Wt 69.9 kg (154 lb)   SpO2 96%   BMI 26.43 kg/m²      Physical Exam    CONSTITUTIONAL: Well developed, frail chronically ill-appearing 62-year-old female,  in no acute distress.  VITAL SIGNS: per nursing,  reviewed and noted  SKIN: exposed skin with no rashes, ulcerations or petechiae  EYES: , no icterus  ENT: Dry mucous membranes.  Poor dentition   RESPIRATORY:  No increased work of breathing. No retractions.  Chest clear to auscultation tracheostomy in position  CARDIOVASCULAR:  regular rate and rhythm, no murmurs.    GI: Abdomen soft, no distention.  G-tube in position  MUSCULOSKELETAL: Contractures and muscle wasting   NEUROLOGIC: Obtunded nonverbal.   PSYCH: Flat affect  : no bladder distention    Procedures  No attending physician procedures were performed on this patient.  ED Course:             Lab Results (last 24 hours)     Procedure Component Value Units Date/Time    CBC & Differential [[id number]]  (Abnormal) Collected: 01/22/23 0629    Specimen: Blood Updated: 01/22/23 0635    Narrative:      The following orders were created for panel order CBC & Differential.  Procedure                               Abnormality         Status                     ---------                               -----------         ------                     CBC Auto Differential[[id number]]        Abnormal            Final result                 Please view results for these tests on the individual orders.    Comprehensive Metabolic Panel [[id number]]  (Abnormal) Collected: 01/22/23 0629    Specimen: Blood Updated: 01/22/23 0656     Glucose 129 mg/dL      BUN 14 mg/dL      Creatinine 0.50 mg/dL      Sodium 136 mmol/L      Potassium 4.3 mmol/L      Comment: Slight hemolysis detected by analyzer. Results may be affected.        Chloride 98 mmol/L      CO2 30.4 mmol/L      Calcium 9.1 mg/dL      Total Protein 7.0 g/dL      Albumin 3.7 g/dL      ALT (SGPT) 33 U/L      AST (SGOT) 21 U/L      Comment: Slight hemolysis detected by analyzer. Results may be affected.        Alkaline Phosphatase 85 U/L      Total Bilirubin 0.3 mg/dL      Globulin 3.3 gm/dL      A/G Ratio 1.1 g/dL      BUN/Creatinine Ratio 28.0     Anion Gap 7.6 mmol/L       "eGFR 104.9 mL/min/1.73     Narrative:      GFR Normal >60  Chronic Kidney Disease <60  Kidney Failure <15      Lipase [[id number]]  (Normal) Collected: 01/22/23 0629    Specimen: Blood Updated: 01/22/23 0654     Lipase 14 U/L     Lactic Acid, Plasma [[id number]]  (Normal) Collected: 01/22/23 0629    Specimen: Blood Updated: 01/22/23 0651     Lactate 0.7 mmol/L     CBC Auto Differential [[id number]]  (Abnormal) Collected: 01/22/23 0629    Specimen: Blood Updated: 01/22/23 0635     WBC 10.59 10*3/mm3      RBC 3.45 10*6/mm3      Hemoglobin 10.6 g/dL      Hematocrit 32.6 %      MCV 94.5 fL      MCH 30.7 pg      MCHC 32.5 g/dL      RDW 12.7 %      RDW-SD 43.7 fl      MPV 12.3 fL      Platelets 236 10*3/mm3      Neutrophil % 74.2 %      Lymphocyte % 17.6 %      Monocyte % 6.9 %      Eosinophil % 0.6 %      Basophil % 0.3 %      Immature Grans % 0.4 %      Neutrophils, Absolute 7.87 10*3/mm3      Lymphocytes, Absolute 1.86 10*3/mm3      Monocytes, Absolute 0.73 10*3/mm3      Eosinophils, Absolute 0.06 10*3/mm3      Basophils, Absolute 0.03 10*3/mm3      Immature Grans, Absolute 0.04 10*3/mm3      nRBC 0.0 /100 WBC     Procalcitonin [[id number]]  (Normal) Collected: 01/22/23 0629    Specimen: Blood Updated: 01/22/23 0747     Procalcitonin 0.04 ng/mL     Narrative:      As a Marker for Sepsis (Non-Neonates):    1. <0.5 ng/mL represents a low risk of severe sepsis and/or septic shock.  2. >2 ng/mL represents a high risk of severe sepsis and/or septic shock.    As a Marker for Lower Respiratory Tract Infections that require antibiotic therapy:    PCT on Admission    Antibiotic Therapy       6-12 Hrs later    >0.5                Strongly Recommended  >0.25 - <0.5        Recommended   0.1 - 0.25          Discouraged              Remeasure/reassess PCT  <0.1                Strongly Discouraged     Remeasure/reassess PCT    As 28 day mortality risk marker: \"Change in Procalcitonin Result\" (>80% or <=80%) if Day 0 (or Day 1) and Day 4 " values are available. Refer to http://www.[facility]-pct-calculator.com    Change in PCT <=80%  A decrease of PCT levels below or equal to 80% defines a positive change in PCT test result representing a higher risk for 28-day all-cause mortality of patients diagnosed with severe sepsis for septic shock.    Change in PCT >80%  A decrease of PCT levels of more than 80% defines a negative change in PCT result representing a lower risk for 28-day all-cause mortality of patients diagnosed with severe sepsis or septic shock.       Urinalysis With Microscopic If Indicated (No Culture) - Straight Cath [[id number]]  (Abnormal) Collected: 01/22/23 0654    Specimen: Urine from Straight Cath Updated: 01/22/23 0703     Color, UA Yellow     Appearance, UA Turbid     pH, UA 8.0     Specific Gravity, UA 1.007     Glucose, UA Negative     Ketones, UA Negative     Bilirubin, UA Negative     Blood, UA Trace     Protein, UA Negative     Leuk Esterase, UA Large (3+)     Nitrite, UA Negative     Urobilinogen, UA 0.2 E.U./dL    Urinalysis, Microscopic Only - Straight Cath [[id number]]  (Abnormal) Collected: 01/22/23 0654    Specimen: Urine from Straight Cath Updated: 01/22/23 0711     RBC, UA 0-2 /HPF      WBC, UA 13-20 /HPF      Bacteria, UA 3+ /HPF      Squamous Epithelial Cells, UA 3-6 /HPF      Hyaline Casts, UA None Seen /LPF      Mucus, UA Trace /HPF      Methodology Manual Light Microscopy    COVID-19 and FLU A/B PCR - Swab, Nasopharynx [[id number]]  (Normal) Collected: 01/22/23 0655    Specimen: Swab from Nasopharynx Updated: 01/22/23 0722     COVID19 Not Detected     Influenza A PCR Not Detected     Influenza B PCR Not Detected    Narrative:      Fact sheet for providers: https://www.fda.gov/media/791790/download    Fact sheet for patients: https://www.fda.gov/media/789028/download    Test performed by PCR.    Urine Culture - Urine, Straight Cath [[id number]] Collected: 01/22/23 0733    Specimen: Urine from Straight Cath Updated:  01/22/23 0734    Blood Culture - Blood, Hand, Left [[id number]] Collected: 01/22/23 0735    Specimen: Blood from Hand, Left Updated: 01/22/23 0751    Blood Culture - Blood, Hand, Right [[id number]] Collected: 01/22/23 0740    Specimen: Blood from Hand, Right Updated: 01/22/23 0750           CT Abdomen Pelvis Without Contrast    Result Date: 1/22/2023  PROCEDURE: CT ABDOMEN PELVIS WO CONTRAST-  HISTORY: vomiting, fever  COMPARISON: 03/31/2022.  PROCEDURE: Axial images were obtained from the lung bases to the pubic symphysis by computed tomography without intravenous contrast.  FINDINGS: Lack of intravenous contrast limits assessment of the solid organs, the mediastinum, and the vasculature.  ABDOMEN:Basilar atelectasis is noted. There is no free air in the abdomen. G-tube is present within the stomach. Cholelithiasis is noted. The noncontrasted liver, spleen, and pancreas are unremarkable. There is mild thickening of the bilateral adrenal glands. There is no hydronephrosis. Atherosclerosis without aortic aneurysm. There are no abnormally dilated loops of bowel. There is a large amount of retained stool with a 11.5 cm rectal bolus, correlate for fecal impaction.  PELVIS: The appendix is normal. Bladder is decompressed. Uterus is not identified. No significant fluid collections in the pelvis. No acute osseous changes.      Impression: There is a large amount of retained stool with a 11.5 cm rectal bolus, correlate for fecal impaction.  Cholelithiasis. Consider ultrasound if clinically warranted.  Mild thickening of the bilateral adrenal glands.      This study was performed with techniques to keep radiation doses as low as reasonably achievable (ALARA). Individualized dose reduction techniques using automated exposure control or adjustment of mA and/or kV according to the patient size were employed.  This report was signed and finalized on 1/22/2023 8:46 AM by César Valentine DO.    XR Chest 1 View    Result Date:  1/22/2023  PROCEDURE: XR CHEST 1 VW-  HISTORY: vomiting, fever  COMPARISON: 11/12/2022.  FINDINGS: Tracheostomy appears in appropriate position. The heart is normal in size. The mediastinum is unremarkable. There are increased interstitial markings which may represent interstitial infiltrates or edema. There is no pneumothorax.  There are no acute osseous abnormalities. G-tube is noted overlying the stomach.      Impression: There are increased interstitial markings which may represent interstitial infiltrates or edema.  Continued followup is recommended.  This report was signed and finalized on 1/22/2023 8:41 AM by César Valentine DO.         [facility]    Initial impression of presenting illness: Chronically ill 62-year-old nursing home resident presents with vomiting with failure of treatment with Zofran    DDX: includes but is not limited to: Sepsis, gastroenteritis, intra-abdominal infection, aspiration, flu, COVID    Patient arrives low-grade fever temperature 100 °F normotensive no tachycardia oxygen saturations 97% with vitals interpreted by myself.     Pertinent features from physical exam: Chronic contractures and muscle wasting.  Flat affect, nonverbal..    Initial diagnostic plan: CT abdomen pelvis given baseline status, chest x-ray, IV fluids, antiemetic  10:03 EST  Patient be signed out to oncoming physician for final disposition  Results from initial plan were reviewed and interpreted by me revealing UA reveals 1320 white cells 3+ bacteria, large leukocytes.  White cell count 10.5 none negative lactate, normal lipase, glucose 129 BUN 14 creatinine 0.5.  CT abdomen pelvis pending flu and COVID are negative    Diagnostic information from other sources: EMS report and nurses notes    Interventions / Re-evaluation: After antiemetic medication patient has had no further vomiting.    Results/clinical rationale were discussed with patient and nursing home.  Patient has no signs of sepsis with normal lactic acid and  procalcitonin.  We did get blood cultures and start patient on antibiotics for her urinary infection.  Imaging reveals possible interstitial infiltrates on x-ray, CT imaging per radiology report also reveals possible fecal impaction.  This appears similar to previous imaging reports.    Consultations/Discussion of results with other physicians: None    Disposition plan: Discharge home with bowel regimen and antibiotics for infection.  Also antinausea medication.  Patient's vital signs have remained stable otherwise.  Return precautions were discussed.  -----    Final diagnoses:   Vomiting, unspecified vomiting type, unspecified whether nausea present   Urinary tract infection without hematuria, site unspecified   Fecal impaction (HCC)        Marlene Nolen MD  01/22/23 4037

## 2023-01-22 NOTE — ED NOTES
Attempted to call report back to [facility] and [address] with no answer.

## 2023-01-22 NOTE — ED NOTES
Called [facility] at this time requesting transport back [facility]

## 2023-01-24 LAB — BACTERIA SPEC AEROBE CULT: ABNORMAL

## 2023-01-27 LAB
BACTERIA SPEC AEROBE CULT: NORMAL
BACTERIA SPEC AEROBE CULT: NORMAL

## 2023-02-11 ENCOUNTER — APPOINTMENT (OUTPATIENT)
Dept: CT IMAGING | Facility: HOSPITAL | Age: 63
End: 2023-02-11
Payer: MEDICAID

## 2023-02-11 ENCOUNTER — HOSPITAL ENCOUNTER (EMERGENCY)
Facility: HOSPITAL | Age: 63
Discharge: NURSING, REHAB OR LONG-TERM CARE | End: 2023-02-11
Attending: EMERGENCY MEDICINE | Admitting: EMERGENCY MEDICINE
Payer: MEDICAID

## 2023-02-11 VITALS
WEIGHT: 160 LBS | TEMPERATURE: 97.9 F | DIASTOLIC BLOOD PRESSURE: 61 MMHG | RESPIRATION RATE: 16 BRPM | OXYGEN SATURATION: 100 % | SYSTOLIC BLOOD PRESSURE: 110 MMHG | HEIGHT: 64 IN | HEART RATE: 77 BPM | BODY MASS INDEX: 27.31 KG/M2

## 2023-02-11 DIAGNOSIS — K56.41 FECAL IMPACTION IN RECTUM: Primary | ICD-10-CM

## 2023-02-11 LAB
ALBUMIN SERPL-MCNC: 3.2 G/DL (ref 3.5–5.2)
ALBUMIN/GLOB SERPL: 1.1 G/DL
ALP SERPL-CCNC: 103 U/L (ref 39–117)
ALT SERPL W P-5'-P-CCNC: 76 U/L (ref 1–33)
ANION GAP SERPL CALCULATED.3IONS-SCNC: 10.5 MMOL/L (ref 5–15)
AST SERPL-CCNC: 29 U/L (ref 1–32)
BASOPHILS # BLD AUTO: 0.03 10*3/MM3 (ref 0–0.2)
BASOPHILS NFR BLD AUTO: 0.4 % (ref 0–1.5)
BILIRUB SERPL-MCNC: 0.3 MG/DL (ref 0–1.2)
BUN SERPL-MCNC: 11 MG/DL (ref 8–23)
BUN/CREAT SERPL: 19 (ref 7–25)
CALCIUM SPEC-SCNC: 9 MG/DL (ref 8.6–10.5)
CHLORIDE SERPL-SCNC: 100 MMOL/L (ref 98–107)
CO2 SERPL-SCNC: 28.5 MMOL/L (ref 22–29)
CREAT SERPL-MCNC: 0.58 MG/DL (ref 0.57–1)
DEPRECATED RDW RBC AUTO: 47.5 FL (ref 37–54)
EGFRCR SERPLBLD CKD-EPI 2021: 101.2 ML/MIN/1.73
EOSINOPHIL # BLD AUTO: 0.14 10*3/MM3 (ref 0–0.4)
EOSINOPHIL NFR BLD AUTO: 1.7 % (ref 0.3–6.2)
ERYTHROCYTE [DISTWIDTH] IN BLOOD BY AUTOMATED COUNT: 13.8 % (ref 12.3–15.4)
GLOBULIN UR ELPH-MCNC: 3 GM/DL
GLUCOSE SERPL-MCNC: 106 MG/DL (ref 65–99)
HCT VFR BLD AUTO: 29.9 % (ref 34–46.6)
HGB BLD-MCNC: 10 G/DL (ref 12–15.9)
IMM GRANULOCYTES # BLD AUTO: 0.02 10*3/MM3 (ref 0–0.05)
IMM GRANULOCYTES NFR BLD AUTO: 0.2 % (ref 0–0.5)
LIPASE SERPL-CCNC: 18 U/L (ref 13–60)
LYMPHOCYTES # BLD AUTO: 2.37 10*3/MM3 (ref 0.7–3.1)
LYMPHOCYTES NFR BLD AUTO: 28.4 % (ref 19.6–45.3)
MCH RBC QN AUTO: 31.5 PG (ref 26.6–33)
MCHC RBC AUTO-ENTMCNC: 33.4 G/DL (ref 31.5–35.7)
MCV RBC AUTO: 94.3 FL (ref 79–97)
MONOCYTES # BLD AUTO: 0.91 10*3/MM3 (ref 0.1–0.9)
MONOCYTES NFR BLD AUTO: 10.9 % (ref 5–12)
NEUTROPHILS NFR BLD AUTO: 4.88 10*3/MM3 (ref 1.7–7)
NEUTROPHILS NFR BLD AUTO: 58.4 % (ref 42.7–76)
NRBC BLD AUTO-RTO: 0 /100 WBC (ref 0–0.2)
PLATELET # BLD AUTO: 263 10*3/MM3 (ref 140–450)
PMV BLD AUTO: 12 FL (ref 6–12)
POTASSIUM SERPL-SCNC: 3.8 MMOL/L (ref 3.5–5.2)
PROT SERPL-MCNC: 6.2 G/DL (ref 6–8.5)
RBC # BLD AUTO: 3.17 10*6/MM3 (ref 3.77–5.28)
SODIUM SERPL-SCNC: 139 MMOL/L (ref 136–145)
WBC NRBC COR # BLD: 8.35 10*3/MM3 (ref 3.4–10.8)

## 2023-02-11 PROCEDURE — 74176 CT ABD & PELVIS W/O CONTRAST: CPT

## 2023-02-11 PROCEDURE — 85025 COMPLETE CBC W/AUTO DIFF WBC: CPT | Performed by: EMERGENCY MEDICINE

## 2023-02-11 PROCEDURE — 83690 ASSAY OF LIPASE: CPT | Performed by: EMERGENCY MEDICINE

## 2023-02-11 PROCEDURE — 99283 EMERGENCY DEPT VISIT LOW MDM: CPT

## 2023-02-11 PROCEDURE — 36415 COLL VENOUS BLD VENIPUNCTURE: CPT

## 2023-02-11 PROCEDURE — 80053 COMPREHEN METABOLIC PANEL: CPT | Performed by: EMERGENCY MEDICINE

## 2023-02-11 RX ORDER — SODIUM CHLORIDE 0.9 % (FLUSH) 0.9 %
10 SYRINGE (ML) INJECTION AS NEEDED
Status: DISCONTINUED | OUTPATIENT
Start: 2023-02-11 | End: 2023-02-11 | Stop reason: HOSPADM

## 2023-03-06 ENCOUNTER — TRANSCRIBE ORDERS (OUTPATIENT)
Dept: ADMINISTRATIVE | Facility: HOSPITAL | Age: 63
End: 2023-03-06
Payer: MEDICAID

## 2023-03-06 DIAGNOSIS — R11.10 UNCONTROLLABLE VOMITING: Primary | ICD-10-CM

## 2023-03-07 ENCOUNTER — HOSPITAL ENCOUNTER (EMERGENCY)
Facility: HOSPITAL | Age: 63
Discharge: NURSING, REHAB OR LONG-TERM CARE | End: 2023-03-07
Attending: EMERGENCY MEDICINE | Admitting: EMERGENCY MEDICINE
Payer: MEDICAID

## 2023-03-07 VITALS
SYSTOLIC BLOOD PRESSURE: 113 MMHG | RESPIRATION RATE: 20 BRPM | HEIGHT: 64 IN | TEMPERATURE: 98.8 F | DIASTOLIC BLOOD PRESSURE: 86 MMHG | WEIGHT: 160 LBS | BODY MASS INDEX: 27.31 KG/M2 | HEART RATE: 87 BPM | OXYGEN SATURATION: 100 %

## 2023-03-07 DIAGNOSIS — K94.23 MALFUNCTION OF GASTROSTOMY TUBE: Primary | ICD-10-CM

## 2023-03-07 PROCEDURE — 99283 EMERGENCY DEPT VISIT LOW MDM: CPT

## 2023-03-07 NOTE — ED PROVIDER NOTES
Subjective  History of Present Illness:    Chief Complaint:   Chief Complaint   Patient presents with   • Wellness Check      History of Present Illness: Viji Vargas is a 62 y.o. female who presents to the emergency department complaining of requesting G-tube replacement.  Review of records shows patient had a tracheostomy and PEG tube placement at this facility by Dr. Pandey.  Reportedly [facility] to pick the patient up to bring her to the ED for a G-tube replacement.  Per EMS, nursing home staff states tube needs to be replaced but no clear reason as to why given at this time.  Patient is nontoxic in no acute distress appears to be at her baseline per nursing home staff and EMS.  She is essentially noncommunicative so history is limited  Onset: Today  Duration: Ongoing  Exacerbating / Alleviating factors: Unknown  Associated symptoms: Unknown      Nurses Notes reviewed and agree, including vitals, allergies, social history and prior medical history.     Review of Systems   Constitutional: Negative.    HENT: Negative.    Eyes: Negative.    Respiratory: Negative.    Cardiovascular: Negative.    Gastrointestinal:        Facility requesting G-tube replacement   Genitourinary: Negative.    Musculoskeletal: Negative.    Skin: Negative.    Neurological: Negative.    Psychiatric/Behavioral: Negative.        Past Medical History:   Diagnosis Date   • COPD (chronic obstructive pulmonary disease) (HCC)    • Hypertension    • Pneumonia    • Stroke (HCC)        Allergies:    Patient has no known allergies.      Past Surgical History:   Procedure Laterality Date   • HYSTERECTOMY      Partial    • TRACHEOSTOMY AND PEG TUBE INSERTION N/A 3/20/2019    Procedure: TRACHEOSTOMY AND PERCUTANEOUS ENDOSCOPIC GASTROSTOMY TUBE INSERTION;  Surgeon: Nadira Pandey MD;  Location: [facility];  Service: General         Social History     Socioeconomic History   •  "Marital status: Legally    Tobacco Use   • Smoking status: Former     Packs/day: 1.00     Types: Electronic Cigarette, Cigarettes   • Smokeless tobacco: Never   Vaping Use   • Vaping Use: Unknown   Substance and Sexual Activity   • Alcohol use: Not Currently     Comment: wine    • Drug use: No   • Sexual activity: Defer         History reviewed. No pertinent family history.    Objective  Physical Exam:  /86   Pulse 87   Temp 98.8 °F (37.1 °C) (Oral)   Resp 20   Ht 162.6 cm (64\")   Wt 72.6 kg (160 lb)   SpO2 100%   BMI 27.46 kg/m²      Physical Exam  Vitals and nursing note reviewed.   Constitutional:       General: She is not in acute distress.     Appearance: Normal appearance. She is not ill-appearing, toxic-appearing or diaphoretic.   HENT:      Head: Normocephalic and atraumatic.      Nose: Nose normal.   Eyes:      Extraocular Movements: Extraocular movements intact.   Cardiovascular:      Rate and Rhythm: Normal rate and regular rhythm.   Pulmonary:      Effort: Pulmonary effort is normal.      Comments: Breath sounds clear to auscultation, patient does have a cast in place and there is some coarse sounds from the trach likely simple secretions.  No respiratory distress  Abdominal:      General: Abdomen is flat. There is no distension.      Palpations: Abdomen is soft.      Comments: G-tube in place left upper abdomen with normal skin surrounding.  No drainage.  Balloon appears tight to the stomach wall and stopper on the outside of the abdomen and seems flush without any sense of movement.   Musculoskeletal:      Cervical back: Normal range of motion.   Skin:     General: Skin is warm and dry.   Neurological:      Mental Status: She is alert. Mental status is at baseline.           Feeding Tube Replacement    Date/Time: 3/7/2023 1:33 PM  Performed by: Josh Lay PA-C  Authorized by: Marlene Nolen MD     Consent:     Consent obtained:  Verbal    Consent given by: " " Healthcare agent  Universal protocol:     Relevant documents present and verified: yes      Patient identity confirmed:  Arm band  Pre-procedure details:     Old tube type:  Gastrostomy    Old tube size:  18 Fr  Sedation:     Sedation type:  None  Procedure details:     Patient position:  Supine    Procedure type:  Replacement    Tube type:  Gastrostomy    Tube size:  18 Fr    Bulb inflation volume:  8ml    Bulb inflation fluid:  Sterile water  Post-procedure details:     Placement/position confirmation:  Gastric contents aspirated    Placement difficulty:  None    Bleeding:  None    Procedure completion:  Tolerated well, no immediate complications        ED Course:         Lab Results (last 24 hours)     ** No results found for the last 24 hours. **           No radiology results from the last 24 hrs       ILANA Vargas is a 62 y.o. female who presents to the emergency department for evaluation of G-tube replacement    Differential diagnosis includes malfunctioning G-tube, blockage among other etiologies.    Chart review if available included outside testing, previous visits, prior labs, prior imaging, available notes from prior evaluations or visits with specialists, medication list, allergies, past medical history, past surgical history when applicable.    Patient was treated with replacement of G-tube    Attempting to flush patient's original G-tube resulted in \"ballooning\" around the 9 cm sam indicative of a distal obstruction.  Warm water was used in an attempt to flush the tube to no success.  Decision was made to replace the G-tube.  Another 18 [place] G-tube was obtained, balloon was deflated original G-tube and moved about 5 cc of fluid, removed without difficulty new tube placed without difficulty, new balloon inflated pulled snug to stomach wall stopper on abdominal wall lowered to fit snugly, gastric contents aspirated confirming placement.    Plan for disposition is discharged back to nursing " home.  Patient/family comfortable with and understanding of the plan.      Final diagnoses:   Malfunction of gastrostomy tube (HCC)        Josh Lay PA-C  03/07/23 1333       Josh Lay PA-C  03/07/23 8980

## 2023-03-07 NOTE — ED NOTES
[facility] [place] [facility] contacted at this time for a transfer back to [facility].

## 2023-03-08 ENCOUNTER — TRANSCRIBE ORDERS (OUTPATIENT)
Dept: ADMINISTRATIVE | Facility: HOSPITAL | Age: 63
End: 2023-03-08
Payer: MEDICAID

## 2023-04-15 ENCOUNTER — HOSPITAL ENCOUNTER (EMERGENCY)
Facility: HOSPITAL | Age: 63
Discharge: NURSING, REHAB OR LONG-TERM CARE | End: 2023-04-15
Attending: STUDENT IN AN ORGANIZED HEALTH CARE EDUCATION/TRAINING PROGRAM | Admitting: EMERGENCY MEDICINE
Payer: MEDICAID

## 2023-04-15 VITALS
TEMPERATURE: 98.8 F | HEIGHT: 64 IN | SYSTOLIC BLOOD PRESSURE: 134 MMHG | OXYGEN SATURATION: 97 % | BODY MASS INDEX: 27.31 KG/M2 | HEART RATE: 92 BPM | WEIGHT: 160 LBS | DIASTOLIC BLOOD PRESSURE: 78 MMHG | RESPIRATION RATE: 16 BRPM

## 2023-04-15 DIAGNOSIS — K94.23 GASTROSTOMY TUBE OBSTRUCTION: Primary | ICD-10-CM

## 2023-04-15 PROCEDURE — 99283 EMERGENCY DEPT VISIT LOW MDM: CPT

## 2023-04-15 NOTE — ED NOTES
Attempted to call report back to [facility] at this time with no answer.

## 2023-04-15 NOTE — ED PROVIDER NOTES
HPI: Viji Vargas is a 62 y.o. female who presents to the emergency department complaining of G-tube obstruction.  Patient sent in from nursing home with complaints that her G-tube is not flushing.  Patient is nonverbal and cannot provide any history.  No other history obtained from the nursing home.      REVIEW OF SYSTEMS: Unable to obtain secondary to nonverbal    PAST MEDICAL HISTORY:   Past Medical History:   Diagnosis Date   • COPD (chronic obstructive pulmonary disease)    • Hypertension    • Pneumonia    • Stroke         FAMILY HISTORY:   History reviewed. No pertinent family history.     SOCIAL HISTORY:   Social History     Socioeconomic History   • Marital status: Legally    Tobacco Use   • Smoking status: Former     Packs/day: 1.00     Types: Electronic Cigarette, Cigarettes   • Smokeless tobacco: Never   Vaping Use   • Vaping Use: Unknown   Substance and Sexual Activity   • Alcohol use: Not Currently     Comment: wine    • Drug use: No   • Sexual activity: Defer        SURGICAL HISTORY:   Past Surgical History:   Procedure Laterality Date   • HYSTERECTOMY      Partial    • TRACHEOSTOMY AND PEG TUBE INSERTION N/A 3/20/2019    Procedure: TRACHEOSTOMY AND PERCUTANEOUS ENDOSCOPIC GASTROSTOMY TUBE INSERTION;  Surgeon: Nadira Pandey MD;  Location: [facility];  Service: General        ALLERGIES: Patient has no known allergies.       PHYSICAL EXAM:   VITAL SIGNS:   Vitals:    04/15/23 1931   BP: 130/71   Pulse: 92   Resp: 16   Temp: 98.8 °F (37.1 °C)   SpO2: 100%      CONSTITUTIONAL: Chronically ill-appearing, awake  HENT: Atraumatic, normocephalic, oral mucosa moist, airway patent. Nares patent without drainage. External ears normal.   EYES: Conjunctivae clear, EOMI, PERRL   NECK: Tracheostomy present  CARDIOVASCULAR: Normal heart rate, Normal rhythm.  PULMONARY/CHEST: Normal work of breathing. Clear to auscultation, no rhonchi, wheezes, or rales.  ABDOMINAL: G-tube present with crusting around  the site, tube is full of what looks like tube feeds  NEUROLOGIC: Chronic contractures, chronic weakness  SKIN: Warm, Dry, No erythema, No rash       ED COURSE / MEDICAL DECISION MAKING:     Viji Vargas is a 62 y.o. female who presents to the emergency department for evaluation of G-tube obstruction.  Chronically ill-appearing lady in no distress with exam as above.  Her vital signs are normal.  Her oxygen saturation is normal on her trach collar at 100%.  G-tube does appear to be obstructed.  This was replaced at bedside.  Patient will be discharged back to nursing home.    Differential diagnosis includes G-tube dysfunction among other etiologies.    Feeding tube replacement procedure note  Indication: Obstructed G-tube.  The old G-tube was deflated and removed.  The site was cleaned with alcohol swab.  A new 18 Khmer G-tube was placed into the old ostomy meeting no resistance.  Balloon was inflated with 9 mL of saline.  Tube was secured with tube stopper.  Gastric contents were aspirated and tube flushed easily.  Patient tolerated procedure well with no acute apparent complications.    Final diagnoses:   Gastrostomy tube obstruction        Jose Dukes MD  04/15/23 1945

## 2023-05-05 ENCOUNTER — HOSPITAL ENCOUNTER (OUTPATIENT)
Facility: HOSPITAL | Age: 63
Setting detail: OBSERVATION
Discharge: NURSING, REHAB OR LONG-TERM CARE | End: 2023-05-08
Attending: EMERGENCY MEDICINE | Admitting: INTERNAL MEDICINE
Payer: MEDICAID

## 2023-05-05 ENCOUNTER — APPOINTMENT (OUTPATIENT)
Dept: GENERAL RADIOLOGY | Facility: HOSPITAL | Age: 63
End: 2023-05-05
Payer: MEDICAID

## 2023-05-05 DIAGNOSIS — R09.02 HYPOXIA: Primary | ICD-10-CM

## 2023-05-05 DIAGNOSIS — Z86.73 HISTORY OF STROKE: ICD-10-CM

## 2023-05-05 DIAGNOSIS — Z98.890 HISTORY OF TRACHEOSTOMY: ICD-10-CM

## 2023-05-05 LAB
A-A DO2: 193.1 MMHG
ALBUMIN SERPL-MCNC: 3.6 G/DL (ref 3.5–5.2)
ALBUMIN/GLOB SERPL: 1 G/DL
ALP SERPL-CCNC: 90 U/L (ref 39–117)
ALT SERPL W P-5'-P-CCNC: 19 U/L (ref 1–33)
ANION GAP SERPL CALCULATED.3IONS-SCNC: 8.7 MMOL/L (ref 5–15)
ARTERIAL PATENCY WRIST A: ABNORMAL
AST SERPL-CCNC: 14 U/L (ref 1–32)
ATMOSPHERIC PRESS: 736 MMHG
BASE EXCESS BLDA CALC-SCNC: 6.6 MMOL/L (ref 0–2)
BASOPHILS # BLD AUTO: 0.04 10*3/MM3 (ref 0–0.2)
BASOPHILS NFR BLD AUTO: 0.4 % (ref 0–1.5)
BDY SITE: ABNORMAL
BILIRUB SERPL-MCNC: 0.2 MG/DL (ref 0–1.2)
BUN SERPL-MCNC: 17 MG/DL (ref 8–23)
BUN/CREAT SERPL: 30.9 (ref 7–25)
CALCIUM SPEC-SCNC: 9 MG/DL (ref 8.6–10.5)
CHLORIDE SERPL-SCNC: 100 MMOL/L (ref 98–107)
CO2 SERPL-SCNC: 29.3 MMOL/L (ref 22–29)
COHGB MFR BLD: 0.9 % (ref 0–2)
CREAT SERPL-MCNC: 0.55 MG/DL (ref 0.57–1)
DEPRECATED RDW RBC AUTO: 46.1 FL (ref 37–54)
EGFRCR SERPLBLD CKD-EPI 2021: 102.5 ML/MIN/1.73
EOSINOPHIL # BLD AUTO: 0.3 10*3/MM3 (ref 0–0.4)
EOSINOPHIL NFR BLD AUTO: 3.3 % (ref 0.3–6.2)
ERYTHROCYTE [DISTWIDTH] IN BLOOD BY AUTOMATED COUNT: 13.3 % (ref 12.3–15.4)
GAS FLOW AIRWAY: 6 LPM
GLOBULIN UR ELPH-MCNC: 3.5 GM/DL
GLUCOSE SERPL-MCNC: 127 MG/DL (ref 65–99)
HCO3 BLDA-SCNC: 32 MMOL/L (ref 22–28)
HCT VFR BLD AUTO: 30.8 % (ref 34–46.6)
HCT VFR BLD CALC: 29.7 %
HGB BLD-MCNC: 9.6 G/DL (ref 12–15.9)
HOLD SPECIMEN: NORMAL
HOLD SPECIMEN: NORMAL
IMM GRANULOCYTES # BLD AUTO: 0.05 10*3/MM3 (ref 0–0.05)
IMM GRANULOCYTES NFR BLD AUTO: 0.5 % (ref 0–0.5)
INHALED O2 CONCENTRATION: 44 %
LYMPHOCYTES # BLD AUTO: 1.4 10*3/MM3 (ref 0.7–3.1)
LYMPHOCYTES NFR BLD AUTO: 15.2 % (ref 19.6–45.3)
Lab: ABNORMAL
MCH RBC QN AUTO: 29.2 PG (ref 26.6–33)
MCHC RBC AUTO-ENTMCNC: 31.2 G/DL (ref 31.5–35.7)
MCV RBC AUTO: 93.6 FL (ref 79–97)
METHGB BLD QL: 0.3 % (ref 0–1.5)
MODALITY: ABNORMAL
MONOCYTES # BLD AUTO: 0.69 10*3/MM3 (ref 0.1–0.9)
MONOCYTES NFR BLD AUTO: 7.5 % (ref 5–12)
NEUTROPHILS NFR BLD AUTO: 6.74 10*3/MM3 (ref 1.7–7)
NEUTROPHILS NFR BLD AUTO: 73.1 % (ref 42.7–76)
NOTE: ABNORMAL
NRBC BLD AUTO-RTO: 0 /100 WBC (ref 0–0.2)
NT-PROBNP SERPL-MCNC: 137.9 PG/ML (ref 0–900)
OXYHGB MFR BLDV: 88.4 % (ref 94–99)
PCO2 BLDA: 49.4 MM HG (ref 35–45)
PCO2 TEMP ADJ BLD: ABNORMAL MM[HG]
PH BLDA: 7.42 PH UNITS (ref 7.35–7.45)
PH, TEMP CORRECTED: ABNORMAL
PLATELET # BLD AUTO: 315 10*3/MM3 (ref 140–450)
PMV BLD AUTO: 11.6 FL (ref 6–12)
PO2 BLDA: 56.3 MM HG (ref 75–100)
PO2 TEMP ADJ BLD: ABNORMAL MM[HG]
POTASSIUM SERPL-SCNC: 4.3 MMOL/L (ref 3.5–5.2)
PROT SERPL-MCNC: 7.1 G/DL (ref 6–8.5)
RBC # BLD AUTO: 3.29 10*6/MM3 (ref 3.77–5.28)
SAO2 % BLDCOA: 89.5 % (ref 94–100)
SODIUM SERPL-SCNC: 138 MMOL/L (ref 136–145)
TROPONIN T SERPL HS-MCNC: 20 NG/L
VENTILATOR MODE: ABNORMAL
WBC NRBC COR # BLD: 9.22 10*3/MM3 (ref 3.4–10.8)
WHOLE BLOOD HOLD COAG: NORMAL
WHOLE BLOOD HOLD SPECIMEN: NORMAL

## 2023-05-05 PROCEDURE — 82375 ASSAY CARBOXYHB QUANT: CPT

## 2023-05-05 PROCEDURE — 71045 X-RAY EXAM CHEST 1 VIEW: CPT

## 2023-05-05 PROCEDURE — 93005 ELECTROCARDIOGRAM TRACING: CPT | Performed by: EMERGENCY MEDICINE

## 2023-05-05 PROCEDURE — 80053 COMPREHEN METABOLIC PANEL: CPT | Performed by: EMERGENCY MEDICINE

## 2023-05-05 PROCEDURE — 36600 WITHDRAWAL OF ARTERIAL BLOOD: CPT

## 2023-05-05 PROCEDURE — 99285 EMERGENCY DEPT VISIT HI MDM: CPT

## 2023-05-05 PROCEDURE — 82805 BLOOD GASES W/O2 SATURATION: CPT

## 2023-05-05 PROCEDURE — 93005 ELECTROCARDIOGRAM TRACING: CPT

## 2023-05-05 PROCEDURE — 83880 ASSAY OF NATRIURETIC PEPTIDE: CPT | Performed by: EMERGENCY MEDICINE

## 2023-05-05 PROCEDURE — 83050 HGB METHEMOGLOBIN QUAN: CPT

## 2023-05-05 PROCEDURE — 85025 COMPLETE CBC W/AUTO DIFF WBC: CPT

## 2023-05-05 PROCEDURE — 84484 ASSAY OF TROPONIN QUANT: CPT | Performed by: EMERGENCY MEDICINE

## 2023-05-05 PROCEDURE — G0378 HOSPITAL OBSERVATION PER HR: HCPCS

## 2023-05-05 RX ORDER — SODIUM CHLORIDE 0.9 % (FLUSH) 0.9 %
10 SYRINGE (ML) INJECTION AS NEEDED
Status: DISCONTINUED | OUTPATIENT
Start: 2023-05-05 | End: 2023-05-08 | Stop reason: HOSPADM

## 2023-05-06 ENCOUNTER — APPOINTMENT (OUTPATIENT)
Dept: CT IMAGING | Facility: HOSPITAL | Age: 63
End: 2023-05-06
Payer: MEDICAID

## 2023-05-06 PROBLEM — R09.02 HYPOXIA: Status: ACTIVE | Noted: 2023-05-06

## 2023-05-06 PROBLEM — Z93.0 TRACHEOSTOMY PRESENT: Status: ACTIVE | Noted: 2023-05-06

## 2023-05-06 PROBLEM — J18.9 PNEUMONIA OF LEFT UPPER LOBE DUE TO INFECTIOUS ORGANISM: Status: ACTIVE | Noted: 2023-05-06

## 2023-05-06 LAB
ALBUMIN SERPL-MCNC: 3.6 G/DL (ref 3.5–5.2)
ALBUMIN/GLOB SERPL: 0.9 G/DL
ALP SERPL-CCNC: 93 U/L (ref 39–117)
ALT SERPL W P-5'-P-CCNC: 22 U/L (ref 1–33)
ANION GAP SERPL CALCULATED.3IONS-SCNC: 9.5 MMOL/L (ref 5–15)
AST SERPL-CCNC: 15 U/L (ref 1–32)
BILIRUB SERPL-MCNC: 0.4 MG/DL (ref 0–1.2)
BUN SERPL-MCNC: 15 MG/DL (ref 8–23)
BUN/CREAT SERPL: 28.3 (ref 7–25)
CALCIUM SPEC-SCNC: 9.2 MG/DL (ref 8.6–10.5)
CHLORIDE SERPL-SCNC: 106 MMOL/L (ref 98–107)
CO2 SERPL-SCNC: 27.5 MMOL/L (ref 22–29)
CREAT SERPL-MCNC: 0.53 MG/DL (ref 0.57–1)
DEPRECATED RDW RBC AUTO: 46.5 FL (ref 37–54)
EGFRCR SERPLBLD CKD-EPI 2021: 103.4 ML/MIN/1.73
ERYTHROCYTE [DISTWIDTH] IN BLOOD BY AUTOMATED COUNT: 13.2 % (ref 12.3–15.4)
GLOBULIN UR ELPH-MCNC: 3.8 GM/DL
GLUCOSE BLDC GLUCOMTR-MCNC: 170 MG/DL (ref 70–130)
GLUCOSE BLDC GLUCOMTR-MCNC: 190 MG/DL (ref 70–130)
GLUCOSE SERPL-MCNC: 134 MG/DL (ref 65–99)
HCT VFR BLD AUTO: 32.4 % (ref 34–46.6)
HGB BLD-MCNC: 10 G/DL (ref 12–15.9)
MCH RBC QN AUTO: 29.4 PG (ref 26.6–33)
MCHC RBC AUTO-ENTMCNC: 30.9 G/DL (ref 31.5–35.7)
MCV RBC AUTO: 95.3 FL (ref 79–97)
MRSA DNA SPEC QL NAA+PROBE: NORMAL
PLATELET # BLD AUTO: 303 10*3/MM3 (ref 140–450)
PMV BLD AUTO: 11.8 FL (ref 6–12)
POTASSIUM SERPL-SCNC: 4.1 MMOL/L (ref 3.5–5.2)
PROT SERPL-MCNC: 7.4 G/DL (ref 6–8.5)
RBC # BLD AUTO: 3.4 10*6/MM3 (ref 3.77–5.28)
SODIUM SERPL-SCNC: 143 MMOL/L (ref 136–145)
WBC NRBC COR # BLD: 9.96 10*3/MM3 (ref 3.4–10.8)

## 2023-05-06 PROCEDURE — 94664 DEMO&/EVAL PT USE INHALER: CPT

## 2023-05-06 PROCEDURE — 96366 THER/PROPH/DIAG IV INF ADDON: CPT

## 2023-05-06 PROCEDURE — 25010000002 VANCOMYCIN 5 G RECONSTITUTED SOLUTION: Performed by: INTERNAL MEDICINE

## 2023-05-06 PROCEDURE — 94799 UNLISTED PULMONARY SVC/PX: CPT

## 2023-05-06 PROCEDURE — G0378 HOSPITAL OBSERVATION PER HR: HCPCS

## 2023-05-06 PROCEDURE — 82948 REAGENT STRIP/BLOOD GLUCOSE: CPT

## 2023-05-06 PROCEDURE — 25010000002 METHYLPREDNISOLONE PER 125 MG: Performed by: INTERNAL MEDICINE

## 2023-05-06 PROCEDURE — 87070 CULTURE OTHR SPECIMN AEROBIC: CPT | Performed by: INTERNAL MEDICINE

## 2023-05-06 PROCEDURE — 96375 TX/PRO/DX INJ NEW DRUG ADDON: CPT

## 2023-05-06 PROCEDURE — 96365 THER/PROPH/DIAG IV INF INIT: CPT

## 2023-05-06 PROCEDURE — 94761 N-INVAS EAR/PLS OXIMETRY MLT: CPT

## 2023-05-06 PROCEDURE — 87186 SC STD MICRODIL/AGAR DIL: CPT | Performed by: INTERNAL MEDICINE

## 2023-05-06 PROCEDURE — 87081 CULTURE SCREEN ONLY: CPT | Performed by: INTERNAL MEDICINE

## 2023-05-06 PROCEDURE — 87102 FUNGUS ISOLATION CULTURE: CPT | Performed by: INTERNAL MEDICINE

## 2023-05-06 PROCEDURE — 25010000002 PIPERACILLIN SOD-TAZOBACTAM PER 1 G: Performed by: INTERNAL MEDICINE

## 2023-05-06 PROCEDURE — 70490 CT SOFT TISSUE NECK W/O DYE: CPT

## 2023-05-06 PROCEDURE — 25010000002 VANCOMYCIN 1 G RECONSTITUTED SOLUTION: Performed by: INTERNAL MEDICINE

## 2023-05-06 PROCEDURE — 25010000002 ENOXAPARIN PER 10 MG: Performed by: INTERNAL MEDICINE

## 2023-05-06 PROCEDURE — 85027 COMPLETE CBC AUTOMATED: CPT | Performed by: INTERNAL MEDICINE

## 2023-05-06 PROCEDURE — 80053 COMPREHEN METABOLIC PANEL: CPT | Performed by: INTERNAL MEDICINE

## 2023-05-06 PROCEDURE — 87205 SMEAR GRAM STAIN: CPT | Performed by: INTERNAL MEDICINE

## 2023-05-06 PROCEDURE — 96372 THER/PROPH/DIAG INJ SC/IM: CPT

## 2023-05-06 PROCEDURE — 96367 TX/PROPH/DG ADDL SEQ IV INF: CPT

## 2023-05-06 PROCEDURE — 87641 MR-STAPH DNA AMP PROBE: CPT | Performed by: INTERNAL MEDICINE

## 2023-05-06 PROCEDURE — 71250 CT THORAX DX C-: CPT

## 2023-05-06 RX ORDER — DOCUSATE SODIUM 100 MG/1
100 CAPSULE, LIQUID FILLED ORAL 2 TIMES DAILY
Status: DISCONTINUED | OUTPATIENT
Start: 2023-05-06 | End: 2023-05-06 | Stop reason: SDUPTHER

## 2023-05-06 RX ORDER — CHOLECALCIFEROL (VITAMIN D3) 125 MCG
5 CAPSULE ORAL NIGHTLY
Status: DISCONTINUED | OUTPATIENT
Start: 2023-05-06 | End: 2023-05-06

## 2023-05-06 RX ORDER — POLYETHYLENE GLYCOL 3350 17 G/17G
17 POWDER, FOR SOLUTION ORAL DAILY PRN
Status: DISCONTINUED | OUTPATIENT
Start: 2023-05-06 | End: 2023-05-07

## 2023-05-06 RX ORDER — GLYCOPYRROLATE 2 MG/1
2 TABLET ORAL 3 TIMES DAILY
COMMUNITY

## 2023-05-06 RX ORDER — HYOSCYAMINE SULFATE 0.125 MG
0.12 TABLET ORAL 3 TIMES DAILY
COMMUNITY

## 2023-05-06 RX ORDER — CARVEDILOL 12.5 MG/1
12.5 TABLET ORAL 2 TIMES DAILY
Status: DISCONTINUED | OUTPATIENT
Start: 2023-05-06 | End: 2023-05-08 | Stop reason: HOSPADM

## 2023-05-06 RX ORDER — SODIUM CHLORIDE 9 MG/ML
40 INJECTION, SOLUTION INTRAVENOUS AS NEEDED
Status: DISCONTINUED | OUTPATIENT
Start: 2023-05-06 | End: 2023-05-08 | Stop reason: HOSPADM

## 2023-05-06 RX ORDER — CALCIUM CARBONATE 200(500)MG
2 TABLET,CHEWABLE ORAL 2 TIMES DAILY PRN
Status: DISCONTINUED | OUTPATIENT
Start: 2023-05-06 | End: 2023-05-08 | Stop reason: HOSPADM

## 2023-05-06 RX ORDER — SCOLOPAMINE TRANSDERMAL SYSTEM 1 MG/1
1 PATCH, EXTENDED RELEASE TRANSDERMAL
Status: DISCONTINUED | OUTPATIENT
Start: 2023-05-06 | End: 2023-05-08 | Stop reason: HOSPADM

## 2023-05-06 RX ORDER — OMEPRAZOLE 20 MG/1
40 CAPSULE, DELAYED RELEASE ORAL 2 TIMES DAILY
COMMUNITY

## 2023-05-06 RX ORDER — PANTOPRAZOLE SODIUM 40 MG/10ML
40 INJECTION, POWDER, LYOPHILIZED, FOR SOLUTION INTRAVENOUS
Status: DISCONTINUED | OUTPATIENT
Start: 2023-05-07 | End: 2023-05-08 | Stop reason: HOSPADM

## 2023-05-06 RX ORDER — MULTIVIT WITH IRON,MINERALS
15 LIQUID (ML) ORAL DAILY
Status: DISCONTINUED | OUTPATIENT
Start: 2023-05-07 | End: 2023-05-08 | Stop reason: HOSPADM

## 2023-05-06 RX ORDER — GLYCOPYRROLATE 1 MG/1
2 TABLET ORAL 3 TIMES DAILY
Status: DISCONTINUED | OUTPATIENT
Start: 2023-05-06 | End: 2023-05-06

## 2023-05-06 RX ORDER — MULTIVIT WITH IRON,MINERALS
15 LIQUID (ML) ORAL DAILY
Status: DISCONTINUED | OUTPATIENT
Start: 2023-05-06 | End: 2023-05-06

## 2023-05-06 RX ORDER — PREDNISONE 20 MG/1
40 TABLET ORAL
Status: DISCONTINUED | OUTPATIENT
Start: 2023-05-07 | End: 2023-05-08 | Stop reason: HOSPADM

## 2023-05-06 RX ORDER — METHYLPREDNISOLONE SODIUM SUCCINATE 125 MG/2ML
125 INJECTION, POWDER, LYOPHILIZED, FOR SOLUTION INTRAMUSCULAR; INTRAVENOUS ONCE
Status: COMPLETED | OUTPATIENT
Start: 2023-05-06 | End: 2023-05-06

## 2023-05-06 RX ORDER — POLYETHYLENE GLYCOL 3350 17 G/17G
17 POWDER, FOR SOLUTION ORAL DAILY PRN
Status: DISCONTINUED | OUTPATIENT
Start: 2023-05-06 | End: 2023-05-06

## 2023-05-06 RX ORDER — CHOLECALCIFEROL (VITAMIN D3) 125 MCG
5 CAPSULE ORAL NIGHTLY
Status: DISCONTINUED | OUTPATIENT
Start: 2023-05-06 | End: 2023-05-08 | Stop reason: HOSPADM

## 2023-05-06 RX ORDER — SODIUM CHLORIDE 0.9 % (FLUSH) 0.9 %
10 SYRINGE (ML) INJECTION AS NEEDED
Status: DISCONTINUED | OUTPATIENT
Start: 2023-05-06 | End: 2023-05-08 | Stop reason: HOSPADM

## 2023-05-06 RX ORDER — IPRATROPIUM BROMIDE AND ALBUTEROL SULFATE 2.5; .5 MG/3ML; MG/3ML
3 SOLUTION RESPIRATORY (INHALATION)
Status: DISCONTINUED | OUTPATIENT
Start: 2023-05-06 | End: 2023-05-07

## 2023-05-06 RX ORDER — BISACODYL 10 MG
10 SUPPOSITORY, RECTAL RECTAL DAILY PRN
Status: DISCONTINUED | OUTPATIENT
Start: 2023-05-06 | End: 2023-05-06

## 2023-05-06 RX ORDER — PROMETHAZINE HYDROCHLORIDE 12.5 MG/1
25 TABLET ORAL EVERY 6 HOURS PRN
Status: DISCONTINUED | OUTPATIENT
Start: 2023-05-06 | End: 2023-05-06

## 2023-05-06 RX ORDER — GLYCOPYRROLATE 1 MG/1
2 TABLET ORAL 3 TIMES DAILY
Status: DISCONTINUED | OUTPATIENT
Start: 2023-05-06 | End: 2023-05-08 | Stop reason: HOSPADM

## 2023-05-06 RX ORDER — DOCUSATE SODIUM 50 MG/5 ML
300 LIQUID (ML) ORAL 2 TIMES DAILY
COMMUNITY

## 2023-05-06 RX ORDER — CALCIUM CARBONATE 200(500)MG
2 TABLET,CHEWABLE ORAL 2 TIMES DAILY PRN
Status: DISCONTINUED | OUTPATIENT
Start: 2023-05-06 | End: 2023-05-06

## 2023-05-06 RX ORDER — ALPRAZOLAM 0.5 MG/1
0.5 TABLET ORAL 2 TIMES DAILY
Status: DISCONTINUED | OUTPATIENT
Start: 2023-05-06 | End: 2023-05-08 | Stop reason: HOSPADM

## 2023-05-06 RX ORDER — POLYETHYLENE GLYCOL 3350 17 G/17G
17 POWDER, FOR SOLUTION ORAL 2 TIMES DAILY
Status: DISCONTINUED | OUTPATIENT
Start: 2023-05-06 | End: 2023-05-06

## 2023-05-06 RX ORDER — SODIUM CHLORIDE, SODIUM LACTATE, POTASSIUM CHLORIDE, CALCIUM CHLORIDE 600; 310; 30; 20 MG/100ML; MG/100ML; MG/100ML; MG/100ML
75 INJECTION, SOLUTION INTRAVENOUS CONTINUOUS
Status: DISCONTINUED | OUTPATIENT
Start: 2023-05-06 | End: 2023-05-08

## 2023-05-06 RX ORDER — CETIRIZINE HYDROCHLORIDE 10 MG/1
10 TABLET ORAL DAILY
Status: DISCONTINUED | OUTPATIENT
Start: 2023-05-06 | End: 2023-05-08 | Stop reason: HOSPADM

## 2023-05-06 RX ORDER — AMOXICILLIN 250 MG
2 CAPSULE ORAL 2 TIMES DAILY
Status: DISCONTINUED | OUTPATIENT
Start: 2023-05-06 | End: 2023-05-06

## 2023-05-06 RX ORDER — BISACODYL 5 MG/1
5 TABLET, DELAYED RELEASE ORAL DAILY PRN
Status: DISCONTINUED | OUTPATIENT
Start: 2023-05-06 | End: 2023-05-06

## 2023-05-06 RX ORDER — SENNA PLUS 8.6 MG/1
2 TABLET ORAL NIGHTLY
COMMUNITY

## 2023-05-06 RX ORDER — MONTELUKAST SODIUM 10 MG/1
10 TABLET ORAL NIGHTLY
Status: DISCONTINUED | OUTPATIENT
Start: 2023-05-06 | End: 2023-05-08 | Stop reason: HOSPADM

## 2023-05-06 RX ORDER — BISACODYL 5 MG/1
5 TABLET, DELAYED RELEASE ORAL DAILY PRN
Status: DISCONTINUED | OUTPATIENT
Start: 2023-05-06 | End: 2023-05-06 | Stop reason: ALTCHOICE

## 2023-05-06 RX ORDER — BUDESONIDE 0.5 MG/2ML
0.5 INHALANT ORAL
Status: DISCONTINUED | OUTPATIENT
Start: 2023-05-06 | End: 2023-05-08 | Stop reason: HOSPADM

## 2023-05-06 RX ORDER — ACETAMINOPHEN 160 MG/5ML
650 SOLUTION ORAL EVERY 4 HOURS PRN
Status: DISCONTINUED | OUTPATIENT
Start: 2023-05-06 | End: 2023-05-08 | Stop reason: HOSPADM

## 2023-05-06 RX ORDER — DOCUSATE SODIUM 50 MG/5 ML
300 LIQUID (ML) ORAL 2 TIMES DAILY
Status: DISCONTINUED | OUTPATIENT
Start: 2023-05-06 | End: 2023-05-08 | Stop reason: HOSPADM

## 2023-05-06 RX ORDER — IPRATROPIUM BROMIDE AND ALBUTEROL SULFATE 2.5; .5 MG/3ML; MG/3ML
3 SOLUTION RESPIRATORY (INHALATION)
Status: DISCONTINUED | OUTPATIENT
Start: 2023-05-06 | End: 2023-05-06

## 2023-05-06 RX ORDER — ACETAMINOPHEN 325 MG/1
650 TABLET ORAL EVERY 4 HOURS PRN
Status: DISCONTINUED | OUTPATIENT
Start: 2023-05-06 | End: 2023-05-06 | Stop reason: ALTCHOICE

## 2023-05-06 RX ORDER — AMINO ACIDS/PROTEIN HYDROLYS 15G-100/30
30 LIQUID (ML) ORAL 2 TIMES DAILY
Status: DISCONTINUED | OUTPATIENT
Start: 2023-05-06 | End: 2023-05-08 | Stop reason: HOSPADM

## 2023-05-06 RX ORDER — BACLOFEN 10 MG/1
10 TABLET ORAL EVERY 8 HOURS
Status: DISCONTINUED | OUTPATIENT
Start: 2023-05-06 | End: 2023-05-08 | Stop reason: HOSPADM

## 2023-05-06 RX ORDER — DOCUSATE SODIUM 50 MG/5 ML
300 LIQUID (ML) ORAL 2 TIMES DAILY
Status: DISCONTINUED | OUTPATIENT
Start: 2023-05-06 | End: 2023-05-06

## 2023-05-06 RX ORDER — ONDANSETRON 4 MG/1
4 TABLET, ORALLY DISINTEGRATING ORAL EVERY 6 HOURS PRN
Status: DISCONTINUED | OUTPATIENT
Start: 2023-05-06 | End: 2023-05-08 | Stop reason: HOSPADM

## 2023-05-06 RX ORDER — ONDANSETRON 2 MG/ML
4 INJECTION INTRAMUSCULAR; INTRAVENOUS EVERY 6 HOURS PRN
Status: DISCONTINUED | OUTPATIENT
Start: 2023-05-06 | End: 2023-05-08 | Stop reason: HOSPADM

## 2023-05-06 RX ORDER — BISACODYL 10 MG
10 SUPPOSITORY, RECTAL RECTAL DAILY PRN
Status: DISCONTINUED | OUTPATIENT
Start: 2023-05-06 | End: 2023-05-07

## 2023-05-06 RX ORDER — ENOXAPARIN SODIUM 100 MG/ML
40 INJECTION SUBCUTANEOUS DAILY
Status: DISCONTINUED | OUTPATIENT
Start: 2023-05-06 | End: 2023-05-08 | Stop reason: HOSPADM

## 2023-05-06 RX ORDER — ONDANSETRON 4 MG/1
4 TABLET, FILM COATED ORAL EVERY 6 HOURS PRN
Status: DISCONTINUED | OUTPATIENT
Start: 2023-05-06 | End: 2023-05-06 | Stop reason: SDUPTHER

## 2023-05-06 RX ORDER — AMOXICILLIN 250 MG
2 CAPSULE ORAL 2 TIMES DAILY
Status: DISCONTINUED | OUTPATIENT
Start: 2023-05-06 | End: 2023-05-07

## 2023-05-06 RX ORDER — PROMETHAZINE HYDROCHLORIDE 12.5 MG/1
25 TABLET ORAL EVERY 6 HOURS PRN
Status: DISCONTINUED | OUTPATIENT
Start: 2023-05-06 | End: 2023-05-08 | Stop reason: HOSPADM

## 2023-05-06 RX ORDER — SODIUM CHLORIDE 0.9 % (FLUSH) 0.9 %
10 SYRINGE (ML) INJECTION EVERY 12 HOURS SCHEDULED
Status: DISCONTINUED | OUTPATIENT
Start: 2023-05-06 | End: 2023-05-08 | Stop reason: HOSPADM

## 2023-05-06 RX ORDER — PANTOPRAZOLE SODIUM 40 MG/1
40 TABLET, DELAYED RELEASE ORAL DAILY
Status: DISCONTINUED | OUTPATIENT
Start: 2023-05-06 | End: 2023-05-06 | Stop reason: ALTCHOICE

## 2023-05-06 RX ORDER — POLYETHYLENE GLYCOL 3350 17 G/17G
17 POWDER, FOR SOLUTION ORAL 2 TIMES DAILY
Status: DISCONTINUED | OUTPATIENT
Start: 2023-05-06 | End: 2023-05-07

## 2023-05-06 RX ADMIN — SENNOSIDES AND DOCUSATE SODIUM 2 TABLET: 50; 8.6 TABLET ORAL at 20:21

## 2023-05-06 RX ADMIN — VANCOMYCIN HYDROCHLORIDE 1500 MG: 5 INJECTION, POWDER, LYOPHILIZED, FOR SOLUTION INTRAVENOUS at 04:00

## 2023-05-06 RX ADMIN — BACLOFEN 10 MG: 10 TABLET ORAL at 18:43

## 2023-05-06 RX ADMIN — POLYETHYLENE GLYCOL 3350 17 G: 17 POWDER, FOR SOLUTION ORAL at 20:20

## 2023-05-06 RX ADMIN — ALPRAZOLAM 0.5 MG: 0.5 TABLET ORAL at 09:11

## 2023-05-06 RX ADMIN — SODIUM CHLORIDE 1000 MG: 900 INJECTION, SOLUTION INTRAVENOUS at 15:09

## 2023-05-06 RX ADMIN — SODIUM CHLORIDE, POTASSIUM CHLORIDE, SODIUM LACTATE AND CALCIUM CHLORIDE 75 ML/HR: 600; 310; 30; 20 INJECTION, SOLUTION INTRAVENOUS at 23:11

## 2023-05-06 RX ADMIN — Medication 5 MG: at 03:23

## 2023-05-06 RX ADMIN — GLYCOPYRROLATE 2 MG: 1 TABLET ORAL at 20:20

## 2023-05-06 RX ADMIN — BUDESONIDE 0.5 MG: 0.5 INHALANT RESPIRATORY (INHALATION) at 20:14

## 2023-05-06 RX ADMIN — IPRATROPIUM BROMIDE AND ALBUTEROL SULFATE 3 ML: .5; 3 SOLUTION RESPIRATORY (INHALATION) at 20:12

## 2023-05-06 RX ADMIN — CETIRIZINE HYDROCHLORIDE 10 MG: 10 TABLET, FILM COATED ORAL at 09:12

## 2023-05-06 RX ADMIN — GLYCOPYRROLATE 2 MG: 1 TABLET ORAL at 15:12

## 2023-05-06 RX ADMIN — ENOXAPARIN SODIUM 40 MG: 100 INJECTION SUBCUTANEOUS at 03:24

## 2023-05-06 RX ADMIN — GLYCOPYRROLATE 2 MG: 1 TABLET ORAL at 09:12

## 2023-05-06 RX ADMIN — TAZOBACTAM SODIUM AND PIPERACILLIN SODIUM 3.38 G: 375; 3 INJECTION, SOLUTION INTRAVENOUS at 09:51

## 2023-05-06 RX ADMIN — Medication 30 ML: at 20:22

## 2023-05-06 RX ADMIN — SENNOSIDES AND DOCUSATE SODIUM 2 TABLET: 50; 8.6 TABLET ORAL at 09:11

## 2023-05-06 RX ADMIN — DOCUSATE SODIUM 100 MG: 100 CAPSULE, LIQUID FILLED ORAL at 09:12

## 2023-05-06 RX ADMIN — CARVEDILOL 12.5 MG: 12.5 TABLET, FILM COATED ORAL at 09:12

## 2023-05-06 RX ADMIN — SCOPALAMINE 1 PATCH: 1 PATCH, EXTENDED RELEASE TRANSDERMAL at 03:23

## 2023-05-06 RX ADMIN — Medication 5 MG: at 20:21

## 2023-05-06 RX ADMIN — MONTELUKAST 10 MG: 10 TABLET, FILM COATED ORAL at 03:23

## 2023-05-06 RX ADMIN — SODIUM CHLORIDE, POTASSIUM CHLORIDE, SODIUM LACTATE AND CALCIUM CHLORIDE 75 ML/HR: 600; 310; 30; 20 INJECTION, SOLUTION INTRAVENOUS at 03:12

## 2023-05-06 RX ADMIN — POLYETHYLENE GLYCOL 3350 17 G: 17 POWDER, FOR SOLUTION ORAL at 09:11

## 2023-05-06 RX ADMIN — TAZOBACTAM SODIUM AND PIPERACILLIN SODIUM 3.38 G: 375; 3 INJECTION, SOLUTION INTRAVENOUS at 17:36

## 2023-05-06 RX ADMIN — TAZOBACTAM SODIUM AND PIPERACILLIN SODIUM 3.38 G: 375; 3 INJECTION, SOLUTION INTRAVENOUS at 03:16

## 2023-05-06 RX ADMIN — Medication 10 ML: at 20:20

## 2023-05-06 RX ADMIN — Medication 10 ML: at 09:12

## 2023-05-06 RX ADMIN — DOCUSATE SODIUM 300 MG: 50 LIQUID ORAL at 20:20

## 2023-05-06 RX ADMIN — BACLOFEN 10 MG: 10 TABLET ORAL at 03:23

## 2023-05-06 RX ADMIN — IPRATROPIUM BROMIDE AND ALBUTEROL SULFATE 3 ML: .5; 3 SOLUTION RESPIRATORY (INHALATION) at 13:22

## 2023-05-06 RX ADMIN — IPRATROPIUM BROMIDE AND ALBUTEROL SULFATE 3 ML: .5; 3 SOLUTION RESPIRATORY (INHALATION) at 07:31

## 2023-05-06 RX ADMIN — BUDESONIDE 0.5 MG: 0.5 INHALANT RESPIRATORY (INHALATION) at 07:32

## 2023-05-06 RX ADMIN — BACLOFEN 10 MG: 10 TABLET ORAL at 09:51

## 2023-05-06 RX ADMIN — ALPRAZOLAM 0.5 MG: 0.5 TABLET ORAL at 20:21

## 2023-05-06 RX ADMIN — Medication 10 ML: at 03:25

## 2023-05-06 RX ADMIN — ASCORBIC ACID, THIAMINE, RIBOFLAVIN, NIACINAMIDE, PYRIDOXINE, FOLIC ACID, COBALAMIN, BIOTIN, PANTOTHENIC ACID 15 ML: 100; 1.5; 1.7; 20; 10; 1; 6; 300; 1 TABLET, COATED ORAL at 09:11

## 2023-05-06 RX ADMIN — PANTOPRAZOLE SODIUM 40 MG: 40 TABLET, DELAYED RELEASE ORAL at 09:12

## 2023-05-06 RX ADMIN — MONTELUKAST 10 MG: 10 TABLET, FILM COATED ORAL at 20:21

## 2023-05-06 RX ADMIN — Medication 30 ML: at 09:13

## 2023-05-06 RX ADMIN — DOCUSATE SODIUM 300 MG: 50 LIQUID ORAL at 09:11

## 2023-05-06 RX ADMIN — CARVEDILOL 12.5 MG: 12.5 TABLET, FILM COATED ORAL at 20:21

## 2023-05-06 RX ADMIN — METHYLPREDNISOLONE SODIUM SUCCINATE 125 MG: 125 INJECTION, POWDER, FOR SOLUTION INTRAMUSCULAR; INTRAVENOUS at 03:24

## 2023-05-06 NOTE — CONSULTS
"Adult Nutrition  Assessment/PES    Patient Name:  Viji Vargas  YOB: 1960  MRN: [id number]  Admit Date:  5/5/2023    Assessment Date:  5/6/2023    Comments:   Pt with tracheostomy, PEG tube placed in 2019 and is need of EN recommendations. Per previous RD documentation at this facility, pt received continuous TF with ProStat 30 mL BID. Pt is currently receiving Prostat 30 mL BID per MAR, providing ~ 200 kcal, 30 g protein.     Rec#1: When medically appropriate, initiate Nutren 1.5 @ 20mL/hr and advance 10mL q8hrs to goal rate of 52 mL/hr x 22 hrs.      Rec#2: ProStat 30 mL BID via PEG tube provides ~ 200 kcal, 30 g protein.      Rec#3: Free water flush 140 mL q4hrs.      Total TF regimen to provide: 1,716 kcals, 78 g protein, and 1,714 mL fluid/day.      Monitor and replace electrolytes PRN.      RD to follow-up and available PRN.      Reason for Assessment     Row Name 05/06/23 1007          Reason for Assessment    Reason For Assessment physician consult;TF/PN                  Labs/Tests/Procedures/Meds     Row Name 05/06/23 1007          Labs/Procedures/Meds    Lab Results Reviewed reviewed, pertinent     Lab Results Comments Low: Cr; High: Glu        Medications    Pertinent Medications Reviewed reviewed, pertinent     Pertinent Medications Comments colace, lovenox, prostat, protonix, multivitmain with minerals, NaCl, lactated ringers                Physical Findings     Row Name 05/06/23 1009          Physical Findings    Overall Physical Appearance overweight for age, tracheostomy, James 10     Enteral Access Devices PEG                Estimated/Assessed Needs - Anthropometrics     Row Name 05/06/23 1010 05/06/23 0552       Anthropometrics    Weight -- 69 kg (152 lb 1.9 oz)    Height for Calculation 1.626 m (5' 4\") --    Weight for Calculation 69 kg (152 lb 1.9 oz)  CBW --       Estimated/Assessed Needs    Additional Documentation Fluid Requirements (Group);KCAL/KG (Group);Estimated " Calorie Needs (Group);Protein Requirements (Group) --       Estimated Calorie Needs    Estimated Calorie Need Method kcal/kg --       KCAL/KG    KCAL/KG 30 Kcal/Kg (kcal);25 Kcal/Kg (kcal) --    25 Kcal/Kg (kcal) 1725 --    30 Kcal/Kg (kcal) 2070 --       Protein Requirements    Weight Used For Protein Calculations 69 kg (152 lb 1.9 oz)  CBW --    Est Protein Requirement Amount (gms/kg) 1.2 gm protein --    Estimated Protein Requirements (gms/day) 82.8 --       Fluid Requirements    Fluid Requirements (mL/day) 1725  1 mL/kcal --    RDA Method (mL) 1725 --               Nutrition Prescription Ordered     Row Name 05/06/23 1012          Nutrition Prescription PO    Current PO Diet NPO                Evaluation of Received Nutrient/Fluid Intake     Row Name 05/06/23 1012          Intake Assessment    Energy/Calorie Requirement Assessment not meeting needs     Protein Requirement Assessment not meeting needs        EN Evaluation    Number of Days EN Intake Evaluated Other (comment)  TF not initiated                   Problem/Interventions:   Problem 1     Row Name 05/06/23 1012          Nutrition Diagnoses Problem 1    Problem 1 Needs Alternate Route     Etiology (related to) Factors Affecting Nutrition     Signs/Symptoms (evidenced by) Report/Observation  pt with trachesotomy, PEG tube and need for EN recommendations                      Intervention Goal     Row Name 05/06/23 1013          Intervention Goal    General Nutrition support treatment;Meet nutritional needs for age/condition;Provide information regarding MNT for treatment/condition     TF/PN Inititiate TF/PN     Weight Maintain weight                Nutrition Intervention     Row Name 05/06/23 1013          Nutrition Intervention    RD/Tech Action Follow Tx progress;Recommend/ordered;Care plan reviewd     Recommended/Ordered EN                Nutrition Prescription     Row Name 05/06/23 1013          Nutrition Prescription EN    Enteral Prescription Enteral  begin/change     Enteral Route PEG     Product Nutren 1.5 hardik     TF Delivery Method Continuous     Continuous TF Goal Rate (mL/hr) 52 mL/hr     Continuous TF Starting Rate (mL/hr) 20 mL/hr     Continuous TF Goal Volume (mL) 1144 mL     Continuous TF Starting Volume (mL) 440 mL     Water flush (mL)  140 mL     Water Flush Frequency Every 4 hours     New EN Prescription Ordered? Yes        Other Orders    Obtain Weight Daily     Obtain Weight Ordered? No, recommended     Supplement Vitamin mineral supplement     Supplement Ordered? No, recommended     Labs K+;Phos;Mg++;Na+     Labs Ordered? No, recommended                Education/Evaluation     Row Name 05/06/23 1023          Education    Education Will Instruct as appropriate        Monitor/Evaluation    Monitor Per protocol;Weight;Skin status;Symptoms;Pertinent labs;TF delivery/tolerance                 Electronically signed by:  Veronika Morgan RD  05/06/23 10:24 EDT

## 2023-05-06 NOTE — ED PROVIDER NOTES
"Subjective  History of Present Illness:    Chief Complaint: Breathing difficulties  History of Present Illness: 62-year-old female history of COPD hypertension stroke, tracheostomy, 4 L oxygen requirement, PEG tube placement, here with reported increased secretions and breathing difficulties.  From nursing home.    Nurses Notes reviewed and agree, including vitals, allergies, social history and prior medical history.     REVIEW OF SYSTEMS: All systems reviewed and not pertinent unless noted.  Review of Systems      Positive for: Breathing difficulties, patient unable to provide negative for positive review of systems secondary to baseline status nonverbal      Past Medical History:   Diagnosis Date   • COPD (chronic obstructive pulmonary disease)    • Hypertension    • Pneumonia    • Stroke        Allergies:    Patient has no known allergies.      Past Surgical History:   Procedure Laterality Date   • HYSTERECTOMY      Partial    • TRACHEOSTOMY AND PEG TUBE INSERTION N/A 3/20/2019    Procedure: TRACHEOSTOMY AND PERCUTANEOUS ENDOSCOPIC GASTROSTOMY TUBE INSERTION;  Surgeon: Nadira Pandey MD;  Location: [facility];  Service: General         Social History     Socioeconomic History   • Marital status: Legally    Tobacco Use   • Smoking status: Former     Packs/day: 1.00     Types: Electronic Cigarette, Cigarettes   • Smokeless tobacco: Never   Vaping Use   • Vaping Use: Unknown   Substance and Sexual Activity   • Alcohol use: Not Currently     Comment: wine    • Drug use: No   • Sexual activity: Defer         No family history on file.    Objective  Physical Exam:  /84   Pulse 101   Temp 99.5 °F (37.5 °C) (Axillary)   Resp 28   Ht 162.6 cm (64\")   Wt 72.6 kg (160 lb)   SpO2 94%   BMI 27.46 kg/m²      Physical Exam    CONSTITUTIONAL: Frail chronically ill-appearing fragile 62-year-old female,  in no acute distress.  VITAL SIGNS: per nursing, reviewed and noted  SKIN: exposed skin with no rashes, " ulcerations or petechiae  EYES: Grossly EOMI, no icterus  ENT:   Moist mucous membranes.  Tracheostomy in position with bubbly foamy secretions from the trach tube.  RESPIRATORY:  No increased work of breathing. No retractions.   CARDIOVASCULAR:   Extremities pink and warm.  Good cap refill to extremities.   GI: Abdomen without distention   MUSCULOSKELETAL: Some muscle contractures and wasting NEUROLOGIC: Awake, baseline nonverbal      Procedures    ED Course:    Lab Results (last 24 hours)     Procedure Component Value Units Date/Time    CBC & Differential [[id number]]  (Abnormal) Collected: 05/05/23 2217    Specimen: Blood Updated: 05/05/23 2223    Narrative:      The following orders were created for panel order CBC & Differential.  Procedure                               Abnormality         Status                     ---------                               -----------         ------                     CBC Auto Differential[[id number]]        Abnormal            Final result                 Please view results for these tests on the individual orders.    Comprehensive Metabolic Panel [[id number]]  (Abnormal) Collected: 05/05/23 2217    Specimen: Blood Updated: 05/05/23 2246     Glucose 127 mg/dL      BUN 17 mg/dL      Creatinine 0.55 mg/dL      Sodium 138 mmol/L      Potassium 4.3 mmol/L      Chloride 100 mmol/L      CO2 29.3 mmol/L      Calcium 9.0 mg/dL      Total Protein 7.1 g/dL      Albumin 3.6 g/dL      ALT (SGPT) 19 U/L      AST (SGOT) 14 U/L      Alkaline Phosphatase 90 U/L      Total Bilirubin 0.2 mg/dL      Globulin 3.5 gm/dL      A/G Ratio 1.0 g/dL      BUN/Creatinine Ratio 30.9     Anion Gap 8.7 mmol/L      eGFR 102.5 mL/min/1.73     Narrative:      GFR Normal >60  Chronic Kidney Disease <60  Kidney Failure <15      BNP [[id number]]  (Normal) Collected: 05/05/23 2217    Specimen: Blood Updated: 05/05/23 2250     proBNP 137.9 pg/mL     Narrative:      Among patients with dyspnea, NT-proBNP is highly  sensitive for the detection of acute congestive heart failure. In addition NT-proBNP of <300 pg/ml effectively rules out acute congestive heart failure with 99% negative predictive value.    Results may be falsely decreased if patient taking Biotin.      Single High Sensitivity Troponin T [[id number]]  (Abnormal) Collected: 05/05/23 2217    Specimen: Blood Updated: 05/05/23 2301     HS Troponin T 20 ng/L     Narrative:      High Sensitive Troponin T Reference Range:  <10.0 ng/L- Negative Female for AMI  <15.0 ng/L- Negative Male for AMI  >=10 - Abnormal Female indicating possible myocardial injury.  >=15 - Abnormal Male indicating possible myocardial injury.   Clinicians would have to utilize clinical acumen, EKG, Troponin, and serial changes to determine if it is an Acute Myocardial Infarction or myocardial injury due to an underlying chronic condition.         CBC Auto Differential [[id number]]  (Abnormal) Collected: 05/05/23 2217    Specimen: Blood Updated: 05/05/23 2223     WBC 9.22 10*3/mm3      RBC 3.29 10*6/mm3      Hemoglobin 9.6 g/dL      Hematocrit 30.8 %      MCV 93.6 fL      MCH 29.2 pg      MCHC 31.2 g/dL      RDW 13.3 %      RDW-SD 46.1 fl      MPV 11.6 fL      Platelets 315 10*3/mm3      Neutrophil % 73.1 %      Lymphocyte % 15.2 %      Monocyte % 7.5 %      Eosinophil % 3.3 %      Basophil % 0.4 %      Immature Grans % 0.5 %      Neutrophils, Absolute 6.74 10*3/mm3      Lymphocytes, Absolute 1.40 10*3/mm3      Monocytes, Absolute 0.69 10*3/mm3      Eosinophils, Absolute 0.30 10*3/mm3      Basophils, Absolute 0.04 10*3/mm3      Immature Grans, Absolute 0.05 10*3/mm3      nRBC 0.0 /100 WBC     Blood Gas, Arterial With Co-Ox [[id number]]  (Abnormal) Collected: 05/05/23 2257    Specimen: Arterial Blood Updated: 05/05/23 2257     Site Right Radial     Gary's Test N/A     pH, Arterial 7.419 pH units      pCO2, Arterial 49.4 mm Hg      Comment: 83 Value above reference range        pO2, Arterial 56.3 mm Hg       Comment: 84 Value below reference range        HCO3, Arterial 32.0 mmol/L      Comment: 83 Value above reference range        Base Excess, Arterial 6.6 mmol/L      Comment: 83 Value above reference range        O2 Saturation, Arterial 89.5 %      Comment: 84 Value below reference range        Hematocrit, Blood Gas 29.7 %      Comment: 84 Value below reference range        Oxyhemoglobin 88.4 %      Comment: 84 Value below reference range        Methemoglobin 0.30 %      Carboxyhemoglobin 0.9 %      A-a DO2 193.1 mmHg      Barometric Pressure for Blood Gas 736 mmHg      Modality Venti Mask     FIO2 44 %      Flow Rate 6.0 lpm      Ventilator Mode NA     Note --     Collected by SJ     Comment: Meter: [id number]     :  [id number]        pH, Temp Corrected --     pCO2, Temperature Corrected --     pO2, Temperature Corrected --           No radiology results from the last 24 hrs     [facility]    ED Course as of 05/06/23 0020   Fri May 05, 2023   2254 EKG interpreted by me reveals sinus rhythm at a rate of 97 no ectopy no ischemic changes [PF]      ED Course User Index  [PF] Janak Gutiérrez,        Medical Decision Making:    Initial impression of presenting illness: 62-year-old female from nursing home presents with breathing difficulties.  History of tracheostomy stroke and other comorbidities    DDX: includes but is not limited to: Pneumonia, bronchitis, CHF         Patient arrives normotensive afebrile heart rate 97, sats 99% trach collar with vitals interpreted by myself.     Pertinent features from physical exam: Muscle wasting, nonverbal, foamy secretions from tracheostomy.    Initial diagnostic plan: Short of breath protocol labs and imaging    Results from initial plan were reviewed and interpreted by me revealing questionable perihilar infiltrates     Diagnostic information from other sources: Old record review    Interventions / Re-evaluation: Respiratory therapy dissection patient multiple occasions  due to secretions to maintain oxygen saturations.  Patient had the oxygen desaturations into the high 80s      Consultations/Discussion of results with other physicians: Dr. Vazquez    Disposition plan: Given patient's fragile nature, hypoxia, patient would benefit from observation.  Dr. Vazquez will assess for hospitalization.  -----      Final diagnoses:   Hypoxia   History of tracheostomy   History of stroke        Janak Gutiérrez, DO  05/06/23 0024

## 2023-05-06 NOTE — PAYOR COMM NOTE
"Viji Vargas (62 y.o. Female)     Date of Birth   1960    Social Security Number       Address   [address] [place] [address]    Home Phone   [phone]    MRN   [id number]       Mormonism   Shinto    Marital Status   Legally                             Admission Date   23    Admission Type   Emergency    Admitting Provider   Kirill Vazquez DO    Attending Provider   Paul Pandey MD    Department, Room/Bed   [facility] TELEMETRY 4, 432/1       Discharge Date       Discharge Disposition       Discharge Destination                               Attending Provider: Paul Pandey MD    Allergies: No Known Allergies    Isolation: Contact   Infection: COVID (History) (22), ESBL Klebsiella (23), Maria M Auris (rule out) (23)   Code Status: CPR    Ht: 162.6 cm (64\")   Wt: 69 kg (152 lb 1.9 oz)    Admission Cmt: None   Principal Problem: Acute on chronic respiratory failure with hypoxia (HCC) [J96.21]                 Active Insurance as of 2023     Primary Coverage     Payor Plan Insurance Group Employer/Plan Group    [place]      Payor Plan Address Payor Plan Phone Number Payor Plan Fax Number Effective Dates    [address] [phone]  3/10/2019 - None Entered    [facility] [id number]       Subscriber Name Subscriber Birth Date Member ID       VIJI VARGAS 1960 [id number]                 Emergency Contacts      (Rel.) Home Phone Work Phone Mobile Phone    WILTON ESPOSITO (Daughter) [phone] -- --    timothy esposito (Grandchild) -- -- [phone]               History & Physical      Kirill Vazquez DO at 23 0156            [facility] HOSPITALIST   HISTORY AND PHYSICAL      Name:  Viji Vargas   Age:  62 y.o.  Sex:  female  :  1960  MRN:  [id number]   Visit Number:  [id number]  Admission Date:  2023  Date Of Service:  23  Primary Care " Physician:  Ranjeet Pina MD    Chief Complaint:     Increased secretions    History Of Presenting Illness:      62-year-old lady with a history of COPD, hypertension, stroke resulting in tracheostomy and peg tube placement.  Patient with a chronic oxygen requirement of 4 L.  Sent from [facility] for increased secretions.  Of note she is on scopolamine and glycopyrrolate.  Attempted to call to get more information unsuccessfully.  She is listed as full code.    Pertinent findings: Chest x-ray showing patchy interstitial prominence which is persistent since previous from January 2023.  CBC CMP and ABG reviewed.  EKG showing sinus rhythm without acute ST or T wave changes noted.    ED Medications:    Medications   sodium chloride 0.9 % flush 10 mL (has no administration in time range)       Edited by: Kirill Vazquez DO at 5/6/2023 0141     Review Of Systems:    All systems were reviewed and negative except as mentioned in history of presenting illness, assessment and plan.    Past Medical History: Patient  has a past medical history of COPD (chronic obstructive pulmonary disease), Hypertension, Pneumonia, and Stroke.    Past Surgical History: Patient  has a past surgical history that includes Hysterectomy and tracheostomy and peg tube insertion (N/A, 3/20/2019).    Social History: Patient  reports that she has quit smoking. Her smoking use included electronic cigarette and cigarettes. She smoked an average of 1 pack per day. She has never used smokeless tobacco. She reports that she does not currently use alcohol. She reports that she does not use drugs.    Family History:  Patient's family history has been reviewed and found to be noncontributory.     Allergies:      Patient has no known allergies.    Home Medications:    Prior to Admission Medications     Prescriptions Last Dose Informant Patient Reported? Taking?    ALPRAZolam (XANAX) 0.5 MG tablet 5/5/2023  No Yes    Administer 1 tablet per G  tube 2 (Two) Times a Day.    baclofen (LIORESAL) 10 MG tablet 5/5/2023  Yes Yes    Administer 1 tablet per G tube Every 8 (Eight) Hours.    carvedilol (COREG) 12.5 MG tablet 5/5/2023  Yes Yes    Administer 1 tablet per G tube 2 (Two) Times a Day.    docusate sodium (COLACE) 50 mg/5 mL liquid 5/5/2023  Yes Yes    Take 30 mL by mouth 2 (Two) Times a Day.    glycopyrrolate (ROBINUL) 2 MG tablet 5/5/2023  Yes Yes    Take 1 tablet by mouth 3 (Three) Times a Day.    Amino Acids-Protein Hydrolys (PRO-STAT) liquid oral liquid   No No    30 mL by Per G Tube route 2 (Two) Times a Day.    azithromycin (Zithromax Z-Leonard) 250 MG tablet   No No    Take 2 tablets by mouth on day 1, then 1 tablet daily on days 2-5    budesonide (PULMICORT) 0.5 MG/2ML nebulizer solution   No No    Take 2 mL by nebulization 2 (Two) Times a Day.    cefuroxime (CEFTIN) 500 MG tablet   No No    Take 1 tablet by mouth 2 (Two) Times a Day.    cetirizine (zyrTEC) 10 MG tablet   Yes No    Administer 10 mg per G tube Daily.    docusate sodium (COLACE) 100 MG capsule   No No    Take 1 capsule by mouth 2 (Two) Times a Day.    doxycycline (MONODOX) 100 MG capsule   No No    Take 1 capsule by mouth 2 (Two) Times a Day.    ipratropium-albuterol (DUO-NEB) 0.5-2.5 mg/3 ml nebulizer   No No    Take 3 mL by nebulization Every 6 (Six) Hours.    Patient taking differently:  Take 3 mL by nebulization 4 (Four) Times a Day. Takes at [phone]    montelukast (SINGULAIR) 10 MG tablet   Yes No    Administer 10 mg per G tube Every Night.    Multiple Vitamins-Minerals (MULTIVITAMIN WITH IRON-MINERALS) liquid   Yes No    Take 15 mL by mouth Daily.    ondansetron ODT (ZOFRAN-ODT) 4 MG disintegrating tablet   No No    Place 1 tablet under the tongue Every 6 (Six) Hours As Needed for Nausea or Vomiting.    pantoprazole (PROTONIX) 20 MG EC tablet   No No    Take 1 tablet by mouth Daily.    Patient taking differently:  Take 40 mg by mouth Daily.    polyethylene glycol  "(MIRALAX) 17 g packet   No No    Take 17 g by mouth 2 (Two) Times a Day.    promethazine (PHENERGAN) 25 MG tablet   No No    Take 1 tablet by mouth Every 6 (Six) Hours As Needed for Nausea or Vomiting.    Scopolamine 1 MG/3DAYS patch   Yes No    Place 1 patch on the skin as directed by provider Every 72 (Seventy-Two) Hours.            Vital Signs:  Temp:  [99.1 °F (37.3 °C)-99.5 °F (37.5 °C)] 99.1 °F (37.3 °C)  Heart Rate:  [] 102  Resp:  [26-28] 26  BP: (114-127)/(68-92) 114/76        05/05/23  2207 05/06/23  0136   Weight: 72.6 kg (160 lb) 69 kg (152 lb 1.9 oz)     Body mass index is 26.11 kg/m².    Physical Exam:     Most recent vital Signs: /76 (BP Location: Left arm, Patient Position: Sitting)   Pulse 102   Temp 99.1 °F (37.3 °C) (Axillary)   Resp 26   Ht 162.6 cm (64\")   Wt 69 kg (152 lb 1.9 oz)   SpO2 100%   BMI 26.11 kg/m²     Constitutional: Awake, alert nonverbal  Eyes: PERRLA, sclerae anicteric, no conjunctival injection  HENT: NCAT, mucous membranes moist  Neck: Supple, no thyromegaly, no lymphadenopathy, trachea midline noted tracheostomy tube with some frothy secretions.  Respiratory: Symmetric breath sounds with some inspiratory wheeze and rhonchi bilaterally, mildly labored respirations   Cardiovascular: Tachycardic but regular, no murmurs, rubs, or gallops, palpable pedal pulses bilaterally  Gastrointestinal: Positive bowel sounds, soft, nontender, nondistended  Musculoskeletal: No bilateral ankle edema, no clubbing or cyanosis to extremities  Psychiatric: Affect appears fearful  Neurologic: Nonverbal   skin: No rashes  Edited by: Kirill Vazquez DO at 5/6/2023 0141      Laboratory data:    I have reviewed the labs done in the emergency room.    Results from last 7 days   Lab Units 05/05/23  2217   SODIUM mmol/L 138   POTASSIUM mmol/L 4.3   CHLORIDE mmol/L 100   CO2 mmol/L 29.3*   BUN mg/dL 17   CREATININE mg/dL 0.55*   CALCIUM mg/dL 9.0   BILIRUBIN mg/dL 0.2   ALK PHOS U/L " 90   ALT (SGPT) U/L 19   AST (SGOT) U/L 14   GLUCOSE mg/dL 127*     Results from last 7 days   Lab Units 05/05/23  2217   WBC 10*3/mm3 9.22   HEMOGLOBIN g/dL 9.6*   HEMATOCRIT % 30.8*   PLATELETS 10*3/mm3 315         Results from last 7 days   Lab Units 05/05/23  2217   HSTROP T ng/L 20*     Results from last 7 days   Lab Units 05/05/23  2217   PROBNP pg/mL 137.9             Results from last 7 days   Lab Units 05/05/23  2257   PH, ARTERIAL pH units 7.419   PO2 ART mm Hg 56.3*   PCO2, ARTERIAL mm Hg 49.4*   HCO3 ART mmol/L 32.0*           Invalid input(s): USDES,  BLOODU, NITRITITE, BACT, EP    Pain Management Panel         Latest Ref Rng & Units 3/10/2019   Pain Management Panel   Amphetamine, Urine Qual Negative Negative     Barbiturates Screen, Urine Negative Negative     Benzodiazepine Screen, Urine Negative Negative     Buprenorphine, Screen, Urine Negative Positive     Cocaine Screen, Urine Negative Negative     Methadone Screen , Urine Negative Negative     Methamphetamine, Ur Negative Negative                  EKG:      Sinus rhythm without acute ST or T wave changes noted    Radiology:    No radiology results for the last 3 days    Assessment/Plan:      Acute on chronic respiratory failure with hypoxia    COPD with acute exacerbation    Tracheostomy present  --Detailed Medical Reasoning: Need 2 of 3 for Acute respiratory failure: pO2 less than 60 mm Hg (hypoxemia) or O2 sat <90% (yes). pCO2 greater than 50 mm Hg (hypercapnia) with pH less than 7.35 (no). Signs and symptoms of acute respiratory distress (RR greater than 20 or less than 10, wheezing, nasal flaring, accessory muscle use): (Yes). --(YAHAIRA Silverio, The Hospitalist, 2019).  Differential includes: COPD exacerbation, tracheitis, pneumonia.    --Plan: We will give steroids nebulizer treatments antibiotics.  Given history of Pseudomonas we will give broad-spectrum antibiotics.  We will check a MRSA swab.  We will check a CT of the chest as with her  chronic interstitial infiltrates it is difficult to exclude acute infiltrate on x-ray.  We will also check a CT of her neck to assess for any tracheitis.  Encourage frequent suctioning.    Disposition: Anticipate discharge back to [facility] and rehab in 1 to 2 days    Prophylaxis: Lovenox          Edited by: Kirill Vazquez DO at 5/6/2023 0141           Risk Assessment: Moderate  DVT Prophylaxis: Lovenox  Code Status:   Code Status and Medical Interventions:   Ordered at: 05/06/23 0156     Code Status (Patient has no pulse and is not breathing):    CPR (Attempt to Resuscitate)     Medical Interventions (Patient has pulse or is breathing):    Full Support      Diet:   Dietary Orders (From admission, onward)     Start     Ordered    05/05/23 2217  NPO Diet NPO Type: Strict NPO  (SOA >/= 35 (Standing Order))  Diet Effective Now        Question:  NPO Type  Answer:  Strict NPO    05/05/23 2216                 Advance Care Planning   ACP discussion was declined by the patient. Patient has an advance directive in EMR which is still valid.           Kirill Vazquez DO  05/06/23  01:56 EDT    Dictated utilizing Dragon dictation.      Electronically signed by Kirill Vazquez DO at 05/06/23 0158          Emergency Department Notes      Janak Gutiérrez,  at 05/06/23 0020          Subjective  History of Present Illness:    Chief Complaint: Breathing difficulties  History of Present Illness: 62-year-old female history of COPD hypertension stroke, tracheostomy, 4 L oxygen requirement, PEG tube placement, here with reported increased secretions and breathing difficulties.  From nursing home.    Nurses Notes reviewed and agree, including vitals, allergies, social history and prior medical history.     REVIEW OF SYSTEMS: All systems reviewed and not pertinent unless noted.  Review of Systems      Positive for: Breathing difficulties, patient unable to provide negative for positive review of systems secondary to  "baseline status nonverbal      Past Medical History:   Diagnosis Date   • COPD (chronic obstructive pulmonary disease)    • Hypertension    • Pneumonia    • Stroke        Allergies:    Patient has no known allergies.      Past Surgical History:   Procedure Laterality Date   • HYSTERECTOMY      Partial    • TRACHEOSTOMY AND PEG TUBE INSERTION N/A 3/20/2019    Procedure: TRACHEOSTOMY AND PERCUTANEOUS ENDOSCOPIC GASTROSTOMY TUBE INSERTION;  Surgeon: Nadira Pandey MD;  Location: [facility];  Service: General         Social History     Socioeconomic History   • Marital status: Legally    Tobacco Use   • Smoking status: Former     Packs/day: 1.00     Types: Electronic Cigarette, Cigarettes   • Smokeless tobacco: Never   Vaping Use   • Vaping Use: Unknown   Substance and Sexual Activity   • Alcohol use: Not Currently     Comment: wine    • Drug use: No   • Sexual activity: Defer         No family history on file.    Objective  Physical Exam:  /84   Pulse 101   Temp 99.5 °F (37.5 °C) (Axillary)   Resp 28   Ht 162.6 cm (64\")   Wt 72.6 kg (160 lb)   SpO2 94%   BMI 27.46 kg/m²      Physical Exam    CONSTITUTIONAL: Frail chronically ill-appearing fragile 62-year-old female,  in no acute distress.  VITAL SIGNS: per nursing, reviewed and noted  SKIN: exposed skin with no rashes, ulcerations or petechiae  EYES: Grossly EOMI, no icterus  ENT:   Moist mucous membranes.  Tracheostomy in position with bubbly foamy secretions from the trach tube.  RESPIRATORY:  No increased work of breathing. No retractions.   CARDIOVASCULAR:   Extremities pink and warm.  Good cap refill to extremities.   GI: Abdomen without distention   MUSCULOSKELETAL: Some muscle contractures and wasting NEUROLOGIC: Awake, baseline nonverbal      Procedures    ED Course:    Lab Results (last 24 hours)     Procedure Component Value Units Date/Time    CBC & Differential [[id number]]  (Abnormal) Collected: 05/05/23 4452    Specimen: Blood " Updated: 05/05/23 2223    Narrative:      The following orders were created for panel order CBC & Differential.  Procedure                               Abnormality         Status                     ---------                               -----------         ------                     CBC Auto Differential[[id number]]        Abnormal            Final result                 Please view results for these tests on the individual orders.    Comprehensive Metabolic Panel [[id number]]  (Abnormal) Collected: 05/05/23 2217    Specimen: Blood Updated: 05/05/23 2246     Glucose 127 mg/dL      BUN 17 mg/dL      Creatinine 0.55 mg/dL      Sodium 138 mmol/L      Potassium 4.3 mmol/L      Chloride 100 mmol/L      CO2 29.3 mmol/L      Calcium 9.0 mg/dL      Total Protein 7.1 g/dL      Albumin 3.6 g/dL      ALT (SGPT) 19 U/L      AST (SGOT) 14 U/L      Alkaline Phosphatase 90 U/L      Total Bilirubin 0.2 mg/dL      Globulin 3.5 gm/dL      A/G Ratio 1.0 g/dL      BUN/Creatinine Ratio 30.9     Anion Gap 8.7 mmol/L      eGFR 102.5 mL/min/1.73     Narrative:      GFR Normal >60  Chronic Kidney Disease <60  Kidney Failure <15      BNP [[id number]]  (Normal) Collected: 05/05/23 2217    Specimen: Blood Updated: 05/05/23 2250     proBNP 137.9 pg/mL     Narrative:      Among patients with dyspnea, NT-proBNP is highly sensitive for the detection of acute congestive heart failure. In addition NT-proBNP of <300 pg/ml effectively rules out acute congestive heart failure with 99% negative predictive value.    Results may be falsely decreased if patient taking Biotin.      Single High Sensitivity Troponin T [[id number]]  (Abnormal) Collected: 05/05/23 2217    Specimen: Blood Updated: 05/05/23 2301     HS Troponin T 20 ng/L     Narrative:      High Sensitive Troponin T Reference Range:  <10.0 ng/L- Negative Female for AMI  <15.0 ng/L- Negative Male for AMI  >=10 - Abnormal Female indicating possible myocardial injury.  >=15 - Abnormal Male indicating  possible myocardial injury.   Clinicians would have to utilize clinical acumen, EKG, Troponin, and serial changes to determine if it is an Acute Myocardial Infarction or myocardial injury due to an underlying chronic condition.         CBC Auto Differential [[id number]]  (Abnormal) Collected: 05/05/23 2217    Specimen: Blood Updated: 05/05/23 2223     WBC 9.22 10*3/mm3      RBC 3.29 10*6/mm3      Hemoglobin 9.6 g/dL      Hematocrit 30.8 %      MCV 93.6 fL      MCH 29.2 pg      MCHC 31.2 g/dL      RDW 13.3 %      RDW-SD 46.1 fl      MPV 11.6 fL      Platelets 315 10*3/mm3      Neutrophil % 73.1 %      Lymphocyte % 15.2 %      Monocyte % 7.5 %      Eosinophil % 3.3 %      Basophil % 0.4 %      Immature Grans % 0.5 %      Neutrophils, Absolute 6.74 10*3/mm3      Lymphocytes, Absolute 1.40 10*3/mm3      Monocytes, Absolute 0.69 10*3/mm3      Eosinophils, Absolute 0.30 10*3/mm3      Basophils, Absolute 0.04 10*3/mm3      Immature Grans, Absolute 0.05 10*3/mm3      nRBC 0.0 /100 WBC     Blood Gas, Arterial With Co-Ox [[id number]]  (Abnormal) Collected: 05/05/23 2257    Specimen: Arterial Blood Updated: 05/05/23 2257     Site Right Radial     Gary's Test N/A     pH, Arterial 7.419 pH units      pCO2, Arterial 49.4 mm Hg      Comment: 83 Value above reference range        pO2, Arterial 56.3 mm Hg      Comment: 84 Value below reference range        HCO3, Arterial 32.0 mmol/L      Comment: 83 Value above reference range        Base Excess, Arterial 6.6 mmol/L      Comment: 83 Value above reference range        O2 Saturation, Arterial 89.5 %      Comment: 84 Value below reference range        Hematocrit, Blood Gas 29.7 %      Comment: 84 Value below reference range        Oxyhemoglobin 88.4 %      Comment: 84 Value below reference range        Methemoglobin 0.30 %      Carboxyhemoglobin 0.9 %      A-a DO2 193.1 mmHg      Barometric Pressure for Blood Gas 736 mmHg      Modality Venti Mask     FIO2 44 %      Flow Rate 6.0 lpm       Ventilator Mode NA     Note --     Collected by SJ     Comment: Meter: [id number]     :  [id number]        pH, Temp Corrected --     pCO2, Temperature Corrected --     pO2, Temperature Corrected --           No radiology results from the last 24 hrs     [facility]    ED Course as of 05/06/23 0020   Fri May 05, 2023   2254 EKG interpreted by me reveals sinus rhythm at a rate of 97 no ectopy no ischemic changes [PF]      ED Course User Index  [PF] Janak Gutiérrez DO       Medical Decision Making:    Initial impression of presenting illness: 62-year-old female from nursing home presents with breathing difficulties.  History of tracheostomy stroke and other comorbidities    DDX: includes but is not limited to: Pneumonia, bronchitis, CHF         Patient arrives normotensive afebrile heart rate 97, sats 99% trach collar with vitals interpreted by myself.     Pertinent features from physical exam: Muscle wasting, nonverbal, foamy secretions from tracheostomy.    Initial diagnostic plan: Short of breath protocol labs and imaging    Results from initial plan were reviewed and interpreted by me revealing questionable perihilar infiltrates     Diagnostic information from other sources: Old record review    Interventions / Re-evaluation: Respiratory therapy dissection patient multiple occasions due to secretions to maintain oxygen saturations.  Patient had the oxygen desaturations into the high 80s      Consultations/Discussion of results with other physicians: Dr. Vazquez    Disposition plan: Given patient's fragile nature, hypoxia, patient would benefit from observation.  Dr. Vazquez will assess for hospitalization.  -----      Final diagnoses:   Hypoxia   History of tracheostomy   History of stroke        Janak Gutiérrez DO  05/06/23 0024      Electronically signed by Janak Gutiérrez DO at 05/06/23 0024     Ankush Ahumada at 05/06/23 0022        Room 426 assigned     Electronically signed by Ankush Ahumada at 05/06/23  0022       Vital Signs (last day)     Date/Time Temp Temp src Pulse Resp BP Patient Position SpO2    05/06/23 0900 -- -- -- -- -- -- 99    05/06/23 0731 98.3 (36.8) Axillary 91 20 99/53 Lying 94    05/06/23 0403 98.4 (36.9) Axillary 92 24 108/58 Lying 100    05/06/23 0136 99.1 (37.3) Axillary 102 26 114/76 Sitting 100    05/06/23 0031 -- -- 102 -- -- -- 94    05/06/23 0030 -- -- 103 -- 116/85 -- 93    05/06/23 0029 -- -- 103 -- -- -- 92 05/06/23 0028 -- -- 103 -- -- -- 92 05/06/23 0027 -- -- 105 -- -- -- 91 05/06/23 0025 -- -- 110 -- -- -- 94 05/06/23 0024 -- -- 104 -- -- -- 92 05/05/23 2358 -- -- 101 -- 127/84 -- 94    05/05/23 2341 -- -- 104 -- -- -- 92 05/05/23 2336 -- -- 106 -- -- -- 92 05/05/23 2331 -- -- 105 -- 119/80 -- 89    05/05/23 2320 -- -- 107 -- -- -- 91 05/05/23 2315 -- -- 106 -- -- -- --    05/05/23 2310 -- -- 110 -- -- -- 92 05/05/23 2309 -- -- 112 -- -- -- 90 05/05/23 2259 -- -- 112 -- 119/92 -- --    05/05/23 2257 -- -- 103 -- -- -- 90 05/05/23 2252 -- -- 103 -- -- -- 89    05/05/23 2247 -- -- 104 -- -- -- 90 05/05/23 2242 -- -- 104 -- -- -- 88 05/05/23 2237 -- -- 103 -- -- -- 87    05/05/23 2232 -- -- 103 -- 118/68 -- 88    05/05/23 22:13:53 99.5 (37.5) Axillary -- -- -- -- --    05/05/23 2207 -- -- 97 28 121/73 Sitting 99            Lab Results (last 24 hours)     Procedure Component Value Units Date/Time    Comprehensive Metabolic Panel [[id number]]  (Abnormal) Collected: 05/06/23 0624    Specimen: Blood from Arm, Left Updated: 05/06/23 0658     Glucose 134 mg/dL      BUN 15 mg/dL      Creatinine 0.53 mg/dL      Sodium 143 mmol/L      Potassium 4.1 mmol/L      Chloride 106 mmol/L      CO2 27.5 mmol/L      Calcium 9.2 mg/dL      Total Protein 7.4 g/dL      Albumin 3.6 g/dL      ALT (SGPT) 22 U/L      AST (SGOT) 15 U/L      Alkaline Phosphatase 93 U/L      Total Bilirubin 0.4 mg/dL      Globulin 3.8 gm/dL      A/G Ratio 0.9 g/dL      BUN/Creatinine Ratio 28.3      Anion Gap 9.5 mmol/L      eGFR 103.4 mL/min/1.73     Narrative:      GFR Normal >60  Chronic Kidney Disease <60  Kidney Failure <15      CBC (No Diff) [[id number]]  (Abnormal) Collected: 05/06/23 0624    Specimen: Blood from Arm, Left Updated: 05/06/23 0636     WBC 9.96 10*3/mm3      RBC 3.40 10*6/mm3      Hemoglobin 10.0 g/dL      Hematocrit 32.4 %      MCV 95.3 fL      MCH 29.4 pg      MCHC 30.9 g/dL      RDW 13.2 %      RDW-SD 46.5 fl      MPV 11.8 fL      Platelets 303 10*3/mm3     Respiratory Culture - Sputum, ET Suction [[id number]] Collected: 05/06/23 0254    Specimen: Sputum from ET Suction Updated: 05/06/23 0342     Gram Stain Greater than 20 WBCs per low power field      Greater than 10 Epithelial cells per low power field      Few (2+) Gram positive bacilli      Rare (1+) Gram negative bacilli    CANDIDA AURIS SCREEN - Swab, Axilla Right, Axilla Left and Groin [[id number]] Collected: 05/06/23 0146    Specimen: Swab from Axilla Right, Axilla Left and Groin Updated: 05/06/23 0314    Acinetobacter Screen - Swab, Axilla, Right [[id number]] Collected: 05/06/23 0146    Specimen: Swab from Axilla, Right Updated: 05/06/23 0314    VRE Culture - Swab, Per Rectum [[id number]] Collected: 05/06/23 0146    Specimen: Swab from Per Rectum Updated: 05/06/23 0314    MRSA Screen, PCR (Inpatient) - Swab, Nares [[id number]] Collected: 05/06/23 0146    Specimen: Swab from Nares Updated: 05/06/23 0314    [place] Draw [[id number]] Collected: 05/05/23 2217    Specimen: Blood Updated: 05/05/23 2330    Narrative:      The following orders were created for panel order [place] Draw.  Procedure                               Abnormality         Status                     ---------                               -----------         ------                     Green Top (Gel)[[id number]]                                  Final result               Lavender Top[[id number]]                                     Final result               Gold Top -  SST[[id number]]                                   Final result               Light Blue Top[[id number]]                                   Final result                 Please view results for these tests on the individual orders.    Green Top (Gel) [[id number]] Collected: 05/05/23 2217    Specimen: Blood Updated: 05/05/23 2330     Extra Tube Hold for add-ons.     Comment: Auto resulted.       Light Blue Top [[id number]] Collected: 05/05/23 2217    Specimen: Blood Updated: 05/05/23 2330     Extra Tube Hold for add-ons.     Comment: Auto resulted       Lavender Top [[id number]] Collected: 05/05/23 2217    Specimen: Blood Updated: 05/05/23 2330     Extra Tube hold for add-on     Comment: Auto resulted       Gold Top - SST [[id number]] Collected: 05/05/23 2217    Specimen: Blood Updated: 05/05/23 2330     Extra Tube Hold for add-ons.     Comment: Auto resulted.       Single High Sensitivity Troponin T [[id number]]  (Abnormal) Collected: 05/05/23 2217    Specimen: Blood Updated: 05/05/23 2301     HS Troponin T 20 ng/L     Narrative:      High Sensitive Troponin T Reference Range:  <10.0 ng/L- Negative Female for AMI  <15.0 ng/L- Negative Male for AMI  >=10 - Abnormal Female indicating possible myocardial injury.  >=15 - Abnormal Male indicating possible myocardial injury.   Clinicians would have to utilize clinical acumen, EKG, Troponin, and serial changes to determine if it is an Acute Myocardial Infarction or myocardial injury due to an underlying chronic condition.         Blood Gas, Arterial With Co-Ox [[id number]]  (Abnormal) Collected: 05/05/23 2257    Specimen: Arterial Blood Updated: 05/05/23 2257     Site Right Radial     Gary's Test N/A     pH, Arterial 7.419 pH units      pCO2, Arterial 49.4 mm Hg      Comment: 83 Value above reference range        pO2, Arterial 56.3 mm Hg      Comment: 84 Value below reference range        HCO3, Arterial 32.0 mmol/L      Comment: 83 Value above reference range        Base Excess,  Arterial 6.6 mmol/L      Comment: 83 Value above reference range        O2 Saturation, Arterial 89.5 %      Comment: 84 Value below reference range        Hematocrit, Blood Gas 29.7 %      Comment: 84 Value below reference range        Oxyhemoglobin 88.4 %      Comment: 84 Value below reference range        Methemoglobin 0.30 %      Carboxyhemoglobin 0.9 %      A-a DO2 193.1 mmHg      Barometric Pressure for Blood Gas 736 mmHg      Modality Venti Mask     FIO2 44 %      Flow Rate 6.0 lpm      Ventilator Mode NA     Note --     Collected by SJ     Comment: Meter: [id number]     :  [id number]        pH, Temp Corrected --     pCO2, Temperature Corrected --     pO2, Temperature Corrected --    BNP [[id number]]  (Normal) Collected: 05/05/23 2217    Specimen: Blood Updated: 05/05/23 2250     proBNP 137.9 pg/mL     Narrative:      Among patients with dyspnea, NT-proBNP is highly sensitive for the detection of acute congestive heart failure. In addition NT-proBNP of <300 pg/ml effectively rules out acute congestive heart failure with 99% negative predictive value.    Results may be falsely decreased if patient taking Biotin.      Comprehensive Metabolic Panel [[id number]]  (Abnormal) Collected: 05/05/23 2217    Specimen: Blood Updated: 05/05/23 2246     Glucose 127 mg/dL      BUN 17 mg/dL      Creatinine 0.55 mg/dL      Sodium 138 mmol/L      Potassium 4.3 mmol/L      Chloride 100 mmol/L      CO2 29.3 mmol/L      Calcium 9.0 mg/dL      Total Protein 7.1 g/dL      Albumin 3.6 g/dL      ALT (SGPT) 19 U/L      AST (SGOT) 14 U/L      Alkaline Phosphatase 90 U/L      Total Bilirubin 0.2 mg/dL      Globulin 3.5 gm/dL      A/G Ratio 1.0 g/dL      BUN/Creatinine Ratio 30.9     Anion Gap 8.7 mmol/L      eGFR 102.5 mL/min/1.73     Narrative:      GFR Normal >60  Chronic Kidney Disease <60  Kidney Failure <15      CBC & Differential [[id number]]  (Abnormal) Collected: 05/05/23 2217    Specimen: Blood Updated: 05/05/23 2223     Narrative:      The following orders were created for panel order CBC & Differential.  Procedure                               Abnormality         Status                     ---------                               -----------         ------                     CBC Auto Differential[[id number]]        Abnormal            Final result                 Please view results for these tests on the individual orders.    CBC Auto Differential [[id number]]  (Abnormal) Collected: 05/05/23 2217    Specimen: Blood Updated: 05/05/23 2223     WBC 9.22 10*3/mm3      RBC 3.29 10*6/mm3      Hemoglobin 9.6 g/dL      Hematocrit 30.8 %      MCV 93.6 fL      MCH 29.2 pg      MCHC 31.2 g/dL      RDW 13.3 %      RDW-SD 46.1 fl      MPV 11.6 fL      Platelets 315 10*3/mm3      Neutrophil % 73.1 %      Lymphocyte % 15.2 %      Monocyte % 7.5 %      Eosinophil % 3.3 %      Basophil % 0.4 %      Immature Grans % 0.5 %      Neutrophils, Absolute 6.74 10*3/mm3      Lymphocytes, Absolute 1.40 10*3/mm3      Monocytes, Absolute 0.69 10*3/mm3      Eosinophils, Absolute 0.30 10*3/mm3      Basophils, Absolute 0.04 10*3/mm3      Immature Grans, Absolute 0.05 10*3/mm3      nRBC 0.0 /100 WBC         Imaging Results (Last 24 Hours)     Procedure Component Value Units Date/Time    XR Chest 1 View [[id number]] Collected: 05/06/23 0805     Updated: 05/06/23 0808    Narrative:       PROCEDURE: XR CHEST 1 VW-     HISTORY: SOA Triage Protocol     COMPARISON: January 22, 2023.     FINDINGS: The heart is normal in size. The mediastinum is unremarkable.  Decreased inspiratory effort noted with crowding of the lung markings in  both lung bases. Linear perihilar densities noted bilaterally consistent  with minimal atelectasis. No area of consolidation is seen. Tracheostomy  tube is in stable position from prior exam... There is no pneumothorax.   There are no acute osseous abnormalities.       Impression:      Mild, bilateral perihilar atelectasis..     Stable position  tracheostomy tube..     This report was signed and finalized on 5/6/2023 8:06 AM by Briana Erickson MD.

## 2023-05-06 NOTE — H&P
[facility]      Name:  Viji Vargas   Age:  62 y.o.  Sex:  female  :  1960  MRN:  [id number]   Visit Number:  [id number]  Admission Date:  2023  Date Of Service:  23  Primary Care Physician:  Ranjeet Pina MD    Chief Complaint:     Increased secretions    History Of Presenting Illness:      62-year-old lady with a history of COPD, hypertension, stroke resulting in tracheostomy and peg tube placement.  Patient with a chronic oxygen requirement of 4 L.  Sent from [facility] for increased secretions.  Of note she is on scopolamine and glycopyrrolate.  Attempted to call to get more information unsuccessfully.  She is listed as full code.    Pertinent findings: Chest x-ray showing patchy interstitial prominence which is persistent since previous from 2023.  CBC CMP and ABG reviewed.  EKG showing sinus rhythm without acute ST or T wave changes noted.    ED Medications:    Medications   sodium chloride 0.9 % flush 10 mL (has no administration in time range)       Edited by: Kirill Vazquez DO at 2023 0141     Review Of Systems:    All systems were reviewed and negative except as mentioned in history of presenting illness, assessment and plan.    Past Medical History: Patient  has a past medical history of COPD (chronic obstructive pulmonary disease), Hypertension, Pneumonia, and Stroke.    Past Surgical History: Patient  has a past surgical history that includes Hysterectomy and tracheostomy and peg tube insertion (N/A, 3/20/2019).    Social History: Patient  reports that she has quit smoking. Her smoking use included electronic cigarette and cigarettes. She smoked an average of 1 pack per day. She has never used smokeless tobacco. She reports that she does not currently use alcohol. She reports that she does not use drugs.    Family History:  Patient's family history has been reviewed and found to be noncontributory.      Allergies:      Patient has no known allergies.    Home Medications:    Prior to Admission Medications     Prescriptions Last Dose Informant Patient Reported? Taking?    ALPRAZolam (XANAX) 0.5 MG tablet 5/5/2023  No Yes    Administer 1 tablet per G tube 2 (Two) Times a Day.    baclofen (LIORESAL) 10 MG tablet 5/5/2023  Yes Yes    Administer 1 tablet per G tube Every 8 (Eight) Hours.    carvedilol (COREG) 12.5 MG tablet 5/5/2023  Yes Yes    Administer 1 tablet per G tube 2 (Two) Times a Day.    docusate sodium (COLACE) 50 mg/5 mL liquid 5/5/2023  Yes Yes    Take 30 mL by mouth 2 (Two) Times a Day.    glycopyrrolate (ROBINUL) 2 MG tablet 5/5/2023  Yes Yes    Take 1 tablet by mouth 3 (Three) Times a Day.    Amino Acids-Protein Hydrolys (PRO-STAT) liquid oral liquid   No No    30 mL by Per G Tube route 2 (Two) Times a Day.    azithromycin (Zithromax Z-Leonard) 250 MG tablet   No No    Take 2 tablets by mouth on day 1, then 1 tablet daily on days 2-5    budesonide (PULMICORT) 0.5 MG/2ML nebulizer solution   No No    Take 2 mL by nebulization 2 (Two) Times a Day.    cefuroxime (CEFTIN) 500 MG tablet   No No    Take 1 tablet by mouth 2 (Two) Times a Day.    cetirizine (zyrTEC) 10 MG tablet   Yes No    Administer 10 mg per G tube Daily.    docusate sodium (COLACE) 100 MG capsule   No No    Take 1 capsule by mouth 2 (Two) Times a Day.    doxycycline (MONODOX) 100 MG capsule   No No    Take 1 capsule by mouth 2 (Two) Times a Day.    ipratropium-albuterol (DUO-NEB) 0.5-2.5 mg/3 ml nebulizer   No No    Take 3 mL by nebulization Every 6 (Six) Hours.    Patient taking differently:  Take 3 mL by nebulization 4 (Four) Times a Day. Takes at [id number]    montelukast (SINGULAIR) 10 MG tablet   Yes No    Administer 10 mg per G tube Every Night.    Multiple Vitamins-Minerals (MULTIVITAMIN WITH IRON-MINERALS) liquid   Yes No    Take 15 mL by mouth Daily.    ondansetron ODT (ZOFRAN-ODT) 4 MG disintegrating tablet   No No     "Place 1 tablet under the tongue Every 6 (Six) Hours As Needed for Nausea or Vomiting.    pantoprazole (PROTONIX) 20 MG EC tablet   No No    Take 1 tablet by mouth Daily.    Patient taking differently:  Take 40 mg by mouth Daily.    polyethylene glycol (MIRALAX) 17 g packet   No No    Take 17 g by mouth 2 (Two) Times a Day.    promethazine (PHENERGAN) 25 MG tablet   No No    Take 1 tablet by mouth Every 6 (Six) Hours As Needed for Nausea or Vomiting.    Scopolamine 1 MG/3DAYS patch   Yes No    Place 1 patch on the skin as directed by provider Every 72 (Seventy-Two) Hours.            Vital Signs:  Temp:  [99.1 °F (37.3 °C)-99.5 °F (37.5 °C)] 99.1 °F (37.3 °C)  Heart Rate:  [] 102  Resp:  [26-28] 26  BP: (114-127)/(68-92) 114/76        05/05/23  2207 05/06/23  0136   Weight: 72.6 kg (160 lb) 69 kg (152 lb 1.9 oz)     Body mass index is 26.11 kg/m².    Physical Exam:     Most recent vital Signs: /76 (BP Location: Left arm, Patient Position: Sitting)   Pulse 102   Temp 99.1 °F (37.3 °C) (Axillary)   Resp 26   Ht 162.6 cm (64\")   Wt 69 kg (152 lb 1.9 oz)   SpO2 100%   BMI 26.11 kg/m²     Constitutional: Awake, alert nonverbal  Eyes: PERRLA, sclerae anicteric, no conjunctival injection  HENT: NCAT, mucous membranes moist  Neck: Supple, no thyromegaly, no lymphadenopathy, trachea midline noted tracheostomy tube with some frothy secretions.  Respiratory: Symmetric breath sounds with some inspiratory wheeze and rhonchi bilaterally, mildly labored respirations   Cardiovascular: Tachycardic but regular, no murmurs, rubs, or gallops, palpable pedal pulses bilaterally  Gastrointestinal: Positive bowel sounds, soft, nontender, nondistended  Musculoskeletal: No bilateral ankle edema, no clubbing or cyanosis to extremities  Psychiatric: Affect appears fearful  Neurologic: Nonverbal   skin: No rashes  Edited by: Kirill Vazquez DO at 5/6/2023 0141      Laboratory data:    I have reviewed the labs done in the " emergency room.    Results from last 7 days   Lab Units 05/05/23  2217   SODIUM mmol/L 138   POTASSIUM mmol/L 4.3   CHLORIDE mmol/L 100   CO2 mmol/L 29.3*   BUN mg/dL 17   CREATININE mg/dL 0.55*   CALCIUM mg/dL 9.0   BILIRUBIN mg/dL 0.2   ALK PHOS U/L 90   ALT (SGPT) U/L 19   AST (SGOT) U/L 14   GLUCOSE mg/dL 127*     Results from last 7 days   Lab Units 05/05/23 2217   WBC 10*3/mm3 9.22   HEMOGLOBIN g/dL 9.6*   HEMATOCRIT % 30.8*   PLATELETS 10*3/mm3 315         Results from last 7 days   Lab Units 05/05/23 2217   HSTROP T ng/L 20*     Results from last 7 days   Lab Units 05/05/23 2217   PROBNP pg/mL 137.9             Results from last 7 days   Lab Units 05/05/23  2257   PH, ARTERIAL pH units 7.419   PO2 ART mm Hg 56.3*   PCO2, ARTERIAL mm Hg 49.4*   HCO3 ART mmol/L 32.0*           Invalid input(s): USDES,  BLOODU, NITRITITE, BACT, EP    Pain Management Panel         Latest Ref Rng & Units 3/10/2019   Pain Management Panel   Amphetamine, Urine Qual Negative Negative     Barbiturates Screen, Urine Negative Negative     Benzodiazepine Screen, Urine Negative Negative     Buprenorphine, Screen, Urine Negative Positive     Cocaine Screen, Urine Negative Negative     Methadone Screen , Urine Negative Negative     Methamphetamine, Ur Negative Negative                  EKG:      Sinus rhythm without acute ST or T wave changes noted    Radiology:    No radiology results for the last 3 days    Assessment/Plan:      Acute on chronic respiratory failure with hypoxia    COPD with acute exacerbation    Tracheostomy present  --Detailed Medical Reasoning: Need 2 of 3 for Acute respiratory failure: pO2 less than 60 mm Hg (hypoxemia) or O2 sat <90% (yes). pCO2 greater than 50 mm Hg (hypercapnia) with pH less than 7.35 (no). Signs and symptoms of acute respiratory distress (RR greater than 20 or less than 10, wheezing, nasal flaring, accessory muscle use): (Yes). --(YAHAIRA Silverio, [employer] Hospital[employer], 2019).  Differential includes: COPD  exacerbation, tracheitis, pneumonia.    --Plan: We will give steroids nebulizer treatments antibiotics.  Given history of Pseudomonas we will give broad-spectrum antibiotics.  We will check a MRSA swab.  We will check a CT of the chest as with her chronic interstitial infiltrates it is difficult to exclude acute infiltrate on x-ray.  We will also check a CT of her neck to assess for any tracheitis.  Encourage frequent suctioning.    Disposition: Anticipate discharge back to [facility] re[facility] in 1 to 2 days    Prophylaxis: Lovenox          Edited by: Kirill Vazquez DO at 5/6/2023 0141           Risk Assessment: Moderate  DVT Prophylaxis: Lovenox  Code Status:   Code Status and Medical Interventions:   Ordered at: 05/06/23 0156     Code Status (Patient has no pulse and is not breathing):    CPR (Attempt to Resuscitate)     Medical Interventions (Patient has pulse or is breathing):    Full Support      Diet:   Dietary Orders (From admission, onward)     Start     Ordered    05/05/23 2217  NPO Diet NPO Type: Strict NPO  (SOA >/= 35 (Standing Order))  Diet Effective Now        Question:  NPO Type  Answer:  Strict NPO    05/05/23 2216                 Advance Care Planning   ACP discussion was declined by the patient. Patient has an advance directive in EMR which is still valid.            Kirill Vazquez DO  05/06/23  01:56 EDT    Dictated utilizing Dragon dictation.

## 2023-05-06 NOTE — PLAN OF CARE
Goal Outcome Evaluation:  Plan of Care Reviewed With: patient        Progress: no change   Viji continues on IV antibiotics. Tube feeding initiated today. Turn q 2 hr. Skin, oral, and trach care provided. Suction at bedside. VSS.

## 2023-05-06 NOTE — PROGRESS NOTES
[facility]    Name:  Viji Vargas   Age:  62 y.o.  Sex:  female  :  1960  MRN:  [id number]   Visit Number:  [id number]  Admission Date:  2023  Date Of Service:  23  Primary Care Physician:  Ranjeet Pina MD     LOS: 0 days :    Chief Complaint:      Follow-up of shortness of breath, increasing secretions and chest congestion.    Subjective:    Ms. Vargas was seen and examined this afternoon.  She is unfortunately nonverbal and paraplegic with tracheostomy and PEG tube.  She just came back up from CT of the chest and neck.  She is currently being initiated on PEG tube feeds.  Currently on 4 L of trach collar oxygen saturating in the upper 90s.  She has been afebrile since admission last night.    Hospital Course:    Ms. Vargas is an unfortunate 62-year-old female, nursing home resident, with history of cardiac arrest and anoxic brain injury in 2019, status post tracheostomy and PEG tube placement, COPD, hypertension was sent from the nursing home facility by EMS with symptoms of increasing shortness of breath, increased secretions and breathing difficulties.  Patient is nonverbal and no review of systems were possible.      In the emergency room, her initial temperature was 99.5, pulse 97, blood pressure 121/73 and pulse oxygen saturation was between 87 and 90% on 4 L of trach collar oxygen.  Initial ABG on 6 L showed a pH of 7.42, PCO2 49, PO2 56 and bicarb of 32.  Blood work revealed glucose of 127 and hemoglobin of 9.6.  CMP and CBC otherwise were unremarkable.  Initial troponin and proBNP levels were fairly unremarkable.  Chest x-ray showed mild bilateral perihilar atelectasis and stable position of the tracheostomy tube.  Patient was given Zosyn and vancomycin in the emergency room along with IV Solu-Medrol and was subsequently admitted to the medical floor with telemetry.    Patient subsequently underwent CT of the chest without  contrast which showed left upper lobe airspace disease consistent with pneumonia and right lower lobe atelectasis/pneumonia.  CT of the neck showed tracheostomy in good position but unfortunately it was suboptimal exam due to motion artifact.  No subcutaneous edema was noted.  No adenopathy or mass lesion was noted.  Respiratory culture and nasal swab for MRSA were sent.  Patient was continued on Zosyn and vancomycin.  She was initiated on PEG tube feeds.    Review of Systems:     All systems were reviewed and negative except as mentioned in subjective, assessment and plan.    Vital Signs:    Temp:  [98.1 °F (36.7 °C)-99.5 °F (37.5 °C)] 98.1 °F (36.7 °C)  Heart Rate:  [] 77  Resp:  [18-28] 18  BP: ()/(52-92) 92/52    Intake and output:    No intake/output data recorded.  I/O this shift:  In: 50 [IV Piggyback:50]  Out: -     Physical Examination:    General Appearance:  Unresponsive and nonverbal.   Head:  Atraumatic and normocephalic.   Eyes: Conjunctivae and sclerae normal, no icterus.  Looks pale.   Throat: No oral lesions, no thrush, oral mucosa dry.   Neck: Supple, tracheostomy noted.   Lungs:   Breath sounds bilaterally in the bases.  No wheezing.  Bilateral basal crackles. No Pleural rub or bronchial breathing.   Heart:  Normal S1 and S2, muffled, no murmur, no gallop, no rub. No JVD.   Abdomen:   Normal bowel sounds, no masses, no organomegaly. Soft, nontender, obese, no rebound tenderness.  PEG tube is in place.   Extremities: Supple, 1+ pitting ankle edema, no cyanosis, no clubbing.   Skin: No bleeding or rash.   Neurologic: Nonverbal.  Severe upper and lower extremity contractures and spasticity noted.     Laboratory results:    Results from last 7 days   Lab Units 05/06/23  0624 05/05/23  2217   SODIUM mmol/L 143 138   POTASSIUM mmol/L 4.1 4.3   CHLORIDE mmol/L 106 100   CO2 mmol/L 27.5 29.3*   BUN mg/dL 15 17   CREATININE mg/dL 0.53* 0.55*   CALCIUM mg/dL 9.2 9.0   BILIRUBIN mg/dL 0.4 0.2    ALK PHOS U/L 93 90   ALT (SGPT) U/L 22 19   AST (SGOT) U/L 15 14   GLUCOSE mg/dL 134* 127*     Results from last 7 days   Lab Units 05/06/23  0624 05/05/23  2217   WBC 10*3/mm3 9.96 9.22   HEMOGLOBIN g/dL 10.0* 9.6*   HEMATOCRIT % 32.4* 30.8*   PLATELETS 10*3/mm3 303 315         Results from last 7 days   Lab Units 05/05/23  2217   HSTROP T ng/L 20*         Recent Labs     05/05/23  2257   PHART 7.419   PHG1WCN 49.4*   PO2ART 56.3*   XDA8VQY 32.0*   BASEEXCESS 6.6*      I have reviewed the patient's laboratory results.    Radiology results:    CT Soft Tissue Neck Without Contrast    Result Date: 5/6/2023  PROCEDURE: CT SOFT TISSUE NECK WO CONTRAST-  HISTORY: Laryngeal spasm  COMPARISON: None.  TECHNIQUE: Thin section axial CT images were obtained through the neck after intravenous contrast administration.  This study was performed with techniques to keep radiation doses as low as reasonably achievable, (ALARA). Individualized dose reduction techniques using automated exposure control or adjustment of mA and/or kV according to the patient size were employed.  FINDINGS: There is no adenopathy or mass lesion present. The thyroid is unremarkable. Limited images of the lung apices are unremarkable. The glottis and supraglottic areas are unremarkable. No acute osseus abnormality is identified. Tracheostomy tube identified.  Significant motion artifact noted. Inhomogeneous enhancement of the thyroid noted. No subcutaneous edema identified.      Impression: Tracheostomy good position.  Suboptimal exam secondary to motion artifact.    This report was signed and finalized on 5/6/2023 11:40 AM by Briana Erickson MD.    CT Chest Without Contrast Diagnostic    Result Date: 5/6/2023  PROCEDURE: CT CHEST WO CONTRAST DIAGNOSTIC-  HISTORY: Abnormal xray - lung opacity/opacities; R09.02-Hypoxemia; Z98.890-Other specified postprocedural states; Z86.73-Personal history of transient ischemic attack (TIA), and cerebral infarction without  residual deficits  COMPARISON: Chest x-ray 1 day prior.  PROCEDURE: Axial images were obtained from the lung apex to the mid abdomen by computed tomography. This study was performed with techniques to keep radiation doses as low as reasonably achievable, (ALARA). Individualized dose reduction techniques using automated exposure control or adjustment of mA and/or kV according to the patient size were employed.  FINDINGS:  CHEST: There is no suspicious axillary adenopathy. Vascular calcifications noted. There is evidence of previous calcified granulomatous disease. Tracheostomy is in good position. Tracheobronchial calcifications noted. There is an area of focal airspace disease peripherally in the left upper lobe concerning for pneumonia. There is posterior right lower lobe airspace disease/atelectasis. There is no suspicious hilar or mediastinal adenopathy. The heart is proper size. There is no pericardial or pleural effusion.No suspicious infiltrate or nodule identified. Limited images of the upper abdomen demonstrate gastrostomy tube which is partially visualized.      Impression: Left upper lobe airspace disease consistent with pneumonia and right lower lobe atelectasis/pneumonia.  Tracheostomy tube and gastrostomy tube as noted.    This report was signed and finalized on 5/6/2023 12:15 PM by Briana Erickson MD.    XR Chest 1 View    Result Date: 5/6/2023   PROCEDURE: XR CHEST 1 VW-  HISTORY: SOA Triage Protocol  COMPARISON: January 22, 2023.  FINDINGS: The heart is normal in size. The mediastinum is unremarkable. Decreased inspiratory effort noted with crowding of the lung markings in both lung bases. Linear perihilar densities noted bilaterally consistent with minimal atelectasis. No area of consolidation is seen. Tracheostomy tube is in stable position from prior exam... There is no pneumothorax. There are no acute osseous abnormalities.      Impression: Mild, bilateral perihilar atelectasis..  Stable position  tracheostomy tube..  This report was signed and finalized on 5/6/2023 8:06 AM by Briana Erickson MD.    I have reviewed the patient's radiology reports.    Medication Review:     I have reviewed the patient's active and prn medications.     Problem List:      Acute on chronic respiratory failure with hypoxia (HCC)    COPD with acute exacerbation    Tracheostomy present    Pneumonia of left upper lobe due to infectious organism    Assessment:    1. Acute on chronic hypoxic respiratory failure secondary to #2, POA.  2. Left upper lobe healthcare associated pneumonia, unable to classify further, POA.  3. Acute COPD exacerbation, POA.  4. History of cardiac arrest/anoxic brain injury.  5. Status post tracheostomy and PEG tube placement.  6. Essential hypertension.    Plan:    Respiratory failure/pneumonia/COPD exacerbation.  - Continue DuoNeb, budesonide and prednisone.  - Continue IV antibiotics therapy with Zosyn and vancomycin.  - Sputum culture and nasal swab for MRSA screen are currently pending.  - Lactobacillus supplements.    Nutrition.  - Continue tube feeds as per dietitian.    Discussed with nursing staff.    DVT Prophylaxis: Enoxaparin  Code Status: Full  Diet: Tube feeds  Discharge Plan: Hopefully back to skilled nursing facility in 2 to 3 days.      Paul Pandey MD  05/06/23  12:32 EDT    Dictated utilizing Dragon dictation.

## 2023-05-06 NOTE — CASE MANAGEMENT/SOCIAL WORK
Discharge Planning Assessment  [facility]     Patient Name: Viji Vargas  MRN: [id number]  Today's Date: 5/6/2023    Admit Date: 5/5/2023    Plan: Pt is non verbal and a resident of [facility]. DCP is to return to [facility] via ems.   Discharge Needs Assessment     Row Name 05/06/23 0939       Living Environment    People in Home facility resident    Current Living Arrangements residential facility    Primary Care Provided by other (see comments)    Provides Primary Care For no one, unable/limited ability to care for self    Able to Return to Prior Arrangements yes       Transition Planning    Patient/Family Anticipates Transition to long-term care facility               Discharge Plan     Row Name 05/06/23 0940       Plan    Plan Pt is non verbal and a resident of [facility]. DCP is to return to [facility] via ems.              Continued Care and Services - Admitted Since 5/5/2023    Coordination has not been started for this encounter.          Demographic Summary     Row Name 05/06/23 0939       General Information    Admission Type observation    Arrived From PACU/recovery room    Referral Source admission list    Reason for Consult discharge planning    Preferred Language English               Functional Status    No documentation.                Psychosocial    No documentation.                Abuse/Neglect    No documentation.                Legal    No documentation.                Substance Abuse    No documentation.                Patient Forms    No documentation.                   Sulema Holman RN

## 2023-05-07 LAB
GLUCOSE BLDC GLUCOMTR-MCNC: 119 MG/DL (ref 70–130)
GLUCOSE BLDC GLUCOMTR-MCNC: 155 MG/DL (ref 70–130)
GLUCOSE BLDC GLUCOMTR-MCNC: 212 MG/DL (ref 70–130)
VANCOMYCIN SERPL-MCNC: 51.1 MCG/ML (ref 5–40)

## 2023-05-07 PROCEDURE — 80202 ASSAY OF VANCOMYCIN: CPT | Performed by: INTERNAL MEDICINE

## 2023-05-07 PROCEDURE — 25010000002 PIPERACILLIN SOD-TAZOBACTAM PER 1 G: Performed by: INTERNAL MEDICINE

## 2023-05-07 PROCEDURE — 63710000001 PREDNISONE PER 1 MG: Performed by: INTERNAL MEDICINE

## 2023-05-07 PROCEDURE — 94762 N-INVAS EAR/PLS OXIMTRY CONT: CPT

## 2023-05-07 PROCEDURE — 25010000002 ENOXAPARIN PER 10 MG: Performed by: INTERNAL MEDICINE

## 2023-05-07 PROCEDURE — 25010000002 VANCOMYCIN 1 G RECONSTITUTED SOLUTION: Performed by: INTERNAL MEDICINE

## 2023-05-07 PROCEDURE — 82948 REAGENT STRIP/BLOOD GLUCOSE: CPT

## 2023-05-07 PROCEDURE — 96372 THER/PROPH/DIAG INJ SC/IM: CPT

## 2023-05-07 PROCEDURE — 94799 UNLISTED PULMONARY SVC/PX: CPT

## 2023-05-07 PROCEDURE — 97161 PT EVAL LOW COMPLEX 20 MIN: CPT

## 2023-05-07 PROCEDURE — 94761 N-INVAS EAR/PLS OXIMETRY MLT: CPT

## 2023-05-07 PROCEDURE — 94760 N-INVAS EAR/PLS OXIMETRY 1: CPT

## 2023-05-07 PROCEDURE — G0378 HOSPITAL OBSERVATION PER HR: HCPCS

## 2023-05-07 PROCEDURE — 96375 TX/PRO/DX INJ NEW DRUG ADDON: CPT

## 2023-05-07 RX ORDER — IPRATROPIUM BROMIDE AND ALBUTEROL SULFATE 2.5; .5 MG/3ML; MG/3ML
3 SOLUTION RESPIRATORY (INHALATION)
Status: DISCONTINUED | OUTPATIENT
Start: 2023-05-07 | End: 2023-05-08 | Stop reason: HOSPADM

## 2023-05-07 RX ADMIN — BACLOFEN 10 MG: 10 TABLET ORAL at 11:40

## 2023-05-07 RX ADMIN — Medication 30 ML: at 20:10

## 2023-05-07 RX ADMIN — CETIRIZINE HYDROCHLORIDE 10 MG: 10 TABLET, FILM COATED ORAL at 09:06

## 2023-05-07 RX ADMIN — Medication 10 ML: at 09:06

## 2023-05-07 RX ADMIN — GLYCOPYRROLATE 2 MG: 1 TABLET ORAL at 09:10

## 2023-05-07 RX ADMIN — CARVEDILOL 12.5 MG: 12.5 TABLET, FILM COATED ORAL at 20:03

## 2023-05-07 RX ADMIN — BUDESONIDE 0.5 MG: 0.5 INHALANT RESPIRATORY (INHALATION) at 07:03

## 2023-05-07 RX ADMIN — BACLOFEN 10 MG: 10 TABLET ORAL at 02:40

## 2023-05-07 RX ADMIN — MONTELUKAST 10 MG: 10 TABLET, FILM COATED ORAL at 20:01

## 2023-05-07 RX ADMIN — BACLOFEN 10 MG: 10 TABLET ORAL at 16:59

## 2023-05-07 RX ADMIN — CARVEDILOL 12.5 MG: 12.5 TABLET, FILM COATED ORAL at 09:06

## 2023-05-07 RX ADMIN — IPRATROPIUM BROMIDE AND ALBUTEROL SULFATE 3 ML: .5; 3 SOLUTION RESPIRATORY (INHALATION) at 13:02

## 2023-05-07 RX ADMIN — IPRATROPIUM BROMIDE AND ALBUTEROL SULFATE 3 ML: .5; 3 SOLUTION RESPIRATORY (INHALATION) at 00:39

## 2023-05-07 RX ADMIN — DOCUSATE SODIUM 300 MG: 50 LIQUID ORAL at 09:06

## 2023-05-07 RX ADMIN — ALPRAZOLAM 0.5 MG: 0.5 TABLET ORAL at 09:06

## 2023-05-07 RX ADMIN — ALPRAZOLAM 0.5 MG: 0.5 TABLET ORAL at 20:01

## 2023-05-07 RX ADMIN — SENNOSIDES AND DOCUSATE SODIUM 2 TABLET: 50; 8.6 TABLET ORAL at 09:06

## 2023-05-07 RX ADMIN — IPRATROPIUM BROMIDE AND ALBUTEROL SULFATE 3 ML: .5; 3 SOLUTION RESPIRATORY (INHALATION) at 07:03

## 2023-05-07 RX ADMIN — ASCORBIC ACID, THIAMINE, RIBOFLAVIN, NIACINAMIDE, PYRIDOXINE, FOLIC ACID, COBALAMIN, BIOTIN, PANTOTHENIC ACID 15 ML: 100; 1.5; 1.7; 20; 10; 1; 6; 300; 1 TABLET, COATED ORAL at 09:06

## 2023-05-07 RX ADMIN — IPRATROPIUM BROMIDE AND ALBUTEROL SULFATE 3 ML: 2.5; .5 SOLUTION RESPIRATORY (INHALATION) at 20:14

## 2023-05-07 RX ADMIN — BUDESONIDE 0.5 MG: 0.5 INHALANT RESPIRATORY (INHALATION) at 20:14

## 2023-05-07 RX ADMIN — ENOXAPARIN SODIUM 40 MG: 100 INJECTION SUBCUTANEOUS at 09:06

## 2023-05-07 RX ADMIN — Medication 5 MG: at 20:01

## 2023-05-07 RX ADMIN — TAZOBACTAM SODIUM AND PIPERACILLIN SODIUM 3.38 G: 375; 3 INJECTION, SOLUTION INTRAVENOUS at 00:25

## 2023-05-07 RX ADMIN — GLYCOPYRROLATE 2 MG: 1 TABLET ORAL at 16:58

## 2023-05-07 RX ADMIN — TAZOBACTAM SODIUM AND PIPERACILLIN SODIUM 3.38 G: 375; 3 INJECTION, SOLUTION INTRAVENOUS at 09:10

## 2023-05-07 RX ADMIN — PANTOPRAZOLE SODIUM 40 MG: 40 INJECTION, POWDER, FOR SOLUTION INTRAVENOUS at 05:15

## 2023-05-07 RX ADMIN — POLYETHYLENE GLYCOL 3350 17 G: 17 POWDER, FOR SOLUTION ORAL at 09:05

## 2023-05-07 RX ADMIN — GLYCOPYRROLATE 2 MG: 1 TABLET ORAL at 20:01

## 2023-05-07 RX ADMIN — DOCUSATE SODIUM 300 MG: 50 LIQUID ORAL at 20:01

## 2023-05-07 RX ADMIN — TAZOBACTAM SODIUM AND PIPERACILLIN SODIUM 3.38 G: 375; 3 INJECTION, SOLUTION INTRAVENOUS at 16:58

## 2023-05-07 RX ADMIN — PREDNISONE 40 MG: 20 TABLET ORAL at 09:06

## 2023-05-07 RX ADMIN — Medication 10 ML: at 20:13

## 2023-05-07 RX ADMIN — SODIUM CHLORIDE 1000 MG: 900 INJECTION, SOLUTION INTRAVENOUS at 02:40

## 2023-05-07 RX ADMIN — Medication 30 ML: at 09:07

## 2023-05-07 NOTE — PROGRESS NOTES
"Pharmacy Consult-Vancomycin Dosing  Viji Vargas is a  62 y.o. female receiving vancomycin therapy.     Indication: Pneumonia  Consulting Provider: Taylor    Goal Trough: 15-20 mcg/mL    Current Antimicrobial Therapy  Anti-Infectives (From admission, onward)      Ordered     Dose/Rate Route Frequency Start Stop    05/07/23 0829  Vancomycin Pharmacy Intermittent/Pulse Dosing        Ordering Provider: Kirill Vazquez DO     Does not apply Daily 05/07/23 0915 05/11/23 0859    05/06/23 0156  piperacillin-tazobactam (ZOSYN) 3.375 g in iso-osmotic dextrose 50 ml (premix)        Ordering Provider: Kirill Vazquez DO    3.375 g  over 4 Hours Intravenous Every 8 Hours 05/06/23 0900 05/11/23 0859    05/06/23 0204  vancomycin 1500 mg/500 mL 0.9% NS IVPB ([facility])        Ordering Provider: Kirill Vazquez DO    1,500 mg Intravenous Once 05/06/23 0300 05/06/23 0753    05/06/23 0156  piperacillin-tazobactam (ZOSYN) 3.375 g in iso-osmotic dextrose 50 ml (premix)        Ordering Provider: Kirill Vazquez DO    3.375 g  over 30 Minutes Intravenous Once 05/06/23 0245 05/06/23 0753    05/06/23 0156  Pharmacy to dose vancomycin        Ordering Provider: Kirill Vazquez DO     Does not apply Continuous PRN 05/06/23 0153 05/11/23 0152            Allergies  Allergies as of 05/05/2023    (No Known Allergies)       Labs    Results from last 7 days   Lab Units 05/06/23  0624 05/05/23  2217   BUN mg/dL 15 17   CREATININE mg/dL 0.53* 0.55*       Results from last 7 days   Lab Units 05/06/23  0624 05/05/23  2217   WBC 10*3/mm3 9.96 9.22       Evaluation of Dosing     Last Dose Received in the ED/Outside Facility: No  Is Patient on Dialysis or Renal Replacement: No    Ht - 162.6 cm (64\")  Wt - 69.3 kg (152 lb 12.5 oz)    Estimated Creatinine Clearance: 102.4 mL/min (A) (by C-G formula based on SCr of 0.53 mg/dL (L)).    Intake & Output (last 3 days)         05/04 0701  05/05 0700 05/05 0701 05/06 0700 05/06 0701 05/07 " 0700 05/07 0701  05/08 0700    I.V. (mL/kg)   2505 (36.1)     Other   700     NG/GT   434     IV Piggyback   650 50    Total Intake(mL/kg)   4289 (61.9) 50 (0.7)    Urine (mL/kg/hr)   450 (0.3) 800 (2.2)    Stool   0 0    Total Output   450 800    Net   +3839 -750            Urine Unmeasured Occurrence  1 x 2 x     Stool Unmeasured Occurrence   2 x 1 x            Microbiology and Radiology  Microbiology Results (last 10 days)       Procedure Component Value - Date/Time    Respiratory Culture - Sputum, ET Suction [[id number]]  (Abnormal) Collected: 05/06/23 0254    Lab Status: Preliminary result Specimen: Sputum from ET Suction Updated: 05/07/23 0903     Respiratory Culture Heavy growth (4+) Gram Negative Bacilli      Heavy growth (4+) Normal Respiratory Jolly     Gram Stain Greater than 20 WBCs per low power field      Greater than 10 Epithelial cells per low power field      Few (2+) Gram positive bacilli      Rare (1+) Gram negative bacilli    MRSA Screen, PCR (Inpatient) - Swab, Nares [[id number]]  (Normal) Collected: 05/06/23 0146    Lab Status: Final result Specimen: Swab from Nares Updated: 05/06/23 1419     MRSA PCR No MRSA Detected    Narrative:      The negative predictive value of this diagnostic test is high and should only be used to consider de-escalating anti-MRSA therapy. A positive result may indicate colonization with MRSA and must be correlated clinically.    VRE Culture - Swab, Per Rectum [[id number]]  (Normal) Collected: 05/06/23 0146    Lab Status: Preliminary result Specimen: Swab from Per Rectum Updated: 05/07/23 1054     VRE Screen CX No Vancomycin Resistant Enterococcus Isolated    CANDIDA AURIS SCREEN - Swab, Axilla Right, Axilla Left and Groin [[id number]]  (Normal) Collected: 05/06/23 0146    Lab Status: Preliminary result Specimen: Swab from Axilla Right, Axilla Left and Groin Updated: 05/07/23 0315     Candida Auris Screen Culture No Candida auris isolated at 24 hours            Vancomycin  Levels:    Results from last 7 days   Lab Units 05/07/23  0624   VANCOMYCIN RM mcg/mL 51.10*                   Assessment/Plan    Pharmacy to dose vancomycin for pneumonia. Goal trough 15-20 mcg/mL.  Patient currently receiving vancomycin dosed per level due to high vancomycin random level suggestive of decreased clearance of vancomycin. No further dose of vancomycin today.  Assess clearance by vancomycin random level on 5/8 @ 0600  With MRSA PCR negative, would consider discontinuation of vancomycin therapy.   Pharmacy will continue to monitor renal function, cultures and sensitivities, and clinical status to adjust regimen as necessary.    Thank you for the consult,    Calderon Valdez, BethD, BCPS   05/07/23 12:15 EDT

## 2023-05-07 NOTE — THERAPY DISCHARGE NOTE
Patient Name: Viji Vargas  : 1960    MRN: [id number]                              Today's Date: 2023       Admit Date: 2023    Visit Dx:     ICD-10-CM ICD-9-CM   1. Hypoxia  R09.02 799.02   2. History of tracheostomy  Z98.890 V44.0   3. History of stroke  Z86.73 V12.54     Patient Active Problem List   Diagnosis   • Cardiopulmonary arrest with successful resuscitation (HCC)   • Acute respiratory failure with hypoxia and hypercapnia (HCC)   • Other pneumothorax   • COPD with acute exacerbation   • Metabolic acidosis   • Hyperglycemia   • Anoxic encephalopathy (HCC)   • Oropharyngeal dysphagia   • Sepsis   • COPD exacerbation   • Shock, septic   • Healthcare-associated pneumonia   • Acute on chronic respiratory failure with hypoxia (HCC)   • Hypoxia   • Tracheostomy present   • Pneumonia of left upper lobe due to infectious organism     Past Medical History:   Diagnosis Date   • COPD (chronic obstructive pulmonary disease)    • Hypertension    • Pneumonia    • Stroke      Past Surgical History:   Procedure Laterality Date   • HYSTERECTOMY      Partial    • TRACHEOSTOMY AND PEG TUBE INSERTION N/A 3/20/2019    Procedure: TRACHEOSTOMY AND PERCUTANEOUS ENDOSCOPIC GASTROSTOMY TUBE INSERTION;  Surgeon: Nadira Pandey MD;  Location: [facility];  Service: General      General Information     Row Name 23 1020          Physical Therapy Time and Intention    Document Type discharge evaluation/summary  -TW     Mode of Treatment physical therapy  -TW     Row Name 23 1020          General Information    Patient Profile Reviewed yes  -TW     Prior Level of Function dependent:;all household mobility;ADL's;bed mobility  pt is nonverbal and did able to provide previous functional mobility information. Therapist reviewed chart and assessed pt for obtaining prior level of function  -TW     Existing Precautions/Restrictions other (see comments);NPO;fall  HOB above 30 degrees due to tube feedings  -TW      Barriers to Rehab medically complex;previous functional deficit;cognitive status;contractures  -TW     Row Name 05/07/23 1020          Living Environment    People in Home facility resident  -TW     Row Name 05/07/23 1020          Home Main Entrance    Number of Stairs, Main Entrance none  -TW     Row Name 05/07/23 1020          Stairs Within Home, Primary    Number of Stairs, Within Home, Primary none  -TW     Row Name 05/07/23 1020          Cognition    Orientation Status (Cognition) person;unable/difficult to assess;other (see comments)  pt did look at therapist when her name called but pt is nonverbal and orientation difficult to assess. Pt did not respond to yes/no questions even when given extended time to assess response.  -     Row Name 05/07/23 1020          Safety Issues, Functional Mobility    Impairments Affecting Function (Mobility) range of motion (ROM);muscle tone abnormal;balance;cognition;motor planning;strength;postural/trunk control;coordination;endurance/activity tolerance;grasp;motor control;pain  -TW     Cognitive Impairments, Mobility Safety/Performance other (see comments)  difficult to assess  -TW     Comment, Safety Issues/Impairments (Mobility) facial indication of pain was with wrist ext B or R knee ext  -TW           User Key  (r) = Recorded By, (t) = Taken By, (c) = Cosigned By    Initials Name Provider Type    TW Jory Curiel, PT Physical Therapist               Mobility     Row Name 05/07/23 1020          Bed Mobility    Bed Mobility bed mobility (all) activities  -TW     All Activities, [place] (Bed Mobility) dependent (less than 25% patient effort);2 person assist  -TW     Assistive Device (Bed Mobility) draw sheet;head of bed elevated  -TW     Row Name 05/07/23 1020          Transfers    Comment, (Transfers) Pt is a mechanical lift at [facility]  -     Row Name 05/07/23 1020          Bed-Chair Transfer    Bed-Chair [place] (Transfers) not tested;other (see comments)  -TW      [facility] Name 05/07/23 1020          Sit-Stand Transfer    Sit-Stand [place] (Transfers) not tested  -TW     [facility] Name 05/07/23 1020          Gait/Stairs (Locomotion)    [place] Level (Gait) unable to assess  -TW           User Key  (r) = Recorded By, (t) = Taken By, (c) = Cosigned By    Initials Name Provider Type    TW Jory Curiel, PT Physical Therapist               Obj/Interventions     [facility] Name 05/07/23 1020          Range of Motion Comprehensive    Comment, General Range of Motion Contractures noted in all four extremities with B wrist in severe flexion and loss of elbow ext and shoulder flexion/abd with R shoulder loss more than L shoulder. Pt B ankles in contracted plantar flexion with a int rotation component. R hip and R knee contracted in flexed pattern.  -[facility] Name 05/07/23 1020          Strength Comprehensive (MMT)    Comment, General Manual Muscle Testing (MMT) Assessment No active movements of BUE observed other than a flexor pattern pull on R. Pt spontaneously moved LLE in hip abd but no following of commands for any BLE movement.  -TW           User Key  (r) = Recorded By, (t) = Taken By, (c) = Cosigned By    Initials Name Provider Type    TW Jory Curiel, PT Physical Therapist               Goals/Plan    No documentation.                Clinical Impression     Row Name 05/07/23 1020          Pain    Additional Documentation Pain Scale: FACES Pre/Post-Treatment (Group)  -TW     Row Name 05/07/23 1020          Pain Scale: FACES Pre/Post-Treatment    Pain: FACES Scale, Pretreatment 0-->no hurt  -TW     Posttreatment Pain Rating 0-->no hurt  -TW     Pre/Posttreatment Pain Comment at rest pt has no facial expressions of pain. Pt did show facial expression of pain with B wrist ROM and R LE ROM  -TW     [facility] Name 05/07/23 1020          Plan of Care Review    Plan of Care Reviewed With patient  -TW     Outcome Evaluation PT evaluation performed bedside with pt presenting at 30 degrees HOB  due to tubfeedings. Pt nonverbal and was not observed to follow any directions for moving her extremities or to answer yes/not questions with extended time given for her to respond. Pt's RUE observed to move in a flexor pattern at times and spontaneous L hip abd movement observed. Pt has multiple mm contractures in her extremities including B wrist flexor contraction, R hip and knee flexor contraction. Pt is dependent of 1-2 PA for all bed mobility. Pt does have B waffle boots for heel protection and therapist propped RUE on pillow to assist with postioning for IV and edema. Pt is at her baseline for her functional mobility and is on a bed turning rotation with heel protectors. Will d/c PT services at this time.  -TW     Row Name 05/07/23 1020          Therapy Assessment/Plan (PT)    Criteria for Skilled Interventions Met (PT) no;does not meet criteria for skilled intervention  -TW     Row Name 05/07/23 1020          Vital Signs    O2 Delivery Pre Treatment trach collar  -TW     Pre Patient Position Side Lying  -TW     Intra Patient Position Supine  -TW     Post Patient Position Side Lying  -TW     Row Name 05/07/23 1020          Positioning and Restraints    Pre-Treatment Position in bed  -TW     Post Treatment Position bed  -TW     In Bed side lying left;RUE elevated;exit alarm on;with other staff;waffle boots/both;pillow between legs  -TW           User Key  (r) = Recorded By, (t) = Taken By, (c) = Cosigned By    Initials Name Provider Type    Jory Tavares, PT Physical Therapist               Outcome Measures     Row Name 05/07/23 1020          How much help from another person do you currently need...    Turning from your back to your side while in flat bed without using bedrails? 1  -TW     Moving from lying on back to sitting on the side of a flat bed without bedrails? 1  -TW     Moving to and from a bed to a chair (including a wheelchair)? 1  -TW     Standing up from a chair using your arms (e.g.,  wheelchair, bedside chair)? 1  -TW     Climbing 3-5 steps with a railing? 1  -TW     To walk in hospital room? 1  -TW     AM-PAC 6 Clicks Score (PT) 6  -TW     Highest level of mobility 2 --> Bed activities/dependent transfer  -     Row Name 05/07/23 1020          Functional Assessment    Outcome Measure Options AM-PAC 6 Clicks Basic Mobility (PT)  -           User Key  (r) = Recorded By, (t) = Taken By, (c) = Cosigned By    Initials Name Provider Type    TW Jory Curiel PT Physical Therapist              Physical Therapy Education     Title: PT OT SLP Therapies (In Progress)     Topic: Physical Therapy (In Progress)     Point: Mobility training (In Progress)     Learning Progress Summary           Patient Acceptance, E, NL by TW at 5/7/2023 1020    Comment: Pt education for purpose of PT evalution and for pupose of positioning of RUE on pillow. Pt is nonverbal and it is not clear how much pt understood of information provided.                               User Key     Initials Effective Dates Name Provider Type Discipline    TW 06/16/21 -  Jory Curiel PT Physical Therapist PT              PT Recommendation and Plan     Plan of Care Reviewed With: patient  Outcome Evaluation: PT evaluation performed bedside with pt presenting at 30 degrees HOB due to tubfeedings. Pt nonverbal and was not observed to follow any directions for moving her extremities or to answer yes/not questions with extended time given for her to respond. Pt's RUE observed to move in a flexor pattern at times and spontaneous L hip abd movement observed. Pt has multiple mm contractures in her extremities including B wrist flexor contraction, R hip and knee flexor contraction. Pt is dependent of 1-2 PA for all bed mobility. Pt does have B waffle boots for heel protection and therapist propped RUE on pillow to assist with postioning for IV and edema. Pt is at her baseline for her functional mobility and is on a bed turning rotation with heel  protectors. Will d/c PT services at this time.     Time Calculation:    PT Charges     Row Name 05/07/23 1020             Time Calculation    Stop Time 1020  -TW      PT Received On 05/07/23 -TW            User Key  (r) = Recorded By, (t) = Taken By, (c) = Cosigned By    Initials Name Provider Type    Jory Tavares PT Physical Therapist              Therapy Charges for Today     Code Description Service Date Service Provider Modifiers Qty    [id number] HC PT EVAL LOW COMPLEXITY 2 5/7/2023 Jory Curiel PT GP 1          PT G-Codes  Outcome Measure Options: AM-PAC 6 Clicks Basic Mobility (PT)  AM-PAC 6 Clicks Score (PT): 6    PT Discharge Summary  Anticipated Discharge Disposition (PT): skilled nursing facility  Reason for Discharge: At baseline function, Unable to participate, Maximum functional potential achieved  Discharge Destination: SNF    Jory Curiel PT  5/7/2023

## 2023-05-07 NOTE — PROGRESS NOTES
[facility]    Name:  Viji Vargas   Age:  62 y.o.  Sex:  female  :  1960  MRN:  [id number]   Visit Number:  [id number]  Admission Date:  2023  Date Of Service:  23  Primary Care Physician:  Ranjeet Pina MD     LOS: 0 days :    Chief Complaint:      Follow-up of shortness of breath, increasing secretions and chest congestion.    Subjective:    Ms. Vargas was seen and examined this morning.  She is alert with spontaneous eye opening.  Unfortunately she is unable to comprehend and is nonverbal.  No fevers overnight.  She is currently on 4 L trach collar oxygen and saturating at 100%.  She is tolerating her tube feeds.    Hospital Course:    Ms. Vargas is an unfortunate 62-year-old female, nursing home resident, with history of cardiac arrest and anoxic brain injury in 2019, status post tracheostomy and PEG tube placement, COPD, hypertension was sent from the nursing home facility by EMS with symptoms of increasing shortness of breath, increased secretions and breathing difficulties.  Patient is nonverbal and no review of systems were possible.      In the emergency room, her initial temperature was 99.5, pulse 97, blood pressure 121/73 and pulse oxygen saturation was between 87 and 90% on 4 L of trach collar oxygen.  Initial ABG on 6 L showed a pH of 7.42, PCO2 49, PO2 56 and bicarb of 32.  Blood work revealed glucose of 127 and hemoglobin of 9.6.  CMP and CBC otherwise were unremarkable.  Initial troponin and proBNP levels were fairly unremarkable.  Chest x-ray showed mild bilateral perihilar atelectasis and stable position of the tracheostomy tube.  Patient was given Zosyn and vancomycin in the emergency room along with IV Solu-Medrol and was subsequently admitted to the medical floor with telemetry.    Patient subsequently underwent CT of the chest without contrast which showed left upper lobe airspace disease consistent with pneumonia  and right lower lobe atelectasis/pneumonia.  CT of the neck showed tracheostomy in good position but unfortunately it was suboptimal exam due to motion artifact.  No subcutaneous edema was noted.  No adenopathy or mass lesion was noted.  Respiratory culture and nasal swab for MRSA were sent.  Patient was continued on Zosyn and vancomycin.  She was initiated on PEG tube feeds.    Review of Systems:     All systems were reviewed and negative except as mentioned in subjective, assessment and plan.    Vital Signs:    Temp:  [97.4 °F (36.3 °C)-98.6 °F (37 °C)] 97.4 °F (36.3 °C)  Heart Rate:  [57-81] 81  Resp:  [14-18] 18  BP: ()/(51-58) 110/57    Intake and output:    I/O last 3 completed shifts:  In: 4289 [I.V.:2505; Other:700; NG/GT:434; IV Piggyback:650]  Out: 450 [Urine:450]  I/O this shift:  In: 50 [IV Piggyback:50]  Out: 800 [Urine:800]    Physical Examination:    General Appearance:  Alert and seems to be comfortable.  Nonverbal   Head:  Atraumatic and normocephalic.   Eyes: Conjunctivae and sclerae normal, no icterus.  Looks pale.   Throat: No oral lesions, no thrush, oral mucosa dry.   Neck: Supple, tracheostomy noted.   Lungs:   Breath sounds bilaterally in the bases.  No wheezing.  Bilateral basal crackles. No Pleural rub or bronchial breathing.   Heart:  Normal S1 and S2, muffled, no murmur, no gallop, no rub. No JVD.   Abdomen:   Normal bowel sounds, no masses, no organomegaly. Soft, nontender, obese, no rebound tenderness.  PEG tube is in place.   Extremities: Supple, 1+ pitting ankle edema, no cyanosis, no clubbing.   Skin: No bleeding or rash.   Neurologic: Nonverbal.  Severe upper and lower extremity contractures and spasticity noted.     Laboratory results:    Results from last 7 days   Lab Units 05/06/23  0624 05/05/23  2217   SODIUM mmol/L 143 138   POTASSIUM mmol/L 4.1 4.3   CHLORIDE mmol/L 106 100   CO2 mmol/L 27.5 29.3*   BUN mg/dL 15 17   CREATININE mg/dL 0.53* 0.55*   CALCIUM mg/dL 9.2 9.0    BILIRUBIN mg/dL 0.4 0.2   ALK PHOS U/L 93 90   ALT (SGPT) U/L 22 19   AST (SGOT) U/L 15 14   GLUCOSE mg/dL 134* 127*     Results from last 7 days   Lab Units 05/06/23  0624 05/05/23  2217   WBC 10*3/mm3 9.96 9.22   HEMOGLOBIN g/dL 10.0* 9.6*   HEMATOCRIT % 32.4* 30.8*   PLATELETS 10*3/mm3 303 315         Results from last 7 days   Lab Units 05/05/23  2217   HSTROP T ng/L 20*         Recent Labs     05/05/23  2257   PHART 7.419   LMK2ZQN 49.4*   PO2ART 56.3*   TPT1PCW 32.0*   BASEEXCESS 6.6*      I have reviewed the patient's laboratory results.    Radiology results:    CT Soft Tissue Neck Without Contrast    Result Date: 5/6/2023  PROCEDURE: CT SOFT TISSUE NECK WO CONTRAST-  HISTORY: Laryngeal spasm  COMPARISON: None.  TECHNIQUE: Thin section axial CT images were obtained through the neck after intravenous contrast administration.  This study was performed with techniques to keep radiation doses as low as reasonably achievable, (ALARA). Individualized dose reduction techniques using automated exposure control or adjustment of mA and/or kV according to the patient size were employed.  FINDINGS: There is no adenopathy or mass lesion present. The thyroid is unremarkable. Limited images of the lung apices are unremarkable. The glottis and supraglottic areas are unremarkable. No acute osseus abnormality is identified. Tracheostomy tube identified.  Significant motion artifact noted. Inhomogeneous enhancement of the thyroid noted. No subcutaneous edema identified.      Impression: Tracheostomy good position.  Suboptimal exam secondary to motion artifact.    This report was signed and finalized on 5/6/2023 11:40 AM by Briana Erickson MD.    CT Chest Without Contrast Diagnostic    Result Date: 5/6/2023  PROCEDURE: CT CHEST WO CONTRAST DIAGNOSTIC-  HISTORY: Abnormal xray - lung opacity/opacities; R09.02-Hypoxemia; Z98.890-Other specified postprocedural states; Z86.73-Personal history of transient ischemic attack (TIA), and  cerebral infarction without residual deficits  COMPARISON: Chest x-ray 1 day prior.  PROCEDURE: Axial images were obtained from the lung apex to the mid abdomen by computed tomography. This study was performed with techniques to keep radiation doses as low as reasonably achievable, (ALARA). Individualized dose reduction techniques using automated exposure control or adjustment of mA and/or kV according to the patient size were employed.  FINDINGS:  CHEST: There is no suspicious axillary adenopathy. Vascular calcifications noted. There is evidence of previous calcified granulomatous disease. Tracheostomy is in good position. Tracheobronchial calcifications noted. There is an area of focal airspace disease peripherally in the left upper lobe concerning for pneumonia. There is posterior right lower lobe airspace disease/atelectasis. There is no suspicious hilar or mediastinal adenopathy. The heart is proper size. There is no pericardial or pleural effusion.No suspicious infiltrate or nodule identified. Limited images of the upper abdomen demonstrate gastrostomy tube which is partially visualized.      Impression: Left upper lobe airspace disease consistent with pneumonia and right lower lobe atelectasis/pneumonia.  Tracheostomy tube and gastrostomy tube as noted.    This report was signed and finalized on 5/6/2023 12:15 PM by Briana Erickson MD.    XR Chest 1 View    Result Date: 5/6/2023   PROCEDURE: XR CHEST 1 VW-  HISTORY: SOA Triage Protocol  COMPARISON: January 22, 2023.  FINDINGS: The heart is normal in size. The mediastinum is unremarkable. Decreased inspiratory effort noted with crowding of the lung markings in both lung bases. Linear perihilar densities noted bilaterally consistent with minimal atelectasis. No area of consolidation is seen. Tracheostomy tube is in stable position from prior exam... There is no pneumothorax. There are no acute osseous abnormalities.      Impression: Mild, bilateral perihilar  atelectasis..  Stable position tracheostomy tube..  This report was signed and finalized on 5/6/2023 8:06 AM by Briana Erickson MD.    I have reviewed the patient's radiology reports.    Medication Review:     I have reviewed the patient's active and prn medications.     Problem List:      Acute on chronic respiratory failure with hypoxia (HCC)    COPD with acute exacerbation    Tracheostomy present    Pneumonia of left upper lobe due to infectious organism    Assessment:    1. Acute on chronic hypoxic respiratory failure secondary to #2, POA.  2. Left upper lobe healthcare associated pneumonia, unable to classify further, POA.  3. Acute COPD exacerbation, POA.  4. History of cardiac arrest/anoxic brain injury.  5. Status post tracheostomy and PEG tube placement.  6. Essential hypertension.    Plan:    Respiratory failure/pneumonia/COPD exacerbation.  - Continue DuoNeb, budesonide and prednisone.  - Continue IV antibiotics therapy with Zosyn (day 2).  - Nasal swab for MRSA is negative and we will discontinue vancomycin.  - Sputum culture is growing gram-negative bacilli.  - Lactobacillus supplements.    Nutrition.  - Continue tube feeds as per dietitian.    Discussed with nursing staff.    DVT Prophylaxis: Enoxaparin  Code Status: Full  Diet: Tube feeds  Discharge Plan: Hopefully back to skilled nursing facility in 1-2 days.      Paul Pandey MD  05/07/23  12:25 EDT    Dictated utilizing Dragon dictation.

## 2023-05-07 NOTE — PLAN OF CARE
Goal Outcome Evaluation:  Plan of Care Reviewed With: patient        Progress: no change   Trach, skin, oral care provided. IV fluids and antibiotics continue.

## 2023-05-07 NOTE — PLAN OF CARE
Goal Outcome Evaluation:  Plan of Care Reviewed With: patient        Progress: no change  Outcome Evaluation: VSS. No acute changes during this shift. No distress noted at this time. Patient turned every two hours. Patient rested well this shift.

## 2023-05-07 NOTE — PLAN OF CARE
Goal Outcome Evaluation:  Plan of Care Reviewed With: patient           Outcome Evaluation: PT evaluation performed bedside with pt presenting at 30 degrees HOB due to tubfeedings. Pt nonverbal and was not observed to follow any directions for moving her extremities or to answer yes/not questions with extended time given for her to respond. Pt's RUE observed to move in a flexor pattern at times and spontaneous L hip abd movement observed. Pt has multiple mm contractures in her extremities including B wrist flexor contraction, R hip and knee flexor contraction. Pt is dependent of 1-2 PA for all bed mobility. Pt does have B waffle boots for heel protection and therapist propped RUE on pillow to assist with postioning for IV and edema. Pt is at her baseline for her functional mobility and is on a bed turning rotation with heel protectors. Will d/c PT services at this time.

## 2023-05-08 VITALS
WEIGHT: 157.41 LBS | DIASTOLIC BLOOD PRESSURE: 51 MMHG | BODY MASS INDEX: 26.87 KG/M2 | SYSTOLIC BLOOD PRESSURE: 118 MMHG | TEMPERATURE: 98 F | HEIGHT: 64 IN | OXYGEN SATURATION: 100 % | RESPIRATION RATE: 20 BRPM | HEART RATE: 64 BPM

## 2023-05-08 LAB
ANION GAP SERPL CALCULATED.3IONS-SCNC: 10 MMOL/L (ref 5–15)
BACTERIA SPEC RESP CULT: ABNORMAL
BACTERIA SPEC RESP CULT: ABNORMAL
BUN SERPL-MCNC: 24 MG/DL (ref 8–23)
BUN/CREAT SERPL: 45.3 (ref 7–25)
CALCIUM SPEC-SCNC: 8.6 MG/DL (ref 8.6–10.5)
CHLORIDE SERPL-SCNC: 108 MMOL/L (ref 98–107)
CO2 SERPL-SCNC: 26 MMOL/L (ref 22–29)
CREAT SERPL-MCNC: 0.53 MG/DL (ref 0.57–1)
DEPRECATED RDW RBC AUTO: 46.5 FL (ref 37–54)
EGFRCR SERPLBLD CKD-EPI 2021: 103.4 ML/MIN/1.73
ERYTHROCYTE [DISTWIDTH] IN BLOOD BY AUTOMATED COUNT: 13.1 % (ref 12.3–15.4)
GLUCOSE BLDC GLUCOMTR-MCNC: 156 MG/DL (ref 70–130)
GLUCOSE BLDC GLUCOMTR-MCNC: 160 MG/DL (ref 70–130)
GLUCOSE SERPL-MCNC: 145 MG/DL (ref 65–99)
GRAM STN SPEC: ABNORMAL
HCT VFR BLD AUTO: 27.8 % (ref 34–46.6)
HGB BLD-MCNC: 8.4 G/DL (ref 12–15.9)
MCH RBC QN AUTO: 29.7 PG (ref 26.6–33)
MCHC RBC AUTO-ENTMCNC: 30.2 G/DL (ref 31.5–35.7)
MCV RBC AUTO: 98.2 FL (ref 79–97)
PLATELET # BLD AUTO: 275 10*3/MM3 (ref 140–450)
PMV BLD AUTO: 12 FL (ref 6–12)
POTASSIUM SERPL-SCNC: 3.8 MMOL/L (ref 3.5–5.2)
PROCALCITONIN SERPL-MCNC: 0.04 NG/ML (ref 0–0.25)
RBC # BLD AUTO: 2.83 10*6/MM3 (ref 3.77–5.28)
SARS-COV-2 RNA RESP QL NAA+PROBE: NOT DETECTED
SODIUM SERPL-SCNC: 144 MMOL/L (ref 136–145)
VRE SPEC QL CULT: NORMAL
WBC NRBC COR # BLD: 8.28 10*3/MM3 (ref 3.4–10.8)

## 2023-05-08 PROCEDURE — 63710000001 PREDNISONE PER 1 MG: Performed by: INTERNAL MEDICINE

## 2023-05-08 PROCEDURE — 85027 COMPLETE CBC AUTOMATED: CPT | Performed by: INTERNAL MEDICINE

## 2023-05-08 PROCEDURE — 96372 THER/PROPH/DIAG INJ SC/IM: CPT

## 2023-05-08 PROCEDURE — 94799 UNLISTED PULMONARY SVC/PX: CPT

## 2023-05-08 PROCEDURE — 96361 HYDRATE IV INFUSION ADD-ON: CPT

## 2023-05-08 PROCEDURE — 96376 TX/PRO/DX INJ SAME DRUG ADON: CPT

## 2023-05-08 PROCEDURE — G0378 HOSPITAL OBSERVATION PER HR: HCPCS

## 2023-05-08 PROCEDURE — 87635 SARS-COV-2 COVID-19 AMP PRB: CPT | Performed by: INTERNAL MEDICINE

## 2023-05-08 PROCEDURE — 82948 REAGENT STRIP/BLOOD GLUCOSE: CPT

## 2023-05-08 PROCEDURE — 94761 N-INVAS EAR/PLS OXIMETRY MLT: CPT

## 2023-05-08 PROCEDURE — 25010000002 PIPERACILLIN SOD-TAZOBACTAM PER 1 G: Performed by: INTERNAL MEDICINE

## 2023-05-08 PROCEDURE — 96366 THER/PROPH/DIAG IV INF ADDON: CPT

## 2023-05-08 PROCEDURE — 25010000002 ENOXAPARIN PER 10 MG: Performed by: INTERNAL MEDICINE

## 2023-05-08 PROCEDURE — 80048 BASIC METABOLIC PNL TOTAL CA: CPT | Performed by: INTERNAL MEDICINE

## 2023-05-08 PROCEDURE — 84145 PROCALCITONIN (PCT): CPT | Performed by: INTERNAL MEDICINE

## 2023-05-08 PROCEDURE — 94664 DEMO&/EVAL PT USE INHALER: CPT

## 2023-05-08 RX ORDER — LEVOFLOXACIN 500 MG/1
500 TABLET, FILM COATED ORAL DAILY
Qty: 3 TABLET | Refills: 0
Start: 2023-05-08 | End: 2023-05-11

## 2023-05-08 RX ORDER — PREDNISONE 10 MG/1
30 TABLET ORAL DAILY
Qty: 9 TABLET | Refills: 0 | Status: SHIPPED | OUTPATIENT
Start: 2023-05-08 | End: 2023-05-11

## 2023-05-08 RX ORDER — IPRATROPIUM BROMIDE AND ALBUTEROL SULFATE 2.5; .5 MG/3ML; MG/3ML
3 SOLUTION RESPIRATORY (INHALATION)
Start: 2023-05-08

## 2023-05-08 RX ADMIN — PANTOPRAZOLE SODIUM 40 MG: 40 INJECTION, POWDER, FOR SOLUTION INTRAVENOUS at 05:50

## 2023-05-08 RX ADMIN — ENOXAPARIN SODIUM 40 MG: 100 INJECTION SUBCUTANEOUS at 10:07

## 2023-05-08 RX ADMIN — TAZOBACTAM SODIUM AND PIPERACILLIN SODIUM 3.38 G: 375; 3 INJECTION, SOLUTION INTRAVENOUS at 00:34

## 2023-05-08 RX ADMIN — BACLOFEN 10 MG: 10 TABLET ORAL at 02:52

## 2023-05-08 RX ADMIN — ALPRAZOLAM 0.5 MG: 0.5 TABLET ORAL at 10:05

## 2023-05-08 RX ADMIN — PREDNISONE 40 MG: 20 TABLET ORAL at 10:06

## 2023-05-08 RX ADMIN — SODIUM CHLORIDE, POTASSIUM CHLORIDE, SODIUM LACTATE AND CALCIUM CHLORIDE 75 ML/HR: 600; 310; 30; 20 INJECTION, SOLUTION INTRAVENOUS at 01:27

## 2023-05-08 RX ADMIN — ASCORBIC ACID, THIAMINE, RIBOFLAVIN, NIACINAMIDE, PYRIDOXINE, FOLIC ACID, COBALAMIN, BIOTIN, PANTOTHENIC ACID 15 ML: 100; 1.5; 1.7; 20; 10; 1; 6; 300; 1 TABLET, COATED ORAL at 10:07

## 2023-05-08 RX ADMIN — BUDESONIDE 0.5 MG: 0.5 INHALANT RESPIRATORY (INHALATION) at 07:09

## 2023-05-08 RX ADMIN — CARVEDILOL 12.5 MG: 12.5 TABLET, FILM COATED ORAL at 10:06

## 2023-05-08 RX ADMIN — DOCUSATE SODIUM 300 MG: 50 LIQUID ORAL at 10:07

## 2023-05-08 RX ADMIN — CETIRIZINE HYDROCHLORIDE 10 MG: 10 TABLET, FILM COATED ORAL at 10:06

## 2023-05-08 RX ADMIN — GLYCOPYRROLATE 2 MG: 1 TABLET ORAL at 10:06

## 2023-05-08 RX ADMIN — BACLOFEN 10 MG: 10 TABLET ORAL at 10:06

## 2023-05-08 RX ADMIN — IPRATROPIUM BROMIDE AND ALBUTEROL SULFATE 3 ML: 2.5; .5 SOLUTION RESPIRATORY (INHALATION) at 07:09

## 2023-05-08 NOTE — CASE MANAGEMENT/SOCIAL WORK
Continued Stay Note  [facility]     Patient Name: Viji Vargas  MRN: [id number]  Today's Date: 5/8/2023    Admit Date: 5/5/2023    Plan: Pt is non verbal and a resident of [facility]. DCP is to return to [facility] via ems.   Discharge Plan    No documentation.            D/C ORDER NOTED; CALLED AND SPOKE WITH MALINDA AT [facility]; STATED SHE IS OUT SICK BUT WILL NOTIFY THEM THAT PT IS COMING BACK TODAY; WILL NEED A COVID; CALL REPORT -[phone], [facility]; FAX D/C SUMMARY -[phone]; INFORMED MD AND RN;; ATTEMPTED TO NOTIFY DAUGHTER, NUMBER WOULD NOT TAKE A MESSAGE      Nazia Marquez RN

## 2023-05-08 NOTE — PLAN OF CARE
Goal Outcome Evaluation: Patient being discharged to [facility] and rehab

## 2023-05-08 NOTE — DISCHARGE SUMMARY
[facility] [place]   DISCHARGE SUMMARY      Name:  Viji Vargas   Age:  62 y.o.  Sex:  female  :  1960  MRN:  [id number]   Visit Number:  [id number]    Admission Date:  2023  Date of Discharge:  2023  Primary Care Physician:  Ranjeet Pina MD    Important issues to note:    1.  Patient was admitted with shortness of breath secondary to left upper lobe pneumonia as well as COPD exacerbation.  Patient was treated with IV antibiotics therapy with Zosyn and vancomycin initially but subsequently her vancomycin was discontinued due to MRSA screen being negative.  Her sputum culture grew Pseudomonas sensitive to Zosyn and quinolones.  She has been afebrile throughout the hospital stay and did not have any leukocytosis.  2.  Patient completed 4 days of IV antibiotics therapy during the hospital stay and will be discharged on Levaquin for 3 more days.  She will also be continued on prednisone for 3 more days.  3.  Continue tube feeds as per previous settings.  Patient had multiple medications listed that she was not taking and her home medication list has been updated as outlined below.    Discharge Diagnoses:     1. Acute on chronic hypoxic respiratory failure secondary to #2, POA.  2. Left upper lobe Pseudomonas pneumonia, unable to classify further, POA.  3. Acute COPD exacerbation, POA, improved.  4. History of cardiac arrest/anoxic brain injury.  5. Status post tracheostomy and PEG tube placement.  6. Essential hypertension.    Problem List:     Active Hospital Problems    Diagnosis  POA   • **Acute on chronic respiratory failure with hypoxia (HCC) [J96.21]  Yes   • Tracheostomy present [Z93.0]  Not Applicable   • Pneumonia of left upper lobe due to infectious organism [J18.9]  Yes   • COPD with acute exacerbation [J44.1]  Yes      Resolved Hospital Problems   No resolved problems to display.     Presenting Problem:    Chief Complaint   Patient presents with   • Shortness of  Breath      Consults:     Consulting Physician(s)             None          Procedures Performed:    None.    History of presenting illness/Hospital Course:    Ms. Vargas is an unfortunate 62-year-old female, nursing home resident, with history of cardiac arrest and anoxic brain injury in March 2019, status post tracheostomy and PEG tube placement, COPD, hypertension was sent from the nursing home facility by EMS with symptoms of increasing shortness of breath, increased secretions and breathing difficulties.  Patient is nonverbal and no review of systems were possible.       In the emergency room, her initial temperature was 99.5, pulse 97, blood pressure 121/73 and pulse oxygen saturation was between 87 and 90% on 4 L of trach collar oxygen.  Initial ABG on 6 L showed a pH of 7.42, PCO2 49, PO2 56 and bicarb of 32.  Blood work revealed glucose of 127 and hemoglobin of 9.6.  CMP and CBC otherwise were unremarkable.  Initial troponin and proBNP levels were fairly unremarkable.  Chest x-ray showed mild bilateral perihilar atelectasis and stable position of the tracheostomy tube.  Patient was given Zosyn and vancomycin in the emergency room along with IV Solu-Medrol and was subsequently admitted to the medical floor with telemetry.     Patient subsequently underwent CT of the chest without contrast which showed left upper lobe airspace disease consistent with pneumonia and right lower lobe atelectasis/pneumonia.  CT of the neck showed tracheostomy in good position but unfortunately it was suboptimal exam due to motion artifact.  No subcutaneous edema was noted.  No adenopathy or mass lesion was noted.  Respiratory culture and nasal swab for MRSA were sent.  Patient was continued on Zosyn and vancomycin.  She was initiated on PEG tube feeds.  Patient did not have any fever during the hospital stay.  Her mental status improved and she was more alert even though she was nonverbal.  Her nasal swab for MRSA was negative  and her vancomycin was discontinued.  Her sputum cultures came back positive for Pseudomonas sensitive to Zosyn and quinolones.  She is currently being discharged back to nursing home facility to continue Levaquin for 3 more days to complete a course of 7 days.  Her repeat procalcitonin level is within normal limits.  Discussed with nursing staff at the bedside.    Vital Signs:    Temp:  [97.4 °F (36.3 °C)-98.1 °F (36.7 °C)] 98 °F (36.7 °C)  Heart Rate:  [60-79] 65  Resp:  [18-22] 20  BP: (113-121)/(48-64) 118/51    Physical Exam:    General Appearance:  Alert but nonverbal.  Seems to be comfortable at rest.   Head:  Atraumatic and normocephalic.   Eyes: Conjunctivae and sclerae normal, no icterus. No pallor.   Ears:  Ears with no abnormalities noted.   Throat: No oral lesions, no thrush, oral mucosa moist.   Neck: Supple, trachea midline, no thyromegaly.   Back:   No kyphoscoliosis present. No tenderness to palpation.   Lungs:   Breath sounds heard bilaterally equally.  No wheezing.  Bilateral basal crackles heard.  No Pleural rub or bronchial breathing.   Heart:  Normal S1 and S2, no murmur, no gallop, no rub. No JVD.   Abdomen:   Normal bowel sounds, no masses, no organomegaly. Soft, nontender, obese, no rebound tenderness.  PEG tube is in place.   Extremities: Supple, 1+ pitting ankle edema, no cyanosis, no clubbing.   Pulses: Pulses palpable bilaterally.   Skin: No bleeding or rash.   Neurologic: Nonverbal.  Severe upper and lower extremity contractures and spasticity noted.     Pertinent Lab Results:     Results from last 7 days   Lab Units 05/08/23  0608 05/06/23  0624 05/05/23  2217   SODIUM mmol/L 144 143 138   POTASSIUM mmol/L 3.8 4.1 4.3   CHLORIDE mmol/L 108* 106 100   CO2 mmol/L 26.0 27.5 29.3*   BUN mg/dL 24* 15 17   CREATININE mg/dL 0.53* 0.53* 0.55*   CALCIUM mg/dL 8.6 9.2 9.0   BILIRUBIN mg/dL  --  0.4 0.2   ALK PHOS U/L  --  93 90   ALT (SGPT) U/L  --  22 19   AST (SGOT) U/L  --  15 14   GLUCOSE  mg/dL 145* 134* 127*     Results from last 7 days   Lab Units 05/08/23  0608 05/06/23  0624 05/05/23 2217   WBC 10*3/mm3 8.28 9.96 9.22   HEMOGLOBIN g/dL 8.4* 10.0* 9.6*   HEMATOCRIT % 27.8* 32.4* 30.8*   PLATELETS 10*3/mm3 275 303 315         Results from last 7 days   Lab Units 05/05/23 2217   HSTROP T ng/L 20*     Results from last 7 days   Lab Units 05/05/23  2217   PROBNP pg/mL 137.9             Results from last 7 days   Lab Units 05/05/23  2257   PH, ARTERIAL pH units 7.419   PO2 ART mm Hg 56.3*   PCO2, ARTERIAL mm Hg 49.4*   HCO3 ART mmol/L 32.0*     Pertinent Radiology Results:    Imaging Results (All)     Procedure Component Value Units Date/Time    CT Chest Without Contrast Diagnostic [[id number]] Collected: 05/06/23 1209     Updated: 05/06/23 1217    Narrative:      PROCEDURE: CT CHEST WO CONTRAST DIAGNOSTIC-     HISTORY: Abnormal xray - lung opacity/opacities; R09.02-Hypoxemia;  Z98.890-Other specified postprocedural states; Z86.73-Personal history  of transient ischemic attack (TIA), and cerebral infarction without  residual deficits     COMPARISON: Chest x-ray 1 day prior.     PROCEDURE: Axial images were obtained from the lung apex to the mid  abdomen by computed tomography. This study was performed with techniques  to keep radiation doses as low as reasonably achievable, (ALARA).  Individualized dose reduction techniques using automated exposure  control or adjustment of mA and/or kV according to the patient size were  employed.     FINDINGS:      CHEST: There is no suspicious axillary adenopathy. Vascular  calcifications noted. There is evidence of previous calcified  granulomatous disease. Tracheostomy is in good position.  Tracheobronchial calcifications noted. There is an area of focal  airspace disease peripherally in the left upper lobe concerning for  pneumonia. There is posterior right lower lobe airspace  disease/atelectasis. There is no suspicious hilar or mediastinal  adenopathy. The  heart is proper size. There is no pericardial or pleural  effusion.No suspicious infiltrate or nodule identified. Limited images  of the upper abdomen demonstrate gastrostomy tube which is partially  visualized.        Impression:      Left upper lobe airspace disease consistent with pneumonia  and right lower lobe atelectasis/pneumonia.     Tracheostomy tube and gastrostomy tube as noted.           This report was signed and finalized on 5/6/2023 12:15 PM by Briana Erickson MD.    CT Soft Tissue Neck Without Contrast [[id number]] Collected: 05/06/23 1133     Updated: 05/06/23 1142    Narrative:      PROCEDURE: CT SOFT TISSUE NECK WO CONTRAST-     HISTORY: Laryngeal spasm     COMPARISON: None.     TECHNIQUE: Thin section axial CT images were obtained through the neck  after intravenous contrast administration.  This study was performed  with techniques to keep radiation doses as low as reasonably achievable,  (ALARA). Individualized dose reduction techniques using automated  exposure control or adjustment of mA and/or kV according to the patient  size were employed.     FINDINGS: There is no adenopathy or mass lesion present. The thyroid is  unremarkable. Limited images of the lung apices are unremarkable. The  glottis and supraglottic areas are unremarkable. No acute osseus  abnormality is identified. Tracheostomy tube identified.  Significant  motion artifact noted. Inhomogeneous enhancement of the thyroid noted.  No subcutaneous edema identified.       Impression:      Tracheostomy good position.     Suboptimal exam secondary to motion artifact.     This report was signed and finalized on 5/6/2023 11:40 AM by rBiana Erickson MD.    XR Chest 1 View [[id number]] Collected: 05/06/23 0805     Updated: 05/06/23 0808    Narrative:       PROCEDURE: XR CHEST 1 VW-     HISTORY: SOA Triage Protocol     COMPARISON: January 22, 2023.     FINDINGS: The heart is normal in size. The mediastinum is unremarkable.  Decreased inspiratory  effort noted with crowding of the lung markings in  both lung bases. Linear perihilar densities noted bilaterally consistent  with minimal atelectasis. No area of consolidation is seen. Tracheostomy  tube is in stable position from prior exam... There is no pneumothorax.   There are no acute osseous abnormalities.       Impression:      Mild, bilateral perihilar atelectasis..     Stable position tracheostomy tube..     This report was signed and finalized on 5/6/2023 8:06 AM by Briana Erickson MD.        Echo:    Results for orders placed during the hospital encounter of 03/10/19    Transthoracic Echo Complete With Contrast if Necessary Per Protocol    Interpretation Summary  Technically difficult study  1) Borderline LV size with mild reduction in LV systolic function ( EF is 45 to 50%)  2) Mild to moderate left atrial enlargement  3) Trace MR and TR with normal PA pressures  4) Borderline RV size with normal function  5) Global mild hypokinesis of the LV  6) Moderate calcific plaque along ascending aorta    Condition on Discharge:      Stable.    Code status during the hospital stay:    Code Status and Medical Interventions:   Ordered at: 05/06/23 0156     Code Status (Patient has no pulse and is not breathing):    CPR (Attempt to Resuscitate)     Medical Interventions (Patient has pulse or is breathing):    Full Support     Discharge Disposition:    Skilled Nursing Facility (DC - External)    Discharge Medications:       Discharge Medications      New Medications      Instructions Start Date   levoFLOXacin 500 MG tablet  Commonly known as: Levaquin   500 mg, Per G Tube, Daily      predniSONE 10 MG tablet  Commonly known as: DELTASONE   30 mg, Oral, Daily         Changes to Medications      Instructions Start Date   docusate sodium 50 mg/5 mL liquid  Commonly known as: COLACE  What changed: Another medication with the same name was removed. Continue taking this medication, and follow the directions you see here.    300 mg, Oral, 2 Times Daily         Continue These Medications      Instructions Start Date   ALPRAZolam 0.5 MG tablet  Commonly known as: XANAX   0.5 mg, Per G Tube, 2 Times Daily      baclofen 10 MG tablet  Commonly known as: LIORESAL   10 mg, Per G Tube, Every 8 Hours      budesonide 0.5 MG/2ML nebulizer solution  Commonly known as: PULMICORT   0.5 mg, Nebulization, 2 Times Daily - RT      carvedilol 12.5 MG tablet  Commonly known as: COREG   12.5 mg, Per G Tube, 2 Times Daily      cetirizine 10 MG tablet  Commonly known as: zyrTEC   10 mg, Per G Tube, Daily      glycopyrrolate 2 MG tablet  Commonly known as: ROBINUL   2 mg, Oral, 3 Times Daily      hyoscyamine 0.125 MG tablet  Commonly known as: ANASPAZ,LEVSIN   0.125 mg, Per G Tube, 3 Times Daily      ipratropium-albuterol 0.5-2.5 mg/3 ml nebulizer  Commonly known as: DUO-NEB   3 mL, Nebulization, 4 Times Daily - RT, Takes at [id number]      montelukast 10 MG tablet  Commonly known as: SINGULAIR   10 mg, Per G Tube, Nightly      multivitamin with iron-minerals (CENTRUM) liquid   15 mL, Oral, Daily      omeprazole 20 MG capsule  Commonly known as: priLOSEC   40 mg, 2 Times Daily      Petrolatum-Zinc Oxide 49-15 % ointment   1 application, Apply externally, 2 Times Daily PRN      polyethylene glycol 17 g packet  Commonly known as: MIRALAX   17 g, Oral, 2 Times Daily      Scopolamine 1 MG/3DAYS patch   1 patch, Transdermal, Every 72 Hours      senna 8.6 MG tablet  Commonly known as: SENOKOT   2 tablets, Per PEG Tube, Nightly         Stop These Medications    azithromycin 250 MG tablet  Commonly known as: Zithromax Z-Leonard     cefuroxime 500 MG tablet  Commonly known as: CEFTIN     doxycycline 100 MG capsule  Commonly known as: MONODOX     ondansetron ODT 4 MG disintegrating tablet  Commonly known as: ZOFRAN-ODT     pantoprazole 20 MG EC tablet  Commonly known as: PROTONIX     PRO-STAT liquid oral liquid     promethazine 25 MG tablet  Commonly known as:  PHENERGAN          Discharge Diet:     Diet Instructions     Diet: Tube Feeding; Continuous; Nutren 1.5, 52 ml/hr      Discharge Diet: Tube Feeding    Feeding Type: Continuous    Formula & Rate: Nutren 1.5, 52 ml/hr        Activity at Discharge:     Activity Instructions     Activity as Tolerated          Follow-up Appointments:     Follow-up Information     Ranjeet Pina MD .    Specialty: Family Medicine  Contact information:  [address] [facility] [address] [phone]             Noemy Garcia PA-C .    Specialties: Physician Assistant, Internal Medicine  Contact information:  [address] [facility] [address]  [facility] [address] [phone]                       Test Results Pending at Discharge:    Pending Labs     Order Current Status    Acinetobacter Screen - Swab, Axilla, Right In process    CANDIDA AURIS SCREEN - Swab, Axilla Right, Axilla Left and Groin Preliminary result    VRE Culture - Swab, Per Rectum Preliminary result           Paul Pandey MD  05/08/23  10:37 EDT    Time: I spent 25 minutes on this discharge activity which included: face-to-face encounter with the patient, reviewing the data in the system, coordination of the care with the nursing staff as well as consultants, documentation, and entering orders.     Dictated utilizing Dragon dictation.

## 2023-05-08 NOTE — CASE MANAGEMENT/SOCIAL WORK
Discharge Planning Assessment  [facility]     Patient Name: Viji Vargas  MRN: [id number]  Today's Date: 5/8/2023    Admit Date: 5/5/2023    Plan: Pt is non verbal and a resident of [facility]. DCP is to return to [place] via ems.   Discharge Needs Assessment    No documentation.                Discharge Plan     Row Name 05/08/23 1614       Plan    Final Discharge Disposition Code 04 - intermediate care facility              Continued Care and Services - Discharged on 5/8/2023 Admission date: 5/5/2023 - Discharge disposition: Skilled Nursing Facility (DC - External)    Destination Coordination complete.    Service Provider Request Status Selected Services Address Phone Fax Patient Preferred    [facility]  Selected Nursing Home [address] [place] [address] [phone] --       Internal Comment last updated by Nazia Marquez, RN 5/8/2023 1614    Celestine crawford coming back lt                         Expected Discharge Date and Time     Expected Discharge Date Expected Discharge Time    May 8, 2023               Nazia Marquez RN

## 2023-05-08 NOTE — CASE MANAGEMENT/SOCIAL WORK
Case Management Discharge Note                Selected Continued Care - Discharged on 5/8/2023 Admission date: 5/5/2023 - Discharge disposition: Skilled Nursing Facility (DC - External)    Destination Coordination complete.    Service Provider Selected Services Address Phone Fax Patient Preferred    [facility] [address] [place] [address] [phone] --       Internal Comment last updated by Nazia Marquez, RN 5/8/2023 1614    Per ty coming back [facility]                      Transportation Services  Ambulance: [place]    Final Discharge Disposition Code: 04 - intermediate care facility

## 2023-05-09 LAB — ACINETOBACTER SCREEN CX: NORMAL

## 2023-05-11 LAB — BACTERIA ISLT: NORMAL

## 2023-05-23 ENCOUNTER — HOSPITAL ENCOUNTER (EMERGENCY)
Facility: HOSPITAL | Age: 63
Discharge: HOME | End: 2023-05-23
Attending: EMERGENCY MEDICINE
Payer: MEDICAID

## 2023-05-23 VITALS
TEMPERATURE: 98.3 F | BODY MASS INDEX: 26.87 KG/M2 | HEART RATE: 82 BPM | RESPIRATION RATE: 16 BRPM | HEIGHT: 64 IN | DIASTOLIC BLOOD PRESSURE: 85 MMHG | SYSTOLIC BLOOD PRESSURE: 124 MMHG | OXYGEN SATURATION: 99 % | WEIGHT: 157.41 LBS

## 2023-05-23 DIAGNOSIS — Z43.0 ENCOUNTER FOR TRACHEOSTOMY TUBE CHANGE: Primary | ICD-10-CM

## 2023-05-23 PROCEDURE — 99284 EMERGENCY DEPT VISIT MOD MDM: CPT

## 2023-05-23 PROCEDURE — 99283 EMERGENCY DEPT VISIT LOW MDM: CPT

## 2023-05-23 NOTE — ED PROVIDER NOTES
TRIAGE CHIEF COMPLAINT:     Nursing and triage notes reviewed    Chief Complaint   Patient presents with   • Tracheostomy Tube Change   • Tracheostomy Tube Check      HPI: Viji Vargas is a 62 y.o. female who presents to the emergency department complaining of a dislodged tracheostomy tube.  Patient is tracheostomy dependent and currently resides at a nursing care facility.  They were performing care on patient's tracheostomy and were unable to replace it at the nursing facility despite multiple attempts.  They sent patient to the emergency department for evaluation.    REVIEW OF SYSTEMS: All other systems reviewed and are negative     PAST MEDICAL HISTORY:   Past Medical History:   Diagnosis Date   • COPD (chronic obstructive pulmonary disease)    • Hypertension    • Pneumonia    • Stroke         FAMILY HISTORY:   No family history on file.     SOCIAL HISTORY:   Social History     Socioeconomic History   • Marital status: Legally    Tobacco Use   • Smoking status: Former     Packs/day: 1.00     Types: Electronic Cigarette, Cigarettes   • Smokeless tobacco: Never   Vaping Use   • Vaping Use: Unknown   Substance and Sexual Activity   • Alcohol use: Not Currently     Comment: wine    • Drug use: No   • Sexual activity: Defer        SURGICAL HISTORY:   Past Surgical History:   Procedure Laterality Date   • HYSTERECTOMY      Partial    • TRACHEOSTOMY AND PEG TUBE INSERTION N/A 3/20/2019    Procedure: TRACHEOSTOMY AND PERCUTANEOUS ENDOSCOPIC GASTROSTOMY TUBE INSERTION;  Surgeon: Nadira Pandey MD;  Location: [facility];  Service: General        CURRENT MEDICATIONS:      Medication List      ASK your doctor about these medications    ALPRAZolam 0.5 MG tablet  Commonly known as: XANAX  Administer 1 tablet per G tube 2 (Two) Times a Day.     baclofen 10 MG tablet  Commonly known as: LIORESAL     budesonide 0.5 MG/2ML nebulizer solution  Commonly known as: PULMICORT  Take 2 mL by nebulization 2 (Two) Times a  Day.     carvedilol 12.5 MG tablet  Commonly known as: COREG     cetirizine 10 MG tablet  Commonly known as: zyrTEC     docusate sodium 50 mg/5 mL liquid  Commonly known as: COLACE     glycopyrrolate 2 MG tablet  Commonly known as: ROBINUL     hyoscyamine 0.125 MG tablet  Commonly known as: ANASPAZ,LEVSIN     ipratropium-albuterol 0.5-2.5 mg/3 ml nebulizer  Commonly known as: DUO-NEB  Take 3 mL by nebulization 4 (Four) Times a Day. Takes at [id number]     montelukast 10 MG tablet  Commonly known as: SINGULAIR     multivitamin with iron-minerals (CENTRUM) liquid     omeprazole 20 MG capsule  Commonly known as: priLOSEC     Petrolatum-Zinc Oxide 49-15 % ointment     polyethylene glycol 17 g packet  Commonly known as: MIRALAX  Take 17 g by mouth 2 (Two) Times a Day.     Scopolamine 1 MG/3DAYS patch     senna 8.6 MG tablet  Commonly known as: SENOKOT             ALLERGIES: Patient has no known allergies.     PHYSICAL EXAM:   VITAL SIGNS:   Vitals:    05/23/23 0346   BP:    Pulse: 78   Resp: 14   Temp:    SpO2: 100%      CONSTITUTIONAL: Awake, nonverbal  HENT: Atraumatic, normocephalic, oral mucosa pink and moist, tracheostomy stoma is open  EYES: Conjunctivae clear, EOMI, PERRL   NECK: Trachea midline   CARDIOVASCULAR: Normal heart rate, Normal rhythm, No murmurs, rubs, gallops   PULMONARY/CHEST: Air movement with no significant wheezing.  Transmitted upper airway sounds.  No respiratory distress.  ABDOMINAL: Nondistended, soft, nontender - no rebound or guarding.  NEUROLOGIC: Nonfocal, moving all four extremities, no gross sensory or motor deficits.   EXTREMITIES: No clubbing, cyanosis, or edema   SKIN: Warm, Dry, No erythema, No rash     ED COURSE / MEDICAL DECISION MAKING:   Viji Vargas is a 62 y.o. female who presents to the emergency department for evaluation of a tracheostomy tube change.    Differential diagnosis includes tracheostomy tube malfunction, dislodged tracheostomy tube among other  etiologies.    Further testing was ordered for further evaluation of the patient's presentation.    Diagnostic information from other sources: Chart review, EMS, nursing home    Interventions: Patient's tracheostomy tube was changed in the emergency department with the assistance of our respiratory therapy team.  Patient had no respiratory distress.  Patient was comfortable after suction of the tube.    Narrative: Patient resting comfortably after tube change.  I do not feel further testing is currently indicated.    Re-evaluation: Breathing comfortably with tracheostomy tube back in place    Plan for disposition is discharge    DECISION TO DISCHARGE/ADMIT: see ED care timeline     FINAL IMPRESSION:   1 --counter for tracheostomy tube change  2 --   3 --     Electronically signed by: Marlene Nolen MD, 5/23/2023 03:52 EDT       Marlene Nolen MD  05/23/23 0355

## 2023-05-23 NOTE — ED NOTES
Called EMS for transport [facility]

## 2023-12-22 ENCOUNTER — APPOINTMENT (OUTPATIENT)
Dept: GENERAL RADIOLOGY | Facility: HOSPITAL | Age: 63
End: 2023-12-22
Payer: MEDICAID

## 2023-12-22 ENCOUNTER — HOSPITAL ENCOUNTER (INPATIENT)
Facility: HOSPITAL | Age: 63
LOS: 2 days | Discharge: ANOTHER HOSPITAL | End: 2023-12-24
Attending: EMERGENCY MEDICINE | Admitting: FAMILY MEDICINE
Payer: MEDICAID

## 2023-12-22 DIAGNOSIS — N39.0 URINARY TRACT INFECTION WITHOUT HEMATURIA, SITE UNSPECIFIED: ICD-10-CM

## 2023-12-22 DIAGNOSIS — J96.21 ACUTE ON CHRONIC RESPIRATORY FAILURE WITH HYPOXIA: ICD-10-CM

## 2023-12-22 DIAGNOSIS — J93.9 PNEUMOTHORAX, RIGHT: ICD-10-CM

## 2023-12-22 DIAGNOSIS — A41.9 SEPSIS, DUE TO UNSPECIFIED ORGANISM, UNSPECIFIED WHETHER ACUTE ORGAN DYSFUNCTION PRESENT: ICD-10-CM

## 2023-12-22 DIAGNOSIS — A41.9 SHOCK, SEPTIC: ICD-10-CM

## 2023-12-22 DIAGNOSIS — J18.9 PNEUMONIA OF RIGHT LUNG DUE TO INFECTIOUS ORGANISM, UNSPECIFIED PART OF LUNG: Primary | ICD-10-CM

## 2023-12-22 DIAGNOSIS — J93.11 PRIMARY SPONTANEOUS PNEUMOTHORAX: ICD-10-CM

## 2023-12-22 DIAGNOSIS — R65.21 SHOCK, SEPTIC: ICD-10-CM

## 2023-12-22 LAB
A-A DO2: ABNORMAL
ALBUMIN SERPL-MCNC: 3.6 G/DL (ref 3.5–5.2)
ALBUMIN/GLOB SERPL: 0.9 G/DL
ALP SERPL-CCNC: 83 U/L (ref 39–117)
ALT SERPL W P-5'-P-CCNC: 17 U/L (ref 1–33)
ANION GAP SERPL CALCULATED.3IONS-SCNC: 12.2 MMOL/L (ref 5–15)
ARTERIAL PATENCY WRIST A: ABNORMAL
AST SERPL-CCNC: 22 U/L (ref 1–32)
ATMOSPHERIC PRESS: 742 MMHG
BACTERIA UR QL AUTO: ABNORMAL /HPF
BASE EXCESS BLDA CALC-SCNC: 4.7 MMOL/L (ref 0–2)
BASOPHILS # BLD AUTO: 0.03 10*3/MM3 (ref 0–0.2)
BASOPHILS NFR BLD AUTO: 0.2 % (ref 0–1.5)
BDY SITE: ABNORMAL
BILIRUB SERPL-MCNC: 0.5 MG/DL (ref 0–1.2)
BILIRUB UR QL STRIP: ABNORMAL
BUN SERPL-MCNC: 19 MG/DL (ref 8–23)
BUN/CREAT SERPL: 32.2 (ref 7–25)
CALCIUM SPEC-SCNC: 9.4 MG/DL (ref 8.6–10.5)
CHLORIDE SERPL-SCNC: 99 MMOL/L (ref 98–107)
CLARITY UR: ABNORMAL
CO2 SERPL-SCNC: 25.8 MMOL/L (ref 22–29)
COHGB MFR BLD: 1.4 % (ref 0–2)
COLOR UR: ABNORMAL
CREAT SERPL-MCNC: 0.59 MG/DL (ref 0.57–1)
D-LACTATE SERPL-SCNC: 1.3 MMOL/L (ref 0.5–2)
DEPRECATED RDW RBC AUTO: 45.3 FL (ref 37–54)
EGFRCR SERPLBLD CKD-EPI 2021: 100.2 ML/MIN/1.73
EOSINOPHIL # BLD AUTO: 0.01 10*3/MM3 (ref 0–0.4)
EOSINOPHIL NFR BLD AUTO: 0.1 % (ref 0.3–6.2)
ERYTHROCYTE [DISTWIDTH] IN BLOOD BY AUTOMATED COUNT: 13.2 % (ref 12.3–15.4)
FLUAV RNA RESP QL NAA+PROBE: NOT DETECTED
FLUBV RNA RESP QL NAA+PROBE: NOT DETECTED
GAS FLOW AIRWAY: 10 LPM
GLOBULIN UR ELPH-MCNC: 4 GM/DL
GLUCOSE BLDC GLUCOMTR-MCNC: 173 MG/DL (ref 70–130)
GLUCOSE SERPL-MCNC: 165 MG/DL (ref 65–99)
GLUCOSE UR STRIP-MCNC: NEGATIVE MG/DL
HCO3 BLDA-SCNC: 28.7 MMOL/L (ref 22–28)
HCT VFR BLD AUTO: 34 % (ref 34–46.6)
HCT VFR BLD CALC: 32.8 %
HGB BLD-MCNC: 11.3 G/DL (ref 12–15.9)
HGB UR QL STRIP.AUTO: ABNORMAL
HOLD SPECIMEN: NORMAL
HOLD SPECIMEN: NORMAL
HYALINE CASTS UR QL AUTO: ABNORMAL /LPF
IMM GRANULOCYTES # BLD AUTO: 0.1 10*3/MM3 (ref 0–0.05)
IMM GRANULOCYTES NFR BLD AUTO: 0.5 % (ref 0–0.5)
KETONES UR QL STRIP: NEGATIVE
LEUKOCYTE ESTERASE UR QL STRIP.AUTO: ABNORMAL
LYMPHOCYTES # BLD AUTO: 1.84 10*3/MM3 (ref 0.7–3.1)
LYMPHOCYTES NFR BLD AUTO: 10 % (ref 19.6–45.3)
Lab: ABNORMAL
MCH RBC QN AUTO: 31.4 PG (ref 26.6–33)
MCHC RBC AUTO-ENTMCNC: 33.2 G/DL (ref 31.5–35.7)
MCV RBC AUTO: 94.4 FL (ref 79–97)
METHGB BLD QL: 0.2 % (ref 0–1.5)
MODALITY: ABNORMAL
MONOCYTES # BLD AUTO: 1.55 10*3/MM3 (ref 0.1–0.9)
MONOCYTES NFR BLD AUTO: 8.4 % (ref 5–12)
NEUTROPHILS NFR BLD AUTO: 14.95 10*3/MM3 (ref 1.7–7)
NEUTROPHILS NFR BLD AUTO: 80.8 % (ref 42.7–76)
NITRITE UR QL STRIP: POSITIVE
NRBC BLD AUTO-RTO: 0 /100 WBC (ref 0–0.2)
OXYHGB MFR BLDV: 98.3 % (ref 94–99)
PCO2 BLDA: 39.7 MM HG (ref 35–45)
PCO2 TEMP ADJ BLD: ABNORMAL MM[HG]
PH BLDA: 7.47 PH UNITS (ref 7.35–7.45)
PH UR STRIP.AUTO: 6.5 [PH] (ref 5–8)
PH, TEMP CORRECTED: ABNORMAL
PLATELET # BLD AUTO: 276 10*3/MM3 (ref 140–450)
PMV BLD AUTO: 11.8 FL (ref 6–12)
PO2 BLDA: 130 MM HG (ref 75–100)
PO2 TEMP ADJ BLD: ABNORMAL MM[HG]
POTASSIUM SERPL-SCNC: 4.5 MMOL/L (ref 3.5–5.2)
PROCALCITONIN SERPL-MCNC: 0.11 NG/ML (ref 0–0.25)
PROT SERPL-MCNC: 7.6 G/DL (ref 6–8.5)
PROT UR QL STRIP: ABNORMAL
RBC # BLD AUTO: 3.6 10*6/MM3 (ref 3.77–5.28)
RBC # UR STRIP: ABNORMAL /HPF
REF LAB TEST METHOD: ABNORMAL
SAO2 % BLDCOA: 100 % (ref 94–100)
SARS-COV-2 RNA RESP QL NAA+PROBE: NOT DETECTED
SODIUM SERPL-SCNC: 137 MMOL/L (ref 136–145)
SP GR UR STRIP: >=1.03 (ref 1–1.03)
SQUAMOUS #/AREA URNS HPF: ABNORMAL /HPF
UROBILINOGEN UR QL STRIP: ABNORMAL
VENTILATOR MODE: ABNORMAL
WBC # UR STRIP: ABNORMAL /HPF
WBC NRBC COR # BLD AUTO: 18.48 10*3/MM3 (ref 3.4–10.8)
WHOLE BLOOD HOLD COAG: NORMAL
WHOLE BLOOD HOLD SPECIMEN: NORMAL

## 2023-12-22 PROCEDURE — 87077 CULTURE AEROBIC IDENTIFY: CPT | Performed by: FAMILY MEDICINE

## 2023-12-22 PROCEDURE — 82948 REAGENT STRIP/BLOOD GLUCOSE: CPT

## 2023-12-22 PROCEDURE — 25010000002 LIDOCAINE 1 % SOLUTION

## 2023-12-22 PROCEDURE — 87147 CULTURE TYPE IMMUNOLOGIC: CPT | Performed by: EMERGENCY MEDICINE

## 2023-12-22 PROCEDURE — 71045 X-RAY EXAM CHEST 1 VIEW: CPT

## 2023-12-22 PROCEDURE — 25010000002 PIPERACILLIN SOD-TAZOBACTAM PER 1 G: Performed by: EMERGENCY MEDICINE

## 2023-12-22 PROCEDURE — 25010000002 VANCOMYCIN 5 G RECONSTITUTED SOLUTION: Performed by: EMERGENCY MEDICINE

## 2023-12-22 PROCEDURE — 25810000003 SODIUM CHLORIDE 0.9 % SOLUTION: Performed by: EMERGENCY MEDICINE

## 2023-12-22 PROCEDURE — 87636 SARSCOV2 & INF A&B AMP PRB: CPT | Performed by: EMERGENCY MEDICINE

## 2023-12-22 PROCEDURE — 82805 BLOOD GASES W/O2 SATURATION: CPT

## 2023-12-22 PROCEDURE — 99221 1ST HOSP IP/OBS SF/LOW 40: CPT | Performed by: SURGERY

## 2023-12-22 PROCEDURE — 99253 IP/OBS CNSLTJ NEW/EST LOW 45: CPT | Performed by: SURGERY

## 2023-12-22 PROCEDURE — 36600 WITHDRAWAL OF ARTERIAL BLOOD: CPT

## 2023-12-22 PROCEDURE — 87150 DNA/RNA AMPLIFIED PROBE: CPT | Performed by: EMERGENCY MEDICINE

## 2023-12-22 PROCEDURE — 93005 ELECTROCARDIOGRAM TRACING: CPT | Performed by: EMERGENCY MEDICINE

## 2023-12-22 PROCEDURE — 87040 BLOOD CULTURE FOR BACTERIA: CPT | Performed by: EMERGENCY MEDICINE

## 2023-12-22 PROCEDURE — 84145 PROCALCITONIN (PCT): CPT | Performed by: EMERGENCY MEDICINE

## 2023-12-22 PROCEDURE — 87186 SC STD MICRODIL/AGAR DIL: CPT | Performed by: FAMILY MEDICINE

## 2023-12-22 PROCEDURE — 80053 COMPREHEN METABOLIC PANEL: CPT | Performed by: EMERGENCY MEDICINE

## 2023-12-22 PROCEDURE — 85025 COMPLETE CBC W/AUTO DIFF WBC: CPT | Performed by: EMERGENCY MEDICINE

## 2023-12-22 PROCEDURE — 25810000003 SODIUM CHLORIDE 0.9 % SOLUTION: Performed by: FAMILY MEDICINE

## 2023-12-22 PROCEDURE — 83605 ASSAY OF LACTIC ACID: CPT | Performed by: EMERGENCY MEDICINE

## 2023-12-22 PROCEDURE — 25010000002 PIPERACILLIN SOD-TAZOBACTAM PER 1 G: Performed by: FAMILY MEDICINE

## 2023-12-22 PROCEDURE — 81001 URINALYSIS AUTO W/SCOPE: CPT | Performed by: EMERGENCY MEDICINE

## 2023-12-22 PROCEDURE — 94640 AIRWAY INHALATION TREATMENT: CPT

## 2023-12-22 PROCEDURE — 83050 HGB METHEMOGLOBIN QUAN: CPT

## 2023-12-22 PROCEDURE — 99223 1ST HOSP IP/OBS HIGH 75: CPT | Performed by: FAMILY MEDICINE

## 2023-12-22 PROCEDURE — 36415 COLL VENOUS BLD VENIPUNCTURE: CPT

## 2023-12-22 PROCEDURE — 87070 CULTURE OTHR SPECIMN AEROBIC: CPT | Performed by: FAMILY MEDICINE

## 2023-12-22 PROCEDURE — 87081 CULTURE SCREEN ONLY: CPT | Performed by: FAMILY MEDICINE

## 2023-12-22 PROCEDURE — 99285 EMERGENCY DEPT VISIT HI MDM: CPT

## 2023-12-22 PROCEDURE — 25010000002 ENOXAPARIN PER 10 MG: Performed by: FAMILY MEDICINE

## 2023-12-22 PROCEDURE — 25010000002 VANCOMYCIN 1 G RECONSTITUTED SOLUTION: Performed by: FAMILY MEDICINE

## 2023-12-22 PROCEDURE — 87086 URINE CULTURE/COLONY COUNT: CPT | Performed by: FAMILY MEDICINE

## 2023-12-22 PROCEDURE — 82375 ASSAY CARBOXYHB QUANT: CPT

## 2023-12-22 PROCEDURE — 87641 MR-STAPH DNA AMP PROBE: CPT | Performed by: FAMILY MEDICINE

## 2023-12-22 RX ORDER — LIDOCAINE HYDROCHLORIDE 10 MG/ML
INJECTION, SOLUTION INFILTRATION; PERINEURAL
Status: COMPLETED
Start: 2023-12-22 | End: 2023-12-22

## 2023-12-22 RX ORDER — NITROGLYCERIN 0.4 MG/1
0.4 TABLET SUBLINGUAL
Status: DISCONTINUED | OUTPATIENT
Start: 2023-12-22 | End: 2023-12-24 | Stop reason: HOSPADM

## 2023-12-22 RX ORDER — CARVEDILOL 12.5 MG/1
12.5 TABLET ORAL 2 TIMES DAILY
Status: DISCONTINUED | OUTPATIENT
Start: 2023-12-22 | End: 2023-12-24 | Stop reason: HOSPADM

## 2023-12-22 RX ORDER — GLYCOPYRROLATE 1 MG/1
2 TABLET ORAL 3 TIMES DAILY
Status: DISCONTINUED | OUTPATIENT
Start: 2023-12-22 | End: 2023-12-24 | Stop reason: HOSPADM

## 2023-12-22 RX ORDER — CHOLECALCIFEROL (VITAMIN D3) 125 MCG
5 CAPSULE ORAL NIGHTLY PRN
Status: DISCONTINUED | OUTPATIENT
Start: 2023-12-22 | End: 2023-12-24 | Stop reason: HOSPADM

## 2023-12-22 RX ORDER — BACLOFEN 10 MG/1
10 TABLET ORAL EVERY 8 HOURS
Status: DISCONTINUED | OUTPATIENT
Start: 2023-12-22 | End: 2023-12-24 | Stop reason: HOSPADM

## 2023-12-22 RX ORDER — SODIUM CHLORIDE 9 MG/ML
100 INJECTION, SOLUTION INTRAVENOUS CONTINUOUS
Status: DISCONTINUED | OUTPATIENT
Start: 2023-12-22 | End: 2023-12-24

## 2023-12-22 RX ORDER — MONTELUKAST SODIUM 10 MG/1
10 TABLET ORAL NIGHTLY
Status: DISCONTINUED | OUTPATIENT
Start: 2023-12-22 | End: 2023-12-24 | Stop reason: HOSPADM

## 2023-12-22 RX ORDER — ALBUTEROL SULFATE 2.5 MG/3ML
2.5 SOLUTION RESPIRATORY (INHALATION) EVERY 6 HOURS PRN
Status: DISCONTINUED | OUTPATIENT
Start: 2023-12-22 | End: 2023-12-24 | Stop reason: HOSPADM

## 2023-12-22 RX ORDER — SENNOSIDES A AND B 8.6 MG/1
2 TABLET, FILM COATED ORAL NIGHTLY
Status: DISCONTINUED | OUTPATIENT
Start: 2023-12-22 | End: 2023-12-22

## 2023-12-22 RX ORDER — ONDANSETRON 2 MG/ML
4 INJECTION INTRAMUSCULAR; INTRAVENOUS EVERY 6 HOURS PRN
Status: DISCONTINUED | OUTPATIENT
Start: 2023-12-22 | End: 2023-12-24 | Stop reason: HOSPADM

## 2023-12-22 RX ORDER — SODIUM CHLORIDE 0.9 % (FLUSH) 0.9 %
10 SYRINGE (ML) INJECTION AS NEEDED
Status: DISCONTINUED | OUTPATIENT
Start: 2023-12-22 | End: 2023-12-24 | Stop reason: HOSPADM

## 2023-12-22 RX ORDER — LANSOPRAZOLE 30 MG/1
30 TABLET, ORALLY DISINTEGRATING, DELAYED RELEASE ORAL
Status: DISCONTINUED | OUTPATIENT
Start: 2023-12-23 | End: 2023-12-24 | Stop reason: HOSPADM

## 2023-12-22 RX ORDER — POLYETHYLENE GLYCOL 3350 17 G/17G
17 POWDER, FOR SOLUTION ORAL 2 TIMES DAILY
Status: DISCONTINUED | OUTPATIENT
Start: 2023-12-22 | End: 2023-12-24 | Stop reason: HOSPADM

## 2023-12-22 RX ORDER — SCOLOPAMINE TRANSDERMAL SYSTEM 1 MG/1
1 PATCH, EXTENDED RELEASE TRANSDERMAL
Status: DISCONTINUED | OUTPATIENT
Start: 2023-12-22 | End: 2023-12-24 | Stop reason: HOSPADM

## 2023-12-22 RX ORDER — VANCOMYCIN/0.9 % SOD CHLORIDE 1.5G/250ML
20 PLASTIC BAG, INJECTION (ML) INTRAVENOUS ONCE
Status: COMPLETED | OUTPATIENT
Start: 2023-12-22 | End: 2023-12-22

## 2023-12-22 RX ORDER — SODIUM CHLORIDE 9 MG/ML
40 INJECTION, SOLUTION INTRAVENOUS AS NEEDED
Status: DISCONTINUED | OUTPATIENT
Start: 2023-12-22 | End: 2023-12-24 | Stop reason: HOSPADM

## 2023-12-22 RX ORDER — ACETAMINOPHEN 650 MG/1
650 SUPPOSITORY RECTAL EVERY 4 HOURS PRN
Status: DISCONTINUED | OUTPATIENT
Start: 2023-12-22 | End: 2023-12-24 | Stop reason: HOSPADM

## 2023-12-22 RX ORDER — BUDESONIDE 0.5 MG/2ML
0.5 INHALANT ORAL
Status: DISCONTINUED | OUTPATIENT
Start: 2023-12-22 | End: 2023-12-24 | Stop reason: HOSPADM

## 2023-12-22 RX ORDER — ACETAMINOPHEN 160 MG/5ML
650 LIQUID ORAL ONCE
Status: COMPLETED | OUTPATIENT
Start: 2023-12-22 | End: 2023-12-22

## 2023-12-22 RX ORDER — ACETAMINOPHEN 160 MG/5ML
650 SOLUTION ORAL EVERY 4 HOURS PRN
Status: DISCONTINUED | OUTPATIENT
Start: 2023-12-22 | End: 2023-12-24 | Stop reason: HOSPADM

## 2023-12-22 RX ORDER — ACETAMINOPHEN 325 MG/1
650 TABLET ORAL EVERY 4 HOURS PRN
Status: DISCONTINUED | OUTPATIENT
Start: 2023-12-22 | End: 2023-12-24 | Stop reason: HOSPADM

## 2023-12-22 RX ORDER — IPRATROPIUM BROMIDE AND ALBUTEROL SULFATE 2.5; .5 MG/3ML; MG/3ML
3 SOLUTION RESPIRATORY (INHALATION)
Status: DISCONTINUED | OUTPATIENT
Start: 2023-12-22 | End: 2023-12-24 | Stop reason: HOSPADM

## 2023-12-22 RX ORDER — SODIUM CHLORIDE 0.9 % (FLUSH) 0.9 %
10 SYRINGE (ML) INJECTION EVERY 12 HOURS SCHEDULED
Status: DISCONTINUED | OUTPATIENT
Start: 2023-12-22 | End: 2023-12-24 | Stop reason: HOSPADM

## 2023-12-22 RX ORDER — LORAZEPAM 2 MG/ML
1 INJECTION INTRAMUSCULAR EVERY 4 HOURS PRN
Status: DISCONTINUED | OUTPATIENT
Start: 2023-12-22 | End: 2023-12-24 | Stop reason: HOSPADM

## 2023-12-22 RX ORDER — ENOXAPARIN SODIUM 100 MG/ML
40 INJECTION SUBCUTANEOUS DAILY
Status: DISCONTINUED | OUTPATIENT
Start: 2023-12-22 | End: 2023-12-24 | Stop reason: HOSPADM

## 2023-12-22 RX ORDER — ALPRAZOLAM 0.5 MG/1
0.5 TABLET ORAL 2 TIMES DAILY
Status: DISCONTINUED | OUTPATIENT
Start: 2023-12-22 | End: 2023-12-24 | Stop reason: HOSPADM

## 2023-12-22 RX ORDER — CETIRIZINE HYDROCHLORIDE 10 MG/1
10 TABLET ORAL DAILY
Status: DISCONTINUED | OUTPATIENT
Start: 2023-12-22 | End: 2023-12-24 | Stop reason: HOSPADM

## 2023-12-22 RX ORDER — DOCUSATE SODIUM 50 MG/5 ML
300 LIQUID (ML) ORAL 2 TIMES DAILY
Status: DISCONTINUED | OUTPATIENT
Start: 2023-12-22 | End: 2023-12-24 | Stop reason: HOSPADM

## 2023-12-22 RX ADMIN — SENNOSIDES 10 ML: 8.8 LIQUID ORAL at 21:44

## 2023-12-22 RX ADMIN — ENOXAPARIN SODIUM 40 MG: 100 INJECTION SUBCUTANEOUS at 18:23

## 2023-12-22 RX ADMIN — VANCOMYCIN HYDROCHLORIDE 1500 MG: 500 INJECTION, POWDER, LYOPHILIZED, FOR SOLUTION INTRAVENOUS at 13:16

## 2023-12-22 RX ADMIN — BACLOFEN 10 MG: 10 TABLET ORAL at 18:23

## 2023-12-22 RX ADMIN — SODIUM CHLORIDE 2136 ML: 9 INJECTION, SOLUTION INTRAVENOUS at 08:39

## 2023-12-22 RX ADMIN — ALPRAZOLAM 0.5 MG: 0.5 TABLET ORAL at 21:44

## 2023-12-22 RX ADMIN — SODIUM CHLORIDE 100 ML/HR: 9 INJECTION, SOLUTION INTRAVENOUS at 18:26

## 2023-12-22 RX ADMIN — PIPERACILLIN SODIUM AND TAZOBACTAM SODIUM 3.38 G: 3; .375 INJECTION, SOLUTION INTRAVENOUS at 09:33

## 2023-12-22 RX ADMIN — SCOLOPAMINE TRANSDERMAL SYSTEM 1 PATCH: 1 PATCH, EXTENDED RELEASE TRANSDERMAL at 19:19

## 2023-12-22 RX ADMIN — GLYCOPYRROLATE 2 MG: 1 TABLET ORAL at 21:44

## 2023-12-22 RX ADMIN — POLYETHYLENE GLYCOL 3350 17 G: 17 POWDER, FOR SOLUTION ORAL at 21:44

## 2023-12-22 RX ADMIN — MONTELUKAST 10 MG: 10 TABLET, FILM COATED ORAL at 21:44

## 2023-12-22 RX ADMIN — LIDOCAINE HYDROCHLORIDE 20 ML: 10 INJECTION, SOLUTION INFILTRATION; PERINEURAL at 10:02

## 2023-12-22 RX ADMIN — CETIRIZINE HYDROCHLORIDE 10 MG: 10 TABLET, FILM COATED ORAL at 18:23

## 2023-12-22 RX ADMIN — PIPERACILLIN SODIUM AND TAZOBACTAM SODIUM 3.38 G: 3; .375 INJECTION, SOLUTION INTRAVENOUS at 19:19

## 2023-12-22 RX ADMIN — CARVEDILOL 12.5 MG: 12.5 TABLET, FILM COATED ORAL at 21:44

## 2023-12-22 RX ADMIN — BUDESONIDE 0.5 MG: 0.5 INHALANT RESPIRATORY (INHALATION) at 18:44

## 2023-12-22 RX ADMIN — Medication 10 ML: at 21:45

## 2023-12-22 RX ADMIN — SODIUM CHLORIDE 1000 MG: 900 INJECTION, SOLUTION INTRAVENOUS at 21:44

## 2023-12-22 RX ADMIN — ACETAMINOPHEN 650 MG: 160 SUSPENSION ORAL at 08:39

## 2023-12-22 RX ADMIN — DOCUSATE SODIUM 300 MG: 50 LIQUID ORAL at 21:44

## 2023-12-22 RX ADMIN — IPRATROPIUM BROMIDE AND ALBUTEROL SULFATE 3 ML: .5; 3 SOLUTION RESPIRATORY (INHALATION) at 18:44

## 2023-12-22 NOTE — PHARMACY RECOMMENDATION
"Pharmacy Consult - Enoxaparin Dosing  Viji Vargas is a 63 y.o. female who has been consulted to dose enoxaparin for VTE prophylaxis.     Allergies  Patient has no known allergies.    Relevant clinical data and objective history reviewed:   [Ht: 162.6 cm (64.02\"); Wt: 71.2 kg (157 lb)]  Body mass index is 26.94 kg/m².  Estimated Creatinine Clearance: 92 mL/min (by C-G formula based on SCr of 0.59 mg/dL).  Results from last 7 days   Lab Units 12/22/23  0820   HEMOGLOBIN g/dL 11.3*   HEMATOCRIT % 34.0   PLATELETS 10*3/mm3 276   CREATININE mg/dL 0.59       Asessment/Plan  Initiate enoxaparin 40 mg subq q 24 hrs.  Pharmacy will monitor Ms. Vargas's renal function and clinical status and adjust the enoxaparin dose and/or frequency as needed.      Thank you for the opportunity to consult on this patient.    Tarik Story RP, Pharm.D.  12/22/23  18:13 EST    "

## 2023-12-22 NOTE — CONSULTS
General Surgery Consult     Name:Viji Vargas  Age: 63 y.o.  Gender: female  : 1960  MRN: [id number]  Visit Number: [id number]  Admit Date: 2023  Date of Service: 23    Patient Care Team:  Ranjeet Pina MD as PCP - General (Family Medicine)  Noemy Garcai PA-C as PCP - Family Medicine (Long Term Care Acute)    Reason for Consultation: pneumothorax    Chief complaint : shortness of breath/respiratory distress      History of Present Illness:     Viji Vargas is a 63 y.o. female patient who is a long-term resident of a skilled nursing facility, with a history of prior cardiac arrest with subsequent anoxic brain injury in 2019 status post tracheostomy and PEG placement, hypertension, and known COPD, sent from her skilled nursing facility via EMS with symptoms of respiratory distress.  Per report, the patient had saturations in the low 80s on 4 L via trach collar when EMS arrived, and was noted to be febrile.  The patient is chronically nonverbal and can provide no history.  The history was obtained from the chart.    In the emergency department, the patient was noted to be febrile to 102.3 °F with a heart rate in the 130s.  She was tachypneic with respirations in the 30s.  Blood pressures were initially in the 90s over 40s but later improved to the 110s systolic.  Labs revealed an elevated white blood cell count 18.5 with a hemoglobin of 11.3, hematocrit of 34, platelets of 276, and 80% neutrophils. Comprehensive metabolic panel was normal with the exception of a glucose of 165.  Lactate was normal at 1.3.  Procalcitonin was normal at 0.11 testing for COVID-19 and influenza were negative.  Blood gas performed in the emergency department was 7.468/39 point 7/130/20 8.7/base excess 4.7.  Blood cultures were obtained urinalysis demonstrated turbid urine with 1+ bilirubin, 3+ blood, 3+ protein, 2+ leukocyte Estrace, positive nitrites, but was contaminated with squamous epithelial  cells.  The patient went on to have a chest x-ray which demonstrated a 40% right-sided pneumothorax with bibasilar atelectasis without exclusion of underlying pneumonia.  A right apical pigtail catheter was placed by the emergency department physician and connected to suction with noted immediate resolution of the right pneumothorax on repeat chest film.  Subsequently, the patient was admitted to the intensive care unit by the hospitalist service, and I was asked to see the patient in consultation for management of her spontaneous pneumothorax and subsequent chest tube insertion.    The patient is currently on vancomycin and Zosyn for broad-spectrum antibiotic coverage, and has received IV fluid resuscitation per sepsis protocols.  At the request of the hospitalist service, she was also placed on the waiting list for transfer to [facility] given the complicated nature of her condition.      Patient Active Problem List   Diagnosis    Cardiopulmonary arrest with successful resuscitation    Acute respiratory failure with hypoxia and hypercapnia    Other pneumothorax    COPD with acute exacerbation    Metabolic acidosis    Hyperglycemia    Anoxic encephalopathy    Oropharyngeal dysphagia    Sepsis    COPD exacerbation    Shock, septic    Healthcare-associated pneumonia    Acute on chronic respiratory failure with hypoxia    Hypoxia    Tracheostomy present    Pneumonia of left upper lobe due to infectious organism    Pneumonia    Pneumothorax, right         Past Medical History:   Diagnosis Date    COPD (chronic obstructive pulmonary disease)     Hypertension     Pneumonia     Stroke        Past Surgical History:   Procedure Laterality Date    HYSTERECTOMY      Partial     TRACHEOSTOMY AND PEG TUBE INSERTION N/A 3/20/2019    Procedure: TRACHEOSTOMY AND PERCUTANEOUS ENDOSCOPIC GASTROSTOMY TUBE INSERTION;  Surgeon: Nadira Pandey MD;  Location: [facility];  Service: General       History reviewed. No  pertinent family history.    Social History     Socioeconomic History    Marital status: Legally    Tobacco Use    Smoking status: Former     Packs/day: 1     Types: Electronic Cigarette, Cigarettes    Smokeless tobacco: Never   Vaping Use    Vaping Use: Unknown   Substance and Sexual Activity    Alcohol use: Not Currently     Comment: wine     Drug use: No    Sexual activity: Defer         Current Facility-Administered Medications:     acetaminophen (TYLENOL) tablet 650 mg, 650 mg, Per G Tube, Q4H PRN **OR** acetaminophen (TYLENOL) 160 MG/5ML oral solution 650 mg, 650 mg, Oral, Q4H PRN **OR** acetaminophen (TYLENOL) suppository 650 mg, 650 mg, Rectal, Q4H PRN, Gisela Lagunas MD    albuterol (PROVENTIL) nebulizer solution 0.083% 2.5 mg/3mL, 2.5 mg, Nebulization, Q6H PRN, Gisela Lagunas MD    ALPRAZolam (XANAX) tablet 0.5 mg, 0.5 mg, Per G Tube, BID, Gisela Lagunas MD    baclofen (LIORESAL) tablet 10 mg, 10 mg, Per G Tube, Q8H, Gisela Lagunas MD, 10 mg at 12/22/23 1823    budesonide (PULMICORT) nebulizer solution 0.5 mg, 0.5 mg, Nebulization, BID - RT, Gisela Lagunas MD, 0.5 mg at 12/22/23 1844    carvedilol (COREG) tablet 12.5 mg, 12.5 mg, Per G Tube, BID, Gisela Lagunas MD    cetirizine (zyrTEC) tablet 10 mg, 10 mg, Per G Tube, Daily, Gisela Lagunas MD, 10 mg at 12/22/23 1823    docusate sodium (COLACE) liquid 300 mg, 300 mg, Per G Tube, BID, Gisela Lagunas MD    Enoxaparin Sodium (LOVENOX) syringe 40 mg, 40 mg, Subcutaneous, Daily, Gisela Lagunas MD, 40 mg at 12/22/23 1823    glycopyrrolate (ROBINUL) tablet 2 mg, 2 mg, Per G Tube, TID, Gisela Lagunas MD    hyoscyamine (LEVSIN) SL tablet 125 mcg, 125 mcg, Per G Tube, TID, Gisela Lagunas MD    ipratropium-albuterol (DUO-NEB) nebulizer solution 3 mL, 3 mL, Nebulization, 4x Daily - RT, Gisela Lagunas MD, 3 mL at 12/22/23 1844    [START ON 12/23/2023] lansoprazole (PREVACID SOLUTAB) disintegrating tablet Tablet Delayed Release  Dispersible 30 mg, 30 mg, Per G Tube, Q AM, Gisela Lagunas MD    Magnesium Standard Dose Replacement - Follow Nurse / BPA Driven Protocol, , Does not apply, PRN, Gisela Lagunas MD    melatonin tablet 5 mg, 5 mg, Per G Tube, Nightly PRN, Gisela Lagunas MD    montelukast (SINGULAIR) tablet 10 mg, 10 mg, Per G Tube, Nightly, Gisela Lagunas MD    nitroglycerin (NITROSTAT) SL tablet 0.4 mg, 0.4 mg, Sublingual, Q5 Min PRN, Gisela Lagunas MD    ondansetron (ZOFRAN) injection 4 mg, 4 mg, Intravenous, Q6H PRN, Gisela Lagunas MD    Pharmacy to Dose enoxaparin (LOVENOX), , Does not apply, Continuous PRN, Gisela Lagunas MD    Pharmacy to dose vancomycin, , Does not apply, Continuous PRN, Gisela Lagunas MD    Pharmacy to Dose Zosyn, , Does not apply, Continuous PRN, Gisela Lagunas MD    piperacillin-tazobactam (ZOSYN) 3.375 g in iso-osmotic dextrose 50 ml (premix), 3.375 g, Intravenous, Q8H, Gisela Lagunas MD    polyethylene glycol (MIRALAX) packet 17 g, 17 g, Per G Tube, BID, Gisela Lagunas MD    Potassium Replacement - Follow Nurse / BPA Driven Protocol, , Does not apply, PRN, Gisela Lagunas MD    scopolamine patch 1 mg/72 hr, 1 patch, Transdermal, Q72H, Gisela Lagunas MD    sennosides (SENOKOT) 8.8 MG/5ML syrup 10 mL, 10 mL, Per PEG Tube, Nightly, Gisela Lagunas MD    sodium chloride 0.9 % flush 10 mL, 10 mL, Intravenous, PRN, Gisela Lagunas MD    sodium chloride 0.9 % flush 10 mL, 10 mL, Intravenous, Q12H, Gisela Lagunas MD    sodium chloride 0.9 % flush 10 mL, 10 mL, Intravenous, PRN, Gisela Lagunas MD    sodium chloride 0.9 % infusion 40 mL, 40 mL, Intravenous, PRN, Gisela Lagunas MD    sodium chloride 0.9 % infusion, 100 mL/hr, Intravenous, Continuous, Gisela Lagunas MD, Last Rate: 100 mL/hr at 12/22/23 1826, 100 mL/hr at 12/22/23 1826    vancomycin 1000 mg/250 mL 0.9% NS (vial-mate), 1,000 mg, Intravenous, Q12H, Gisela Lagunas MD    Medications Prior to Admission   Medication Sig  Dispense Refill Last Dose    ALPRAZolam (XANAX) 0.5 MG tablet Administer 1 tablet per G tube 2 (Two) Times a Day. 60 tablet 5     baclofen (LIORESAL) 10 MG tablet Administer 1 tablet per G tube Every 8 (Eight) Hours.       budesonide (PULMICORT) 0.5 MG/2ML nebulizer solution Take 2 mL by nebulization 2 (Two) Times a Day.       carvedilol (COREG) 12.5 MG tablet Administer 1 tablet per G tube 2 (Two) Times a Day.       cetirizine (zyrTEC) 10 MG tablet Administer 1 tablet per G tube Daily.       docusate sodium (COLACE) 50 mg/5 mL liquid Take 30 mL by mouth 2 (Two) Times a Day.       glycopyrrolate (ROBINUL) 2 MG tablet Take 1 tablet by mouth 3 (Three) Times a Day.       hyoscyamine (ANASPAZ,LEVSIN) 0.125 MG tablet Administer 1 tablet per G tube 3 (Three) Times a Day.       ipratropium-albuterol (DUO-NEB) 0.5-2.5 mg/3 ml nebulizer Take 3 mL by nebulization 4 (Four) Times a Day. Takes at [id number]       montelukast (SINGULAIR) 10 MG tablet Administer 1 tablet per G tube Every Night.       Multiple Vitamins-Minerals (MULTIVITAMIN WITH IRON-MINERALS) liquid Take 15 mL by mouth Daily.       omeprazole (priLOSEC) 20 MG capsule 2 capsules 2 (Two) Times a Day.       Petrolatum-Zinc Oxide 49-15 % ointment Apply 1 application topically 2 (Two) Times a Day As Needed (Excoriation).       polyethylene glycol (MIRALAX) 17 g packet Take 17 g by mouth 2 (Two) Times a Day. 60 each 0     Scopolamine 1 MG/3DAYS patch Place 1 patch on the skin as directed by provider Every 72 (Seventy-Two) Hours.       senna 8.6 MG tablet 2 tablets by Per PEG Tube route Every Night.          No Known Allergies    Review of Systems   Unable to perform ROS: Patient nonverbal       OBJECTIVE:     Vital Signs  Temp:  [97.3 °F (36.3 °C)-102.3 °F (39.1 °C)] 97.3 °F (36.3 °C)  Heart Rate:  [] 92  Resp:  [28-32] 28  BP: ()/() 113/63    I/O this shift:  In: 5256 [IV Piggyback:3646]  Out: -   No intake/output data  recorded.      Physical Exam:      General Appearance:  Awake, alert, nonverbal, appears ill   Head:    Normocephalic, without obvious abnormality, atraumatic   Eyes:            Lids and lashes normal, conjunctivae and sclerae normal, no icterus   Ears:    Ears appear intact with no abnormalities noted   Neck:  Tracheostomy in place   Lungs:   Noted tachypnea with decreased air movement, and bilateral crackles.  Right anterior pigtail catheter chest tube in place to -20 cm H2O continuous suction without air leak noted.    Heart:    Regular rhythm and normal rate   Abdomen:     Soft, no apparent tenderness, non-distended, no guarding, no rebound   tenderness. PEG in place and appears appropriate   Genitalia:    Deferred   Extremities:   Contracted extremities   Pulses:   Pulses palpable and equal bilaterally   Skin:   No bleeding, bruising or rash   Neurologic:   Nonverbal         Results Review:  I have reviewed the entirety of the patient's clinical lab results.  I have also personally reviewed the patient's imaging    Narrative & Impression   PROCEDURE: XR CHEST 1 VW-     HISTORY: post chest tube placement.     COMPARISON: 5/5/2023     FINDINGS:  Portable view of the chest demonstrates bibasilar  atelectasis. Underlying pneumonia right lung base is not excluded.     There is been interval placement of right pigtail pleural catheter in  the right apical region. Right pneumothorax has resolved. Tracheostomy  tube is stable. The mediastinum is unremarkable.     The heart size is normal.     IMPRESSION:  Interval resolution of right apical pneumothorax following  right pleural catheter placement     This report was signed and finalized on 12/22/2023 10:34 AM by Jamie Jung MD.            Narrative & Impression   PROCEDURE: XR CHEST 1 VW-     HISTORY: Simple Sepsis Protocol     COMPARISON: 5/5/2023     FINDINGS:  Portable view of the chest demonstrates bibasilar opacities  likely atelectasis. Right basilar pneumonia  is not excluded.     Moderate size right pneumothorax is present new since prior exam.  Tracheostomy tube is unremarkable. The mediastinum is unremarkable.     The heart size is normal.     IMPRESSION:  1. Moderate size right pneumothorax approximately 40%  2. Bibasilar atelectasis. Underlying pneumonia right lung base not  excluded given asymmetry of opacification     Findings regarding pneumothorax called to the emergency department at  8:38 a.m.           This report was signed and finalized on 12/22/2023 8:39 AM by Jamie Jung MD.               Lab Results (last 72 hours)       Procedure Component Value Units Date/Time    Acinetobacter Screen - Swab, Axilla, Right [[id number]] Collected: 12/22/23 1800    Specimen: Swab from Axilla, Right Updated: 12/22/23 1807    VRE Culture - Swab, Per Rectum [[id number]] Collected: 12/22/23 1800    Specimen: Swab from Per Rectum Updated: 12/22/23 1807    MRSA Screen, PCR (Inpatient) - Swab, Nares [[id number]] Collected: 12/22/23 1748    Specimen: Swab from Nares Updated: 12/22/23 1807    Urine Culture - Urine, Urine, Catheter [[id number]] Collected: 12/22/23 1237    Specimen: Urine, Catheter Updated: 12/22/23 1551    Urinalysis, Microscopic Only - Urine, Catheter [[id number]]  (Abnormal) Collected: 12/22/23 1237    Specimen: Urine, Catheter Updated: 12/22/23 1257     RBC, UA 21-50 /HPF      WBC, UA 11-20 /HPF      Bacteria, UA 1+ /HPF      Squamous Epithelial Cells, UA 7-12 /HPF      Hyaline Casts, UA None Seen /LPF      Methodology Manual Light Microscopy    Urinalysis With Microscopic If Indicated (No Culture) - Urine, Catheter [[id number]]  (Abnormal) Collected: 12/22/23 1237    Specimen: Urine, Catheter Updated: 12/22/23 1247     Color, UA [place]     Appearance, UA Turbid     pH, UA 6.5     Specific Gravity, UA >=1.030     Glucose, UA Negative     Ketones, UA Negative     Bilirubin, UA Small (1+)     Blood, UA Large (3+)     Protein, UA >=300 mg/dL (3+)     Leuk Esterase, UA  Moderate (2+)     Nitrite, UA Positive     Urobilinogen, UA 0.2 E.U./dL    Roy Draw [[id number]] Collected: 12/22/23 0820    Specimen: Blood Updated: 12/22/23 0930    Narrative:      The following orders were created for panel order Roy Draw.  Procedure                               Abnormality         Status                     ---------                               -----------         ------                     Green Top (Gel)[[id number]]                                  Final result               Lavender Top[[id number]]                                     Final result               Gold Top - SST[[id number]]                                   Final result               Light Blue Top[[id number]]                                   Final result                 Please view results for these tests on the individual orders.    Green Top (Gel) [[id number]] Collected: 12/22/23 0820    Specimen: Blood Updated: 12/22/23 0930     Extra Tube Hold for add-ons.     Comment: Auto resulted.       Lavender Top [[id number]] Collected: 12/22/23 0820    Specimen: Blood Updated: 12/22/23 0930     Extra Tube hold for add-on     Comment: Auto resulted       Gold Top - SST [[id number]] Collected: 12/22/23 0820    Specimen: Blood Updated: 12/22/23 0930     Extra Tube Hold for add-ons.     Comment: Auto resulted.       Light Blue Top [[id number]] Collected: 12/22/23 0820    Specimen: Blood Updated: 12/22/23 0930     Extra Tube Hold for add-ons.     Comment: Auto resulted       Blood Culture - Blood, Hand, Right [[id number]] Collected: 12/22/23 0856    Specimen: Blood from Hand, Right Updated: 12/22/23 0910    Blood Culture - Blood, Hand, Left [[id number]] Collected: 12/22/23 0850    Specimen: Blood from Hand, Left Updated: 12/22/23 0910    Procalcitonin [[id number]]  (Normal) Collected: 12/22/23 0820    Specimen: Blood Updated: 12/22/23 0907     Procalcitonin 0.11 ng/mL     Narrative:      As a Marker for Sepsis (Non-Neonates):    1. <0.5  "ng/mL represents a low risk of severe sepsis and/or septic shock.  2. >2 ng/mL represents a high risk of severe sepsis and/or septic shock.    As a Marker for Lower Respiratory Tract Infections that require antibiotic therapy:    PCT on Admission    Antibiotic Therapy       6-12 Hrs later    >0.5                Strongly Recommended  >0.25 - <0.5        Recommended   0.1 - 0.25          Discouraged              Remeasure/reassess PCT  <0.1                Strongly Discouraged     Remeasure/reassess PCT    As 28 day mortality risk marker: \"Change in Procalcitonin Result\" (>80% or <=80%) if Day 0 (or Day 1) and Day 4 values are available. Refer to http://www.[employer] Mediapct-calculator.com    Change in PCT <=80%  A decrease of PCT levels below or equal to 80% defines a positive change in PCT test result representing a higher risk for 28-day all-cause mortality of patients diagnosed with severe sepsis for septic shock.    Change in PCT >80%  A decrease of PCT levels of more than 80% defines a negative change in PCT result representing a lower risk for 28-day all-cause mortality of patients diagnosed with severe sepsis or septic shock.       COVID-19 and FLU A/B PCR, 1 HR TAT - Swab, Nasopharynx [[id number]]  (Normal) Collected: 12/22/23 0825    Specimen: Swab from Nasopharynx Updated: 12/22/23 0900     COVID19 Not Detected     Influenza A PCR Not Detected     Influenza B PCR Not Detected    Narrative:      Fact sheet for providers: https://www.fda.gov/media/342829/download    Fact sheet for patients: https://www.fda.gov/media/437094/download    Test performed by PCR.    Comprehensive Metabolic Panel [[id number]]  (Abnormal) Collected: 12/22/23 0820    Specimen: Blood Updated: 12/22/23 0849     Glucose 165 mg/dL      BUN 19 mg/dL      Creatinine 0.59 mg/dL      Sodium 137 mmol/L      Potassium 4.5 mmol/L      Comment: Specimen hemolyzed.  Result may be falsely elevated.        Chloride 99 mmol/L      CO2 25.8 mmol/L      Calcium " 9.4 mg/dL      Total Protein 7.6 g/dL      Albumin 3.6 g/dL      ALT (SGPT) 17 U/L      Comment: Specimen hemolyzed.  Result may  be falsely elevated.        AST (SGOT) 22 U/L      Comment: Specimen hemolyzed.  Result may be falsely elevated.        Alkaline Phosphatase 83 U/L      Total Bilirubin 0.5 mg/dL      Globulin 4.0 gm/dL      A/G Ratio 0.9 g/dL      BUN/Creatinine Ratio 32.2     Anion Gap 12.2 mmol/L      eGFR 100.2 mL/min/1.73     Narrative:      GFR Normal >60  Chronic Kidney Disease <60  Kidney Failure <15      Blood Gas, Arterial With Co-Ox [[id number]]  (Abnormal) Collected: 12/22/23 0846    Specimen: Arterial Blood Updated: 12/22/23 0847     Site Right Radial     Gary's Test N/A     pH, Arterial 7.468 pH units      Comment: 83 Value above reference range        pCO2, Arterial 39.7 mm Hg      pO2, Arterial 130.0 mm Hg      Comment: 83 Value above reference range        HCO3, Arterial 28.7 mmol/L      Comment: 83 Value above reference range        Base Excess, Arterial 4.7 mmol/L      Comment: 83 Value above reference range        O2 Saturation, Arterial 100.0 %      Comment: 83 Value above reference range        Hematocrit, Blood Gas 32.8 %      Comment: 84 Value below reference range        Oxyhemoglobin 98.3 %      Methemoglobin 0.20 %      Carboxyhemoglobin 1.4 %      A-a DO2 --     Comment: UNABLE TO CALCULATE        Barometric Pressure for Blood Gas 742 mmHg      Modality Nasal Cannula     Flow Rate 10.0 lpm      Ventilator Mode NA     Collected by DRU     Comment: Meter: [id number]     :  TERESA        pH, Temp Corrected --     pCO2, Temperature Corrected --     pO2, Temperature Corrected --    Lactic Acid, Plasma [[id number]]  (Normal) Collected: 12/22/23 0820    Specimen: Blood Updated: 12/22/23 0843     Lactate 1.3 mmol/L     CBC & Differential [[id number]]  (Abnormal) Collected: 12/22/23 0820    Specimen: Blood Updated: 12/22/23 0827    Narrative:      The following orders were  created for panel order CBC & Differential.  Procedure                               Abnormality         Status                     ---------                               -----------         ------                     CBC Auto Differential[[id number]]        Abnormal            Final result                 Please view results for these tests on the individual orders.    CBC Auto Differential [[id number]]  (Abnormal) Collected: 12/22/23 0820    Specimen: Blood Updated: 12/22/23 0827     WBC 18.48 10*3/mm3      RBC 3.60 10*6/mm3      Hemoglobin 11.3 g/dL      Hematocrit 34.0 %      MCV 94.4 fL      MCH 31.4 pg      MCHC 33.2 g/dL      RDW 13.2 %      RDW-SD 45.3 fl      MPV 11.8 fL      Platelets 276 10*3/mm3      Neutrophil % 80.8 %      Lymphocyte % 10.0 %      Monocyte % 8.4 %      Eosinophil % 0.1 %      Basophil % 0.2 %      Immature Grans % 0.5 %      Neutrophils, Absolute 14.95 10*3/mm3      Lymphocytes, Absolute 1.84 10*3/mm3      Monocytes, Absolute 1.55 10*3/mm3      Eosinophils, Absolute 0.01 10*3/mm3      Basophils, Absolute 0.03 10*3/mm3      Immature Grans, Absolute 0.10 10*3/mm3      nRBC 0.0 /100 WBC     POC Glucose Once [[id number]]  (Abnormal) Collected: 12/22/23 0819    Specimen: Blood Updated: 12/22/23 0825     Glucose 173 mg/dL      Comment: Serial Number: [id number]:  [id number]                               ASSESSMENT/PLAN:      Pneumonia    Pneumothorax, right    Ms. Vargas is a 63-year-old female patient with multiple chronic problems as described above, admitted with hypoxic respiratory failure secondary to bilateral pneumonia with evidence of a spontaneous right-sided pneumothorax in the setting of underlying COPD.  She also appears to have an acute urinary tract infection with a history of ESBL Klebsiella pneumoniae on previous urine cultures.  At this time, she has evidence of sepsis.  Regarding the right pneumothorax, I suspect that this is related to rupture of a bleb at the lung  apex.  On my personal review of her imaging from today, the pigtail chest tube is in good position with resolution of the right-sided pneumothorax.  I have also personally reviewed an old CT scan of the chest from 5/6/2023, and on my review of the study, there does appear to be evidence of blebs in the apices of both lungs.  Again, this is likely the source of the patient's spontaneous pneumothorax.  For now, I would maintain the chest tube to suction.  Fortunately, there is no evidence of air leak.  Repeat chest x-ray is ordered for tomorrow.  I will continue to follow along with you.    Nadira Pandey MD  12/22/23  18:49 EST

## 2023-12-22 NOTE — CASE MANAGEMENT/SOCIAL WORK
Discharge Planning Assessment  [facility]     Patient Name: Viji Vargas  MRN: [id number]  Today's Date: 12/22/2023    Admit Date: 12/22/2023    Plan: Patient is non-verbal and unable to answer questions.  Spoke with the patient's daughter and POA, Liliya Epperson.  She reports that the patient is a current resident at [facility] and is followed by Dr. Pina.  She gets her medications from the facility provided pharmacy.  All her needs are met by the nursing staff at the facility.  At the time of DC the POA plans for the patient to return to [facility].  All questions and concerns were addressed at the time of this phone conversation.  Will provide additional information and resources upon POA request.   Discharge Needs Assessment       Row Name 12/22/23 1314       Living Environment    People in Home facility resident    Unique Family Situation Long term resident at [facility]    Current Living Arrangements residential facility    Duration at Residence 3 years    Potentially Unsafe Housing Conditions none    In the past 12 months has the electric, gas, oil, or water company threatened to shut off services in your home? No    Primary Care Provided by other (see comments)  Nursing staff at [facility]    Provides Primary Care For no one, unable/limited ability to care for self    Quality of Family Relationships involved    Able to Return to Prior Arrangements yes       Resource/Environmental Concerns    Resource/Environmental Concerns none       Transportation Needs    In the past 12 months, has lack of transportation kept you from medical appointments or from getting medications? no    In the past 12 months, has lack of transportation kept you from meetings, work, or from getting things needed for daily living? No       Food Insecurity    Within the past 12 months, you worried that your food would run out before you got the money to buy more.  Never true    Within the past 12 months, the food you bought just didn't last and you didn't have money to get more. Never true       Transition Planning    Patient/Family Anticipates Transition to long-term care facility    Patient/Family Anticipated Services at Transition none       Discharge Needs Assessment    Readmission Within the Last 30 Days no previous admission in last 30 days    Equipment Currently Used at Home hospital bed;feeding device;nutrition supplies;oxygen;respiratory supplies;trach supplies  All supplies provided by [facility]    Concerns to be Addressed denies needs/concerns at this time    Anticipated Changes Related to Illness none    Equipment Needed After Discharge none    Discharge Facility/Level of Care Needs nursing facility, skilled                   Discharge Plan       Row Name 12/22/23 9367       Plan    Plan Patient is non-verbal and unable to answer questions.  Spoke with the patient's daughter and POA, Liliya Epperson.  She reports that the patient is a current resident at [facility] and is followed by Dr. Pina.  She gets her medications from the facility provided pharmacy.  All her needs are met by the nursing staff at the facility.  At the time of DC the POA plans for the patient to return to [facility] and [facility].  All questions and concerns were addressed at the time of this phone conversation.  Will provide additional information and resources upon POA request.    Patient/Family in Agreement with Plan yes    Final Discharge Disposition Code 03 - skilled nursing facility (SNF)                  Continued Care and Services - Admitted Since 12/22/2023    Coordination has not been started for this encounter.          Demographic Summary       Row Name 12/22/23 7132       General Information    Admission Type inpatient    Arrived From emergency department    Referral Source admission list    Reason for Consult discharge planning    Preferred Language  English                   Functional Status       Row Name 12/22/23 1313       Functional Status    Usual Activity Tolerance poor    Current Activity Tolerance poor       Physical Activity    On average, how many days per week do you engage in moderate to strenuous exercise (like a brisk walk)? 0 days    On average, how many minutes do you engage in exercise at this level? 0 min    Number of minutes of exercise per week 0       Assessment of Health Literacy    How often do you have someone help you read hospital materials? Always    How often do you have problems learning about your medical condition because of difficulty understanding written information? Always    How often do you have a problem understanding what is told to you about your medical condition? Always    How confident are you filling out medical forms by yourself? Not at all    Health Literacy Low       Functional Status, IADL    Medications completely dependent    Meal Preparation completely dependent    Housekeeping completely dependent    Laundry completely dependent    Shopping completely dependent       Mental Status    General Appearance WDL WDL       Mental Status Summary    Recent Changes in Mental Status/Cognitive Functioning unable to assess    Mental Status Comments Patient is non-verbal and unable to answer questions                   Psychosocial       Row Name 12/22/23 1314       Values/Beliefs    Values/Beliefs Comment Patient is non-verbal and unable to answer questions                   Abuse/Neglect       Row Name 12/22/23 1314       Personal Safety    Physical Signs of Abuse Present no    Personal Safety Comments Patient is non-verbal and unable to answer questions                   Legal       Row Name 12/22/23 1314       Financial Resource Strain    How hard is it for you to pay for the very basics like food, housing, medical care, and heating? Not hard                   Substance Abuse    No documentation.                   Patient Forms    No documentation.                     Sharon Zamudio

## 2023-12-22 NOTE — CONSULTS
"Tube Feeding Assessment    Patient Name: Viji Vargas  YOB: 1960  MRN: [id number]  Admission date: 12/22/2023    Comment:    Rec#1: Initiate Nutren 1.5 @ 20mL/hr and advance 10mL q8hrs to goal rate of 55mL/hr x 22hrs    Rec#2: Free water flush 120mL q4hrs    Total TF regimen to provide: 1815kcals, 82g protein, and 1644mL fluid per day.     RD to follow-up    Encounter Information     Trending Narrative 12/22: Pt w/ PEG-tube and need for enteral nutrition recommendations. Will provide continuous recommendations.    H&P  Past Medical History:   Diagnosis Date    COPD (chronic obstructive pulmonary disease)     Hypertension     Pneumonia     Stroke          Past Surgical History:   Procedure Laterality Date    HYSTERECTOMY      Partial     TRACHEOSTOMY AND PEG TUBE INSERTION N/A 3/20/2019    Procedure: TRACHEOSTOMY AND PERCUTANEOUS ENDOSCOPIC GASTROSTOMY TUBE INSERTION;  Surgeon: Nadira Pandey MD;  Location: [facility];  Service: General        Anthropometrics     Current Height, Weight Height: 162.6 cm (64.02\")  Weight: 71.2 kg (157 lb) (12/22/23 0818)     Trending Weight History Wt Readings from Last 30 Encounters:   12/22/23 0818 71.2 kg (157 lb)   05/23/23 0326 71.4 kg (157 lb 6.5 oz)   05/08/23 0500 71.4 kg (157 lb 6.5 oz)   05/07/23 0622 69.3 kg (152 lb 12.5 oz)   05/06/23 0552 69 kg (152 lb 1.9 oz)   05/06/23 0136 69 kg (152 lb 1.9 oz)   05/05/23 2207 72.6 kg (160 lb)   04/15/23 1931 72.6 kg (160 lb)   03/07/23 1309 72.6 kg (160 lb)   02/11/23 0152 72.6 kg (160 lb)   01/22/23 0620 69.9 kg (154 lb)   11/22/22 1531 69.9 kg (154 lb)   11/12/22 2146 69.9 kg (154 lb)   10/22/22 0356 68 kg (150 lb)   08/13/22 1109 70.5 kg (155 lb 6.4 oz)   04/10/22 0920 79.4 kg (175 lb)   03/31/22 1117 79.4 kg (175 lb)   03/30/22 1638 79.4 kg (175 lb)   02/22/22 1328 79.4 kg (175 lb 0.7 oz)   01/24/22 0124 79.4 kg (175 lb)   01/12/22 0556 74.3 kg (163 lb 12.8 oz)   01/11/22 0340 73.3 kg (161 lb 9.6 oz)   01/10/22 " 0935 70.7 kg (155 lb 13.8 oz)   01/10/22 0300 70.6 kg (155 lb 10.3 oz)   01/09/22 2152 70.3 kg (155 lb)   12/30/21 1953 70.3 kg (155 lb)   12/30/21 1147 70.3 kg (155 lb)   10/22/21 0917 70.3 kg (155 lb)   09/29/21 0046 70.3 kg (155 lb)   08/24/21 1437 70.5 kg (155 lb 6 oz)   08/22/21 1642 72.3 kg (159 lb 6.4 oz)   06/22/21 1333 70.1 kg (154 lb 7 oz)   04/20/21 1611 68.5 kg (151 lb)   01/19/21 0856 66.7 kg (147 lb)   10/14/20 1149 68.2 kg (150 lb 6.4 oz)   06/01/20 1316 66.4 kg (146 lb 6.4 oz)   03/31/20 0500 66.9 kg (147 lb 8 oz)   03/30/20 0600 66.1 kg (145 lb 11.6 oz)   03/29/20 0615 66.1 kg (145 lb 11.6 oz)   03/29/20 0500 66.8 kg (147 lb 4.3 oz)   03/28/20 0500 68.7 kg (151 lb 7.3 oz)   03/27/20 1838 68.3 kg (150 lb 9.2 oz)   03/27/20 0600 69.4 kg (153 lb)   03/27/20 0400 69.5 kg (153 lb 4.8 oz)   03/26/20 0400 67.6 kg (149 lb)   03/24/20 2100 65.3 kg (144 lb 1 oz)   12/05/19 1900 67 kg (147 lb 9.6 oz)   12/05/19 1301 68 kg (150 lb)       BMI Body mass index is 26.94 kg/m².     Labs     Pertinent Labs Results from last 7 days   Lab Units 12/22/23  0820   SODIUM mmol/L 137   POTASSIUM mmol/L 4.5   CHLORIDE mmol/L 99   CO2 mmol/L 25.8   BUN mg/dL 19   CREATININE mg/dL 0.59   CALCIUM mg/dL 9.4   BILIRUBIN mg/dL 0.5   ALK PHOS U/L 83   ALT (SGPT) U/L 17   AST (SGOT) U/L 22   GLUCOSE mg/dL 165*        Results from last 7 days   Lab Units 12/22/23  0820   HEMOGLOBIN g/dL 11.3*   HEMATOCRIT % 34.0            Lab Results   Component Value Date    HGBA1C 5.1 03/10/2019        Medications  Schedule Medications     Infusions       PRN Medications    sodium chloride     Physical Findings        Edema  None   Bowel Function None reported   Tubes PEG-tube   Skin WNL      Estimated/Assessed Needs       Energy Requirements    EST Needs, Method, Wt used 1780-2136kcals/day using 25-30kcals/kg       Protein Requirements    EST Needs, Method, Wt used 71g protein per day using 1g/kg       Fluid Requirements     Estimated Needs  (mL/day) 1780mL per day     Current Tube Feed Orders & Evaluation      Enteral Nutrition     Enteral Route PEG-tube   Orders, Modulars, Flushes    Residual/Tolerance    Vasopressors    Noninvasive MAP (mmHg) 100   Lactate (mmol/L) 1.3   Propofol (mL/hr)     Tube Feed Observation      Nutrition Diagnosis         Nutrition Dx Problem 1 Need alternate r/t unable to tolerate anything by mouth as evidenced by pt with PEG-tube    Nutrition Dx Problem 2        Intervention Goal         Intervention Goal(s) Tolerate TF regimen at goal     Nutrition Intervention        RD Action Provide enteral nutrition recommendations     Enteral Nutrition Prescription     Enteral Prescription Rec#1: Initiate Nutren 1.5 @ 20mL/hr and advance 10mL q8hrs to goal rate of 55mL/hr x 22hrs  Rec#2: Free water flush 120mL q4hrs    Total TF regimen to provide: 1815kcals, 82g protein, and 1644mL fluid per day.          Monitor/Evaluation        Monitor Per protocol, PO intake, Pertinent labs, EN delivery/tolerance, Weight, Skin status, GI status, Symptoms     RD to follow-up    Electronically signed by:  Delfin Torres RD  12/22/23 15:58 EST

## 2023-12-22 NOTE — NURSING NOTE
Attempted to call report to Kerry RN in ED at 1340. Was told patient could possibly be transferred out. ED RN said she'd call back with confirmation and give report at later time when decision is made. House supervisor notified.

## 2023-12-22 NOTE — ED PROVIDER NOTES
"Subjective  History of Present Illness:    Chief Complaint: Respiratory distress    History of Present Illness: 63-year-old female nursing home resident, history of stroke pneumonia hypertension COPD, status post trach and PEG tube, chronic contractures, reportedly had sats at 81% on 4 L trach collar, arrives by EMS found to be febrile 102.3 °F on arrival.  Patient nonverbal unable to provide any meaningful history    Nurses Notes reviewed and agree, including vitals, allergies, social history and prior medical history.     REVIEW OF SYSTEMS: All systems reviewed and not pertinent unless noted.  Review of Systems      Positive for: Respiratory issue    Negative for: Unable obtain negative review of systems secondary to patient baseline status    Past Medical History:   Diagnosis Date    COPD (chronic obstructive pulmonary disease)     Hypertension     Pneumonia     Stroke        Allergies:    Patient has no known allergies.      Past Surgical History:   Procedure Laterality Date    HYSTERECTOMY      Partial     TRACHEOSTOMY AND PEG TUBE INSERTION N/A 3/20/2019    Procedure: TRACHEOSTOMY AND PERCUTANEOUS ENDOSCOPIC GASTROSTOMY TUBE INSERTION;  Surgeon: Nadira Pandey MD;  Location: [facility];  Service: General         Social History     Socioeconomic History    Marital status: Legally    Tobacco Use    Smoking status: Former     Packs/day: 1     Types: Electronic Cigarette, Cigarettes    Smokeless tobacco: Never   Vaping Use    Vaping Use: Unknown   Substance and Sexual Activity    Alcohol use: Not Currently     Comment: wine     Drug use: No    Sexual activity: Defer         History reviewed. No pertinent family history.    Objective  Physical Exam:  /74   Pulse 98   Temp (!) 102.3 °F (39.1 °C)   Resp (!) 32   Ht 162.6 cm (64.02\")   Wt 71.2 kg (157 lb)   SpO2 100%   BMI 26.94 kg/m²      Physical Exam    CONSTITUTIONAL: Well developed, frail chronically ill-appearing 63-year-old female,  in " no acute distress.  VITAL SIGNS: per nursing, reviewed and noted  SKIN: exposed skin with no rashes, ulcerations or petechiae  EYES: Grossly EOMI, no icterus  ENT: Normal voice.  Moist mucous membranes   RESPIRATORY:  No increased work of breathing. No retractions.  Trach in place.  Decreased breath sounds throughout.  Mild crackles  CARDIOVASCULAR:   Extremities pink and warm.  Good cap refill to extremities.   GI: Abdomen without distention   MUSCULOSKELETAL: Muscle wasting and contractures.  Heel pads in place.  NEUROLOGIC: Alert, interactive.  Nonverbal   Chest Tube Insertion    Date/Time: 12/22/2023 10:41 AM    Performed by: Janak Gutiérrez DO  Authorized by: Janak Gutiérrez DO    Universal protocol:     Patient identity confirmed:  Provided demographic data  Pre-procedure details:     Skin preparation:  Chlorhexidine    Preparation: Patient was prepped and draped in the usual sterile fashion    Sedation:     Sedation type:  None  Anesthesia:     Anesthesia method:  Local infiltration    Local anesthetic:  Lidocaine 1% w/o epi  Procedure details:     Placement location:  R anterior    Scalpel size:  11    Tube size (Fr):  8    Ultrasound guidance: yes      Tension pneumothorax: no      Tube connected to:  Water seal    Drainage characteristics:  Air only    Suture material:  2-0 silk    Dressing:  Petrolatum-impregnated gauze (Foam tape)  Post-procedure details:     Post-insertion x-ray findings: tube in good position      Procedure completion:  Tolerated well, no immediate complications      ED Course:    Lab Results (last 24 hours)       Procedure Component Value Units Date/Time    POC Glucose Once [[id number]]  (Abnormal) Collected: 12/22/23 0819    Specimen: Blood Updated: 12/22/23 0825     Glucose 173 mg/dL      Comment: Serial Number: [id number]:  [id number]       CBC & Differential [[id number]]  (Abnormal) Collected: 12/22/23 0820    Specimen: Blood Updated: 12/22/23 0827    Narrative:      The  following orders were created for panel order CBC & Differential.  Procedure                               Abnormality         Status                     ---------                               -----------         ------                     CBC Auto Differential[[id number]]        Abnormal            Final result                 Please view results for these tests on the individual orders.    Comprehensive Metabolic Panel [[id number]]  (Abnormal) Collected: 12/22/23 0820    Specimen: Blood Updated: 12/22/23 0849     Glucose 165 mg/dL      BUN 19 mg/dL      Creatinine 0.59 mg/dL      Sodium 137 mmol/L      Potassium 4.5 mmol/L      Comment: Specimen hemolyzed.  Result may be falsely elevated.        Chloride 99 mmol/L      CO2 25.8 mmol/L      Calcium 9.4 mg/dL      Total Protein 7.6 g/dL      Albumin 3.6 g/dL      ALT (SGPT) 17 U/L      Comment: Specimen hemolyzed.  Result may  be falsely elevated.        AST (SGOT) 22 U/L      Comment: Specimen hemolyzed.  Result may be falsely elevated.        Alkaline Phosphatase 83 U/L      Total Bilirubin 0.5 mg/dL      Globulin 4.0 gm/dL      A/G Ratio 0.9 g/dL      BUN/Creatinine Ratio 32.2     Anion Gap 12.2 mmol/L      eGFR 100.2 mL/min/1.73     Narrative:      GFR Normal >60  Chronic Kidney Disease <60  Kidney Failure <15      Lactic Acid, Plasma [[id number]]  (Normal) Collected: 12/22/23 0820    Specimen: Blood Updated: 12/22/23 0843     Lactate 1.3 mmol/L     Procalcitonin [[id number]]  (Normal) Collected: 12/22/23 0820    Specimen: Blood Updated: 12/22/23 0907     Procalcitonin 0.11 ng/mL     Narrative:      As a Marker for Sepsis (Non-Neonates):    1. <0.5 ng/mL represents a low risk of severe sepsis and/or septic shock.  2. >2 ng/mL represents a high risk of severe sepsis and/or septic shock.    As a Marker for Lower Respiratory Tract Infections that require antibiotic therapy:    PCT on Admission    Antibiotic Therapy       6-12 Hrs later    >0.5                Strongly  "Recommended  >0.25 - <0.5        Recommended   0.1 - 0.25          Discouraged              Remeasure/reassess PCT  <0.1                Strongly Discouraged     Remeasure/reassess PCT    As 28 day mortality risk marker: \"Change in Procalcitonin Result\" (>80% or <=80%) if Day 0 (or Day 1) and Day 4 values are available. Refer to http://www[place] [place]    Change in PCT <=80%  A decrease of PCT levels below or equal to 80% defines a positive change in PCT test result representing a higher risk for 28-day all-cause mortality of patients diagnosed with severe sepsis for septic shock.    Change in PCT >80%  A decrease of PCT levels of more than 80% defines a negative change in PCT result representing a lower risk for 28-day all-cause mortality of patients diagnosed with severe sepsis or septic shock.       CBC Auto Differential [[id number]]  (Abnormal) Collected: 12/22/23 0820    Specimen: Blood Updated: 12/22/23 0827     WBC 18.48 10*3/mm3      RBC 3.60 10*6/mm3      Hemoglobin 11.3 g/dL      Hematocrit 34.0 %      MCV 94.4 fL      MCH 31.4 pg      MCHC 33.2 g/dL      RDW 13.2 %      RDW-SD 45.3 fl      MPV 11.8 fL      Platelets 276 10*3/mm3      Neutrophil % 80.8 %      Lymphocyte % 10.0 %      Monocyte % 8.4 %      Eosinophil % 0.1 %      Basophil % 0.2 %      Immature Grans % 0.5 %      Neutrophils, Absolute 14.95 10*3/mm3      Lymphocytes, Absolute 1.84 10*3/mm3      Monocytes, Absolute 1.55 10*3/mm3      Eosinophils, Absolute 0.01 10*3/mm3      Basophils, Absolute 0.03 10*3/mm3      Immature Grans, Absolute 0.10 10*3/mm3      nRBC 0.0 /100 WBC     COVID-19 and FLU A/B PCR, 1 HR TAT - Swab, Nasopharynx [[id number]]  (Normal) Collected: 12/22/23 0825    Specimen: Swab from Nasopharynx Updated: 12/22/23 0900     COVID19 Not Detected     Influenza A PCR Not Detected     Influenza B PCR Not Detected    Narrative:      Fact sheet for providers: https://www.fda.gov/media/055639/download    Fact sheet for " patients: https://www.[employer].gov/media/004989/download    Test performed by PCR.    Blood Gas, Arterial With Co-Ox [[id number]]  (Abnormal) Collected: 12/22/23 0846    Specimen: Arterial Blood Updated: 12/22/23 0847     Site Right Radial     Gary's Test N/A     pH, Arterial 7.468 pH units      Comment: 83 Value above reference range        pCO2, Arterial 39.7 mm Hg      pO2, Arterial 130.0 mm Hg      Comment: 83 Value above reference range        HCO3, Arterial 28.7 mmol/L      Comment: 83 Value above reference range        Base Excess, Arterial 4.7 mmol/L      Comment: 83 Value above reference range        O2 Saturation, Arterial 100.0 %      Comment: 83 Value above reference range        Hematocrit, Blood Gas 32.8 %      Comment: 84 Value below reference range        Oxyhemoglobin 98.3 %      Methemoglobin 0.20 %      Carboxyhemoglobin 1.4 %      A-a DO2 --     Comment: UNABLE TO CALCULATE        Barometric Pressure for Blood Gas 742 mmHg      Modality Nasal Cannula     Flow Rate 10.0 lpm      Ventilator Mode NA     Collected by DRU     Comment: Meter: [id number]     :  TERESA        pH, Temp Corrected --     pCO2, Temperature Corrected --     pO2, Temperature Corrected --    Blood Culture - Blood, Hand, Left [[id number]] Collected: 12/22/23 0850    Specimen: Blood from Hand, Left Updated: 12/22/23 0910    Blood Culture - Blood, Hand, Right [[id number]] Collected: 12/22/23 0856    Specimen: Blood from Hand, Right Updated: 12/22/23 0910    Urinalysis With Microscopic If Indicated (No Culture) - Urine, Catheter [[id number]]  (Abnormal) Collected: 12/22/23 1237    Specimen: Urine, Catheter Updated: 12/22/23 1247     Color, UA Tate     Appearance, UA Turbid     pH, UA 6.5     Specific Gravity, UA >=1.030     Glucose, UA Negative     Ketones, UA Negative     Bilirubin, UA Small (1+)     Blood, UA Large (3+)     Protein, UA >=300 mg/dL (3+)     Leuk Esterase, UA Moderate (2+)     Nitrite, UA Positive      Urobilinogen, UA 0.2 E.U./dL    Urinalysis, Microscopic Only - Urine, Catheter [[id number]]  (Abnormal) Collected: 12/22/23 1237    Specimen: Urine, Catheter Updated: 12/22/23 1257     RBC, UA 21-50 /HPF      WBC, UA 11-20 /HPF      Bacteria, UA 1+ /HPF      Squamous Epithelial Cells, UA 7-12 /HPF      Hyaline Casts, UA None Seen /LPF      Methodology Manual Light Microscopy             XR Chest 1 View    Result Date: 12/22/2023  PROCEDURE: XR CHEST 1 VW-  HISTORY: post chest tube placement.  COMPARISON: 5/5/2023  FINDINGS:  Portable view of the chest demonstrates bibasilar atelectasis. Underlying pneumonia right lung base is not excluded.  There is been interval placement of right pigtail pleural catheter in the right apical region. Right pneumothorax has resolved. Tracheostomy tube is stable. The mediastinum is unremarkable.  The heart size is normal.      Impression: Interval resolution of right apical pneumothorax following right pleural catheter placement  This report was signed and finalized on 12/22/2023 10:34 AM by Jamie Jung MD.      XR Chest 1 View    Result Date: 12/22/2023  PROCEDURE: XR CHEST 1 VW-  HISTORY: Simple Sepsis Protocol  COMPARISON: 5/5/2023  FINDINGS:  Portable view of the chest demonstrates bibasilar opacities likely atelectasis. Right basilar pneumonia is not excluded.  Moderate size right pneumothorax is present new since prior exam. Tracheostomy tube is unremarkable. The mediastinum is unremarkable.  The heart size is normal.      Impression: 1. Moderate size right pneumothorax approximately 40% 2. Bibasilar atelectasis. Underlying pneumonia right lung base not excluded given asymmetry of opacification  Findings regarding pneumothorax called to the emergency department at 8:38 a.m.    This report was signed and finalized on 12/22/2023 8:39 AM by Jamie Jung MD.        MDM     Amount and/or Complexity of Data Reviewed  Clinical lab tests: reviewed  Tests in the radiology section of  CPT®: reviewed  Tests in the medicine section of CPT®: reviewed        ED Course as of 12/22/23 1430   Fri Dec 22, 2023   0845 EKG interpreted by me reveals sinus tachycardia rate of 104, nonspecific T wave changes no ectopy no ischemic change moderate artifact. [PF]      ED Course User Index  [PF] Janak Gutiérrez,        Medical Decision Making:    Initial impression of presenting illness: 63-year-old female nursing home resident, history of stroke pneumonia hypertension COPD, status post trach and PEG tube, chronic contractures, reportedly had sats at 81% on 4 L trach collar, arrives by EMS found to be febrile 102.3 °F on arrival.  Patient nonverbal unable to provide any meaningful history    DDX: includes but is not limited to: Pneumonia sepsis aspiration, among others         Patient arrives blood pressure 119/63 heart rate 135 respiratory rate 32 sats 95% with vitals interpreted by myself.     Pertinent features from physical exam: Frail chronically ill-appearing, contractures and muscle wasting.  Decreased breath sounds with mild crackles throughout.    Initial diagnostic plan: Sepsis workup including lactate procalcitonin blood gas CBC CMP flu and COVID UA chest x-ray    Results from initial plan were reviewed and interpreted by me revealing chest x-ray with 40% pneumothorax and right basilar infiltrate per radiologist    Diagnostic information from other sources: All information obtained from EMS, nursing home data sheet,    Interventions / Re-evaluation: Treated empirically for sepsis with Zosyn, IV antibiotics, Tylenol in regards to fever per G-tube.  In regards to pneumothorax successfully placed 8 Vietnamese pigtail catheter with resolution of pneumothorax on chest x-ray without leak on waterseal.      Consultations/Discussion of results with other physicians: hospital service Dr. Vazquez, and general surgeon Dr. Pandey.  Dr. Pandey advised she could follow in consultation.    Disposition plan:  Admission  -----  1427  Patient was seen and evaluated by Dr. Lagunas hospitalist, requested transfer for pulmonology.   is on regional divert and is not taking waitlist patients.  [facility] is on waitlist patient's only.  Discussed with Dr. Mojica, on waitlist at [facility].  Dr. Lagunas will admit in the interim.  Final diagnoses:   Pneumonia of right lung due to infectious organism, unspecified part of lung   Sepsis, due to unspecified organism, unspecified whether acute organ dysfunction present   Primary spontaneous pneumothorax   Urinary tract infection without hematuria, site unspecified            Janak Gutiérrez, DO  12/22/23 1304       Janak Gutiérrez, DO  12/22/23 1431       Janak Gutiérrez, DO  12/22/23 1511

## 2023-12-22 NOTE — Clinical Note
Level of Care: Critical Care [6]   Diagnosis: Pneumothorax, right [998053]   Admitting Physician: DANIEL SERVIN [[id number]]   Isolate for COVID?: No [0]   Certification: I Certify That Inpatient Hospital Services Are Medically Necessary For Greater Than 2 Midnights

## 2023-12-22 NOTE — PHARMACY RECOMMENDATION
Pharmacy Consult-Vancomycin Dosing    Viji Vargas is a  63 y.o. female receiving vancomycin therapy.     Indication: pneumonia, sepsis  Consulting Provider: Dr. JONAH Lagunas    Goal AUC: 400 to 600 (mg/L)×hr    Current Antimicrobial Therapy  Anti-Infectives (From admission, onward)      Ordered     Dose/Rate Route Frequency Start Stop    12/22/23 1838  vancomycin 1000 mg/250 mL 0.9% NS (vial-mate)        Ordering Provider: Gisela Lagunas MD    1,000 mg  250 mL/hr over 60 Minutes Intravenous Every 12 Hours 12/22/23 2200 12/27/23 2159    12/22/23 1840  piperacillin-tazobactam (ZOSYN) 3.375 g in iso-osmotic dextrose 50 ml (premix)        Ordering Provider: Gisela Laugnas MD    3.375 g  12.5 mL/hr over 4 Hours Intravenous Every 8 Hours 12/22/23 1930 12/27/23 1929    12/22/23 1744  Pharmacy to dose vancomycin        Ordering Provider: Gisela Lagunas MD     Does not apply Continuous PRN 12/22/23 1744 12/27/23 1743    12/22/23 1744  Pharmacy to Dose Zosyn        Ordering Provider: Gisela Lagunas MD     Does not apply Continuous PRN 12/22/23 1744 12/27/23 1743    12/22/23 1239  vancomycin IVPB 1500 mg in 0.9% NaCl (Premix) 500 mL        Ordering Provider: Janak Gutiérrez DO    20 mg/kg × 71.2 kg  333.3 mL/hr over 90 Minutes Intravenous Once 12/22/23 1255 12/22/23 1457    12/22/23 0819  piperacillin-tazobactam (ZOSYN) 3.375 g in iso-osmotic dextrose 50 ml (premix)        Ordering Provider: Janak Gutiérrez DO    3.375 g  over 30 Minutes Intravenous Once 12/22/23 0835 12/22/23 1011            Labs  Results from last 7 days   Lab Units 12/22/23  0820   WBC 10*3/mm3 18.48*   CREATININE mg/dL 0.59      Estimated Creatinine Clearance: 92 mL/min (by C-G formula based on SCr of 0.59 mg/dL).  Temp Readings from Last 1 Encounters:   12/22/23 97.3 °F (36.3 °C) (Temporal)       Microbiology Culture results  Microbiology Results (last 10 days)       Procedure Component Value - Date/Time    COVID-19 and FLU A/B PCR, 1 HR TAT -  "Swab, Nasopharynx [[id number]]  (Normal) Collected: 12/22/23 0825    Lab Status: Final result Specimen: Swab from Nasopharynx Updated: 12/22/23 0900     COVID19 Not Detected     Influenza A PCR Not Detected     Influenza B PCR Not Detected    Narrative:      Fact sheet for providers: https://www.fda.gov/media/980461/download    Fact sheet for patients: https://www.fda.gov/media/976952/download    Test performed by PCR.            Evaluation of Dosing     Last Dose Received in the ED/Outside Facility: 12/22 1316; 1500 mg  Is Patient on Dialysis or Renal Replacement: no    Ht - 162.6 cm (64.02\")  Wt - 71.2 kg (157 lb)    Evaluation of Level                      InsightRX AUC Calculation     New dose/frequency: 1000 mg q 12 hrs     New AUC:   483 (mg/L)×hr     New C(trough): 14.6 mcg/mL      Assessment/Plan    Pharmacy to dose vancomycin for pneumonia, sepsis. Goal  to 600 (mg/L)×hr.  Patient received loading dose of vancomycin 1500 mg (~21.1 mg/kg) IV on 12/22 at 1316. Initiate maintenance dose of vancomycin 1000 mg (~14.0 mg/kg) IV q 12 hrs on 12/22 at 2200.  Assess clearance by vancomycin random level on 12/23 at 0600.  Patient also initiated on piperacillin/tazobactam (Zosyn) 3.375 gm IV q 8 hrs.  Pharmacy will continue to monitor renal function, cultures and sensitivities, and clinical status to adjust regimen as necessary.      Thank you for the opportunity to consult on this patient.    Tarik Story, [facility], Pharm.D.  12/22/23  18:44 EST    "

## 2023-12-22 NOTE — H&P
[facility]      Name:  Viji Vargsa   Age:  63 y.o.  Sex:  female  :  1960  MRN:  [id number]   Visit Number:  [id number]  Admission Date:  2023  Date Of Service:  23  Primary Care Physician:  Ranjeet Pina MD    Chief Complaint:     Respiratory distress    History Of Presenting Illness:      Patient is a 63 years old female nursing home resident, with history of cardiac arrest and anoxic brain injury in 2019, status post tracheostomy and PEG tube placement, COPD, hypertension was sent from the nursing home facility by EMS with symptoms of respiratory distress.  Patient apparently was saturating at 81% on 4 L trach collar when EMS arrived.  He was also running a fever.  Patient is nonverbal and unable to provide any history.  Patient was noted to have increased secretions and breathing difficulties on arrival to the ER. Patient is nonverbal and no review of systems were possible.     On evaluation evaluation, patient was febrile with a temperature of 102.3 Fahrenheit, heart rate 130s, tachypneic with a respiratory rate of 30s, blood pressure stable, patient on supplemental oxygen with trach collar. Patient was febrile was significant for ABG with a pH of 7.468/pCO2 39.7/pO2 130/HCO3 28.7/saturation 100% on 10 L of supplemental oxygen her labs were significant for WBC of 18.4, hemoglobin 11.3.  Glucose 165.  Pro-Teo and lactate WNL.  CMP otherwise within acceptable range.  COVID and flu negative.  Chest x-ray showed moderate size right pneumothorax approximately 40% along with bibasilar atelectasis. Underlying pneumonia right lung base not excluded given asymmetry of opacification.  Urinalysis was positive for nitrates, leukocytes, WBC 11-20, bacteria 1+.  Chest tube (pigtail) was placed in the right anterior chest by ER provider with interval resolution of right apical pneumothorax following placement of right pleural catheter.  Blood  cultures obtained.  Patien received normal saline boluses per sepsis protocol, started on Vanco and Zosyn and received Tylenol.  Case was discussed for transfer for pulmonology service that are not available currently in this hospital, patient discussed with Joyce Calloway by ER provider and placed on waiting list.  Hospitalist consulted for admission, further management and treatment.    Review Of Systems:    All systems were reviewed and negative except as mentioned in history of presenting illness, assessment and plan.    Past Medical History: Patient  has a past medical history of COPD (chronic obstructive pulmonary disease), Hypertension, Pneumonia, and Stroke.    Past Surgical History: Patient  has a past surgical history that includes Hysterectomy and tracheostomy and peg tube insertion (N/A, 3/20/2019).    Social History: Patient  reports that she has quit smoking. Her smoking use included electronic cigarette and cigarettes. She smoked an average of 1 pack per day. She has never used smokeless tobacco. She reports that she does not currently use alcohol. She reports that she does not use drugs.    Family History:  Patient's family history has been reviewed and found to be noncontributory.     Allergies:      Patient has no known allergies.    Home Medications:    Prior to Admission Medications       Prescriptions Last Dose Informant Patient Reported? Taking?    ALPRAZolam (XANAX) 0.5 MG tablet   No No    Administer 1 tablet per G tube 2 (Two) Times a Day.    baclofen (LIORESAL) 10 MG tablet   Yes No    Administer 1 tablet per G tube Every 8 (Eight) Hours.    budesonide (PULMICORT) 0.5 MG/2ML nebulizer solution   No No    Take 2 mL by nebulization 2 (Two) Times a Day.    carvedilol (COREG) 12.5 MG tablet   Yes No    Administer 1 tablet per G tube 2 (Two) Times a Day.    cetirizine (zyrTEC) 10 MG tablet   Yes No    Administer 1 tablet per G tube Daily.    docusate sodium (COLACE) 50 mg/5 mL liquid   Yes No     Take 30 mL by mouth 2 (Two) Times a Day.    glycopyrrolate (ROBINUL) 2 MG tablet   Yes No    Take 1 tablet by mouth 3 (Three) Times a Day.    hyoscyamine (ANASPAZ,LEVSIN) 0.125 MG tablet   Yes No    Administer 1 tablet per G tube 3 (Three) Times a Day.    ipratropium-albuterol (DUO-NEB) 0.5-2.5 mg/3 ml nebulizer   No No    Take 3 mL by nebulization 4 (Four) Times a Day. Takes at [id number]    montelukast (SINGULAIR) 10 MG tablet   Yes No    Administer 1 tablet per G tube Every Night.    Multiple Vitamins-Minerals (MULTIVITAMIN WITH IRON-MINERALS) liquid   Yes No    Take 15 mL by mouth Daily.    omeprazole (priLOSEC) 20 MG capsule   Yes No    2 capsules 2 (Two) Times a Day.    Petrolatum-Zinc Oxide 49-15 % ointment   Yes No    Apply 1 application topically 2 (Two) Times a Day As Needed (Excoriation).    polyethylene glycol (MIRALAX) 17 g packet   No No    Take 17 g by mouth 2 (Two) Times a Day.    Scopolamine 1 MG/3DAYS patch   Yes No    Place 1 patch on the skin as directed by provider Every 72 (Seventy-Two) Hours.    senna 8.6 MG tablet   Yes No    2 tablets by Per PEG Tube route Every Night.          ED Medications:    Medications   sodium chloride 0.9 % flush 10 mL (has no administration in time range)   sodium chloride 0.9 % bolus 2,136 mL (0 mL Intravenous Stopped 12/22/23 1201)   piperacillin-tazobactam (ZOSYN) 3.375 g in iso-osmotic dextrose 50 ml (premix) (0 g Intravenous Stopped 12/22/23 1011)   acetaminophen (TYLENOL) 160 MG/5ML oral solution 650 mg (650 mg Per G Tube Given 12/22/23 0839)   lidocaine (XYLOCAINE) 1 % injection  - ADS Override Pull (20 mL  Given 12/22/23 1002)   vancomycin IVPB 1500 mg in 0.9% NaCl (Premix) 500 mL (0 mg Intravenous Stopped 12/22/23 1457)     Vital Signs:  Temp:  [102.3 °F (39.1 °C)] 102.3 °F (39.1 °C)  Heart Rate:  [] 98  Resp:  [32] 32  BP: ()/(43-74) 120/74        12/22/23  0818   Weight: 71.2 kg (157 lb)     Body mass index is 26.94  "kg/m².    Physical Exam:     Most recent vital Signs: /74   Pulse 98   Temp (!) 102.3 °F (39.1 °C)   Resp (!) 32   Ht 162.6 cm (64.02\")   Wt 71.2 kg (157 lb)   SpO2 100%   BMI 26.94 kg/m²     Physical Exam  Vitals and nursing note reviewed.   Constitutional:       General: She is in acute distress.      Appearance: She is ill-appearing.      Comments: Appears awake and alert but nonverbal   HENT:      Head: Normocephalic and atraumatic.      Right Ear: External ear normal.      Left Ear: External ear normal.      Mouth/Throat:      Mouth: Mucous membranes are moist.   Eyes:      Extraocular Movements: Extraocular movements intact.      Conjunctiva/sclera: Conjunctivae normal.      Pupils: Pupils are equal, round, and reactive to light.   Neck:      Comments: Tracheostomy noted.  Cardiovascular:      Rate and Rhythm: Normal rate and regular rhythm.      Pulses: Normal pulses.      Heart sounds: Normal heart sounds.   Pulmonary:      Effort: Tachypnea, accessory muscle usage, prolonged expiration and respiratory distress present.      Breath sounds: Decreased air movement present. No wheezing.      Comments: Bilateral crackles heard. Chest tube (pigtail) in the right anterior chest noted.   Abdominal:      General: Bowel sounds are normal. There is no distension.      Palpations: Abdomen is soft.      Tenderness: There is no abdominal tenderness.      Comments: PEG tube noted in place.   Musculoskeletal:         General: Deformity present.      Cervical back: Neck supple.      Right lower leg: No edema.      Left lower leg: No edema.   Skin:     General: Skin is warm and dry.      Findings: No rash.   Neurological:      Mental Status: She is alert.      Comments: Nonverbal.  With chronic intellectual disability.  Severe upper and lower extremities contractures and spasticity noted.  Otherwise nonfocal.   Psychiatric:         Mood and Affect: Mood normal.         Behavior: Behavior normal.         Thought " Content: Thought content normal.         Laboratory data:    I have reviewed the labs done in the emergency room.    Results from last 7 days   Lab Units 12/22/23  0820   SODIUM mmol/L 137   POTASSIUM mmol/L 4.5   CHLORIDE mmol/L 99   CO2 mmol/L 25.8   BUN mg/dL 19   CREATININE mg/dL 0.59   CALCIUM mg/dL 9.4   BILIRUBIN mg/dL 0.5   ALK PHOS U/L 83   ALT (SGPT) U/L 17   AST (SGOT) U/L 22   GLUCOSE mg/dL 165*     Results from last 7 days   Lab Units 12/22/23  0820   WBC 10*3/mm3 18.48*   HEMOGLOBIN g/dL 11.3*   HEMATOCRIT % 34.0   PLATELETS 10*3/mm3 276                         Results from last 7 days   Lab Units 12/22/23  0846   PH, ARTERIAL pH units 7.468*   PO2 ART mm Hg 130.0*   PCO2, ARTERIAL mm Hg 39.7   HCO3 ART mmol/L 28.7*     Results from last 7 days   Lab Units 12/22/23  1237   COLOR UA  Orange*   GLUCOSE UA  Negative   KETONES UA  Negative   BLOOD UA  Large (3+)*   LEUKOCYTES UA  Moderate (2+)*   PH, URINE  6.5   BILIRUBIN UA  Small (1+)*   UROBILINOGEN UA  0.2 E.U./dL   RBC UA /HPF 21-50*   WBC UA /HPF 11-20*       Pain Management Panel          Latest Ref Rng & Units 3/10/2019   Pain Management Panel   Amphetamine, Urine Qual Negative Negative    Barbiturates Screen, Urine Negative Negative    Benzodiazepine Screen, Urine Negative Negative    Buprenorphine, Screen, Urine Negative Positive    Cocaine Screen, Urine Negative Negative    Methadone Screen , Urine Negative Negative    Methamphetamine, Ur Negative Negative        EKG:      EKG performed in the ED was reviewed and was with poor quality but showed sinus tachycardia, heart rate 104, frequent PVCs, nonspecific ST/T wave changes.      Radiology:    XR Chest 1 View    Result Date: 12/22/2023  PROCEDURE: XR CHEST 1 VW-  HISTORY: post chest tube placement.  COMPARISON: 5/5/2023  FINDINGS:  Portable view of the chest demonstrates bibasilar atelectasis. Underlying pneumonia right lung base is not excluded.  There is been interval placement of right  pigtail pleural catheter in the right apical region. Right pneumothorax has resolved. Tracheostomy tube is stable. The mediastinum is unremarkable.  The heart size is normal.      Interval resolution of right apical pneumothorax following right pleural catheter placement  This report was signed and finalized on 12/22/2023 10:34 AM by Jamie Jung MD.      XR Chest 1 View    Result Date: 12/22/2023  PROCEDURE: XR CHEST 1 VW-  HISTORY: Simple Sepsis Protocol  COMPARISON: 5/5/2023  FINDINGS:  Portable view of the chest demonstrates bibasilar opacities likely atelectasis. Right basilar pneumonia is not excluded.  Moderate size right pneumothorax is present new since prior exam. Tracheostomy tube is unremarkable. The mediastinum is unremarkable.  The heart size is normal.      1. Moderate size right pneumothorax approximately 40% 2. Bibasilar atelectasis. Underlying pneumonia right lung base not excluded given asymmetry of opacification  Findings regarding pneumothorax called to the emergency department at 8:38 a.m.    This report was signed and finalized on 12/22/2023 8:39 AM by Jamie Jung MD.       Assessment:    Acute on chronic hypoxic respiratory failure secondary to #2 and #4, POA  Bilateral pneumonia, unable to classify further, POA  Sepsis, secondary to #2, POA  Right-sided pneumothorax, spontaneous, status post chest tube, POA  Acute urinary tract infection, POA  History of cardiac arrest/anoxic brain injury.  Status post tracheostomy and PEG tube placement.  Essential hypertension.    Plan:      Patient is admitted to ICU for further management and treatment.    Respiratory failure with hypoxia  Bilateral pneumonia  -Continue supplemental oxygen to keep saturation above 90%, titrate down as able to.  -Patient received IV fluids per sepsis protocol while in the ED  -Continue with IV fluids for maintenance  -Cover patient with vancomycin and Zosyn  -MRSA screen ordered  -Follow-up on blood cultures, sputum  cultures and urine cultures  -Patient with history of Pseudomonas on sputum cultures per her previous admission  -Monitor with ABG and chest x-ray as indicated    Right-sided pneumothorax  -Status post chest tube (pigtail) on suction -20  -Dr. Pandey with general surgery consulted, appreciate recommendations.  -Continue treatment as per above  -Monitor with chest x-ray    Acute UTI  -Continue with Zosyn  -Follow-up on urine cultures  -History of ESBL Klebsiella pneumonia on previous urine cultures    -Continue home meds as warranted.  -Further orders as indicated per clinical course.  -I called and discussed with her daughter Liliya Epperson, confirmed CODE STATUS is full.  I discussed the case with the daughter in details and discussed management plan. Daughter was agreeable on admitting patient here until she gets admitted in [place].  -Patient on the wait list to be transferred to [facility] when bed becomes available.    Risk Assessment: High  DVT Prophylaxis: Lovenox prophylaxis (benefit> risk)  Code Status: Full  Diet: N.p.o., tube feeding    Advance Care Planning   ACP discussion was held with the patient during this visit. Patient does not have an advance directive, information provided.       Gisela Lagunas MD  12/22/23  15:32 EST    Dictated utilizing Dragon dictation.

## 2023-12-23 ENCOUNTER — APPOINTMENT (OUTPATIENT)
Dept: GENERAL RADIOLOGY | Facility: HOSPITAL | Age: 63
End: 2023-12-23
Payer: MEDICAID

## 2023-12-23 LAB
ANION GAP SERPL CALCULATED.3IONS-SCNC: 8.3 MMOL/L (ref 5–15)
BACTERIA BLD CULT: ABNORMAL
BOTTLE TYPE: ABNORMAL
BUN SERPL-MCNC: 11 MG/DL (ref 8–23)
BUN/CREAT SERPL: 23.9 (ref 7–25)
CALCIUM SPEC-SCNC: 8.7 MG/DL (ref 8.6–10.5)
CHLORIDE SERPL-SCNC: 106 MMOL/L (ref 98–107)
CO2 SERPL-SCNC: 23.7 MMOL/L (ref 22–29)
CREAT SERPL-MCNC: 0.46 MG/DL (ref 0.57–1)
DEPRECATED RDW RBC AUTO: 48.8 FL (ref 37–54)
EGFRCR SERPLBLD CKD-EPI 2021: 106.4 ML/MIN/1.73
ERYTHROCYTE [DISTWIDTH] IN BLOOD BY AUTOMATED COUNT: 13.2 % (ref 12.3–15.4)
GLUCOSE BLDC GLUCOMTR-MCNC: 146 MG/DL (ref 70–130)
GLUCOSE BLDC GLUCOMTR-MCNC: 151 MG/DL (ref 70–130)
GLUCOSE BLDC GLUCOMTR-MCNC: 167 MG/DL (ref 70–130)
GLUCOSE BLDC GLUCOMTR-MCNC: 190 MG/DL (ref 70–130)
GLUCOSE SERPL-MCNC: 144 MG/DL (ref 65–99)
HCT VFR BLD AUTO: 30.4 % (ref 34–46.6)
HGB BLD-MCNC: 9.4 G/DL (ref 12–15.9)
MCH RBC QN AUTO: 31 PG (ref 26.6–33)
MCHC RBC AUTO-ENTMCNC: 30.9 G/DL (ref 31.5–35.7)
MCV RBC AUTO: 100.3 FL (ref 79–97)
MRSA DNA SPEC QL NAA+PROBE: NORMAL
PLATELET # BLD AUTO: 207 10*3/MM3 (ref 140–450)
PMV BLD AUTO: 12.3 FL (ref 6–12)
POTASSIUM SERPL-SCNC: 3.9 MMOL/L (ref 3.5–5.2)
PROCALCITONIN SERPL-MCNC: 0.4 NG/ML (ref 0–0.25)
RBC # BLD AUTO: 3.03 10*6/MM3 (ref 3.77–5.28)
SODIUM SERPL-SCNC: 138 MMOL/L (ref 136–145)
VANCOMYCIN SERPL-MCNC: 16.5 MCG/ML (ref 5–40)
WBC NRBC COR # BLD AUTO: 21.72 10*3/MM3 (ref 3.4–10.8)

## 2023-12-23 PROCEDURE — 71045 X-RAY EXAM CHEST 1 VIEW: CPT

## 2023-12-23 PROCEDURE — 94664 DEMO&/EVAL PT USE INHALER: CPT

## 2023-12-23 PROCEDURE — 25010000002 ENOXAPARIN PER 10 MG: Performed by: FAMILY MEDICINE

## 2023-12-23 PROCEDURE — 82948 REAGENT STRIP/BLOOD GLUCOSE: CPT

## 2023-12-23 PROCEDURE — 25810000003 SODIUM CHLORIDE 0.9 % SOLUTION: Performed by: FAMILY MEDICINE

## 2023-12-23 PROCEDURE — 76937 US GUIDE VASCULAR ACCESS: CPT | Performed by: SURGERY

## 2023-12-23 PROCEDURE — 25010000002 VANCOMYCIN 1 G RECONSTITUTED SOLUTION: Performed by: FAMILY MEDICINE

## 2023-12-23 PROCEDURE — 94799 UNLISTED PULMONARY SVC/PX: CPT

## 2023-12-23 PROCEDURE — 25010000002 PIPERACILLIN SOD-TAZOBACTAM PER 1 G: Performed by: FAMILY MEDICINE

## 2023-12-23 PROCEDURE — 36556 INSERT NON-TUNNEL CV CATH: CPT | Performed by: SURGERY

## 2023-12-23 PROCEDURE — 94761 N-INVAS EAR/PLS OXIMETRY MLT: CPT

## 2023-12-23 PROCEDURE — 84145 PROCALCITONIN (PCT): CPT | Performed by: FAMILY MEDICINE

## 2023-12-23 PROCEDURE — 85027 COMPLETE CBC AUTOMATED: CPT | Performed by: FAMILY MEDICINE

## 2023-12-23 PROCEDURE — 80202 ASSAY OF VANCOMYCIN: CPT | Performed by: FAMILY MEDICINE

## 2023-12-23 PROCEDURE — 02HV33Z INSERTION OF INFUSION DEVICE INTO SUPERIOR VENA CAVA, PERCUTANEOUS APPROACH: ICD-10-PCS | Performed by: SURGERY

## 2023-12-23 PROCEDURE — 99232 SBSQ HOSP IP/OBS MODERATE 35: CPT | Performed by: FAMILY MEDICINE

## 2023-12-23 PROCEDURE — 80048 BASIC METABOLIC PNL TOTAL CA: CPT | Performed by: FAMILY MEDICINE

## 2023-12-23 PROCEDURE — 99232 SBSQ HOSP IP/OBS MODERATE 35: CPT | Performed by: SURGERY

## 2023-12-23 RX ORDER — SODIUM CHLORIDE 0.9 % (FLUSH) 0.9 %
10 SYRINGE (ML) INJECTION AS NEEDED
Status: DISCONTINUED | OUTPATIENT
Start: 2023-12-23 | End: 2023-12-24 | Stop reason: HOSPADM

## 2023-12-23 RX ORDER — SODIUM CHLORIDE 0.9 % (FLUSH) 0.9 %
10 SYRINGE (ML) INJECTION EVERY 12 HOURS SCHEDULED
Status: DISCONTINUED | OUTPATIENT
Start: 2023-12-23 | End: 2023-12-24 | Stop reason: HOSPADM

## 2023-12-23 RX ORDER — NOREPINEPHRINE BITARTRATE/D5W 8 MG/250ML
.02-.3 PLASTIC BAG, INJECTION (ML) INTRAVENOUS
Status: DISCONTINUED | OUTPATIENT
Start: 2023-12-23 | End: 2023-12-24 | Stop reason: HOSPADM

## 2023-12-23 RX ORDER — SODIUM CHLORIDE 9 MG/ML
40 INJECTION, SOLUTION INTRAVENOUS AS NEEDED
Status: DISCONTINUED | OUTPATIENT
Start: 2023-12-23 | End: 2023-12-24 | Stop reason: HOSPADM

## 2023-12-23 RX ORDER — SODIUM CHLORIDE 0.9 % (FLUSH) 0.9 %
20 SYRINGE (ML) INJECTION AS NEEDED
Status: DISCONTINUED | OUTPATIENT
Start: 2023-12-23 | End: 2023-12-24 | Stop reason: HOSPADM

## 2023-12-23 RX ADMIN — ENOXAPARIN SODIUM 40 MG: 100 INJECTION SUBCUTANEOUS at 08:43

## 2023-12-23 RX ADMIN — PIPERACILLIN SODIUM AND TAZOBACTAM SODIUM 3.38 G: 3; .375 INJECTION, SOLUTION INTRAVENOUS at 11:09

## 2023-12-23 RX ADMIN — SODIUM CHLORIDE 100 ML/HR: 9 INJECTION, SOLUTION INTRAVENOUS at 04:29

## 2023-12-23 RX ADMIN — IPRATROPIUM BROMIDE AND ALBUTEROL SULFATE 3 ML: .5; 3 SOLUTION RESPIRATORY (INHALATION) at 06:57

## 2023-12-23 RX ADMIN — SODIUM CHLORIDE 1000 MG: 900 INJECTION, SOLUTION INTRAVENOUS at 21:53

## 2023-12-23 RX ADMIN — GLYCOPYRROLATE 2 MG: 1 TABLET ORAL at 21:48

## 2023-12-23 RX ADMIN — NOREPINEPHRINE BITARTRATE 0.02 MCG/KG/MIN: 8 INJECTION, SOLUTION INTRAVENOUS at 11:34

## 2023-12-23 RX ADMIN — GLYCOPYRROLATE 2 MG: 1 TABLET ORAL at 08:44

## 2023-12-23 RX ADMIN — PIPERACILLIN SODIUM AND TAZOBACTAM SODIUM 3.38 G: 3; .375 INJECTION, SOLUTION INTRAVENOUS at 03:04

## 2023-12-23 RX ADMIN — BUDESONIDE 0.5 MG: 0.5 INHALANT RESPIRATORY (INHALATION) at 19:04

## 2023-12-23 RX ADMIN — BACLOFEN 10 MG: 10 TABLET ORAL at 09:53

## 2023-12-23 RX ADMIN — CARVEDILOL 12.5 MG: 12.5 TABLET, FILM COATED ORAL at 21:48

## 2023-12-23 RX ADMIN — ALPRAZOLAM 0.5 MG: 0.5 TABLET ORAL at 08:44

## 2023-12-23 RX ADMIN — Medication 10 ML: at 21:47

## 2023-12-23 RX ADMIN — BACLOFEN 10 MG: 10 TABLET ORAL at 17:30

## 2023-12-23 RX ADMIN — LANSOPRAZOLE 30 MG: 30 TABLET, ORALLY DISINTEGRATING ORAL at 07:04

## 2023-12-23 RX ADMIN — BACLOFEN 10 MG: 10 TABLET ORAL at 03:04

## 2023-12-23 RX ADMIN — GLYCOPYRROLATE 2 MG: 1 TABLET ORAL at 16:58

## 2023-12-23 RX ADMIN — IPRATROPIUM BROMIDE AND ALBUTEROL SULFATE 3 ML: .5; 3 SOLUTION RESPIRATORY (INHALATION) at 19:04

## 2023-12-23 RX ADMIN — SODIUM CHLORIDE 1000 MG: 900 INJECTION, SOLUTION INTRAVENOUS at 09:53

## 2023-12-23 RX ADMIN — ALPRAZOLAM 0.5 MG: 0.5 TABLET ORAL at 21:48

## 2023-12-23 RX ADMIN — CARVEDILOL 12.5 MG: 12.5 TABLET, FILM COATED ORAL at 08:44

## 2023-12-23 RX ADMIN — MONTELUKAST 10 MG: 10 TABLET, FILM COATED ORAL at 21:48

## 2023-12-23 RX ADMIN — PIPERACILLIN SODIUM AND TAZOBACTAM SODIUM 4.5 G: 4; .5 INJECTION, SOLUTION INTRAVENOUS at 18:43

## 2023-12-23 RX ADMIN — Medication 10 ML: at 21:48

## 2023-12-23 RX ADMIN — BUDESONIDE 0.5 MG: 0.5 INHALANT RESPIRATORY (INHALATION) at 06:57

## 2023-12-23 RX ADMIN — CETIRIZINE HYDROCHLORIDE 10 MG: 10 TABLET, FILM COATED ORAL at 08:44

## 2023-12-23 RX ADMIN — IPRATROPIUM BROMIDE AND ALBUTEROL SULFATE 3 ML: .5; 3 SOLUTION RESPIRATORY (INHALATION) at 13:33

## 2023-12-23 NOTE — PROGRESS NOTES
LOS: 1 day   Patient Care Team:  Ranjeet Pina MD as PCP - General (Family Medicine)  Noemy Garcia PA-C as PCP - Family Medicine (Long Term Care Acute)    Chief Complaint: Follow-up pneumothorax    Subjective     Interval History:     The patient remains in the ICU, with worsening grandmothers related to sepsis secondary to bilateral pneumonia and a UTI.  She was noted to have copious secretions from her tracheostomy requiring frequent suctioning.  Her white blood cell count is trending upward.  She was hypotensive through the night, and pressors have been initiated.  She remains nonverbal, which is her baseline.  Chest x-ray this morning demonstrated some questionable kinking of the chest tube with new development of a significant right pleural effusion.      Review of Systems:    Review of systems could not be obtained due to  patient nonverbal.    Objective     Vital Signs  Temp:  [97.3 °F (36.3 °C)-99.5 °F (37.5 °C)] 99.1 °F (37.3 °C)  Heart Rate:  [] 98  Resp:  [22-32] 27  BP: ()/() 110/58    Physical Exam: Patient seen and examined at the bedside on 12/23/2023        General Appearance:  Lethargic, nonverbal, appears ill   Head:    Normocephalic, without obvious abnormality, atraumatic   Eyes:            Lids and lashes normal, conjunctivae and sclerae normal, no icterus   Ears:    Ears appear intact with no abnormalities noted   Neck:  Tracheostomy in place with thick secretions with some blood singeing in the suction.     Lungs:   Noted tachypnea with decreased air movement, and bilateral crackles.  Right anterior pigtail catheter chest tube in place to -20 cm H2O continuous suction without air leak noted.  Dressing removed, and pigtail catheter adjusted to ensure that there was no kinking of the tube.    Heart:    Regular rhythm and normal rate   Abdomen:     Soft, no apparent tenderness, non-distended, no guarding, no rebound   tenderness. PEG in place and appears appropriate    Genitalia:    Deferred   Extremities:   Contracted extremities   Pulses:   Pulses palpable and equal bilaterally   Skin:   No bleeding, bruising or rash   Neurologic:   Nonverbal      Results Review:    I reviewed the patient's new clinical results.    Results from last 7 days   Lab Units 12/23/23  0628 12/22/23  0820   SODIUM mmol/L 138 137   POTASSIUM mmol/L 3.9 4.5   CHLORIDE mmol/L 106 99   CO2 mmol/L 23.7 25.8   BUN mg/dL 11 19   CREATININE mg/dL 0.46* 0.59   CALCIUM mg/dL 8.7 9.4   BILIRUBIN mg/dL  --  0.5   ALK PHOS U/L  --  83   ALT (SGPT) U/L  --  17   AST (SGOT) U/L  --  22   GLUCOSE mg/dL 144* 165*       Results from last 7 days   Lab Units 12/23/23  0430 12/22/23  0820   WBC 10*3/mm3 21.72* 18.48*   HEMOGLOBIN g/dL 9.4* 11.3*   HEMATOCRIT % 30.4* 34.0   PLATELETS 10*3/mm3 207 276     Narrative & Impression   PROCEDURE: XR CHEST 1 VW-     INDICATION:  Pneumothorax/ pneumonia follow-up; J18.9-Pneumonia,  unspecified organism; A41.9-Sepsis, unspecified organism; J93.11-Primary  spontaneous pneumothorax; N39.0-Urinary tract infection, site not  specified     FINDINGS:  A portable view of the chest was obtained.  Comparison is  made to a prior exam dated 12/22/2023.   A tracheostomy tube is  unchanged. A small caliber chest tube is present at the right apex.  There may be a kink in the tubing proximal to the pigtail. Heart size is  stable. There has been interval increase in size of the right pleural  effusion and there is worsening right lung opacity. No pneumothorax.     IMPRESSION:  Worsening right pleural effusion and right lung opacity.  There is a curling of the right chest tube proximal to the pigtail  portion. A kink is not excluded. Please correlate with drainage.        This report was signed and finalized on 12/23/2023 10:42 AM by Maritza Casper MD.          Narrative & Impression   PROCEDURE: XR CHEST 1 VW-     INDICATION:  re-eval pigtail position; J18.9-Pneumonia, unspecified  organism;  A41.9-Sepsis, unspecified organism; J93.11-Primary spontaneous  pneumothorax; N39.0-Urinary tract infection, site not specified     FINDINGS:  A portable view of the chest was obtained.  Comparison is  made to a prior exam dated earlier the same day as well as a prior dated  12/22/2023.   The tracheostomy tube is unchanged. A right chest tube is  again noted. A curl seen previously along the midportion of the tube is  no longer present. The heart is enlarged. A right pleural effusion has  increased in size rather significantly since yesterday. It may be  partially loculated. There is worsening right greater than left lung  opacities. No pneumothorax.     IMPRESSION:  1. Right chest tube has been uncurled since earlier today.  2. Rather significant increase in size in a right pleural effusion since  yesterday. This may be partially loculated.  3. Worsening airspace disease bilaterally.        This report was signed and finalized on 12/23/2023 3:16 PM by Maritza Casper MD.          Medication Review:   Scheduled Meds:ALPRAZolam, 0.5 mg, Per G Tube, BID  baclofen, 10 mg, Per G Tube, Q8H  budesonide, 0.5 mg, Nebulization, BID - RT  carvedilol, 12.5 mg, Per G Tube, BID  cetirizine, 10 mg, Per G Tube, Daily  docusate sodium, 300 mg, Per G Tube, BID  enoxaparin, 40 mg, Subcutaneous, Daily  glycopyrrolate, 2 mg, Per G Tube, TID  ipratropium-albuterol, 3 mL, Nebulization, 4x Daily - RT  lansoprazole, 30 mg, Per G Tube, Q AM  montelukast, 10 mg, Per G Tube, Nightly  piperacillin-tazobactam, 4.5 g, Intravenous, Q8H  polyethylene glycol, 17 g, Per G Tube, BID  Scopolamine, 1 patch, Transdermal, Q72H  sennosides, 10 mL, Per PEG Tube, Nightly  sodium chloride, 10 mL, Intravenous, Q12H  sodium chloride, 10 mL, Intravenous, Q12H  sodium chloride, 10 mL, Intravenous, Q12H  sodium chloride, 10 mL, Intravenous, Q12H  vancomycin, 1,000 mg, Intravenous, Q12H      Continuous Infusions:norepinephrine, 0.02-0.3 mcg/kg/min, Last Rate: 0.06  mcg/kg/min (12/23/23 0995)  Pharmacy to Dose enoxaparin (LOVENOX),   Pharmacy to dose vancomycin,   Pharmacy to Dose Zosyn,   sodium chloride, 100 mL/hr, Last Rate: 100 mL/hr (12/23/23 4479)      PRN Meds:.  acetaminophen **OR** acetaminophen **OR** acetaminophen    albuterol    LORazepam    Magnesium Standard Dose Replacement - Follow Nurse / BPA Driven Protocol    melatonin    nitroglycerin    ondansetron    Pharmacy to Dose enoxaparin (LOVENOX)    Pharmacy to dose vancomycin    Pharmacy to Dose Zosyn    Potassium Replacement - Follow Nurse / BPA Driven Protocol    sodium chloride    sodium chloride    sodium chloride    sodium chloride    sodium chloride    sodium chloride      Assessment & Plan       Pneumonia    Pneumothorax, right      Ms. Vargas is a 63-year-old female patient with multiple chronic problems as described above, admitted with hypoxic respiratory failure secondary to bilateral pneumonia with evidence of a spontaneous right-sided pneumothorax in the setting of underlying COPD.  She also appears to have an acute urinary tract infection with a history of ESBL Klebsiella pneumoniae on previous urine cultures.  At this time, she has evidence of worsening sepsis with 1 positive blood culture.   Placement of a central line was requested by the primary service, and this was subsequently accomplished without difficulty. Please see the separately dictated procedure note for complete details.  Regarding the patient's right pneumothorax, I personally reviewed the morning chest x-ray, and I have adjusted the pigtail catheter.  Repeat chest x-ray shows improved positioning of the tube without kinking.  Again noted is a large right pleural effusion that is new since yesterday and appears to be partially loculated.  There is no evidence of pneumothorax.  The pigtail catheter was initially placed for a spontaneous pneumothorax at which time there was no evidence of significant effusion.  Because of its apical  position, there has been no significant drainage.  I discussed with the nursing staff that with turned to the patient's right, this is likely to facilitate drainage via the pigtail catheter.  I am hopeful that we can avoid the need to place a larger chest tube.  Given that the patient's oxygen saturations are in the upper 90s, and that her respiratory status is relatively stable compared to yesterday's assessment, I would not change out her tube at this time.  Plan for repeat chest x-ray tomorrow for reassessment.    Nadira Pandey MD  12/23/23  13:23 EST

## 2023-12-23 NOTE — PROCEDURES
Central Venous Catheter Insertion Procedure Note     Patient: Viji Vargas     : 1960     Date: 2023     Surgeon: Nadira Pandey MD, FACS     Procedure(s) : right femoral central venous catheter insertion with ultrasound guidance     Indications: Severe sepsis, bilateral pneumonia, need for pressors     Procedure Details :   Informed consent was obtained for the procedure from the patient's daughter.  Risks of pneumothorax, hemothorax, vascular injury, infection, cardiac arrhythmia, and adverse drug reaction were discussed.  A timeout was performed before beginning the procedure.  Maximum sterile technique was used including antiseptics, cap, gloves, gown, hand hygiene, mask, and sheet.     Due to the patient's chromic contractures and lack of neck mobility, a femoral approach was chosen. The patient was placed into the supine position and the right groin was prepped and draped in the usual sterile fashion.  Tube feeds were placed on hold for this procedure.  Under sterile conditions the area of the right femoral vein was assessed with ultrasound for line placement.  It was felt to be adequate.  Under ultrasound visualization, the skin and soft tissue overlying the location of the right femoral vein was anesthetized with lidocaine.  Once this was done, the right femoral vein was punctured under live ultrasound visualization an 18-gauge needle.  There was return of dark red, nonpulsatile venous blood.  A guidewire was then inserted into the vein without resistance.  The wire was advanced without resistance.  The position of the wire within the femoral vein was again confirmed with ultrasound views in the transverse, and sagittal plane.  Once this was done, a small nick was made in the skin with an 11 blade knife, and dilation was performed over the guidewire using the provided dilators in the central venous catheter kit. An 8 [place] 20 cm long central venous catheter was then inserted into the  right femoral vein over the guide wire.  Once the line was fully advanced, the guidewire was removed.  All 3 lumens were then accessed sterilely and flushed with saline.  Blood return was noted to be excellent and there was no resistance to flushing.  Each of these lumens was occluded and capped off with the provided caps from the sterile central venous catheter kit.  The catheter was sutured into place.  A chlorhexidine impregnated Biopatch was placed over the catheter entrance site and covered with a sterile Tegaderm.       Findings:  Overall, the procedure was well-tolerated by the patient.  There were no substantial changes to the patient's vital signs.  The catheter was flushed with a total of 30 mL of normal saline.  No immediate complications were identified.       Recommendations:  The line was deemed appropriate for use and the nursing staff was notified.

## 2023-12-23 NOTE — PLAN OF CARE
Goal Outcome Evaluation:                 Patient alert, opening eyes spontaneously, O2 > 92% on T piece for her tracheostomy, sinus tachycardia, purulent/blood streaked secretions with trache suctioning, tachypneic respirations, and patient has slept in-between care throughout the shift.

## 2023-12-23 NOTE — PHARMACY RECOMMENDATION
"Pharmacy Consult - Vancomycin Dosing    Pharmacy was consulted to dose vancomycin for  Viji Vargas, a 63 y.o. female  162.6 cm (64.02\") 69.7 kg (153 lb 10.6 oz)    Indication: pneumonia, sepsis  Consulting Provider: Dr. Lagunas    Goal AUC: 400-600 mg/L*hr.      Labs  Results from last 7 days   Lab Units 12/23/23  0628 12/23/23  0430 12/22/23  0820   WBC 10*3/mm3  --  21.72* 18.48*   CREATININE mg/dL 0.46*  --  0.59      Estimated Creatinine Clearance: 116.8 mL/min (A) (by C-G formula based on SCr of 0.46 mg/dL (L)).  Temp Readings from Last 1 Encounters:   12/23/23 98.5 °F (36.9 °C) (Temporal)       Results from last 7 days   Lab Units 12/23/23  0628   VANCOMYCIN RM mcg/mL 16.50               Other Antimicrobials    Zosyn 3.375 g IV every 8 hours    InsightRX AUC Calculation    Current dose: 1000 mg IV every 12 hours   Predicted Steady State AUC on Current Dose: 447 mg/L*hr    Assessment/Plan    Patient currently receiving vancomycin 1000 mg IV every 12 hours . Random vancomycin level this AM resulted at 16.5 mcg/ml with an expected steady state .  Continue current dose.  Pharmacy will continue to monitor renal function, cultures and sensitivities, and clinical status to adjust regimen as necessary.        Thank you,  Karly Street, BethD, BCPS  12/23/23 10:41 EST    "

## 2023-12-23 NOTE — PAYOR COMM NOTE
"TO:MK  FROM:TYRESE JHA RN PHONE [phone]  IN CLINICALS REF# [id number]    Viji Vargas (63 y.o. Female)       Date of Birth   1960    Social Security Number       Address   [address] [place] [address] [address] [place] [address]    Home Phone   [phone]    MRN   [id number]       Evangelical   Oriental orthodox    Marital Status   Legally                             Admission Date   23    Admission Type   Emergency    Admitting Provider   Gisela Lagunas MD    Attending Provider   Gisela Lagunas MD    Department, Room/Bed   [facility] INTENSIVE CARE, I03/       Discharge Date       Discharge Disposition       Discharge Destination                                 Attending Provider: Gisela Lagunas MD    Allergies: No Known Allergies    Isolation: Contact   Infection: ESBL Klebsiella (23)   Code Status: CPR    Ht: 162.6 cm (64.02\")   Wt: 69.7 kg (153 lb 10.6 oz)    Admission Cmt: None   Principal Problem: Pneumonia [J18.9]                   Active Insurance as of 2023       Primary Coverage       Payor Plan Insurance Group Employer/Plan Group    [place]        Payor Plan Address Payor Plan Phone Number Payor Plan Fax Number Effective Dates    [address] [phone]  3/10/2019 - None Entered    [facility] [id number]         Subscriber Name Subscriber Birth Date Member ID       VIJI VARGAS 1960 [id number]                     Emergency Contacts        (Rel.) Home Phone Work Phone Mobile Phone    KENIA (POA)WILTON (Daughter) [phone] -- --    timothy esposito (Grandchild) [phone] -- [phone]                 History & Physical        Gisela Lagunas MD at 23 [facility]            [facility]   HISTORY AND PHYSICAL      Name:  Viji Vargas   Age:  63 y.o.  Sex:  female  :  1960  MRN:  [id number]   Visit Number:  [id number]  Admission Date:  " 12/22/2023  Date Of Service:  12/22/23  Primary Care Physician:  Ranjeet Pina MD    Chief Complaint:     Respiratory distress    History Of Presenting Illness:      Patient is a 63 years old female nursing home resident, with history of cardiac arrest and anoxic brain injury in March 2019, status post tracheostomy and PEG tube placement, COPD, hypertension was sent from the nursing home facility by EMS with symptoms of respiratory distress.  Patient apparently was saturating at 81% on 4 L trach collar when EMS arrived.  He was also running a fever.  Patient is nonverbal and unable to provide any history.  Patient was noted to have increased secretions and breathing difficulties on arrival to the ER. Patient is nonverbal and no review of systems were possible.     On evaluation evaluation, patient was febrile with a temperature of 102.3 Fahrenheit, heart rate 130s, tachypneic with a respiratory rate of 30s, blood pressure stable, patient on supplemental oxygen with trach collar. Patient was febrile was significant for ABG with a pH of 7.468/pCO2 39.7/pO2 130/HCO3 28.7/saturation 100% on 10 L of supplemental oxygen her labs were significant for WBC of 18.4, hemoglobin 11.3.  Glucose 165.  Pro-Teo and lactate WNL.  CMP otherwise within acceptable range.  COVID and flu negative.  Chest x-ray showed moderate size right pneumothorax approximately 40% along with bibasilar atelectasis. Underlying pneumonia right lung base not excluded given asymmetry of opacification.  Urinalysis was positive for nitrates, leukocytes, WBC 11-20, bacteria 1+.  Chest tube (pigtail) was placed in the right anterior chest by ER provider with interval resolution of right apical pneumothorax following placement of right pleural catheter.  Blood cultures obtained.  Patien received normal saline boluses per sepsis protocol, started on Vanco and Zosyn and received Tylenol.  Case was discussed for transfer for pulmonology service that are not  available currently in this hospital, patient discussed with Joyce Calloway by ER provider and placed on waiting list.  Hospitalist consulted for admission, further management and treatment.    Review Of Systems:    All systems were reviewed and negative except as mentioned in history of presenting illness, assessment and plan.    Past Medical History: Patient  has a past medical history of COPD (chronic obstructive pulmonary disease), Hypertension, Pneumonia, and Stroke.    Past Surgical History: Patient  has a past surgical history that includes Hysterectomy and tracheostomy and peg tube insertion (N/A, 3/20/2019).    Social History: Patient  reports that she has quit smoking. Her smoking use included electronic cigarette and cigarettes. She smoked an average of 1 pack per day. She has never used smokeless tobacco. She reports that she does not currently use alcohol. She reports that she does not use drugs.    Family History:  Patient's family history has been reviewed and found to be noncontributory.     Allergies:      Patient has no known allergies.    Home Medications:    Prior to Admission Medications       Prescriptions Last Dose Informant Patient Reported? Taking?    ALPRAZolam (XANAX) 0.5 MG tablet   No No    Administer 1 tablet per G tube 2 (Two) Times a Day.    baclofen (LIORESAL) 10 MG tablet   Yes No    Administer 1 tablet per G tube Every 8 (Eight) Hours.    budesonide (PULMICORT) 0.5 MG/2ML nebulizer solution   No No    Take 2 mL by nebulization 2 (Two) Times a Day.    carvedilol (COREG) 12.5 MG tablet   Yes No    Administer 1 tablet per G tube 2 (Two) Times a Day.    cetirizine (zyrTEC) 10 MG tablet   Yes No    Administer 1 tablet per G tube Daily.    docusate sodium (COLACE) 50 mg/5 mL liquid   Yes No    Take 30 mL by mouth 2 (Two) Times a Day.    glycopyrrolate (ROBINUL) 2 MG tablet   Yes No    Take 1 tablet by mouth 3 (Three) Times a Day.    hyoscyamine (ANASPAZ,LEVSIN) 0.125 MG tablet   Yes  "No    Administer 1 tablet per G tube 3 (Three) Times a Day.    ipratropium-albuterol (DUO-NEB) 0.5-2.5 mg/3 ml nebulizer   No No    Take 3 mL by nebulization 4 (Four) Times a Day. Takes at [phone]    montelukast (SINGULAIR) 10 MG tablet   Yes No    Administer 1 tablet per G tube Every Night.    Multiple Vitamins-Minerals (MULTIVITAMIN WITH IRON-MINERALS) liquid   Yes No    Take 15 mL by mouth Daily.    omeprazole (priLOSEC) 20 MG capsule   Yes No    2 capsules 2 (Two) Times a Day.    Petrolatum-Zinc Oxide 49-15 % ointment   Yes No    Apply 1 application topically 2 (Two) Times a Day As Needed (Excoriation).    polyethylene glycol (MIRALAX) 17 g packet   No No    Take 17 g by mouth 2 (Two) Times a Day.    Scopolamine 1 MG/3DAYS patch   Yes No    Place 1 patch on the skin as directed by provider Every 72 (Seventy-Two) Hours.    senna 8.6 MG tablet   Yes No    2 tablets by Per PEG Tube route Every Night.          ED Medications:    Medications   sodium chloride 0.9 % flush 10 mL (has no administration in time range)   sodium chloride 0.9 % bolus 2,136 mL (0 mL Intravenous Stopped 12/22/23 1201)   piperacillin-tazobactam (ZOSYN) 3.375 g in iso-osmotic dextrose 50 ml (premix) (0 g Intravenous Stopped 12/22/23 1011)   acetaminophen (TYLENOL) 160 MG/5ML oral solution 650 mg (650 mg Per G Tube Given 12/22/23 0839)   lidocaine (XYLOCAINE) 1 % injection  - ADS Override Pull (20 mL  Given 12/22/23 1002)   vancomycin IVPB 1500 mg in 0.9% NaCl (Premix) 500 mL (0 mg Intravenous Stopped 12/22/23 1457)     Vital Signs:  Temp:  [102.3 °F (39.1 °C)] 102.3 °F (39.1 °C)  Heart Rate:  [] 98  Resp:  [32] 32  BP: ()/(43-74) 120/74        12/22/23  0818   Weight: 71.2 kg (157 lb)     Body mass index is 26.94 kg/m².    Physical Exam:     Most recent vital Signs: /74   Pulse 98   Temp (!) 102.3 °F (39.1 °C)   Resp (!) 32   Ht 162.6 cm (64.02\")   Wt 71.2 kg (157 lb)   SpO2 100%   BMI 26.94 kg/m² "     Physical Exam  Vitals and nursing note reviewed.   Constitutional:       General: She is in acute distress.      Appearance: She is ill-appearing.      Comments: Appears awake and alert but nonverbal   HENT:      Head: Normocephalic and atraumatic.      Right Ear: External ear normal.      Left Ear: External ear normal.      Mouth/Throat:      Mouth: Mucous membranes are moist.   Eyes:      Extraocular Movements: Extraocular movements intact.      Conjunctiva/sclera: Conjunctivae normal.      Pupils: Pupils are equal, round, and reactive to light.   Neck:      Comments: Tracheostomy noted.  Cardiovascular:      Rate and Rhythm: Normal rate and regular rhythm.      Pulses: Normal pulses.      Heart sounds: Normal heart sounds.   Pulmonary:      Effort: Tachypnea, accessory muscle usage, prolonged expiration and respiratory distress present.      Breath sounds: Decreased air movement present. No wheezing.      Comments: Bilateral crackles heard. Chest tube (pigtail) in the right anterior chest noted.   Abdominal:      General: Bowel sounds are normal. There is no distension.      Palpations: Abdomen is soft.      Tenderness: There is no abdominal tenderness.      Comments: PEG tube noted in place.   Musculoskeletal:         General: Deformity present.      Cervical back: Neck supple.      Right lower leg: No edema.      Left lower leg: No edema.   Skin:     General: Skin is warm and dry.      Findings: No rash.   Neurological:      Mental Status: She is alert.      Comments: Nonverbal.  With chronic intellectual disability.  Severe upper and lower extremities contractures and spasticity noted.  Otherwise nonfocal.   Psychiatric:         Mood and Affect: Mood normal.         Behavior: Behavior normal.         Thought Content: Thought content normal.         Laboratory data:    I have reviewed the labs done in the emergency room.    Results from last 7 days   Lab Units 12/22/23  0820   SODIUM mmol/L 137   POTASSIUM  mmol/L 4.5   CHLORIDE mmol/L 99   CO2 mmol/L 25.8   BUN mg/dL 19   CREATININE mg/dL 0.59   CALCIUM mg/dL 9.4   BILIRUBIN mg/dL 0.5   ALK PHOS U/L 83   ALT (SGPT) U/L 17   AST (SGOT) U/L 22   GLUCOSE mg/dL 165*     Results from last 7 days   Lab Units 12/22/23  0820   WBC 10*3/mm3 18.48*   HEMOGLOBIN g/dL 11.3*   HEMATOCRIT % 34.0   PLATELETS 10*3/mm3 276                         Results from last 7 days   Lab Units 12/22/23  0846   PH, ARTERIAL pH units 7.468*   PO2 ART mm Hg 130.0*   PCO2, ARTERIAL mm Hg 39.7   HCO3 ART mmol/L 28.7*     Results from last 7 days   Lab Units 12/22/23  1237   COLOR UA  Orange*   GLUCOSE UA  Negative   KETONES UA  Negative   BLOOD UA  Large (3+)*   LEUKOCYTES UA  Moderate (2+)*   PH, URINE  6.5   BILIRUBIN UA  Small (1+)*   UROBILINOGEN UA  0.2 E.U./dL   RBC UA /HPF 21-50*   WBC UA /HPF 11-20*       Pain Management Panel          Latest Ref Rng & Units 3/10/2019   Pain Management Panel   Amphetamine, Urine Qual Negative Negative    Barbiturates Screen, Urine Negative Negative    Benzodiazepine Screen, Urine Negative Negative    Buprenorphine, Screen, Urine Negative Positive    Cocaine Screen, Urine Negative Negative    Methadone Screen , Urine Negative Negative    Methamphetamine, Ur Negative Negative        EKG:      EKG performed in the ED was reviewed and was with poor quality but showed sinus tachycardia, heart rate 104, frequent PVCs, nonspecific ST/T wave changes.      Radiology:    XR Chest 1 View    Result Date: 12/22/2023  PROCEDURE: XR CHEST 1 VW-  HISTORY: post chest tube placement.  COMPARISON: 5/5/2023  FINDINGS:  Portable view of the chest demonstrates bibasilar atelectasis. Underlying pneumonia right lung base is not excluded.  There is been interval placement of right pigtail pleural catheter in the right apical region. Right pneumothorax has resolved. Tracheostomy tube is stable. The mediastinum is unremarkable.  The heart size is normal.      Interval resolution of  right apical pneumothorax following right pleural catheter placement  This report was signed and finalized on 12/22/2023 10:34 AM by Jamie Jung MD.      XR Chest 1 View    Result Date: 12/22/2023  PROCEDURE: XR CHEST 1 VW-  HISTORY: Simple Sepsis Protocol  COMPARISON: 5/5/2023  FINDINGS:  Portable view of the chest demonstrates bibasilar opacities likely atelectasis. Right basilar pneumonia is not excluded.  Moderate size right pneumothorax is present new since prior exam. Tracheostomy tube is unremarkable. The mediastinum is unremarkable.  The heart size is normal.      1. Moderate size right pneumothorax approximately 40% 2. Bibasilar atelectasis. Underlying pneumonia right lung base not excluded given asymmetry of opacification  Findings regarding pneumothorax called to the emergency department at 8:38 a.m.    This report was signed and finalized on 12/22/2023 8:39 AM by Jamie Jung MD.       Assessment:    Acute on chronic hypoxic respiratory failure secondary to #2 and #4, POA  Bilateral pneumonia, unable to classify further, POA  Sepsis, secondary to #2, POA  Right-sided pneumothorax, spontaneous, status post chest tube, POA  Acute urinary tract infection, POA  History of cardiac arrest/anoxic brain injury.  Status post tracheostomy and PEG tube placement.  Essential hypertension.    Plan:      Patient is admitted to ICU for further management and treatment.    Respiratory failure with hypoxia  Bilateral pneumonia  -Continue supplemental oxygen to keep saturation above 90%, titrate down as able to.  -Patient received IV fluids per sepsis protocol while in the ED  -Continue with IV fluids for maintenance  -Cover patient with vancomycin and Zosyn  -MRSA screen ordered  -Follow-up on blood cultures, sputum cultures and urine cultures  -Patient with history of Pseudomonas on sputum cultures per her previous admission  -Monitor with ABG and chest x-ray as indicated    Right-sided pneumothorax  -Status post  chest tube (pigtail) on suction -20  -Dr. Pandey with general surgery consulted, appreciate recommendations.  -Continue treatment as per above  -Monitor with chest x-ray    Acute UTI  -Continue with Zosyn  -Follow-up on urine cultures  -History of ESBL Klebsiella pneumonia on previous urine cultures    -Continue home meds as warranted.  -Further orders as indicated per clinical course.  -I called and discussed with her daughter Liliya Epperson, confirmed CODE STATUS is full.  I discussed the case with the daughter in details and discussed management plan. Daughter was agreeable on admitting patient here until she gets admitted in [place].  -Patient on the wait list to be transferred to [facility] when bed becomes available.    Risk Assessment: High  DVT Prophylaxis: Lovenox prophylaxis (benefit> risk)  Code Status: Full  Diet: N.p.o., tube feeding    Advance Care Planning  ACP discussion was held with the patient during this visit. Patient does not have an advance directive, information provided.       Gisela Lagunas MD  12/22/23  15:32 EST    Dictated utilizing Dragon dictation.    Electronically signed by Gisela Lagunas MD at 12/22/23 1553          Emergency Department Notes        Kerry Upton RN at 12/22/23 1638          Report given to Nimisha JUAREZ    Electronically signed by Kerry Upton RN at 12/22/23 1638       Kerry Upton RN at 12/22/23 1600          Attempted to call report at this time, RN will call me back shortly.    Electronically signed by Kerry Upton RN at 12/22/23 1600       Nancy Adams, RN at 12/22/23 1435          [facility] called at this time, Dr Mojica will be paged for Dr Gutiérrez    Electronically signed by Nancy Adams RN at 12/22/23 1435       Kerry Upton RN at 12/22/23 1213          Called [facility] for updated medication list      Electronically signed by Kerry Upton RN at 12/22/23 1213       Janak Gutiérrez DO at  "12/22/23 1031        Procedure Orders    1. Chest Tube Insertion [[id number]] ordered by Janak Gutiérrez DO                 Subjective  History of Present Illness:    Chief Complaint: Respiratory distress    History of Present Illness: 63-year-old female nursing home resident, history of stroke pneumonia hypertension COPD, status post trach and PEG tube, chronic contractures, reportedly had sats at 81% on 4 L trach collar, arrives by EMS found to be febrile 102.3 °F on arrival.  Patient nonverbal unable to provide any meaningful history    Nurses Notes reviewed and agree, including vitals, allergies, social history and prior medical history.     REVIEW OF SYSTEMS: All systems reviewed and not pertinent unless noted.  Review of Systems      Positive for: Respiratory issue    Negative for: Unable obtain negative review of systems secondary to patient baseline status    Past Medical History:   Diagnosis Date    COPD (chronic obstructive pulmonary disease)     Hypertension     Pneumonia     Stroke        Allergies:    Patient has no known allergies.      Past Surgical History:   Procedure Laterality Date    HYSTERECTOMY      Partial     TRACHEOSTOMY AND PEG TUBE INSERTION N/A 3/20/2019    Procedure: TRACHEOSTOMY AND PERCUTANEOUS ENDOSCOPIC GASTROSTOMY TUBE INSERTION;  Surgeon: Nadira Pandey MD;  Location: [facility];  Service: General         Social History     Socioeconomic History    Marital status: Legally    Tobacco Use    Smoking status: Former     Packs/day: 1     Types: Electronic Cigarette, Cigarettes    Smokeless tobacco: Never   Vaping Use    Vaping Use: Unknown   Substance and Sexual Activity    Alcohol use: Not Currently     Comment: wine     Drug use: No    Sexual activity: Defer         History reviewed. No pertinent family history.    Objective  Physical Exam:  /74   Pulse 98   Temp (!) 102.3 °F (39.1 °C)   Resp (!) 32   Ht 162.6 cm (64.02\")   Wt 71.2 kg (157 lb)   SpO2 100%   BMI " 26.94 kg/m²      Physical Exam    CONSTITUTIONAL: Well developed, frail chronically ill-appearing 63-year-old female,  in no acute distress.  VITAL SIGNS: per nursing, reviewed and noted  SKIN: exposed skin with no rashes, ulcerations or petechiae  EYES: Grossly EOMI, no icterus  ENT: Normal voice.  Moist mucous membranes   RESPIRATORY:  No increased work of breathing. No retractions.  Trach in place.  Decreased breath sounds throughout.  Mild crackles  CARDIOVASCULAR:   Extremities pink and warm.  Good cap refill to extremities.   GI: Abdomen without distention   MUSCULOSKELETAL: Muscle wasting and contractures.  Heel pads in place.  NEUROLOGIC: Alert, interactive.  Nonverbal   Chest Tube Insertion    Date/Time: 12/22/2023 10:41 AM    Performed by: Janak Gutiérrez DO  Authorized by: Janak Gutiérrez DO    Universal protocol:     Patient identity confirmed:  Provided demographic data  Pre-procedure details:     Skin preparation:  Chlorhexidine    Preparation: Patient was prepped and draped in the usual sterile fashion    Sedation:     Sedation type:  None  Anesthesia:     Anesthesia method:  Local infiltration    Local anesthetic:  Lidocaine 1% w/o epi  Procedure details:     Placement location:  R anterior    Scalpel size:  11    Tube size (Fr):  8    Ultrasound guidance: yes      Tension pneumothorax: no      Tube connected to:  Water seal    Drainage characteristics:  Air only    Suture material:  2-0 silk    Dressing:  Petrolatum-impregnated gauze (Foam tape)  Post-procedure details:     Post-insertion x-ray findings: tube in good position      Procedure completion:  Tolerated well, no immediate complications      ED Course:    Lab Results (last 24 hours)       Procedure Component Value Units Date/Time    POC Glucose Once [[id number]]  (Abnormal) Collected: 12/22/23 0819    Specimen: Blood Updated: 12/22/23 0825     Glucose 173 mg/dL      Comment: Serial Number: [id number]:  [id number]       CBC & Differential  [[id number]]  (Abnormal) Collected: 12/22/23 0820    Specimen: Blood Updated: 12/22/23 0827    Narrative:      The following orders were created for panel order CBC & Differential.  Procedure                               Abnormality         Status                     ---------                               -----------         ------                     CBC Auto Differential[[id number]]        Abnormal            Final result                 Please view results for these tests on the individual orders.    Comprehensive Metabolic Panel [[id number]]  (Abnormal) Collected: 12/22/23 0820    Specimen: Blood Updated: 12/22/23 0849     Glucose 165 mg/dL      BUN 19 mg/dL      Creatinine 0.59 mg/dL      Sodium 137 mmol/L      Potassium 4.5 mmol/L      Comment: Specimen hemolyzed.  Result may be falsely elevated.        Chloride 99 mmol/L      CO2 25.8 mmol/L      Calcium 9.4 mg/dL      Total Protein 7.6 g/dL      Albumin 3.6 g/dL      ALT (SGPT) 17 U/L      Comment: Specimen hemolyzed.  Result may  be falsely elevated.        AST (SGOT) 22 U/L      Comment: Specimen hemolyzed.  Result may be falsely elevated.        Alkaline Phosphatase 83 U/L      Total Bilirubin 0.5 mg/dL      Globulin 4.0 gm/dL      A/G Ratio 0.9 g/dL      BUN/Creatinine Ratio 32.2     Anion Gap 12.2 mmol/L      eGFR 100.2 mL/min/1.73     Narrative:      GFR Normal >60  Chronic Kidney Disease <60  Kidney Failure <15      Lactic Acid, Plasma [[id number]]  (Normal) Collected: 12/22/23 0820    Specimen: Blood Updated: 12/22/23 0843     Lactate 1.3 mmol/L     Procalcitonin [[id number]]  (Normal) Collected: 12/22/23 0820    Specimen: Blood Updated: 12/22/23 0907     Procalcitonin 0.11 ng/mL     Narrative:      As a Marker for Sepsis (Non-Neonates):    1. <0.5 ng/mL represents a low risk of severe sepsis and/or septic shock.  2. >2 ng/mL represents a high risk of severe sepsis and/or septic shock.    As a Marker for Lower Respiratory Tract Infections that require  "antibiotic therapy:    PCT on Admission    Antibiotic Therapy       6-12 Hrs later    >0.5                Strongly Recommended  >0.25 - <0.5        Recommended   0.1 - 0.25          Discouraged              Remeasure/reassess PCT  <0.1                Strongly Discouraged     Remeasure/reassess PCT    As 28 day mortality risk marker: \"Change in Procalcitonin Result\" (>80% or <=80%) if Day 0 (or Day 1) and Day 4 values are available. Refer to http://www.[facility]-pct-calculator.com    Change in PCT <=80%  A decrease of PCT levels below or equal to 80% defines a positive change in PCT test result representing a higher risk for 28-day all-cause mortality of patients diagnosed with severe sepsis for septic shock.    Change in PCT >80%  A decrease of PCT levels of more than 80% defines a negative change in PCT result representing a lower risk for 28-day all-cause mortality of patients diagnosed with severe sepsis or septic shock.       CBC Auto Differential [[id number]]  (Abnormal) Collected: 12/22/23 0820    Specimen: Blood Updated: 12/22/23 0827     WBC 18.48 10*3/mm3      RBC 3.60 10*6/mm3      Hemoglobin 11.3 g/dL      Hematocrit 34.0 %      MCV 94.4 fL      MCH 31.4 pg      MCHC 33.2 g/dL      RDW 13.2 %      RDW-SD 45.3 fl      MPV 11.8 fL      Platelets 276 10*3/mm3      Neutrophil % 80.8 %      Lymphocyte % 10.0 %      Monocyte % 8.4 %      Eosinophil % 0.1 %      Basophil % 0.2 %      Immature Grans % 0.5 %      Neutrophils, Absolute 14.95 10*3/mm3      Lymphocytes, Absolute 1.84 10*3/mm3      Monocytes, Absolute 1.55 10*3/mm3      Eosinophils, Absolute 0.01 10*3/mm3      Basophils, Absolute 0.03 10*3/mm3      Immature Grans, Absolute 0.10 10*3/mm3      nRBC 0.0 /100 WBC     COVID-19 and FLU A/B PCR, 1 HR TAT - Swab, Nasopharynx [[id number]]  (Normal) Collected: 12/22/23 0825    Specimen: Swab from Nasopharynx Updated: 12/22/23 0900     COVID19 Not Detected     Influenza A PCR Not Detected     Influenza B PCR Not " Detected    Narrative:      Fact sheet for providers: https://www.fda.gov/media/319422/download    Fact sheet for patients: https://www.fda.gov/media/646105/download    Test performed by PCR.    Blood Gas, Arterial With Co-Ox [[id number]]  (Abnormal) Collected: 12/22/23 0846    Specimen: Arterial Blood Updated: 12/22/23 0847     Site Right Radial     Gary's Test N/A     pH, Arterial 7.468 pH units      Comment: 83 Value above reference range        pCO2, Arterial 39.7 mm Hg      pO2, Arterial 130.0 mm Hg      Comment: 83 Value above reference range        HCO3, Arterial 28.7 mmol/L      Comment: 83 Value above reference range        Base Excess, Arterial 4.7 mmol/L      Comment: 83 Value above reference range        O2 Saturation, Arterial 100.0 %      Comment: 83 Value above reference range        Hematocrit, Blood Gas 32.8 %      Comment: 84 Value below reference range        Oxyhemoglobin 98.3 %      Methemoglobin 0.20 %      Carboxyhemoglobin 1.4 %      A-a DO2 --     Comment: UNABLE TO CALCULATE        Barometric Pressure for Blood Gas 742 mmHg      Modality Nasal Cannula     Flow Rate 10.0 lpm      Ventilator Mode NA     Collected by DRU     Comment: Meter: [id number]     :  TERESA        pH, Temp Corrected --     pCO2, Temperature Corrected --     pO2, Temperature Corrected --    Blood Culture - Blood, Hand, Left [[id number]] Collected: 12/22/23 0850    Specimen: Blood from Hand, Left Updated: 12/22/23 0910    Blood Culture - Blood, Hand, Right [[id number]] Collected: 12/22/23 0856    Specimen: Blood from Hand, Right Updated: 12/22/23 0910    Urinalysis With Microscopic If Indicated (No Culture) - Urine, Catheter [[id number]]  (Abnormal) Collected: 12/22/23 1237    Specimen: Urine, Catheter Updated: 12/22/23 1247     Color, UA Mathews     Appearance, UA Turbid     pH, UA 6.5     Specific Gravity, UA >=1.030     Glucose, UA Negative     Ketones, UA Negative     Bilirubin, UA Small (1+)     Blood, UA  Large (3+)     Protein, UA >=300 mg/dL (3+)     Leuk Esterase, UA Moderate (2+)     Nitrite, UA Positive     Urobilinogen, UA 0.2 E.U./dL    Urinalysis, Microscopic Only - Urine, Catheter [[id number]]  (Abnormal) Collected: 12/22/23 1237    Specimen: Urine, Catheter Updated: 12/22/23 1257     RBC, UA 21-50 /HPF      WBC, UA 11-20 /HPF      Bacteria, UA 1+ /HPF      Squamous Epithelial Cells, UA 7-12 /HPF      Hyaline Casts, UA None Seen /LPF      Methodology Manual Light Microscopy             XR Chest 1 View    Result Date: 12/22/2023  PROCEDURE: XR CHEST 1 VW-  HISTORY: post chest tube placement.  COMPARISON: 5/5/2023  FINDINGS:  Portable view of the chest demonstrates bibasilar atelectasis. Underlying pneumonia right lung base is not excluded.  There is been interval placement of right pigtail pleural catheter in the right apical region. Right pneumothorax has resolved. Tracheostomy tube is stable. The mediastinum is unremarkable.  The heart size is normal.      Impression: Interval resolution of right apical pneumothorax following right pleural catheter placement  This report was signed and finalized on 12/22/2023 10:34 AM by Jamie Jung MD.      XR Chest 1 View    Result Date: 12/22/2023  PROCEDURE: XR CHEST 1 VW-  HISTORY: Simple Sepsis Protocol  COMPARISON: 5/5/2023  FINDINGS:  Portable view of the chest demonstrates bibasilar opacities likely atelectasis. Right basilar pneumonia is not excluded.  Moderate size right pneumothorax is present new since prior exam. Tracheostomy tube is unremarkable. The mediastinum is unremarkable.  The heart size is normal.      Impression: 1. Moderate size right pneumothorax approximately 40% 2. Bibasilar atelectasis. Underlying pneumonia right lung base not excluded given asymmetry of opacification  Findings regarding pneumothorax called to the emergency department at 8:38 a.m.    This report was signed and finalized on 12/22/2023 8:39 AM by Jamie Jung MD.        [facility]      Amount and/or Complexity of Data Reviewed  Clinical lab tests: reviewed  Tests in the radiology section of CPT®: reviewed  Tests in the medicine section of CPT®: reviewed        ED Course as of 12/22/23 1430   Fri Dec 22, 2023   0845 EKG interpreted by me reveals sinus tachycardia rate of 104, nonspecific T wave changes no ectopy no ischemic change moderate artifact. [PF]      ED Course User Index  [PF] Janak Gutiérrez,        Medical Decision Making:    Initial impression of presenting illness: 63-year-old female nursing home resident, history of stroke pneumonia hypertension COPD, status post trach and PEG tube, chronic contractures, reportedly had sats at 81% on 4 L trach collar, arrives by EMS found to be febrile 102.3 °F on arrival.  Patient nonverbal unable to provide any meaningful history    DDX: includes but is not limited to: Pneumonia sepsis aspiration, among others         Patient arrives blood pressure 119/63 heart rate 135 respiratory rate 32 sats 95% with vitals interpreted by myself.     Pertinent features from physical exam: Frail chronically ill-appearing, contractures and muscle wasting.  Decreased breath sounds with mild crackles throughout.    Initial diagnostic plan: Sepsis workup including lactate procalcitonin blood gas CBC CMP flu and COVID UA chest x-ray    Results from initial plan were reviewed and interpreted by me revealing chest x-ray with 40% pneumothorax and right basilar infiltrate per radiologist    Diagnostic information from other sources: All information obtained from EMS, nursing home data sheet,    Interventions / Re-evaluation: Treated empirically for sepsis with Zosyn, IV antibiotics, Tylenol in regards to fever per G-tube.  In regards to pneumothorax successfully placed 8 Indian pigtail catheter with resolution of pneumothorax on chest x-ray without leak on waterseal.      Consultations/Discussion of results with other physicians: hospital service Dr. Vazquez, and general  surgeon Dr. Pandey.  Dr. Pandey advised she could follow in consultation.    Disposition plan: Admission  -----  1427  Patient was seen and evaluated by Dr. Lagunas hospitalist, requested transfer for pulmonology.   is on regional divert and is not taking waitlist patients.  [facility] is on waitlist patient's only.  Discussed with Dr. Mojica, on waitlist at [facility].  Dr. Lagunas will admit in the interim.  Final diagnoses:   Pneumonia of right lung due to infectious organism, unspecified part of lung   Sepsis, due to unspecified organism, unspecified whether acute organ dysfunction present   Primary spontaneous pneumothorax   Urinary tract infection without hematuria, site unspecified            Janak Gutiérrez, DO  12/22/23 1304       Janak Gutiérrez,   12/22/23 1431       Janak Gutiérrez, DO  12/22/23 1511      Electronically signed by Janak Gutiérrez,  at 12/22/23 1511       Vital Signs (last day)       Date/Time Temp Temp src Pulse Resp BP Patient Position SpO2    12/23/23 0800 -- -- 98 -- -- -- 100    12/23/23 0749 98.5 (36.9) Temporal -- -- -- -- --    12/23/23 0745 -- -- 101 -- 111/57 -- 99    12/23/23 0730 -- -- 105 -- -- -- 100    12/23/23 0715 -- -- 107 -- 135/85 -- 99    12/23/23 0700 -- -- 105 -- 114/66 -- 98    12/23/23 0657 -- -- -- 32 -- -- --    12/23/23 0600 -- -- 105 24 111/64 Lying 98    12/23/23 0545 -- -- 104 -- 103/66 -- 99    12/23/23 0530 -- -- 102 -- 109/60 -- 98    12/23/23 0500 -- -- 105 -- 101/60 -- 97    12/23/23 0445 -- -- 104 -- 95/54 -- 97    12/23/23 0400 97.8 (36.6) Axillary 101 22 91/50 Lying 98    12/23/23 0348 -- -- 104 -- 96/57 -- 98    12/23/23 0345 -- -- 98 -- 75/36 -- 100    12/23/23 0330 -- -- 101 -- 96/53 -- 99    12/23/23 0315 -- -- 107 -- 103/66 -- 96    12/23/23 0300 -- -- 105 -- 103/58 -- 97    12/23/23 0245 -- -- 105 -- 97/61 -- 95    12/23/23 0230 -- -- 105 -- 98/58 -- 97    12/23/23 0215 -- -- 104 -- 93/59 -- 84    12/23/23 0200 -- -- 102 24 95/58  Lying 96    12/23/23 0145 -- -- 105 -- 98/57 -- 89    12/23/23 0130 -- -- 105 -- 99/66 -- 97    12/23/23 0115 -- -- 106 -- 80/58 -- 96    12/23/23 0100 -- -- 111 -- 97/73 -- 100    12/23/23 0045 99.3 (37.4) Axillary 111 -- -- -- 96    12/23/23 0030 -- -- 112 -- -- -- 93    12/23/23 0015 -- -- 111 -- 122/66 -- 92    12/23/23 0000 -- -- 110 26 119/80 Lying 93    12/22/23 2345 -- -- 110 -- 113/72 -- 93    12/22/23 2330 -- -- 110 -- 114/67 -- 94    12/22/23 2315 -- -- 110 -- -- -- 92    12/22/23 2300 99.3 (37.4) Axillary 112 -- 110/79 -- 91    12/22/23 2245 -- -- 117 -- 108/67 -- 88    12/22/23 2230 -- -- 115 -- 103/79 -- 92    12/22/23 2215 -- -- 118 -- 113/68 -- 94    12/22/23 2200 -- -- 114 25 116/61 Lying 98    12/22/23 2145 -- -- 109 -- 114/54 -- 100    12/22/23 2130 -- -- 113 -- 110/80 -- 99    12/22/23 2115 -- -- 107 -- 111/65 -- 99    12/22/23 2100 99.5 (37.5) Axillary 111 -- 111/57 -- 95    12/22/23 2045 -- -- 113 -- 118/64 -- 96    12/22/23 2030 -- -- 116 -- 99/61 -- 94    12/22/23 2015 -- -- 109 -- 103/65 -- 97    12/22/23 2000 -- -- 111 24 98/50 Lying 96    12/22/23 1945 -- -- 113 -- 96/63 -- 95    12/22/23 1930 -- -- 112 -- 142/78 -- 98    12/22/23 1915 -- -- 128 -- 155/91 -- 95    12/22/23 1900 -- -- 123 -- 125/108 -- 100    12/22/23 1845 -- -- 105 28 154/81 -- 100    12/22/23 1830 -- -- 98 -- 110/88 -- 100    12/22/23 1815 -- -- 102 -- 124/72 -- 100    12/22/23 1800 -- -- 92 -- 113/63 -- 100    12/22/23 1745 -- -- 101 -- 126/76 -- 100    12/22/23 1730 -- -- 108 -- 117/100 -- 99    12/22/23 1726 99.1 (37.3) Axillary 112 32 136/75 Lying 99    12/22/23 1711 97.3 (36.3) Temporal -- -- -- -- --    12/22/23 1710 -- -- 107 -- -- -- 99    12/22/23 15:58:10 99.1 (37.3) -- -- -- -- -- --    12/22/23 1546 -- -- 95 -- 125/88 -- 100    12/22/23 1516 -- -- 96 -- 120/62 -- 100    12/22/23 1446 -- -- 87 -- 116/82 -- 100    12/22/23 1415 -- -- 98 -- 120/74 -- 100    12/22/23 1400 -- -- 98 -- 92/72 -- 100    12/22/23 1345  -- -- 94 -- 129/61 -- 100    12/22/23 1330 -- -- 98 -- 124/63 -- 100    12/22/23 1316 -- -- 94 -- -- -- 100    12/22/23 1315 -- -- 92 -- 106/57 -- 100    12/22/23 1301 -- -- 93 -- 101/62 -- 100    12/22/23 1246 -- -- 104 -- 106/57 -- 100    12/22/23 1128 -- -- 97 -- 106/54 -- 100    12/22/23 1058 -- -- 95 -- 90/43 -- 100    12/22/23 0958 -- -- 105 -- 119/67 -- 100    12/22/23 0928 -- -- 106 -- 109/57 -- 98    12/22/23 0915 -- -- 107 -- 93/58 -- --    12/22/23 0900 -- -- 116 -- 106/56 -- 99    12/22/23 0845 -- -- 116 -- 113/55 -- 99    12/22/23 0834 -- -- 115 -- 104/60 -- 93    12/22/23 0821 102.3 (39.1) -- -- -- -- -- --    12/22/23 0818 -- -- 135 32 119/63 -- 95          Current Facility-Administered Medications   Medication Dose Route Frequency Provider Last Rate Last Admin    acetaminophen (TYLENOL) tablet 650 mg  650 mg Per G Tube Q4H PRN Gisela Lagunas MD        Or    acetaminophen (TYLENOL) 160 MG/5ML oral solution 650 mg  650 mg Oral Q4H PRN Gisela Lagunas MD        Or    acetaminophen (TYLENOL) suppository 650 mg  650 mg Rectal Q4H PRN Gisela Lagunas MD        albuterol (PROVENTIL) nebulizer solution 0.083% 2.5 mg/3mL  2.5 mg Nebulization Q6H PRN Gisela Lagunas MD        ALPRAZolam (XANAX) tablet 0.5 mg  0.5 mg Per G Tube BID Gisela Lagunas MD   0.5 mg at 12/23/23 0844    baclofen (LIORESAL) tablet 10 mg  10 mg Per G Tube Q8H Gisela Lagunas MD   10 mg at 12/23/23 0304    budesonide (PULMICORT) nebulizer solution 0.5 mg  0.5 mg Nebulization BID - RT Gisela Lagunas MD   0.5 mg at 12/23/23 0657    carvedilol (COREG) tablet 12.5 mg  12.5 mg Per G Tube BID Gisela Lagunas MD   12.5 mg at 12/23/23 0844    cetirizine (zyrTEC) tablet 10 mg  10 mg Per G Tube Daily Gisela Lagunas MD   10 mg at 12/23/23 0844    docusate sodium (COLACE) liquid 300 mg  300 mg Per G Tube BID Gisela Lagunas MD   300 mg at 12/22/23 2144    Enoxaparin Sodium (LOVENOX) syringe 40 mg  40 mg Subcutaneous Daily Janneth  MD Gisela   40 mg at 12/23/23 0843    glycopyrrolate (ROBINUL) tablet 2 mg  2 mg Per G Tube TID Gisela Lagunas MD   2 mg at 12/23/23 0844    ipratropium-albuterol (DUO-NEB) nebulizer solution 3 mL  3 mL Nebulization 4x Daily - RT Gisela Lagunas MD   3 mL at 12/23/23 0657    lansoprazole (PREVACID SOLUTAB) disintegrating tablet Tablet Delayed Release Dispersible 30 mg  30 mg Per G Tube Q AM Gisela Lagunas MD   30 mg at 12/23/23 0704    LORazepam (ATIVAN) injection 1 mg  1 mg Intravenous Q4H PRN Paul Pandey MD        Magnesium Standard Dose Replacement - Follow Nurse / BPA Driven Protocol   Does not apply PRN Gisela Lagunas MD        melatonin tablet 5 mg  5 mg Per G Tube Nightly PRN Gisela Lagunas MD        montelukast (SINGULAIR) tablet 10 mg  10 mg Per G Tube Nightly Gisela Lagunas MD   10 mg at 12/22/23 2144    nitroglycerin (NITROSTAT) SL tablet 0.4 mg  0.4 mg Sublingual Q5 Min PRN Gisela Lagunas MD        ondansetron (ZOFRAN) injection 4 mg  4 mg Intravenous Q6H PRN Gisela Lagunas MD        Pharmacy to Dose enoxaparin (LOVENOX)   Does not apply Continuous PRN Gisela Lagunas MD        Pharmacy to dose vancomycin   Does not apply Continuous PRN Gisela Lagunas MD        Pharmacy to Dose Zosyn   Does not apply Continuous PRN Gisela Lagunas MD        piperacillin-tazobactam (ZOSYN) 3.375 g in iso-osmotic dextrose 50 ml (premix)  3.375 g Intravenous Q8H Gisela Lagunas MD 12.5 mL/hr at 12/23/23 0304 3.375 g at 12/23/23 0304    polyethylene glycol (MIRALAX) packet 17 g  17 g Per G Tube BID Gisela Lagunas MD   17 g at 12/22/23 2144    Potassium Replacement - Follow Nurse / BPA Driven Protocol   Does not apply PRN Gisela Lagunas MD        scopolamine patch 1 mg/72 hr  1 patch Transdermal Q72H Gisela Lagunas MD   1 patch at 12/22/23 1919    sennosides (SENOKOT) 8.8 MG/5ML syrup 10 mL  10 mL Per PEG Tube Nightly Gisela Lagunas MD   10 mL at 12/22/23 2144    sodium chloride 0.9 % flush 10  "mL  10 mL Intravenous PRN Gisela Lagunas MD        sodium chloride 0.9 % flush 10 mL  10 mL Intravenous Q12H Gisela Lagunas MD   10 mL at 12/22/23 2145    sodium chloride 0.9 % flush 10 mL  10 mL Intravenous PRN Gisela Lagunas MD        sodium chloride 0.9 % infusion 40 mL  40 mL Intravenous PRN Gisela Lagunas MD        sodium chloride 0.9 % infusion  100 mL/hr Intravenous Continuous Gisela Lagunas  mL/hr at 12/23/23 0429 100 mL/hr at 12/23/23 0429    vancomycin 1000 mg/250 mL 0.9% NS (vial-mate)  1,000 mg Intravenous Q12H Gisela Lagunas  mL/hr at 12/22/23 2144 1,000 mg at 12/22/23 2144     Lab Results (last 24 hours)       Procedure Component Value Units Date/Time    Blood Culture - Blood, Hand, Left [[id number]]  (Normal) Collected: 12/22/23 0850    Specimen: Blood from Hand, Left Updated: 12/23/23 0916     Blood Culture No growth at 24 hours    Blood Culture ID, PCR - Blood, Hand, Right [[id number]]  (Abnormal) Collected: 12/22/23 0856    Specimen: Blood from Hand, Right Updated: 12/23/23 0738     BCID, PCR Staph spp, not aureus or lugdunensis. Identification by BCID2 PCR.     BOTTLE TYPE Pediatric Bottle    Procalcitonin [[id number]]  (Abnormal) Collected: 12/23/23 0628    Specimen: Blood Updated: 12/23/23 0725     Procalcitonin 0.40 ng/mL     Narrative:      As a Marker for Sepsis (Non-Neonates):    1. <0.5 ng/mL represents a low risk of severe sepsis and/or septic shock.  2. >2 ng/mL represents a high risk of severe sepsis and/or septic shock.    As a Marker for Lower Respiratory Tract Infections that require antibiotic therapy:    PCT on Admission    Antibiotic Therapy       6-12 Hrs later    >0.5                Strongly Recommended  >0.25 - <0.5        Recommended   0.1 - 0.25          Discouraged              Remeasure/reassess PCT  <0.1                Strongly Discouraged     Remeasure/reassess PCT    As 28 day mortality risk marker: \"Change in Procalcitonin Result\" (>80% or <=80%) if " Day 0 (or Day 1) and Day 4 values are available. Refer to http://[email or web address]-pct-calculator.[email or web address]    Change in PCT <=80%  A decrease of PCT levels below or equal to 80% defines a positive change in PCT test result representing a higher risk for 28-day all-cause mortality of patients diagnosed with severe sepsis for septic shock.    Change in PCT >80%  A decrease of PCT levels of more than 80% defines a negative change in PCT result representing a lower risk for 28-day all-cause mortality of patients diagnosed with severe sepsis or septic shock.       Basic Metabolic Panel [[id number]]  (Abnormal) Collected: 12/23/23 0628    Specimen: Blood Updated: 12/23/23 0720     Glucose 144 mg/dL      BUN 11 mg/dL      Creatinine 0.46 mg/dL      Sodium 138 mmol/L      Potassium 3.9 mmol/L      Chloride 106 mmol/L      CO2 23.7 mmol/L      Calcium 8.7 mg/dL      BUN/Creatinine Ratio 23.9     Anion Gap 8.3 mmol/L      eGFR 106.4 mL/min/1.73     Narrative:      GFR Normal >60  Chronic Kidney Disease <60  Kidney Failure <15      Vancomycin, Random [[id number]]  (Normal) Collected: 12/23/23 0628    Specimen: Blood Updated: 12/23/23 0720     Vancomycin Random 16.50 mcg/mL     Narrative:      Therapeutic Ranges for Vancomycin    Vancomycin Random   5.0-40.0 mcg/mL  Vancomycin Trough   5.0-20.0 mcg/mL  Vancomycin Peak     20.0-40.0 mcg/mL    POC Glucose Once [[id number]]  (Abnormal) Collected: 12/23/23 0630    Specimen: Blood Updated: 12/23/23 0633     Glucose 146 mg/dL      Comment: Serial Number: [id number]:  [id number]       CBC (No Diff) [[id number]]  (Abnormal) Collected: 12/23/23 0430    Specimen: Blood Updated: 12/23/23 0504     WBC 21.72 10*3/mm3      RBC 3.03 10*6/mm3      Hemoglobin 9.4 g/dL      Hematocrit 30.4 %      .3 fL      MCH 31.0 pg      MCHC 30.9 g/dL      RDW 13.2 %      RDW-SD 48.8 fl      MPV 12.3 fL      Platelets 207 10*3/mm3     MRSA Screen, PCR (Inpatient) - Swab, Nares [[id number]]  (Normal) Collected:  12/22/23 1748    Specimen: Swab from Nares Updated: 12/23/23 0017     MRSA PCR No MRSA Detected    Narrative:      The negative predictive value of this diagnostic test is high and should only be used to consider de-escalating anti-MRSA therapy. A positive result may indicate colonization with MRSA and must be correlated clinically.    POC Glucose Once [[id number]]  (Abnormal) Collected: 12/22/23 1743    Specimen: Blood Updated: 12/23/23 0005     Glucose 151 mg/dL      Comment: Serial Number: [id number]:  [id number]       Respiratory Culture - Sputum, ET Suction [[id number]] Collected: 12/22/23 1916    Specimen: Sputum from ET Suction Updated: 12/22/23 2035    Acinetobacter Screen - Swab, Axilla, Right [[id number]] Collected: 12/22/23 1800    Specimen: Swab from Axilla, Right Updated: 12/22/23 1807    VRE Culture - Swab, Per Rectum [[id number]] Collected: 12/22/23 1800    Specimen: Swab from Per Rectum Updated: 12/22/23 1807    Urine Culture - Urine, Urine, Catheter [[id number]] Collected: 12/22/23 1237    Specimen: Urine, Catheter Updated: 12/22/23 1551    Urinalysis, Microscopic Only - Urine, Catheter [[id number]]  (Abnormal) Collected: 12/22/23 1237    Specimen: Urine, Catheter Updated: 12/22/23 1257     RBC, UA 21-50 /HPF      WBC, UA 11-20 /HPF      Bacteria, UA 1+ /HPF      Squamous Epithelial Cells, UA 7-12 /HPF      Hyaline Casts, UA None Seen /LPF      Methodology Manual Light Microscopy    Urinalysis With Microscopic If Indicated (No Culture) - Urine, Catheter [[id number]]  (Abnormal) Collected: 12/22/23 1237    Specimen: Urine, Catheter Updated: 12/22/23 1247     Color, UA [place]     Appearance, UA Turbid     pH, UA 6.5     Specific Gravity, UA >=1.030     Glucose, UA Negative     Ketones, UA Negative     Bilirubin, UA Small (1+)     Blood, UA Large (3+)     Protein, UA >=300 mg/dL (3+)     Leuk Esterase, UA Moderate (2+)     Nitrite, UA Positive     Urobilinogen, UA 0.2 E.U./dL    [place] Draw [[id number]]  Collected: 12/22/23 0820    Specimen: Blood Updated: 12/22/23 0930    Narrative:      The following orders were created for panel order [place] Draw.  Procedure                               Abnormality         Status                     ---------                               -----------         ------                     Green Top (Gel)[[id number]]                                  Final result               Lavender Top[[id number]]                                     Final result               Gold Top - SST[[id number]]                                   Final result               Light Blue Top[[id number]]                                   Final result                 Please view results for these tests on the individual orders.    Green Top (Gel) [[id number]] Collected: 12/22/23 0820    Specimen: Blood Updated: 12/22/23 0930     Extra Tube Hold for add-ons.     Comment: Auto resulted.       Lavender Top [[id number]] Collected: 12/22/23 0820    Specimen: Blood Updated: 12/22/23 0930     Extra Tube hold for add-on     Comment: Auto resulted       Gold Top - SST [[id number]] Collected: 12/22/23 0820    Specimen: Blood Updated: 12/22/23 0930     Extra Tube Hold for add-ons.     Comment: Auto resulted.       Light Blue Top [[id number]] Collected: 12/22/23 0820    Specimen: Blood Updated: 12/22/23 0930     Extra Tube Hold for add-ons.     Comment: Auto resulted             Imaging Results (Last 24 Hours)       Procedure Component Value Units Date/Time    XR Chest 1 View [[id number]] Resulted: 12/23/23 0633     Updated: 12/23/23 0633    XR Chest 1 View [[id number]] Collected: 12/22/23 1033     Updated: 12/22/23 1036    Narrative:      PROCEDURE: XR CHEST 1 VW-     HISTORY: post chest tube placement.     COMPARISON: 5/5/2023     FINDINGS:  Portable view of the chest demonstrates bibasilar  atelectasis. Underlying pneumonia right lung base is not excluded.     There is been interval placement of right pigtail pleural catheter  in  the right apical region. Right pneumothorax has resolved. Tracheostomy  tube is stable. The mediastinum is unremarkable.     The heart size is normal.       Impression:      Interval resolution of right apical pneumothorax following  right pleural catheter placement     This report was signed and finalized on 2023 10:34 AM by Jamie Jung MD.             Physician Progress Notes (last 24 hours)  Notes from 23 through 23   No notes of this type exist for this encounter.          Consult Notes (last 24 hours)        Nadira Pandey MD at 23 1849          General Surgery Consult     Name:Viji Vargas  Age: 63 y.o.  Gender: female  : 1960  MRN: [id number]  Visit Number: [id number]  Admit Date: 2023  Date of Service: 23    Patient Care Team:  Ranjete Pina MD as PCP - General (Family Medicine)  Noemy Garcia PA-C as PCP - Family Medicine (Long Term Care Acute)    Reason for Consultation: pneumothorax    Chief complaint : shortness of breath/respiratory distress      History of Present Illness:     Viji Vargas is a 63 y.o. female patient who is a long-term resident of a skilled nursing facility, with a history of prior cardiac arrest with subsequent anoxic brain injury in 2019 status post tracheostomy and PEG placement, hypertension, and known COPD, sent from her skilled nursing facility via EMS with symptoms of respiratory distress.  Per report, the patient had saturations in the low 80s on 4 L via trach collar when EMS arrived, and was noted to be febrile.  The patient is chronically nonverbal and can provide no history.  The history was obtained from the chart.    In the emergency department, the patient was noted to be febrile to 102.3 °F with a heart rate in the 130s.  She was tachypneic with respirations in the 30s.  Blood pressures were initially in the 90s over 40s but later improved to the 110s systolic.  Labs revealed an elevated  white blood cell count 18.5 with a hemoglobin of 11.3, hematocrit of 34, platelets of 276, and 80% neutrophils. Comprehensive metabolic panel was normal with the exception of a glucose of 165.  Lactate was normal at 1.3.  Procalcitonin was normal at 0.11 testing for COVID-19 and influenza were negative.  Blood gas performed in the emergency department was 7.468/39 point 7/130/20 8.7/base excess 4.7.  Blood cultures were obtained urinalysis demonstrated turbid urine with 1+ bilirubin, 3+ blood, 3+ protein, 2+ leukocyte Estrace, positive nitrites, but was contaminated with squamous epithelial cells.  The patient went on to have a chest x-ray which demonstrated a 40% right-sided pneumothorax with bibasilar atelectasis without exclusion of underlying pneumonia.  A right apical pigtail catheter was placed by the emergency department physician and connected to suction with noted immediate resolution of the right pneumothorax on repeat chest film.  Subsequently, the patient was admitted to the intensive care unit by the hospitalist service, and I was asked to see the patient in consultation for management of her spontaneous pneumothorax and subsequent chest tube insertion.    The patient is currently on vancomycin and Zosyn for broad-spectrum antibiotic coverage, and has received IV fluid resuscitation per sepsis protocols.  At the request of the hospitalist service, she was also placed on the waiting list for transfer to [facility] given the complicated nature of her condition.      Patient Active Problem List   Diagnosis    Cardiopulmonary arrest with successful resuscitation    Acute respiratory failure with hypoxia and hypercapnia    Other pneumothorax    COPD with acute exacerbation    Metabolic acidosis    Hyperglycemia    Anoxic encephalopathy    Oropharyngeal dysphagia    Sepsis    COPD exacerbation    Shock, septic    Healthcare-associated pneumonia    Acute on chronic respiratory failure with  hypoxia    Hypoxia    Tracheostomy present    Pneumonia of left upper lobe due to infectious organism    Pneumonia    Pneumothorax, right         Past Medical History:   Diagnosis Date    COPD (chronic obstructive pulmonary disease)     Hypertension     Pneumonia     Stroke        Past Surgical History:   Procedure Laterality Date    HYSTERECTOMY      Partial     TRACHEOSTOMY AND PEG TUBE INSERTION N/A 3/20/2019    Procedure: TRACHEOSTOMY AND PERCUTANEOUS ENDOSCOPIC GASTROSTOMY TUBE INSERTION;  Surgeon: Nadira Pandey MD;  Location: [facility];  Service: General       History reviewed. No pertinent family history.    Social History     Socioeconomic History    Marital status: Legally    Tobacco Use    Smoking status: Former     Packs/day: 1     Types: Electronic Cigarette, Cigarettes    Smokeless tobacco: Never   Vaping Use    Vaping Use: Unknown   Substance and Sexual Activity    Alcohol use: Not Currently     Comment: wine     Drug use: No    Sexual activity: Defer         Current Facility-Administered Medications:     acetaminophen (TYLENOL) tablet 650 mg, 650 mg, Per G Tube, Q4H PRN **OR** acetaminophen (TYLENOL) 160 MG/5ML oral solution 650 mg, 650 mg, Oral, Q4H PRN **OR** acetaminophen (TYLENOL) suppository 650 mg, 650 mg, Rectal, Q4H PRN, Gisela Lagunas MD    albuterol (PROVENTIL) nebulizer solution 0.083% 2.5 mg/3mL, 2.5 mg, Nebulization, Q6H PRN, Gisela Lagunas MD    ALPRAZolam (XANAX) tablet 0.5 mg, 0.5 mg, Per G Tube, BID, Gisela Lagunas MD    baclofen (LIORESAL) tablet 10 mg, 10 mg, Per G Tube, Q8H, Gisela Lagunas MD, 10 mg at 12/22/23 1823    budesonide (PULMICORT) nebulizer solution 0.5 mg, 0.5 mg, Nebulization, BID - RT, Gisela Lagunas MD, 0.5 mg at 12/22/23 1844    carvedilol (COREG) tablet 12.5 mg, 12.5 mg, Per G Tube, BID, Gisela Lagunas MD    cetirizine (zyrTEC) tablet 10 mg, 10 mg, Per G Tube, Daily, Gisela Lagunas MD, 10 mg at 12/22/23 1823    docusate sodium (COLACE)  liquid 300 mg, 300 mg, Per G Tube, BID, Gisela Lagunas MD    Enoxaparin Sodium (LOVENOX) syringe 40 mg, 40 mg, Subcutaneous, Daily, Gisela Lagunas MD, 40 mg at 12/22/23 1823    glycopyrrolate (ROBINUL) tablet 2 mg, 2 mg, Per G Tube, TID, Gisela Lagunas MD    hyoscyamine (LEVSIN) SL tablet 125 mcg, 125 mcg, Per G Tube, TID, Gisela Lagunas MD    ipratropium-albuterol (DUO-NEB) nebulizer solution 3 mL, 3 mL, Nebulization, 4x Daily - RT, Gisela Lagunas MD, 3 mL at 12/22/23 1844    [START ON 12/23/2023] lansoprazole (PREVACID SOLUTAB) disintegrating tablet Tablet Delayed Release Dispersible 30 mg, 30 mg, Per G Tube, Q AM, Gisela Lagunas MD    Magnesium Standard Dose Replacement - Follow Nurse / BPA Driven Protocol, , Does not apply, PRN, Gisela Lagunas MD    melatonin tablet 5 mg, 5 mg, Per G Tube, Nightly PRN, Gisela Lagunas MD    montelukast (SINGULAIR) tablet 10 mg, 10 mg, Per G Tube, Nightly, Gisela Lagunas MD    nitroglycerin (NITROSTAT) SL tablet 0.4 mg, 0.4 mg, Sublingual, Q5 Min PRN, Gisela Lagunas MD    ondansetron (ZOFRAN) injection 4 mg, 4 mg, Intravenous, Q6H PRN, Gisela Lagunas MD    Pharmacy to Dose enoxaparin (LOVENOX), , Does not apply, Continuous PRN, Gisela Lagunas MD    Pharmacy to dose vancomycin, , Does not apply, Continuous PRN, Gisela Lagunas MD    Pharmacy to Dose Zosyn, , Does not apply, Continuous PRN, Gisela Lagunas MD    piperacillin-tazobactam (ZOSYN) 3.375 g in iso-osmotic dextrose 50 ml (premix), 3.375 g, Intravenous, Q8H, Gisela Lagunas MD    polyethylene glycol (MIRALAX) packet 17 g, 17 g, Per G Tube, BID, Gisela Lagunas MD    Potassium Replacement - Follow Nurse / BPA Driven Protocol, , Does not apply, PRN, Gisela Lagunas MD    scopolamine patch 1 mg/72 hr, 1 patch, Transdermal, Q72H, Gisela Lagunas MD    sennosides (SENOKOT) 8.8 MG/5ML syrup 10 mL, 10 mL, Per PEG Tube, Nightly, Gisela Lagunas MD    sodium chloride 0.9 % flush 10 mL, 10 mL,  Intravenous, PRN, Gisela Lagunas MD    sodium chloride 0.9 % flush 10 mL, 10 mL, Intravenous, Q12H, Gisela Lagunas MD    sodium chloride 0.9 % flush 10 mL, 10 mL, Intravenous, PRN, Gisela Lagunas MD    sodium chloride 0.9 % infusion 40 mL, 40 mL, Intravenous, PRN, Gisela Lagunas MD    sodium chloride 0.9 % infusion, 100 mL/hr, Intravenous, Continuous, Gisela Lagunas MD, Last Rate: 100 mL/hr at 12/22/23 1826, 100 mL/hr at 12/22/23 1826    vancomycin 1000 mg/250 mL 0.9% NS (vial-mate), 1,000 mg, Intravenous, Q12H, Gisela Lagunas MD    Medications Prior to Admission   Medication Sig Dispense Refill Last Dose    ALPRAZolam (XANAX) 0.5 MG tablet Administer 1 tablet per G tube 2 (Two) Times a Day. 60 tablet 5     baclofen (LIORESAL) 10 MG tablet Administer 1 tablet per G tube Every 8 (Eight) Hours.       budesonide (PULMICORT) 0.5 MG/2ML nebulizer solution Take 2 mL by nebulization 2 (Two) Times a Day.       carvedilol (COREG) 12.5 MG tablet Administer 1 tablet per G tube 2 (Two) Times a Day.       cetirizine (zyrTEC) 10 MG tablet Administer 1 tablet per G tube Daily.       docusate sodium (COLACE) 50 mg/5 mL liquid Take 30 mL by mouth 2 (Two) Times a Day.       glycopyrrolate (ROBINUL) 2 MG tablet Take 1 tablet by mouth 3 (Three) Times a Day.       hyoscyamine (ANASPAZ,LEVSIN) 0.125 MG tablet Administer 1 tablet per G tube 3 (Three) Times a Day.       ipratropium-albuterol (DUO-NEB) 0.5-2.5 mg/3 ml nebulizer Take 3 mL by nebulization 4 (Four) Times a Day. Takes at [id number]       montelukast (SINGULAIR) 10 MG tablet Administer 1 tablet per G tube Every Night.       Multiple Vitamins-Minerals (MULTIVITAMIN WITH IRON-MINERALS) liquid Take 15 mL by mouth Daily.       omeprazole (priLOSEC) 20 MG capsule 2 capsules 2 (Two) Times a Day.       Petrolatum-Zinc Oxide 49-15 % ointment Apply 1 application topically 2 (Two) Times a Day As Needed (Excoriation).       polyethylene glycol (MIRALAX) 17 g packet  Take 17 g by mouth 2 (Two) Times a Day. 60 each 0     Scopolamine 1 MG/3DAYS patch Place 1 patch on the skin as directed by provider Every 72 (Seventy-Two) Hours.       senna 8.6 MG tablet 2 tablets by Per PEG Tube route Every Night.          No Known Allergies    Review of Systems   Unable to perform ROS: Patient nonverbal       OBJECTIVE:     Vital Signs  Temp:  [97.3 °F (36.3 °C)-102.3 °F (39.1 °C)] 97.3 °F (36.3 °C)  Heart Rate:  [] 92  Resp:  [28-32] 28  BP: ()/() 113/63    I/O this shift:  In: 2686 [IV Piggyback:2686]  Out: -   No intake/output data recorded.      Physical Exam:      General Appearance:  Awake, alert, nonverbal, appears ill   Head:    Normocephalic, without obvious abnormality, atraumatic   Eyes:            Lids and lashes normal, conjunctivae and sclerae normal, no icterus   Ears:    Ears appear intact with no abnormalities noted   Neck:  Tracheostomy in place   Lungs:   Noted tachypnea with decreased air movement, and bilateral crackles.  Right anterior pigtail catheter chest tube in place to -20 cm H2O continuous suction without air leak noted.    Heart:    Regular rhythm and normal rate   Abdomen:     Soft, no apparent tenderness, non-distended, no guarding, no rebound   tenderness. PEG in place and appears appropriate   Genitalia:    Deferred   Extremities:   Contracted extremities   Pulses:   Pulses palpable and equal bilaterally   Skin:   No bleeding, bruising or rash   Neurologic:   Nonverbal         Results Review:  I have reviewed the entirety of the patient's clinical lab results.  I have also personally reviewed the patient's imaging    Narrative & Impression   PROCEDURE: XR CHEST 1 VW-     HISTORY: post chest tube placement.     COMPARISON: 5/5/2023     FINDINGS:  Portable view of the chest demonstrates bibasilar  atelectasis. Underlying pneumonia right lung base is not excluded.     There is been interval placement of right pigtail pleural catheter in  the right  apical region. Right pneumothorax has resolved. Tracheostomy  tube is stable. The mediastinum is unremarkable.     The heart size is normal.     IMPRESSION:  Interval resolution of right apical pneumothorax following  right pleural catheter placement     This report was signed and finalized on 12/22/2023 10:34 AM by Jamie Jung MD.            Narrative & Impression   PROCEDURE: XR CHEST 1 VW-     HISTORY: Simple Sepsis Protocol     COMPARISON: 5/5/2023     FINDINGS:  Portable view of the chest demonstrates bibasilar opacities  likely atelectasis. Right basilar pneumonia is not excluded.     Moderate size right pneumothorax is present new since prior exam.  Tracheostomy tube is unremarkable. The mediastinum is unremarkable.     The heart size is normal.     IMPRESSION:  1. Moderate size right pneumothorax approximately 40%  2. Bibasilar atelectasis. Underlying pneumonia right lung base not  excluded given asymmetry of opacification     Findings regarding pneumothorax called to the emergency department at  8:38 a.m.           This report was signed and finalized on 12/22/2023 8:39 AM by Jamie Jung MD.               Lab Results (last 72 hours)       Procedure Component Value Units Date/Time    Acinetobacter Screen - Swab, Axilla, Right [[id number]] Collected: 12/22/23 1800    Specimen: Swab from Axilla, Right Updated: 12/22/23 1807    VRE Culture - Swab, Per Rectum [[id number]] Collected: 12/22/23 1800    Specimen: Swab from Per Rectum Updated: 12/22/23 1807    MRSA Screen, PCR (Inpatient) - Swab, Nares [[id number]] Collected: 12/22/23 1748    Specimen: Swab from Nares Updated: 12/22/23 1807    Urine Culture - Urine, Urine, Catheter [[id number]] Collected: 12/22/23 1237    Specimen: Urine, Catheter Updated: 12/22/23 1551    Urinalysis, Microscopic Only - Urine, Catheter [[id number]]  (Abnormal) Collected: 12/22/23 1237    Specimen: Urine, Catheter Updated: 12/22/23 1257     RBC, UA 21-50 /HPF      WBC, UA 11-20 /HPF       Bacteria, UA 1+ /HPF      Squamous Epithelial Cells, UA 7-12 /HPF      Hyaline Casts, UA None Seen /LPF      Methodology Manual Light Microscopy    Urinalysis With Microscopic If Indicated (No Culture) - Urine, Catheter [[id number]]  (Abnormal) Collected: 12/22/23 1237    Specimen: Urine, Catheter Updated: 12/22/23 1247     Color, UA [place]     Appearance, UA Turbid     pH, UA 6.5     Specific Gravity, UA >=1.030     Glucose, UA Negative     Ketones, UA Negative     Bilirubin, UA Small (1+)     Blood, UA Large (3+)     Protein, UA >=300 mg/dL (3+)     Leuk Esterase, UA Moderate (2+)     Nitrite, UA Positive     Urobilinogen, UA 0.2 E.U./dL    [place] Draw [[id number]] Collected: 12/22/23 0820    Specimen: Blood Updated: 12/22/23 0930    Narrative:      The following orders were created for panel order [place] Draw.  Procedure                               Abnormality         Status                     ---------                               -----------         ------                     Green Top (Gel)[[id number]]                                  Final result               Lavender Top[[id number]]                                     Final result               Gold Top - SST[[id number]]                                   Final result               Light Blue Top[[id number]]                                   Final result                 Please view results for these tests on the individual orders.    Green Top (Gel) [[id number]] Collected: 12/22/23 0820    Specimen: Blood Updated: 12/22/23 0930     Extra Tube Hold for add-ons.     Comment: Auto resulted.       Lavender Top [[id number]] Collected: 12/22/23 0820    Specimen: Blood Updated: 12/22/23 0930     Extra Tube hold for add-on     Comment: Auto resulted       Gold Top - SST [[id number]] Collected: 12/22/23 0820    Specimen: Blood Updated: 12/22/23 0930     Extra Tube Hold for add-ons.     Comment: Auto resulted.       Light Blue Top [[id number]] Collected: 12/22/23 0820  "   Specimen: Blood Updated: 12/22/23 0930     Extra Tube Hold for add-ons.     Comment: Auto resulted       Blood Culture - Blood, Hand, Right [[id number]] Collected: 12/22/23 0856    Specimen: Blood from Hand, Right Updated: 12/22/23 0910    Blood Culture - Blood, Hand, Left [[id number]] Collected: 12/22/23 0850    Specimen: Blood from Hand, Left Updated: 12/22/23 0910    Procalcitonin [[id number]]  (Normal) Collected: 12/22/23 0820    Specimen: Blood Updated: 12/22/23 0907     Procalcitonin 0.11 ng/mL     Narrative:      As a Marker for Sepsis (Non-Neonates):    1. <0.5 ng/mL represents a low risk of severe sepsis and/or septic shock.  2. >2 ng/mL represents a high risk of severe sepsis and/or septic shock.    As a Marker for Lower Respiratory Tract Infections that require antibiotic therapy:    PCT on Admission    Antibiotic Therapy       6-12 Hrs later    >0.5                Strongly Recommended  >0.25 - <0.5        Recommended   0.1 - 0.25          Discouraged              Remeasure/reassess PCT  <0.1                Strongly Discouraged     Remeasure/reassess PCT    As 28 day mortality risk marker: \"Change in Procalcitonin Result\" (>80% or <=80%) if Day 0 (or Day 1) and Day 4 values are available. Refer to http://www.[employer]-pct-calculator.com    Change in PCT <=80%  A decrease of PCT levels below or equal to 80% defines a positive change in PCT test result representing a higher risk for 28-day all-cause mortality of patients diagnosed with severe sepsis for septic shock.    Change in PCT >80%  A decrease of PCT levels of more than 80% defines a negative change in PCT result representing a lower risk for 28-day all-cause mortality of patients diagnosed with severe sepsis or septic shock.       COVID-19 and FLU A/B PCR, 1 HR TAT - Swab, Nasopharynx [[id number]]  (Normal) Collected: 12/22/23 0825    Specimen: Swab from Nasopharynx Updated: 12/22/23 0900     COVID19 Not Detected     Influenza A PCR Not Detected     " Influenza B PCR Not Detected    Narrative:      Fact sheet for providers: https://www.fda.gov/media/376253/download    Fact sheet for patients: https://www.fda.gov/media/704202/download    Test performed by PCR.    Comprehensive Metabolic Panel [[id number]]  (Abnormal) Collected: 12/22/23 0820    Specimen: Blood Updated: 12/22/23 0849     Glucose 165 mg/dL      BUN 19 mg/dL      Creatinine 0.59 mg/dL      Sodium 137 mmol/L      Potassium 4.5 mmol/L      Comment: Specimen hemolyzed.  Result may be falsely elevated.        Chloride 99 mmol/L      CO2 25.8 mmol/L      Calcium 9.4 mg/dL      Total Protein 7.6 g/dL      Albumin 3.6 g/dL      ALT (SGPT) 17 U/L      Comment: Specimen hemolyzed.  Result may  be falsely elevated.        AST (SGOT) 22 U/L      Comment: Specimen hemolyzed.  Result may be falsely elevated.        Alkaline Phosphatase 83 U/L      Total Bilirubin 0.5 mg/dL      Globulin 4.0 gm/dL      A/G Ratio 0.9 g/dL      BUN/Creatinine Ratio 32.2     Anion Gap 12.2 mmol/L      eGFR 100.2 mL/min/1.73     Narrative:      GFR Normal >60  Chronic Kidney Disease <60  Kidney Failure <15      Blood Gas, Arterial With Co-Ox [[id number]]  (Abnormal) Collected: 12/22/23 0846    Specimen: Arterial Blood Updated: 12/22/23 0847     Site Right Radial     Gary's Test N/A     pH, Arterial 7.468 pH units      Comment: 83 Value above reference range        pCO2, Arterial 39.7 mm Hg      pO2, Arterial 130.0 mm Hg      Comment: 83 Value above reference range        HCO3, Arterial 28.7 mmol/L      Comment: 83 Value above reference range        Base Excess, Arterial 4.7 mmol/L      Comment: 83 Value above reference range        O2 Saturation, Arterial 100.0 %      Comment: 83 Value above reference range        Hematocrit, Blood Gas 32.8 %      Comment: 84 Value below reference range        Oxyhemoglobin 98.3 %      Methemoglobin 0.20 %      Carboxyhemoglobin 1.4 %      A-a DO2 --     Comment: UNABLE TO CALCULATE        Barometric  Pressure for Blood Gas 742 mmHg      Modality Nasal Cannula     Flow Rate 10.0 lpm      Ventilator Mode NA     Collected by DRU     Comment: Meter: [id number]     :  TERESA        pH, Temp Corrected --     pCO2, Temperature Corrected --     pO2, Temperature Corrected --    Lactic Acid, Plasma [[id number]]  (Normal) Collected: 12/22/23 0820    Specimen: Blood Updated: 12/22/23 0843     Lactate 1.3 mmol/L     CBC & Differential [[id number]]  (Abnormal) Collected: 12/22/23 0820    Specimen: Blood Updated: 12/22/23 0827    Narrative:      The following orders were created for panel order CBC & Differential.  Procedure                               Abnormality         Status                     ---------                               -----------         ------                     CBC Auto Differential[[id number]]        Abnormal            Final result                 Please view results for these tests on the individual orders.    CBC Auto Differential [[id number]]  (Abnormal) Collected: 12/22/23 0820    Specimen: Blood Updated: 12/22/23 0827     WBC 18.48 10*3/mm3      RBC 3.60 10*6/mm3      Hemoglobin 11.3 g/dL      Hematocrit 34.0 %      MCV 94.4 fL      MCH 31.4 pg      MCHC 33.2 g/dL      RDW 13.2 %      RDW-SD 45.3 fl      MPV 11.8 fL      Platelets 276 10*3/mm3      Neutrophil % 80.8 %      Lymphocyte % 10.0 %      Monocyte % 8.4 %      Eosinophil % 0.1 %      Basophil % 0.2 %      Immature Grans % 0.5 %      Neutrophils, Absolute 14.95 10*3/mm3      Lymphocytes, Absolute 1.84 10*3/mm3      Monocytes, Absolute 1.55 10*3/mm3      Eosinophils, Absolute 0.01 10*3/mm3      Basophils, Absolute 0.03 10*3/mm3      Immature Grans, Absolute 0.10 10*3/mm3      nRBC 0.0 /100 WBC     POC Glucose Once [[id number]]  (Abnormal) Collected: 12/22/23 0819    Specimen: Blood Updated: 12/22/23 0825     Glucose 173 mg/dL      Comment: Serial Number: [id number]:  [id number]                               ASSESSMENT/PLAN:       Pneumonia    Pneumothorax, right    Ms. Vargas is a 63-year-old female patient with multiple chronic problems as described above, admitted with hypoxic respiratory failure secondary to bilateral pneumonia with evidence of a spontaneous right-sided pneumothorax in the setting of underlying COPD.  She also appears to have an acute urinary tract infection with a history of ESBL Klebsiella pneumoniae on previous urine cultures.  At this time, she has evidence of sepsis.  Regarding the right pneumothorax, I suspect that this is related to rupture of a bleb at the lung apex.  On my personal review of her imaging from today, the pigtail chest tube is in good position with resolution of the right-sided pneumothorax.  I have also personally reviewed an old CT scan of the chest from 5/6/2023, and on my review of the study, there does appear to be evidence of blebs in the apices of both lungs.  Again, this is likely the source of the patient's spontaneous pneumothorax.  For now, I would maintain the chest tube to suction.  Fortunately, there is no evidence of air leak.  Repeat chest x-ray is ordered for tomorrow.  I will continue to follow along with you.    Nadira Pandey MD  12/22/23  18:49 EST          Electronically signed by Nadira Pandey MD at 12/22/23 1912

## 2023-12-23 NOTE — THERAPY EVALUATION
PT evaluation order received. Pt's chart reviewed and therapist spoke with pt's nurse. PT evaluation held this date as pt is not medically appropriate to begin PT services.  Will plan to check back tomorrow.

## 2023-12-23 NOTE — PROGRESS NOTES
[facility]    Name:  Viji Vargas   Age:  63 y.o.  Sex:  female  :  1960  MRN:  [id number]   Visit Number:  [id number]  Admission Date:  2023  Date Of Service:  23  Primary Care Physician:  Ranjeet Pina MD     LOS: 1 day :    Chief Complaint:      Respiratory distress     Subjective:    Patient was seen and examined bedside today.  Patient remains in ICU.  Patient is nonverbal and appears lethargic today.  Nursing at bedside, patient has been having excessive secretions to her tracheostomy stoma which has been suctioned and appears with scant amount of blood along with mucus.  Her white count increased today.  Discussed with nursing at bedside management and plan.  Dr. Pandey following.  Patient has been hypotensive overnight with MAP dropping under 65 now.  Discussed starting Levophed.  Discussed with Dr. Pandey for central line placement.    Hospital Course:    Patient is a 63 years old female nursing home resident, with history of cardiac arrest and anoxic brain injury in 2019, status post tracheostomy and PEG tube placement, COPD, hypertension was sent from the nursing home facility by EMS with symptoms of respiratory distress.  Patient apparently was saturating at 81% on 4 L trach collar when EMS arrived.  He was also running a fever.  Patient is nonverbal and unable to provide any history.  Patient was noted to have increased secretions and breathing difficulties on arrival to the ER. Patient is nonverbal and no review of systems were possible.      On evaluation evaluation, patient was febrile with a temperature of 102.3 Fahrenheit, heart rate 130s, tachypneic with a respiratory rate of 30s, blood pressure stable, patient on supplemental oxygen with trach collar. Patient was febrile was significant for ABG with a pH of 7.468/pCO2 39.7/pO2 130/HCO3 28.7/saturation 100% on 10 L of supplemental oxygen her labs were significant for WBC  of 18.4, hemoglobin 11.3.  Glucose 165.  Pro-Teo and lactate WNL.  CMP otherwise within acceptable range.  COVID and flu negative.  Chest x-ray showed moderate size right pneumothorax approximately 40% along with bibasilar atelectasis. Underlying pneumonia right lung base not excluded given asymmetry of opacification.  Urinalysis was positive for nitrates, leukocytes, WBC 11-20, bacteria 1+.  Chest tube (pigtail) was placed in the right anterior chest by ER provider with interval resolution of right apical pneumothorax following placement of right pleural catheter.  Blood cultures obtained.  Patien received normal saline boluses per sepsis protocol, started on Vanco and Zosyn and received Tylenol.  Case was discussed for transfer for pulmonology service that are not available currently in this hospital, patient discussed with Joyce Calloway by ER provider and placed on waiting list.  Hospitalist consulted for admission, further management and treatment.    Review of Systems:     All systems were reviewed and negative except as mentioned in subjective, assessment and plan.    Vital Signs:    Temp:  [97.3 °F (36.3 °C)-99.5 °F (37.5 °C)] 98.5 °F (36.9 °C)  Heart Rate:  [] 89  Resp:  [22-32] 27  BP: ()/() 153/91    Intake and output:    I/O last 3 completed shifts:  In: 4277 [I.V.:1311; Other:120; NG/GT:160; IV Piggyback:2686]  Out: 355 [Urine:350; Chest Tube:5]  No intake/output data recorded.    Physical Examination:    General Appearance:  Nonverbal   Head:  Atraumatic and normocephalic.   Eyes: Conjunctivae and sclerae normal, no icterus. No pallor.   Throat: No oral lesions, no thrush, oral mucosa moist.   Neck: Supple, trachea midline, no thyromegaly.  Tracheostomy noted.   Lungs:   Breath sounds heard bilaterally left more than right.  Decreased air movement bilaterally.  Bilateral crackles heard.  Increased work of breathing.  Prolonged expiration.  Chest tube (pigtail) in the right anterior  chest noted.  No wheezing or crackles.  Excessive secretions on suctioning of tracheostomy.    Heart:  Normal S1 and S2, no murmur, no gallop, no rub. No JVD.   Abdomen:   Normal bowel sounds, no masses, no organomegaly. Soft, nontender, nondistended, no rebound tenderness.  PEG tube noted in place.   Extremities: Chronic contractures and deformity, no edema, no cyanosis, no clubbing.   Skin: No bleeding or rash.   Neurologic: Nonverbal.  Lethargic.  Chronic intellectual disability.  Severe upper and lower extremities contractures and spasticity noted.  GCS 8. No facial asymmetry. Moves all four limbs. No tremors.      Laboratory results:    Results from last 7 days   Lab Units 12/23/23  0628 12/22/23  0820   SODIUM mmol/L 138 137   POTASSIUM mmol/L 3.9 4.5   CHLORIDE mmol/L 106 99   CO2 mmol/L 23.7 25.8   BUN mg/dL 11 19   CREATININE mg/dL 0.46* 0.59   CALCIUM mg/dL 8.7 9.4   BILIRUBIN mg/dL  --  0.5   ALK PHOS U/L  --  83   ALT (SGPT) U/L  --  17   AST (SGOT) U/L  --  22   GLUCOSE mg/dL 144* 165*     Results from last 7 days   Lab Units 12/23/23  0430 12/22/23  0820   WBC 10*3/mm3 21.72* 18.48*   HEMOGLOBIN g/dL 9.4* 11.3*   HEMATOCRIT % 30.4* 34.0   PLATELETS 10*3/mm3 207 276             Results from last 7 days   Lab Units 12/22/23  0850   BLOODCX  No growth at 24 hours     Recent Labs     05/05/23  2257 12/22/23  0846   PHART 7.419 7.468*   [id number] 49.4* 39.7   PO2ART 56.3* 130.0*   [id number] 32.0* 28.7*   BASEEXCESS 6.6* 4.7*      I have reviewed the patient's laboratory results.    Radiology results:    XR Chest 1 View    Result Date: 12/22/2023  PROCEDURE: XR CHEST 1 VW-  HISTORY: post chest tube placement.  COMPARISON: 5/5/2023  FINDINGS:  Portable view of the chest demonstrates bibasilar atelectasis. Underlying pneumonia right lung base is not excluded.  There is been interval placement of right pigtail pleural catheter in the right apical region. Right pneumothorax has resolved. Tracheostomy tube is stable.  The mediastinum is unremarkable.  The heart size is normal.      Impression: Interval resolution of right apical pneumothorax following right pleural catheter placement  This report was signed and finalized on 12/22/2023 10:34 AM by Jamie Jung MD.      XR Chest 1 View    Result Date: 12/22/2023  PROCEDURE: XR CHEST 1 VW-  HISTORY: Simple Sepsis Protocol  COMPARISON: 5/5/2023  FINDINGS:  Portable view of the chest demonstrates bibasilar opacities likely atelectasis. Right basilar pneumonia is not excluded.  Moderate size right pneumothorax is present new since prior exam. Tracheostomy tube is unremarkable. The mediastinum is unremarkable.  The heart size is normal.      Impression: 1. Moderate size right pneumothorax approximately 40% 2. Bibasilar atelectasis. Underlying pneumonia right lung base not excluded given asymmetry of opacification  Findings regarding pneumothorax called to the emergency department at 8:38 a.m.    This report was signed and finalized on 12/22/2023 8:39 AM by Jamie Jung MD.     I have reviewed the patient's radiology reports.    Medication Review:     I have reviewed the patient's active and prn medications.     Problem List:      Pneumonia    Pneumothorax, right      Assessment:    Acute on chronic hypoxic respiratory failure secondary to #2 and #4, POA  Bilateral pneumonia, unable to classify further, POA  Sepsis, secondary to #2, POA  Right-sided pneumothorax, spontaneous, status post chest tube, POA  Acute urinary tract infection, POA  History of cardiac arrest/anoxic brain injury.  Status post tracheostomy and PEG tube placement.  Essential hypertension.    Plan:    Respiratory failure with hypoxia  Bilateral pneumonia/sepsis  -Continue supplemental oxygen to keep saturation above 90%, titrate down as able to.  Currently on tracheostomy collar.  -Patient received IV fluids per sepsis protocol while in the ED  -Continue with IV fluids for maintenance  -Cover patient with vancomycin  and Zosyn  -MRSA screen negative  -Follow-up on blood cultures, sputum cultures and urine cultures  -Patient with history of Pseudomonas on sputum cultures per her previous admission  -Monitor with ABG and chest x-ray as indicated  -WBC 21.7, Pro-Teo 0.40  -preliminary blood cultures showing staph spp in 1 bottle  -Patient started to become hypotensive overnight, Dr. Pandey will place a central line  -Will start patient on Levophed to keep MAP over 65.     Right-sided pneumothorax  -Status post chest tube (pigtail) on suction -20  -Dr. Pandey with general surgery consulted, appreciate recommendations.  -Continue treatment as per above  -Repeat chest x-ray this morning showed worsening right pleural effusion and right lung opacity. There is a curling of the right chest tube proximal to the pigtail portion. A kink is not excluded.   -Monitor with chest x-ray     Acute UTI  -Continue with Zosyn  -Follow-up on urine cultures  -History of ESBL Klebsiella pneumonia on previous urine cultures     -Continue home meds as warranted.  -Further orders as indicated per clinical course.  -I discussed with her daughter Liliya Epperson. Daughter was agreeable on admitting patient here until she gets transferred to [place].     DVT Prophylaxis: Lovenox prophylaxis (benefit> risk)  Code Status: Full  Diet: N.p.o., tube feeding  Discharge Plan: transfer to [facility], pending bed availability    Gisela Lagunas MD  12/23/23  10:13 EST    Dictated utilizing Dragon dictation.

## 2023-12-24 ENCOUNTER — APPOINTMENT (OUTPATIENT)
Dept: GENERAL RADIOLOGY | Facility: HOSPITAL | Age: 63
End: 2023-12-24
Payer: MEDICAID

## 2023-12-24 ENCOUNTER — HOSPITAL ENCOUNTER (INPATIENT)
Facility: HOSPITAL | Age: 63
LOS: 16 days | Discharge: NURSING, REHAB OR LONG-TERM CARE | End: 2024-01-09
Attending: INTERNAL MEDICINE | Admitting: INTERNAL MEDICINE
Payer: MEDICARE

## 2023-12-24 ENCOUNTER — APPOINTMENT (OUTPATIENT)
Dept: CT IMAGING | Facility: HOSPITAL | Age: 63
End: 2023-12-24
Payer: MEDICARE

## 2023-12-24 VITALS
OXYGEN SATURATION: 95 % | TEMPERATURE: 98.4 F | WEIGHT: 155.42 LBS | BODY MASS INDEX: 26.53 KG/M2 | SYSTOLIC BLOOD PRESSURE: 104 MMHG | HEART RATE: 96 BPM | HEIGHT: 64 IN | DIASTOLIC BLOOD PRESSURE: 62 MMHG | RESPIRATION RATE: 28 BRPM

## 2023-12-24 DIAGNOSIS — J90 PLEURAL EFFUSION: ICD-10-CM

## 2023-12-24 DIAGNOSIS — Z74.09 IMPAIRED FUNCTIONAL MOBILITY, BALANCE, GAIT, AND ENDURANCE: ICD-10-CM

## 2023-12-24 DIAGNOSIS — K94.23 MALFUNCTION OF PERCUTANEOUS ENDOSCOPIC GASTROSTOMY (PEG) TUBE: ICD-10-CM

## 2023-12-24 DIAGNOSIS — J18.9 PNEUMONIA OF BOTH LUNGS DUE TO INFECTIOUS ORGANISM, UNSPECIFIED PART OF LUNG: Primary | ICD-10-CM

## 2023-12-24 PROBLEM — N39.0 UTI (URINARY TRACT INFECTION): Status: ACTIVE | Noted: 2023-12-24

## 2023-12-24 PROBLEM — J18.0 BRONCHIAL PNEUMONIA: Status: ACTIVE | Noted: 2023-12-24

## 2023-12-24 LAB
ANION GAP SERPL CALCULATED.3IONS-SCNC: 8.4 MMOL/L (ref 5–15)
BACTERIA SPEC AEROBE CULT: ABNORMAL
BACTERIA UR QL AUTO: ABNORMAL /HPF
BASOPHILS # BLD AUTO: 0.03 10*3/MM3 (ref 0–0.2)
BASOPHILS NFR BLD AUTO: 0.2 % (ref 0–1.5)
BILIRUB UR QL STRIP: NEGATIVE
BUN SERPL-MCNC: 10 MG/DL (ref 8–23)
BUN/CREAT SERPL: 22.2 (ref 7–25)
CALCIUM SPEC-SCNC: 7.8 MG/DL (ref 8.6–10.5)
CHLORIDE SERPL-SCNC: 110 MMOL/L (ref 98–107)
CLARITY UR: ABNORMAL
CO2 SERPL-SCNC: 23.6 MMOL/L (ref 22–29)
COLOR UR: YELLOW
CREAT SERPL-MCNC: 0.45 MG/DL (ref 0.57–1)
DEPRECATED RDW RBC AUTO: 46.3 FL (ref 37–54)
EGFRCR SERPLBLD CKD-EPI 2021: 106.9 ML/MIN/1.73
EOSINOPHIL # BLD AUTO: 0.77 10*3/MM3 (ref 0–0.4)
EOSINOPHIL NFR BLD AUTO: 5.8 % (ref 0.3–6.2)
ERYTHROCYTE [DISTWIDTH] IN BLOOD BY AUTOMATED COUNT: 13 % (ref 12.3–15.4)
GLUCOSE BLDC GLUCOMTR-MCNC: 214 MG/DL (ref 70–130)
GLUCOSE SERPL-MCNC: 182 MG/DL (ref 65–99)
GLUCOSE UR STRIP-MCNC: NEGATIVE MG/DL
GRAM STN SPEC: ABNORMAL
HCT VFR BLD AUTO: 25.8 % (ref 34–46.6)
HGB BLD-MCNC: 8.2 G/DL (ref 12–15.9)
HGB UR QL STRIP.AUTO: ABNORMAL
HYALINE CASTS UR QL AUTO: ABNORMAL /LPF
IMM GRANULOCYTES # BLD AUTO: 0.08 10*3/MM3 (ref 0–0.05)
IMM GRANULOCYTES NFR BLD AUTO: 0.6 % (ref 0–0.5)
ISOLATED FROM: ABNORMAL
KETONES UR QL STRIP: NEGATIVE
LEUKOCYTE ESTERASE UR QL STRIP.AUTO: ABNORMAL
LYMPHOCYTES # BLD AUTO: 1.26 10*3/MM3 (ref 0.7–3.1)
LYMPHOCYTES NFR BLD AUTO: 9.4 % (ref 19.6–45.3)
MCH RBC QN AUTO: 30.9 PG (ref 26.6–33)
MCHC RBC AUTO-ENTMCNC: 31.8 G/DL (ref 31.5–35.7)
MCV RBC AUTO: 97.4 FL (ref 79–97)
MONOCYTES # BLD AUTO: 1.22 10*3/MM3 (ref 0.1–0.9)
MONOCYTES NFR BLD AUTO: 9.1 % (ref 5–12)
NEUTROPHILS NFR BLD AUTO: 10 10*3/MM3 (ref 1.7–7)
NEUTROPHILS NFR BLD AUTO: 74.9 % (ref 42.7–76)
NITRITE UR QL STRIP: NEGATIVE
NRBC BLD AUTO-RTO: 0 /100 WBC (ref 0–0.2)
PH UR STRIP.AUTO: 6 [PH] (ref 5–8)
PLATELET # BLD AUTO: 211 10*3/MM3 (ref 140–450)
PMV BLD AUTO: 12.3 FL (ref 6–12)
POTASSIUM SERPL-SCNC: 3.4 MMOL/L (ref 3.5–5.2)
PROCALCITONIN SERPL-MCNC: 0.31 NG/ML (ref 0–0.25)
PROT UR QL STRIP: ABNORMAL
RBC # BLD AUTO: 2.65 10*6/MM3 (ref 3.77–5.28)
RBC # UR STRIP: ABNORMAL /HPF
REF LAB TEST METHOD: ABNORMAL
SODIUM SERPL-SCNC: 142 MMOL/L (ref 136–145)
SP GR UR STRIP: 1.02 (ref 1–1.03)
SQUAMOUS #/AREA URNS HPF: ABNORMAL /HPF
UROBILINOGEN UR QL STRIP: ABNORMAL
VANCOMYCIN SERPL-MCNC: 19.7 MCG/ML (ref 5–40)
VRE SPEC QL CULT: NORMAL
WBC # UR STRIP: ABNORMAL /HPF
WBC NRBC COR # BLD AUTO: 13.36 10*3/MM3 (ref 3.4–10.8)
YEAST URNS QL MICRO: ABNORMAL /HPF

## 2023-12-24 PROCEDURE — 87086 URINE CULTURE/COLONY COUNT: CPT

## 2023-12-24 PROCEDURE — 84145 PROCALCITONIN (PCT): CPT | Performed by: FAMILY MEDICINE

## 2023-12-24 PROCEDURE — 94799 UNLISTED PULMONARY SVC/PX: CPT

## 2023-12-24 PROCEDURE — 25010000002 VANCOMYCIN 1 G RECONSTITUTED SOLUTION: Performed by: FAMILY MEDICINE

## 2023-12-24 PROCEDURE — 87205 SMEAR GRAM STAIN: CPT | Performed by: INTERNAL MEDICINE

## 2023-12-24 PROCEDURE — 85025 COMPLETE CBC W/AUTO DIFF WBC: CPT | Performed by: FAMILY MEDICINE

## 2023-12-24 PROCEDURE — 71045 X-RAY EXAM CHEST 1 VIEW: CPT

## 2023-12-24 PROCEDURE — 71250 CT THORAX DX C-: CPT

## 2023-12-24 PROCEDURE — 87186 SC STD MICRODIL/AGAR DIL: CPT | Performed by: INTERNAL MEDICINE

## 2023-12-24 PROCEDURE — 99223 1ST HOSP IP/OBS HIGH 75: CPT | Performed by: INTERNAL MEDICINE

## 2023-12-24 PROCEDURE — 0BJ08ZZ INSPECTION OF TRACHEOBRONCHIAL TREE, VIA NATURAL OR ARTIFICIAL OPENING ENDOSCOPIC: ICD-10-PCS | Performed by: INTERNAL MEDICINE

## 2023-12-24 PROCEDURE — 25010000002 PIPERACILLIN SOD-TAZOBACTAM PER 1 G: Performed by: FAMILY MEDICINE

## 2023-12-24 PROCEDURE — 25010000002 ENOXAPARIN PER 10 MG: Performed by: FAMILY MEDICINE

## 2023-12-24 PROCEDURE — 87077 CULTURE AEROBIC IDENTIFY: CPT | Performed by: INTERNAL MEDICINE

## 2023-12-24 PROCEDURE — 99232 SBSQ HOSP IP/OBS MODERATE 35: CPT | Performed by: SURGERY

## 2023-12-24 PROCEDURE — 81001 URINALYSIS AUTO W/SCOPE: CPT

## 2023-12-24 PROCEDURE — 31645 BRNCHSC W/THER ASPIR 1ST: CPT | Performed by: INTERNAL MEDICINE

## 2023-12-24 PROCEDURE — 25810000003 SODIUM CHLORIDE 0.9 % SOLUTION: Performed by: FAMILY MEDICINE

## 2023-12-24 PROCEDURE — 25010000002 PIPERACILLIN SOD-TAZOBACTAM PER 1 G

## 2023-12-24 PROCEDURE — 94664 DEMO&/EVAL PT USE INHALER: CPT

## 2023-12-24 PROCEDURE — 80048 BASIC METABOLIC PNL TOTAL CA: CPT | Performed by: FAMILY MEDICINE

## 2023-12-24 PROCEDURE — 80202 ASSAY OF VANCOMYCIN: CPT

## 2023-12-24 PROCEDURE — 87070 CULTURE OTHR SPECIMN AEROBIC: CPT | Performed by: INTERNAL MEDICINE

## 2023-12-24 PROCEDURE — 82948 REAGENT STRIP/BLOOD GLUCOSE: CPT

## 2023-12-24 PROCEDURE — 94761 N-INVAS EAR/PLS OXIMETRY MLT: CPT

## 2023-12-24 PROCEDURE — 87040 BLOOD CULTURE FOR BACTERIA: CPT

## 2023-12-24 PROCEDURE — 99239 HOSP IP/OBS DSCHRG MGMT >30: CPT | Performed by: FAMILY MEDICINE

## 2023-12-24 RX ORDER — NOREPINEPHRINE BITARTRATE 0.03 MG/ML
.02-.3 INJECTION, SOLUTION INTRAVENOUS
Status: DISCONTINUED | OUTPATIENT
Start: 2023-12-24 | End: 2023-12-27

## 2023-12-24 RX ORDER — LORAZEPAM 2 MG/ML
1 INJECTION INTRAMUSCULAR EVERY 4 HOURS PRN
Status: ON HOLD
Start: 2023-12-24 | End: 2023-12-26

## 2023-12-24 RX ORDER — ACETAMINOPHEN 325 MG/1
650 TABLET ORAL EVERY 4 HOURS PRN
Status: ON HOLD
Start: 2023-12-24 | End: 2023-12-26

## 2023-12-24 RX ORDER — SODIUM CHLORIDE 0.9 % (FLUSH) 0.9 %
10 SYRINGE (ML) INJECTION EVERY 12 HOURS SCHEDULED
Status: DISCONTINUED | OUTPATIENT
Start: 2023-12-24 | End: 2024-01-10 | Stop reason: HOSPADM

## 2023-12-24 RX ORDER — NOREPINEPHRINE BITARTRATE/D5W 8 MG/250ML
.02-.3 PLASTIC BAG, INJECTION (ML) INTRAVENOUS
Status: ON HOLD
Start: 2023-12-24 | End: 2023-12-26

## 2023-12-24 RX ORDER — ALBUTEROL SULFATE 2.5 MG/3ML
2.5 SOLUTION RESPIRATORY (INHALATION) EVERY 6 HOURS PRN
Status: ON HOLD
Start: 2023-12-24 | End: 2023-12-26

## 2023-12-24 RX ORDER — ENOXAPARIN SODIUM 100 MG/ML
40 INJECTION SUBCUTANEOUS DAILY
Status: ON HOLD
Start: 2023-12-25 | End: 2023-12-26

## 2023-12-24 RX ORDER — BISACODYL 5 MG/1
5 TABLET, DELAYED RELEASE ORAL DAILY PRN
Status: DISCONTINUED | OUTPATIENT
Start: 2023-12-24 | End: 2023-12-25

## 2023-12-24 RX ORDER — POTASSIUM CHLORIDE 1.5 G/1.58G
40 POWDER, FOR SOLUTION ORAL EVERY 4 HOURS
Status: COMPLETED | OUTPATIENT
Start: 2023-12-24 | End: 2023-12-24

## 2023-12-24 RX ORDER — AMOXICILLIN 250 MG
2 CAPSULE ORAL 2 TIMES DAILY
Status: DISCONTINUED | OUTPATIENT
Start: 2023-12-24 | End: 2023-12-25

## 2023-12-24 RX ORDER — POLYETHYLENE GLYCOL 3350 17 G/17G
17 POWDER, FOR SOLUTION ORAL DAILY PRN
Status: DISCONTINUED | OUTPATIENT
Start: 2023-12-24 | End: 2023-12-25

## 2023-12-24 RX ORDER — BISACODYL 10 MG
10 SUPPOSITORY, RECTAL RECTAL DAILY PRN
Status: DISCONTINUED | OUTPATIENT
Start: 2023-12-24 | End: 2023-12-25

## 2023-12-24 RX ADMIN — IPRATROPIUM BROMIDE AND ALBUTEROL SULFATE 3 ML: .5; 3 SOLUTION RESPIRATORY (INHALATION) at 07:07

## 2023-12-24 RX ADMIN — GLYCOPYRROLATE 2 MG: 1 TABLET ORAL at 08:37

## 2023-12-24 RX ADMIN — BACLOFEN 10 MG: 10 TABLET ORAL at 02:51

## 2023-12-24 RX ADMIN — BACLOFEN 10 MG: 10 TABLET ORAL at 10:24

## 2023-12-24 RX ADMIN — SODIUM CHLORIDE 100 ML/HR: 9 INJECTION, SOLUTION INTRAVENOUS at 01:00

## 2023-12-24 RX ADMIN — Medication 10 ML: at 08:37

## 2023-12-24 RX ADMIN — Medication 10 ML: at 08:38

## 2023-12-24 RX ADMIN — LANSOPRAZOLE 30 MG: 30 TABLET, ORALLY DISINTEGRATING ORAL at 05:36

## 2023-12-24 RX ADMIN — Medication 10 ML: at 21:09

## 2023-12-24 RX ADMIN — POLYETHYLENE GLYCOL 3350 17 G: 17 POWDER, FOR SOLUTION ORAL at 08:37

## 2023-12-24 RX ADMIN — ENOXAPARIN SODIUM 40 MG: 100 INJECTION SUBCUTANEOUS at 08:37

## 2023-12-24 RX ADMIN — ALPRAZOLAM 0.5 MG: 0.5 TABLET ORAL at 08:37

## 2023-12-24 RX ADMIN — POTASSIUM CHLORIDE 40 MEQ: 1.5 POWDER, FOR SOLUTION ORAL at 05:36

## 2023-12-24 RX ADMIN — PIPERACILLIN SODIUM AND TAZOBACTAM SODIUM 4.5 G: 4; .5 INJECTION, SOLUTION INTRAVENOUS at 03:00

## 2023-12-24 RX ADMIN — IPRATROPIUM BROMIDE AND ALBUTEROL SULFATE 3 ML: .5; 3 SOLUTION RESPIRATORY (INHALATION) at 12:57

## 2023-12-24 RX ADMIN — SODIUM CHLORIDE 1000 MG: 900 INJECTION, SOLUTION INTRAVENOUS at 10:24

## 2023-12-24 RX ADMIN — POTASSIUM CHLORIDE 40 MEQ: 1.5 POWDER, FOR SOLUTION ORAL at 10:25

## 2023-12-24 RX ADMIN — SENNOSIDES AND DOCUSATE SODIUM 2 TABLET: 8.6; 5 TABLET ORAL at 21:08

## 2023-12-24 RX ADMIN — PIPERACILLIN SODIUM AND TAZOBACTAM SODIUM 3.38 G: 3; .375 INJECTION, SOLUTION INTRAVENOUS at 21:08

## 2023-12-24 RX ADMIN — BUDESONIDE 0.5 MG: 0.5 INHALANT RESPIRATORY (INHALATION) at 07:07

## 2023-12-24 RX ADMIN — CETIRIZINE HYDROCHLORIDE 10 MG: 10 TABLET, FILM COATED ORAL at 08:37

## 2023-12-24 RX ADMIN — PIPERACILLIN SODIUM AND TAZOBACTAM SODIUM 4.5 G: 4; .5 INJECTION, SOLUTION INTRAVENOUS at 11:58

## 2023-12-24 RX ADMIN — CARVEDILOL 12.5 MG: 12.5 TABLET, FILM COATED ORAL at 08:37

## 2023-12-24 RX ADMIN — DOCUSATE SODIUM 300 MG: 50 LIQUID ORAL at 08:37

## 2023-12-24 NOTE — LETTER
EMS Transport Request  For use at [facility], [place], [place], [place], and [place] only   Patient Name: Viji Vargas : 1960   Weight:70.9 kg (156 lb 4.9 oz) Pick-up Location: [facility] BLS/ALS: BLS/ALS: BLS   Insurance: [place] MEDICAID Auth End Date:    Pre-Cert #: D/C Summary complete:    [facility]   Contact Precautions: Other contact, MDR acinetobacter   Equipment (O2, Fluids, etc.): O2, settings ?   Arrive By Date/Time:  or  afternoon Stretcher/WC:stretcher   CM Requesting: Aurora Davey RN Ext: [phone]   Notes/Medical Necessity: contracted, bed bound     ______________________________________________________________________    *Only 2 patient bags OR 1 carry-on size bag are permitted.  Wheelchairs and walkers CANNOT transported with the patient. Acknowledge: Yes

## 2023-12-24 NOTE — PROGRESS NOTES
[facility]    Name:  Viji Vargas   Age:  63 y.o.  Sex:  female  :  1960  MRN:  [id number]   Visit Number:  [id number]  Admission Date:  2023  Date Of Service:  23  Primary Care Physician:  Ranjeet Pina MD     LOS: 2 days :    Chief Complaint:      Respiratory distress     Subjective:    Patient was seen and examined bedside today.  Patient remains in ICU.  Patient is nonverbal and appears lethargic today.  Nursing at bedside, patient continued to have excessive secretions to her tracheostomy stoma which has been suctioned and appears with scant amount of blood along with mucus.  Leukocytosis trending down.  Afebrile, off pressors currently, on tracheostomy collar.  No other acute events overnight.    Hospital Course:    Patient is a 63 years old female nursing home resident, with history of cardiac arrest and anoxic brain injury in 2019, status post tracheostomy and PEG tube placement, COPD, hypertension was sent from the nursing home facility by EMS with symptoms of respiratory distress.  Patient apparently was saturating at 81% on 4 L trach collar when EMS arrived.  He was also running a fever.  Patient is nonverbal and unable to provide any history.  Patient was noted to have increased secretions and breathing difficulties on arrival to the ER. Patient is nonverbal and no review of systems were possible.      On evaluation evaluation, patient was febrile with a temperature of 102.3 Fahrenheit, heart rate 130s, tachypneic with a respiratory rate of 30s, blood pressure stable, patient on supplemental oxygen with trach collar. Patient was febrile was significant for ABG with a pH of 7.468/pCO2 39.7/pO2 130/HCO3 28.7/saturation 100% on 10 L of supplemental oxygen her labs were significant for WBC of 18.4, hemoglobin 11.3.  Glucose 165.  Pro-Teo and lactate WNL.  CMP otherwise within acceptable range.  COVID and flu negative.  Chest x-ray  showed moderate size right pneumothorax approximately 40% along with bibasilar atelectasis. Underlying pneumonia right lung base not excluded given asymmetry of opacification.  Urinalysis was positive for nitrates, leukocytes, WBC 11-20, bacteria 1+.  Chest tube (pigtail) was placed in the right anterior chest by ER provider with interval resolution of right apical pneumothorax following placement of right pleural catheter.  Blood cultures obtained.  Patien received normal saline boluses per sepsis protocol, started on Vanco and Zosyn and received Tylenol.  Case was discussed for transfer for pulmonology service that are not available currently in this hospital, patient discussed with Joyce Calloway by ER provider and placed on waiting list.  Hospitalist consulted for admission, further management and treatment.    Review of Systems:     All systems were reviewed and negative except as mentioned in subjective, assessment and plan.    Vital Signs:    Temp:  [97.7 °F (36.5 °C)-99.7 °F (37.6 °C)] 99 °F (37.2 °C)  Heart Rate:  [] 105  Resp:  [24-31] 26  BP: ()/() 134/77    Intake and output:    I/O last 3 completed shifts:  In: 6227 [I.V.:4405; Other:720; NG/GT:1102]  Out: 1700 [Urine:1650; Chest Tube:50]  No intake/output data recorded.    Physical Examination:    General Appearance:  Nonverbal   Head:  Atraumatic and normocephalic.   Eyes: Conjunctivae and sclerae normal, no icterus. No pallor.   Throat: No oral lesions, no thrush, oral mucosa moist.   Neck: Supple, trachea midline, no thyromegaly.  Tracheostomy noted.   Lungs:   Breath sounds heard bilaterally left more than right.  Decreased air movement bilaterally.  Bilateral crackles heard.  Increased work of breathing.  Prolonged expiration.  Chest tube (pigtail) in the right anterior chest noted.  No wheezing or crackles.  Excessive secretions on suctioning of tracheostomy.    Heart:  Normal S1 and S2, no murmur, no gallop, no rub. No JVD.    Abdomen:   Normal bowel sounds, no masses, no organomegaly. Soft, nontender, nondistended, no rebound tenderness.  PEG tube noted in place.   Extremities: Chronic contractures and deformity, no edema, no cyanosis, no clubbing.   Skin: No bleeding or rash.   Neurologic: Nonverbal.  Lethargic.  Chronic intellectual disability.  Severe upper and lower extremities contractures and spasticity noted.  GCS 8. No facial asymmetry. Moves all four limbs. No tremors.      Laboratory results:    Results from last 7 days   Lab Units 12/24/23  0355 12/23/23  0628 12/22/23  0820   SODIUM mmol/L 142 138 137   POTASSIUM mmol/L 3.4* 3.9 4.5   CHLORIDE mmol/L 110* 106 99   CO2 mmol/L 23.6 23.7 25.8   BUN mg/dL 10 11 19   CREATININE mg/dL 0.45* 0.46* 0.59   CALCIUM mg/dL 7.8* 8.7 9.4   BILIRUBIN mg/dL  --   --  0.5   ALK PHOS U/L  --   --  83   ALT (SGPT) U/L  --   --  17   AST (SGOT) U/L  --   --  22   GLUCOSE mg/dL 182* 144* 165*     Results from last 7 days   Lab Units 12/24/23  0355 12/23/23  0430 12/22/23  0820   WBC 10*3/mm3 13.36* 21.72* 18.48*   HEMOGLOBIN g/dL 8.2* 9.4* 11.3*   HEMATOCRIT % 25.8* 30.4* 34.0   PLATELETS 10*3/mm3 211 207 276             Results from last 7 days   Lab Units 12/22/23  1237 12/22/23  0856 12/22/23  0850   BLOODCX   --  Staphylococcus, coagulase negative* No growth at 24 hours   URINECX  Culture in progress  --   --      Recent Labs     05/05/23  2257 12/22/23  0846   PHART 7.419 7.468*   [id number] 49.4* 39.7   PO2ART 56.3* 130.0*   [id number] 32.0* 28.7*   BASEEXCESS 6.6* 4.7*      I have reviewed the patient's laboratory results.    Radiology results:    XR Chest 1 View    Result Date: 12/24/2023  PROCEDURE: XR CHEST 1 VW-  INDICATION:  Pneumonia follow-up; J18.9-Pneumonia, unspecified organism; A41.9-Sepsis, unspecified organism; J93.11-Primary spontaneous pneumothorax; N39.0-Urinary tract infection, site not specified  FINDINGS:  A portable view of the chest was obtained.  Comparison is made to a  prior exam dated 12/23/2020.   No change in support tubes and lines. Heart size is stable. A right pleural effusion, possibly loculated, is unchanged. Diffuse right lung opacity is stable. There is been interval improvement in left basilar airspace disease. No pneumothorax.      Impression: Improved left basilar airspace disease. No change in a right pleural effusion or right lung opacity.   This report was signed and finalized on 12/24/2023 8:27 AM by Maritza Casper MD.      XR Chest 1 View    Result Date: 12/23/2023  PROCEDURE: XR CHEST 1 VW-  INDICATION:  re-eval pigtail position; J18.9-Pneumonia, unspecified organism; A41.9-Sepsis, unspecified organism; J93.11-Primary spontaneous pneumothorax; N39.0-Urinary tract infection, site not specified  FINDINGS:  A portable view of the chest was obtained.  Comparison is made to a prior exam dated earlier the same day as well as a prior dated 12/22/2023.   The tracheostomy tube is unchanged. A right chest tube is again noted. A curl seen previously along the midportion of the tube is no longer present. The heart is enlarged. A right pleural effusion has increased in size rather significantly since yesterday. It may be partially loculated. There is worsening right greater than left lung opacities. No pneumothorax.      Impression: 1. Right chest tube has been uncurled since earlier today. 2. Rather significant increase in size in a right pleural effusion since yesterday. This may be partially loculated. 3. Worsening airspace disease bilaterally.   This report was signed and finalized on 12/23/2023 3:16 PM by Maritza Casper MD.      XR Chest 1 View    Result Date: 12/23/2023  PROCEDURE: XR CHEST 1 VW-  INDICATION:  Pneumothorax/ pneumonia follow-up; J18.9-Pneumonia, unspecified organism; A41.9-Sepsis, unspecified organism; J93.11-Primary spontaneous pneumothorax; N39.0-Urinary tract infection, site not specified  FINDINGS:  A portable view of the chest was obtained.   Comparison is made to a prior exam dated 12/22/2023.   A tracheostomy tube is unchanged. A small caliber chest tube is present at the right apex. There may be a kink in the tubing proximal to the pigtail. Heart size is stable. There has been interval increase in size of the right pleural effusion and there is worsening right lung opacity. No pneumothorax.      Impression: Worsening right pleural effusion and right lung opacity. There is a curling of the right chest tube proximal to the pigtail portion. A kink is not excluded. Please correlate with drainage.   This report was signed and finalized on 12/23/2023 10:42 AM by Maritza Casper MD.      XR Chest 1 View    Result Date: 12/22/2023  PROCEDURE: XR CHEST 1 VW-  HISTORY: post chest tube placement.  COMPARISON: 5/5/2023  FINDINGS:  Portable view of the chest demonstrates bibasilar atelectasis. Underlying pneumonia right lung base is not excluded.  There is been interval placement of right pigtail pleural catheter in the right apical region. Right pneumothorax has resolved. Tracheostomy tube is stable. The mediastinum is unremarkable.  The heart size is normal.      Impression: Interval resolution of right apical pneumothorax following right pleural catheter placement  This report was signed and finalized on 12/22/2023 10:34 AM by Jamie Jung MD.     I have reviewed the patient's radiology reports.    Medication Review:     I have reviewed the patient's active and prn medications.     Problem List:      Pneumonia    Pneumothorax, right      Assessment:    Acute on chronic hypoxic respiratory failure secondary to #2 and #4, POA  Bilateral pneumonia, unable to classify further, POA  Sepsis, secondary to #2, POA  Right-sided pneumothorax, spontaneous, status post chest tube, POA  Acute urinary tract infection, POA  History of cardiac arrest/anoxic brain injury.  Status post tracheostomy and PEG tube placement.  Essential hypertension.    Plan:    Respiratory failure  with hypoxia  Bilateral pneumonia/sepsis  -Continue supplemental oxygen to keep saturation above 90%, titrate down as able to. Currently on tracheostomy collar.  -Patient received IV fluids per sepsis protocol while in the ED  -Cover patient with vancomycin and Zosyn  -MRSA screen negative  -Follow-up on blood cultures, sputum cultures and urine cultures  -Patient with history of Pseudomonas on sputum cultures per her previous admission  -Monitor with ABG and chest x-ray as indicated  -WBC 13.3, Pro-Teo 0.31  -preliminary blood cultures showing staph spp in 1 bottle, likely contaminated  -Patient is currently off pressors  -Right femoral central line  -Patient on tube feeding, discontinue IV fluids  -No pulmonology coverage here.  I discussed the case with Dr. Motta, intensivist at [facility], who graciously accepted the patient for transfer, pending bed availability.  -repeat blood cultures on 12/24     Right-sided pneumothorax  -Status post chest tube (pigtail) on suction -20, appears to be with no airleak  -Dr. Pandey with general surgery consulted, appreciate recommendations.  -Continue treatment as per above  -Chest x-ray with improved left basilar airspace disease with no change in right pleural effusion/opacity.   -Monitor with chest x-ray     Acute UTI  -Continue with Zosyn  -Follow-up on urine cultures  -History of ESBL Klebsiella pneumonia on previous urine cultures     -Continue home meds as warranted.  -Further orders as indicated per clinical course.  -I discussed with her daughter Liliya Epperson again today.  I updated her on the clinical status of the patient.  Daughter was agreeable on the transfer.     DVT Prophylaxis: Lovenox prophylaxis (benefit> risk)  Code Status: Full  Diet: N.p.o., tube feeding  Discharge Plan: transfer to [facility], pending bed availability    Gisela Lagunas MD  12/24/23  09:15 EST    Dictated utilizing Dragon dictation.

## 2023-12-24 NOTE — THERAPY EVALUATION
PT order was received.  Evaluation is held, as patient is not medically appropriate.  PT will follow up at a later date.

## 2023-12-24 NOTE — CASE MANAGEMENT/SOCIAL WORK
Case Management Discharge Note                Selected Continued Care - Admitted Since 12/22/2023       Destination    No services have been selected for the patient.                Durable Medical Equipment    No services have been selected for the patient.                Dialysis/Infusion    No services have been selected for the patient.                Home Medical Care    No services have been selected for the patient.                Therapy    No services have been selected for the patient.                Community Resources    No services have been selected for the patient.                [facility]    No services have been selected for the patient.                    Transportation Services  Ambulance: [facility]    Final Discharge Disposition Code: 03 - skilled nursing facility (SNF)

## 2023-12-24 NOTE — PROGRESS NOTES
LOS: 2 days   Patient Care Team:  Ranjeet Pina MD as PCP - General (Family Medicine)  Noemy Garcia PA-C as PCP - Family Medicine ([facility])    Chief Complaint: Follow-up pneumothorax    Subjective     Interval History:     The nursing staff reports that the patient was able to be weaned off of pressors overnight.  Her oxygen requirement has also decreased.  Saturations remain in the mid to upper 90s.  Scant serosanguineous drainage from the chest tube despite frequent turns.      Review of Systems:   Review of systems could not be obtained due to  patient nonverbal.    Objective     Vital Signs  Temp:  [98.2 °F (36.8 °C)-99.7 °F (37.6 °C)] 98.8 °F (37.1 °C)  Heart Rate:  [] 98  Resp:  [25-31] 26  BP: ()/() 112/62    Chest  tube 50mL    Physical Exam: Patient seen and examined at the bedside on 12/24/2023        General Appearance:  Awake, nonverbal, appears ill   Head:    Normocephalic, without obvious abnormality, atraumatic   Eyes:            Lids and lashes normal, conjunctivae and sclerae normal, no icterus   Ears:    Ears appear intact with no abnormalities noted   Neck:  Tracheostomy in place with thick secretions with some blood tingeing in the suction.      Lungs:   Less tachypneic today.  Right anterior pigtail catheter chest tube in place to -20 cm H2O continuous suction without air leak noted.     Heart:    Regular rhythm and normal rate   Abdomen:     Soft, no apparent tenderness, non-distended, no guarding, no rebound   tenderness. PEG in place and appears appropriate   Genitalia:    Deferred   Extremities:   Contracted extremities   Pulses:   Pulses palpable and equal bilaterally   Skin:   No bleeding, bruising or rash   Neurologic:   Nonverbal     Results Review:    I reviewed the patient's new clinical results.    Narrative & Impression   PROCEDURE: XR CHEST 1 VW-     INDICATION:  Pneumonia follow-up; J18.9-Pneumonia, unspecified organism;  A41.9-Sepsis,  unspecified organism; J93.11-Primary spontaneous  pneumothorax; N39.0-Urinary tract infection, site not specified     FINDINGS:  A portable view of the chest was obtained.  Comparison is  made to a prior exam dated 12/23/2020.   No change in support tubes and  lines. Heart size is stable. A right pleural effusion, possibly  loculated, is unchanged. Diffuse right lung opacity is stable. There is  been interval improvement in left basilar airspace disease. No  pneumothorax.     IMPRESSION:  Improved left basilar airspace disease. No change in a right  pleural effusion or right lung opacity.        This report was signed and finalized on 12/24/2023 8:27 AM by Maritza Casper MD.            Results from last 7 days   Lab Units 12/24/23  0355 12/23/23  0628 12/22/23  0820   SODIUM mmol/L 142 138 137   POTASSIUM mmol/L 3.4* 3.9 4.5   CHLORIDE mmol/L 110* 106 99   CO2 mmol/L 23.6 23.7 25.8   BUN mg/dL 10 11 19   CREATININE mg/dL 0.45* 0.46* 0.59   CALCIUM mg/dL 7.8* 8.7 9.4   BILIRUBIN mg/dL  --   --  0.5   ALK PHOS U/L  --   --  83   ALT (SGPT) U/L  --   --  17   AST (SGOT) U/L  --   --  22   GLUCOSE mg/dL 182* 144* 165*       Results from last 7 days   Lab Units 12/24/23  0355 12/23/23  0430 12/22/23  0820   WBC 10*3/mm3 13.36* 21.72* 18.48*   HEMOGLOBIN g/dL 8.2* 9.4* 11.3*   HEMATOCRIT % 25.8* 30.4* 34.0   PLATELETS 10*3/mm3 211 207 276         Medication Review: Scheduled Meds:ALPRAZolam, 0.5 mg, Per G Tube, BID  baclofen, 10 mg, Per G Tube, Q8H  budesonide, 0.5 mg, Nebulization, BID - RT  carvedilol, 12.5 mg, Per G Tube, BID  cetirizine, 10 mg, Per G Tube, Daily  docusate sodium, 300 mg, Per G Tube, BID  enoxaparin, 40 mg, Subcutaneous, Daily  glycopyrrolate, 2 mg, Per G Tube, TID  ipratropium-albuterol, 3 mL, Nebulization, 4x Daily - RT  lansoprazole, 30 mg, Per G Tube, Q AM  montelukast, 10 mg, Per G Tube, Nightly  piperacillin-tazobactam, 4.5 g, Intravenous, Q8H  polyethylene glycol, 17 g, Per G Tube,  BID  Scopolamine, 1 patch, Transdermal, Q72H  sennosides, 10 mL, Per PEG Tube, Nightly  sodium chloride, 10 mL, Intravenous, Q12H  sodium chloride, 10 mL, Intravenous, Q12H  sodium chloride, 10 mL, Intravenous, Q12H  sodium chloride, 10 mL, Intravenous, Q12H  vancomycin, 1,000 mg, Intravenous, Q12H      Continuous Infusions:norepinephrine, 0.02-0.3 mcg/kg/min, Last Rate: Stopped (12/23/23 1650)  Pharmacy to Dose enoxaparin (LOVENOX),   Pharmacy to dose vancomycin,   Pharmacy to Dose Zosyn,       PRN Meds:.  acetaminophen **OR** acetaminophen **OR** acetaminophen    albuterol    LORazepam    Magnesium Standard Dose Replacement - Follow Nurse / BPA Driven Protocol    melatonin    nitroglycerin    ondansetron    Pharmacy to Dose enoxaparin (LOVENOX)    Pharmacy to dose vancomycin    Pharmacy to Dose Zosyn    Potassium Replacement - Follow Nurse / BPA Driven Protocol    sodium chloride    sodium chloride    sodium chloride    sodium chloride    sodium chloride    sodium chloride      Assessment & Plan       Pneumonia    Pneumothorax, right    Ms. Vargas is a 63-year-old female patient with multiple chronic problems as described above, admitted with hypoxic respiratory failure secondary to bilateral pneumonia with evidence of a spontaneous right-sided pneumothorax in the setting of underlying COPD.  She also appears to have an acute urinary tract infection with a history of ESBL Klebsiella pneumoniae on previous urine cultures.  From the standpoint of patient's pneumothorax, the patient said several days of chest x-rays demonstrating no evidence of recurrent or residual pneumothorax after initial placement of an apical pigtail catheter in the emergency department.  Unfortunately, she has developed the large right pleural effusion which is likely partially loculated, and has not improved or drained despite frequent repositioning to encourage drainage via the pigtail catheter.  Her respiratory status overall is stable to  minimally improved, and in fact, her oxygen needs have been decreasing and her saturations are acceptable.  I would consider placing a larger bore chest tube in a standard position for drainage of the pleural effusion.  If a larger bore tube was not successful in accomplishing drainage of the effusion, then a VATS procedure could be necessary. However, the nursing staff tells me that the patient has a pending bed at [facility] with hopes for transfer later today.  Given this fact, I would not proceed with placement of an additional, larger chest tube at this time.  Continue the current apical pigtail catheter to suction.    Nadira Pandey MD  12/24/23  12:05 EST

## 2023-12-24 NOTE — PROCEDURES
"Bronchoscopy    Date/Time: 12/24/2023 3:59 PM    Performed by: Oumar Motta MD  Authorized by: Oumar Motta MD  Consent: The procedure was performed in an emergent situation. Verbal consent obtained.  Risks and benefits: risks, benefits and alternatives were discussed  Consent given by: power of   Patient identity confirmed: arm band and provided demographic data  Time out: Immediately prior to procedure a \"time out\" was called to verify the correct patient, procedure, equipment, support staff and site/side marked as required.  Patient tolerance: patient tolerated the procedure well with no immediate complications  Comments: Indication: Pneumonia and suspected mucous plugging on the right side    Due to the above indication, therapeutic bronchoscopy was deemed necessary for transfer to our facility.  The flexible fiberoptic bronchoscope was passed without difficulty through the pre-existing tracheostomy tube.  The tracheostomy tube is patent.  The trachea appears patent and normal to the level of the joby which is seen to be sharp.  First proceeded to the left side and surveyed the left upper, the lingula, and the left lower lobes.  There are minimal retained bits of secretions in the left lower lobe which were easily suctioned.  There is severe dynamic bronchomalacia with breathing.  I then proceeded to the right side and encountered in the right mainstem and the right lower lobe a large amount of thick white secretions which were suctioned with some difficulty.  Again is seen bronchomalacia.  No endobronchial lesions were seen in the right upper, right middle, or right lower lobes.  Sputum was sent for culture.  The patient tolerated the procedure without any immediate complications.        "

## 2023-12-24 NOTE — NURSING NOTE
Daughter, Liliya Epperson, notified that bed available at [facility], and that patient would be transferred via EMS.

## 2023-12-24 NOTE — PLAN OF CARE
Goal Outcome Evaluation:                 Patient alert, opening eyes spontaneously, O2 > 92% with tracheostomy collar, sinus tachycardia, purulent/blood streaked secretions with trache suctioning, tachypneic respirations, and patient has slept in-between care throughout the shift.

## 2023-12-24 NOTE — DISCHARGE SUMMARY
[facility] HOSPITALIST   DISCHARGE SUMMARY      Name:  Viji Vargas   Age:  63 y.o.  Sex:  female  :  1960  MRN:  [id number]   Visit Number:  [id number]    Admission Date:  2023  Date of Discharge:  2023  Primary Care Physician:  Ranjeet Pina MD    Important issues to note:    -Patient transferred to [facility], under the care of intensivist Dr. Motta.    Discharge Diagnoses:     Acute on chronic hypoxic respiratory failure secondary to #2 and #4, POA  Bilateral pneumonia, unable to classify further, POA  Sepsis, secondary to #2, POA  Right-sided pneumothorax, spontaneous, status post chest tube, POA  Acute urinary tract infection, POA  Right pleural effusion  History of cardiac arrest/anoxic brain injury.  Status post tracheostomy and PEG tube placement.  Essential hypertension.    Problem List:     Active Hospital Problems    Diagnosis  POA    **Pneumonia [J18.9]  Yes    Pneumonia, unspecified organism [J18.9]  Yes    Pneumothorax, right [J93.9]  Yes      Resolved Hospital Problems   No resolved problems to display.     Presenting Problem:    Chief Complaint   Patient presents with    Shortness of Breath    Fever      Consults:     Consulting Physician(s)                     None              Procedures Performed:        History of presenting illness/Hospital Course:    Patient is a 63 years old female nursing home resident, with history of cardiac arrest and anoxic brain injury in 2019, status post tracheostomy and PEG tube placement, COPD, hypertension was sent from the nursing home facility by EMS with symptoms of respiratory distress.  Patient apparently was saturating at 81% on 4 L trach collar when EMS arrived.  He was also running a fever.  Patient is nonverbal and unable to provide any history.  Patient was noted to have increased secretions and breathing difficulties on arrival to the ER. Patient is nonverbal and no review of systems were  possible.      On evaluation evaluation, patient was febrile with a temperature of 102.3 Fahrenheit, heart rate 130s, tachypneic with a respiratory rate of 30s, blood pressure stable, patient on supplemental oxygen with trach collar. Patient was febrile was significant for ABG with a pH of 7.468/pCO2 39.7/pO2 130/HCO3 28.7/saturation 100% on 10 L of supplemental oxygen her labs were significant for WBC of 18.4, hemoglobin 11.3.  Glucose 165.  Pro-Teo and lactate WNL.  CMP otherwise within acceptable range.  COVID and flu negative.  Chest x-ray showed moderate size right pneumothorax approximately 40% along with bibasilar atelectasis. Underlying pneumonia right lung base not excluded given asymmetry of opacification.  Urinalysis was positive for nitrates, leukocytes, WBC 11-20, bacteria 1+.  Chest tube (pigtail) was placed in the right anterior chest by ER provider with interval resolution of right apical pneumothorax following placement of right pleural catheter.  Blood cultures obtained.  Patien received normal saline boluses per sepsis protocol, started on Vanco and Zosyn and received Tylenol.  Case was discussed for transfer for pulmonology service that are not available currently in this hospital, patient discussed with Joyce Calloway by ER provider and placed on waiting list.  Hospitalist consulted for admission, further management and treatment.    Respiratory failure with hypoxia  Bilateral pneumonia/sepsis  Right pleural effusion  -Continue supplemental oxygen to keep saturation above 90%, titrate down as able to. Currently on tracheostomy collar.  -Patient received IV fluids per sepsis protocol while in the ED  -Cover patient with vancomycin and Zosyn  -MRSA screen negative  -Patient with history of Pseudomonas on sputum cultures per her previous admission  -Monitor with ABG and chest x-ray as indicated  -WBC 13.3, Pro-Teo 0.31  -preliminary blood cultures showing staph spp in 1 bottle, probably  contaminated  -Patient is currently off pressors  -Right femoral central line  -Patient on tube feeding, discontinue IV fluids  -Follow-up on blood cultures, sputum cultures and urine cultures  -No pulmonology coverage here.  I discussed the case with Dr. Motta, intensivist at [facility], who graciously accepted the patient for transfer.  I discussed with the daughter over the phone and she was agreeable for the plan and transfer.    Right-sided pneumothorax  -Status post chest tube (pigtail) on suction -20, appears to be with no airleak  -Dr. Pandey with general surgery consulted, appreciate recommendations.  -Continue treatment as per above  -Chest x-ray with improved left basilar airspace disease with no change in right pleural effusion/opacity.   -Monitor with chest x-ray     Acute UTI  -Continue with Zosyn  -Follow-up on urine cultures, preliminary growing gram negative bacilli  -History of ESBL Klebsiella pneumonia on previous urine cultures    Vital Signs:    Temp:  [98.2 °F (36.8 °C)-99.7 °F (37.6 °C)] 98.8 °F (37.1 °C)  Heart Rate:  [] 98  Resp:  [25-31] 28  BP: ()/() 112/62    Physical Exam:    General Appearance:  Nonverbal   Head:  Atraumatic and normocephalic.   Eyes: Conjunctivae and sclerae normal, no icterus. No pallor.   Throat: No oral lesions, no thrush, oral mucosa moist.   Neck: Supple, trachea midline, no thyromegaly.  Tracheostomy noted.   Lungs:   Breath sounds heard bilaterally left more than right.  Decreased air movement bilaterally.  Bilateral crackles heard.  Increased work of breathing.  Prolonged expiration.  Chest tube (pigtail) in the right anterior chest noted.  No wheezing or crackles.  Excessive secretions on suctioning of tracheostomy.    Heart:  Normal S1 and S2, no murmur, no gallop, no rub. No JVD.   Abdomen:   Normal bowel sounds, no masses, no organomegaly. Soft, nontender, nondistended, no rebound tenderness.  PEG tube noted in place.    Extremities: Chronic contractures and deformity, no edema, no cyanosis, no clubbing.   Skin: No bleeding or rash.   Neurologic: Nonverbal.  Lethargic.  Chronic intellectual disability.  Severe upper and lower extremities contractures and spasticity noted.  GCS 8. No facial asymmetry. Moves all four limbs. No tremors.      Pertinent Lab Results:     Results from last 7 days   Lab Units 12/24/23  0355 12/23/23  0628 12/22/23  0820   SODIUM mmol/L 142 138 137   POTASSIUM mmol/L 3.4* 3.9 4.5   CHLORIDE mmol/L 110* 106 99   CO2 mmol/L 23.6 23.7 25.8   BUN mg/dL 10 11 19   CREATININE mg/dL 0.45* 0.46* 0.59   CALCIUM mg/dL 7.8* 8.7 9.4   BILIRUBIN mg/dL  --   --  0.5   ALK PHOS U/L  --   --  83   ALT (SGPT) U/L  --   --  17   AST (SGOT) U/L  --   --  22   GLUCOSE mg/dL 182* 144* 165*     Results from last 7 days   Lab Units 12/24/23  0355 12/23/23  0430 12/22/23  0820   WBC 10*3/mm3 13.36* 21.72* 18.48*   HEMOGLOBIN g/dL 8.2* 9.4* 11.3*   HEMATOCRIT % 25.8* 30.4* 34.0   PLATELETS 10*3/mm3 211 207 276                         Results from last 7 days   Lab Units 12/22/23  0846   PH, ARTERIAL pH units 7.468*   PO2 ART mm Hg 130.0*   PCO2, ARTERIAL mm Hg 39.7   HCO3 ART mmol/L 28.7*     Results from last 7 days   Lab Units 12/22/23  1237 12/22/23  0856 12/22/23  0850   BLOODCX   --  Staphylococcus, coagulase negative* No growth at 2 days   URINECX  50,000 CFU/mL Gram Negative Bacilli*  --   --        Pertinent Radiology Results:    Imaging Results (All)       Procedure Component Value Units Date/Time    XR Chest 1 View [[id number]] Collected: 12/24/23 0826     Updated: 12/24/23 0829    Narrative:      PROCEDURE: XR CHEST 1 VW-     INDICATION:  Pneumonia follow-up; J18.9-Pneumonia, unspecified organism;  A41.9-Sepsis, unspecified organism; J93.11-Primary spontaneous  pneumothorax; N39.0-Urinary tract infection, site not specified     FINDINGS:  A portable view of the chest was obtained.  Comparison is  made to a prior exam  dated 12/23/2020.   No change in support tubes and  lines. Heart size is stable. A right pleural effusion, possibly  loculated, is unchanged. Diffuse right lung opacity is stable. There is  been interval improvement in left basilar airspace disease. No  pneumothorax.       Impression:      Improved left basilar airspace disease. No change in a right  pleural effusion or right lung opacity.        This report was signed and finalized on 12/24/2023 8:27 AM by Maritza Casper MD.       XR Chest 1 View [[id number]] Collected: 12/23/23 1514     Updated: 12/23/23 1518    Narrative:      PROCEDURE: XR CHEST 1 VW-     INDICATION:  re-eval pigtail position; J18.9-Pneumonia, unspecified  organism; A41.9-Sepsis, unspecified organism; J93.11-Primary spontaneous  pneumothorax; N39.0-Urinary tract infection, site not specified     FINDINGS:  A portable view of the chest was obtained.  Comparison is  made to a prior exam dated earlier the same day as well as a prior dated  12/22/2023.   The tracheostomy tube is unchanged. A right chest tube is  again noted. A curl seen previously along the midportion of the tube is  no longer present. The heart is enlarged. A right pleural effusion has  increased in size rather significantly since yesterday. It may be  partially loculated. There is worsening right greater than left lung  opacities. No pneumothorax.       Impression:      1. Right chest tube has been uncurled since earlier today.  2. Rather significant increase in size in a right pleural effusion since  yesterday. This may be partially loculated.  3. Worsening airspace disease bilaterally.        This report was signed and finalized on 12/23/2023 3:16 PM by Maritza Casper MD.       XR Chest 1 View [[id number]] Collected: 12/23/23 1031     Updated: 12/23/23 1044    Narrative:      PROCEDURE: XR CHEST 1 VW-     INDICATION:  Pneumothorax/ pneumonia follow-up; J18.9-Pneumonia,  unspecified organism; A41.9-Sepsis, unspecified organism;  J93.11-Primary  spontaneous pneumothorax; N39.0-Urinary tract infection, site not  specified     FINDINGS:  A portable view of the chest was obtained.  Comparison is  made to a prior exam dated 12/22/2023.   A tracheostomy tube is  unchanged. A small caliber chest tube is present at the right apex.  There may be a kink in the tubing proximal to the pigtail. Heart size is  stable. There has been interval increase in size of the right pleural  effusion and there is worsening right lung opacity. No pneumothorax.       Impression:      Worsening right pleural effusion and right lung opacity.  There is a curling of the right chest tube proximal to the pigtail  portion. A kink is not excluded. Please correlate with drainage.        This report was signed and finalized on 12/23/2023 10:42 AM by Maritza Casper MD.       XR Chest 1 View [[id number]] Collected: 12/22/23 1033     Updated: 12/22/23 1036    Narrative:      PROCEDURE: XR CHEST 1 VW-     HISTORY: post chest tube placement.     COMPARISON: 5/5/2023     FINDINGS:  Portable view of the chest demonstrates bibasilar  atelectasis. Underlying pneumonia right lung base is not excluded.     There is been interval placement of right pigtail pleural catheter in  the right apical region. Right pneumothorax has resolved. Tracheostomy  tube is stable. The mediastinum is unremarkable.     The heart size is normal.       Impression:      Interval resolution of right apical pneumothorax following  right pleural catheter placement     This report was signed and finalized on 12/22/2023 10:34 AM by Jamie Jung MD.       XR Chest 1 View [[id number]] Collected: 12/22/23 0837     Updated: 12/22/23 0841    Narrative:      PROCEDURE: XR CHEST 1 VW-     HISTORY: Simple Sepsis Protocol     COMPARISON: 5/5/2023     FINDINGS:  Portable view of the chest demonstrates bibasilar opacities  likely atelectasis. Right basilar pneumonia is not excluded.     Moderate size right pneumothorax is  present new since prior exam.  Tracheostomy tube is unremarkable. The mediastinum is unremarkable.     The heart size is normal.       Impression:      1. Moderate size right pneumothorax approximately 40%  2. Bibasilar atelectasis. Underlying pneumonia right lung base not  excluded given asymmetry of opacification     Findings regarding pneumothorax called to the emergency department at  8:38 a.m.           This report was signed and finalized on 12/22/2023 8:39 AM by Jamie Jung MD.               Echo:    Results for orders placed during the hospital encounter of 03/10/19    Transthoracic Echo Complete With Contrast if Necessary Per Protocol    Interpretation Summary  Technically difficult study  1) Borderline LV size with mild reduction in LV systolic function ( EF is 45 to 50%)  2) Mild to moderate left atrial enlargement  3) Trace MR and TR with normal PA pressures  4) Borderline RV size with normal function  5) Global mild hypokinesis of the LV  6) Moderate calcific plaque along ascending aorta    Condition on Discharge:      Stable.    Code status during the hospital stay:    Code Status and Medical Interventions:   Ordered at: 12/22/23 1531     Code Status (Patient has no pulse and is not breathing):    CPR (Attempt to Resuscitate)     Medical Interventions (Patient has pulse or is breathing):    Full Support     Discharge Disposition:    Short Term Hospital (DC - External)    Discharge Medications:       Discharge Medications        New Medications        Instructions Start Date   acetaminophen 325 MG tablet  Commonly known as: TYLENOL   650 mg, Per G Tube, Every 4 Hours PRN      albuterol (2.5 MG/3ML) 0.083% nebulizer solution  Commonly known as: PROVENTIL   2.5 mg, Nebulization, Every 6 Hours PRN      Enoxaparin Sodium 40 MG/0.4ML solution prefilled syringe syringe  Commonly known as: LOVENOX   40 mg, Subcutaneous, Daily   Start Date: December 25, 2023     LORazepam 2 MG/ML injection  Commonly known  as: ATIVAN   1 mg, Intravenous, Every 4 Hours PRN      norepinephrine-dextrose 8-5 MG/250ML-% solution infusion  Commonly known as: LEVOPHED   0.02-0.3 mcg/kg/min (1.394-20.91 mcg/min), Intravenous, Titrated      piperacillin-tazobactam 4-0.5 GM/100ML solution IVPB  Commonly known as: ZOSYN   4.5 g, Intravenous, Every 8 Hours Scheduled, Infuse over 30 minutes      vancomycin   1,000 mg, Intravenous, Every 12 Hours             Continue These Medications        Instructions Start Date   ALPRAZolam 0.5 MG tablet  Commonly known as: XANAX   0.5 mg, Per G Tube, 2 Times Daily      baclofen 10 MG tablet  Commonly known as: LIORESAL   10 mg, Per G Tube, Every 8 Hours      budesonide 0.5 MG/2ML nebulizer solution  Commonly known as: PULMICORT   0.5 mg, Nebulization, 2 Times Daily - RT      carvedilol 12.5 MG tablet  Commonly known as: COREG   12.5 mg, Per G Tube, 2 Times Daily      cetirizine 10 MG tablet  Commonly known as: zyrTEC   10 mg, Per G Tube, Daily      docusate sodium 50 mg/5 mL liquid  Commonly known as: COLACE   300 mg, Oral, 2 Times Daily      glycopyrrolate 2 MG tablet  Commonly known as: ROBINUL   2 mg, Oral, 3 Times Daily      hyoscyamine 0.125 MG tablet  Commonly known as: ANASPAZ,LEVSIN   0.125 mg, Per G Tube, 3 Times Daily      ipratropium-albuterol 0.5-2.5 mg/3 ml nebulizer  Commonly known as: DUO-NEB   3 mL, Nebulization, 4 Times Daily - RT, Takes at [id number]      montelukast 10 MG tablet  Commonly known as: SINGULAIR   10 mg, Per G Tube, Nightly      multivitamin with iron-minerals (CENTRUM) liquid   15 mL, Oral, Daily      omeprazole 20 MG capsule  Commonly known as: priLOSEC   40 mg, 2 Times Daily      Petrolatum-Zinc Oxide 49-15 % ointment   1 application , Apply externally, 2 Times Daily PRN      polyethylene glycol 17 g packet  Commonly known as: MIRALAX   17 g, Oral, 2 Times Daily      Scopolamine 1 MG/3DAYS patch   1 patch, Transdermal, Every 72 Hours      senna 8.6 MG  tablet  Commonly known as: SENOKOT   2 tablets, Per PEG Tube, Nightly             Discharge Diet:     Diet Instructions       Diet: Tube Feeding; Continuous; Feeding Start Rate (mL/hr): 20; Then Advance Rate By (mL/hr): 10; Every __ Hours: 8; To Goal Rate of (mL/hr): 55;      Discharge Diet: Tube Feeding    Feeding Type: Continuous    Formula & Rate: Feeding Start Rate (mL/hr): 20; Then Advance Rate By (mL/hr): 10; Every __ Hours: 8; To Goal Rate of (mL/hr): 55;          Activity at Discharge:       Follow-up Appointments:     Follow-up Information       Ranjeet Pina MD .    Specialty: Family Medicine  Contact information:  [address] [facility] [address] [phone]               Noemy Garcia PA-C .    Specialties: Physician Assistant, Internal Medicine, Hospice and Palliative Medicine  Contact information:  [address] [facility] [facility] [address]  [facility] [address] [phone]                           No future appointments.  Test Results Pending at Discharge:    Pending Labs       Order Current Status    Acinetobacter Screen - Swab, Axilla, Right Preliminary result    Blood Culture - Blood, Hand, Left Preliminary result    Respiratory Culture - Sputum, ET Suction Preliminary result    Urine Culture - Urine, Urine, Catheter Preliminary result               Gisela Lagunas MD  12/24/23  13:22 EST    Time: I spent 38 minutes on this discharge activity which included: face-to-face encounter with the patient, reviewing the data in the system, coordination of the care with the nursing staff as well as consultants, documentation, and entering orders.     Dictated utilizing Dragon dictation.

## 2023-12-25 ENCOUNTER — APPOINTMENT (OUTPATIENT)
Dept: GENERAL RADIOLOGY | Facility: HOSPITAL | Age: 63
End: 2023-12-25
Payer: MEDICARE

## 2023-12-25 LAB
ACINETOBACTER SCREEN CX: NORMAL
ANION GAP SERPL CALCULATED.3IONS-SCNC: 8 MMOL/L (ref 5–15)
APPEARANCE FLD: ABNORMAL
BACTERIA SPEC AEROBE CULT: ABNORMAL
BACTERIA SPEC AEROBE CULT: NO GROWTH
BACTERIA SPEC RESP CULT: NORMAL
BUN SERPL-MCNC: 7 MG/DL (ref 8–23)
BUN/CREAT SERPL: 20.6 (ref 7–25)
CALCIUM SPEC-SCNC: 8.6 MG/DL (ref 8.6–10.5)
CHLORIDE SERPL-SCNC: 106 MMOL/L (ref 98–107)
CO2 SERPL-SCNC: 26 MMOL/L (ref 22–29)
COLOR FLD: ABNORMAL
CREAT SERPL-MCNC: 0.34 MG/DL (ref 0.57–1)
DEPRECATED RDW RBC AUTO: 45.9 FL (ref 37–54)
EGFRCR SERPLBLD CKD-EPI 2021: 114.4 ML/MIN/1.73
EOSINOPHIL NFR FLD MANUAL: 3 %
ERYTHROCYTE [DISTWIDTH] IN BLOOD BY AUTOMATED COUNT: 13 % (ref 12.3–15.4)
GLUCOSE FLD-MCNC: 18 MG/DL
GLUCOSE SERPL-MCNC: 135 MG/DL (ref 65–99)
HCT VFR BLD AUTO: 25.5 % (ref 34–46.6)
HGB BLD-MCNC: 8.3 G/DL (ref 12–15.9)
LDH FLD-CCNC: 1320 U/L
LYMPHOCYTES NFR FLD MANUAL: 5 %
MAGNESIUM SERPL-MCNC: 2 MG/DL (ref 1.6–2.4)
MCH RBC QN AUTO: 31.3 PG (ref 26.6–33)
MCHC RBC AUTO-ENTMCNC: 32.5 G/DL (ref 31.5–35.7)
MCV RBC AUTO: 96.2 FL (ref 79–97)
MONOCYTES NFR FLD: 5 %
NEUTROPHILS NFR FLD MANUAL: 87 %
PHOSPHATE SERPL-MCNC: 2.1 MG/DL (ref 2.5–4.5)
PLATELET # BLD AUTO: 254 10*3/MM3 (ref 140–450)
PMV BLD AUTO: 12 FL (ref 6–12)
POTASSIUM SERPL-SCNC: 3.5 MMOL/L (ref 3.5–5.2)
PROT FLD-MCNC: 3.8 G/DL
RBC # BLD AUTO: 2.65 10*6/MM3 (ref 3.77–5.28)
RBC # FLD AUTO: 7000 /MM3
SODIUM SERPL-SCNC: 140 MMOL/L (ref 136–145)
WBC # FLD AUTO: 1430 /MM3
WBC NRBC COR # BLD AUTO: 10.64 10*3/MM3 (ref 3.4–10.8)

## 2023-12-25 PROCEDURE — 71045 X-RAY EXAM CHEST 1 VIEW: CPT

## 2023-12-25 PROCEDURE — 85027 COMPLETE CBC AUTOMATED: CPT

## 2023-12-25 PROCEDURE — 89051 BODY FLUID CELL COUNT: CPT | Performed by: INTERNAL MEDICINE

## 2023-12-25 PROCEDURE — 87205 SMEAR GRAM STAIN: CPT | Performed by: INTERNAL MEDICINE

## 2023-12-25 PROCEDURE — 83615 LACTATE (LD) (LDH) ENZYME: CPT | Performed by: INTERNAL MEDICINE

## 2023-12-25 PROCEDURE — 94761 N-INVAS EAR/PLS OXIMETRY MLT: CPT

## 2023-12-25 PROCEDURE — 0W9930Z DRAINAGE OF RIGHT PLEURAL CAVITY WITH DRAINAGE DEVICE, PERCUTANEOUS APPROACH: ICD-10-PCS | Performed by: INTERNAL MEDICINE

## 2023-12-25 PROCEDURE — 82945 GLUCOSE OTHER FLUID: CPT | Performed by: INTERNAL MEDICINE

## 2023-12-25 PROCEDURE — 83735 ASSAY OF MAGNESIUM: CPT

## 2023-12-25 PROCEDURE — 80048 BASIC METABOLIC PNL TOTAL CA: CPT

## 2023-12-25 PROCEDURE — 32551 INSERTION OF CHEST TUBE: CPT | Performed by: INTERNAL MEDICINE

## 2023-12-25 PROCEDURE — 94799 UNLISTED PULMONARY SVC/PX: CPT

## 2023-12-25 PROCEDURE — 84157 ASSAY OF PROTEIN OTHER: CPT | Performed by: INTERNAL MEDICINE

## 2023-12-25 PROCEDURE — 87015 SPECIMEN INFECT AGNT CONCNTJ: CPT | Performed by: INTERNAL MEDICINE

## 2023-12-25 PROCEDURE — 87070 CULTURE OTHR SPECIMN AEROBIC: CPT | Performed by: INTERNAL MEDICINE

## 2023-12-25 PROCEDURE — 99233 SBSQ HOSP IP/OBS HIGH 50: CPT | Performed by: INTERNAL MEDICINE

## 2023-12-25 PROCEDURE — 84100 ASSAY OF PHOSPHORUS: CPT

## 2023-12-25 PROCEDURE — 25010000002 PIPERACILLIN SOD-TAZOBACTAM PER 1 G

## 2023-12-25 RX ORDER — POLYETHYLENE GLYCOL 3350 17 G/17G
17 POWDER, FOR SOLUTION ORAL DAILY PRN
Status: DISCONTINUED | OUTPATIENT
Start: 2023-12-25 | End: 2024-01-10 | Stop reason: HOSPADM

## 2023-12-25 RX ORDER — AMOXICILLIN 250 MG
2 CAPSULE ORAL 2 TIMES DAILY
Status: DISCONTINUED | OUTPATIENT
Start: 2023-12-25 | End: 2024-01-10 | Stop reason: HOSPADM

## 2023-12-25 RX ORDER — BISACODYL 10 MG
10 SUPPOSITORY, RECTAL RECTAL DAILY PRN
Status: DISCONTINUED | OUTPATIENT
Start: 2023-12-25 | End: 2024-01-10 | Stop reason: HOSPADM

## 2023-12-25 RX ORDER — BISACODYL 5 MG/1
5 TABLET, DELAYED RELEASE ORAL DAILY PRN
Status: DISCONTINUED | OUTPATIENT
Start: 2023-12-25 | End: 2023-12-26

## 2023-12-25 RX ORDER — FENTANYL CITRATE 50 UG/ML
50 INJECTION, SOLUTION INTRAMUSCULAR; INTRAVENOUS ONCE AS NEEDED
Status: DISCONTINUED | OUTPATIENT
Start: 2023-12-25 | End: 2023-12-27

## 2023-12-25 RX ADMIN — PIPERACILLIN SODIUM AND TAZOBACTAM SODIUM 3.38 G: 3; .375 INJECTION, SOLUTION INTRAVENOUS at 14:23

## 2023-12-25 RX ADMIN — Medication 10 ML: at 09:02

## 2023-12-25 RX ADMIN — PIPERACILLIN SODIUM AND TAZOBACTAM SODIUM 3.38 G: 3; .375 INJECTION, SOLUTION INTRAVENOUS at 05:09

## 2023-12-25 RX ADMIN — PIPERACILLIN SODIUM AND TAZOBACTAM SODIUM 3.38 G: 3; .375 INJECTION, SOLUTION INTRAVENOUS at 21:30

## 2023-12-25 RX ADMIN — Medication 10 ML: at 21:31

## 2023-12-25 NOTE — PROCEDURES
"Chest Tube Insertion    Date/Time: 12/25/2023 1:50 PM    Performed by: Oumar Motta MD  Authorized by: Oumar Motta MD  Consent: Verbal consent obtained.  Risks and benefits: risks, benefits and alternatives were discussed  Consent given by: power of   Patient identity confirmed: arm band  Time out: Immediately prior to procedure a \"time out\" was called to verify the correct patient, procedure, equipment, support staff and site/side marked as required.  Indications: pleural effusion    Sedation:  Patient sedated: no    Anesthesia: local infiltration    Anesthesia:  Local Anesthetic: lidocaine 1% without epinephrine  Anesthetic total: 10 mL  Preparation: sterile field established and skin prepped with chlorhexidine  Placement location: right lateral  Scalpel size: 11  Tube size (Serbian): 14.  Ultrasound guidance: yes  Tube connected to: suction  Drainage characteristics: serosanguinous  Drainage amount: 100 ml  Suture material: 0 silk  Dressing: 4x4 sterile gauze  Post-insertion x-ray findings: tube in good position  Patient tolerance: patient tolerated the procedure well with no immediate complications        "

## 2023-12-25 NOTE — PROGRESS NOTES
Intensive Care Follow-up     Hospital:  LOS: 1 day   Ms. Viji Vargas, 63 y.o. female is followed for:   Acute on chronic respiratory failure with hypoxia        Subjective     Viji Vargas is a 63 year-old female with history of cardiac arrest and anoxic brain injury in March 2019 s/p Trach and PEG placement, COPD, HTN and history of MDRs including ESBL Klebsiella that was originally admitted to [facility] through ED after being brought in by EMS on 12/22/23 for respiratory distress. She was hypoxic on 4 L NC trach collar and febrile. Noted large spontaneous pneumothorax on CXR with subsequent insertion of chest tube by ED physician. Cultures drawn, with urine culture growing gram negative bacilli and one blood culture bottle with staph thought to be a contaminate. She has been started on Vanc and Zosyn for broad spectrum coverage. Vasopressor support weaned off. She has continued to only require trach collar for supplemental O2.     Patient has developed right pleural effusion that is not draining from chest tube, concerning for loculated effusion. OSH provider felt the patient would benefit from bronchoscopy and Pulmonology was not available at OSH. Patient is being transferred for higher level of care.   Interval History:  The chart has been reviewed.  The patient has remained afebrile.  She is agitated and uncomfortable in bed.  I have reviewed the patient's CT imaging as well as a chest x-ray from today.  This discloses right-sided pleural effusion as well as consolidation in the right lower lobe versus atelectasis.  In addition, there is a right lateral relatively midlung loculated effusion.  Chest tube does not appear to be communicating with this.  There has been low outflow from the chest tube.  Currently growing Proteus mirabilis in the urine.  Respiratory culture from the bronchoscopy yesterday is pending though is showing a multitude of bacteria on stain.    The patient's past medical, surgical and  social history were reviewed and updated in Epic as appropriate.        Objective     Infusions:  norepinephrine, 0.02-0.3 mcg/kg/min, Last Rate: Stopped (12/24/23 2330)      Medications:  piperacillin-tazobactam, 3.375 g, Intravenous, Q8H  senna-docusate sodium, 2 tablet, Oral, BID  sodium chloride, 10 mL, Intravenous, Q12H        Vital Sign Min/Max for last 24 hours  Temp  Min: 97.4 °F (36.3 °C)  Max: 99.1 °F (37.3 °C)   BP  Min: 66/40  Max: 152/76   Pulse  Min: 81  Max: 106   Resp  Min: 24  Max: 34   SpO2  Min: 88 %  Max: 98 %   Flow (L/min)  Min: 12  Max: 12       Input/Output for last 24 hour shift  12/24 0701 - 12/25 0700  In: 30.9 [I.V.:30.9]  Out: 1485 [Urine:1470]      Objective:  General Appearance:  Uncomfortable and ill-appearing.    Vital signs: (most recent): Blood pressure (!) 150/103, pulse 101, temperature 98.5 °F (36.9 °C), temperature source Axillary, resp. rate 28, weight 71.9 kg (158 lb 8.2 oz), SpO2 90%.    HEENT: (Shiley tracheostomy in place.)    Lungs:  (Coarse bilateral breath sounds.)  Heart: Tachycardia.  Regular rhythm.  No murmur.   Chest: (Right anterior small bore thoracostomy tube in place.  No airleak.)  Abdomen: Abdomen is soft.  (Torrie ).  There is no abdominal tenderness.     Extremities: Normal range of motion.  There is no dependent edema.    Neurological: (Arousable.  Not following command.).    Pupils:  Pupils are equal, round, and reactive to light.    Skin:  Warm.                Results from last 7 days   Lab Units 12/25/23  0626 12/24/23  0355 12/23/23  0430   WBC 10*3/mm3 10.64 13.36* 21.72*   HEMOGLOBIN g/dL 8.3* 8.2* 9.4*   PLATELETS 10*3/mm3 254 211 207     Results from last 7 days   Lab Units 12/25/23  0626 12/24/23  0355 12/23/23  0628   SODIUM mmol/L 140 142 138   POTASSIUM mmol/L 3.5 3.4* 3.9   CO2 mmol/L 26.0 23.6 23.7   BUN mg/dL 7* 10 11   CREATININE mg/dL 0.34* 0.45* 0.46*   MAGNESIUM mg/dL 2.0  --   --    PHOSPHORUS mg/dL 2.1*  --   --    GLUCOSE mg/dL 135*  182* 144*     Estimated Creatinine Clearance: 160.4 mL/min (A) (by C-G formula based on SCr of 0.34 mg/dL (L)).    Results from last 7 days   Lab Units 12/22/23  0846   PH, ARTERIAL pH units 7.468*   PCO2, ARTERIAL mm Hg 39.7   PO2 ART mm Hg 130.0*         I reviewed the patient's results and images.     Assessment & Plan   Impression        Acute on chronic respiratory failure with hypoxia    Anoxic brain injury    Tracheostomy present    Pneumonia    Pneumothorax, right    UTI (urinary tract infection)    Bronchial pneumonia       Plan        Continue on with Zosyn for now and we will modify antibiotics as the sputum is returned.  I would like to perform at least a diagnostic thoracentesis and use ultrasound to try to access this loculated effusion.  She may require replacement of her chest tube.  It is unclear to me what the natural history of this loculation is as it does not appear to be clearly present on recent chest x-rays prior to the 22nd.  We will know a bit more once we are able to sample it.  Continue with current trach collar and airway support.  She will remain in the intensive care unit under close observation secondary to her very high risk of worsening from what I believe is acute pneumonia.    Plan of care and goals reviewed with mulitdisciplinary/antibiotic stewardship team during rounds.   I discussed the patient's findings and my recommendations with nursing staff     High level of risk due to:  drug(s) requiring intensive monitoring for toxicity and parenteral controlled substances.    Oumar Motta MD, [facility] [place]  Pulmonology and Critical Care Medicine

## 2023-12-25 NOTE — PLAN OF CARE
Goal Outcome Evaluation:        Problem: Skin Injury Risk Increased  Goal: Skin Health and Integrity  Outcome: Ongoing, Progressing  Intervention: Promote and Optimize Oral Intake  Intervention: Optimize Skin Protection     Problem: Fall Injury Risk  Goal: Absence of Fall and Fall-Related Injury  Outcome: Ongoing, Progressing  Intervention: Identify and Manage Contributors  Intervention: Promote Injury-Free Environment

## 2023-12-25 NOTE — PLAN OF CARE
Problem: Skin Injury Risk Increased  Goal: Skin Health and Integrity  Outcome: Ongoing, Progressing  Intervention: Optimize Skin Protection  Recent Flowsheet Documentation  Taken 12/25/2023 0200 by Emmett Kirby RN  Pressure Reduction Techniques:   heels elevated off bed   weight shift assistance provided  Head of Bed (HOB) Positioning: HOB at 30 degrees  Pressure Reduction Devices:   positioning supports utilized   pressure-redistributing mattress utilized  Skin Protection:   adhesive use limited   incontinence pads utilized   skin-to-device areas padded   tubing/devices free from skin contact  Taken 12/25/2023 0000 by Emmett Kirby RN  Pressure Reduction Techniques:   heels elevated off bed   weight shift assistance provided  Head of Bed (HOB) Positioning: HOB at 30 degrees  Pressure Reduction Devices:   positioning supports utilized   pressure-redistributing mattress utilized  Skin Protection:   adhesive use limited   incontinence pads utilized   skin-to-device areas padded   tubing/devices free from skin contact  Taken 12/24/2023 2200 by Emmett Kirby RN  Pressure Reduction Techniques:   heels elevated off bed   weight shift assistance provided  Head of Bed (\[facility]\"") Positioning: HOB at 30 degrees  Pressure Reduction Devices:   pressure-redistributing mattress utilized   specialty bed utilized  Skin Protection:   adhesive use limited   incontinence pads utilized   skin-to-device areas padded   tubing/devices free from skin contact  Taken 12/24/2023 2000 by Emmett Kirby RN  Pressure Reduction Techniques:   heels elevated off bed   weight shift assistance provided  Head of Bed (\[facility]\"") Positioning: HOB at 30 degrees  Pressure Reduction Devices:   positioning supports utilized   pressure-redistributing mattress utilized  Skin Protection:   adhesive use limited   incontinence pads utilized   skin-to-device areas padded   tubing/devices free from skin contact     Problem: Fall Injury Risk  Goal: Absence of Fall and Fall-Related  Injury  Outcome: Ongoing, Progressing  Intervention: Identify and Manage Contributors  Recent Flowsheet Documentation  Taken 12/24/2023 2000 by Emmett Kirby RN  Medication Review/Management: medications reviewed  Intervention: Promote Injury-Free Environment  Recent Flowsheet Documentation  Taken 12/25/2023 0200 by Emmett Kirby RN  Safety Promotion/Fall Prevention:   activity supervised   assistive device/personal items within reach   clutter free environment maintained   room organization consistent   safety round/check completed  Taken 12/25/2023 0000 by Emmett Kirby RN  Safety Promotion/Fall Prevention:   activity supervised   assistive device/personal items within reach   clutter free environment maintained   room organization consistent   safety round/check completed  Taken 12/24/2023 2200 by Emmett Kirby RN  Safety Promotion/Fall Prevention:   activity supervised   assistive device/personal items within reach   clutter free environment maintained   room organization consistent   safety round/check completed  Taken 12/24/2023 2000 by Emmett Kirby RN  Safety Promotion/Fall Prevention:   activity supervised   assistive device/personal items within reach   clutter free environment maintained   room organization consistent   safety round/check completed   Goal Outcome Evaluation:

## 2023-12-26 ENCOUNTER — APPOINTMENT (OUTPATIENT)
Dept: GENERAL RADIOLOGY | Facility: HOSPITAL | Age: 63
End: 2023-12-26
Payer: MEDICARE

## 2023-12-26 LAB
ANION GAP SERPL CALCULATED.3IONS-SCNC: 7 MMOL/L (ref 5–15)
BASOPHILS # BLD AUTO: 0.03 10*3/MM3 (ref 0–0.2)
BASOPHILS NFR BLD AUTO: 0.3 % (ref 0–1.5)
BUN SERPL-MCNC: 8 MG/DL (ref 8–23)
BUN/CREAT SERPL: 19.5 (ref 7–25)
CALCIUM SPEC-SCNC: 8.3 MG/DL (ref 8.6–10.5)
CHLORIDE SERPL-SCNC: 106 MMOL/L (ref 98–107)
CO2 SERPL-SCNC: 29 MMOL/L (ref 22–29)
CREAT SERPL-MCNC: 0.41 MG/DL (ref 0.57–1)
DEPRECATED RDW RBC AUTO: 46.5 FL (ref 37–54)
EGFRCR SERPLBLD CKD-EPI 2021: 109.3 ML/MIN/1.73
EOSINOPHIL # BLD AUTO: 0.43 10*3/MM3 (ref 0–0.4)
EOSINOPHIL NFR BLD AUTO: 5 % (ref 0.3–6.2)
ERYTHROCYTE [DISTWIDTH] IN BLOOD BY AUTOMATED COUNT: 13.1 % (ref 12.3–15.4)
GLUCOSE BLDC GLUCOMTR-MCNC: 155 MG/DL (ref 70–130)
GLUCOSE BLDC GLUCOMTR-MCNC: 160 MG/DL (ref 70–130)
GLUCOSE BLDC GLUCOMTR-MCNC: 177 MG/DL (ref 70–130)
GLUCOSE BLDC GLUCOMTR-MCNC: 197 MG/DL (ref 70–130)
GLUCOSE SERPL-MCNC: 166 MG/DL (ref 65–99)
HCT VFR BLD AUTO: 25.1 % (ref 34–46.6)
HGB BLD-MCNC: 8 G/DL (ref 12–15.9)
IMM GRANULOCYTES # BLD AUTO: 0.05 10*3/MM3 (ref 0–0.05)
IMM GRANULOCYTES NFR BLD AUTO: 0.6 % (ref 0–0.5)
LYMPHOCYTES # BLD AUTO: 1.6 10*3/MM3 (ref 0.7–3.1)
LYMPHOCYTES NFR BLD AUTO: 18.5 % (ref 19.6–45.3)
MAGNESIUM SERPL-MCNC: 1.9 MG/DL (ref 1.6–2.4)
MCH RBC QN AUTO: 31 PG (ref 26.6–33)
MCHC RBC AUTO-ENTMCNC: 31.9 G/DL (ref 31.5–35.7)
MCV RBC AUTO: 97.3 FL (ref 79–97)
MONOCYTES # BLD AUTO: 0.78 10*3/MM3 (ref 0.1–0.9)
MONOCYTES NFR BLD AUTO: 9 % (ref 5–12)
NEUTROPHILS NFR BLD AUTO: 5.74 10*3/MM3 (ref 1.7–7)
NEUTROPHILS NFR BLD AUTO: 66.6 % (ref 42.7–76)
NRBC BLD AUTO-RTO: 0 /100 WBC (ref 0–0.2)
PHOSPHATE SERPL-MCNC: 2.1 MG/DL (ref 2.5–4.5)
PHOSPHATE SERPL-MCNC: 2.6 MG/DL (ref 2.5–4.5)
PLATELET # BLD AUTO: 292 10*3/MM3 (ref 140–450)
PMV BLD AUTO: 12 FL (ref 6–12)
POTASSIUM SERPL-SCNC: 3.4 MMOL/L (ref 3.5–5.2)
POTASSIUM SERPL-SCNC: 3.8 MMOL/L (ref 3.5–5.2)
RBC # BLD AUTO: 2.58 10*6/MM3 (ref 3.77–5.28)
SODIUM SERPL-SCNC: 142 MMOL/L (ref 136–145)
WBC NRBC COR # BLD AUTO: 8.63 10*3/MM3 (ref 3.4–10.8)

## 2023-12-26 PROCEDURE — 94761 N-INVAS EAR/PLS OXIMETRY MLT: CPT

## 2023-12-26 PROCEDURE — 82948 REAGENT STRIP/BLOOD GLUCOSE: CPT

## 2023-12-26 PROCEDURE — 25010000002 PIPERACILLIN SOD-TAZOBACTAM PER 1 G

## 2023-12-26 PROCEDURE — 83735 ASSAY OF MAGNESIUM: CPT | Performed by: INTERNAL MEDICINE

## 2023-12-26 PROCEDURE — 80048 BASIC METABOLIC PNL TOTAL CA: CPT | Performed by: INTERNAL MEDICINE

## 2023-12-26 PROCEDURE — 71045 X-RAY EXAM CHEST 1 VIEW: CPT

## 2023-12-26 PROCEDURE — 25010000002 HEPARIN (PORCINE) PER 1000 UNITS: Performed by: INTERNAL MEDICINE

## 2023-12-26 PROCEDURE — 25010000002 PIPERACILLIN SOD-TAZOBACTAM PER 1 G: Performed by: INTERNAL MEDICINE

## 2023-12-26 PROCEDURE — 84100 ASSAY OF PHOSPHORUS: CPT | Performed by: INTERNAL MEDICINE

## 2023-12-26 PROCEDURE — 84132 ASSAY OF SERUM POTASSIUM: CPT | Performed by: INTERNAL MEDICINE

## 2023-12-26 PROCEDURE — 99233 SBSQ HOSP IP/OBS HIGH 50: CPT | Performed by: INTERNAL MEDICINE

## 2023-12-26 PROCEDURE — C1894 INTRO/SHEATH, NON-LASER: HCPCS

## 2023-12-26 PROCEDURE — 84100 ASSAY OF PHOSPHORUS: CPT

## 2023-12-26 PROCEDURE — 85025 COMPLETE CBC W/AUTO DIFF WBC: CPT | Performed by: INTERNAL MEDICINE

## 2023-12-26 PROCEDURE — C1751 CATH, INF, PER/CENT/MIDLINE: HCPCS

## 2023-12-26 RX ORDER — BACLOFEN 10 MG/1
10 TABLET ORAL EVERY 8 HOURS SCHEDULED
Status: DISCONTINUED | OUTPATIENT
Start: 2023-12-26 | End: 2024-01-10 | Stop reason: HOSPADM

## 2023-12-26 RX ORDER — SCOLOPAMINE TRANSDERMAL SYSTEM 1 MG/1
1 PATCH, EXTENDED RELEASE TRANSDERMAL
Status: DISCONTINUED | OUTPATIENT
Start: 2023-12-26 | End: 2024-01-10 | Stop reason: HOSPADM

## 2023-12-26 RX ORDER — POTASSIUM CHLORIDE 1.5 G/1.58G
40 POWDER, FOR SOLUTION ORAL EVERY 4 HOURS
Status: COMPLETED | OUTPATIENT
Start: 2023-12-26 | End: 2023-12-26

## 2023-12-26 RX ORDER — AMINO ACIDS/PROTEIN HYDROLYS 11G-40/45
30 LIQUID IN PACKET (ML) ORAL DAILY
Status: DISCONTINUED | OUTPATIENT
Start: 2023-12-27 | End: 2024-01-10 | Stop reason: HOSPADM

## 2023-12-26 RX ORDER — POLYETHYLENE GLYCOL 3350 17 G/17G
17 POWDER, FOR SOLUTION ORAL 2 TIMES DAILY
COMMUNITY

## 2023-12-26 RX ORDER — ACETAMINOPHEN 160 MG/5ML
650 SOLUTION ORAL EVERY 6 HOURS PRN
Status: DISCONTINUED | OUTPATIENT
Start: 2023-12-26 | End: 2023-12-27

## 2023-12-26 RX ORDER — BUMETANIDE 0.25 MG/ML
2 INJECTION INTRAMUSCULAR; INTRAVENOUS ONCE
Status: COMPLETED | OUTPATIENT
Start: 2023-12-26 | End: 2023-12-26

## 2023-12-26 RX ORDER — HEPARIN SODIUM 5000 [USP'U]/ML
5000 INJECTION, SOLUTION INTRAVENOUS; SUBCUTANEOUS EVERY 8 HOURS SCHEDULED
Status: DISCONTINUED | OUTPATIENT
Start: 2023-12-26 | End: 2024-01-10 | Stop reason: HOSPADM

## 2023-12-26 RX ORDER — CARVEDILOL 6.25 MG/1
6.25 TABLET ORAL EVERY 12 HOURS SCHEDULED
Status: DISCONTINUED | OUTPATIENT
Start: 2023-12-26 | End: 2024-01-10 | Stop reason: HOSPADM

## 2023-12-26 RX ORDER — GLYCOPYRROLATE 1 MG/1
2 TABLET ORAL 3 TIMES DAILY
Status: DISCONTINUED | OUTPATIENT
Start: 2023-12-26 | End: 2024-01-10 | Stop reason: HOSPADM

## 2023-12-26 RX ORDER — FAMOTIDINE 40 MG/5ML
20 POWDER, FOR SUSPENSION ORAL 2 TIMES DAILY
COMMUNITY

## 2023-12-26 RX ORDER — AMINO ACIDS/PROTEIN HYDROLYS 11G-40/45
30 LIQUID IN PACKET (ML) ORAL DAILY
Status: DISCONTINUED | OUTPATIENT
Start: 2023-12-26 | End: 2023-12-26

## 2023-12-26 RX ADMIN — PIPERACILLIN SODIUM AND TAZOBACTAM SODIUM 3.38 G: 3; .375 INJECTION, SOLUTION INTRAVENOUS at 05:57

## 2023-12-26 RX ADMIN — CARVEDILOL 6.25 MG: 6.25 TABLET, FILM COATED ORAL at 21:01

## 2023-12-26 RX ADMIN — GLYCOPYRROLATE 2 MG: 1 TABLET ORAL at 21:00

## 2023-12-26 RX ADMIN — GLYCOPYRROLATE 2 MG: 1 TABLET ORAL at 15:57

## 2023-12-26 RX ADMIN — BUMETANIDE 2 MG: 0.25 INJECTION, SOLUTION INTRAMUSCULAR; INTRAVENOUS at 10:40

## 2023-12-26 RX ADMIN — SCOPOLAMINE 1 PATCH: 1.5 PATCH, EXTENDED RELEASE TRANSDERMAL at 15:57

## 2023-12-26 RX ADMIN — POTASSIUM CHLORIDE 40 MEQ: 1.5 POWDER, FOR SOLUTION ORAL at 10:40

## 2023-12-26 RX ADMIN — Medication 10 ML: at 21:01

## 2023-12-26 RX ADMIN — BACLOFEN 10 MG: 10 TABLET ORAL at 14:49

## 2023-12-26 RX ADMIN — POTASSIUM CHLORIDE 40 MEQ: 1.5 POWDER, FOR SOLUTION ORAL at 14:19

## 2023-12-26 RX ADMIN — PIPERACILLIN SODIUM AND TAZOBACTAM SODIUM 4.5 G: 4; .5 INJECTION, SOLUTION INTRAVENOUS at 14:49

## 2023-12-26 RX ADMIN — POTASSIUM & SODIUM PHOSPHATES POWDER PACK 280-160-250 MG 2 PACKET: 280-160-250 PACK at 10:40

## 2023-12-26 RX ADMIN — HEPARIN SODIUM 5000 UNITS: 5000 INJECTION INTRAVENOUS; SUBCUTANEOUS at 21:00

## 2023-12-26 RX ADMIN — HEPARIN SODIUM 5000 UNITS: 5000 INJECTION INTRAVENOUS; SUBCUTANEOUS at 14:12

## 2023-12-26 RX ADMIN — Medication 30 ML: at 14:13

## 2023-12-26 RX ADMIN — BACLOFEN 10 MG: 10 TABLET ORAL at 21:00

## 2023-12-26 RX ADMIN — PIPERACILLIN SODIUM AND TAZOBACTAM SODIUM 4.5 G: 4; .5 INJECTION, SOLUTION INTRAVENOUS at 21:00

## 2023-12-26 NOTE — PROGRESS NOTES
Intensive Care Follow-up     Hospital:  LOS: 2 days   Ms. Viji Vargas, 63 y.o. female is followed for:   Acute on chronic respiratory failure with hypoxia        Subjective     Viji Vargas is a 63 year-old female with history of cardiac arrest and anoxic brain injury in March 2019 s/p Trach and PEG placement, COPD, HTN and history of MDRs including ESBL Klebsiella that was originally admitted to [facility] through ED after being brought in by EMS on 12/22/23 for respiratory distress. She was hypoxic on 4 L NC trach collar and febrile. Noted large spontaneous pneumothorax on CXR with subsequent insertion of chest tube by ED physician. Cultures drawn, with urine culture growing gram negative bacilli and one blood culture bottle with staph thought to be a contaminate. She has been started on Vanc and Zosyn for broad spectrum coverage. Vasopressor support weaned off. She has continued to only require trach collar for supplemental O2.     Patient has developed right pleural effusion that is not draining from chest tube, concerning for loculated effusion. OSH provider felt the patient would benefit from bronchoscopy and Pulmonology was not available at OSH. Patient is being transferred for higher level of care.    Pigtail thoracostomy placed in the right fourth interspace on 12/25/2023 and the right anterior chest tube was removed.  Interval History:  The chart has been reviewed.  The patient has remained afebrile.  Secretion burden remains heavy however it is now white and frothy compared to dark thick.  She has had approximately 300 mL from her thoracostomy tube which was placed yesterday.  Chest x-ray has been reviewed which shows lessened effusion however there is still significant loculated component to this.  Fluid itself upon withdrawal was serosanguineous and did not appear to be infected.  Patient is still requiring upwards of 60% trach collars.  When lessened, she desaturates quickly.    The patient's past  medical, surgical and social history were reviewed and updated in Epic as appropriate.        Objective     Infusions:  hold, 1 each  norepinephrine, 0.02-0.3 mcg/kg/min, Last Rate: Stopped (12/24/23 2330)      Medications:  bumetanide, 2 mg, Intravenous, Once  heparin (porcine), 5,000 Units, Subcutaneous, Q8H  piperacillin-tazobactam, 3.375 g, Intravenous, Q8H  senna-docusate sodium, 2 tablet, Per G Tube, BID  sodium chloride, 10 mL, Intravenous, Q12H        Vital Sign Min/Max for last 24 hours  Temp  Min: 97.8 °F (36.6 °C)  Max: 99.1 °F (37.3 °C)   BP  Min: 93/53  Max: 149/96   Pulse  Min: 81  Max: 112   Resp  Min: 26  Max: 36   SpO2  Min: 91 %  Max: 100 %   Flow (L/min)  Min: 12  Max: 12       Input/Output for last 24 hour shift  12/25 0701 - 12/26 0700  In: 896.4 [I.V.:24]  Out: 1535 [Urine:1455]      Objective:  General Appearance:  Uncomfortable, ill-appearing and in no acute distress.    Vital signs: (most recent): Blood pressure 116/83, pulse 102, temperature 99 °F (37.2 °C), temperature source Axillary, resp. rate 28, weight 69.7 kg (153 lb 10.6 oz), SpO2 91%.    HEENT: (Shiley tracheostomy in place.)    Lungs:  There are rhonchi.  No wheezes.  (Coarse bilateral breath sounds.)  Heart: Tachycardia.  Regular rhythm.  No murmur.   Chest: (Right thoracostomy tube in place.  No airleak.  Currently 20 cm water suction)  Abdomen: Abdomen is soft.  (Torrie ).  Bowel sounds are normal.   There is no abdominal tenderness.     Extremities: Normal range of motion.  There is no dependent edema.    Neurological: (Arousable.  Not following command.).    Pupils:  Pupils are equal, round, and reactive to light.    Skin:  Warm.  No rash.               Results from last 7 days   Lab Units 12/25/23  0626 12/24/23  0355 12/23/23  0430   WBC 10*3/mm3 10.64 13.36* 21.72*   HEMOGLOBIN g/dL 8.3* 8.2* 9.4*   PLATELETS 10*3/mm3 254 211 207     Results from last 7 days   Lab Units 12/26/23  0551 12/25/23  0626 12/24/23  0355   SODIUM  mmol/L 142 140 142   POTASSIUM mmol/L 3.4* 3.5 3.4*   CO2 mmol/L 29.0 26.0 23.6   BUN mg/dL 8 7* 10   CREATININE mg/dL 0.41* 0.34* 0.45*   MAGNESIUM mg/dL 1.9 2.0  --    PHOSPHORUS mg/dL 2.1* 2.1*  --    GLUCOSE mg/dL 166* 135* 182*     Estimated Creatinine Clearance: 131.1 mL/min (A) (by C-G formula based on SCr of 0.41 mg/dL (L)).    Results from last 7 days   Lab Units 12/22/23  0846   PH, ARTERIAL pH units 7.468*   PCO2, ARTERIAL mm Hg 39.7   PO2 ART mm Hg 130.0*         I reviewed the patient's results and images.     Assessment & Plan   Impression        Acute on chronic respiratory failure with hypoxia    Anoxic brain injury    Tracheostomy present    Pneumonia    Pneumothorax, right    UTI (urinary tract infection)    Bronchial pneumonia       Plan        Will replace all electrolytes.  Bumex 2 mg IV x 1 today.  Due to poor IV access and probable need for long-term antimicrobial therapy, we will have a PICC line placed if possible.  Maintain right thoracostomy tube for now and we will see if we can get the remainder of this to drain.  Based upon the CT scan of the chest, the apparently loculated area does appear to have at least some communication to the lower component of the pleural effusion so this may drain for us.  Otherwise, we may need a further chest tube to be placed.  I suspect that this would need to be done under CT guidance.  Continue with current antimicrobial therapy.  We are awaiting sputum cultures and hopefully this will help us clarify.  DVT prophylaxis as before.  Initiate tube feedings.  Patient is at very high risk of worsening secondary to acute respiratory failure, pneumonia, pleural effusion, and underlying anoxic injury predisposing to recurrent pneumonias.    Plan of care and goals reviewed with mulitdisciplinary/antibiotic stewardship team during rounds.   I discussed the patient's findings and my recommendations with nursing staff     High level of risk due to:  drug(s) requiring  intensive monitoring for toxicity and parenteral controlled substances.    Oumar Motta MD, [facility]  Pulmonology and Critical Care Medicine

## 2023-12-26 NOTE — CONSULTS
PICC attempted in RUE without successful thread into the chest. Patient is extremely contracted which could be hindering turn over the shoulder.  Left UE has nothing suitable to hold a 4FR picc.  Sorry unable to help.

## 2023-12-26 NOTE — PROGRESS NOTES
Multidisciplinary Rounds EN Review Note    Patient Name: Viji Vargas  Date of Encounter: 23 15:42 EST  MRN: [id number]  Admission date: 2023    Reason for visit: EN review . RD to continue to follow per protocol.     EMR reviewed   Medication reviewed  Labs reviewed Phos 2.1, K 3.4    Estimated/Assessed Needs (23):      Energy: 1500 kcal  Protein: 79 gm  Fluids: per clinical status / to maintain normal sodium    Additional Information Obtained:   Pt with PEG/EN as sole source of nutrition since 2019. EN re-initiated yesterday upon transfer here and pt now tolerating at goal. Phos and K low and replaced. RN reports frequent stools started last night.     Current diet: NPO Diet NPO Type: Strict NPO    EN: IsoSource 1.5  Goal Rate: 50 ml/hr    Water Flushes: 25 ml/hr  Modular: Prosource -no carb 1/day  Route: PEG  Tube: 20 PEG    At goal over: 20Hrs/day    Rx will supply:   Goal Volume 1000 mL/day     Flush Volume 500 mL/day     Energy 1500 Kcal/day 104 % Est Need   Protein 83 g/day 105 % Est Need   Fiber 15 g/day     Water in   mL     Total Water 1260 mL     Meet DRI Yes        --------------------------------------------------------------------------  Product/Rate verified at bedside: Yes  Infusing Rate at time of visit: 50 ml/hr    Average Delivery from [facility] Day: (while advancing)  Volume 446 mL/day   % Goal Vol.   Flush Volume 335 mL/day     Energy  Kcal/day  % Est Need   Protein  g/day  % Est Need   Fiber  g/day     Water in  EN  mL     Total Water  mL     Meet DRI No            Intervention:  Follow treatment plan  Care plan reviewed    Continue EN per order / as tolerated    Monitor bowels- hesitant to add fiber at this time as pt previously on fiber free formula, do not want to increase too fast. Should improve with EN infusing at goal now- if no improvement will add more    Follow up:   Per protocol      Katie Jenkins RD, CNSC  15:42 EST  Time: 15min

## 2023-12-26 NOTE — PLAN OF CARE
Goal Outcome Evaluation:         Neuro remains unchanged. NSR/ST HR 95-130s. Copious white frothy secretions. Periods of desat to 85%. Trach collar at 80% FiO2. Freq deep suctioning needed. Tachypnea and increase work of breathing noted. MD aware. Tf at goal. Multiple large liquid Bms. Unable to place PICC. See Note. Fem TLDL still in place. Adequate UOP after Bumex given. No visitors at bedside. Spoke to POA via phone. Update given.

## 2023-12-26 NOTE — PLAN OF CARE
Problem: Skin Injury Risk Increased  Goal: Skin Health and Integrity  Outcome: Ongoing, Progressing  Intervention: Optimize Skin Protection  Recent Flowsheet Documentation  Taken 12/26/2023 0600 by Emmett Kirby RN  Pressure Reduction Techniques:   heels elevated off bed   weight shift assistance provided  Head of Bed (HOB) Positioning: HOB at 30 degrees  Pressure Reduction Devices:   positioning supports utilized   pressure-redistributing mattress utilized  Skin Protection:   adhesive use limited   incontinence pads utilized   skin-to-device areas padded   tubing/devices free from skin contact  Taken 12/26/2023 0400 by Emmett Kirby RN  Pressure Reduction Techniques:   heels elevated off bed   weight shift assistance provided  Head of Bed ([facility]) Positioning: HOB at 30 degrees  Pressure Reduction Devices:   positioning supports utilized   pressure-redistributing mattress utilized  Skin Protection:   adhesive use limited   incontinence pads utilized   skin-to-device areas padded   tubing/devices free from skin contact  Taken 12/26/2023 0200 by Emmett Kirby RN  Pressure Reduction Techniques:   heels elevated off bed   weight shift assistance provided  Head of Bed ([facility]) Positioning: HOB at 30 degrees  Pressure Reduction Devices:   positioning supports utilized   pressure-redistributing mattress utilized  Skin Protection:   adhesive use limited   incontinence pads utilized   skin-to-device areas padded   tubing/devices free from skin contact  Taken 12/26/2023 0000 by Emmett Kirby RN  Pressure Reduction Techniques:   heels elevated off bed   weight shift assistance provided  Head of Bed ([facility]) Positioning: HOB at 30 degrees  Pressure Reduction Devices:   positioning supports utilized   pressure-redistributing mattress utilized  Skin Protection:   adhesive use limited   incontinence pads utilized   skin-to-device areas padded   tubing/devices free from skin contact  Taken 12/25/2023 2200 by Emmett Kirby RN  Pressure Reduction  Techniques:   heels elevated off bed   weight shift assistance provided  Head of Bed (HOB) Positioning: HOB at 30 degrees  Pressure Reduction Devices:   positioning supports utilized   pressure-redistributing mattress utilized  Skin Protection:   adhesive use limited   incontinence pads utilized   skin-to-device areas padded   tubing/devices free from skin contact  Taken 12/25/2023 2000 by Emmett Kirby RN  Pressure Reduction Techniques:   heels elevated off bed   weight shift assistance provided  Head of Bed (HOB) Positioning: HOB at 30 degrees  Pressure Reduction Devices:   positioning supports utilized   pressure-redistributing mattress utilized   heel offloading device utilized  Skin Protection:   adhesive use limited   incontinence pads utilized   skin-to-device areas padded   tubing/devices free from skin contact     Problem: Fall Injury Risk  Goal: Absence of Fall and Fall-Related Injury  Outcome: Ongoing, Progressing  Intervention: Identify and Manage Contributors  Recent Flowsheet Documentation  Taken 12/25/2023 2000 by Emmett Kirby RN  Medication Review/Management: medications reviewed  Intervention: Promote Injury-Free Environment  Recent Flowsheet Documentation  Taken 12/26/2023 0600 by Emmett Kirby RN  Safety Promotion/Fall Prevention:   activity supervised   assistive device/personal items within reach   clutter free environment maintained   room organization consistent   safety round/check completed  Taken 12/26/2023 0400 by Emmett Kirby RN  Safety Promotion/Fall Prevention:   activity supervised   assistive device/personal items within reach   clutter free environment maintained   room organization consistent   safety round/check completed  Taken 12/26/2023 0200 by Emmett Kirby RN  Safety Promotion/Fall Prevention:   activity supervised   clutter free environment maintained   assistive device/personal items within reach   room organization consistent   safety round/check completed  Taken 12/26/2023 0000 by  Mirta, Emmett, RN  Safety Promotion/Fall Prevention:   activity supervised   assistive device/personal items within reach   clutter free environment maintained   room organization consistent   safety round/check completed  Taken 12/25/2023 2200 by Emmett Kirby RN  Safety Promotion/Fall Prevention:   activity supervised   assistive device/personal items within reach   clutter free environment maintained   room organization consistent   safety round/check completed  Taken 12/25/2023 2000 by Emmett Kirby RN  Safety Promotion/Fall Prevention:   activity supervised   assistive device/personal items within reach   clutter free environment maintained   room organization consistent   safety round/check completed     Problem: Adjustment to Illness (Sepsis/Septic Shock)  Goal: Optimal Coping  Outcome: Ongoing, Progressing     Problem: Bleeding (Sepsis/Septic Shock)  Goal: Absence of Bleeding  Outcome: Ongoing, Progressing  Intervention: Monitor and Manage Bleeding  Recent Flowsheet Documentation  Taken 12/26/2023 0600 by Emmett Kirby RN  Bleeding Precautions: blood pressure closely monitored  Taken 12/26/2023 0400 by Emmett Kirby RN  Bleeding Precautions: blood pressure closely monitored  Taken 12/26/2023 0200 by Emmett Kirby RN  Bleeding Precautions: blood pressure closely monitored  Taken 12/26/2023 0000 by Emmett Kirby RN  Bleeding Precautions: blood pressure closely monitored  Taken 12/25/2023 2000 by Emmett Kirby RN  Bleeding Precautions: blood pressure closely monitored     Problem: Glycemic Control Impaired (Sepsis/Septic Shock)  Goal: Blood Glucose Level Within Desired Range  Outcome: Ongoing, Progressing     Problem: Infection Progression (Sepsis/Septic Shock)  Goal: Absence of Infection Signs and Symptoms  Outcome: Ongoing, Progressing  Intervention: Initiate Sepsis Management  Recent Flowsheet Documentation  Taken 12/26/2023 0600 by Emmett Kirby RN  Infection Prevention: environmental surveillance performed  Taken 12/26/2023  0400 by Emmett Kirby RN  Infection Prevention: rest/sleep promoted  Taken 12/26/2023 0200 by Emmett Kirby RN  Infection Prevention: rest/sleep promoted  Isolation Precautions:   precautions maintained   contact  Taken 12/26/2023 0000 by Emmett Kirby RN  Infection Prevention: rest/sleep promoted  Isolation Precautions:   precautions maintained   contact  Taken 12/25/2023 2200 by Emmett Kirby RN  Infection Prevention: rest/sleep promoted  Taken 12/25/2023 2000 by Emmett Kirby RN  Infection Prevention: environmental surveillance performed  Isolation Precautions:   precautions maintained   contact  Intervention: Promote Recovery  Recent Flowsheet Documentation  Taken 12/26/2023 0600 by Emmett Kirby RN  Activity Management: bedrest  Taken 12/26/2023 0400 by Emmett Kirby RN  Activity Management: bedrest  Taken 12/26/2023 0200 by Emmett Kirby RN  Activity Management: bedrest  Taken 12/26/2023 0000 by Emmett Kirby RN  Activity Management: bedrest  Taken 12/25/2023 2000 by Emmett Kirby RN  Activity Management: bedrest     Problem: Nutrition Impaired (Sepsis/Septic Shock)  Goal: Optimal Nutrition Intake  Outcome: Ongoing, Progressing   Goal Outcome Evaluation:

## 2023-12-26 NOTE — CASE MANAGEMENT/SOCIAL WORK
Discharge Planning Assessment  [facility]     Patient Name: Viji Vargas  MRN: [id number]  Today's Date: 12/26/2023    Admit Date: 12/24/2023    Plan: [facility]   Discharge Needs Assessment       Row Name 12/26/23 1036       Living Environment    People in Home facility resident    Unique Family Situation [facility] for 3 to 4 years    Current Living Arrangements extended care facility    Potentially Unsafe Housing Conditions none    In the past 12 months has the electric, gas, oil, or water company threatened to shut off services in your home? No    Primary Care Provided by other (see comments)    Provides Primary Care For no one, unable/limited ability to care for self    Family Caregiver if Needed child(aliyah), adult    Family Caregiver Names Liliya Epperson (daughter/POA) [phone]    Able to Return to Prior Arrangements yes       Resource/Environmental Concerns    Resource/Environmental Concerns none    Transportation Concerns other (see comments)       Transportation Needs    In the past 12 months, has lack of transportation kept you from medical appointments or from getting medications? no    In the past 12 months, has lack of transportation kept you from meetings, work, or from getting things needed for daily living? No       Food Insecurity    Within the past 12 months, you worried that your food would run out before you got the money to buy more. Never true    Within the past 12 months, the food you bought just didn't last and you didn't have money to get more. Never true       Transition Planning    Patient/Family Anticipates Transition to long-term care facility    Patient/Family Anticipated Services at Transition none    Transportation Anticipated health plan transportation       Discharge Needs Assessment    Readmission Within the Last 30 Days no previous admission in last 30 days    Equipment Currently Used at Home hospital bed;feeding device;nutrition  supplies;respiratory supplies;trach supplies;oxygen    Concerns to be Addressed denies needs/concerns at this time    Anticipated Changes Related to Illness none    Equipment Needed After Discharge none    Discharge Facility/Level of Care Needs nursing facility, intermediate                   Discharge Plan       Row Name 12/26/23 1038       Plan    [facility]    Patient/Family in Agreement with Plan yes    Plan Comments Spoke with daughter by phone. Lives at [facility] in [facility]. Pt has been there 3-4 years. Contact is Liliya Epperson (daughter/POA) [phone]. Was completely dependent with ADL's. No problems with [place] Medicaid or medications. Uses a hosptial bed, feeding device, resp and trach supplies and oxygen at home. Has advanced directives. PCP is Ranjeet Pina MD. Plan is back to [facility]. Will need ambulance transport. CM will continue to follow.    Final Discharge Disposition Code 04 - intermediate care facility                  Continued Care and Services - Admitted Since 12/24/2023       Destination Coordination complete.      Service Provider Request Status Selected Services Address Phone Fax Patient Preferred    [facility]  Selected Nursing Home [address] [place] [id number] [phone] --                  Selected Continued Care - Prior Encounters Includes continued care and service providers with selected services from prior encounters from 9/25/2023 to 12/26/2023      Discharged on 12/24/2023 Admission date: 12/22/2023 - Discharge disposition: Short Term Hospital (DC - External)      Destination       Service Provider Selected Services Address Phone Fax Patient Preferred    [facility] [address] [facility] [address] [phone] --                             Demographic Summary       Row Name 12/26/23 1035       General Information     Admission Type inpatient    Arrived From hospital    Referral Source admission list    Reason for Consult discharge planning    Preferred Language English       Contact Information    Permission Granted to Share Info With     Contact Information Obtained for                    Functional Status       Row Name 12/26/23 1035       Functional Status    Usual Activity Tolerance poor    Current Activity Tolerance poor    Functional Status Comments Mostly bedbound       Functional Status, IADL    Medications completely dependent    Meal Preparation completely dependent    Housekeeping completely dependent    Laundry completely dependent    Shopping completely dependent       Mental Status    General Appearance WDL WDL       Mental Status Summary    Recent Changes in Mental Status/Cognitive Functioning unable to assess       Employment/    Employment Status retired                   Psychosocial    No documentation.                  Abuse/Neglect    No documentation.                  Legal    No documentation.                  Substance Abuse    No documentation.                  Patient Forms    No documentation.                     Silvestre Dukes RN

## 2023-12-27 ENCOUNTER — APPOINTMENT (OUTPATIENT)
Dept: GENERAL RADIOLOGY | Facility: HOSPITAL | Age: 63
End: 2023-12-27
Payer: MEDICARE

## 2023-12-27 LAB
BACTERIA SPEC AEROBE CULT: NORMAL
BACTERIA SPEC RESP CULT: ABNORMAL
BACTERIA SPEC RESP CULT: ABNORMAL
GLUCOSE BLDC GLUCOMTR-MCNC: 169 MG/DL (ref 70–130)
GLUCOSE BLDC GLUCOMTR-MCNC: 171 MG/DL (ref 70–130)
GLUCOSE BLDC GLUCOMTR-MCNC: 175 MG/DL (ref 70–130)
GLUCOSE BLDC GLUCOMTR-MCNC: 186 MG/DL (ref 70–130)
GLUCOSE BLDC GLUCOMTR-MCNC: 205 MG/DL (ref 70–130)
GRAM STN SPEC: ABNORMAL
REF LAB TEST METHOD: NORMAL

## 2023-12-27 PROCEDURE — 25010000002 MEROPENEM PER 100 MG: Performed by: INTERNAL MEDICINE

## 2023-12-27 PROCEDURE — 71045 X-RAY EXAM CHEST 1 VIEW: CPT

## 2023-12-27 PROCEDURE — 25010000002 HEPARIN (PORCINE) PER 1000 UNITS: Performed by: INTERNAL MEDICINE

## 2023-12-27 PROCEDURE — 25010000002 PIPERACILLIN SOD-TAZOBACTAM PER 1 G: Performed by: INTERNAL MEDICINE

## 2023-12-27 PROCEDURE — 63710000001 INSULIN REGULAR HUMAN PER 5 UNITS: Performed by: NURSE PRACTITIONER

## 2023-12-27 PROCEDURE — 99233 SBSQ HOSP IP/OBS HIGH 50: CPT | Performed by: INTERNAL MEDICINE

## 2023-12-27 PROCEDURE — 94799 UNLISTED PULMONARY SVC/PX: CPT

## 2023-12-27 PROCEDURE — 94761 N-INVAS EAR/PLS OXIMETRY MLT: CPT

## 2023-12-27 PROCEDURE — 82948 REAGENT STRIP/BLOOD GLUCOSE: CPT

## 2023-12-27 RX ORDER — ACETAMINOPHEN 160 MG/5ML
650 SOLUTION ORAL EVERY 6 HOURS PRN
Status: DISCONTINUED | OUTPATIENT
Start: 2023-12-27 | End: 2024-01-10 | Stop reason: HOSPADM

## 2023-12-27 RX ORDER — NICOTINE POLACRILEX 4 MG
15 LOZENGE BUCCAL
Status: DISCONTINUED | OUTPATIENT
Start: 2023-12-27 | End: 2023-12-27

## 2023-12-27 RX ORDER — IBUPROFEN 600 MG/1
1 TABLET ORAL
Status: DISCONTINUED | OUTPATIENT
Start: 2023-12-27 | End: 2024-01-10 | Stop reason: HOSPADM

## 2023-12-27 RX ORDER — BUMETANIDE 0.25 MG/ML
2 INJECTION INTRAMUSCULAR; INTRAVENOUS ONCE
Status: DISCONTINUED | OUTPATIENT
Start: 2023-12-27 | End: 2023-12-29

## 2023-12-27 RX ORDER — DEXTROSE MONOHYDRATE 25 G/50ML
25 INJECTION, SOLUTION INTRAVENOUS
Status: DISCONTINUED | OUTPATIENT
Start: 2023-12-27 | End: 2024-01-10 | Stop reason: HOSPADM

## 2023-12-27 RX ADMIN — GLYCOPYRROLATE 2 MG: 1 TABLET ORAL at 21:16

## 2023-12-27 RX ADMIN — Medication 10 ML: at 13:12

## 2023-12-27 RX ADMIN — HEPARIN SODIUM 5000 UNITS: 5000 INJECTION INTRAVENOUS; SUBCUTANEOUS at 05:23

## 2023-12-27 RX ADMIN — INSULIN HUMAN 2 UNITS: 100 INJECTION, SOLUTION PARENTERAL at 13:11

## 2023-12-27 RX ADMIN — Medication 30 ML: at 09:05

## 2023-12-27 RX ADMIN — INSULIN HUMAN 2 UNITS: 100 INJECTION, SOLUTION PARENTERAL at 18:11

## 2023-12-27 RX ADMIN — PIPERACILLIN SODIUM AND TAZOBACTAM SODIUM 4.5 G: 4; .5 INJECTION, SOLUTION INTRAVENOUS at 05:22

## 2023-12-27 RX ADMIN — INSULIN HUMAN 2 UNITS: 100 INJECTION, SOLUTION PARENTERAL at 05:35

## 2023-12-27 RX ADMIN — GLYCOPYRROLATE 2 MG: 1 TABLET ORAL at 18:11

## 2023-12-27 RX ADMIN — CARVEDILOL 6.25 MG: 6.25 TABLET, FILM COATED ORAL at 21:08

## 2023-12-27 RX ADMIN — BACLOFEN 10 MG: 10 TABLET ORAL at 21:08

## 2023-12-27 RX ADMIN — MEROPENEM 1000 MG: 1 INJECTION, POWDER, FOR SOLUTION INTRAVENOUS at 13:11

## 2023-12-27 RX ADMIN — MEROPENEM 1000 MG: 1 INJECTION, POWDER, FOR SOLUTION INTRAVENOUS at 18:10

## 2023-12-27 RX ADMIN — Medication 10 ML: at 22:09

## 2023-12-27 RX ADMIN — GLYCOPYRROLATE 2 MG: 1 TABLET ORAL at 09:05

## 2023-12-27 RX ADMIN — INSULIN HUMAN 3 UNITS: 100 INJECTION, SOLUTION PARENTERAL at 00:30

## 2023-12-27 RX ADMIN — BACLOFEN 10 MG: 10 TABLET ORAL at 14:39

## 2023-12-27 RX ADMIN — HEPARIN SODIUM 5000 UNITS: 5000 INJECTION INTRAVENOUS; SUBCUTANEOUS at 21:08

## 2023-12-27 RX ADMIN — BACLOFEN 10 MG: 10 TABLET ORAL at 05:23

## 2023-12-27 NOTE — PLAN OF CARE
"Goal Outcome Evaluation:  Pt with unchanged neuro exam, 15L and 50% trach collar with sats 92-95%. Tracheal suctioning multiple times this shift, large thick tan secretions. Low grade temp throuighout the day. Blood pressures difficult to obtain in upper extremities with monitor reading artifact frequently\"/ Placed cuff on RLE and able to record Bps. Bumex ordered but not given due to low pressures, providers aware. Notified of pt bronchial washing testing positive for Acinetobacter Baumanii, placed in contact precautions. Continue plan of care.                       "

## 2023-12-27 NOTE — PROGRESS NOTES
Multidisciplinary Rounds EN Review Note    Patient Name: Viji Vargas  Date of Encounter: 23 14:20 EST  MRN: [id number]  Admission date: 2023    Reason for visit: EN review . RD to continue to follow per protocol.     EMR reviewed   Medication reviewed  Labs reviewed     Estimated/Assessed Needs (23):      Energy: 1500 kcal  Protein: 79 gm  Fluids: per clinical status / to maintain normal sodium    Additional Information Obtained:   Tolerating EN at goal.     Current diet: NPO Diet NPO Type: Strict NPO    EN: IsoSource 1.5  Goal Rate: 50 ml/hr    Water Flushes: 25 ml/hr  Modular: Prosource -no carb 1/day  Route: PEG  Tube: 20 PEG    At goal over: 20Hrs/day    Rx will supply:   Goal Volume 1000 mL/day     Flush Volume 500 mL/day     Energy 1500 Kcal/day 104 % Est Need   Protein 83 g/day 105 % Est Need   Fiber 15 g/day     Water in   mL     Total Water 1260 mL     Meet DRI Yes        --------------------------------------------------------------------------  Product/Rate verified at bedside: Yes  Infusing Rate at time of visit: 50 ml/hr    Average Delivery from Chartin Day: (while advancing)  Volume 446 mL/day   % Goal Vol.   Flush Volume 335 mL/day     Energy  Kcal/day  % Est Need   Protein  g/day  % Est Need   Fiber  g/day     Water in  EN  mL     Total Water  mL     Meet DRI No            Intervention:  Follow treatment plan  Care plan reviewed    Continue EN per order / as tolerated    Monitor bowels- hesitant to add fiber at this time as pt previously on fiber free formula, do not want to increase too fast. Should improve with EN infusing at goal now- if no improvement will add more    Follow up:   Per protocol      Katie Jenkins RD, [facility]  14:21 EST  Time: 15min

## 2023-12-27 NOTE — PROGRESS NOTES
Intensive Care Follow-up     Hospital:  LOS: 3 days   Ms. Viji Vargas, 63 y.o. female is followed for:   Acute on chronic respiratory failure with hypoxia        Subjective     Viji Vargas is a 63 year-old female with history of cardiac arrest and anoxic brain injury in March 2019 s/p Trach and PEG placement, COPD, HTN and history of MDRs including ESBL Klebsiella that was originally admitted to [facility] through ED after being brought in by EMS on 12/22/23 for respiratory distress. She was hypoxic on 4 L NC trach collar and febrile. Noted large spontaneous pneumothorax on CXR with subsequent insertion of chest tube by ED physician. Cultures drawn, with urine culture growing gram negative bacilli and one blood culture bottle with staph thought to be a contaminate. She has been started on Vanc and Zosyn for broad spectrum coverage. Vasopressor support weaned off. She has continued to only require trach collar for supplemental O2.     Patient has developed right pleural effusion that is not draining from chest tube, concerning for loculated effusion. OSH provider felt the patient would benefit from bronchoscopy and Pulmonology was not available at OSH. Patient is being transferred for higher level of care.     Pigtail thoracostomy placed in the right fourth interspace on 12/25/2023 and the right anterior chest tube was removed.  Interval History:  The chart has been reviewed.  The patient has had a maximum temperature of 100.6 °F.  She is much more calm at this time.  Of note, her baclofen was restarted yesterday.  Secretions remain quite heavy.  Currently growing Acinetobacter with multidrug resistance from the sputum.  Good diuresis overnight.    The patient's past medical, surgical and social history were reviewed and updated in Epic as appropriate.        Objective     Infusions:     Medications:  baclofen, 10 mg, Per PEG Tube, Q8H  bumetanide, 2 mg, Intravenous, Once  carvedilol, 6.25 mg, Per PEG Tube,  Q12H  glycopyrrolate, 2 mg, Per PEG Tube, TID  heparin (porcine), 5,000 Units, Subcutaneous, Q8H  insulin regular, 2-7 Units, Subcutaneous, Q6H  meropenem, 1,000 mg, Intravenous, Once  meropenem, 1,000 mg, Intravenous, Q8H  ProSource No Carb, 30 mL, Per PEG Tube, Daily  Scopolamine, 1 patch, Transdermal, Q72H  senna-docusate sodium, 2 tablet, Per G Tube, BID  sodium chloride, 10 mL, Intravenous, Q12H        Vital Sign Min/Max for last 24 hours  Temp  Min: 99.3 °F (37.4 °C)  Max: 100.6 °F (38.1 °C)   BP  Min: 84/71  Max: 188/78   Pulse  Min: 78  Max: 124   Resp  Min: 24  Max: 28   SpO2  Min: 88 %  Max: 100 %   Flow (L/min)  Min: 15  Max: 15       Input/Output for last 24 hour shift  12/26 0701 - 12/27 0700  In: 1648 [I.V.:10]  Out: 3295 [Urine:3175]      Objective:  General Appearance:  Uncomfortable, ill-appearing and in no acute distress.    Vital signs: (most recent): Blood pressure 100/72, pulse 101, temperature 99.3 °F (37.4 °C), temperature source Bladder, resp. rate 24, weight 69.7 kg (153 lb 10.6 oz), SpO2 92%.    HEENT: (Shiley tracheostomy in place.)    Lungs:  Normal respiratory rate.  There are rhonchi.  No wheezes.  (Coarse bilateral breath sounds.)  Heart: Tachycardia.  Regular rhythm.  No murmur.   Chest: (Right thoracostomy tube in place.  No airleak.  Currently 20 cm water suction)  Abdomen: Abdomen is soft.  (Peg).  Bowel sounds are normal.   There is no abdominal tenderness.     Extremities: Normal range of motion.  There is no dependent edema.    Neurological: (Arousable.  Not following command.).    Pupils:  Pupils are equal, round, and reactive to light.    Skin:  Warm.  No rash.               Results from last 7 days   Lab Units 12/26/23  0551 12/25/23  0626 12/24/23  0355   WBC 10*3/mm3 8.63 10.64 13.36*   HEMOGLOBIN g/dL 8.0* 8.3* 8.2*   PLATELETS 10*3/mm3 292 254 211     Results from last 7 days   Lab Units 12/26/23  2042 12/26/23  0551 12/25/23  0626 12/24/23  0355   SODIUM mmol/L  --  142  140 142   POTASSIUM mmol/L 3.8 3.4* 3.5 3.4*   CO2 mmol/L  --  29.0 26.0 23.6   BUN mg/dL  --  8 7* 10   CREATININE mg/dL  --  0.41* 0.34* 0.45*   MAGNESIUM mg/dL  --  1.9 2.0  --    PHOSPHORUS mg/dL 2.6 2.1* 2.1*  --    GLUCOSE mg/dL  --  166* 135* 182*     Estimated Creatinine Clearance: 131.1 mL/min (A) (by C-G formula based on SCr of 0.41 mg/dL (L)).    Results from last 7 days   Lab Units 12/22/23  0846   PH, ARTERIAL pH units 7.468*   PCO2, ARTERIAL mm Hg 39.7   PO2 ART mm Hg 130.0*         I reviewed the patient's results and images.     Assessment & Plan   Impression        Acute on chronic respiratory failure with hypoxia    Anoxic brain injury    Tracheostomy present    Pneumonia    Pneumothorax, right    UTI (urinary tract infection)    Bronchial pneumonia       Plan        She has had a good diuresis and respiratory status has improved somewhat.  We will give a further dose of Bumex today.  Discontinue Zosyn and we will intensify to meropenem given the findings of Acinetobacter on cultures.  Continue with good pulmonary hygiene.  Maintain right thoracostomy tube for now.  Marginal improvement in the effusion.  It is likely that she may need a second chest tube placed by interventional radiology with CT guidance if there is no further improvement as it is likely that this is loculated.  Continue to wean oxygen as tolerated.  She will remain in the intensive care unit today.    Plan of care and goals reviewed with mulitdisciplinary/antibiotic stewardship team during rounds.   I discussed the patient's findings and my recommendations with nursing staff     High level of risk due to:  drug(s) requiring intensive monitoring for toxicity and parenteral controlled substances.    Oumar Motta MD, [facility]  Pulmonology and Critical Care Medicine

## 2023-12-27 NOTE — PLAN OF CARE
Progress: Pt remains at baseline (nonverbal, unable to follow commands, contracted). O2 at 15L 50% on trach collar.     Problem: Skin Injury Risk Increased  Goal: Skin Health and Integrity  Outcome: Ongoing, Progressing  Intervention: Optimize Skin Protection  Recent Flowsheet Documentation  Taken 12/27/2023 0400 by Emmett Kirby RN  Pressure Reduction Techniques: weight shift assistance provided  Head of Bed (HOB) Positioning: HOB at 30 degrees  Pressure Reduction Devices:   positioning supports utilized   pressure-redistributing mattress utilized  Skin Protection:   adhesive use limited   incontinence pads utilized   skin-to-device areas padded   tubing/devices free from skin contact  Taken 12/27/2023 0200 by Emmett Kirby RN  Pressure Reduction Techniques: weight shift assistance provided  Head of Bed (HOB) Positioning: HOB at 30 degrees  Pressure Reduction Devices:   positioning supports utilized   pressure-redistributing mattress utilized  Skin Protection:   adhesive use limited   incontinence pads utilized   skin-to-device areas padded   tubing/devices free from skin contact  Taken 12/27/2023 0030 by Emmett Kirby RN  Pressure Reduction Techniques:   pressure points protected   weight shift assistance provided  Head of Bed (HOB) Positioning: HOB at 30 degrees  Pressure Reduction Devices:   positioning supports utilized   pressure-redistributing mattress utilized  Skin Protection:   adhesive use limited   incontinence pads utilized   skin-to-device areas padded   tubing/devices free from skin contact  Taken 12/26/2023 2200 by Emmett Kirby RN  Pressure Reduction Techniques: weight shift assistance provided  Head of Bed (HOB) Positioning: HOB at 30 degrees  Pressure Reduction Devices:   positioning supports utilized   pressure-redistributing mattress utilized  Skin Protection:   adhesive use limited   skin-to-device areas padded   tubing/devices free from skin contact   incontinence pads utilized  Taken 12/26/2023 2000  by Mirta, Emmett, RN  Pressure Reduction Techniques:   weight shift assistance provided   positioned off wounds  Head of Bed (HOB) Positioning: HOB at 30 degrees  Pressure Reduction Devices:   pressure-redistributing mattress utilized   positioning supports utilized   specialty bed utilized  Skin Protection:   adhesive use limited   incontinence pads utilized   skin-to-device areas padded   tubing/devices free from skin contact     Problem: Fall Injury Risk  Goal: Absence of Fall and Fall-Related Injury  Outcome: Ongoing, Progressing  Intervention: Identify and Manage Contributors  Recent Flowsheet Documentation  Taken 12/26/2023 2000 by Emmett Kirby RN  Medication Review/Management: medications reviewed  Intervention: Promote Injury-Free Environment  Recent Flowsheet Documentation  Taken 12/27/2023 0400 by Emmett Kirby RN  Safety Promotion/Fall Prevention:   activity supervised   assistive device/personal items within reach   clutter free environment maintained   room organization consistent   safety round/check completed  Taken 12/27/2023 0200 by Emmett Kirby RN  Safety Promotion/Fall Prevention:   activity supervised   assistive device/personal items within reach   clutter free environment maintained   room organization consistent   safety round/check completed  Taken 12/27/2023 0030 by Emmett Kirby RN  Safety Promotion/Fall Prevention:   activity supervised   assistive device/personal items within reach   clutter free environment maintained   room organization consistent   safety round/check completed  Taken 12/26/2023 2200 by Emmett Kirby RN  Safety Promotion/Fall Prevention:   activity supervised   assistive device/personal items within reach   clutter free environment maintained   room organization consistent   safety round/check completed  Taken 12/26/2023 2000 by Emmett Kirby RN  Safety Promotion/Fall Prevention:   activity supervised   assistive device/personal items within reach   clutter free environment  maintained   room organization consistent   safety round/check completed     Problem: Adjustment to Illness (Sepsis/Septic Shock)  Goal: Optimal Coping  Outcome: Ongoing, Progressing     Problem: Bleeding (Sepsis/Septic Shock)  Goal: Absence of Bleeding  Outcome: Ongoing, Progressing  Intervention: Monitor and Manage Bleeding  Recent Flowsheet Documentation  Taken 12/27/2023 0400 by Emmett Kirby RN  Bleeding Precautions: blood pressure closely monitored  Taken 12/27/2023 0200 by Emmett Kirby RN  Bleeding Precautions: blood pressure closely monitored  Taken 12/27/2023 0030 by Emmett Kirby RN  Bleeding Precautions: blood pressure closely monitored  Taken 12/26/2023 2000 by Emmett Kirby RN  Bleeding Precautions: blood pressure closely monitored     Problem: Glycemic Control Impaired (Sepsis/Septic Shock)  Goal: Blood Glucose Level Within Desired Range  Outcome: Ongoing, Progressing  Intervention: Optimize Glycemic Control  Recent Flowsheet Documentation  Taken 12/26/2023 2000 by Emmett Kirby RN  Glycemic Management: blood glucose monitored     Problem: Infection Progression (Sepsis/Septic Shock)  Goal: Absence of Infection Signs and Symptoms  Outcome: Ongoing, Progressing  Intervention: Initiate Sepsis Management  Recent Flowsheet Documentation  Taken 12/27/2023 0400 by Emmett Kirby RN  Infection Prevention: rest/sleep promoted  Taken 12/27/2023 0200 by Emmett Kirby RN  Infection Prevention: rest/sleep promoted  Taken 12/27/2023 0030 by Emmett Kirby RN  Infection Prevention: rest/sleep promoted  Taken 12/26/2023 2200 by Emmett Kirby RN  Infection Prevention: rest/sleep promoted  Taken 12/26/2023 2000 by Emmett Kirby RN  Infection Prevention: environmental surveillance performed  Intervention: Promote Recovery  Recent Flowsheet Documentation  Taken 12/27/2023 0400 by Emmett Kirby RN  Activity Management: bedrest  Taken 12/27/2023 0200 by Emmett Kirby RN  Activity Management: bedrest  Taken 12/27/2023 0030 by Emmett Kirby  RN  Activity Management: bedrest  Taken 12/26/2023 2200 by Emmett Kirby RN  Activity Management: bedrest  Taken 12/26/2023 2000 by Emmett Kirby RN  Activity Management: bedrest  Airway/Ventilation Support: pulmonary hygiene promoted  Intervention: Promote Stabilization  Recent Flowsheet Documentation  Taken 12/26/2023 2000 by Emmett Kirby, RN  Lung Protection Measures: fluid excess minimized     Problem: Nutrition Impaired (Sepsis/Septic Shock)  Goal: Optimal Nutrition Intake  Outcome: Ongoing, Progressing   Goal Outcome Evaluation:

## 2023-12-28 ENCOUNTER — APPOINTMENT (OUTPATIENT)
Dept: CT IMAGING | Facility: HOSPITAL | Age: 63
End: 2023-12-28
Payer: MEDICARE

## 2023-12-28 ENCOUNTER — APPOINTMENT (OUTPATIENT)
Dept: GENERAL RADIOLOGY | Facility: HOSPITAL | Age: 63
End: 2023-12-28
Payer: MEDICARE

## 2023-12-28 PROBLEM — J90 PLEURAL EFFUSION ON RIGHT: Status: ACTIVE | Noted: 2023-12-28

## 2023-12-28 LAB
ANION GAP SERPL CALCULATED.3IONS-SCNC: 7 MMOL/L (ref 5–15)
BACTERIA FLD CULT: NORMAL
BASOPHILS # BLD AUTO: 0.05 10*3/MM3 (ref 0–0.2)
BASOPHILS NFR BLD AUTO: 0.5 % (ref 0–1.5)
BUN SERPL-MCNC: 15 MG/DL (ref 8–23)
BUN/CREAT SERPL: 34.9 (ref 7–25)
CALCIUM SPEC-SCNC: 8.2 MG/DL (ref 8.6–10.5)
CHLORIDE SERPL-SCNC: 101 MMOL/L (ref 98–107)
CO2 SERPL-SCNC: 29 MMOL/L (ref 22–29)
CREAT SERPL-MCNC: 0.43 MG/DL (ref 0.57–1)
DEPRECATED RDW RBC AUTO: 47.4 FL (ref 37–54)
EGFRCR SERPLBLD CKD-EPI 2021: 108.1 ML/MIN/1.73
EOSINOPHIL # BLD AUTO: 0.42 10*3/MM3 (ref 0–0.4)
EOSINOPHIL NFR BLD AUTO: 4 % (ref 0.3–6.2)
ERYTHROCYTE [DISTWIDTH] IN BLOOD BY AUTOMATED COUNT: 13.5 % (ref 12.3–15.4)
GLUCOSE BLDC GLUCOMTR-MCNC: 135 MG/DL (ref 70–130)
GLUCOSE BLDC GLUCOMTR-MCNC: 163 MG/DL (ref 70–130)
GLUCOSE BLDC GLUCOMTR-MCNC: 192 MG/DL (ref 70–130)
GLUCOSE SERPL-MCNC: 169 MG/DL (ref 65–99)
GRAM STN SPEC: NORMAL
HCT VFR BLD AUTO: 25 % (ref 34–46.6)
HGB BLD-MCNC: 8.1 G/DL (ref 12–15.9)
IMM GRANULOCYTES # BLD AUTO: 0.18 10*3/MM3 (ref 0–0.05)
IMM GRANULOCYTES NFR BLD AUTO: 1.7 % (ref 0–0.5)
LYMPHOCYTES # BLD AUTO: 1.81 10*3/MM3 (ref 0.7–3.1)
LYMPHOCYTES NFR BLD AUTO: 17.2 % (ref 19.6–45.3)
MAGNESIUM SERPL-MCNC: 2.3 MG/DL (ref 1.6–2.4)
MCH RBC QN AUTO: 31.2 PG (ref 26.6–33)
MCHC RBC AUTO-ENTMCNC: 32.4 G/DL (ref 31.5–35.7)
MCV RBC AUTO: 96.2 FL (ref 79–97)
MONOCYTES # BLD AUTO: 1.04 10*3/MM3 (ref 0.1–0.9)
MONOCYTES NFR BLD AUTO: 9.9 % (ref 5–12)
NEUTROPHILS NFR BLD AUTO: 66.7 % (ref 42.7–76)
NEUTROPHILS NFR BLD AUTO: 7.05 10*3/MM3 (ref 1.7–7)
NRBC BLD AUTO-RTO: 0 /100 WBC (ref 0–0.2)
PLATELET # BLD AUTO: 328 10*3/MM3 (ref 140–450)
PMV BLD AUTO: 11.2 FL (ref 6–12)
POTASSIUM SERPL-SCNC: 4.1 MMOL/L (ref 3.5–5.2)
RBC # BLD AUTO: 2.6 10*6/MM3 (ref 3.77–5.28)
SODIUM SERPL-SCNC: 137 MMOL/L (ref 136–145)
WBC NRBC COR # BLD AUTO: 10.55 10*3/MM3 (ref 3.4–10.8)

## 2023-12-28 PROCEDURE — 94799 UNLISTED PULMONARY SVC/PX: CPT

## 2023-12-28 PROCEDURE — 85025 COMPLETE CBC W/AUTO DIFF WBC: CPT | Performed by: INTERNAL MEDICINE

## 2023-12-28 PROCEDURE — 82948 REAGENT STRIP/BLOOD GLUCOSE: CPT

## 2023-12-28 PROCEDURE — 83735 ASSAY OF MAGNESIUM: CPT | Performed by: INTERNAL MEDICINE

## 2023-12-28 PROCEDURE — 25010000002 HEPARIN (PORCINE) PER 1000 UNITS: Performed by: INTERNAL MEDICINE

## 2023-12-28 PROCEDURE — 63710000001 INSULIN REGULAR HUMAN PER 5 UNITS: Performed by: NURSE PRACTITIONER

## 2023-12-28 PROCEDURE — 71045 X-RAY EXAM CHEST 1 VIEW: CPT

## 2023-12-28 PROCEDURE — 71250 CT THORAX DX C-: CPT

## 2023-12-28 PROCEDURE — 25010000002 ALTEPLASE 2 MG RECONSTITUTED SOLUTION 1 EACH VIAL: Performed by: INTERNAL MEDICINE

## 2023-12-28 PROCEDURE — 99233 SBSQ HOSP IP/OBS HIGH 50: CPT | Performed by: INTERNAL MEDICINE

## 2023-12-28 PROCEDURE — 25010000002 MEROPENEM PER 100 MG: Performed by: INTERNAL MEDICINE

## 2023-12-28 PROCEDURE — 80048 BASIC METABOLIC PNL TOTAL CA: CPT | Performed by: INTERNAL MEDICINE

## 2023-12-28 RX ADMIN — MEROPENEM 1000 MG: 1 INJECTION, POWDER, FOR SOLUTION INTRAVENOUS at 00:34

## 2023-12-28 RX ADMIN — INSULIN HUMAN 2 UNITS: 100 INJECTION, SOLUTION PARENTERAL at 18:12

## 2023-12-28 RX ADMIN — DORNASE ALFA 5 MG: 1 SOLUTION RESPIRATORY (INHALATION) at 17:15

## 2023-12-28 RX ADMIN — INSULIN HUMAN 2 UNITS: 100 INJECTION, SOLUTION PARENTERAL at 06:34

## 2023-12-28 RX ADMIN — HEPARIN SODIUM 5000 UNITS: 5000 INJECTION INTRAVENOUS; SUBCUTANEOUS at 15:20

## 2023-12-28 RX ADMIN — SENNOSIDES AND DOCUSATE SODIUM 2 TABLET: 8.6; 5 TABLET ORAL at 20:15

## 2023-12-28 RX ADMIN — Medication 10 ML: at 09:19

## 2023-12-28 RX ADMIN — HEPARIN SODIUM 5000 UNITS: 5000 INJECTION INTRAVENOUS; SUBCUTANEOUS at 06:34

## 2023-12-28 RX ADMIN — ALTEPLASE 10 MG: 2.2 INJECTION, POWDER, LYOPHILIZED, FOR SOLUTION INTRAVENOUS at 17:15

## 2023-12-28 RX ADMIN — CARVEDILOL 6.25 MG: 6.25 TABLET, FILM COATED ORAL at 20:15

## 2023-12-28 RX ADMIN — MEROPENEM 1000 MG: 1 INJECTION, POWDER, FOR SOLUTION INTRAVENOUS at 16:28

## 2023-12-28 RX ADMIN — GLYCOPYRROLATE 2 MG: 1 TABLET ORAL at 20:18

## 2023-12-28 RX ADMIN — HEPARIN SODIUM 5000 UNITS: 5000 INJECTION INTRAVENOUS; SUBCUTANEOUS at 22:14

## 2023-12-28 RX ADMIN — Medication 10 ML: at 20:18

## 2023-12-28 RX ADMIN — GLYCOPYRROLATE 2 MG: 1 TABLET ORAL at 16:28

## 2023-12-28 RX ADMIN — BACLOFEN 10 MG: 10 TABLET ORAL at 15:20

## 2023-12-28 RX ADMIN — MEROPENEM 1000 MG: 1 INJECTION, POWDER, FOR SOLUTION INTRAVENOUS at 09:18

## 2023-12-28 RX ADMIN — INSULIN HUMAN 2 UNITS: 100 INJECTION, SOLUTION PARENTERAL at 00:34

## 2023-12-28 RX ADMIN — BACLOFEN 10 MG: 10 TABLET ORAL at 22:14

## 2023-12-28 NOTE — CASE MANAGEMENT/SOCIAL WORK
Continued Stay Note  [facility]     Patient Name: Viji Vargas  MRN: [id number]  Today's Date: 12/28/2023    Admit Date: 12/24/2023    Plan: [facility]   Discharge Plan       Row Name 12/28/23 1339       Plan    Plan [facility]    Plan Comments Discussed patient in MDR today.  Plans for chest CT today; patient with trach collar 15L/50%.   Discharge plan is to return to [facility]; will need EMS transport arranged at discharge.                   Discharge Codes    No documentation.                       Debi Rivera RN

## 2023-12-28 NOTE — PLAN OF CARE
Goal Outcome Evaluation:         Neuro status remains unchanged. On 15L 40% trach collar. CT chest done this AM. Intrapleural alteplase given per intensivist. CT remains in place with 8mL out. 855 mL UOP. PEG tube unclogged with clog zapper, TF resumed at goal rate.

## 2023-12-28 NOTE — PROGRESS NOTES
Multidisciplinary Rounds EN Review Note    Patient Name: Viji Vargas  Date of Encounter: 23 13:38 EST  MRN: [id number]  Admission date: 2023    Reason for visit: EN review . RD to continue to follow per protocol.     EMR reviewed   Medication reviewed  Labs reviewed     Estimated/Assessed Needs (23):      Energy: 1500 kcal  Protein: 79 gm  Fluids: per clinical status / to maintain normal sodium    Additional Information Obtained:   Has tolerated EN at goal. Frequent BMs on POA now slowed with  EN at goal providing fiber needs. PEG occluded this morning and RN unable to unclog. Plan for GI consult if remain unable to resolve.     Current diet: NPO Diet NPO Type: Strict NPO    EN: IsoSource 1.5  Goal Rate: 50 ml/hr    Water Flushes: 25 ml/hr  Modular: Prosource -no carb 1/day  Route: PEG  Tube: 20 PEG    At goal over: 20Hrs/day    Rx will supply:   Goal Volume 1000 mL/day     Flush Volume 500 mL/day     Energy 1500 Kcal/day 104 % Est Need   Protein 83 g/day 105 % Est Need   Fiber 15 g/day     Water in   mL     Total Water 1260 mL     Meet DRI Yes        --------------------------------------------------------------------------  Product/Rate verified at bedside: Yes  Infusing Rate at time of visit: 50 ml/hr    Average Delivery from Chartin Days:   Volume 1090 mL/day 101  % Goal Vol.   Flush Volume 559 mL/day     Energy  Kcal/day  % Est Need   Protein  g/day  % Est Need   Fiber  g/day     Water in  EN  mL     Total Water  mL     Meet DRI No            Intervention:  Follow treatment plan  Care plan reviewed    Continue EN per order / as tolerated    Will need GI to assess PEG if unable to unclog    Follow up:   Per protocol      Katie Jenkins RD, [facility]  13:41 EST  Time: 15min

## 2023-12-28 NOTE — PROGRESS NOTES
INTENSIVIST NOTE     Hospital Day: 4    Ms. Viji Vargas, 63 y.o. female is followed for:   Pneumonia, pneumothorax, and pleural effusion       SUBJECTIVE     Interval history:    Mental status roughly the same.  Maximum temperature 100.5.  Fluid balance +1.2 L.  Chest tube remains in place with about 60 mL of drainage of the last 24 hours    The patient's relevant past medical, surgical and social history were reviewed and updated in Epic as appropriate.       OBJECTIVE     Vital Sign Min/Max for last 24 hours  Temp  Min: 99.3 °F (37.4 °C)  Max: 100.5 °F (38.1 °C)   BP  Min: 82/59  Max: 125/75   Pulse  Min: 86  Max: 114   Resp  Min: 16  Max: 22   SpO2  Min: 86 %  Max: 98 %   No data recorded   No data recorded      Intake/Output Summary (Last 24 hours) at 12/28/2023 1255  Last data filed at 12/28/2023 0918  Gross per 24 hour   Intake 2556 ml   Output 1265 ml   Net 1291 ml      Flowsheet Rows      Flowsheet Row First Filed Value   Admission Height --   Admission Weight 79 kg (174 lb 2.6 oz) Documented at 12/24/2023 1600               12/24/23  1600 12/25/23  0600 12/26/23  0430   Weight: 79 kg (174 lb 2.6 oz) 71.9 kg (158 lb 8.2 oz) 69.7 kg (153 lb 10.6 oz)            Objective:  General Appearance:  In no acute distress.    Vital signs: (most recent): Blood pressure 104/59, pulse 86, temperature 99.7 °F (37.6 °C), temperature source Bladder, resp. rate 22, weight 69.7 kg (153 lb 10.6 oz), SpO2 96%.    HEENT: (Shiley tracheostomy with trach collar oxygen in place)    Lungs:  She is not in respiratory distress.  There are decreased breath sounds and rhonchi.  No rales or wheezes.    Heart: Normal rate.  Regular rhythm.  S1 normal and S2 normal.  No murmur, gallop or friction rub.   Chest: Symmetric chest wall expansion.   Abdomen: Abdomen is soft and non-distended.  (PEG tube).  Bowel sounds are normal.   There is no abdominal tenderness.   There is no mass. There is no splenomegaly. There is no hepatomegaly.    Extremities: There is dependent edema.  There is no deformity.    Neurological: GCS score is 9.  (Altered mental status).    Skin:  Warm and dry.                I reviewed the patient's new clinical results.  I reviewed the patient's new imaging results/reports including actual images and agree with reports.    CT Chest Without Contrast Diagnostic    Result Date: 12/28/2023  Impression: 1. Large right-sided pleural effusion which likely has some loculations. There is a right-sided pigtail chest tube which appears in good position. 3. Complete consolidation of the right lower lobe, right middle lobe as well as partial consolidation of the right upper lobe likely combination of pneumonia and atelectasis. Within the right middle lobe consolidation there is a rounded fluid density area measuring 2.5 cm, which may represent a lung abscess. 4. Small left effusion and left base consolidation. Electronically Signed: Myke Guerrero MD  12/28/2023 12:04 PM EST  Workstation ID: [id number]    XR Chest 1 View    Result Date: 12/28/2023  Impression: 1. Slight decrease in right lateral and basilar pleural effusion. 2. Persistent left effusion. Electronically Signed: Myke Guerrero MD  12/28/2023 8:14 AM EST  Workstation ID: [id number]    XR Chest 1 View    Result Date: 12/27/2023  Impression: No significant change identified Electronically Signed: Néstor Gaitan MD  12/27/2023 8:15 AM CST  Workstation ID: [id number]    XR Chest 1 View    Result Date: 12/26/2023  Impression: No significant change since earlier today. Electronically Signed: Dayana Huffman MD  12/26/2023 1:41 PM EST  Workstation ID: [id number]      INFUSIONS       Results from last 7 days   Lab Units 12/28/23  0640 12/26/23  0551 12/25/23  0626   WBC 10*3/mm3 10.55 8.63 10.64   HEMOGLOBIN g/dL 8.1* 8.0* 8.3*   HEMATOCRIT % 25.0* 25.1* 25.5*   PLATELETS 10*3/mm3 328 292 254     Results from last 7 days   Lab Units 12/28/23  0640 12/26/23  2042 12/26/23  0551 12/25/23 0626  12/23/23  0628 12/22/23  0820   SODIUM mmol/L 137  --  142 140   < > 137   POTASSIUM mmol/L 4.1 3.8 3.4* 3.5   < > 4.5   CHLORIDE mmol/L 101  --  106 106   < > 99   CO2 mmol/L 29.0  --  29.0 26.0   < > 25.8   BUN mg/dL 15  --  8 7*   < > 19   GLUCOSE mg/dL 169*  --  166* 135*   < > 165*   CREATININE mg/dL 0.43*  --  0.41* 0.34*   < > 0.59   MAGNESIUM mg/dL 2.3  --  1.9 2.0  --   --    CALCIUM mg/dL 8.2*  --  8.3* 8.6   < > 9.4   ALBUMIN g/dL  --   --   --   --   --  3.6    < > = values in this interval not displayed.         Results from last 7 days   Lab Units 12/22/23  0846   PH, ARTERIAL pH units 7.468*   PCO2, ARTERIAL mm Hg 39.7   PO2 ART mm Hg 130.0*       Diet, Tube Feeding Tube Feeding Formula: Isosource 1.5 (Calorically Dense)   /h  Patient doesn't have any tube feeding modular orders    Mechanical Ventilator:   Settings: Observed:                                                I reviewed the patient's medications.    Assessment & Plan   ASSESSMENT/PLAN     Active Hospital Problems    Diagnosis     **Acute on chronic respiratory failure with hypoxia     Pleural effusion on right     Bronchial pneumonia     UTI (urinary tract infection)     Pneumonia     Pneumothorax, right     Tracheostomy present     Anoxic brain injury        63 year-old female with history of cardiac arrest and anoxic brain injury in March 2019 s/p Trach and PEG placement, COPD, HTN and history of MDRs including ESBL Klebsiella that was originally admitted to [facility] through ED after being brought in by EMS on 12/22/23 for respiratory distress. She was hypoxic on 4 L NC trach collar and febrile. Noted large spontaneous pneumothorax on CXR with subsequent insertion of chest tube by ED physician.     She was admitted to the hospital in [place] and started on broad-spectrum antibiotics.  She was transferred here on 12/24.  She underwent bronchoscopy which revealed bronchial malacia and secretions right greater than left.  Cultures have grown  Acinetobacter.  A 14 Polish chest tube was placed and the existing chest tube removed.      She has had a persistent right effusion.  There is no residual pneumothorax.  Chest CT today revealed that the chest tube is well-positioned within the right effusion but there appears to be some loculations and I suspect the effusion is complicated/loculated.    Plan:    Will try intrapleural instillation of thrombolytics and dornase for 6 doses as chest tube appears to be well localized within loculated pleural fluid  Continue antimicrobial therapy with Merrem  Bumex x 1 dose  Sliding-scale insulin  Tube feeds at goal rate of Isosource 1.5 at 50 mL/hour  Unclog PEG tube  Follow-up chest x-ray     I discussed the patient's findings and my recommendations with nursing staff     Plan of care and goals reviewed with multidisciplinary team at daily rounds.    High level of risk due to:  Acute on chronic illness with threat to life or bodily function.    Luther Muse MD  Pulmonary and Critical Care Medicine  12/28/23 12:55 EST

## 2023-12-29 ENCOUNTER — APPOINTMENT (OUTPATIENT)
Dept: GENERAL RADIOLOGY | Facility: HOSPITAL | Age: 63
End: 2023-12-29
Payer: MEDICARE

## 2023-12-29 LAB
ANION GAP SERPL CALCULATED.3IONS-SCNC: 8 MMOL/L (ref 5–15)
BACTERIA SPEC AEROBE CULT: NORMAL
BACTERIA SPEC AEROBE CULT: NORMAL
BASOPHILS # BLD AUTO: 0.05 10*3/MM3 (ref 0–0.2)
BASOPHILS NFR BLD AUTO: 0.4 % (ref 0–1.5)
BUN SERPL-MCNC: 15 MG/DL (ref 8–23)
BUN/CREAT SERPL: 33.3 (ref 7–25)
CALCIUM SPEC-SCNC: 8.4 MG/DL (ref 8.6–10.5)
CHLORIDE SERPL-SCNC: 102 MMOL/L (ref 98–107)
CO2 SERPL-SCNC: 26 MMOL/L (ref 22–29)
CREAT SERPL-MCNC: 0.45 MG/DL (ref 0.57–1)
DEPRECATED RDW RBC AUTO: 46 FL (ref 37–54)
EGFRCR SERPLBLD CKD-EPI 2021: 106.9 ML/MIN/1.73
EOSINOPHIL # BLD AUTO: 0.3 10*3/MM3 (ref 0–0.4)
EOSINOPHIL NFR BLD AUTO: 2.3 % (ref 0.3–6.2)
ERYTHROCYTE [DISTWIDTH] IN BLOOD BY AUTOMATED COUNT: 13.5 % (ref 12.3–15.4)
GLUCOSE BLDC GLUCOMTR-MCNC: 138 MG/DL (ref 70–130)
GLUCOSE BLDC GLUCOMTR-MCNC: 168 MG/DL (ref 70–130)
GLUCOSE BLDC GLUCOMTR-MCNC: 176 MG/DL (ref 70–130)
GLUCOSE BLDC GLUCOMTR-MCNC: 178 MG/DL (ref 70–130)
GLUCOSE BLDC GLUCOMTR-MCNC: 240 MG/DL (ref 70–130)
GLUCOSE SERPL-MCNC: 172 MG/DL (ref 65–99)
HCT VFR BLD AUTO: 26.7 % (ref 34–46.6)
HGB BLD-MCNC: 8.6 G/DL (ref 12–15.9)
IMM GRANULOCYTES # BLD AUTO: 0.34 10*3/MM3 (ref 0–0.05)
IMM GRANULOCYTES NFR BLD AUTO: 2.7 % (ref 0–0.5)
LYMPHOCYTES # BLD AUTO: 2.23 10*3/MM3 (ref 0.7–3.1)
LYMPHOCYTES NFR BLD AUTO: 17.4 % (ref 19.6–45.3)
MCH RBC QN AUTO: 30.4 PG (ref 26.6–33)
MCHC RBC AUTO-ENTMCNC: 32.2 G/DL (ref 31.5–35.7)
MCV RBC AUTO: 94.3 FL (ref 79–97)
MONOCYTES # BLD AUTO: 1.31 10*3/MM3 (ref 0.1–0.9)
MONOCYTES NFR BLD AUTO: 10.2 % (ref 5–12)
NEUTROPHILS NFR BLD AUTO: 67 % (ref 42.7–76)
NEUTROPHILS NFR BLD AUTO: 8.56 10*3/MM3 (ref 1.7–7)
NRBC BLD AUTO-RTO: 0 /100 WBC (ref 0–0.2)
PLATELET # BLD AUTO: 366 10*3/MM3 (ref 140–450)
PMV BLD AUTO: 10.9 FL (ref 6–12)
POTASSIUM SERPL-SCNC: 4 MMOL/L (ref 3.5–5.2)
RBC # BLD AUTO: 2.83 10*6/MM3 (ref 3.77–5.28)
SODIUM SERPL-SCNC: 136 MMOL/L (ref 136–145)
WBC NRBC COR # BLD AUTO: 12.79 10*3/MM3 (ref 3.4–10.8)

## 2023-12-29 PROCEDURE — 25010000002 MEROPENEM PER 100 MG: Performed by: INTERNAL MEDICINE

## 2023-12-29 PROCEDURE — 94761 N-INVAS EAR/PLS OXIMETRY MLT: CPT

## 2023-12-29 PROCEDURE — 94799 UNLISTED PULMONARY SVC/PX: CPT

## 2023-12-29 PROCEDURE — 63710000001 INSULIN REGULAR HUMAN PER 5 UNITS

## 2023-12-29 PROCEDURE — 82948 REAGENT STRIP/BLOOD GLUCOSE: CPT

## 2023-12-29 PROCEDURE — 99232 SBSQ HOSP IP/OBS MODERATE 35: CPT

## 2023-12-29 PROCEDURE — 80048 BASIC METABOLIC PNL TOTAL CA: CPT | Performed by: INTERNAL MEDICINE

## 2023-12-29 PROCEDURE — 71045 X-RAY EXAM CHEST 1 VIEW: CPT

## 2023-12-29 PROCEDURE — 63710000001 INSULIN REGULAR HUMAN PER 5 UNITS: Performed by: NURSE PRACTITIONER

## 2023-12-29 PROCEDURE — 85025 COMPLETE CBC W/AUTO DIFF WBC: CPT | Performed by: INTERNAL MEDICINE

## 2023-12-29 PROCEDURE — 25010000002 ALTEPLASE 2 MG RECONSTITUTED SOLUTION 1 EACH VIAL

## 2023-12-29 PROCEDURE — 25010000002 HEPARIN (PORCINE) PER 1000 UNITS: Performed by: INTERNAL MEDICINE

## 2023-12-29 RX ADMIN — HEPARIN SODIUM 5000 UNITS: 5000 INJECTION INTRAVENOUS; SUBCUTANEOUS at 05:06

## 2023-12-29 RX ADMIN — INSULIN HUMAN 2 UNITS: 100 INJECTION, SOLUTION PARENTERAL at 18:39

## 2023-12-29 RX ADMIN — BACLOFEN 10 MG: 10 TABLET ORAL at 14:23

## 2023-12-29 RX ADMIN — MEROPENEM 1000 MG: 1 INJECTION, POWDER, FOR SOLUTION INTRAVENOUS at 09:02

## 2023-12-29 RX ADMIN — MEROPENEM 1000 MG: 1 INJECTION, POWDER, FOR SOLUTION INTRAVENOUS at 00:36

## 2023-12-29 RX ADMIN — Medication 10 ML: at 09:52

## 2023-12-29 RX ADMIN — INSULIN HUMAN 2 UNITS: 100 INJECTION, SOLUTION PARENTERAL at 00:08

## 2023-12-29 RX ADMIN — CARVEDILOL 6.25 MG: 6.25 TABLET, FILM COATED ORAL at 09:52

## 2023-12-29 RX ADMIN — MEROPENEM 1000 MG: 1 INJECTION, POWDER, FOR SOLUTION INTRAVENOUS at 16:39

## 2023-12-29 RX ADMIN — GLYCOPYRROLATE 2 MG: 1 TABLET ORAL at 09:01

## 2023-12-29 RX ADMIN — DORNASE ALFA 5 MG: 1 SOLUTION RESPIRATORY (INHALATION) at 20:38

## 2023-12-29 RX ADMIN — Medication 30 ML: at 09:53

## 2023-12-29 RX ADMIN — CARVEDILOL 6.25 MG: 6.25 TABLET, FILM COATED ORAL at 20:30

## 2023-12-29 RX ADMIN — ALTEPLASE 10 MG: 2.2 INJECTION, POWDER, LYOPHILIZED, FOR SOLUTION INTRAVENOUS at 20:38

## 2023-12-29 RX ADMIN — GLYCOPYRROLATE 2 MG: 1 TABLET ORAL at 16:39

## 2023-12-29 RX ADMIN — GLYCOPYRROLATE 2 MG: 1 TABLET ORAL at 20:30

## 2023-12-29 RX ADMIN — BACLOFEN 10 MG: 10 TABLET ORAL at 22:17

## 2023-12-29 RX ADMIN — INSULIN HUMAN 2 UNITS: 100 INJECTION, SOLUTION PARENTERAL at 05:06

## 2023-12-29 RX ADMIN — HEPARIN SODIUM 5000 UNITS: 5000 INJECTION INTRAVENOUS; SUBCUTANEOUS at 14:22

## 2023-12-29 RX ADMIN — BACLOFEN 10 MG: 10 TABLET ORAL at 05:06

## 2023-12-29 RX ADMIN — SCOPOLAMINE 1 PATCH: 1.5 PATCH, EXTENDED RELEASE TRANSDERMAL at 14:30

## 2023-12-29 RX ADMIN — SENNOSIDES AND DOCUSATE SODIUM 2 TABLET: 8.6; 5 TABLET ORAL at 20:30

## 2023-12-29 RX ADMIN — HEPARIN SODIUM 5000 UNITS: 5000 INJECTION INTRAVENOUS; SUBCUTANEOUS at 22:17

## 2023-12-29 RX ADMIN — ACETAMINOPHEN 650 MG: 650 SOLUTION ORAL at 02:42

## 2023-12-29 RX ADMIN — ACETAMINOPHEN 650 MG: 650 SOLUTION ORAL at 12:22

## 2023-12-29 RX ADMIN — DORNASE ALFA 5 MG: 1 SOLUTION RESPIRATORY (INHALATION) at 05:26

## 2023-12-29 RX ADMIN — INSULIN HUMAN 3 UNITS: 100 INJECTION, SOLUTION PARENTERAL at 12:22

## 2023-12-29 RX ADMIN — Medication 10 ML: at 20:30

## 2023-12-29 RX ADMIN — ALTEPLASE 10 MG: 2.2 INJECTION, POWDER, LYOPHILIZED, FOR SOLUTION INTRAVENOUS at 05:26

## 2023-12-29 NOTE — PROGRESS NOTES
Instilled #2 of 6 doses of alteplase and dornase alpha via right chest tube.  Stopcock on chest tube was turned off to the patient.  Discussed with RN that medication will dwell in pleural space for 2 hours while she is turned from left to right every 15 minutes.  After 2 hours, stopcock can be opened up for chest tube to drain.  Patient tolerated administration well.  There were no complications.    Electronically signed by FÉLIX Snider, 12/29/23, 6:17 AM EST.

## 2023-12-29 NOTE — PROGRESS NOTES
Intensive Care Follow-up     Hospital:  LOS: 5 days   Ms. Viji Vargas, 63 y.o. female is followed for:   Acute on chronic respiratory failure with hypoxia      Subjective   Interval History:  Patient alert but unable to answer questions. Copious secretions from tracheostomy. Oxygenating well on 45% trach collar. Chest tubes remain in place with approximately 720 output in the past 24 hours.     The patient's past medical, surgical and social history were reviewed and updated in Epic as appropriate.     Objective   Medications:  alteplase (CATHFLO/ACTIVASE) 10 mg in sodium chloride 0.9 % 30 mL Syringe, 10 mg, Intrapleural, BID   And  dornase alpha (PULMOZYME) 5 mg in sterile water (preservative free) 30 mL intrapleural syringe, 5 mg, Intrapleural, BID  baclofen, 10 mg, Per PEG Tube, Q8H  carvedilol, 6.25 mg, Per PEG Tube, Q12H  glycopyrrolate, 2 mg, Per PEG Tube, TID  heparin (porcine), 5,000 Units, Subcutaneous, Q8H  insulin regular, 2-7 Units, Subcutaneous, Q6H  meropenem, 1,000 mg, Intravenous, Q8H  ProSource No Carb, 30 mL, Per PEG Tube, Daily  Scopolamine, 1 patch, Transdermal, Q72H  senna-docusate sodium, 2 tablet, Per G Tube, BID  sodium chloride, 10 mL, Intravenous, Q12H      I reviewed the patient's medications.    Vital Sign Min/Max for last 24 hours  Temp  Min: 99.3 °F (37.4 °C)  Max: 100.8 °F (38.2 °C)   BP  Min: 88/56  Max: 134/69   Pulse  Min: 78  Max: 112   Resp  Min: 18  Max: 24   SpO2  Min: 93 %  Max: 100 %   Flow (L/min)  Min: 15  Max: 15       Input/Output for last 24 hour shift  12/28 0701 - 12/29 0700  In: 1516.5 [I.V.:80.5]  Out: 2433 [Urine:1415]      Physical Exam:  GENERAL: Patient lying in bed. Alert. No acute distress.   HEENT: Normocephalic and atraumatic. Trachea midline. PER. EOM WNL.   LUNGS: Chest rise of normal depth and symmetric. Lungs coarse with copious secretions from tracheostomy.   HEART: S1,S2 detected. Regular rate and rhythm. No rub, murmur, or gallop.   ABDOMEN: Soft,  round, nondistended, and nontender. Bowel sounds present.   EXTREMITIES: No clubbing, edema, or cyanosis. Peripheral pulses present. Skin warm and dry.    NEURO/PSYCH: Arms contracted. Not following commands.      Results from last 7 days   Lab Units 12/29/23  0532 12/28/23  0640 12/26/23  0551   WBC 10*3/mm3 12.79* 10.55 8.63   HEMOGLOBIN g/dL 8.6* 8.1* 8.0*   PLATELETS 10*3/mm3 366 328 292     Results from last 7 days   Lab Units 12/29/23  0532 12/28/23  0640 12/26/23 2042 12/26/23  0551 12/25/23  0626   SODIUM mmol/L 136 137  --  142 140   POTASSIUM mmol/L 4.0 4.1 3.8 3.4* 3.5   CO2 mmol/L 26.0 29.0  --  29.0 26.0   BUN mg/dL 15 15  --  8 7*   CREATININE mg/dL 0.45* 0.43*  --  0.41* 0.34*   MAGNESIUM mg/dL  --  2.3  --  1.9 2.0   PHOSPHORUS mg/dL  --   --  2.6 2.1* 2.1*   GLUCOSE mg/dL 172* 169*  --  166* 135*     Estimated Creatinine Clearance: 119.4 mL/min (A) (by C-G formula based on SCr of 0.45 mg/dL (L)).      I reviewed the patient's new clinical results.  I reviewed the patient's new imaging results/reports including actual images and agree with reports.     Imaging Results (Last 24 Hours)       Procedure Component Value Units Date/Time    XR Chest 1 View [[id number]] Collected: 12/29/23 0704     Updated: 12/29/23 0709    Narrative:      XR CHEST 1 VW    Date of Exam: 12/29/2023 12:45 AM EST    Indication: Right effusion    Comparison: Chest CT and chest radiograph December 28, 2023    Findings:  Tracheostomy tube appears in expected position. There is a right pleural drainage catheter projecting over the mid right thorax, as before. No definite pneumothorax.    There are bilateral pleural effusions, right greater than left, similar to recent prior exams. Bibasilar consolidation, also right greater than left appears unchanged. No definite new or worsening infiltrate. Heart size and mediastinal contour appear   within normal limits and unchanged.      Impression:      Impression:  1.Bilateral pleural  effusions and bibasilar consolidation, right greater than left, as before.  2.Right pleural drainage catheter appears in similar position. No definite pneumothorax.        Electronically Signed: Oumar Canomayo    12/29/2023 7:06 AM EST    Workstation ID: [id number]          Assessment & Plan   Impression      Acute on chronic respiratory failure with hypoxia    Anoxic brain injury    Tracheostomy present    Pneumonia    Pneumothorax, right    UTI (urinary tract infection)    Bronchial pneumonia    Pleural effusion on right       Plan        63 year-old female with history of cardiac arrest and anoxic brain injury in March 2019 s/p Trach and PEG placement, COPD, HTN and history of MDRs including ESBL Klebsiella that was originally admitted to [facility] through ED after being brought in by EMS on 12/22/23 for respiratory distress. She was hypoxic on 4 L NC trach collar and febrile. Noted large spontaneous pneumothorax on CXR with subsequent insertion of chest tube by ED physician.      She was admitted to the hospital in [place] and started on broad-spectrum antibiotics.  She was transferred here on 12/24.  She underwent bronchoscopy which revealed bronchial malacia and secretions right greater than left.  Cultures have grown Acinetobacter.  A 14 [place] chest tube was placed and the existing chest tube removed.       She has had a persistent right effusion.  There is no residual pneumothorax.  Chest CT today revealed that the chest tube is well-positioned within the right effusion. Started intrapleural instillation of thrombolytics and dornase 12/28/23 with good response.     Has received 2/6 doses intrapleural instillation of alteplase + dornase alpha with 550 out already this shift. Will continue administration every 12 hours.  Wean FiO2 as tolerated  Continue antimicrobial therapy with Merrem  PEG tube unclogged successfully 12/28 with resumption of tube feeding  AccuChecks every 6 hours; titrate SSI  CXR stable; follow up  12/30/23    DVT Prophylaxis: SQ heparin  Dispo: Keep in ICU    Time spent: 36 minutes  Plan of care and goals reviewed with multidisciplinary/antibiotic stewardship team during rounds.   I discussed the patient's findings and my recommendations with patient and nursing staff     Sharon Schneider, MSN, APRN, ACNPC-AG  Pulmonary and Critical Care Medicine  Electronically signed by FÉLIX Navarrete, 12/29/23, 12:33 PM EST.

## 2023-12-29 NOTE — PLAN OF CARE
Goal Outcome Evaluation:  Plan of Care Reviewed With: patient           Outcome Evaluation: Well rested in bed this shift.  UOP adequate.  Pt mildly febrile throughout the shift.  Pt also noted to be mildly tachycardic.  Pt was administered tylenol for fever which did not appear to affect temperature much, however soon after administration pt HR went from 100-105 to 70-80 and BP dropped as well.  Continues to make thick tan secretions, tracheally suctioned with red rubber suction catheter.  Will continue to closeley monitor this patient.

## 2023-12-29 NOTE — CASE MANAGEMENT/SOCIAL WORK
Continued Stay Note  [facility]     Patient Name: Viji Vargas  MRN: [id number]  Today's Date: 12/29/2023    Admit Date: 12/24/2023    Plan: Return to [facility]   Discharge Plan       Row Name 12/29/23 1311       Plan    Plan Return to [facility]    Plan Comments Discussed patient in MDR.  Patient's discharge plan will be to return to [facility] when medically ready; will need EMS arranged.  CM will continue to follow.                   Discharge Codes    No documentation.                          Debi Rivera RN

## 2023-12-29 NOTE — PROGRESS NOTES
Clinical Nutrition     Nutrition Support Assessment  Reason for Visit: EN    Patient Name: Viji Vargas  YOB: 1960  MRN: [id number]  Date of Encounter: 12/29/23 14:19 EST  Admission date: 12/24/2023    Continue EN per order / as tolerated of:    EN: IsoSource 1.5  Goal Rate: 50 ml/hr                 Water Flushes: 25 ml/hr  Modular: Prosource -no carb 1/day  Route: PEG  Tube: 20 PEG     At goal over: 20Hrs/day     Rx will supply:   Goal Volume 1000 mL/day       Flush Volume 500 mL/day       Energy 1500 Kcal/day 104 % Est Need   Protein 83 g/day 105 % Est Need   Fiber 15 g/day       Water in   mL       Total Water 1260 mL       Meet DRI Yes           Nutrition Assessment   Admission Diagnosis:  Bronchial pneumonia [J18.0]      Problem List:    Acute on chronic respiratory failure with hypoxia    Anoxic brain injury    Tracheostomy present    Pneumonia    Pneumothorax, right    UTI (urinary tract infection)    Bronchial pneumonia    Pleural effusion on right        PMH:   She  has a past medical history of COPD (chronic obstructive pulmonary disease), Hypertension, Pneumonia, and Stroke.    PSH:  She  has a past surgical history that includes Hysterectomy and tracheostomy and peg tube insertion (N/A, 3/20/2019).      Applicable Nutrition Concerns:   Skin:  Oral:  GI:      Applicable Interval History:         Reported/Observed/Food/Nutrition Related History:     12/29) Tolerating EN at goal. PEG clogged yesterday successfully unclogged and EN resumed.     12/25) Pt transferred from [facility] for higher level of care including bronchoscopy. Pt in acute on chronic respiratory failure, pneumothorax s/p chest tubes, currently doing well on trach collar for o2. Pt with hx anoxic brain injury with trach/PEG placement in 2019. Per review of EMR pt has tolerated isosource 1.5. Most recently at [facility] was receiving Nutren 1.5 which is not available at our facility. Bowels are moving. Phos  "slightly low this morning and was replaced. Levophed weaned yesterday. Pt lying in bed with no family at bedside at time RD visit. Per intensivist note okay with restarting nutrition.     Labs    Labs Reviewed: Yes     Results from last 7 days   Lab Units 12/29/23  0532 12/28/23  0640 12/26/23  2042 12/26/23  0551 12/25/23  0626   GLUCOSE mg/dL 172* 169*  --  166* 135*   BUN mg/dL 15 15  --  8 7*   CREATININE mg/dL 0.45* 0.43*  --  0.41* 0.34*   SODIUM mmol/L 136 137  --  142 140   CHLORIDE mmol/L 102 101  --  106 106   POTASSIUM mmol/L 4.0 4.1 3.8 3.4* 3.5   PHOSPHORUS mg/dL  --   --  2.6 2.1* 2.1*   MAGNESIUM mg/dL  --  2.3  --  1.9 2.0             Invalid input(s): \"PLAT\"      Results from last 7 days   Lab Units 12/29/23  1208 12/29/23  0505 12/28/23  2356 12/28/23  1728 12/28/23  1251 12/28/23  0551   GLUCOSE mg/dL 240* 176* 178* 163* 135* 192*     Lab Results   Lab Value Date/Time    HGBA1C 5.1 03/10/2019 0412                 Medications    Medications Reviewed: Yes  Pertinent: abx, insulin, prosource  Infusion:  PRN:     Intake/Ouptut 24 hrs (0701 - 0700)   I&O's Reviewed: Yes   Intake & Output (last day)         12/28 0701  12/29 0700 12/29 0701 12/30 0700    I.V. (mL/kg) 80.5 (1.2)     Other 350     NG/     IV Piggyback 300     Total Intake(mL/kg) 1516.5 (21.8)     Urine (mL/kg/hr) 1415 (0.8) 170 (0.3)    Chest Tube 1018 550    Total Output 2433 720    Net -916.5 -720              LBM 12/26    Anthropometrics     Height:    Last Filed Weight: Weight: 69.7 kg (153 lb 10.6 oz) (12/26/23 0430)  Method:    BMI: BMI (Calculated): 26.4  BMI classification: Overweight: 25.0-29.9kg/m2   IBW:  120 lb    UBW:    Weight     Weight (kg) Weight (lbs)   11/22/2022 69.854 kg  154 lb    1/22/2023 69.854 kg  154 lb    2/11/2023 72.576 kg  160 lb    3/7/2023 72.576 kg  160 lb    4/15/2023 72.576 kg  160 lb    5/5/2023 72.576 kg  160 lb    5/6/2023 69 kg  152 lb 1.9 oz     69 kg  152 lb 1.9 oz    5/7/2023 69.3 kg  152 " lb 12.5 oz    2023 71.4 kg  157 lb 6.5 oz    2023 71.4 kg  157 lb 6.5 oz    2023 71.215 kg  157 lb     70.28 kg  154 lb 15 oz    2023 69.7 kg  153 lb 10.6 oz    2023 70.5 kg  155 lb 6.8 oz     79 kg  174 lb 2.6 oz    2023 71.9 kg  158 lb 8.2 oz        Weight change: no significant changes    Nutrition Focused Physical Exam     Date:     Pt does not meet criteria malnutrition at this time.     Needs Assessment   Date:     Height used: 64 in  Weights used: 71.9 kg (ABW)      Estimated Calorie needs: ~1500 Kcal/day (21 kcal/kg)  Method:  Kcals/KG - ABW = 2434-2226  Method:  MS 1252X1.2=1502    Estimated Protein needs: ~79 g PRO/day  Method: 1-1.2 g/Kg = 72-86    Estimated Fluid needs: ~ ml/day   Per clinical status    Current Nutrition Prescription     PO: NPO Diet NPO Type: Strict NPO  Oral Nutrition Supplement:   Intake: N/A    EN: IsoSource 1.5  Goal Rate: 50 ml/hr                 Water Flushes: 25 ml/hr  Modular: Prosource -no carb 1/day  Route: PEG  Tube: 20 PEG     At goal over: 20Hrs/day     Rx will supply:   Goal Volume 1000 mL/day       Flush Volume 500 mL/day       Energy 1500 Kcal/day 104 % Est Need   Protein 83 g/day 105 % Est Need   Fiber 15 g/day       Water in   mL       Total Water 1260 mL       Meet DRI Yes            --------------------------------------------------------------------------  Product/Rate verified at bedside: Yes  Infusing Rate at time of visit: 50 ml/hr     Average Delivery from Chartin Day: held due to PEG clogged, now resolved   Volume 626 mL/day 101  % Goal Vol.   Flush Volume 350 mL/day       Energy   Kcal/day   % Est Need   Protein   g/day   % Est Need   Fiber   g/day       Water in  EN   mL       Total Water   mL       Meet DRI No            Nutrition Diagnosis   Date:  Updated:   Problem Inadequate oral intake   Etiology Dysphagia 2/2 anoxic brain injury   Signs/Symptoms PEG/EN for nutrition   Status:  ongoing    Goal:   General: Nutrition support treatment  PO: Advace diet as medically feasible/appropriate  EN/PN: Maintain EN, Deliver estimated needs    Nutrition Intervention      Follow treatment progress, Care plan reviewed, Nutrition support order placed    Continue EN per order/as tolerated    Monitoring/Evaluation:   Per protocol, I&O, Pertinent labs, EN delivery/tolerance, Weight, GI status, Symptoms, POC/GOC, Hemodynamic stability      Katie Jenkins RD, CNSC  Time Spent: 30m

## 2023-12-30 ENCOUNTER — APPOINTMENT (OUTPATIENT)
Dept: GENERAL RADIOLOGY | Facility: HOSPITAL | Age: 63
End: 2023-12-30
Payer: MEDICARE

## 2023-12-30 LAB
GLUCOSE BLDC GLUCOMTR-MCNC: 146 MG/DL (ref 70–130)
GLUCOSE BLDC GLUCOMTR-MCNC: 158 MG/DL (ref 70–130)
GLUCOSE BLDC GLUCOMTR-MCNC: 160 MG/DL (ref 70–130)
GLUCOSE BLDC GLUCOMTR-MCNC: 178 MG/DL (ref 70–130)

## 2023-12-30 PROCEDURE — 82948 REAGENT STRIP/BLOOD GLUCOSE: CPT

## 2023-12-30 PROCEDURE — 94761 N-INVAS EAR/PLS OXIMETRY MLT: CPT

## 2023-12-30 PROCEDURE — 25010000002 MEROPENEM PER 100 MG: Performed by: INTERNAL MEDICINE

## 2023-12-30 PROCEDURE — 63710000001 INSULIN REGULAR HUMAN PER 5 UNITS

## 2023-12-30 PROCEDURE — 25010000002 HEPARIN (PORCINE) PER 1000 UNITS: Performed by: INTERNAL MEDICINE

## 2023-12-30 PROCEDURE — 99232 SBSQ HOSP IP/OBS MODERATE 35: CPT | Performed by: INTERNAL MEDICINE

## 2023-12-30 PROCEDURE — 71045 X-RAY EXAM CHEST 1 VIEW: CPT

## 2023-12-30 PROCEDURE — 94799 UNLISTED PULMONARY SVC/PX: CPT

## 2023-12-30 PROCEDURE — 25010000002 ALTEPLASE 2 MG RECONSTITUTED SOLUTION 1 EACH VIAL

## 2023-12-30 RX ADMIN — Medication 10 ML: at 08:41

## 2023-12-30 RX ADMIN — DORNASE ALFA 5 MG: 1 SOLUTION RESPIRATORY (INHALATION) at 08:28

## 2023-12-30 RX ADMIN — BACLOFEN 10 MG: 10 TABLET ORAL at 05:04

## 2023-12-30 RX ADMIN — MEROPENEM 1000 MG: 1 INJECTION, POWDER, FOR SOLUTION INTRAVENOUS at 01:34

## 2023-12-30 RX ADMIN — GLYCOPYRROLATE 2 MG: 1 TABLET ORAL at 20:32

## 2023-12-30 RX ADMIN — GLYCOPYRROLATE 2 MG: 1 TABLET ORAL at 08:40

## 2023-12-30 RX ADMIN — ALTEPLASE 10 MG: 2.2 INJECTION, POWDER, LYOPHILIZED, FOR SOLUTION INTRAVENOUS at 20:30

## 2023-12-30 RX ADMIN — MEROPENEM 1000 MG: 1 INJECTION, POWDER, FOR SOLUTION INTRAVENOUS at 16:25

## 2023-12-30 RX ADMIN — BACLOFEN 10 MG: 10 TABLET ORAL at 14:56

## 2023-12-30 RX ADMIN — MEROPENEM 1000 MG: 1 INJECTION, POWDER, FOR SOLUTION INTRAVENOUS at 08:40

## 2023-12-30 RX ADMIN — DORNASE ALFA 5 MG: 1 SOLUTION RESPIRATORY (INHALATION) at 20:30

## 2023-12-30 RX ADMIN — Medication 10 ML: at 21:04

## 2023-12-30 RX ADMIN — ALTEPLASE 10 MG: 2.2 INJECTION, POWDER, LYOPHILIZED, FOR SOLUTION INTRAVENOUS at 08:26

## 2023-12-30 RX ADMIN — SENNOSIDES AND DOCUSATE SODIUM 2 TABLET: 8.6; 5 TABLET ORAL at 08:40

## 2023-12-30 RX ADMIN — HEPARIN SODIUM 5000 UNITS: 5000 INJECTION INTRAVENOUS; SUBCUTANEOUS at 14:56

## 2023-12-30 RX ADMIN — GLYCOPYRROLATE 2 MG: 1 TABLET ORAL at 16:25

## 2023-12-30 RX ADMIN — Medication 30 ML: at 08:41

## 2023-12-30 RX ADMIN — INSULIN HUMAN 2 UNITS: 100 INJECTION, SOLUTION PARENTERAL at 17:55

## 2023-12-30 RX ADMIN — INSULIN HUMAN 2 UNITS: 100 INJECTION, SOLUTION PARENTERAL at 12:13

## 2023-12-30 RX ADMIN — CARVEDILOL 6.25 MG: 6.25 TABLET, FILM COATED ORAL at 20:32

## 2023-12-30 RX ADMIN — INSULIN HUMAN 2 UNITS: 100 INJECTION, SOLUTION PARENTERAL at 05:16

## 2023-12-30 RX ADMIN — BACLOFEN 10 MG: 10 TABLET ORAL at 22:03

## 2023-12-30 RX ADMIN — HEPARIN SODIUM 5000 UNITS: 5000 INJECTION INTRAVENOUS; SUBCUTANEOUS at 22:02

## 2023-12-30 RX ADMIN — HEPARIN SODIUM 5000 UNITS: 5000 INJECTION INTRAVENOUS; SUBCUTANEOUS at 05:04

## 2023-12-30 NOTE — PLAN OF CARE
Goal Outcome Evaluation:  Plan of Care Reviewed With: patient           Outcome Evaluation: No noted changes to exam.  70 mL of serosanguineous fluid drained from chest tube post alteplase/dornase administration into pleural space by NP

## 2023-12-30 NOTE — PROGRESS NOTES
Instilled #3 of 6 doses of alteplase and dornase alpha via right chest tube.      Initially, there was fibrinous clot in the chest tube and it would not flush.  I was able to work out some of the clot and the catheter was slightly difficult to flush.  I was able to instill alteplase and dornase alpha via right chest tube and stopcock was turned off to the patient.  Plan is to let medications dwell in pleural space while patient is rolled left to right every 15 minutes for 2 hours.  I also left some medication in the chest tube to dwell as well.  I asked RN to call me prior to opening stopcock and I will come flush the chest tube again, check for patency and try to aspirate more clot.  There were no complications.      Electronically signed by FÉLIX Snider, 12/29/23, 9:38 PM EST.    12/29/23 at 2230, I opened the stopcock up.  I was able to flush the chest tubes much more easily, but I did not aspirate fluid or clot.      Electronically signed by FÉLIX Snider, 12/30/23, 2:08 AM EST.

## 2023-12-30 NOTE — PROGRESS NOTES
INTENSIVIST NOTE     Hospital Day: 6    Ms. Viji Vargas, 63 y.o. female is followed for:   Pneumonia, pneumothorax, and pleural effusion       SUBJECTIVE     Interval history:    Mental status remains overall about the same.  Maximum temperature is 99.5.  Chest tube output 600 yesterday and 240 mL today so far.  2 more doses of thrombolytics and dornase to be infused in the chest tube.  Significant secretions but generally she coughs them out through her tracheostomy.    The patient's relevant past medical, surgical and social history were reviewed and updated in Epic as appropriate.       OBJECTIVE     Vital Sign Min/Max for last 24 hours  Temp  Min: 98.8 °F (37.1 °C)  Max: 99.5 °F (37.5 °C)   BP  Min: 79/20  Max: 123/71   Pulse  Min: 80  Max: 111   Resp  Min: 18  Max: 24   SpO2  Min: 92 %  Max: 97 %   No data recorded   No data recorded      Intake/Output Summary (Last 24 hours) at 12/30/2023 1600  Last data filed at 12/30/2023 1200  Gross per 24 hour   Intake 1983.2 ml   Output 664 ml   Net 1319.2 ml      Flowsheet Rows      Flowsheet Row First Filed Value   Admission Height --   Admission Weight 79 kg (174 lb 2.6 oz) Documented at 12/24/2023 1600               12/24/23  1600 12/25/23  0600 12/26/23  0430   Weight: 79 kg (174 lb 2.6 oz) 71.9 kg (158 lb 8.2 oz) 69.7 kg (153 lb 10.6 oz)            Objective:  General Appearance:  In no acute distress.    Vital signs: (most recent): Blood pressure 105/49, pulse 96, temperature 98.8 °F (37.1 °C), temperature source Bladder, resp. rate 22, weight 69.7 kg (153 lb 10.6 oz), SpO2 97%.    HEENT: (Shiley tracheostomy with trach collar oxygen in place)    Lungs:  She is not in respiratory distress.  There are decreased breath sounds and rhonchi.  No rales or wheezes.    Heart: Normal rate.  Regular rhythm.  S1 normal and S2 normal.  No murmur, gallop or friction rub.   Chest: Symmetric chest wall expansion.   Abdomen: Abdomen is soft and non-distended.  (PEG tube).   Bowel sounds are normal.   There is no abdominal tenderness.   There is no mass. There is no splenomegaly. There is no hepatomegaly.   Extremities: There is dependent edema.  There is no deformity.    Neurological: GCS score is 9.  (Altered mental status).    Skin:  Warm and dry.                I reviewed the patient's new clinical results.  I reviewed the patient's new imaging results/reports including actual images and agree with reports.    XR Chest 1 View    Result Date: 12/30/2023  Impression: 1. Right-sided chest tube in expected position with moderate right-sided pleural effusion. Small left-sided pleural effusion. 2. Tracheostomy tube in expected position.. Electronically Signed: Mario Alcaraz  12/30/2023 8:07 AM EST  Workstation ID: [id number]    XR Chest 1 View    Result Date: 12/29/2023  Impression: 1.Bilateral pleural effusions and bibasilar consolidation, right greater than left, as before. 2.Right pleural drainage catheter appears in similar position. No definite pneumothorax. Electronically Signed: Oumar Asher  12/29/2023 7:06 AM EST  Workstation ID: [id number]      INFUSIONS       Results from last 7 days   Lab Units 12/29/23  0532 12/28/23  0640 12/26/23  0551   WBC 10*3/mm3 12.79* 10.55 8.63   HEMOGLOBIN g/dL 8.6* 8.1* 8.0*   HEMATOCRIT % 26.7* 25.0* 25.1*   PLATELETS 10*3/mm3 366 328 292     Results from last 7 days   Lab Units 12/29/23  0532 12/28/23  0640 12/26/23  2042 12/26/23  0551 12/25/23  0626   SODIUM mmol/L 136 137  --  142 140   POTASSIUM mmol/L 4.0 4.1 3.8 3.4* 3.5   CHLORIDE mmol/L 102 101  --  106 106   CO2 mmol/L 26.0 29.0  --  29.0 26.0   BUN mg/dL 15 15  --  8 7*   GLUCOSE mg/dL 172* 169*  --  166* 135*   CREATININE mg/dL 0.45* 0.43*  --  0.41* 0.34*   MAGNESIUM mg/dL  --  2.3  --  1.9 2.0   CALCIUM mg/dL 8.4* 8.2*  --  8.3* 8.6                 Diet, Tube Feeding Tube Feeding Formula: Isosource 1.5 (Calorically Dense)   /h  Patient doesn't have any tube feeding modular  orders    Mechanical Ventilator:   Settings: Observed:                                                I reviewed the patient's medications.    Assessment & Plan   ASSESSMENT/PLAN     Active Hospital Problems    Diagnosis     **Acute on chronic respiratory failure with hypoxia     Pleural effusion on right     Bronchial pneumonia     UTI (urinary tract infection)     Pneumonia     Pneumothorax, right     Tracheostomy present     Anoxic brain injury        63 year-old female with history of cardiac arrest and anoxic brain injury in March 2019 s/p Trach and PEG placement, COPD, HTN and history of MDRs including ESBL Klebsiella that was originally admitted to [facility] through ED after being brought in by EMS on 12/22/23 for respiratory distress. She was hypoxic on 4 L NC trach collar and febrile. Noted large spontaneous pneumothorax on CXR with subsequent insertion of chest tube by ED physician.     She was admitted to the hospital in [place] and started on broad-spectrum antibiotics.  She was transferred here on 12/24.  She underwent bronchoscopy which revealed bronchial malacia and secretions right greater than left.  Cultures have grown Acinetobacter.  A 14 Taiwanese chest tube was placed and the existing chest tube removed.  It was discovered that she had significant amount of pleural fluid that appeared loculated posteriorly.  The chest tube was well-positioned within this collection but was not draining much so it was decided to instill thrombolytics and dornase intrapleurally for 6 doses    She continues to respond well to the intrapleural therapy with increased output.  Chest x-ray not fundamentally different.    Plan:    Continue intrapleural instillation of thrombolytics and dornase for a total of 6 doses   Continue antimicrobial therapy with Merrem  Additional Bumex  Sliding-scale insulin  Tube feeds at goal rate of Isosource 1.5 at 50 mL/hour  Follow-up chest x-ray     I discussed the patient's findings and my  recommendations with nursing staff     Plan of care and goals reviewed with multidisciplinary team at daily rounds.    High level of risk due to:  Acute on chronic illness with threat to life or bodily function.    Luther Muse MD  Pulmonary and Critical Care Medicine  12/30/23 16:00 EST

## 2023-12-31 ENCOUNTER — APPOINTMENT (OUTPATIENT)
Dept: GENERAL RADIOLOGY | Facility: HOSPITAL | Age: 63
End: 2023-12-31
Payer: MEDICARE

## 2023-12-31 LAB
ALBUMIN SERPL-MCNC: 2.6 G/DL (ref 3.5–5.2)
ANION GAP SERPL CALCULATED.3IONS-SCNC: 6 MMOL/L (ref 5–15)
BASOPHILS # BLD AUTO: 0.06 10*3/MM3 (ref 0–0.2)
BASOPHILS NFR BLD AUTO: 0.4 % (ref 0–1.5)
BUN SERPL-MCNC: 19 MG/DL (ref 8–23)
BUN/CREAT SERPL: 43.2 (ref 7–25)
CALCIUM SPEC-SCNC: 8.6 MG/DL (ref 8.6–10.5)
CHLORIDE SERPL-SCNC: 101 MMOL/L (ref 98–107)
CO2 SERPL-SCNC: 26 MMOL/L (ref 22–29)
CREAT SERPL-MCNC: 0.44 MG/DL (ref 0.57–1)
DEPRECATED RDW RBC AUTO: 46.9 FL (ref 37–54)
EGFRCR SERPLBLD CKD-EPI 2021: 107.5 ML/MIN/1.73
EOSINOPHIL # BLD AUTO: 0.61 10*3/MM3 (ref 0–0.4)
EOSINOPHIL NFR BLD AUTO: 4 % (ref 0.3–6.2)
ERYTHROCYTE [DISTWIDTH] IN BLOOD BY AUTOMATED COUNT: 13.5 % (ref 12.3–15.4)
GLUCOSE BLDC GLUCOMTR-MCNC: 155 MG/DL (ref 70–130)
GLUCOSE BLDC GLUCOMTR-MCNC: 156 MG/DL (ref 70–130)
GLUCOSE BLDC GLUCOMTR-MCNC: 159 MG/DL (ref 70–130)
GLUCOSE BLDC GLUCOMTR-MCNC: 175 MG/DL (ref 70–130)
GLUCOSE SERPL-MCNC: 137 MG/DL (ref 65–99)
HCT VFR BLD AUTO: 27 % (ref 34–46.6)
HGB BLD-MCNC: 8.5 G/DL (ref 12–15.9)
IMM GRANULOCYTES # BLD AUTO: 0.48 10*3/MM3 (ref 0–0.05)
IMM GRANULOCYTES NFR BLD AUTO: 3.2 % (ref 0–0.5)
LYMPHOCYTES # BLD AUTO: 2.28 10*3/MM3 (ref 0.7–3.1)
LYMPHOCYTES NFR BLD AUTO: 15 % (ref 19.6–45.3)
MCH RBC QN AUTO: 29.9 PG (ref 26.6–33)
MCHC RBC AUTO-ENTMCNC: 31.5 G/DL (ref 31.5–35.7)
MCV RBC AUTO: 95.1 FL (ref 79–97)
MONOCYTES # BLD AUTO: 1.34 10*3/MM3 (ref 0.1–0.9)
MONOCYTES NFR BLD AUTO: 8.8 % (ref 5–12)
NEUTROPHILS NFR BLD AUTO: 10.45 10*3/MM3 (ref 1.7–7)
NEUTROPHILS NFR BLD AUTO: 68.6 % (ref 42.7–76)
NRBC BLD AUTO-RTO: 0 /100 WBC (ref 0–0.2)
PHOSPHATE SERPL-MCNC: 3.9 MG/DL (ref 2.5–4.5)
PLATELET # BLD AUTO: 432 10*3/MM3 (ref 140–450)
PMV BLD AUTO: 10.9 FL (ref 6–12)
POTASSIUM SERPL-SCNC: 4.7 MMOL/L (ref 3.5–5.2)
RBC # BLD AUTO: 2.84 10*6/MM3 (ref 3.77–5.28)
SODIUM SERPL-SCNC: 133 MMOL/L (ref 136–145)
WBC NRBC COR # BLD AUTO: 15.22 10*3/MM3 (ref 3.4–10.8)

## 2023-12-31 PROCEDURE — 63710000001 INSULIN REGULAR HUMAN PER 5 UNITS

## 2023-12-31 PROCEDURE — 94799 UNLISTED PULMONARY SVC/PX: CPT

## 2023-12-31 PROCEDURE — 82948 REAGENT STRIP/BLOOD GLUCOSE: CPT

## 2023-12-31 PROCEDURE — 25010000002 MEROPENEM PER 100 MG: Performed by: INTERNAL MEDICINE

## 2023-12-31 PROCEDURE — 99232 SBSQ HOSP IP/OBS MODERATE 35: CPT | Performed by: INTERNAL MEDICINE

## 2023-12-31 PROCEDURE — 25010000002 HEPARIN (PORCINE) PER 1000 UNITS: Performed by: INTERNAL MEDICINE

## 2023-12-31 PROCEDURE — 80069 RENAL FUNCTION PANEL: CPT | Performed by: INTERNAL MEDICINE

## 2023-12-31 PROCEDURE — 71045 X-RAY EXAM CHEST 1 VIEW: CPT

## 2023-12-31 PROCEDURE — 94761 N-INVAS EAR/PLS OXIMETRY MLT: CPT

## 2023-12-31 PROCEDURE — 25010000002 ALTEPLASE 2 MG RECONSTITUTED SOLUTION 1 EACH VIAL

## 2023-12-31 PROCEDURE — 85025 COMPLETE CBC W/AUTO DIFF WBC: CPT | Performed by: INTERNAL MEDICINE

## 2023-12-31 RX ADMIN — INSULIN HUMAN 2 UNITS: 100 INJECTION, SOLUTION PARENTERAL at 05:57

## 2023-12-31 RX ADMIN — GLYCOPYRROLATE 2 MG: 1 TABLET ORAL at 09:34

## 2023-12-31 RX ADMIN — Medication 10 ML: at 09:36

## 2023-12-31 RX ADMIN — MEROPENEM 1000 MG: 1 INJECTION, POWDER, FOR SOLUTION INTRAVENOUS at 01:26

## 2023-12-31 RX ADMIN — MEROPENEM 1000 MG: 1 INJECTION, POWDER, FOR SOLUTION INTRAVENOUS at 16:11

## 2023-12-31 RX ADMIN — Medication 10 ML: at 20:37

## 2023-12-31 RX ADMIN — INSULIN HUMAN 2 UNITS: 100 INJECTION, SOLUTION PARENTERAL at 18:17

## 2023-12-31 RX ADMIN — BACLOFEN 10 MG: 10 TABLET ORAL at 14:02

## 2023-12-31 RX ADMIN — BACLOFEN 10 MG: 10 TABLET ORAL at 05:57

## 2023-12-31 RX ADMIN — BACLOFEN 10 MG: 10 TABLET ORAL at 21:49

## 2023-12-31 RX ADMIN — MEROPENEM 1000 MG: 1 INJECTION, POWDER, FOR SOLUTION INTRAVENOUS at 09:34

## 2023-12-31 RX ADMIN — GLYCOPYRROLATE 2 MG: 1 TABLET ORAL at 16:11

## 2023-12-31 RX ADMIN — GLYCOPYRROLATE 2 MG: 1 TABLET ORAL at 20:37

## 2023-12-31 RX ADMIN — HEPARIN SODIUM 5000 UNITS: 5000 INJECTION INTRAVENOUS; SUBCUTANEOUS at 21:49

## 2023-12-31 RX ADMIN — HEPARIN SODIUM 5000 UNITS: 5000 INJECTION INTRAVENOUS; SUBCUTANEOUS at 14:01

## 2023-12-31 RX ADMIN — DORNASE ALFA 5 MG: 1 SOLUTION RESPIRATORY (INHALATION) at 09:00

## 2023-12-31 RX ADMIN — INSULIN HUMAN 2 UNITS: 100 INJECTION, SOLUTION PARENTERAL at 11:40

## 2023-12-31 RX ADMIN — HEPARIN SODIUM 5000 UNITS: 5000 INJECTION INTRAVENOUS; SUBCUTANEOUS at 05:57

## 2023-12-31 RX ADMIN — Medication 30 ML: at 09:47

## 2023-12-31 RX ADMIN — ALTEPLASE 10 MG: 2.2 INJECTION, POWDER, LYOPHILIZED, FOR SOLUTION INTRAVENOUS at 09:00

## 2023-12-31 NOTE — PLAN OF CARE
Problem: Adult Inpatient Plan of Care  Goal: Plan of Care Review  Outcome: Ongoing, Progressing  Flowsheets (Taken 12/31/2023 2041)  Progress: no change  Plan of Care Reviewed With: patient  Outcome Evaluation: 340mL of serosanguineous fluid drained from chest tube post alteplase/dornase administration into pleural space by NP. Patient afebrile. UOP:350.

## 2023-12-31 NOTE — SIGNIFICANT NOTE
Right Chest Tube TPA/Alteplase instillation    Prior to instillation I discussed the process with the nurse. We noted that the patient would need to be turned from left to right and then center every 15 minutes for 2 hours. She verbalized understanding. At that point I turned the stopcock off to the patient and cleaned the instillation port with alcohol swab. I instilled the TPA followed by the Dornase alpha. I finished by turning the stopcock back off to the patient. The patient tolerated the procedure well and there were no complications. I reiterated to the nurse the goal of rotation every 15 minutes for a total dwelling time of 2 hours. At 1030 she plans on turning the stopcock off to atmosphere and allowing the chest tube to drain as able.

## 2023-12-31 NOTE — PROGRESS NOTES
INTENSIVIST NOTE     Hospital Day: 7    Ms. Viji Vargas, 63 y.o. female is followed for:   Pneumonia, pneumothorax, and pleural effusion       SUBJECTIVE     Interval history:    Mental status without appreciable change.  Afebrile.  Fluid balance +660 mL.  Chest tube output about 630 mL yesterday.  Received last thrombolytic and dornase instillation this morning.    The patient's relevant past medical, surgical and social history were reviewed and updated in Epic as appropriate.       OBJECTIVE     Vital Sign Min/Max for last 24 hours  Temp  Min: 96.6 °F (35.9 °C)  Max: 98.8 °F (37.1 °C)   BP  Min: 73/42  Max: 116/68   Pulse  Min: 80  Max: 120   Resp  Min: 18  Max: 20   SpO2  Min: 91 %  Max: 100 %   No data recorded   No data recorded      Intake/Output Summary (Last 24 hours) at 12/31/2023 1616  Last data filed at 12/31/2023 1200  Gross per 24 hour   Intake 2502.07 ml   Output 1845 ml   Net 657.07 ml      Flowsheet Rows      Flowsheet Row First Filed Value   Admission Height --   Admission Weight 79 kg (174 lb 2.6 oz) Documented at 12/24/2023 1600               12/24/23  1600 12/25/23  0600 12/26/23  0430   Weight: 79 kg (174 lb 2.6 oz) 71.9 kg (158 lb 8.2 oz) 69.7 kg (153 lb 10.6 oz)            Objective:  General Appearance:  In no acute distress.    Vital signs: (most recent): Blood pressure 114/55, pulse 109, temperature 98.2 °F (36.8 °C), temperature source Oral, resp. rate 18, weight 69.7 kg (153 lb 10.6 oz), SpO2 97%.    HEENT: (Shiley tracheostomy with trach collar oxygen in place)    Lungs:  She is not in respiratory distress.  There are decreased breath sounds and rhonchi.  No rales or wheezes.    Heart: Normal rate.  Regular rhythm.  S1 normal and S2 normal.  No murmur, gallop or friction rub.   Chest: Symmetric chest wall expansion.   Abdomen: Abdomen is soft and non-distended.  (PEG tube).  Bowel sounds are normal.   There is no abdominal tenderness.   There is no mass. There is no splenomegaly.  There is no hepatomegaly.   Extremities: There is dependent edema.  There is no deformity.    Neurological: GCS score is 9.  (Altered mental status).    Skin:  Warm and dry.                I reviewed the patient's new clinical results.  I reviewed the patient's new imaging results/reports including actual images and agree with reports.    XR Chest 1 View    Result Date: 12/31/2023  Impression: Mild improved aeration at the right lung base without significant interval change otherwise. Electronically Signed: Harmeet Gibson MD  12/31/2023 8:43 AM EST  Workstation ID: [id number]    XR Chest 1 View    Result Date: 12/30/2023  Impression: 1. Right-sided chest tube in expected position with moderate right-sided pleural effusion. Small left-sided pleural effusion. 2. Tracheostomy tube in expected position.. Electronically Signed: Mario Alcaraz  12/30/2023 8:07 AM EST  Workstation ID: [id number]      INFUSIONS       Results from last 7 days   Lab Units 12/31/23  0426 12/29/23  0532 12/28/23  0640   WBC 10*3/mm3 15.22* 12.79* 10.55   HEMOGLOBIN g/dL 8.5* 8.6* 8.1*   HEMATOCRIT % 27.0* 26.7* 25.0*   PLATELETS 10*3/mm3 432 366 328     Results from last 7 days   Lab Units 12/31/23  0426 12/29/23  0532 12/28/23  0640 12/26/23  2042 12/26/23  0551 12/25/23  0626   SODIUM mmol/L 133* 136 137  --  142 140   POTASSIUM mmol/L 4.7 4.0 4.1   < > 3.4* 3.5   CHLORIDE mmol/L 101 102 101  --  106 106   CO2 mmol/L 26.0 26.0 29.0  --  29.0 26.0   BUN mg/dL 19 15 15  --  8 7*   GLUCOSE mg/dL 137* 172* 169*  --  166* 135*   CREATININE mg/dL 0.44* 0.45* 0.43*  --  0.41* 0.34*   MAGNESIUM mg/dL  --   --  2.3  --  1.9 2.0   CALCIUM mg/dL 8.6 8.4* 8.2*  --  8.3* 8.6   ALBUMIN g/dL 2.6*  --   --   --   --   --     < > = values in this interval not displayed.                 Diet, Tube Feeding Tube Feeding Formula: Isosource 1.5 (Calorically Dense)   /h  Patient doesn't have any tube feeding modular orders    Mechanical Ventilator:   Settings:  Observed:                                                I reviewed the patient's medications.    Assessment & Plan   ASSESSMENT/PLAN     Active Hospital Problems    Diagnosis     **Acute on chronic respiratory failure with hypoxia     Pleural effusion on right     Bronchial pneumonia     UTI (urinary tract infection)     Pneumonia     Pneumothorax, right     Tracheostomy present     Anoxic brain injury        63 year-old female with history of cardiac arrest and anoxic brain injury in March 2019 s/p Trach and PEG placement, COPD, HTN and history of MDRs including ESBL Klebsiella that was originally admitted to [facility] through ED after being brought in by EMS on 12/22/23 for respiratory distress. She was hypoxic on 4 L NC trach collar and febrile. Noted large spontaneous pneumothorax on CXR with subsequent insertion of chest tube by ED physician.     She was admitted to the hospital in [place] and started on broad-spectrum antibiotics.  She was transferred here on 12/24.  She underwent bronchoscopy which revealed bronchial malacia and secretions right greater than left.  Cultures have grown Acinetobacter.  A 14 Arabic chest tube was placed and the existing chest tube removed.  It was discovered that she had significant amount of pleural fluid that appeared loculated posteriorly.  The chest tube was well-positioned within this collection but was not draining much so it was decided to instill thrombolytics and dornase intrapleurally for 6 doses    She responded well to the thrombolytic/dornase intrapleural therapy in regards to increased chest tube output but the appearance of the chest x-ray only improved marginally.  I suspect there is significant complicated pleural fluid present that would only respond to thoracotomy for which she is a poor risk for period    Plan:    I will monitor chest tube output but once it is reduced to a reasonable amount will remove it.  If it is not draining a significant amount it is an  infection risk.  I think she has residual complicated pleural fluid but the only option would be a thoracotomy with decortication but she is at prohibitive risk for this.  Continue antimicrobial therapy with Merrem to complete a course  Sliding-scale insulin  Tube feeds at goal rate of Isosource 1.5 at 50 mL/hour  She still has a femoral deep line in place but she is not a candidate for a PICC line as this has been attempted unsuccessfully bilaterally and she has poor peripheral access.  She does not move around so I see no point in changing her triple-lumen to another site empirically.  Will simply need to remove this when she no longer needs IV antibiotics     I discussed the patient's findings and my recommendations with nursing staff     Plan of care and goals reviewed with multidisciplinary team at daily rounds.    .    Luther Muse MD  Pulmonary and Critical Care Medicine  12/31/23 16:16 EST

## 2024-01-01 ENCOUNTER — APPOINTMENT (OUTPATIENT)
Dept: GENERAL RADIOLOGY | Facility: HOSPITAL | Age: 64
End: 2024-01-01
Payer: MEDICARE

## 2024-01-01 LAB
ANION GAP SERPL CALCULATED.3IONS-SCNC: 8 MMOL/L (ref 5–15)
BASOPHILS # BLD AUTO: 0.06 10*3/MM3 (ref 0–0.2)
BASOPHILS NFR BLD AUTO: 0.5 % (ref 0–1.5)
BUN SERPL-MCNC: 18 MG/DL (ref 8–23)
BUN/CREAT SERPL: 47.4 (ref 7–25)
CALCIUM SPEC-SCNC: 8.2 MG/DL (ref 8.6–10.5)
CHLORIDE SERPL-SCNC: 101 MMOL/L (ref 98–107)
CO2 SERPL-SCNC: 27 MMOL/L (ref 22–29)
CREAT SERPL-MCNC: 0.38 MG/DL (ref 0.57–1)
DEPRECATED RDW RBC AUTO: 48.5 FL (ref 37–54)
EGFRCR SERPLBLD CKD-EPI 2021: 111.4 ML/MIN/1.73
EOSINOPHIL # BLD AUTO: 0.6 10*3/MM3 (ref 0–0.4)
EOSINOPHIL NFR BLD AUTO: 5 % (ref 0.3–6.2)
ERYTHROCYTE [DISTWIDTH] IN BLOOD BY AUTOMATED COUNT: 13.8 % (ref 12.3–15.4)
GLUCOSE BLDC GLUCOMTR-MCNC: 158 MG/DL (ref 70–130)
GLUCOSE BLDC GLUCOMTR-MCNC: 161 MG/DL (ref 70–130)
GLUCOSE BLDC GLUCOMTR-MCNC: 176 MG/DL (ref 70–130)
GLUCOSE SERPL-MCNC: 138 MG/DL (ref 65–99)
HCT VFR BLD AUTO: 23.3 % (ref 34–46.6)
HGB BLD-MCNC: 7.4 G/DL (ref 12–15.9)
IMM GRANULOCYTES # BLD AUTO: 0.39 10*3/MM3 (ref 0–0.05)
IMM GRANULOCYTES NFR BLD AUTO: 3.2 % (ref 0–0.5)
LYMPHOCYTES # BLD AUTO: 1.95 10*3/MM3 (ref 0.7–3.1)
LYMPHOCYTES NFR BLD AUTO: 16.2 % (ref 19.6–45.3)
MCH RBC QN AUTO: 31 PG (ref 26.6–33)
MCHC RBC AUTO-ENTMCNC: 31.8 G/DL (ref 31.5–35.7)
MCV RBC AUTO: 97.5 FL (ref 79–97)
MONOCYTES # BLD AUTO: 1.07 10*3/MM3 (ref 0.1–0.9)
MONOCYTES NFR BLD AUTO: 8.9 % (ref 5–12)
NEUTROPHILS NFR BLD AUTO: 66.2 % (ref 42.7–76)
NEUTROPHILS NFR BLD AUTO: 8 10*3/MM3 (ref 1.7–7)
NRBC BLD AUTO-RTO: 0 /100 WBC (ref 0–0.2)
PLATELET # BLD AUTO: 373 10*3/MM3 (ref 140–450)
PMV BLD AUTO: 11 FL (ref 6–12)
POTASSIUM SERPL-SCNC: 4.6 MMOL/L (ref 3.5–5.2)
RBC # BLD AUTO: 2.39 10*6/MM3 (ref 3.77–5.28)
SODIUM SERPL-SCNC: 136 MMOL/L (ref 136–145)
WBC NRBC COR # BLD AUTO: 12.07 10*3/MM3 (ref 3.4–10.8)

## 2024-01-01 PROCEDURE — 25010000002 HEPARIN (PORCINE) PER 1000 UNITS: Performed by: INTERNAL MEDICINE

## 2024-01-01 PROCEDURE — 71045 X-RAY EXAM CHEST 1 VIEW: CPT

## 2024-01-01 PROCEDURE — 63710000001 INSULIN REGULAR HUMAN PER 5 UNITS

## 2024-01-01 PROCEDURE — 80048 BASIC METABOLIC PNL TOTAL CA: CPT | Performed by: INTERNAL MEDICINE

## 2024-01-01 PROCEDURE — 82948 REAGENT STRIP/BLOOD GLUCOSE: CPT

## 2024-01-01 PROCEDURE — 99232 SBSQ HOSP IP/OBS MODERATE 35: CPT | Performed by: INTERNAL MEDICINE

## 2024-01-01 PROCEDURE — 94799 UNLISTED PULMONARY SVC/PX: CPT

## 2024-01-01 PROCEDURE — 85025 COMPLETE CBC W/AUTO DIFF WBC: CPT | Performed by: INTERNAL MEDICINE

## 2024-01-01 PROCEDURE — 94761 N-INVAS EAR/PLS OXIMETRY MLT: CPT

## 2024-01-01 PROCEDURE — 25010000002 MEROPENEM PER 100 MG: Performed by: INTERNAL MEDICINE

## 2024-01-01 RX ADMIN — HEPARIN SODIUM 5000 UNITS: 5000 INJECTION INTRAVENOUS; SUBCUTANEOUS at 13:43

## 2024-01-01 RX ADMIN — GLYCOPYRROLATE 2 MG: 1 TABLET ORAL at 20:08

## 2024-01-01 RX ADMIN — BACLOFEN 10 MG: 10 TABLET ORAL at 13:43

## 2024-01-01 RX ADMIN — Medication 30 ML: at 08:31

## 2024-01-01 RX ADMIN — INSULIN HUMAN 2 UNITS: 100 INJECTION, SOLUTION PARENTERAL at 11:55

## 2024-01-01 RX ADMIN — INSULIN HUMAN 2 UNITS: 100 INJECTION, SOLUTION PARENTERAL at 05:46

## 2024-01-01 RX ADMIN — GLYCOPYRROLATE 2 MG: 1 TABLET ORAL at 16:02

## 2024-01-01 RX ADMIN — BACLOFEN 10 MG: 10 TABLET ORAL at 22:42

## 2024-01-01 RX ADMIN — MEROPENEM 1000 MG: 1 INJECTION, POWDER, FOR SOLUTION INTRAVENOUS at 00:20

## 2024-01-01 RX ADMIN — Medication 10 ML: at 08:30

## 2024-01-01 RX ADMIN — HEPARIN SODIUM 5000 UNITS: 5000 INJECTION INTRAVENOUS; SUBCUTANEOUS at 22:42

## 2024-01-01 RX ADMIN — INSULIN HUMAN 2 UNITS: 100 INJECTION, SOLUTION PARENTERAL at 00:20

## 2024-01-01 RX ADMIN — HEPARIN SODIUM 5000 UNITS: 5000 INJECTION INTRAVENOUS; SUBCUTANEOUS at 05:46

## 2024-01-01 RX ADMIN — Medication 10 ML: at 20:08

## 2024-01-01 RX ADMIN — SCOPOLAMINE 1 PATCH: 1.5 PATCH, EXTENDED RELEASE TRANSDERMAL at 16:02

## 2024-01-01 RX ADMIN — BACLOFEN 10 MG: 10 TABLET ORAL at 05:46

## 2024-01-01 RX ADMIN — INSULIN HUMAN 2 UNITS: 100 INJECTION, SOLUTION PARENTERAL at 17:49

## 2024-01-01 RX ADMIN — MEROPENEM 1000 MG: 1 INJECTION, POWDER, FOR SOLUTION INTRAVENOUS at 16:02

## 2024-01-01 RX ADMIN — MEROPENEM 1000 MG: 1 INJECTION, POWDER, FOR SOLUTION INTRAVENOUS at 08:29

## 2024-01-01 RX ADMIN — GLYCOPYRROLATE 2 MG: 1 TABLET ORAL at 08:30

## 2024-01-01 NOTE — PLAN OF CARE
Goal Outcome Evaluation:  Plan of Care Reviewed With: patient        Progress: no change  Outcome Evaluation: VSS overnight. 30ml of serosanguineous fluid drained from chest tube. Patient afebrile. UOP 1L. Neuro status remains the same, nonverbal, does not follow commands. Remains on Trach Collar at 15L 35%.

## 2024-01-01 NOTE — PROGRESS NOTES
INTENSIVIST NOTE     Hospital Day: 8    Ms. Viji Vargas, 63 y.o. female is followed for:   Pneumonia, pneumothorax, and pleural effusion       SUBJECTIVE     Interval history:    Mental status remains impaired and likely not far from her chronic state.  Fluid balance even.  Afebrile.    The patient's relevant past medical, surgical and social history were reviewed and updated in Epic as appropriate.       OBJECTIVE     Vital Sign Min/Max for last 24 hours  Temp  Min: 98.6 °F (37 °C)  Max: 99.9 °F (37.7 °C)   BP  Min: 79/43  Max: 133/51   Pulse  Min: 81  Max: 121   Resp  Min: 20  Max: 24   SpO2  Min: 95 %  Max: 100 %   No data recorded   Weight  Min: 74.7 kg (164 lb 10.9 oz)  Max: 74.7 kg (164 lb 10.9 oz)      Intake/Output Summary (Last 24 hours) at 1/1/2024 1452  Last data filed at 1/1/2024 1157  Gross per 24 hour   Intake 2353.45 ml   Output 2200 ml   Net 153.45 ml      Flowsheet Rows      Flowsheet Row First Filed Value   Admission Height --   Admission Weight 79 kg (174 lb 2.6 oz) Documented at 12/24/2023 1600               12/25/23  0600 12/26/23  0430 01/01/24  0600   Weight: 71.9 kg (158 lb 8.2 oz) 69.7 kg (153 lb 10.6 oz) 74.7 kg (164 lb 10.9 oz)            Objective:  General Appearance:  In no acute distress.    Vital signs: (most recent): Blood pressure 101/51, pulse 99, temperature 98.6 °F (37 °C), temperature source Bladder, resp. rate 22, weight 74.7 kg (164 lb 10.9 oz), SpO2 98%.    HEENT: (Shiley tracheostomy with trach collar oxygen in place)    Lungs:  She is not in respiratory distress.  There are decreased breath sounds and rhonchi.  No rales or wheezes.    Heart: Normal rate.  Regular rhythm.  S1 normal and S2 normal.  No murmur, gallop or friction rub.   Chest: Symmetric chest wall expansion.   Abdomen: Abdomen is soft and non-distended.  (PEG tube).  Bowel sounds are normal.   There is no abdominal tenderness.   There is no mass. There is no splenomegaly. There is no hepatomegaly.    Extremities: There is dependent edema.  There is no deformity.    Neurological: GCS score is 9.  (Altered mental status).    Skin:  Warm and dry.                I reviewed the patient's new clinical results.  I reviewed the patient's new imaging results/reports including actual images and agree with reports.    XR Chest 1 View    Result Date: 1/1/2024  Impression: Mildly improved right pleural effusion, otherwise stable chest radiograph. Electronically Signed: Chemo Mckeon MD  1/1/2024 8:52 AM EST  Workstation ID: [id number]    XR Chest 1 View    Result Date: 12/31/2023  Impression: Mild improved aeration at the right lung base without significant interval change otherwise. Electronically Signed: Harmeet Gibson MD  12/31/2023 8:43 AM EST  Workstation ID: [id number]      INFUSIONS       Results from last 7 days   Lab Units 01/01/24  0553 12/31/23  0426 12/29/23  0532   WBC 10*3/mm3 12.07* 15.22* 12.79*   HEMOGLOBIN g/dL 7.4* 8.5* 8.6*   HEMATOCRIT % 23.3* 27.0* 26.7*   PLATELETS 10*3/mm3 373 432 366     Results from last 7 days   Lab Units 01/01/24  0553 12/31/23  0426 12/29/23  0532 12/28/23  0640 12/26/23  2042 12/26/23  0551   SODIUM mmol/L 136 133* 136 137  --  142   POTASSIUM mmol/L 4.6 4.7 4.0 4.1   < > 3.4*   CHLORIDE mmol/L 101 101 102 101  --  106   CO2 mmol/L 27.0 26.0 26.0 29.0  --  29.0   BUN mg/dL 18 19 15 15  --  8   GLUCOSE mg/dL 138* 137* 172* 169*  --  166*   CREATININE mg/dL 0.38* 0.44* 0.45* 0.43*  --  0.41*   MAGNESIUM mg/dL  --   --   --  2.3  --  1.9   CALCIUM mg/dL 8.2* 8.6 8.4* 8.2*  --  8.3*   ALBUMIN g/dL  --  2.6*  --   --   --   --     < > = values in this interval not displayed.                 Diet, Tube Feeding Tube Feeding Formula: Isosource 1.5 (Calorically Dense)   /h  Patient doesn't have any tube feeding modular orders    Mechanical Ventilator:   Settings: Observed:                                                I reviewed the patient's medications.    Assessment & Plan    ASSESSMENT/PLAN     Active Hospital Problems    Diagnosis     **Acute on chronic respiratory failure with hypoxia     Pleural effusion on right     Bronchial pneumonia     UTI (urinary tract infection)     Pneumonia     Pneumothorax, right     Tracheostomy present     Anoxic brain injury        63 year-old female with history of cardiac arrest and anoxic brain injury in March 2019 s/p Trach and PEG placement, COPD, HTN and history of MDRs including ESBL Klebsiella that was originally admitted to [facility] through ED after being brought in by EMS on 12/22/23 for respiratory distress. She was hypoxic on 4 L NC trach collar and febrile. Noted large spontaneous pneumothorax on CXR with subsequent insertion of chest tube by ED physician.     She was admitted to the hospital in [place] and started on broad-spectrum antibiotics.  She was transferred here on 12/24.  She underwent bronchoscopy which revealed bronchial malacia and secretions right greater than left.  Cultures have grown Acinetobacter.  A 14 Frisian chest tube was placed and the existing chest tube removed.  It was discovered that she had significant amount of pleural fluid that appeared loculated posteriorly.  The chest tube was well-positioned within this collection but was not draining much so it was decided to instill thrombolytics and dornase intrapleurally for 6 doses    She responded well to the thrombolytic/dornase intrapleural therapy in regards to increased chest tube output but the appearance of the chest x-ray only improved marginally.  I suspect there is significant complicated pleural fluid present that would only respond to thoracotomy for which she is a poor risk for.    Plan:    In regards to her chest tube, we will leave it today as output around 400 mL over the last 24 hours.  My approach would be to remove the chest tube when drainage is decreased though I fully expect her to have residual pleural density of significance that really could only  be addressed with thoracotomy but she is at high risk for that.  Continue antimicrobial therapy with Merrem to complete a course.  This will be completed in another 24 hours  Sliding-scale insulin  Tube feeds at goal rate of Isosource 1.5 at 50 mL/hour  She has a right femoral deep line.  She has failed PICC line placement.  Moving the central line to her neck would make little sense as she has a tracheostomy and it would likely be less hygienic in that spot.  She does not get out of bed.  I think when the IV antibiotics are completed we can just try to find some minimal peripheral IV access  Addressed long-term placement     I discussed the patient's findings and my recommendations with nursing staff     Plan of care and goals reviewed with multidisciplinary team at daily rounds.    .    Luther Muse MD  Pulmonary and Critical Care Medicine  01/01/24 14:52 EST

## 2024-01-02 ENCOUNTER — APPOINTMENT (OUTPATIENT)
Dept: GENERAL RADIOLOGY | Facility: HOSPITAL | Age: 64
End: 2024-01-02
Payer: MEDICARE

## 2024-01-02 LAB
ANION GAP SERPL CALCULATED.3IONS-SCNC: 8 MMOL/L (ref 5–15)
BASOPHILS # BLD AUTO: 0.08 10*3/MM3 (ref 0–0.2)
BASOPHILS NFR BLD AUTO: 0.7 % (ref 0–1.5)
BUN SERPL-MCNC: 17 MG/DL (ref 8–23)
BUN/CREAT SERPL: 47.2 (ref 7–25)
CALCIUM SPEC-SCNC: 8.4 MG/DL (ref 8.6–10.5)
CHLORIDE SERPL-SCNC: 102 MMOL/L (ref 98–107)
CO2 SERPL-SCNC: 27 MMOL/L (ref 22–29)
CREAT SERPL-MCNC: 0.36 MG/DL (ref 0.57–1)
CRP SERPL-MCNC: 6.55 MG/DL (ref 0–0.5)
DEPRECATED RDW RBC AUTO: 47.1 FL (ref 37–54)
EGFRCR SERPLBLD CKD-EPI 2021: 112.8 ML/MIN/1.73
EOSINOPHIL # BLD AUTO: 0.53 10*3/MM3 (ref 0–0.4)
EOSINOPHIL NFR BLD AUTO: 4.8 % (ref 0.3–6.2)
ERYTHROCYTE [DISTWIDTH] IN BLOOD BY AUTOMATED COUNT: 13.9 % (ref 12.3–15.4)
GLUCOSE BLDC GLUCOMTR-MCNC: 120 MG/DL (ref 70–130)
GLUCOSE BLDC GLUCOMTR-MCNC: 129 MG/DL (ref 70–130)
GLUCOSE BLDC GLUCOMTR-MCNC: 133 MG/DL (ref 70–130)
GLUCOSE BLDC GLUCOMTR-MCNC: 147 MG/DL (ref 70–130)
GLUCOSE BLDC GLUCOMTR-MCNC: 150 MG/DL (ref 70–130)
GLUCOSE BLDC GLUCOMTR-MCNC: 154 MG/DL (ref 70–130)
GLUCOSE BLDC GLUCOMTR-MCNC: 163 MG/DL (ref 70–130)
GLUCOSE SERPL-MCNC: 126 MG/DL (ref 65–99)
HCT VFR BLD AUTO: 22.4 % (ref 34–46.6)
HGB BLD-MCNC: 7.1 G/DL (ref 12–15.9)
IMM GRANULOCYTES # BLD AUTO: 0.45 10*3/MM3 (ref 0–0.05)
IMM GRANULOCYTES NFR BLD AUTO: 4.1 % (ref 0–0.5)
LYMPHOCYTES # BLD AUTO: 1.64 10*3/MM3 (ref 0.7–3.1)
LYMPHOCYTES NFR BLD AUTO: 14.8 % (ref 19.6–45.3)
MCH RBC QN AUTO: 30.2 PG (ref 26.6–33)
MCHC RBC AUTO-ENTMCNC: 31.7 G/DL (ref 31.5–35.7)
MCV RBC AUTO: 95.3 FL (ref 79–97)
MONOCYTES # BLD AUTO: 1.04 10*3/MM3 (ref 0.1–0.9)
MONOCYTES NFR BLD AUTO: 9.4 % (ref 5–12)
NEUTROPHILS NFR BLD AUTO: 66.2 % (ref 42.7–76)
NEUTROPHILS NFR BLD AUTO: 7.37 10*3/MM3 (ref 1.7–7)
NRBC BLD AUTO-RTO: 0.3 /100 WBC (ref 0–0.2)
PLATELET # BLD AUTO: 374 10*3/MM3 (ref 140–450)
PMV BLD AUTO: 10.9 FL (ref 6–12)
POTASSIUM SERPL-SCNC: 4.8 MMOL/L (ref 3.5–5.2)
RBC # BLD AUTO: 2.35 10*6/MM3 (ref 3.77–5.28)
SODIUM SERPL-SCNC: 137 MMOL/L (ref 136–145)
WBC NRBC COR # BLD AUTO: 11.11 10*3/MM3 (ref 3.4–10.8)

## 2024-01-02 PROCEDURE — 71045 X-RAY EXAM CHEST 1 VIEW: CPT

## 2024-01-02 PROCEDURE — 82948 REAGENT STRIP/BLOOD GLUCOSE: CPT

## 2024-01-02 PROCEDURE — 86140 C-REACTIVE PROTEIN: CPT

## 2024-01-02 PROCEDURE — 25010000002 HEPARIN (PORCINE) PER 1000 UNITS: Performed by: INTERNAL MEDICINE

## 2024-01-02 PROCEDURE — 25010000002 MEROPENEM PER 100 MG: Performed by: INTERNAL MEDICINE

## 2024-01-02 PROCEDURE — 86901 BLOOD TYPING SEROLOGIC RH(D): CPT

## 2024-01-02 PROCEDURE — 63710000001 INSULIN REGULAR HUMAN PER 5 UNITS

## 2024-01-02 PROCEDURE — 80048 BASIC METABOLIC PNL TOTAL CA: CPT | Performed by: INTERNAL MEDICINE

## 2024-01-02 PROCEDURE — 99232 SBSQ HOSP IP/OBS MODERATE 35: CPT | Performed by: INTERNAL MEDICINE

## 2024-01-02 PROCEDURE — 85025 COMPLETE CBC W/AUTO DIFF WBC: CPT | Performed by: INTERNAL MEDICINE

## 2024-01-02 PROCEDURE — 94799 UNLISTED PULMONARY SVC/PX: CPT

## 2024-01-02 PROCEDURE — 94761 N-INVAS EAR/PLS OXIMETRY MLT: CPT

## 2024-01-02 PROCEDURE — 86900 BLOOD TYPING SEROLOGIC ABO: CPT

## 2024-01-02 RX ADMIN — Medication 30 ML: at 12:46

## 2024-01-02 RX ADMIN — MEROPENEM 1000 MG: 1 INJECTION, POWDER, FOR SOLUTION INTRAVENOUS at 08:57

## 2024-01-02 RX ADMIN — GLYCOPYRROLATE 2 MG: 1 TABLET ORAL at 20:25

## 2024-01-02 RX ADMIN — BACLOFEN 10 MG: 10 TABLET ORAL at 22:16

## 2024-01-02 RX ADMIN — GLYCOPYRROLATE 2 MG: 1 TABLET ORAL at 16:49

## 2024-01-02 RX ADMIN — SENNOSIDES AND DOCUSATE SODIUM 2 TABLET: 8.6; 5 TABLET ORAL at 08:57

## 2024-01-02 RX ADMIN — MEROPENEM 1000 MG: 1 INJECTION, POWDER, FOR SOLUTION INTRAVENOUS at 17:47

## 2024-01-02 RX ADMIN — HEPARIN SODIUM 5000 UNITS: 5000 INJECTION INTRAVENOUS; SUBCUTANEOUS at 06:36

## 2024-01-02 RX ADMIN — MEROPENEM 1000 MG: 1 INJECTION, POWDER, FOR SOLUTION INTRAVENOUS at 01:57

## 2024-01-02 RX ADMIN — HEPARIN SODIUM 5000 UNITS: 5000 INJECTION INTRAVENOUS; SUBCUTANEOUS at 14:59

## 2024-01-02 RX ADMIN — CARVEDILOL 6.25 MG: 6.25 TABLET, FILM COATED ORAL at 08:57

## 2024-01-02 RX ADMIN — HEPARIN SODIUM 5000 UNITS: 5000 INJECTION INTRAVENOUS; SUBCUTANEOUS at 22:16

## 2024-01-02 RX ADMIN — INSULIN HUMAN 2 UNITS: 100 INJECTION, SOLUTION PARENTERAL at 12:46

## 2024-01-02 RX ADMIN — GLYCOPYRROLATE 2 MG: 1 TABLET ORAL at 08:57

## 2024-01-02 RX ADMIN — Medication 10 ML: at 20:25

## 2024-01-02 RX ADMIN — BACLOFEN 10 MG: 10 TABLET ORAL at 14:59

## 2024-01-02 RX ADMIN — BACLOFEN 10 MG: 10 TABLET ORAL at 06:36

## 2024-01-02 NOTE — PROGRESS NOTES
Clinical Nutrition     Nutrition Support Assessment  Reason for Visit: EN    Patient Name: Viji Vargas  YOB: 1960  MRN: [id number]  Date of Encounter: 01/02/24 09:19 EST  Admission date: 12/24/2023    Comments:  Continue with current EN Rx.     Nutrition Assessment   Admission Diagnosis:  Bronchial pneumonia [J18.0]    Problem List:    Acute on chronic respiratory failure with hypoxia    Anoxic brain injury    Tracheostomy present    Pneumonia    Pneumothorax, right    UTI (urinary tract infection)    Bronchial pneumonia    Pleural effusion on right      PMH:   She  has a past medical history of COPD (chronic obstructive pulmonary disease), Hypertension, Pneumonia, and Stroke.    PSH:  She  has a past surgical history that includes Hysterectomy and tracheostomy and peg tube insertion (N/A, 3/20/2019).    Applicable Nutrition Concerns:   Skin:  Oral: Chronic Trach   GI:  PEG     Applicable Interval History:   Trach/PEG 2019 has been at nursing home facility.     Reported/Observed/Food/Nutrition Related History:   1/2  No significant changes. No-verbal at baseline.  EN @ goal rate and being tolerated. Can possibly transfer to the floor. RN reports patient is contracted at baseline.      12/25  Pt transferred from [facility] for higher level of care including bronchoscopy. Pt in acute on chronic respiratory failure, pneumothorax s/p chest tubes, currently doing well on trach collar for o2. Pt with hx anoxic brain injury with trach/PEG placement in 2019. Per review of EMR pt has tolerated isosource 1.5. Most recently at [facility] was receiving Nutren 1.5 which is not available at our facility. Bowels are moving. Phos slightly low this morning and was replaced. Levophed weaned yesterday. Pt lying in bed with no family at bedside at time RD visit. Per intensivist note okay with restarting nutrition.     Labs    Labs Reviewed: Yes     Results from last 7 days   Lab Units 01/02/24  0604  01/01/24  0553 12/31/23  0426 12/29/23  0532 12/28/23  0640 12/26/23  2042   GLUCOSE mg/dL 126* 138* 137*   < > 169*  --    BUN mg/dL 17 18 19   < > 15  --    CREATININE mg/dL 0.36* 0.38* 0.44*   < > 0.43*  --    SODIUM mmol/L 137 136 133*   < > 137  --    CHLORIDE mmol/L 102 101 101   < > 101  --    POTASSIUM mmol/L 4.8 4.6 4.7   < > 4.1 3.8   PHOSPHORUS mg/dL  --   --  3.9  --   --  2.6   MAGNESIUM mg/dL  --   --   --   --  2.3  --     < > = values in this interval not displayed.       Results from last 7 days   Lab Units 12/31/23  0426   ALBUMIN g/dL 2.6*       Results from last 7 days   Lab Units 01/02/24  0516 01/02/24  0006 01/01/24  1723 01/01/24  1147 01/01/24  0005 12/31/23  1723   GLUCOSE mg/dL 133* 129 176* 161* 158* 155*     Lab Results   Lab Value Date/Time    HGBA1C 5.1 03/10/2019 0412               Medications    Medications Reviewed: Yes  Pertinent: insulin, Pericolace, scopolamine patch   Infusion: none at this time   PRN: pericolace (ordered)    Intake/Ouptut 24 hrs (0701 - 0700)   I&O's Reviewed: Yes   Intake & Output (last day)         01/01 0701 01/02 0700 01/02 0701 01/03 0700    I.V. (mL/kg) 238.7 (3.2)     Other 531     NG/GT 1056     IV Piggyback  100    Total Intake(mL/kg) 1825.7 (24.7) 100 (1.4)    Urine (mL/kg/hr) 2625 (1.5)     Stool 0     Chest Tube      Total Output 2625     Net -799.3 +100          Stool Unmeasured Occurrence 2 x           Anthropometrics     Height:    Last Filed Weight: Weight: 73.8 kg (162 lb 11.2 oz) (01/02/24 0600)  Method: Weight Method: Bed scale  BMI: BMI (Calculated): 27.9  BMI classification: Overweight: 25.0-29.9kg/m2   IBW:  120 lb    UBW:    Weight     Weight (kg) Weight (lbs)   11/22/2022 69.854 kg  154 lb    1/22/2023 69.854 kg  154 lb    2/11/2023 72.576 kg  160 lb    3/7/2023 72.576 kg  160 lb    4/15/2023 72.576 kg  160 lb    5/5/2023 72.576 kg  160 lb    5/6/2023 69 kg  152 lb 1.9 oz     69 kg  152 lb 1.9 oz    5/7/2023 69.3 kg  152 lb 12.5 oz     5/8/2023 71.4 kg  157 lb 6.5 oz    5/23/2023 71.4 kg  157 lb 6.5 oz    12/22/2023 71.215 kg  157 lb     70.28 kg  154 lb 15 oz    12/23/2023 69.7 kg  153 lb 10.6 oz    12/24/2023 70.5 kg  155 lb 6.8 oz     79 kg  174 lb 2.6 oz    12/25/2023 71.9 kg  158 lb 8.2 oz        Weight change: no significant changes    Nutrition Focused Physical Exam     Date: 12/25    Unable to perform exam due to: Pt unable to participate at time of visit    Needs Assessment   Date: 12/25  Reviewed 1/1    Height used: 64 in  Weights used: 71.9 kg (ABW)    Estimated Calorie needs: ~1500 Kcal/day (21 kcal/kg)  Method:  Kcals/KG 20-22 ABW = 6853-7811  Method:  MSJ 1252X1.2=1502    Estimated Protein needs: ~79 g PRO/day  Method: 1-1.2 g/Kg = 72-86    Estimated Fluid needs: ~ ml/day   Per clinical status    Current Nutrition Prescription     PO: NPO Diet NPO Type: Strict NPO  Oral Nutrition Supplement:   Intake: N/A    EN: IsoSource 1.5  Goal Rate: 50ml/hr   Water Flushes: 25ml/hr  Modular: Prosource -no carb 1/day  Route: PEG  Tube: Unknown    At goal over: 20Hrs/day    Rx will supply:   Goal Volume 1000  mL/day     Flush Volume 500 mL/day     Energy 1560 Kcal/day 100 % Est Need   Protein 83 g/day 105 % Est Need   Fiber 15.2 g/day     Water in   mL     Total Water 1260 mL     Meet DRI Yes        --------------------------------------------------------------------------  Product/Rate verified at bedside: Yes  Infusing Rate at time of visit: per RN @ goal rate     Average Delivery from Charting: 3 Days:  Volume 1074 mL/day 107  % Goal Vol.   Flush Volume 585 mL/day     Energy 1671 Kcal/day 111 % Est Need   Protein 88 g/day 111 % Est Need   Fiber 16.3 g/day     Water in   mL     Total Water 1401 mL     Meet DRI Yes          Nutrition Diagnosis   Date:  Updated:   Problem Inadequate oral intake   Etiology Dysphagia 2/2 anoxic brain injury   Signs/Symptoms PEG/EN for nutrition   Status:     Goal:   General: Nutrition support  treatment  PO: Advace diet as medically feasible/appropriate  EN/PN: Maintain EN    Nutrition Intervention      Follow treatment progress, Care plan reviewed        Monitoring/Evaluation:   Per protocol, I&O, Pertinent labs, EN delivery/tolerance, Weight, GI status, Symptoms, POC/GOC, Hemodynamic stability      Mar Anand RD, CNSC  Time Spent: 30m

## 2024-01-02 NOTE — POST-PROCEDURE NOTE
R lateral small bore chest tube removed without complication. Occlusive dressing applied.     Electronically signed by FÉLIX Tillman, 01/02/24, 1:38 PM EST.

## 2024-01-02 NOTE — PROGRESS NOTES
Intensive Care Follow-up     Hospital:  LOS: 9 days   Ms. Viji Vargas, 63 y.o. female is followed for:   Acute on chronic respiratory failure with hypoxia            History of present illness:   63-year-old female with past medical history of cardiac arrest and anoxic brain injury in March 2019 status post tracheostomy and PEG placement, COPD, hypertension was recent admitted to [facility] for respiratory distress.  Patient was found to have large spontaneous pneumothorax on the chest x-ray and chest tube was placed.Subsequently developed large right loculated effusion. Pt was transferred here on 12/24.  Patient underwent bronchoscopy and cultures are growing Acinetobacter.  14 Armenian chest tube was placed for drainage of pleural effusion.  Patient was given thrombolytics and dornase as well..  Urinalysis showed complicated parapneumonic effusions and cultures remain negative.  Patient was not deemed candidate for surgical decortication due to her underlying comorbidities.  Is being treated with meropenem.      Subjective   Interval History:  Overnight no acute events.  Patient grecia on trach collar.  Chest tube is in place and not much drainage noted.  No meaningful neurological response noted.  Hemodynamically stable otherwise.  No new fevers.  Hemoglobin trending down and will be monitored.  WBC count improving.                 The patient's past medical, surgical and social history were reviewed and updated in Epic as appropriate.       Objective     Infusions:     Medications:  baclofen, 10 mg, Per PEG Tube, Q8H  carvedilol, 6.25 mg, Per PEG Tube, Q12H  glycopyrrolate, 2 mg, Per PEG Tube, TID  heparin (porcine), 5,000 Units, Subcutaneous, Q8H  insulin regular, 2-9 Units, Subcutaneous, Q6H  meropenem, 1,000 mg, Intravenous, Q8H  ProSource No Carb, 30 mL, Per PEG Tube, Daily  Scopolamine, 1 patch, Transdermal, Q72H  senna-docusate sodium, 2 tablet, Per G Tube, BID  sodium chloride, 10 mL,  Intravenous, Q12H        Vital Sign Min/Max for last 24 hours  Temp  Min: 97.9 °F (36.6 °C)  Max: 99 °F (37.2 °C)   BP  Min: 91/53  Max: 111/48   Pulse  Min: 87  Max: 118   Resp  Min: 17  Max: 20   SpO2  Min: 97 %  Max: 100 %   Flow (L/min)  Min: 15  Max: 15       Input/Output for last 24 hour shift  01/01 0701 - 01/02 0700  In: 1825.7 [I.V.:238.7]  Out: 2625 [Urine:2625]      Objective:  Vital signs: (most recent): Blood pressure 96/43, pulse 100, temperature 98.8 °F (37.1 °C), temperature source Bladder, resp. rate 20, weight 73.8 kg (162 lb 11.2 oz), SpO2 99%.            General Appearance: Eyes open but not following any commands..  Neck: Tracheostomy tube in place.   Lungs:   B/L Breath sounds present with decreased breath sounds on bases, no wheezing heard, no crackles. Chest tube in place and not much drainage noted.   Heart: S1 and S2 present, no murmur  Abdomen: Soft, nontender, no guarding or rigidity, bowel sounds positive.  Extremities:  no edema, warm to touch. Femoral line in place  Neurologic:  Chronic contractures.       Results from last 7 days   Lab Units 01/02/24  0642 01/01/24  0553 12/31/23  0426   WBC 10*3/mm3 11.11* 12.07* 15.22*   HEMOGLOBIN g/dL 7.1* 7.4* 8.5*   PLATELETS 10*3/mm3 374 373 432     Results from last 7 days   Lab Units 01/02/24  0642 01/01/24  0553 12/31/23  0426 12/29/23  0532 12/28/23  0640 12/26/23  2042   SODIUM mmol/L 137 136 133*   < > 137  --    POTASSIUM mmol/L 4.8 4.6 4.7   < > 4.1 3.8   CO2 mmol/L 27.0 27.0 26.0   < > 29.0  --    BUN mg/dL 17 18 19   < > 15  --    CREATININE mg/dL 0.36* 0.38* 0.44*   < > 0.43*  --    MAGNESIUM mg/dL  --   --   --   --  2.3  --    PHOSPHORUS mg/dL  --   --  3.9  --   --  2.6   GLUCOSE mg/dL 126* 138* 137*   < > 169*  --     < > = values in this interval not displayed.     Estimated Creatinine Clearance: 153.2 mL/min (A) (by C-G formula based on SCr of 0.36 mg/dL (L)).          Images:   CXR reviewed and showed right pigtail catheter in  place. Small right pleural effusion, improved from before.     I reviewed the patient's results and images.     Assessment & Plan   Impression        Acute on chronic respiratory failure with hypoxia    Anoxic brain injury    Tracheostomy present    Pneumonia    Pneumothorax, right    UTI (urinary tract infection)    Bronchial pneumonia    Pleural effusion on right       Plan        1.  Patient admitted here with respiratory failure, worsening pneumonia on the right side as well as complicated parapneumonic effusion.  On antibiotics with meropenem.  Will finish antibiotics tomorrow.  WBC count continues to improve.  No new fevers or hemodynamic instability.  2.  Had right chest tube in place to drain pleural effusion as well as for pneumothorax.  No air leak noted.  Not much drainage noted from chest tube.  S/p dornase alpha/tPA.  Patient not candidate for decortication due to underlying comorbidities.  Will discontinue chest tube and monitor closely.  Hopefully will treatment of pneumonia we will see improvement in pleural effusion as well and she may have some residual pleural thickening from this effusion but it is not impacting her clinically at this time.  3.  Patient also had component of UTI with 50,000 colonies of Proteus.  Should be treated with current antibiotics.  4.  Hemoglobin level is slowly trending down.  No bleeding noted through chest tube.  Will trend for now and recheck again in the morning.  If hemoglobin below 7 will transfuse.  5.  Patient has femoral line in place.  Unable to get PICC line and unable to get peripheral IVs.  Will reevaluate need for IV access as antibiotics finishing.  6.  On scopolamine patch for oral secretions.  Continue monitoring.  On Robinul as well.  7.  Continue Coreg for blood pressure control.  8.  On sliding scale insulin coverage.  Unclear if she is requiring any insulin therapy.  Will check with nursing staff otherwise will discontinue.  9.  Continue enteral  nutrition.  Tolerating well.    Overall guarded long-term prognosis.  Check labs and chest x-ray in the morning.    Plan of care and goals reviewed with multidisciplinary/antibiotic stewardship team during rounds.   I discussed the patient's findings and my recommendations with nursing staff     No family is available here.      Mandeep Miranda MD, [facility]  Pulmonary, Critical care and Sleep Medicine

## 2024-01-02 NOTE — CASE MANAGEMENT/SOCIAL WORK
Continued Stay Note  [facility]     Patient Name: Viji Vargas  MRN: [id number]  Today's Date: 1/2/2024    Admit Date: 12/24/2023    Plan: [facility]   Discharge Plan       Row Name 01/02/24 1336       Plan    Plan Return to [facility] and [facility]    Plan Comments Discussed patient in MDR.  Patient's discharge plan will be to return to [facility] bed when medically ready.  CM will continue to follow and arrange EMS transport when able to discharge.                   Discharge Codes    No documentation.                       Debi Rivera RN

## 2024-01-02 NOTE — CASE MANAGEMENT/SOCIAL WORK
Continued Stay Note  [facility]     Patient Name: Viji Vargas  MRN: [id number]  Today's Date: 1/2/2024    Admit Date: 12/24/2023    [facility]   Discharge Plan       Row Name 01/02/24 1336       Plan    Plan [facility]    Plan Comments Discussed patient in MDR.  Patient's discharge plan will be to return to [facility] and [facility] bed when medically ready; Shadia at facility updated.  CM will continue to follow and arrange EMS transport when able to discharge.                   Discharge Codes    No documentation.                       Debi Rivera RN

## 2024-01-03 ENCOUNTER — APPOINTMENT (OUTPATIENT)
Dept: GENERAL RADIOLOGY | Facility: HOSPITAL | Age: 64
End: 2024-01-03
Payer: MEDICARE

## 2024-01-03 LAB
ABO GROUP BLD: NORMAL
ABO GROUP BLD: NORMAL
ANION GAP SERPL CALCULATED.3IONS-SCNC: 10 MMOL/L (ref 5–15)
BASOPHILS # BLD AUTO: 0.05 10*3/MM3 (ref 0–0.2)
BASOPHILS NFR BLD AUTO: 0.4 % (ref 0–1.5)
BLD GP AB SCN SERPL QL: NEGATIVE
BUN SERPL-MCNC: 19 MG/DL (ref 8–23)
BUN/CREAT SERPL: 47.5 (ref 7–25)
CALCIUM SPEC-SCNC: 8.6 MG/DL (ref 8.6–10.5)
CHLORIDE SERPL-SCNC: 101 MMOL/L (ref 98–107)
CO2 SERPL-SCNC: 26 MMOL/L (ref 22–29)
CREAT SERPL-MCNC: 0.4 MG/DL (ref 0.57–1)
DEPRECATED RDW RBC AUTO: 50.2 FL (ref 37–54)
EGFRCR SERPLBLD CKD-EPI 2021: 110 ML/MIN/1.73
EOSINOPHIL # BLD AUTO: 0.49 10*3/MM3 (ref 0–0.4)
EOSINOPHIL NFR BLD AUTO: 4.1 % (ref 0.3–6.2)
ERYTHROCYTE [DISTWIDTH] IN BLOOD BY AUTOMATED COUNT: 13.9 % (ref 12.3–15.4)
GLUCOSE BLDC GLUCOMTR-MCNC: 132 MG/DL (ref 70–130)
GLUCOSE BLDC GLUCOMTR-MCNC: 138 MG/DL (ref 70–130)
GLUCOSE BLDC GLUCOMTR-MCNC: 154 MG/DL (ref 70–130)
GLUCOSE SERPL-MCNC: 142 MG/DL (ref 65–99)
HCT VFR BLD AUTO: 21.4 % (ref 34–46.6)
HCT VFR BLD AUTO: 26.7 % (ref 34–46.6)
HGB BLD-MCNC: 6.6 G/DL (ref 12–15.9)
HGB BLD-MCNC: 8.5 G/DL (ref 12–15.9)
IMM GRANULOCYTES # BLD AUTO: 0.33 10*3/MM3 (ref 0–0.05)
IMM GRANULOCYTES NFR BLD AUTO: 2.8 % (ref 0–0.5)
LYMPHOCYTES # BLD AUTO: 1.81 10*3/MM3 (ref 0.7–3.1)
LYMPHOCYTES NFR BLD AUTO: 15.2 % (ref 19.6–45.3)
MAGNESIUM SERPL-MCNC: 2.6 MG/DL (ref 1.6–2.4)
MCH RBC QN AUTO: 30.6 PG (ref 26.6–33)
MCHC RBC AUTO-ENTMCNC: 30.8 G/DL (ref 31.5–35.7)
MCV RBC AUTO: 99.1 FL (ref 79–97)
MONOCYTES # BLD AUTO: 0.95 10*3/MM3 (ref 0.1–0.9)
MONOCYTES NFR BLD AUTO: 8 % (ref 5–12)
NEUTROPHILS NFR BLD AUTO: 69.5 % (ref 42.7–76)
NEUTROPHILS NFR BLD AUTO: 8.29 10*3/MM3 (ref 1.7–7)
NRBC BLD AUTO-RTO: 0.3 /100 WBC (ref 0–0.2)
PLAT MORPH BLD: NORMAL
PLATELET # BLD AUTO: 426 10*3/MM3 (ref 140–450)
PMV BLD AUTO: 11.5 FL (ref 6–12)
POTASSIUM SERPL-SCNC: 4.4 MMOL/L (ref 3.5–5.2)
PREALB SERPL-MCNC: 15.5 MG/DL (ref 20–40)
RBC # BLD AUTO: 2.16 10*6/MM3 (ref 3.77–5.28)
RBC MORPH BLD: NORMAL
RH BLD: POSITIVE
RH BLD: POSITIVE
SODIUM SERPL-SCNC: 137 MMOL/L (ref 136–145)
T&S EXPIRATION DATE: NORMAL
WBC MORPH BLD: NORMAL
WBC NRBC COR # BLD AUTO: 11.92 10*3/MM3 (ref 3.4–10.8)

## 2024-01-03 PROCEDURE — 86901 BLOOD TYPING SEROLOGIC RH(D): CPT | Performed by: NURSE PRACTITIONER

## 2024-01-03 PROCEDURE — 86900 BLOOD TYPING SEROLOGIC ABO: CPT | Performed by: NURSE PRACTITIONER

## 2024-01-03 PROCEDURE — 80048 BASIC METABOLIC PNL TOTAL CA: CPT | Performed by: INTERNAL MEDICINE

## 2024-01-03 PROCEDURE — 83735 ASSAY OF MAGNESIUM: CPT | Performed by: INTERNAL MEDICINE

## 2024-01-03 PROCEDURE — 82948 REAGENT STRIP/BLOOD GLUCOSE: CPT

## 2024-01-03 PROCEDURE — 71045 X-RAY EXAM CHEST 1 VIEW: CPT

## 2024-01-03 PROCEDURE — 99232 SBSQ HOSP IP/OBS MODERATE 35: CPT | Performed by: INTERNAL MEDICINE

## 2024-01-03 PROCEDURE — 63710000001 INSULIN REGULAR HUMAN PER 5 UNITS

## 2024-01-03 PROCEDURE — 85018 HEMOGLOBIN: CPT | Performed by: INTERNAL MEDICINE

## 2024-01-03 PROCEDURE — 85007 BL SMEAR W/DIFF WBC COUNT: CPT | Performed by: INTERNAL MEDICINE

## 2024-01-03 PROCEDURE — 86900 BLOOD TYPING SEROLOGIC ABO: CPT

## 2024-01-03 PROCEDURE — 36430 TRANSFUSION BLD/BLD COMPNT: CPT

## 2024-01-03 PROCEDURE — 86923 COMPATIBILITY TEST ELECTRIC: CPT

## 2024-01-03 PROCEDURE — 84134 ASSAY OF PREALBUMIN: CPT

## 2024-01-03 PROCEDURE — 94761 N-INVAS EAR/PLS OXIMETRY MLT: CPT

## 2024-01-03 PROCEDURE — 85025 COMPLETE CBC W/AUTO DIFF WBC: CPT | Performed by: INTERNAL MEDICINE

## 2024-01-03 PROCEDURE — P9016 RBC LEUKOCYTES REDUCED: HCPCS

## 2024-01-03 PROCEDURE — 25010000002 HEPARIN (PORCINE) PER 1000 UNITS: Performed by: INTERNAL MEDICINE

## 2024-01-03 PROCEDURE — 85014 HEMATOCRIT: CPT | Performed by: INTERNAL MEDICINE

## 2024-01-03 PROCEDURE — 86850 RBC ANTIBODY SCREEN: CPT | Performed by: NURSE PRACTITIONER

## 2024-01-03 PROCEDURE — 94799 UNLISTED PULMONARY SVC/PX: CPT

## 2024-01-03 PROCEDURE — 25010000002 MEROPENEM PER 100 MG: Performed by: INTERNAL MEDICINE

## 2024-01-03 RX ADMIN — GLYCOPYRROLATE 2 MG: 1 TABLET ORAL at 20:11

## 2024-01-03 RX ADMIN — BACLOFEN 10 MG: 10 TABLET ORAL at 16:01

## 2024-01-03 RX ADMIN — HEPARIN SODIUM 5000 UNITS: 5000 INJECTION INTRAVENOUS; SUBCUTANEOUS at 16:01

## 2024-01-03 RX ADMIN — CARVEDILOL 6.25 MG: 6.25 TABLET, FILM COATED ORAL at 08:36

## 2024-01-03 RX ADMIN — BACLOFEN 10 MG: 10 TABLET ORAL at 06:31

## 2024-01-03 RX ADMIN — MEROPENEM 1000 MG: 1 INJECTION, POWDER, FOR SOLUTION INTRAVENOUS at 00:10

## 2024-01-03 RX ADMIN — Medication 10 ML: at 20:15

## 2024-01-03 RX ADMIN — GLYCOPYRROLATE 2 MG: 1 TABLET ORAL at 16:01

## 2024-01-03 RX ADMIN — MEROPENEM 1000 MG: 1 INJECTION, POWDER, FOR SOLUTION INTRAVENOUS at 08:36

## 2024-01-03 RX ADMIN — HEPARIN SODIUM 5000 UNITS: 5000 INJECTION INTRAVENOUS; SUBCUTANEOUS at 06:30

## 2024-01-03 RX ADMIN — Medication 30 ML: at 08:35

## 2024-01-03 RX ADMIN — MEROPENEM 1000 MG: 1 INJECTION, POWDER, FOR SOLUTION INTRAVENOUS at 16:02

## 2024-01-03 RX ADMIN — HEPARIN SODIUM 5000 UNITS: 5000 INJECTION INTRAVENOUS; SUBCUTANEOUS at 22:03

## 2024-01-03 RX ADMIN — Medication 10 ML: at 08:37

## 2024-01-03 RX ADMIN — GLYCOPYRROLATE 2 MG: 1 TABLET ORAL at 08:36

## 2024-01-03 RX ADMIN — INSULIN HUMAN 2 UNITS: 100 INJECTION, SOLUTION PARENTERAL at 06:30

## 2024-01-03 RX ADMIN — BACLOFEN 10 MG: 10 TABLET ORAL at 22:03

## 2024-01-03 RX ADMIN — SENNOSIDES AND DOCUSATE SODIUM 2 TABLET: 8.6; 5 TABLET ORAL at 08:36

## 2024-01-03 NOTE — PROGRESS NOTES
Intensive Care Follow-up     Hospital:  LOS: 10 days   Ms. Viji Vargas, 63 y.o. female is followed for:   Acute on chronic respiratory failure with hypoxia            History of present illness:   63-year-old female with past medical history of cardiac arrest and anoxic brain injury in March 2019 status post tracheostomy and PEG placement, COPD, hypertension was recent admitted to [facility] for respiratory distress.  Patient was found to have large spontaneous pneumothorax on the chest x-ray and chest tube was placed.Subsequently developed large right loculated effusion. Pt was transferred here on 12/24.  Patient underwent bronchoscopy and cultures are growing Acinetobacter.  14 Russian chest tube was placed for drainage of pleural effusion.  Patient was given thrombolytics and dornase as well..  Urinalysis showed complicated parapneumonic effusions and cultures remain negative.  Patient was not deemed candidate for surgical decortication due to her underlying comorbidities.  Is being treated with meropenem.  There was no output noted from the chest tube despite using tPA/dornase alpha.  Chest tube was discontinued.    Subjective   Interval History:  Overnight no acute events.  Patient remains on tracheal oxygen.  Saturating well.  Requiring frequent suctioning                 The patient's past medical, surgical and social history were reviewed and updated in Epic as appropriate.       Objective     Infusions:     Medications:  baclofen, 10 mg, Per PEG Tube, Q8H  carvedilol, 6.25 mg, Per PEG Tube, Q12H  glycopyrrolate, 2 mg, Per PEG Tube, TID  heparin (porcine), 5,000 Units, Subcutaneous, Q8H  insulin regular, 2-9 Units, Subcutaneous, Q6H  meropenem, 1,000 mg, Intravenous, Q8H  ProSource No Carb, 30 mL, Per PEG Tube, Daily  Scopolamine, 1 patch, Transdermal, Q72H  senna-docusate sodium, 2 tablet, Per G Tube, BID  sodium chloride, 10 mL, Intravenous, Q12H        Vital Sign Min/Max for last 24  hours  Temp  Min: 98.2 °F (36.8 °C)  Max: 99 °F (37.2 °C)   BP  Min: 85/48  Max: 121/56   Pulse  Min: 77  Max: 104   Resp  Min: 18  Max: 19   SpO2  Min: 96 %  Max: 100 %   Flow (L/min)  Min: 15  Max: 15       Input/Output for last 24 hour shift  01/02 0701 - 01/03 0700  In: 2144.5 [I.V.:68.5]  Out: 2915 [Urine:2905]      Objective:  Vital signs: (most recent): Blood pressure 113/76, pulse 95, temperature 98.2 °F (36.8 °C), resp. rate 18, weight 70.9 kg (156 lb 4.9 oz), SpO2 99%.            General Appearance: Eyes open but not following any commands..  Neck: Tracheostomy tube in place.   Lungs:   B/L Breath sounds present with decreased breath sounds on bases, no wheezing heard, no crackles.   Heart: S1 and S2 present, no murmur  Abdomen: Soft, nontender, no guarding or rigidity, bowel sounds positive.  Extremities:  no edema, warm to touch. Femoral line in place  Neurologic:  Chronic contractures.       Results from last 7 days   Lab Units 01/03/24  0354 01/02/24  0642 01/01/24  0553   WBC 10*3/mm3 11.92* 11.11* 12.07*   HEMOGLOBIN g/dL 6.6* 7.1* 7.4*   PLATELETS 10*3/mm3 426 374 373     Results from last 7 days   Lab Units 01/03/24  0354 01/02/24  0642 01/01/24  0553 12/31/23  0426 12/29/23  0532 12/28/23  0640   SODIUM mmol/L 137 137 136 133*   < > 137   POTASSIUM mmol/L 4.4 4.8 4.6 4.7   < > 4.1   CO2 mmol/L 26.0 27.0 27.0 26.0   < > 29.0   BUN mg/dL 19 17 18 19   < > 15   CREATININE mg/dL 0.40* 0.36* 0.38* 0.44*   < > 0.43*   MAGNESIUM mg/dL 2.6*  --   --   --   --  2.3   PHOSPHORUS mg/dL  --   --   --  3.9  --   --    GLUCOSE mg/dL 142* 126* 138* 137*   < > 169*    < > = values in this interval not displayed.     Estimated Creatinine Clearance: 135.5 mL/min (A) (by C-G formula based on SCr of 0.4 mg/dL (L)).          Images:   CXR reviewed and showed interval discontinuation of right chest tube.  No pneumothorax.  Right basilar pleural effusion persisting.  I reviewed the patient's results and images.      Assessment & Plan   Impression        Acute on chronic respiratory failure with hypoxia    Anoxic brain injury    Tracheostomy present    Pneumonia    Pneumothorax, right    UTI (urinary tract infection)    Bronchial pneumonia    Pleural effusion on right       Plan        1.  Patient admitted here with respiratory failure, worsening pneumonia on the right side as well as complicated parapneumonic effusion.  On antibiotics with meropenem. Will complete 10 days of therapy for Acinetobacter infection.   2.  Pleural effusion was drained with chest tube.  Is complicated parapneumonic effusion.  Cultures remain negative.  It was somewhat loculated effusion.  Patient would need decortication but I do not think she is a good candidate for decortication given her underlying comorbidities.  Monitor for now.  3.  Patient also had component of UTI with 50,000 colonies of Proteus.  Should be treated with current antibiotics.  4.  Hemoglobin trending down. Will give 1 unit PRBC transfusion. No active bleeding noted currently.   5.  Patient has femoral line in place.  Unable to get PICC line and unable to get peripheral IVs.  Will reevaluate need for IV access on daily basis.   6.  On scopolamine patch and  robinul for oral secretions. Continue pulmonary toilet.   7.  Continue Coreg for blood pressure control.  8.  Discontinue FSBS checks as pt not requiring insulin coverage. .  9.  Continue enteral nutrition.  Tolerating well. Monitor bowel function.     Overall guarded long-term prognosis.  Will plan on transferring patient out of ICU to telemetry floor.  Will sign out to hospitalist team for ongoing medical care.    Plan of care and goals reviewed with multidisciplinary/antibiotic stewardship team during rounds.   I discussed the patient's findings and my recommendations with nursing staff     No family is available here.      Mandeep Miranda MD, [facility]  Pulmonary, Critical care and Sleep Medicine

## 2024-01-04 ENCOUNTER — ANESTHESIA EVENT (OUTPATIENT)
Dept: TELEMETRY | Facility: HOSPITAL | Age: 64
End: 2024-01-04

## 2024-01-04 PROBLEM — K94.23 MALFUNCTION OF PERCUTANEOUS ENDOSCOPIC GASTROSTOMY (PEG) TUBE: Status: ACTIVE | Noted: 2023-12-24

## 2024-01-04 LAB
ANION GAP SERPL CALCULATED.3IONS-SCNC: 9 MMOL/L (ref 5–15)
BASOPHILS # BLD AUTO: 0.07 10*3/MM3 (ref 0–0.2)
BASOPHILS NFR BLD AUTO: 0.6 % (ref 0–1.5)
BH BB BLOOD EXPIRATION DATE: NORMAL
BH BB BLOOD TYPE BARCODE: 6200
BH BB DISPENSE STATUS: NORMAL
BH BB PRODUCT CODE: NORMAL
BH BB UNIT NUMBER: NORMAL
BUN SERPL-MCNC: 18 MG/DL (ref 8–23)
BUN/CREAT SERPL: 45 (ref 7–25)
CALCIUM SPEC-SCNC: 8.8 MG/DL (ref 8.6–10.5)
CHLORIDE SERPL-SCNC: 103 MMOL/L (ref 98–107)
CO2 SERPL-SCNC: 27 MMOL/L (ref 22–29)
CREAT SERPL-MCNC: 0.4 MG/DL (ref 0.57–1)
CROSSMATCH INTERPRETATION: NORMAL
DEPRECATED RDW RBC AUTO: 48.9 FL (ref 37–54)
EGFRCR SERPLBLD CKD-EPI 2021: 110 ML/MIN/1.73
EOSINOPHIL # BLD AUTO: 0.44 10*3/MM3 (ref 0–0.4)
EOSINOPHIL NFR BLD AUTO: 4 % (ref 0.3–6.2)
ERYTHROCYTE [DISTWIDTH] IN BLOOD BY AUTOMATED COUNT: 14.5 % (ref 12.3–15.4)
GLUCOSE BLDC GLUCOMTR-MCNC: 106 MG/DL (ref 70–130)
GLUCOSE BLDC GLUCOMTR-MCNC: 111 MG/DL (ref 70–130)
GLUCOSE BLDC GLUCOMTR-MCNC: 112 MG/DL (ref 70–130)
GLUCOSE BLDC GLUCOMTR-MCNC: 91 MG/DL (ref 70–130)
GLUCOSE BLDC GLUCOMTR-MCNC: 94 MG/DL (ref 70–130)
GLUCOSE SERPL-MCNC: 102 MG/DL (ref 65–99)
HCT VFR BLD AUTO: 29.1 % (ref 34–46.6)
HGB BLD-MCNC: 9.4 G/DL (ref 12–15.9)
IMM GRANULOCYTES # BLD AUTO: 0.23 10*3/MM3 (ref 0–0.05)
IMM GRANULOCYTES NFR BLD AUTO: 2.1 % (ref 0–0.5)
LYMPHOCYTES # BLD AUTO: 2.32 10*3/MM3 (ref 0.7–3.1)
LYMPHOCYTES NFR BLD AUTO: 21.2 % (ref 19.6–45.3)
MAGNESIUM SERPL-MCNC: 2.7 MG/DL (ref 1.6–2.4)
MCH RBC QN AUTO: 30 PG (ref 26.6–33)
MCHC RBC AUTO-ENTMCNC: 32.3 G/DL (ref 31.5–35.7)
MCV RBC AUTO: 93 FL (ref 79–97)
MONOCYTES # BLD AUTO: 0.9 10*3/MM3 (ref 0.1–0.9)
MONOCYTES NFR BLD AUTO: 8.2 % (ref 5–12)
NEUTROPHILS NFR BLD AUTO: 6.98 10*3/MM3 (ref 1.7–7)
NEUTROPHILS NFR BLD AUTO: 63.9 % (ref 42.7–76)
NRBC BLD AUTO-RTO: 0 /100 WBC (ref 0–0.2)
PLATELET # BLD AUTO: 451 10*3/MM3 (ref 140–450)
PMV BLD AUTO: 10.5 FL (ref 6–12)
POTASSIUM SERPL-SCNC: 4.4 MMOL/L (ref 3.5–5.2)
RBC # BLD AUTO: 3.13 10*6/MM3 (ref 3.77–5.28)
SODIUM SERPL-SCNC: 139 MMOL/L (ref 136–145)
UNIT  ABO: NORMAL
UNIT  RH: NORMAL
WBC NRBC COR # BLD AUTO: 10.94 10*3/MM3 (ref 3.4–10.8)

## 2024-01-04 PROCEDURE — 82948 REAGENT STRIP/BLOOD GLUCOSE: CPT

## 2024-01-04 PROCEDURE — 85025 COMPLETE CBC W/AUTO DIFF WBC: CPT | Performed by: INTERNAL MEDICINE

## 2024-01-04 PROCEDURE — 25010000002 HEPARIN (PORCINE) PER 1000 UNITS: Performed by: INTERNAL MEDICINE

## 2024-01-04 PROCEDURE — 25010000002 MEROPENEM PER 100 MG: Performed by: INTERNAL MEDICINE

## 2024-01-04 PROCEDURE — 99232 SBSQ HOSP IP/OBS MODERATE 35: CPT | Performed by: INTERNAL MEDICINE

## 2024-01-04 PROCEDURE — 83735 ASSAY OF MAGNESIUM: CPT | Performed by: INTERNAL MEDICINE

## 2024-01-04 PROCEDURE — 99222 1ST HOSP IP/OBS MODERATE 55: CPT | Performed by: PHYSICIAN ASSISTANT

## 2024-01-04 PROCEDURE — 80048 BASIC METABOLIC PNL TOTAL CA: CPT | Performed by: INTERNAL MEDICINE

## 2024-01-04 PROCEDURE — 25810000003 LACTATED RINGERS PER 1000 ML

## 2024-01-04 RX ORDER — SODIUM CHLORIDE, SODIUM LACTATE, POTASSIUM CHLORIDE, CALCIUM CHLORIDE 600; 310; 30; 20 MG/100ML; MG/100ML; MG/100ML; MG/100ML
50 INJECTION, SOLUTION INTRAVENOUS CONTINUOUS
Status: DISCONTINUED | OUTPATIENT
Start: 2024-01-04 | End: 2024-01-04

## 2024-01-04 RX ORDER — SODIUM CHLORIDE 0.9 % (FLUSH) 0.9 %
10 SYRINGE (ML) INJECTION EVERY 12 HOURS SCHEDULED
Status: CANCELLED | OUTPATIENT
Start: 2024-01-04

## 2024-01-04 RX ORDER — LIDOCAINE HYDROCHLORIDE 10 MG/ML
0.5 INJECTION, SOLUTION EPIDURAL; INFILTRATION; INTRACAUDAL; PERINEURAL ONCE AS NEEDED
Status: CANCELLED | OUTPATIENT
Start: 2024-01-04

## 2024-01-04 RX ORDER — FAMOTIDINE 10 MG/ML
20 INJECTION, SOLUTION INTRAVENOUS ONCE
Status: CANCELLED | OUTPATIENT
Start: 2024-01-04 | End: 2024-01-04

## 2024-01-04 RX ORDER — SODIUM CHLORIDE 0.9 % (FLUSH) 0.9 %
10 SYRINGE (ML) INJECTION AS NEEDED
Status: CANCELLED | OUTPATIENT
Start: 2024-01-04

## 2024-01-04 RX ORDER — SODIUM CHLORIDE 9 MG/ML
40 INJECTION, SOLUTION INTRAVENOUS AS NEEDED
Status: CANCELLED | OUTPATIENT
Start: 2024-01-04

## 2024-01-04 RX ORDER — MIDAZOLAM HYDROCHLORIDE 1 MG/ML
1 INJECTION INTRAMUSCULAR; INTRAVENOUS
Status: CANCELLED | OUTPATIENT
Start: 2024-01-04

## 2024-01-04 RX ORDER — SODIUM CHLORIDE, SODIUM LACTATE, POTASSIUM CHLORIDE, CALCIUM CHLORIDE 600; 310; 30; 20 MG/100ML; MG/100ML; MG/100ML; MG/100ML
9 INJECTION, SOLUTION INTRAVENOUS CONTINUOUS
Status: CANCELLED | OUTPATIENT
Start: 2024-01-04

## 2024-01-04 RX ORDER — SODIUM CHLORIDE, SODIUM LACTATE, POTASSIUM CHLORIDE, CALCIUM CHLORIDE 600; 310; 30; 20 MG/100ML; MG/100ML; MG/100ML; MG/100ML
30 INJECTION, SOLUTION INTRAVENOUS CONTINUOUS
Status: ACTIVE | OUTPATIENT
Start: 2024-01-04 | End: 2024-01-05

## 2024-01-04 RX ORDER — MIDAZOLAM HYDROCHLORIDE 1 MG/ML
0.5 INJECTION INTRAMUSCULAR; INTRAVENOUS
Status: CANCELLED | OUTPATIENT
Start: 2024-01-04

## 2024-01-04 RX ORDER — FAMOTIDINE 20 MG/1
20 TABLET, FILM COATED ORAL ONCE
Status: CANCELLED | OUTPATIENT
Start: 2024-01-04 | End: 2024-01-04

## 2024-01-04 RX ADMIN — HEPARIN SODIUM 5000 UNITS: 5000 INJECTION INTRAVENOUS; SUBCUTANEOUS at 16:58

## 2024-01-04 RX ADMIN — SCOPOLAMINE 1 PATCH: 1.5 PATCH, EXTENDED RELEASE TRANSDERMAL at 16:56

## 2024-01-04 RX ADMIN — MEROPENEM 1000 MG: 1 INJECTION, POWDER, FOR SOLUTION INTRAVENOUS at 08:00

## 2024-01-04 RX ADMIN — HEPARIN SODIUM 5000 UNITS: 5000 INJECTION INTRAVENOUS; SUBCUTANEOUS at 05:42

## 2024-01-04 RX ADMIN — MEROPENEM 1000 MG: 1 INJECTION, POWDER, FOR SOLUTION INTRAVENOUS at 16:39

## 2024-01-04 RX ADMIN — HEPARIN SODIUM 5000 UNITS: 5000 INJECTION INTRAVENOUS; SUBCUTANEOUS at 20:57

## 2024-01-04 RX ADMIN — MEROPENEM 1000 MG: 1 INJECTION, POWDER, FOR SOLUTION INTRAVENOUS at 23:47

## 2024-01-04 RX ADMIN — SODIUM CHLORIDE, POTASSIUM CHLORIDE, SODIUM LACTATE AND CALCIUM CHLORIDE 30 ML/HR: 600; 310; 30; 20 INJECTION, SOLUTION INTRAVENOUS at 16:39

## 2024-01-04 RX ADMIN — Medication 10 ML: at 11:09

## 2024-01-04 RX ADMIN — MEROPENEM 1000 MG: 1 INJECTION, POWDER, FOR SOLUTION INTRAVENOUS at 00:27

## 2024-01-04 NOTE — CONSULTS
[facility] Gastroenterology Consult    Referring Provider: Janak Gutiérrez DO    PCP: Ranjeet Pina MD    Reason for Consultation: PEG malfunction    History of present illness:    Viji Vargas is a 63 y.o. female, PMH includes previous cardiac arrest and subsequent anoxic brain injury (3/2019) s/p tracheostomy and PEG placement, is admitted 12/24/23 for evaluation of respiratory distress due to PNA.     Enteral nutrition has been on hold due to clogged PEG tube this admission. Bedside nursing attempts to declog the tube have not been successful. Per chart review, pt was seen at [facility] ED 3/2023 for G-tube exchange due to malfunctioning tube.     EGD 2019 with Dr. Nadira Pandey at [facility]. PEG placed at this time.      Allergies:  Patient has no known allergies.    Scheduled Meds:  baclofen, 10 mg, Per PEG Tube, Q8H  carvedilol, 6.25 mg, Per PEG Tube, Q12H  glycopyrrolate, 2 mg, Per PEG Tube, TID  heparin (porcine), 5,000 Units, Subcutaneous, Q8H  meropenem, 1,000 mg, Intravenous, Q8H  ProSource No Carb, 30 mL, Per PEG Tube, Daily  Scopolamine, 1 patch, Transdermal, Q72H  senna-docusate sodium, 2 tablet, Per G Tube, BID  sodium chloride, 10 mL, Intravenous, Q12H         Infusions:       PRN Meds:    acetaminophen    senna-docusate sodium **AND** polyethylene glycol **AND** [DISCONTINUED] bisacodyl **AND** bisacodyl    Calcium Replacement - Follow Nurse / BPA Driven Protocol    dextrose    glucagon (human recombinant)    Magnesium Standard Dose Replacement - Follow Nurse / BPA Driven Protocol    Phosphorus Replacement - Follow Nurse / BPA Driven Protocol    Potassium Replacement - Follow Nurse / BPA Driven Protocol    Home Meds:  Medications Prior to Admission   Medication Sig Dispense Refill Last Dose    baclofen (LIORESAL) 10 MG tablet Administer 1 tablet per G tube Every 8 (Eight) Hours.       budesonide (PULMICORT) 0.5 MG/2ML nebulizer solution Take 2 mL by nebulization 2 (Two) Times a Day.        carvedilol (COREG) 12.5 MG tablet Administer 1 tablet per G tube 2 (Two) Times a Day.       cetirizine (zyrTEC) 10 MG tablet Administer 1 tablet per G tube Daily.       docusate sodium (COLACE) 50 mg/5 mL liquid Administer 30 mL per G tube 2 (Two) Times a Day.       DPH-Lido-AlHydr-MgHydr-Simeth (FIRST-MOUTHWASH BLM MT) Apply 2 mL to the mouth or throat 2 (Two) Times a Day. To R side of mouth       famotidine (PEPCID) 40 mg/5 mL suspension Administer 2.5 mL per G tube 2 (Two) Times a Day.       glycopyrrolate (ROBINUL) 2 MG tablet Administer 1 tablet per G tube 3 (Three) Times a Day.       hyoscyamine (ANASPAZ,LEVSIN) 0.125 MG tablet Administer 1 tablet per G tube 3 (Three) Times a Day.       ipratropium-albuterol (DUO-NEB) 0.5-2.5 mg/3 ml nebulizer Take 3 mL by nebulization 4 (Four) Times a Day. Takes at [phone]       montelukast (SINGULAIR) 10 MG tablet Administer 1 tablet per G tube Every Night.       Multiple Vitamins-Minerals (MULTIVITAMIN WITH IRON-MINERALS) liquid Administer 15 mL per G tube Daily.       Petrolatum-Zinc Oxide 49-15 % ointment Apply 1 application topically 2 (Two) Times a Day As Needed (Excoriation).       polyethylene glycol (MiraLax) 17 g packet 17 g 2 (Two) Times a Day.       Scopolamine 1 MG/3DAYS patch Place 1 patch on the skin as directed by provider Every 72 (Seventy-Two) Hours.       senna 8.6 MG tablet 2 tablets by Per PEG Tube route Every Night.          ROS: Review of Systems   Unable to perform ROS: Patient nonverbal       PAST MED HX:  Past Medical History:   Diagnosis Date    COPD (chronic obstructive pulmonary disease)     Hypertension     Pneumonia     Stroke        PAST SURG HX:  Past Surgical History:   Procedure Laterality Date    HYSTERECTOMY      Partial     TRACHEOSTOMY AND PEG TUBE INSERTION N/A 3/20/2019    Procedure: TRACHEOSTOMY AND PERCUTANEOUS ENDOSCOPIC GASTROSTOMY TUBE INSERTION;  Surgeon: Nadira Pandey MD;  Location: [facility];  Service:  General       FAM HX:  No family history on file.    SOC HX:  Social History     Socioeconomic History    Marital status: Legally    Tobacco Use    Smoking status: Former     Packs/day: 1     Types: Electronic Cigarette, Cigarettes    Smokeless tobacco: Never   Vaping Use    Vaping Use: Unknown   Substance and Sexual Activity    Alcohol use: Not Currently     Comment: wine     Drug use: No    Sexual activity: Defer       PHYSICAL EXAM  /74   Pulse 98   Temp 97.9 °F (36.6 °C) (Bladder)   Resp 22   Wt 70.9 kg (156 lb 4.9 oz)   SpO2 97%   BMI 26.82 kg/m²   Wt Readings from Last 3 Encounters:   01/04/24 70.9 kg (156 lb 4.9 oz)   12/24/23 70.5 kg (155 lb 6.8 oz)   05/23/23 71.4 kg (157 lb 6.5 oz)   ,body mass index is 26.82 kg/m².  Physical Exam  Vitals and nursing note reviewed.   Constitutional:       Appearance: Normal appearance. She is normal weight. She is ill-appearing (chronically).   HENT:      Head: Normocephalic and atraumatic.      Right Ear: External ear normal.      Left Ear: External ear normal.      Nose: Nose normal.      Mouth/Throat:      Mouth: Mucous membranes are moist.      Pharynx: Oropharynx is clear.   Eyes:      Conjunctiva/sclera: Conjunctivae normal.      Pupils: Pupils are equal, round, and reactive to light.   Neck:      Thyroid: No thyromegaly.      Trachea: Tracheostomy present.   Cardiovascular:      Rate and Rhythm: Normal rate and regular rhythm.      Pulses: Normal pulses.      Heart sounds: Normal heart sounds.   Pulmonary:      Effort: Pulmonary effort is normal.      Breath sounds: Decreased air movement present. Rhonchi present.   Abdominal:      General: Abdomen is flat. Bowel sounds are normal. There is no distension.      Tenderness: There is no abdominal tenderness.      Comments: G-tube in place in upper abdomen. Insertion site is CDI without erythema or drainage.    Musculoskeletal:      Cervical back: Normal range of motion and neck supple.   Skin:      General: Skin is warm and dry.         Results Review:   I reviewed the patient's new clinical results.  I reviewed the patient's new imaging results and agree with the interpretation.  I reviewed the patient's other test results and agree with the interpretation    Lab Results   Component Value Date    WBC 10.94 (H) 01/04/2024    HGB 9.4 (L) 01/04/2024    HGB 8.5 (L) 01/03/2024    HGB 6.6 (C) 01/03/2024    HCT 29.1 (L) 01/04/2024    MCV 93.0 01/04/2024     (H) 01/04/2024       Lab Results   Component Value Date    INR 0.98 03/31/2022    INR 1.05 12/05/2019       Lab Results   Component Value Date    GLUCOSE 102 (H) 01/04/2024    BUN 18 01/04/2024    CREATININE 0.40 (L) 01/04/2024    EGFRIFNONA >150 01/13/2022    EGFRIFAFRI 71 03/10/2019    BCR 45.0 (H) 01/04/2024     01/04/2024    K 4.4 01/04/2024    CO2 27.0 01/04/2024    CALCIUM 8.8 01/04/2024    ALBUMIN 2.6 (L) 12/31/2023    ALKPHOS 83 12/22/2023    BILITOT 0.5 12/22/2023    BILIDIR <0.2 01/13/2022    ALT 17 12/22/2023    AST 22 12/22/2023       ASSESSMENTS/PLANS    PEG tube malfunction  Cardiac arrest and subsequent anoxic brain injury, s/p tracheostomy and PEG placement 3/2019   - plan for EGD with PEG exchange with Dr. Brunner tomorrow      I discussed the patient's findings and my recommendations with nursing staff and consulting provider. Thank you very kindly for this consultation. Will continue to follow during this hospitalization.      Liya Rivera PA-C  01/04/24  13:44 EST

## 2024-01-04 NOTE — PLAN OF CARE
Goal Outcome Evaluation:         Pt transferred from ICU to floor this morning. Pt VSS, NSR on tele. Pt came from unit and PEG tube clogged, going for EGD with PEG revision tomorrow. ICU NP contacted for IV fluids. Pt with IV fluids running and IV abx per MD orders. Sanchez remains in place, however urine was on the chicho when pt was being turned. Trach collar remains at 35%O2.

## 2024-01-04 NOTE — CASE MANAGEMENT/SOCIAL WORK
Continued Stay Note  [facility]     Patient Name: Viji Vargas  MRN: [id number]  Today's Date: 1/4/2024    Admit Date: 12/24/2023    Plan: Return to [facility]   Discharge Plan       Row Name 01/04/24 1340       Plan    Plan Return to [facility]    Plan Comments Discussed patient in MDR.  Patient will be transferred to the floor today.  GI to be consulted regarding PEG occlusion.  Discharge plan is to return to LTC bed at [facility].  CM will continue to follow and arrange EMS transportation when medically ready to discharge.                   Discharge Codes    No documentation.                       Debi Rivera RN

## 2024-01-04 NOTE — PROGRESS NOTES
Intensive Care Follow-up     Hospital:  LOS: 11 days   Ms. Viji Vargas, 63 y.o. female is followed for:   Acute on chronic respiratory failure with hypoxia            History of present illness:   63-year-old female with past medical history of cardiac arrest and anoxic brain injury in March 2019 status post tracheostomy and PEG placement, COPD, hypertension was recent admitted to [facility] for respiratory distress.  Patient was found to have large spontaneous pneumothorax on the chest x-ray and chest tube was placed.Subsequently developed large right loculated effusion. Pt was transferred here on 12/24.  Patient underwent bronchoscopy and cultures are growing Acinetobacter.  14 E[place]an chest tube was placed for drainage of pleural effusion.  Patient was given thrombolytics and dornase as well..  Urinalysis showed complicated parapneumonic effusions and cultures remain negative.  Patient was not deemed candidate for surgical decortication due to her underlying comorbidities.  Is being treated with meropenem.  There was no output noted from the chest tube despite using tPA/dornase alpha.  Chest tube was discontinued on 1/3.    Subjective   Interval History:  Overnight no acute events.  No new fevers noted.  On trach collar oxygen.  Tolerating tube feeds okay.  Requiring frequent pulmonary toilet. PEG tube clogged.                  The patient's past medical, surgical and social history were reviewed and updated in Epic as appropriate.       Objective     Infusions:     Medications:  baclofen, 10 mg, Per PEG Tube, Q8H  carvedilol, 6.25 mg, Per PEG Tube, Q12H  glycopyrrolate, 2 mg, Per PEG Tube, TID  heparin (porcine), 5,000 Units, Subcutaneous, Q8H  meropenem, 1,000 mg, Intravenous, Q8H  ProSource No Carb, 30 mL, Per PEG Tube, Daily  Scopolamine, 1 patch, Transdermal, Q72H  senna-docusate sodium, 2 tablet, Per G Tube, BID  sodium chloride, 10 mL, Intravenous, Q12H        Vital Sign Min/Max for last 24  hours  Temp  Min: 97.9 °F (36.6 °C)  Max: 98.4 °F (36.9 °C)   BP  Min: 84/39  Max: 131/62   Pulse  Min: 66  Max: 104   Resp  Min: 18  Max: 22   SpO2  Min: 96 %  Max: 100 %   Flow (L/min)  Min: 11  Max: 15       Input/Output for last 24 hour shift  01/03 0701 - 01/04 0700  In: 1520.4 [I.V.:59]  Out: 1890 [Urine:1890]      Objective:  Vital signs: (most recent): Blood pressure 122/74, pulse 98, temperature 97.9 °F (36.6 °C), temperature source Bladder, resp. rate 22, weight 70.9 kg (156 lb 4.9 oz), SpO2 97%.            General Appearance: Eyes open but not following any commands..  Neck: Tracheostomy tube in place.   Lungs:   B/L Breath sounds present with decreased breath sounds on bases, no wheezing heard, no crackles.   Heart: S1 and S2 present, no murmur  Abdomen: Soft, nontender, no guarding or rigidity, bowel sounds positive.  Extremities:  no edema, warm to touch. Femoral line in place  Neurologic:  Chronic contractures.       Results from last 7 days   Lab Units 01/04/24  0512 01/03/24  1534 01/03/24  0354 01/02/24  0642   WBC 10*3/mm3 10.94*  --  11.92* 11.11*   HEMOGLOBIN g/dL 9.4* 8.5* 6.6* 7.1*   PLATELETS 10*3/mm3 451*  --  426 374     Results from last 7 days   Lab Units 01/04/24  0512 01/03/24  0354 01/02/24  0642 01/01/24  0553 12/31/23  0426   SODIUM mmol/L 139 137 137   < > 133*   POTASSIUM mmol/L 4.4 4.4 4.8   < > 4.7   CO2 mmol/L 27.0 26.0 27.0   < > 26.0   BUN mg/dL 18 19 17   < > 19   CREATININE mg/dL 0.40* 0.40* 0.36*   < > 0.44*   MAGNESIUM mg/dL 2.7* 2.6*  --   --   --    PHOSPHORUS mg/dL  --   --   --   --  3.9   GLUCOSE mg/dL 102* 142* 126*   < > 137*    < > = values in this interval not displayed.     Estimated Creatinine Clearance: 135.5 mL/min (A) (by C-G formula based on SCr of 0.4 mg/dL (L)).          Images:   None new  I reviewed the patient's results and images.     Assessment & Plan   Impression        Acute on chronic respiratory failure with hypoxia    Anoxic brain injury     Tracheostomy present    Pneumonia    Pneumothorax, right    UTI (urinary tract infection)    Bronchial pneumonia    Pleural effusion on right       Plan        1.  Patient admitted here with respiratory failure, worsening pneumonia on the right side as well as complicated parapneumonic effusion.  Treated with meropenem. Will complete 10 days of therapy for Acinetobacter infection.   2.  Pleural effusion was drained.  Results are consistent with complicated parapneumonic effusion.  Cultures remain negative.  It was somewhat loculated effusion.  Ideal treatment would be decortication but I do not think she is a good candidate for decortication given her underlying comorbidities.  Monitor for now.  If has worsening effusion then  opinion from CT surgery can be obtained.  3.  Patient also had component of UTI with 50,000 colonies of Proteus.  Should be treated with current antibiotics.  4.  Hemoglobin improved after monitor blood transfusion.  No acute bleed noted.  5.  Patient has femoral line in place.  Unable to get PICC line and unable to get peripheral IVs.  Will reevaluate need for IV access after antibiotics finished.   6.  On scopolamine patch and  robinul for oral secretions. Continue pulmonary toilet.   7.  Continue Coreg for blood pressure control.  8.  Enteral nutrition is off currently as PEG tube is clogged.  Nursing have attempted to declog the tube but have not been successful.  Will get GI team's input.    Overall guarded long-term prognosis.  Awaiting to be transferred out of ICU to floor. Will sign out to hospitalist team.     Plan of care and goals reviewed with multidisciplinary/antibiotic stewardship team during rounds.   I discussed the patient's findings and my recommendations with nursing staff     No family is available here.      Mandeep Miranda MD, [facility]  Pulmonary, Critical care and Sleep Medicine

## 2024-01-05 ENCOUNTER — APPOINTMENT (OUTPATIENT)
Dept: GENERAL RADIOLOGY | Facility: HOSPITAL | Age: 64
End: 2024-01-05
Payer: MEDICARE

## 2024-01-05 ENCOUNTER — ANESTHESIA (OUTPATIENT)
Dept: TELEMETRY | Facility: HOSPITAL | Age: 64
End: 2024-01-05

## 2024-01-05 LAB
ANION GAP SERPL CALCULATED.3IONS-SCNC: 10 MMOL/L (ref 5–15)
BASOPHILS # BLD AUTO: 0.07 10*3/MM3 (ref 0–0.2)
BASOPHILS NFR BLD AUTO: 0.7 % (ref 0–1.5)
BUN SERPL-MCNC: 17 MG/DL (ref 8–23)
BUN/CREAT SERPL: 51.5 (ref 7–25)
CALCIUM SPEC-SCNC: 9 MG/DL (ref 8.6–10.5)
CHLORIDE SERPL-SCNC: 101 MMOL/L (ref 98–107)
CO2 SERPL-SCNC: 26 MMOL/L (ref 22–29)
CREAT SERPL-MCNC: 0.33 MG/DL (ref 0.57–1)
DEPRECATED RDW RBC AUTO: 48.3 FL (ref 37–54)
EGFRCR SERPLBLD CKD-EPI 2021: 115.2 ML/MIN/1.73
EOSINOPHIL # BLD AUTO: 0.35 10*3/MM3 (ref 0–0.4)
EOSINOPHIL NFR BLD AUTO: 3.5 % (ref 0.3–6.2)
ERYTHROCYTE [DISTWIDTH] IN BLOOD BY AUTOMATED COUNT: 13.8 % (ref 12.3–15.4)
GLUCOSE SERPL-MCNC: 88 MG/DL (ref 65–99)
HCT VFR BLD AUTO: 31.2 % (ref 34–46.6)
HGB BLD-MCNC: 9.8 G/DL (ref 12–15.9)
IMM GRANULOCYTES # BLD AUTO: 0.15 10*3/MM3 (ref 0–0.05)
IMM GRANULOCYTES NFR BLD AUTO: 1.5 % (ref 0–0.5)
LYMPHOCYTES # BLD AUTO: 1.87 10*3/MM3 (ref 0.7–3.1)
LYMPHOCYTES NFR BLD AUTO: 18.7 % (ref 19.6–45.3)
MCH RBC QN AUTO: 30 PG (ref 26.6–33)
MCHC RBC AUTO-ENTMCNC: 31.4 G/DL (ref 31.5–35.7)
MCV RBC AUTO: 95.4 FL (ref 79–97)
MONOCYTES # BLD AUTO: 0.86 10*3/MM3 (ref 0.1–0.9)
MONOCYTES NFR BLD AUTO: 8.6 % (ref 5–12)
NEUTROPHILS NFR BLD AUTO: 6.68 10*3/MM3 (ref 1.7–7)
NEUTROPHILS NFR BLD AUTO: 67 % (ref 42.7–76)
NRBC BLD AUTO-RTO: 0 /100 WBC (ref 0–0.2)
PLATELET # BLD AUTO: 489 10*3/MM3 (ref 140–450)
PMV BLD AUTO: 11.1 FL (ref 6–12)
POTASSIUM SERPL-SCNC: 3.9 MMOL/L (ref 3.5–5.2)
RBC # BLD AUTO: 3.27 10*6/MM3 (ref 3.77–5.28)
SODIUM SERPL-SCNC: 137 MMOL/L (ref 136–145)
WBC NRBC COR # BLD AUTO: 9.98 10*3/MM3 (ref 3.4–10.8)

## 2024-01-05 PROCEDURE — 99232 SBSQ HOSP IP/OBS MODERATE 35: CPT | Performed by: INTERNAL MEDICINE

## 2024-01-05 PROCEDURE — 02HV33Z INSERTION OF INFUSION DEVICE INTO SUPERIOR VENA CAVA, PERCUTANEOUS APPROACH: ICD-10-PCS | Performed by: INTERNAL MEDICINE

## 2024-01-05 PROCEDURE — 80048 BASIC METABOLIC PNL TOTAL CA: CPT | Performed by: INTERNAL MEDICINE

## 2024-01-05 PROCEDURE — 25010000002 BUMETANIDE PER 0.5 MG: Performed by: INTERNAL MEDICINE

## 2024-01-05 PROCEDURE — 43762 RPLC GTUBE NO REVJ TRC: CPT | Performed by: INTERNAL MEDICINE

## 2024-01-05 PROCEDURE — 94799 UNLISTED PULMONARY SVC/PX: CPT

## 2024-01-05 PROCEDURE — 25010000002 HEPARIN (PORCINE) PER 1000 UNITS: Performed by: INTERNAL MEDICINE

## 2024-01-05 PROCEDURE — 3E02340 INTRODUCTION OF INFLUENZA VACCINE INTO MUSCLE, PERCUTANEOUS APPROACH: ICD-10-PCS | Performed by: INTERNAL MEDICINE

## 2024-01-05 PROCEDURE — 71045 X-RAY EXAM CHEST 1 VIEW: CPT

## 2024-01-05 PROCEDURE — 25010000002 MEROPENEM PER 100 MG: Performed by: INTERNAL MEDICINE

## 2024-01-05 PROCEDURE — 0D20XUZ CHANGE FEEDING DEVICE IN UPPER INTESTINAL TRACT, EXTERNAL APPROACH: ICD-10-PCS | Performed by: INTERNAL MEDICINE

## 2024-01-05 PROCEDURE — 97161 PT EVAL LOW COMPLEX 20 MIN: CPT

## 2024-01-05 PROCEDURE — 85025 COMPLETE CBC W/AUTO DIFF WBC: CPT | Performed by: INTERNAL MEDICINE

## 2024-01-05 PROCEDURE — C1751 CATH, INF, PER/CENT/MIDLINE: HCPCS

## 2024-01-05 PROCEDURE — 94761 N-INVAS EAR/PLS OXIMETRY MLT: CPT

## 2024-01-05 RX ORDER — BUMETANIDE 0.25 MG/ML
2 INJECTION INTRAMUSCULAR; INTRAVENOUS ONCE
Status: COMPLETED | OUTPATIENT
Start: 2024-01-05 | End: 2024-01-05

## 2024-01-05 RX ORDER — TORSEMIDE 20 MG/1
60 TABLET ORAL ONCE
Status: COMPLETED | OUTPATIENT
Start: 2024-01-05 | End: 2024-01-05

## 2024-01-05 RX ADMIN — TORSEMIDE 60 MG: 20 TABLET ORAL at 10:37

## 2024-01-05 RX ADMIN — MEROPENEM 1000 MG: 1 INJECTION, POWDER, FOR SOLUTION INTRAVENOUS at 13:55

## 2024-01-05 RX ADMIN — GLYCOPYRROLATE 2 MG: 1 TABLET ORAL at 10:10

## 2024-01-05 RX ADMIN — BACLOFEN 10 MG: 10 TABLET ORAL at 20:21

## 2024-01-05 RX ADMIN — MEROPENEM 1000 MG: 1 INJECTION, POWDER, FOR SOLUTION INTRAVENOUS at 08:04

## 2024-01-05 RX ADMIN — Medication 10 ML: at 08:04

## 2024-01-05 RX ADMIN — CARVEDILOL 6.25 MG: 6.25 TABLET, FILM COATED ORAL at 20:20

## 2024-01-05 RX ADMIN — ACETAMINOPHEN 650 MG: 650 SOLUTION ORAL at 20:21

## 2024-01-05 RX ADMIN — HEPARIN SODIUM 5000 UNITS: 5000 INJECTION INTRAVENOUS; SUBCUTANEOUS at 13:55

## 2024-01-05 RX ADMIN — BACLOFEN 10 MG: 10 TABLET ORAL at 13:55

## 2024-01-05 RX ADMIN — Medication 10 ML: at 20:22

## 2024-01-05 RX ADMIN — BUMETANIDE 2 MG: 0.25 INJECTION, SOLUTION INTRAMUSCULAR; INTRAVENOUS at 14:18

## 2024-01-05 RX ADMIN — ACETAMINOPHEN 650 MG: 650 SOLUTION ORAL at 10:10

## 2024-01-05 RX ADMIN — GLYCOPYRROLATE 2 MG: 1 TABLET ORAL at 16:08

## 2024-01-05 RX ADMIN — HEPARIN SODIUM 5000 UNITS: 5000 INJECTION INTRAVENOUS; SUBCUTANEOUS at 20:21

## 2024-01-05 RX ADMIN — Medication 30 ML: at 10:11

## 2024-01-05 RX ADMIN — CARVEDILOL 6.25 MG: 6.25 TABLET, FILM COATED ORAL at 10:10

## 2024-01-05 NOTE — PROGRESS NOTES
[facility] Medicine Services  PROGRESS NOTE    Patient Name: Viji Vargas  : 1960  MRN: [id number]    Date of Admission: 2023  Primary Care Physician: Ranjeet Pina MD    Subjective   Subjective     CC:  Follow-up for loculated pleural effusion    HPI:  Patient was seen and examined.  Nonverbal.  A lot of secretions from her trach.  Unable to obtain HPI      Objective   Objective     Vital Signs:   Temp:  [96.8 °F (36 °C)-98.3 °F (36.8 °C)] 97.9 °F (36.6 °C)  Heart Rate:  [] 121  Resp:  [18-22] 22  BP: ()/(53-74) 131/69  Flow (L/min):  [11-15] 15     Physical Exam:  General: Nonverbal.    Head: Atraumatic and normocephalic  Eyes: No Icterus. No pallor  Ears:  Ears appear intact with no abnormalities noted  Throat: Tracheostomy in place with lots of secretions   Neck: Supple, trachea midline  Lungs: Diminished air entry with coarse crackles right lung base.    Heart:  Normal S1 and S2, no murmur, no gallop, No JVD, 3+ lower extremity swelling  Abdomen:  Soft, no tenderness, no organomegaly, normal bowel sounds, no organomegaly  Extremities: pulses equal bilaterally  Skin: No bleeding, bruising or rash, normal skin turgor and elasticity  Neurologic: Cranial nerves appear intact with no evidence of facial asymmetry, normal motor and sensory functions in all 4 extremities  Psych: Completely disoriented    Results Reviewed:  LAB RESULTS:      Lab 24  0415 24  0512 24  1534 24  0354 24  0642 24  0553   WBC 9.98 10.94*  --  11.92* 11.11* 12.07*   HEMOGLOBIN 9.8* 9.4* 8.5* 6.6* 7.1* 7.4*   HEMATOCRIT 31.2* 29.1* 26.7* 21.4* 22.4* 23.3*   PLATELETS 489* 451*  --  426 374 373   NEUTROS ABS 6.68 6.98  --  8.29* 7.37* 8.00*   IMMATURE GRANS (ABS) 0.15* 0.23*  --  0.33* 0.45* 0.39*   LYMPHS ABS 1.87 2.32  --  1.81 1.64 1.95   MONOS ABS 0.86 0.90  --  0.95* 1.04* 1.07*   EOS ABS 0.35 0.44*  --  0.49* 0.53* 0.60*   MCV 95.4 93.0  --   99.1* 95.3 97.5*   CRP  --   --   --   --  6.55*  --          Lab 01/05/24  0415 01/04/24  0512 01/03/24  0354 01/02/24  0642 01/01/24  0553 12/31/23  0426   SODIUM 137 139 137 137 136 133*   POTASSIUM 3.9 4.4 4.4 4.8 4.6 4.7   CHLORIDE 101 103 101 102 101 101   CO2 26.0 27.0 26.0 27.0 27.0 26.0   ANION GAP 10.0 9.0 10.0 8.0 8.0 6.0   BUN 17 18 19 17 18 19   CREATININE 0.33* 0.40* 0.40* 0.36* 0.38* 0.44*   EGFR 115.2 110.0 110.0 112.8 111.4 107.5   GLUCOSE 88 102* 142* 126* 138* 137*   CALCIUM 9.0 8.8 8.6 8.4* 8.2* 8.6   MAGNESIUM  --  2.7* 2.6*  --   --   --    PHOSPHORUS  --   --   --   --   --  3.9         Lab 12/31/23  0426   ALBUMIN 2.6*                 Lab 01/03/24  0504   ABO TYPING A   RH TYPING Positive   ANTIBODY SCREEN Negative         Brief Urine Lab Results  (Last result in the past 365 days)        Color   Clarity   Blood   Leuk Est   Nitrite   Protein   CREAT   Urine HCG        12/24/23 1755 Yellow   Cloudy   Moderate (2+)   Small (1+)   Negative   30 mg/dL (1+)                   Microbiology Results Abnormal       Procedure Component Value - Date/Time    Blood Culture - Blood, Arm, Left [[id number]]  (Normal) Collected: 12/24/23 1715    Lab Status: Final result Specimen: Blood from Arm, Left Updated: 12/29/23 2030     Blood Culture No growth at 5 days    Narrative:      Aerobic Bottle Only      Blood Culture - Blood, Arm, Left [[id number]]  (Normal) Collected: 12/24/23 1728    Lab Status: Final result Specimen: Blood from Arm, Left Updated: 12/29/23 2015     Blood Culture No growth at 5 days    Narrative:      Aerobic Bottle Only      Body Fluid Culture - Body Fluid, Pleural Cavity [[id number]] Collected: 12/25/23 1412    Lab Status: Final result Specimen: Body Fluid from Pleural Cavity Updated: 12/28/23 0827     Body Fluid Culture No growth at 3 days     Gram Stain Many (4+) Red blood cells      Few (2+) WBCs seen      No organisms seen    Urine Culture - Urine, Urine, Random Void [[id number]]  (Normal)  Collected: 12/24/23 2274    Lab Status: Final result Specimen: Urine, Random Void Updated: 12/25/23 2007     Urine Culture No growth            No radiology results from the last 24 hrs    Results for orders placed during the hospital encounter of 03/10/19    Transthoracic Echo Complete With Contrast if Necessary Per Protocol    Interpretation Summary  Technically difficult study  1) Borderline LV size with mild reduction in LV systolic function ( EF is 45 to 50%)  2) Mild to moderate left atrial enlargement  3) Trace MR and TR with normal PA pressures  4) Borderline RV size with normal function  5) Global mild hypokinesis of the LV  6) Moderate calcific plaque along ascending aorta      Current medications:  Scheduled Meds:baclofen, 10 mg, Per PEG Tube, Q8H  carvedilol, 6.25 mg, Per PEG Tube, Q12H  glycopyrrolate, 2 mg, Per PEG Tube, TID  heparin (porcine), 5,000 Units, Subcutaneous, Q8H  meropenem, 1,000 mg, Intravenous, Q8H  ProSource No Carb, 30 mL, Per PEG Tube, Daily  Scopolamine, 1 patch, Transdermal, Q72H  senna-docusate sodium, 2 tablet, Per G Tube, BID  sodium chloride, 10 mL, Intravenous, Q12H      Continuous Infusions:lactated ringers, 30 mL/hr, Last Rate: 30 mL/hr (01/04/24 1639)      PRN Meds:.  acetaminophen    senna-docusate sodium **AND** polyethylene glycol **AND** [DISCONTINUED] bisacodyl **AND** bisacodyl    Calcium Replacement - Follow Nurse / BPA Driven Protocol    dextrose    glucagon (human recombinant)    Magnesium Standard Dose Replacement - Follow Nurse / BPA Driven Protocol    Phosphorus Replacement - Follow Nurse / BPA Driven Protocol    Potassium Replacement - Follow Nurse / BPA Driven Protocol    Assessment & Plan   Assessment & Plan     Active Hospital Problems    Diagnosis  POA    **Acute on chronic respiratory failure with hypoxia [J96.21]  Yes    Pleural effusion on right [J90]  Yes    UTI (urinary tract infection) [N39.0]  Yes    Bronchial pneumonia [J18.0]  Yes    Malfunction of  percutaneous endoscopic gastrostomy (PEG) tube [K94.23]  Unknown    Pneumonia [J18.9]  Yes    Pneumothorax, right [J93.9]  Yes    Tracheostomy present [Z93.0]  Not Applicable    Anoxic brain injury [G93.1]  Yes      Resolved Hospital Problems   No resolved problems to display.        Brief Hospital Course to date:  63-year-old female with past medical history of cardiac arrest and anoxic brain injury in March 2019 status post tracheostomy and PEG placement, COPD, hypertension was recent admitted to [facility] for respiratory distress.  Patient was found to have large spontaneous pneumothorax on the chest x-ray and chest tube was placed. Subsequently developed large right loculated effusion. Pt was transferred here on 12/24.  Patient underwent bronchoscopy and cultures are growing Acinetobacter.  14 [place] chest tube was placed for drainage of pleural effusion.  Patient was given thrombolytics and dornase as well..  Pleural fluid showed complicated parapneumonic effusions and cultures remain negative.  Patient was not deemed candidate for surgical decortication due to her underlying comorbidities.  Is being treated with meropenem.    History of cardiac arrest with anoxic brain injury  Status post trach  Complicated right pleural effusion  Patient  had recent hospitalization to [facility] for spontaneous right pneumothorax.  Underwent chest tube placement and eventually developed infected loculated effusion  And also fluid is consistent with complicated parapneumonic effusion  Underwent 14 [place] chest tube placement with minimal drainage that was eventually removed  Not candidate for decortication per CTS  Culture growing Acetobacter  Currently on Merrem  ID consulted.  Plan to continue tabetic till 12/27/2024  scopolamine patch and  robinul for oral secretions. Continue pulmonary toilet    Essential hypertension  Continue Coreg     Enteral nutrition  PEG tube replaced by GI  service  Continue tube feed    Worsening any of chronic disease  Status post 1 unit blood transfusion 1/3/24, hemoglobin has been stable      Expected Discharge Location and Transportation: TBD  Expected Discharge   Expected Discharge Date: 1/3/2024; Expected Discharge Time:      DVT prophylaxis:  Medical and mechanical DVT prophylaxis orders are present.     AM-PAC 6 Clicks Score (PT): 6 (01/04/24 0800)    CODE STATUS:   Code Status and Medical Interventions:   Ordered at: 12/25/23 1114     Level Of Support Discussed With:    Next of Kin (If No Surrogate)     Code Status (Patient has no pulse and is not breathing):    CPR (Attempt to Resuscitate)     Medical Interventions (Patient has pulse or is breathing):    Full Support     Copied text in this note has been reviewed and is accurate as of 01/05/24.     Viki Littlejohn MD  01/05/24

## 2024-01-05 NOTE — PROCEDURES
Bedside gastrostomy change    The patient's existing 16 [place] Medline G-tube did not have updated ENFit connectors and was clogged.  The balloon was deflated on the existing tube and tube removed without difficulty.  A new LORNA 20 [place] G-tube was inserted into the existing gastrostomy, and internal balloon bolster inflated with 10 ml sterile water.  The external bolster was adjusted, not quite touching the skin.  The tube was flushed, with recovery of gastric contents, confirming appropriate position.  The patient tolerated procedure well.  There were no immediate complications no blood loss.    >> Resume feeds and meds per G-tube.    Please call with questions or concerns.

## 2024-01-05 NOTE — THERAPY DISCHARGE NOTE
Patient Name: Viji Vargas  : 1960    MRN: [id number]                              Today's Date: 2024       Admit Date: 2023    Visit Dx:     ICD-10-CM ICD-9-CM   1. Pneumonia of both lungs due to infectious organism, unspecified part of lung  J18.9 483.8   2. Pleural effusion  J90 511.9   3. Malfunction of percutaneous endoscopic gastrostomy (PEG) tube  K94.23 536.42   4. Impaired functional mobility, balance, gait, and endurance  Z74.09 V49.89     Patient Active Problem List   Diagnosis    Cardiopulmonary arrest with successful resuscitation    Acute respiratory failure with hypoxia and hypercapnia    Other pneumothorax    COPD with acute exacerbation    Metabolic acidosis    Hyperglycemia    Anoxic brain injury    Oropharyngeal dysphagia    Sepsis    COPD exacerbation    Shock, septic    Healthcare-associated pneumonia    Acute on chronic respiratory failure with hypoxia    Hypoxia    Tracheostomy present    Pneumonia of left upper lobe due to infectious organism    Pneumonia    Pneumothorax, right    Pneumonia, unspecified organism    UTI (urinary tract infection)    Bronchial pneumonia    Pleural effusion on right    Malfunction of percutaneous endoscopic gastrostomy (PEG) tube     Past Medical History:   Diagnosis Date    COPD (chronic obstructive pulmonary disease)     Hypertension     Pneumonia     Stroke      Past Surgical History:   Procedure Laterality Date    HYSTERECTOMY      Partial     TRACHEOSTOMY AND PEG TUBE INSERTION N/A 3/20/2019    Procedure: TRACHEOSTOMY AND PERCUTANEOUS ENDOSCOPIC GASTROSTOMY TUBE INSERTION;  Surgeon: Nadira Pandey MD;  Location: [facility];  Service: General      General Information       Row Name 24 0850          Physical Therapy Time and Intention    Document Type discharge evaluation/summary  -SD     Mode of Treatment individual therapy;physical therapy  -SD       Row Name 24 0850          General Information    Patient Profile Reviewed  yes  -SD     Prior Level of Function dependent:;ADL's;bed mobility  -SD     Existing Precautions/Restrictions fall;oxygen therapy device and L/min;NPO  PEG, trach collar  -SD     Barriers to Rehab medically complex;previous functional deficit;cognitive status;physical barrier;contractures  -SD       Row Name 01/05/24 0850          Living Environment    People in Home facility resident  -SD       Row Name 01/05/24 0850          Cognition    Orientation Status (Cognition) unable/difficult to assess  -SD       Row Name 01/05/24 0850          Safety Issues, Functional Mobility    Safety Issues Affecting Function (Mobility) ability to follow commands;friction/shear risk;insight into deficits/self-awareness  -SD     Impairments Affecting Function (Mobility) cognition;grasp;motor control;muscle tone abnormal;range of motion (ROM);strength  -SD               User Key  (r) = Recorded By, (t) = Taken By, (c) = Cosigned By      Initials Name Provider Type    Liya Manley PT Physical Therapist                   Mobility       Row Name 01/05/24 0924          Bed Mobility    Bed Mobility bed mobility (all) activities  -SD     All Activities, Gordon (Bed Mobility) dependent (less than 25% patient effort)  -SD     Assistive Device (Bed Mobility) draw sheet  -SD       Row Name 01/05/24 0924          Bed-Chair Transfer    Bed-Chair Gordon (Transfers) not tested  -SD       Row Name 01/05/24 0924          Gait/Stairs (Locomotion)    Patient was able to Ambulate no, other medical factors prevent ambulation  -SD     Reason Patient was unable to Ambulate Non-Ambulatory at Baseline  -SD               User Key  (r) = Recorded By, (t) = Taken By, (c) = Cosigned By      Initials Name Provider Type    Liya Manley PT Physical Therapist                   Obj/Interventions       Row Name 01/05/24 0924          Range of Motion Comprehensive    General Range of Motion lower extremity range of motion deficits  identified;hand range of motion deficits identified;neck/trunk range of motion deficits identified;upper extremity range of motion deficits identified  -SD     Comment, General Range of Motion bilat wrist flex contractures, LUE flexor tone, R elbow extensor tone, bilat ankle plantar flex contractures, R hip/knee flexor tone/contractures, L hip/knee extensor tone  -SD       Row Name 01/05/24 0924          Strength Comprehensive (MMT)    General Manual Muscle Testing (MMT) Assessment upper extremity strength deficits identified;lower extremity strength deficits identified;neck/trunk strength deficits identified;hand strength deficits identified  -SD     Comment, General Manual Muscle Testing (MMT) Assessment Pt unable to follow commands, no observable movement  -SD               User Key  (r) = Recorded By, (t) = Taken By, (c) = Cosigned By      Initials Name Provider Type    Liya Manley PT Physical Therapist                   Goals/Plan    No documentation.                  Clinical Impression       Row Name 01/05/24 0929          Pain    Additional Documentation Pain Scale: FACES Pre/Post-Treatment (Group)  -SD       Row Name 01/05/24 0929          Pain Scale: FACES Pre/Post-Treatment    Pain: FACES Scale, Pretreatment 0-->no hurt  -SD     Posttreatment Pain Rating 2-->hurts little bit  -SD     Pain Location generalized  -SD     Pre/Posttreatment Pain Comment slight facial grimacing with ROM assessment  -SD       Row Name 01/05/24 0945          Plan of Care Review    Plan of Care Reviewed With patient  -SD     Outcome Evaluation PT eval completed. Pt presents at her baseline and is dependent with all mobility and ADL tasks. Pt opens eyes to stimuli, however is non-verbal and did not follow commands. Pt has significant ROM deficits due to contractures and tone synergies.  No IPPT services warranted at this time.  -SD       Row Name 01/05/24 0982          Therapy Assessment/Plan (PT)    Patient/Family  Therapy Goals Statement (PT) return to LTC facility  -SD     Criteria for Skilled Interventions Met (PT) does not meet criteria for skilled intervention  -SD     Therapy Frequency (PT) evaluation only  -SD       Row Name 01/05/24 0929          Vital Signs    Pre Systolic BP Rehab 121  -SD     Pre Treatment Diastolic BP 47  -SD     Post Systolic BP Rehab 133  -SD     Post Treatment Diastolic BP 91  -SD     Pretreatment Heart Rate (beats/min) 73  -SD     Posttreatment Heart Rate (beats/min) 89  -SD     Pre SpO2 (%) 99  -SD     O2 Delivery Pre Treatment trach collar  -SD     Post SpO2 (%) 99  -SD     O2 Delivery Post Treatment trach collar  -SD     Pre Patient Position Supine  -SD     Post Patient Position Supine  -SD       Row Name 01/05/24 0929          Positioning and Restraints    Pre-Treatment Position in bed  -SD     Post Treatment Position bed  -SD     In Bed fowlers;call light within reach;encouraged to call for assist;exit alarm on;waffle boots/both;notified nsg  -SD               User Key  (r) = Recorded By, (t) = Taken By, (c) = Cosigned By      Initials Name Provider Type    Liya Manley, PT Physical Therapist                   Outcome Measures       Row Name 01/05/24 0934 01/05/24 0800       How much help from another person do you currently need...    Turning from your back to your side while in flat bed without using bedrails? 1  -SD 1  -CH    Moving from lying on back to sitting on the side of a flat bed without bedrails? 1  -SD 1  -CH    Moving to and from a bed to a chair (including a wheelchair)? 1  -SD 1  -CH    Standing up from a chair using your arms (e.g., wheelchair, bedside chair)? 1  -SD 1  -CH    Climbing 3-5 steps with a railing? 1  -SD 1  -CH    To walk in hospital room? 1  -SD 1  -CH    AM-PAC 6 Clicks Score (PT) 6  -SD 6  -CH    Highest Level of Mobility Goal 2 --> Bed activities/dependent transfer  -SD 2 --> Bed activities/dependent transfer  -CH      Row Name 01/05/24 0934           Functional Assessment    Outcome Measure Options AM-PAC 6 Clicks Basic Mobility (PT)  -SD               User Key  (r) = Recorded By, (t) = Taken By, (c) = Cosigned By      Initials Name Provider Type    Liya Manley PT Physical Therapist    Payal Saavedra RN Registered Nurse                  Physical Therapy Education       Title: PT OT SLP Therapies (In Progress)       Topic: Physical Therapy (In Progress)       Point: Mobility training (In Progress)       Learning Progress Summary             Patient Nonacceptance, E, NL by SD at 1/5/2024 0935                                         User Key       Initials Effective Dates Name Provider Type Discipline    SD 03/13/23 -  Liya Anguiano PT Physical Therapist PT                  PT Recommendation and Plan     Plan of Care Reviewed With: patient  Outcome Evaluation: PT eval completed. Pt presents at her baseline and is dependent with all mobility and ADL tasks. Pt opens eyes to stimuli, however is non-verbal and did not follow commands. Pt has significant ROM deficits due to contractures and tone synergies.  No IPPT services warranted at this time.     Time Calculation:   PT Evaluation Complexity  History, PT Evaluation Complexity: 3 or more personal factors and/or comorbidities  Examination of Body Systems (PT Eval Complexity): 1-2 elements  Clinical Presentation (PT Evaluation Complexity): evolving  Clinical Decision Making (PT Evaluation Complexity): low complexity  Overall Complexity (PT Evaluation Complexity): low complexity     PT Charges       Row Name 01/05/24 0936             Time Calculation    Start Time 0850  -SD      PT Non-Billable Time (min) 46 min  -SD      PT Received On 01/05/24  -SD                User Key  (r) = Recorded By, (t) = Taken By, (c) = Cosigned By      Initials Name Provider Type    Liya Manley PT Physical Therapist                  Therapy Charges for Today       Code Description Service  Date Service Provider Modifiers Qty    16286439333 HC PT EVAL LOW COMPLEXITY 4 1/5/2024 Liya Anguiano, PT GP 1            PT G-Codes  Outcome Measure Options: AM-PAC 6 Clicks Basic Mobility (PT)  AM-PAC 6 Clicks Score (PT): 6    PT Discharge Summary  Anticipated Discharge Disposition (PT): extended care facility    Liya Anguiano, PT  1/5/2024

## 2024-01-05 NOTE — PROGRESS NOTES
Clinical Nutrition     Nutrition Support Assessment  Reason for Visit: EN    Patient Name: Viji Vargas  YOB: 1960  MRN: [id number]  Date of Encounter: 01/05/24 12:48 EST  Admission date: 12/24/2023    Comments:    Adjust EN regimen for change in feeding window:  Isosource 1.5 @ 45ml/hr. Prosource 1x daily. Water flush @ 25ml/hr.   =990ml, 1545kcals (103% est needs), 82g pro (104% est needs), 15g fiber, 752ml water from formula, +550ml water from flushes, 1302ml TFW.     Nutrition Assessment   Admission Diagnosis:  Bronchial pneumonia [J18.0]    Problem List:    Acute on chronic respiratory failure with hypoxia    Anoxic brain injury    Tracheostomy present    Pneumonia    Pneumothorax, right    UTI (urinary tract infection)    Bronchial pneumonia    Pleural effusion on right    Malfunction of percutaneous endoscopic gastrostomy (PEG) tube      PMH:   She  has a past medical history of COPD (chronic obstructive pulmonary disease), Hypertension, Pneumonia, and Stroke.    PSH:  She  has a past surgical history that includes Hysterectomy and tracheostomy and peg tube insertion (N/A, 3/20/2019).    Applicable Nutrition Concerns:   Skin:  Oral: Chronic Trach   GI:  PEG     Applicable Interval History:   Trach/PEG 2019 has been at nursing home facility.     Reported/Observed/Food/Nutrition Related History:     1/5  TF running at 25ml/hr. G-tube replaced this am per GI 2/2 clog.     1/2  No significant changes. No-verbal at baseline.  EN @ goal rate and being tolerated. Can possibly transfer to the floor. RN reports patient is contracted at baseline.      12/25  Pt transferred from [facility] for higher level of care including bronchoscopy. Pt in acute on chronic respiratory failure, pneumothorax s/p chest tubes, currently doing well on trach collar for o2. Pt with hx anoxic brain injury with trach/PEG placement in 2019. Per review of EMR pt has tolerated isosource 1.5. Most recently at [facility]  was receiving Nutren 1.5 which is not available at our facility. Bowels are moving. Phos slightly low this morning and was replaced. Levophed weaned yesterday. Pt lying in bed with no family at bedside at time RD visit. Per intensivist note okay with restarting nutrition.     Labs    Labs Reviewed: Yes     Results from last 7 days   Lab Units 01/05/24  0415 01/04/24  0512 01/03/24  0354 01/01/24  0553 12/31/23  0426   GLUCOSE mg/dL 88 102* 142*   < > 137*   BUN mg/dL 17 18 19   < > 19   CREATININE mg/dL 0.33* 0.40* 0.40*   < > 0.44*   SODIUM mmol/L 137 139 137   < > 133*   CHLORIDE mmol/L 101 103 101   < > 101   POTASSIUM mmol/L 3.9 4.4 4.4   < > 4.7   PHOSPHORUS mg/dL  --   --   --   --  3.9   MAGNESIUM mg/dL  --  2.7* 2.6*  --   --     < > = values in this interval not displayed.       Results from last 7 days   Lab Units 01/03/24  0354 01/02/24  0642 12/31/23  0426   ALBUMIN g/dL  --   --  2.6*   PREALBUMIN mg/dL 15.5*  --   --    CRP mg/dL  --  6.55*  --        Results from last 7 days   Lab Units 01/04/24  2350 01/04/24  1838 01/04/24  1129 01/04/24  0614 01/04/24  0008 01/03/24  1713   GLUCOSE mg/dL 94 91 112 106 111 138*     Lab Results   Lab Value Date/Time    HGBA1C 5.1 03/10/2019 0412               Medications    Medications Reviewed: Yes  Pertinent: insulin, Pericolace, scopolamine patch   Infusion:   PRN:     Intake/Ouptut 24 hrs (0701 - 0700)   I&O's Reviewed: Yes   Intake & Output (last day)         01/04 0701 01/05 0700 01/05 0701 01/06 0700    I.V. (mL/kg) 10.9 (0.2) 716.7 (10.1)    Blood      Other  100    NG/GT      IV Piggyback 68.7     Total Intake(mL/kg) 79.6 (1.1) 816.7 (11.5)    Urine (mL/kg/hr) 770 (0.5)     Stool 0     Total Output 770     Net -690.4 +816.7          Urine Unmeasured Occurrence 2 x     Stool Unmeasured Occurrence 2 x           Anthropometrics     Height:    Last Filed Weight: Weight: 70.9 kg (156 lb 4.9 oz) (01/04/24 0600)  Method: Weight Method: Bed scale  BMI: BMI  (Calculated): 26.8  BMI classification: Overweight: 25.0-29.9kg/m2   IBW:  120 lb    UBW:    Weight     Weight (kg) Weight (lbs)   11/22/2022 69.854 kg  154 lb    1/22/2023 69.854 kg  154 lb    2/11/2023 72.576 kg  160 lb    3/7/2023 72.576 kg  160 lb    4/15/2023 72.576 kg  160 lb    5/5/2023 72.576 kg  160 lb    5/6/2023 69 kg  152 lb 1.9 oz     69 kg  152 lb 1.9 oz    5/7/2023 69.3 kg  152 lb 12.5 oz    5/8/2023 71.4 kg  157 lb 6.5 oz    5/23/2023 71.4 kg  157 lb 6.5 oz    12/22/2023 71.215 kg  157 lb     70.28 kg  154 lb 15 oz    12/23/2023 69.7 kg  153 lb 10.6 oz    12/24/2023 70.5 kg  155 lb 6.8 oz     79 kg  174 lb 2.6 oz    12/25/2023 71.9 kg  158 lb 8.2 oz        Weight change: no significant changes    Nutrition Focused Physical Exam     Date: 12/25    Unable to perform exam due to: Pt unable to participate at time of visit    Needs Assessment   Date: 12/25  Reviewed 1/1    Height used: 64 in  Weights used: 71.9 kg (ABW)    Estimated Calorie needs: ~1500 Kcal/day (21 kcal/kg)  Method:  Kcals/KG 20-22 ABW = 4029-3367  Method:  [facility] 1252X1.2=1502    Estimated Protein needs: ~79 g PRO/day  Method: 1-1.2 g/Kg = 72-86    Estimated Fluid needs: ~ ml/day   Per clinical status    Current Nutrition Prescription     PO: NPO Diet NPO Type: Strict NPO  Oral Nutrition Supplement:   Intake: N/A    EN: IsoSource 1.5  Goal Rate: 50ml/hr   Water Flushes: 25ml/hr  Modular: Prosource -no carb 1/day  Route: PEG  Tube: 20 PEG    At goal over: 22Hrs/day    Rx will supply:   Goal Volume 1100  mL/day     Flush Volume 550 mL/day     Energy 1710 Kcal/day 114 % Est Need   Protein 89 g/day 113 % Est Need   Fiber 17 g/day     Water in   mL     Total Water 1386 mL     Meet DRI Yes        --------------------------------------------------------------------------  Product/Rate verified at bedside: Yes  Infusing Rate at time of visit: 25ml/hr    Average Delivery from Charting: Insufficient data    Nutrition Diagnosis    Date:  Updated:   Problem Inadequate oral intake   Etiology Dysphagia 2/2 anoxic brain injury   Signs/Symptoms PEG/EN for nutrition   Status:     Goal:   General: Nutrition support treatment  PO: Advace diet as medically feasible/appropriate  EN/PN: Adjust EN    Nutrition Intervention      Follow treatment progress, Care plan reviewed    Isosource 1.5 @ 45ml/hr. Prosource 1x daily. Water flush @ 25ml/hr.     Monitoring/Evaluation:   Per protocol, I&O, Pertinent labs, EN delivery/tolerance, Weight, GI status, Symptoms, POC/GOC      Perla Solares, MS,RD,LD  Time Spent: 25m

## 2024-01-05 NOTE — PLAN OF CARE
Goal Outcome Evaluation:  Plan of Care Reviewed With: patient        Progress: improving  Outcome Evaluation: g tube replaced, fem line removed, subclavian line placed, tf restarted now at goal

## 2024-01-05 NOTE — NURSING NOTE
Prior to central line removal, order for the removal of catheter was verified, patient was assessed, necessary materials were gathered and patient was educated regarding procedure .    Patient was positioned flat to ensure that the insertion site was at or below the level of the heart.    Hands were washed, clean gloves were applied and central line dressing was gently removed. Catheter exit site was not cultured.     A new pair of clean gloves were then applied. Insertion site was cleansed with 2% Chlorhexidine swab using a circular motion beginning at the insertion site and moving outward for 30 seconds and allowed to dry.     Clamp on line was present and clamped.     Patient unable to perform Valsalva maneuver or hold breath during central line removal due to current condition.     The central line was grasped at the insertion site and slowly pulled outward parallel to the skin. Resistance was not met.    After central line was completely removed, a sterile 4x4 gauze pad was used to apply light pressure until bleeding stopped. At that time, petroleum-based gauze and a sterile occlusive dressing was applied to exit site.     Patient was instructed to keep dressing in place for at least 24 hours and to remain in a flat or reclining position for 30 minutes post-removal.     Catheter was inspected after removal and was intact. Tip of central line was not sent for culture. Patient tolerated procedure.

## 2024-01-05 NOTE — PROCEDURES
RIGHT SUBCLAVIAN CENTRAL LINE PLACEMENT PROCEDURE NOTE    Indication: IV access for vasopressor administration    : Dr Littlejohn    Anesthesia: Local anesthesia with lidocaine 1%    After informed consent was obtained and the patient was correctly identified, the patient was prepped and draped in the standard surgical fashion. The patient was then placed in Trendelenburg position. 1% lidocaine was used as local anesthesia. The introducer needle was inserted into the subcutaneous tissue and into the right subclavian vein. Venous blood was withdrawn. The syringe was removed and no pulsatile blood was noted. A guidewire was then advanced into the introducer needle. A small incision was made at the skin surface with an 11-blade scalpel. The needle was withdrawn and a dilator was placed over the guidewire to dilate the subcutaneous tissues. The dilator was removed and a 7Fr triple lumen catheter was placed over the guidewire using Seldinger technique. The guidewire was then removed. The catheter was sutured into place at 20 cm and all ports were appropriately aspirated and flushed with normal saline. After the area was cleansed and dried, a Tegaderm was used as a sterile dressing. The patient tolerated the procedure well with no complications.    Estimated blood loss: 0.5 ml    A post procedure X-ray was obtained and showed the catheter tip to be in satisfactory position.  No evidence of pneumothorax.

## 2024-01-05 NOTE — CONSULTS
[facility]    INFECTIOUS DISEASE CONSULT/INITIAL HOSPITAL VISIT    Viji Vargas  1960  [id number]    Date of consult: 1/5/2024    Admit date: 12/24/2023    Requesting Provider: Janak Gutiérrez DO  Evaluating physician: Walter Sanchez MD  Reason for Consultation: Complicated right pleural effusion, parapneumonic effusion status post chest tube and thrombolytics, Acinetobacter in patient with anoxic brain injury  Chief Complaint: Above      Subjective   History of present illness:  Patient is a  63 y.o.  Yr old female with a history of cardiac arrest and anoxic brain injury March 2019 status post trach and PEG, COPD, hypertension, multidrug-resistant organisms recently admitted to [facility] on 12/22 for respiratory distress, then transferred and admitted to [facility] on 12/24/2023 with a bronchoscopy revealing bronchial malacia, and culture positive for Acinetobacter sensitive to ampicillin sulbactam and meropenem and gentamicin, resistant to cefepime.  A 14 Senegalese tube was placed and the existing chest tube removed, with instillation of thrombolytics.  At the patient was initially on piperacillin/tazobactam but changed to meropenem on 12/27.  Pleural fluid culture from 12/25 negative.  Blood cultures 12/24 negative.  Urine culture at [facility] positive for Proteus.  Blood culture from [facility] positive for staphylococcal species contaminant.  COVID-19 and influenza a negative.  Pleural fluid cell studies included WBC 1430, RBC 7000, 87% neutrophils, protein 3.8, glucose 18, with placement of right pigtail chest tube 12/22.  CT scan revealed loculated right pleural effusion with large right lower lobe pneumonia.  The patient also had a right pneumothorax.  She was stabilized and moved out of the ICU to the floor on around 1/4/2024.  I was consulted on 1/5/2024.  Patient is a poor historian.    Past Medical History:    Diagnosis Date    COPD (chronic obstructive pulmonary disease)     Hypertension     Pneumonia     Stroke        Past Surgical History:   Procedure Laterality Date    HYSTERECTOMY      Partial     TRACHEOSTOMY AND PEG TUBE INSERTION N/A 3/20/2019    Procedure: TRACHEOSTOMY AND PERCUTANEOUS ENDOSCOPIC GASTROSTOMY TUBE INSERTION;  Surgeon: Nadira Pandey MD;  Location: [facility] OR;  Service: General       Pediatric History   Patient Parents    Not on file     Other Topics Concern    Not on file   Social History Narrative    Not on file   No current cigarettes, alcohol, drug use    family history is not on file.  Unavailable per patient  No Known Allergies    Immunization History   Administered Date(s) Administered    COVID-19 (PFIZER) BIVALENT 12+YRS 10/17/2022    COVID-19 (PFIZER) Purple Cap Monovalent 01/21/2021, 02/11/2021    COVID-19 F23 (MODERNA) 12YRS+ (SPIKEVAX) 12/04/2023    Pneumococcal Conjugate 13-Valent (PCV13) 11/08/2021    Tdap 06/10/2015       Medication:  @Scheduled Meds:baclofen, 10 mg, Per PEG Tube, Q8H  carvedilol, 6.25 mg, Per PEG Tube, Q12H  glycopyrrolate, 2 mg, Per PEG Tube, TID  heparin (porcine), 5,000 Units, Subcutaneous, Q8H  meropenem, 1,000 mg, Intravenous, Q8H  ProSource No Carb, 30 mL, Per PEG Tube, Daily  Scopolamine, 1 patch, Transdermal, Q72H  senna-docusate sodium, 2 tablet, Per G Tube, BID  sodium chloride, 10 mL, Intravenous, Q12H      Continuous Infusions:lactated ringers, 30 mL/hr, Last Rate: 30 mL/hr (01/04/24 1639)      PRN Meds:.  acetaminophen    senna-docusate sodium **AND** polyethylene glycol **AND** [DISCONTINUED] bisacodyl **AND** bisacodyl    Calcium Replacement - Follow Nurse / BPA Driven Protocol    dextrose    glucagon (human recombinant)    Magnesium Standard Dose Replacement - Follow Nurse / BPA Driven Protocol    Phosphorus Replacement - Follow Nurse / BPA Driven Protocol    Potassium Replacement - Follow Nurse / BPA Driven Protocol     Please refer to the  medical record for a full medication list    Review of Systems: Difficult to obtain secondary to mental status.      Physical Exam:   Vital Signs   Temp:  [96.8 °F (36 °C)-99.3 °F (37.4 °C)] 99.1 °F (37.3 °C)  Heart Rate:  [] 93  Resp:  [18-22] 20  BP: (108-131)/(47-70) 108/68    Blood pressure 108/68, pulse 93, temperature 99.1 °F (37.3 °C), temperature source Axillary, resp. rate 20, weight 70.9 kg (156 lb 4.9 oz), SpO2 98%.  GENERAL: Awake and alert, in no acute distress. Appears older than stated age.  HEENT:  Normocephalic, atraumatic.  Oropharynx without thrush. Dentition in good repair. No cervical adenopathy. No neck masses.  Ears externally normal, Nose externally normal.  EYES: PERRL. No conjunctival injection. No icterus. EOM full.  LYMPHATICS: No lymphadenopathy of the neck or axillary or inguinal regions.   HEART: No murmur, gallop, or pericardial friction rub. Reg rate rhythm, No JVD at 45 degrees.  LUNGS: Rales right greater than left with decreased breath sounds right base. No respiratory distress, no use of accessory muscles.  ABDOMEN: Soft, nontender, nondistended. No appreciable HSM.  Bowel sounds normal.  GENITAL: No external lesions, breasts without masses, back straight, no CVAT, rectal external without lesions.   SKIN: Warm and dry without cutaneous eruptions.  No nodules.    PSYCHIATRIC: Mental status lethargic.  EXT:  No cellulitic change.  NEURO: Oriented to 0, CN 2 to 12 intact, DTR 1 + and symmetric, sensory intact to LT upper and lower extremity, motor 5/5 upper and lower extremity, cerebellar and gait not tested.      Results Review:   I reviewed the patient's new clinical results.  I reviewed the patient's new imaging results and agree with the interpretation.  I reviewed the patient's other test results and agree with the interpretation    Results from last 7 days   Lab Units 01/05/24  0415 01/04/24  0512 01/03/24  1534 01/03/24  0354   WBC 10*3/mm3 9.98 10.94*  --  11.92*  "  HEMOGLOBIN g/dL 9.8* 9.4* 8.5* 6.6*   HEMATOCRIT % 31.2* 29.1* 26.7* 21.4*   PLATELETS 10*3/mm3 489* 451*  --  426     Results from last 7 days   Lab Units 01/05/24  0415   SODIUM mmol/L 137   POTASSIUM mmol/L 3.9   CHLORIDE mmol/L 101   CO2 mmol/L 26.0   BUN mg/dL 17   CREATININE mg/dL 0.33*   GLUCOSE mg/dL 88   CALCIUM mg/dL 9.0             Results from last 7 days   Lab Units 01/02/24  0642   CRP mg/dL 6.55*             Estimated Creatinine Clearance: 164.2 mL/min (A) (by C-G formula based on SCr of 0.33 mg/dL (L)).     Procalitonin Results:       Brief Urine Lab Results  (Last result in the past 365 days)        Color   Clarity   Blood   Leuk Est   Nitrite   Protein   CREAT   Urine HCG        12/24/23 1755 Yellow   Cloudy   Moderate (2+)   Small (1+)   Negative   30 mg/dL (1+)                  No results found for: \"SITE\", \"ALLENTEST\", \"PHART\", \"IHH8WRY\", \"PO2ART\", \"JNX4KSZ\", \"BASEEXCESS\", \"A8BJMJDP\", \"HGBBG\", \"HCTABG\", \"OXYHEMOGLOBI\", \"METHHGBN\", \"CARBOXYHGB\", \"CO2CT\", \"BAROMETRIC\", \"MODALITY\", \"FIO2\"     Microbiology:  12/24/2023 with a bronchoscopy revealing bronchial malacia, and culture positive for Acinetobacter sensitive to ampicillin sulbactam and meropenem and gentamicin, resistant to cefepime, pleural fluid culture negative    Radiology:  Imaging Results (Last 72 Hours)       Procedure Component Value Units Date/Time    XR Chest 1 View [[id number]] Collected: 01/03/24 0756     Updated: 01/03/24 0801    Narrative:      XR CHEST 1 VW    Date of Exam: 1/3/2024 2:20 AM EST    Indication: Pneumonia, pleural effusion.    Comparison: 1/2/2024.    Findings:  It appears that the right basilar pigtail chest tube has been removed. There is a moderate sized right pleural effusion with compressive atelectasis/airspace disease worsened from yesterday. There is a small left pleural effusion with compressive   atelectasis/airspace disease similar to yesterday. The heart size is normal. There may be some underlying " pulmonary vascular congestion. There is no pneumothorax. There is a stable tracheostomy tube in place.      Impression:      Impression:    1. It appears that the right basilar pigtail chest tube has been removed. There is a moderate size right pleural effusion with compressive atelectasis/airspace disease worsened from yesterday.  2. Small left pleural effusion with compressive atelectasis/airspace disease similar to yesterday.  3. Questionable mild pulmonary vascular congestion.      Electronically Signed: Micheal French MD    1/3/2024 7:58 AM EST    Workstation ID: [id number]            IMPRESSION:     1.  Right parapneumonic complicated pleural effusion, culture negative from 12/25/2023, with bronchoscopy culture positive for Acinetobacter baumannii, treated with meropenem since 12/27.  Pulmonary evaluation suggested that patient may not resolve the loculated effusions and may require surgery but not a good surgical candidate.  The patient was treated with thrombolytics and chest tube.  Cytology negative.  2.  Acinetobacter pneumonia right lower lobe likely related to aspiration from mental status from previous anoxic brain injury 2019.  3.  Anoxic brain injury with encephalopathy ongoing.  4.  Anemia, chronic disease.  5.  History of COPD, essential hypertension contributing to issues above.    RECOMMENDATIONS:    1.  Diagnostically, continue to follow patient's physical exam, CBC, CMP, CRP, culture data, radiographic studies.  2.  Therapeutically, consider continuing with meropenem until 1/27 for complicated right pleural effusion and severe Acinetobacter pneumonia.  Increased risk for therapeutic failure but not a good surgical candidate for decortication.  3.  Supportive care.  Status post previous trach and PEG 2019.    I discussed the patient's findings and my recommendations with nursing staff    Thank you for asking me to see Viji Vargas.  Our group would be pleased to follow this patient over the course  of their hospitalization and assist with outpatient antimicrobial therapy, as indicated.  Further recommendations depend on the results of the cultures and clinical course.  Poor long-term prognosis.  See next on Monday, call sooner if needed.    Walter Sanchez MD  1/5/2024

## 2024-01-05 NOTE — CASE MANAGEMENT/SOCIAL WORK
Continued Stay Note  [facility]     Patient Name: Viji Vargas  MRN: [id number]  Today's Date: 1/5/2024    Admit Date: 12/24/2023    Plan: discharge plan   Discharge Plan       Row Name 01/05/24 1525       Plan    Plan discharge plan    Plan Comments The plan is back to [facility]) at discharge. Per discussion in [facility], pt will be here over weekend and possibly medically ready for discharge Monday or Tues.  I spoke with Shadia in admissions at [facility] and they can take pt back when medically ready. Pt currently on 15 L of O2 and only on 4 L  through trach collar at the facility. Per Shadia, pt will need to be near baseline O2. Transportation tentatively arranged  with [facility] to transport pt back to [facility] Monday, 1/8 at 1615. CM will need to follow up with Shadia Monday( [phone]). I attempted to call daughter Liliya several times and unsuccessful. I updated granddaughter Liliya of tentative transportation for Monday, pending medical readiness. CM will follow up Monday.    Final Discharge Disposition Code 04 - intermediate care facility                   Discharge Codes    No documentation.                 Expected Discharge Date and Time       Expected Discharge Date Expected Discharge Time    Jeff 10, 2024               Aurora Davey RN

## 2024-01-05 NOTE — PLAN OF CARE
Goal Outcome Evaluation:  Plan of Care Reviewed With: patient           Outcome Evaluation: PT eval completed. Pt presents at her baseline and is dependent with all mobility and ADL tasks. Pt opens eyes to stimuli, however is non-verbal and did not follow commands. Pt has significant ROM deficits due to contractures and tone synergies.  No IPPT services warranted at this time.      Anticipated Discharge Disposition (PT): extended care facility

## 2024-01-06 LAB
ALBUMIN SERPL-MCNC: 3 G/DL (ref 3.5–5.2)
ALBUMIN/GLOB SERPL: 0.7 G/DL
ALP SERPL-CCNC: 85 U/L (ref 39–117)
ALT SERPL W P-5'-P-CCNC: 19 U/L (ref 1–33)
ANION GAP SERPL CALCULATED.3IONS-SCNC: 12 MMOL/L (ref 5–15)
AST SERPL-CCNC: 18 U/L (ref 1–32)
BASOPHILS # BLD AUTO: 0.04 10*3/MM3 (ref 0–0.2)
BASOPHILS NFR BLD AUTO: 0.4 % (ref 0–1.5)
BILIRUB SERPL-MCNC: 0.5 MG/DL (ref 0–1.2)
BUN SERPL-MCNC: 30 MG/DL (ref 8–23)
BUN/CREAT SERPL: 54.5 (ref 7–25)
CALCIUM SPEC-SCNC: 9.2 MG/DL (ref 8.6–10.5)
CHLORIDE SERPL-SCNC: 93 MMOL/L (ref 98–107)
CO2 SERPL-SCNC: 31 MMOL/L (ref 22–29)
CREAT SERPL-MCNC: 0.55 MG/DL (ref 0.57–1)
CRP SERPL-MCNC: 8.29 MG/DL (ref 0–0.5)
DEPRECATED RDW RBC AUTO: 46 FL (ref 37–54)
EGFRCR SERPLBLD CKD-EPI 2021: 101.9 ML/MIN/1.73
EOSINOPHIL # BLD AUTO: 0.38 10*3/MM3 (ref 0–0.4)
EOSINOPHIL NFR BLD AUTO: 3.8 % (ref 0.3–6.2)
ERYTHROCYTE [DISTWIDTH] IN BLOOD BY AUTOMATED COUNT: 13.6 % (ref 12.3–15.4)
GLOBULIN UR ELPH-MCNC: 4.1 GM/DL
GLUCOSE BLDC GLUCOMTR-MCNC: 123 MG/DL (ref 70–130)
GLUCOSE SERPL-MCNC: 131 MG/DL (ref 65–99)
HCT VFR BLD AUTO: 31.6 % (ref 34–46.6)
HGB BLD-MCNC: 10.1 G/DL (ref 12–15.9)
IMM GRANULOCYTES # BLD AUTO: 0.1 10*3/MM3 (ref 0–0.05)
IMM GRANULOCYTES NFR BLD AUTO: 1 % (ref 0–0.5)
LYMPHOCYTES # BLD AUTO: 2.1 10*3/MM3 (ref 0.7–3.1)
LYMPHOCYTES NFR BLD AUTO: 21.2 % (ref 19.6–45.3)
MAGNESIUM SERPL-MCNC: 2.4 MG/DL (ref 1.6–2.4)
MCH RBC QN AUTO: 30 PG (ref 26.6–33)
MCHC RBC AUTO-ENTMCNC: 32 G/DL (ref 31.5–35.7)
MCV RBC AUTO: 93.8 FL (ref 79–97)
MONOCYTES # BLD AUTO: 0.93 10*3/MM3 (ref 0.1–0.9)
MONOCYTES NFR BLD AUTO: 9.4 % (ref 5–12)
NEUTROPHILS NFR BLD AUTO: 6.37 10*3/MM3 (ref 1.7–7)
NEUTROPHILS NFR BLD AUTO: 64.2 % (ref 42.7–76)
NRBC BLD AUTO-RTO: 0 /100 WBC (ref 0–0.2)
PHOSPHATE SERPL-MCNC: 3.8 MG/DL (ref 2.5–4.5)
PLATELET # BLD AUTO: 504 10*3/MM3 (ref 140–450)
PMV BLD AUTO: 11 FL (ref 6–12)
POTASSIUM SERPL-SCNC: 3.5 MMOL/L (ref 3.5–5.2)
POTASSIUM SERPL-SCNC: 5.8 MMOL/L (ref 3.5–5.2)
PROT SERPL-MCNC: 7.1 G/DL (ref 6–8.5)
RBC # BLD AUTO: 3.37 10*6/MM3 (ref 3.77–5.28)
SODIUM SERPL-SCNC: 136 MMOL/L (ref 136–145)
WBC NRBC COR # BLD AUTO: 9.92 10*3/MM3 (ref 3.4–10.8)

## 2024-01-06 PROCEDURE — 80053 COMPREHEN METABOLIC PANEL: CPT | Performed by: INTERNAL MEDICINE

## 2024-01-06 PROCEDURE — 94761 N-INVAS EAR/PLS OXIMETRY MLT: CPT

## 2024-01-06 PROCEDURE — 83735 ASSAY OF MAGNESIUM: CPT | Performed by: INTERNAL MEDICINE

## 2024-01-06 PROCEDURE — 99232 SBSQ HOSP IP/OBS MODERATE 35: CPT | Performed by: INTERNAL MEDICINE

## 2024-01-06 PROCEDURE — 82948 REAGENT STRIP/BLOOD GLUCOSE: CPT

## 2024-01-06 PROCEDURE — 85025 COMPLETE CBC W/AUTO DIFF WBC: CPT | Performed by: INTERNAL MEDICINE

## 2024-01-06 PROCEDURE — 25010000002 MEROPENEM PER 100 MG: Performed by: INTERNAL MEDICINE

## 2024-01-06 PROCEDURE — 94799 UNLISTED PULMONARY SVC/PX: CPT

## 2024-01-06 PROCEDURE — 86140 C-REACTIVE PROTEIN: CPT | Performed by: INTERNAL MEDICINE

## 2024-01-06 PROCEDURE — 84100 ASSAY OF PHOSPHORUS: CPT | Performed by: INTERNAL MEDICINE

## 2024-01-06 PROCEDURE — 25010000002 HEPARIN (PORCINE) PER 1000 UNITS: Performed by: INTERNAL MEDICINE

## 2024-01-06 PROCEDURE — 84132 ASSAY OF SERUM POTASSIUM: CPT | Performed by: INTERNAL MEDICINE

## 2024-01-06 RX ORDER — POTASSIUM CHLORIDE 1.5 G/1.58G
40 POWDER, FOR SOLUTION ORAL EVERY 4 HOURS
Status: COMPLETED | OUTPATIENT
Start: 2024-01-06 | End: 2024-01-06

## 2024-01-06 RX ADMIN — POTASSIUM CHLORIDE 40 MEQ: 1.5 POWDER, FOR SOLUTION ORAL at 13:07

## 2024-01-06 RX ADMIN — CARVEDILOL 6.25 MG: 6.25 TABLET, FILM COATED ORAL at 21:38

## 2024-01-06 RX ADMIN — BACLOFEN 10 MG: 10 TABLET ORAL at 21:38

## 2024-01-06 RX ADMIN — Medication 30 ML: at 10:46

## 2024-01-06 RX ADMIN — MEROPENEM 1000 MG: 1 INJECTION, POWDER, FOR SOLUTION INTRAVENOUS at 10:37

## 2024-01-06 RX ADMIN — Medication 10 ML: at 10:45

## 2024-01-06 RX ADMIN — HEPARIN SODIUM 5000 UNITS: 5000 INJECTION INTRAVENOUS; SUBCUTANEOUS at 06:11

## 2024-01-06 RX ADMIN — BACLOFEN 10 MG: 10 TABLET ORAL at 13:11

## 2024-01-06 RX ADMIN — HEPARIN SODIUM 5000 UNITS: 5000 INJECTION INTRAVENOUS; SUBCUTANEOUS at 13:11

## 2024-01-06 RX ADMIN — BACLOFEN 10 MG: 10 TABLET ORAL at 06:10

## 2024-01-06 RX ADMIN — GLYCOPYRROLATE 2 MG: 1 TABLET ORAL at 13:07

## 2024-01-06 RX ADMIN — GLYCOPYRROLATE 2 MG: 1 TABLET ORAL at 21:38

## 2024-01-06 RX ADMIN — GLYCOPYRROLATE 2 MG: 1 TABLET ORAL at 01:34

## 2024-01-06 RX ADMIN — GLYCOPYRROLATE 2 MG: 1 TABLET ORAL at 17:17

## 2024-01-06 RX ADMIN — Medication 10 ML: at 21:00

## 2024-01-06 RX ADMIN — POTASSIUM CHLORIDE 40 MEQ: 1.5 POWDER, FOR SOLUTION ORAL at 17:13

## 2024-01-06 RX ADMIN — MEROPENEM 1000 MG: 1 INJECTION, POWDER, FOR SOLUTION INTRAVENOUS at 17:12

## 2024-01-06 RX ADMIN — MEROPENEM 1000 MG: 1 INJECTION, POWDER, FOR SOLUTION INTRAVENOUS at 01:34

## 2024-01-06 RX ADMIN — HEPARIN SODIUM 5000 UNITS: 5000 INJECTION INTRAVENOUS; SUBCUTANEOUS at 21:38

## 2024-01-06 RX ADMIN — SENNOSIDES AND DOCUSATE SODIUM 2 TABLET: 8.6; 5 TABLET ORAL at 10:38

## 2024-01-06 NOTE — PROGRESS NOTES
[facility] Medicine Services  PROGRESS NOTE    Patient Name: Viji Vargas  : 1960  MRN: [id number]    Date of Admission: 2023  Primary Care Physician: Ranjeet Pina MD    Subjective   Subjective     CC:  Follow-up for loculated pleural effusion    HPI:  Patient was seen and examined.  Nonverbal.  Secretions improved.  Ox requirements down to 6 L    Objective   Objective     Vital Signs:   Temp:  [98.4 °F (36.9 °C)-99.3 °F (37.4 °C)] 98.7 °F (37.1 °C)  Heart Rate:  [80-93] 80  Resp:  [18-22] 18  BP: (102-121)/(47-81) 116/72  Flow (L/min):  [15] 15     Physical Exam:  General: Nonverbal.    Head: Atraumatic and normocephalic  Eyes: No Icterus. No pallor  Ears:  Ears appear intact with no abnormalities noted  Throat: Tracheostomy in place with lots of secretions   Neck: Supple, trachea midline  Lungs: Diminished air entry with coarse crackles right lung base.    Heart:  Normal S1 and S2, no murmur, no gallop, No JVD, 3+ lower extremity swelling  Abdomen:  Soft, no tenderness, no organomegaly, normal bowel sounds, no organomegaly  Extremities: pulses equal bilaterally  Skin: No bleeding, bruising or rash, normal skin turgor and elasticity  Neurologic: Cranial nerves appear intact with no evidence of facial asymmetry, normal motor and sensory functions in all 4 extremities  Psych: Completely disoriented    Results Reviewed:  LAB RESULTS:      Lab 24  0335 24  0415 24  0512 24  1534 24  0354 24  0642   WBC 9.92 9.98 10.94*  --  11.92* 11.11*   HEMOGLOBIN 10.1* 9.8* 9.4* 8.5* 6.6* 7.1*   HEMATOCRIT 31.6* 31.2* 29.1* 26.7* 21.4* 22.4*   PLATELETS 504* 489* 451*  --  426 374   NEUTROS ABS 6.37 6.68 6.98  --  8.29* 7.37*   IMMATURE GRANS (ABS) 0.10* 0.15* 0.23*  --  0.33* 0.45*   LYMPHS ABS 2.10 1.87 2.32  --  1.81 1.64   MONOS ABS 0.93* 0.86 0.90  --  0.95* 1.04*   EOS ABS 0.38 0.35 0.44*  --  0.49* 0.53*   MCV 93.8 95.4 93.0  --  99.1* 95.3    CRP 8.29*  --   --   --   --  6.55*         Lab 01/06/24  0335 01/05/24  0415 01/04/24  0512 01/03/24  0354 01/02/24  0642 01/01/24  0553 12/31/23  0426   SODIUM 136 137 139 137 137   < > 133*   POTASSIUM 3.5 3.9 4.4 4.4 4.8   < > 4.7   CHLORIDE 93* 101 103 101 102   < > 101   CO2 31.0* 26.0 27.0 26.0 27.0   < > 26.0   ANION GAP 12.0 10.0 9.0 10.0 8.0   < > 6.0   BUN 30* 17 18 19 17   < > 19   CREATININE 0.55* 0.33* 0.40* 0.40* 0.36*   < > 0.44*   EGFR 101.9 115.2 110.0 110.0 112.8   < > 107.5   GLUCOSE 131* 88 102* 142* 126*   < > 137*   CALCIUM 9.2 9.0 8.8 8.6 8.4*   < > 8.6   MAGNESIUM 2.4  --  2.7* 2.6*  --   --   --    PHOSPHORUS 3.8  --   --   --   --   --  3.9    < > = values in this interval not displayed.         Lab 01/06/24  0335 12/31/23  0426   TOTAL PROTEIN 7.1  --    ALBUMIN 3.0* 2.6*   GLOBULIN 4.1  --    ALT (SGPT) 19  --    AST (SGOT) 18  --    BILIRUBIN 0.5  --    ALK PHOS 85  --                  Lab 01/03/24  0504   ABO TYPING A   RH TYPING Positive   ANTIBODY SCREEN Negative         Brief Urine Lab Results  (Last result in the past 365 days)        Color   Clarity   Blood   Leuk Est   Nitrite   Protein   CREAT   Urine HCG        12/24/23 1755 Yellow   Cloudy   Moderate (2+)   Small (1+)   Negative   30 mg/dL (1+)                   Microbiology Results Abnormal       Procedure Component Value - Date/Time    Blood Culture - Blood, Arm, Left [[id number]]  (Normal) Collected: 12/24/23 1715    Lab Status: Final result Specimen: Blood from Arm, Left Updated: 12/29/23 2030     Blood Culture No growth at 5 days    Narrative:      Aerobic Bottle Only      Blood Culture - Blood, Arm, Left [[id number]]  (Normal) Collected: 12/24/23 1728    Lab Status: Final result Specimen: Blood from Arm, Left Updated: 12/29/23 2015     Blood Culture No growth at 5 days    Narrative:      Aerobic Bottle Only      Body Fluid Culture - Body Fluid, Pleural Cavity [[id number]] Collected: 12/25/23 1412    Lab Status: Final  result Specimen: Body Fluid from Pleural Cavity Updated: 12/28/23 0827     Body Fluid Culture No growth at 3 days     Gram Stain Many (4+) Red blood cells      Few (2+) WBCs seen      No organisms seen    Urine Culture - Urine, Urine, Random Void [[id number]]  (Normal) Collected: 12/24/23 1758    Lab Status: Final result Specimen: Urine, Random Void Updated: 12/25/23 2007     Urine Culture No growth            XR Chest 1 View    Result Date: 1/5/2024  XR CHEST 1 VW Date of Exam: 1/5/2024 12:47 PM EST Indication: Central line insertion. Comparison: 1/3/2024. Findings: There is been placement of a right-sided subclavian line with the tip terminating deep in the right atrium. There is no pneumothorax. There is a stable tracheostomy tube in place. The heart size is normal. There is a small right pleural effusion reduced in size from yesterday with compressive atelectasis and possible airspace disease. There is also a trace left pleural effusion with atelectasis/airspace disease also improved from yesterday. There may be slight pulmonary vascular congestion similar to yesterday.     Impression: Impression: 1. Placement of a right-sided subclavian line with the tip terminating deep in the right atrium. There is no pneumothorax. 2. Bilateral basilar pleural effusions with compressive atelectasis/airspace disease improved from yesterday as described. 3. Questionable slight pulmonary vascular congestion. Electronically Signed: Micheal French MD  1/5/2024 1:43 PM EST  Workstation ID: [id number]     Results for orders placed during the hospital encounter of 03/10/19    Transthoracic Echo Complete With Contrast if Necessary Per Protocol    Interpretation Summary  Technically difficult study  1) Borderline LV size with mild reduction in LV systolic function ( EF is 45 to 50%)  2) Mild to moderate left atrial enlargement  3) Trace MR and TR with normal PA pressures  4) Borderline RV size with normal function  5) Global mild hypokinesis  of the LV  6) Moderate calcific plaque along ascending aorta      Current medications:  Scheduled Meds:baclofen, 10 mg, Per PEG Tube, Q8H  carvedilol, 6.25 mg, Per PEG Tube, Q12H  glycopyrrolate, 2 mg, Per PEG Tube, TID  heparin (porcine), 5,000 Units, Subcutaneous, Q8H  meropenem, 1,000 mg, Intravenous, Q8H  ProSource No Carb, 30 mL, Per PEG Tube, Daily  Scopolamine, 1 patch, Transdermal, Q72H  senna-docusate sodium, 2 tablet, Per G Tube, BID  sodium chloride, 10 mL, Intravenous, Q12H      Continuous Infusions:     PRN Meds:.  acetaminophen    senna-docusate sodium **AND** polyethylene glycol **AND** [DISCONTINUED] bisacodyl **AND** bisacodyl    Calcium Replacement - Follow Nurse / BPA Driven Protocol    dextrose    glucagon (human recombinant)    influenza vaccine    Magnesium Standard Dose Replacement - Follow Nurse / BPA Driven Protocol    Phosphorus Replacement - Follow Nurse / BPA Driven Protocol    Potassium Replacement - Follow Nurse / BPA Driven Protocol    Assessment & Plan   Assessment & Plan     Active Hospital Problems    Diagnosis  POA    **Acute on chronic respiratory failure with hypoxia [J96.21]  Yes    Pleural effusion on right [J90]  Yes    UTI (urinary tract infection) [N39.0]  Yes    Bronchial pneumonia [J18.0]  Yes    Malfunction of percutaneous endoscopic gastrostomy (PEG) tube [K94.23]  Unknown    Pneumonia [J18.9]  Yes    Pneumothorax, right [J93.9]  Yes    Tracheostomy present [Z93.0]  Not Applicable    Anoxic brain injury [G93.1]  Yes      Resolved Hospital Problems   No resolved problems to display.        Brief Hospital Course to date:  63-year-old female with past medical history of cardiac arrest and anoxic brain injury in March 2019 status post tracheostomy and PEG placement, COPD, hypertension was recent admitted to [facility] for respiratory distress.  Patient was found to have large spontaneous pneumothorax on the chest x-ray and chest tube was placed. Subsequently  developed large right loculated effusion. Pt was transferred here on 12/24.  Patient underwent bronchoscopy and cultures are growing Acinetobacter.  14 Trinidadian chest tube was placed for drainage of pleural effusion.  Patient was given thrombolytics and dornase as well..  Pleural fluid showed complicated parapneumonic effusions and cultures remain negative.  Patient was not deemed candidate for surgical decortication due to her underlying comorbidities.  Is being treated with meropenem.    History of cardiac arrest with anoxic brain injury  Status post trach  Complicated right pleural effusion  Patient  had recent hospitalization to [facility] for spontaneous right pneumothorax.  Underwent chest tube placement and eventually developed infected loculated effusion  And also fluid is consistent with complicated parapneumonic effusion  Underwent 14 Trinidadian chest tube placement with minimal drainage that was eventually removed  Not candidate for decortication per CTS  Culture growing Acetobacter  Currently on Merrem  ID consulted.  Plan to continue Merrem till 12/27/2024  Continue scopolamine patch and  robinul for oral secretions. Continue pulmonary toilet    Essential hypertension  Continue Coreg     Enteral nutrition  PEG tube replaced by GI service  Continue tube feed    Worsening any of chronic disease  Status post 1 unit blood transfusion 1/3/24, hemoglobin has been stable      Expected Discharge Location and Transportation: [facility]  Expected Discharge   Expected Discharge Date: 1/10/2024; Expected Discharge Time:      DVT prophylaxis:  Medical and mechanical DVT prophylaxis orders are present.     AM-PAC 6 Clicks Score (PT): 6 (01/05/24 2030)    CODE STATUS:   Code Status and Medical Interventions:   Ordered at: 12/25/23 1114     Level Of Support Discussed With:    Next of Kin (If No Surrogate)     Code Status (Patient has no pulse and is not breathing):    CPR (Attempt to Resuscitate)     Medical  Interventions (Patient has pulse or is breathing):    Full Support     Copied text in this note has been reviewed and is accurate as of 01/06/24.     Viki Littlejohn MD  01/06/24

## 2024-01-06 NOTE — PLAN OF CARE
Goal Outcome Evaluation:  No changes overnight. VSS. Tolerating TF at goal. Trach care preformed. No signs of pain/discomfort. Will continue to monitor.

## 2024-01-07 LAB
ANION GAP SERPL CALCULATED.3IONS-SCNC: 8 MMOL/L (ref 5–15)
BASOPHILS # BLD AUTO: 0.04 10*3/MM3 (ref 0–0.2)
BASOPHILS NFR BLD AUTO: 0.4 % (ref 0–1.5)
BUN SERPL-MCNC: 36 MG/DL (ref 8–23)
BUN/CREAT SERPL: 69.2 (ref 7–25)
CALCIUM SPEC-SCNC: 9.2 MG/DL (ref 8.6–10.5)
CHLORIDE SERPL-SCNC: 99 MMOL/L (ref 98–107)
CO2 SERPL-SCNC: 27 MMOL/L (ref 22–29)
CREAT SERPL-MCNC: 0.52 MG/DL (ref 0.57–1)
CRP SERPL-MCNC: 3.7 MG/DL (ref 0–0.5)
DEPRECATED RDW RBC AUTO: 46.5 FL (ref 37–54)
EGFRCR SERPLBLD CKD-EPI 2021: 103.3 ML/MIN/1.73
EOSINOPHIL # BLD AUTO: 0.43 10*3/MM3 (ref 0–0.4)
EOSINOPHIL NFR BLD AUTO: 4.8 % (ref 0.3–6.2)
ERYTHROCYTE [DISTWIDTH] IN BLOOD BY AUTOMATED COUNT: 13.3 % (ref 12.3–15.4)
GLUCOSE BLDC GLUCOMTR-MCNC: 142 MG/DL (ref 70–130)
GLUCOSE BLDC GLUCOMTR-MCNC: 145 MG/DL (ref 70–130)
GLUCOSE SERPL-MCNC: 125 MG/DL (ref 65–99)
HCT VFR BLD AUTO: 31.4 % (ref 34–46.6)
HGB BLD-MCNC: 9.8 G/DL (ref 12–15.9)
IMM GRANULOCYTES # BLD AUTO: 0.11 10*3/MM3 (ref 0–0.05)
IMM GRANULOCYTES NFR BLD AUTO: 1.2 % (ref 0–0.5)
LYMPHOCYTES # BLD AUTO: 2.16 10*3/MM3 (ref 0.7–3.1)
LYMPHOCYTES NFR BLD AUTO: 23.9 % (ref 19.6–45.3)
MAGNESIUM SERPL-MCNC: 2.7 MG/DL (ref 1.6–2.4)
MCH RBC QN AUTO: 29.9 PG (ref 26.6–33)
MCHC RBC AUTO-ENTMCNC: 31.2 G/DL (ref 31.5–35.7)
MCV RBC AUTO: 95.7 FL (ref 79–97)
MONOCYTES # BLD AUTO: 0.89 10*3/MM3 (ref 0.1–0.9)
MONOCYTES NFR BLD AUTO: 9.8 % (ref 5–12)
NEUTROPHILS NFR BLD AUTO: 5.42 10*3/MM3 (ref 1.7–7)
NEUTROPHILS NFR BLD AUTO: 59.9 % (ref 42.7–76)
NRBC BLD AUTO-RTO: 0 /100 WBC (ref 0–0.2)
PHOSPHATE SERPL-MCNC: 2.8 MG/DL (ref 2.5–4.5)
PLATELET # BLD AUTO: 468 10*3/MM3 (ref 140–450)
PMV BLD AUTO: 10.6 FL (ref 6–12)
POTASSIUM SERPL-SCNC: 4.6 MMOL/L (ref 3.5–5.2)
RBC # BLD AUTO: 3.28 10*6/MM3 (ref 3.77–5.28)
SODIUM SERPL-SCNC: 134 MMOL/L (ref 136–145)
WBC NRBC COR # BLD AUTO: 9.05 10*3/MM3 (ref 3.4–10.8)

## 2024-01-07 PROCEDURE — 25010000002 BUMETANIDE PER 0.5 MG: Performed by: INTERNAL MEDICINE

## 2024-01-07 PROCEDURE — 94799 UNLISTED PULMONARY SVC/PX: CPT

## 2024-01-07 PROCEDURE — 25010000002 HEPARIN (PORCINE) PER 1000 UNITS: Performed by: INTERNAL MEDICINE

## 2024-01-07 PROCEDURE — 25010000002 MEROPENEM PER 100 MG: Performed by: INTERNAL MEDICINE

## 2024-01-07 PROCEDURE — 85025 COMPLETE CBC W/AUTO DIFF WBC: CPT | Performed by: INTERNAL MEDICINE

## 2024-01-07 PROCEDURE — 99232 SBSQ HOSP IP/OBS MODERATE 35: CPT | Performed by: INTERNAL MEDICINE

## 2024-01-07 PROCEDURE — 83735 ASSAY OF MAGNESIUM: CPT | Performed by: INTERNAL MEDICINE

## 2024-01-07 PROCEDURE — 82948 REAGENT STRIP/BLOOD GLUCOSE: CPT

## 2024-01-07 PROCEDURE — 86140 C-REACTIVE PROTEIN: CPT | Performed by: INTERNAL MEDICINE

## 2024-01-07 PROCEDURE — 84100 ASSAY OF PHOSPHORUS: CPT | Performed by: INTERNAL MEDICINE

## 2024-01-07 PROCEDURE — 80048 BASIC METABOLIC PNL TOTAL CA: CPT | Performed by: INTERNAL MEDICINE

## 2024-01-07 RX ORDER — BUMETANIDE 0.25 MG/ML
2 INJECTION INTRAMUSCULAR; INTRAVENOUS ONCE
Status: COMPLETED | OUTPATIENT
Start: 2024-01-07 | End: 2024-01-07

## 2024-01-07 RX ADMIN — BACLOFEN 10 MG: 10 TABLET ORAL at 21:17

## 2024-01-07 RX ADMIN — MEROPENEM 1000 MG: 1 INJECTION, POWDER, FOR SOLUTION INTRAVENOUS at 09:31

## 2024-01-07 RX ADMIN — HEPARIN SODIUM 5000 UNITS: 5000 INJECTION INTRAVENOUS; SUBCUTANEOUS at 05:29

## 2024-01-07 RX ADMIN — MEROPENEM 1000 MG: 1 INJECTION, POWDER, FOR SOLUTION INTRAVENOUS at 15:08

## 2024-01-07 RX ADMIN — BACLOFEN 10 MG: 10 TABLET ORAL at 14:51

## 2024-01-07 RX ADMIN — BUMETANIDE 2 MG: 0.25 INJECTION, SOLUTION INTRAMUSCULAR; INTRAVENOUS at 14:51

## 2024-01-07 RX ADMIN — GLYCOPYRROLATE 2 MG: 1 TABLET ORAL at 21:18

## 2024-01-07 RX ADMIN — MEROPENEM 1000 MG: 1 INJECTION, POWDER, FOR SOLUTION INTRAVENOUS at 23:41

## 2024-01-07 RX ADMIN — Medication 10 ML: at 21:23

## 2024-01-07 RX ADMIN — BACLOFEN 10 MG: 10 TABLET ORAL at 05:29

## 2024-01-07 RX ADMIN — SENNOSIDES AND DOCUSATE SODIUM 2 TABLET: 8.6; 5 TABLET ORAL at 09:31

## 2024-01-07 RX ADMIN — Medication 10 ML: at 09:32

## 2024-01-07 RX ADMIN — HEPARIN SODIUM 5000 UNITS: 5000 INJECTION INTRAVENOUS; SUBCUTANEOUS at 21:17

## 2024-01-07 RX ADMIN — CARVEDILOL 6.25 MG: 6.25 TABLET, FILM COATED ORAL at 21:17

## 2024-01-07 RX ADMIN — GLYCOPYRROLATE 2 MG: 1 TABLET ORAL at 15:09

## 2024-01-07 RX ADMIN — SCOPOLAMINE 1 PATCH: 1.5 PATCH, EXTENDED RELEASE TRANSDERMAL at 14:54

## 2024-01-07 RX ADMIN — MEROPENEM 1000 MG: 1 INJECTION, POWDER, FOR SOLUTION INTRAVENOUS at 00:26

## 2024-01-07 RX ADMIN — SENNOSIDES AND DOCUSATE SODIUM 2 TABLET: 8.6; 5 TABLET ORAL at 21:36

## 2024-01-07 RX ADMIN — HEPARIN SODIUM 5000 UNITS: 5000 INJECTION INTRAVENOUS; SUBCUTANEOUS at 14:53

## 2024-01-07 RX ADMIN — Medication 30 ML: at 09:30

## 2024-01-07 RX ADMIN — GLYCOPYRROLATE 2 MG: 1 TABLET ORAL at 11:12

## 2024-01-07 NOTE — PROGRESS NOTES
[facility] Medicine Services  PROGRESS NOTE    Patient Name: Viji Vargas  : 1960  MRN: [id number]    Date of Admission: 2023  Primary Care Physician: Ranjeet Pina MD    Subjective   Subjective     CC:  Follow-up for loculated pleural effusion    HPI:  Patient was seen and examined.  Nonverbal.  Secretions improved.  Ox requirements around 10 L.    Objective   Objective     Vital Signs:   Temp:  [98.6 °F (37 °C)-99.9 °F (37.7 °C)] 98.9 °F (37.2 °C)  Heart Rate:  [76-98] 93  Resp:  [16-18] 18  BP: ()/(51-86) 100/59  Flow (L/min):  [8-15] 8     Physical Exam:  General: Nonverbal.    Head: Atraumatic and normocephalic  Eyes: No Icterus. No pallor  Ears:  Ears appear intact with no abnormalities noted  Throat: Tracheostomy in place with lots of secretions   Neck: Supple, trachea midline  Lungs: Diminished air entry with coarse crackles right lung base.    Heart:  Normal S1 and S2, no murmur, no gallop, No JVD, 3+ lower extremity swelling  Abdomen:  Soft, no tenderness, no organomegaly, normal bowel sounds, no organomegaly  Extremities: pulses equal bilaterally  Skin: No bleeding, bruising or rash, normal skin turgor and elasticity  Neurologic: Cranial nerves appear intact with no evidence of facial asymmetry, normal motor and sensory functions in all 4 extremities  Psych: Completely disoriented    Results Reviewed:  LAB RESULTS:      Lab 24  0504 24  0335 24  0415 24  0512 24  1534 24  0354 24  0642   WBC 9.05 9.92 9.98 10.94*  --  11.92* 11.11*   HEMOGLOBIN 9.8* 10.1* 9.8* 9.4* 8.5* 6.6* 7.1*   HEMATOCRIT 31.4* 31.6* 31.2* 29.1* 26.7* 21.4* 22.4*   PLATELETS 468* 504* 489* 451*  --  426 374   NEUTROS ABS 5.42 6.37 6.68 6.98  --  8.29* 7.37*   IMMATURE GRANS (ABS) 0.11* 0.10* 0.15* 0.23*  --  0.33* 0.45*   LYMPHS ABS 2.16 2.10 1.87 2.32  --  1.81 1.64   MONOS ABS 0.89 0.93* 0.86 0.90  --  0.95* 1.04*   EOS ABS 0.43* 0.38 0.35  0.44*  --  0.49* 0.53*   MCV 95.7 93.8 95.4 93.0  --  99.1* 95.3   CRP  --  8.29*  --   --   --   --  6.55*         Lab 01/06/24 2027 01/06/24  0335 01/05/24  0415 01/04/24  0512 01/03/24  0354 01/02/24  0642   SODIUM  --  136 137 139 137 137   POTASSIUM 5.8* 3.5 3.9 4.4 4.4 4.8   CHLORIDE  --  93* 101 103 101 102   CO2  --  31.0* 26.0 27.0 26.0 27.0   ANION GAP  --  12.0 10.0 9.0 10.0 8.0   BUN  --  30* 17 18 19 17   CREATININE  --  0.55* 0.33* 0.40* 0.40* 0.36*   EGFR  --  101.9 115.2 110.0 110.0 112.8   GLUCOSE  --  131* 88 102* 142* 126*   CALCIUM  --  9.2 9.0 8.8 8.6 8.4*   MAGNESIUM  --  2.4  --  2.7* 2.6*  --    PHOSPHORUS  --  3.8  --   --   --   --          Lab 01/06/24  0335   TOTAL PROTEIN 7.1   ALBUMIN 3.0*   GLOBULIN 4.1   ALT (SGPT) 19   AST (SGOT) 18   BILIRUBIN 0.5   ALK PHOS 85                 Lab 01/03/24  0504   ABO TYPING A   RH TYPING Positive   ANTIBODY SCREEN Negative         Brief Urine Lab Results  (Last result in the past 365 days)        Color   Clarity   Blood   Leuk Est   Nitrite   Protein   CREAT   Urine HCG        12/24/23 1755 Yellow   Cloudy   Moderate (2+)   Small (1+)   Negative   30 mg/dL (1+)                   Microbiology Results Abnormal       Procedure Component Value - Date/Time    Blood Culture - Blood, Arm, Left [[id number]]  (Normal) Collected: 12/24/23 1715    Lab Status: Final result Specimen: Blood from Arm, Left Updated: 12/29/23 2030     Blood Culture No growth at 5 days    Narrative:      Aerobic Bottle Only      Blood Culture - Blood, Arm, Left [[id number]]  (Normal) Collected: 12/24/23 1728    Lab Status: Final result Specimen: Blood from Arm, Left Updated: 12/29/23 2015     Blood Culture No growth at 5 days    Narrative:      Aerobic Bottle Only      Body Fluid Culture - Body Fluid, Pleural Cavity [[id number]] Collected: 12/25/23 1412    Lab Status: Final result Specimen: Body Fluid from Pleural Cavity Updated: 12/28/23 0827     Body Fluid Culture No growth at 3  days     Gram Stain Many (4+) Red blood cells      Few (2+) WBCs seen      No organisms seen    Urine Culture - Urine, Urine, Random Void [[id number]]  (Normal) Collected: 12/24/23 1755    Lab Status: Final result Specimen: Urine, Random Void Updated: 12/25/23 2007     Urine Culture No growth            XR Chest 1 View    Result Date: 1/5/2024  XR CHEST 1 VW Date of Exam: 1/5/2024 12:47 PM EST Indication: Central line insertion. Comparison: 1/3/2024. Findings: There is been placement of a right-sided subclavian line with the tip terminating deep in the right atrium. There is no pneumothorax. There is a stable tracheostomy tube in place. The heart size is normal. There is a small right pleural effusion reduced in size from yesterday with compressive atelectasis and possible airspace disease. There is also a trace left pleural effusion with atelectasis/airspace disease also improved from yesterday. There may be slight pulmonary vascular congestion similar to yesterday.     Impression: Impression: 1. Placement of a right-sided subclavian line with the tip terminating deep in the right atrium. There is no pneumothorax. 2. Bilateral basilar pleural effusions with compressive atelectasis/airspace disease improved from yesterday as described. 3. Questionable slight pulmonary vascular congestion. Electronically Signed: Micheal French MD  1/5/2024 1:43 PM EST  Workstation ID: [id number]     Results for orders placed during the hospital encounter of 03/10/19    Transthoracic Echo Complete With Contrast if Necessary Per Protocol    Interpretation Summary  Technically difficult study  1) Borderline LV size with mild reduction in LV systolic function ( EF is 45 to 50%)  2) Mild to moderate left atrial enlargement  3) Trace MR and TR with normal PA pressures  4) Borderline RV size with normal function  5) Global mild hypokinesis of the LV  6) Moderate calcific plaque along ascending aorta      Current medications:  Scheduled  Meds:baclofen, 10 mg, Per PEG Tube, Q8H  carvedilol, 6.25 mg, Per PEG Tube, Q12H  glycopyrrolate, 2 mg, Per PEG Tube, TID  heparin (porcine), 5,000 Units, Subcutaneous, Q8H  meropenem, 1,000 mg, Intravenous, Q8H  ProSource No Carb, 30 mL, Per PEG Tube, Daily  Scopolamine, 1 patch, Transdermal, Q72H  senna-docusate sodium, 2 tablet, Per G Tube, BID  sodium chloride, 10 mL, Intravenous, Q12H      Continuous Infusions:     PRN Meds:.  acetaminophen    senna-docusate sodium **AND** polyethylene glycol **AND** [DISCONTINUED] bisacodyl **AND** bisacodyl    Calcium Replacement - Follow Nurse / BPA Driven Protocol    dextrose    glucagon (human recombinant)    influenza vaccine    Magnesium Standard Dose Replacement - Follow Nurse / BPA Driven Protocol    Phosphorus Replacement - Follow Nurse / BPA Driven Protocol    Potassium Replacement - Follow Nurse / BPA Driven Protocol    Assessment & Plan   Assessment & Plan     Active Hospital Problems    Diagnosis  POA    **Acute on chronic respiratory failure with hypoxia [J96.21]  Yes    Pleural effusion on right [J90]  Yes    UTI (urinary tract infection) [N39.0]  Yes    Bronchial pneumonia [J18.0]  Yes    Malfunction of percutaneous endoscopic gastrostomy (PEG) tube [K94.23]  Unknown    Pneumonia [J18.9]  Yes    Pneumothorax, right [J93.9]  Yes    Tracheostomy present [Z93.0]  Not Applicable    Anoxic brain injury [G93.1]  Yes      Resolved Hospital Problems   No resolved problems to display.        Brief Hospital Course to date:  63-year-old female with past medical history of cardiac arrest and anoxic brain injury in March 2019 status post tracheostomy and PEG placement, COPD, hypertension was recent admitted to [facility] for respiratory distress.  Patient was found to have large spontaneous pneumothorax on the chest x-ray and chest tube was placed. Subsequently developed large right loculated effusion. Pt was transferred here on 12/24.  Patient underwent  bronchoscopy and cultures are growing Acinetobacter.  14 French chest tube was placed for drainage of pleural effusion.  Patient was given thrombolytics and dornase as well..  Pleural fluid showed complicated parapneumonic effusions and cultures remain negative.  Patient was not deemed candidate for surgical decortication due to her underlying comorbidities.  Is being treated with meropenem.    History of cardiac arrest with anoxic brain injury  Status post trach  Complicated right pleural effusion  Patient  had recent hospitalization to [facility] for spontaneous right pneumothorax.  Underwent chest tube placement and eventually developed infected loculated effusion  And also fluid is consistent with complicated parapneumonic effusion  Underwent 14 French chest tube placement with minimal drainage that was eventually removed  Not candidate for decortication per CTS  Culture growing Acetobacter  Currently on Merrem  ID consulted.  Plan to continue Merrem till 12/27/2024  Continue scopolamine patch and  robinul for oral secretions. Continue pulmonary toilet  As needed diuresis    Essential hypertension  Continue Coreg     Enteral nutrition  PEG tube replaced by GI service  Continue tube feed    Worsening anemia of chronic disease  Status post 1 unit blood transfusion 1/3/24, hemoglobin has been stable      Expected Discharge Location and Transportation: [facility]  Expected Discharge   Expected Discharge Date: 1/10/2024; Expected Discharge Time:      DVT prophylaxis:  Medical and mechanical DVT prophylaxis orders are present.     AM-PAC 6 Clicks Score (PT): 6 (01/05/24 2030)    CODE STATUS:   Code Status and Medical Interventions:   Ordered at: 12/25/23 1114     Level Of Support Discussed With:    Next of Kin (If No Surrogate)     Code Status (Patient has no pulse and is not breathing):    CPR (Attempt to Resuscitate)     Medical Interventions (Patient has pulse or is breathing):    Full Support     Copied text  in this note has been reviewed and is accurate as of 01/07/24.     Viki Littlejohn MD  01/07/24

## 2024-01-07 NOTE — PLAN OF CARE
Goal Outcome Evaluation: Pt did well overnight. O2 sats WDL all night; white frothy sputum excreted from tach. Trach care completed. All VS WDL at this time. POC ongoing. Halina Pandey RN

## 2024-01-08 LAB
ANION GAP SERPL CALCULATED.3IONS-SCNC: 11 MMOL/L (ref 5–15)
BASOPHILS # BLD AUTO: 0.07 10*3/MM3 (ref 0–0.2)
BASOPHILS NFR BLD AUTO: 0.7 % (ref 0–1.5)
BUN SERPL-MCNC: 37 MG/DL (ref 8–23)
BUN/CREAT SERPL: 64.9 (ref 7–25)
CALCIUM SPEC-SCNC: 9.4 MG/DL (ref 8.6–10.5)
CHLORIDE SERPL-SCNC: 94 MMOL/L (ref 98–107)
CO2 SERPL-SCNC: 31 MMOL/L (ref 22–29)
CREAT SERPL-MCNC: 0.57 MG/DL (ref 0.57–1)
DEPRECATED RDW RBC AUTO: 46.2 FL (ref 37–54)
EGFRCR SERPLBLD CKD-EPI 2021: 101 ML/MIN/1.73
EOSINOPHIL # BLD AUTO: 0.68 10*3/MM3 (ref 0–0.4)
EOSINOPHIL NFR BLD AUTO: 7.1 % (ref 0.3–6.2)
ERYTHROCYTE [DISTWIDTH] IN BLOOD BY AUTOMATED COUNT: 13.2 % (ref 12.3–15.4)
GLUCOSE BLDC GLUCOMTR-MCNC: 124 MG/DL (ref 70–130)
GLUCOSE BLDC GLUCOMTR-MCNC: 131 MG/DL (ref 70–130)
GLUCOSE BLDC GLUCOMTR-MCNC: 136 MG/DL (ref 70–130)
GLUCOSE BLDC GLUCOMTR-MCNC: 144 MG/DL (ref 70–130)
GLUCOSE BLDC GLUCOMTR-MCNC: 155 MG/DL (ref 70–130)
GLUCOSE SERPL-MCNC: 115 MG/DL (ref 65–99)
HCT VFR BLD AUTO: 33.9 % (ref 34–46.6)
HGB BLD-MCNC: 10.9 G/DL (ref 12–15.9)
IMM GRANULOCYTES # BLD AUTO: 0.11 10*3/MM3 (ref 0–0.05)
IMM GRANULOCYTES NFR BLD AUTO: 1.1 % (ref 0–0.5)
LYMPHOCYTES # BLD AUTO: 2.65 10*3/MM3 (ref 0.7–3.1)
LYMPHOCYTES NFR BLD AUTO: 27.5 % (ref 19.6–45.3)
MAGNESIUM SERPL-MCNC: 2.5 MG/DL (ref 1.6–2.4)
MCH RBC QN AUTO: 30.8 PG (ref 26.6–33)
MCHC RBC AUTO-ENTMCNC: 32.2 G/DL (ref 31.5–35.7)
MCV RBC AUTO: 95.8 FL (ref 79–97)
MONOCYTES # BLD AUTO: 1.01 10*3/MM3 (ref 0.1–0.9)
MONOCYTES NFR BLD AUTO: 10.5 % (ref 5–12)
NEUTROPHILS NFR BLD AUTO: 5.12 10*3/MM3 (ref 1.7–7)
NEUTROPHILS NFR BLD AUTO: 53.1 % (ref 42.7–76)
NRBC BLD AUTO-RTO: 0 /100 WBC (ref 0–0.2)
PHOSPHATE SERPL-MCNC: 3.4 MG/DL (ref 2.5–4.5)
PLATELET # BLD AUTO: 369 10*3/MM3 (ref 140–450)
PMV BLD AUTO: 10.7 FL (ref 6–12)
POTASSIUM SERPL-SCNC: 4.4 MMOL/L (ref 3.5–5.2)
RBC # BLD AUTO: 3.54 10*6/MM3 (ref 3.77–5.28)
SODIUM SERPL-SCNC: 136 MMOL/L (ref 136–145)
WBC NRBC COR # BLD AUTO: 9.64 10*3/MM3 (ref 3.4–10.8)

## 2024-01-08 PROCEDURE — 25010000002 MEROPENEM PER 100 MG: Performed by: INTERNAL MEDICINE

## 2024-01-08 PROCEDURE — 83735 ASSAY OF MAGNESIUM: CPT | Performed by: INTERNAL MEDICINE

## 2024-01-08 PROCEDURE — G0008 ADMIN INFLUENZA VIRUS VAC: HCPCS | Performed by: INTERNAL MEDICINE

## 2024-01-08 PROCEDURE — 80048 BASIC METABOLIC PNL TOTAL CA: CPT | Performed by: INTERNAL MEDICINE

## 2024-01-08 PROCEDURE — 84100 ASSAY OF PHOSPHORUS: CPT | Performed by: INTERNAL MEDICINE

## 2024-01-08 PROCEDURE — 85025 COMPLETE CBC W/AUTO DIFF WBC: CPT | Performed by: INTERNAL MEDICINE

## 2024-01-08 PROCEDURE — 82948 REAGENT STRIP/BLOOD GLUCOSE: CPT

## 2024-01-08 PROCEDURE — 25010000002 HEPARIN (PORCINE) PER 1000 UNITS: Performed by: INTERNAL MEDICINE

## 2024-01-08 PROCEDURE — 25010000002 INFLUENZA VAC HIGH-DOSE QUAD 0.7 ML SUSPENSION PREFILLED SYRINGE: Performed by: INTERNAL MEDICINE

## 2024-01-08 PROCEDURE — 90662 IIV NO PRSV INCREASED AG IM: CPT | Performed by: INTERNAL MEDICINE

## 2024-01-08 PROCEDURE — 94799 UNLISTED PULMONARY SVC/PX: CPT

## 2024-01-08 PROCEDURE — 99238 HOSP IP/OBS DSCHRG MGMT 30/<: CPT | Performed by: INTERNAL MEDICINE

## 2024-01-08 RX ORDER — TORSEMIDE 20 MG/1
10 TABLET ORAL DAILY
Start: 2024-01-08

## 2024-01-08 RX ORDER — SODIUM CHLORIDE 9 MG/ML
INJECTION, SOLUTION INTRAVENOUS
Status: COMPLETED
Start: 2024-01-08 | End: 2024-01-08

## 2024-01-08 RX ORDER — MIDODRINE HYDROCHLORIDE 5 MG/1
5 TABLET ORAL
Status: DISCONTINUED | OUTPATIENT
Start: 2024-01-09 | End: 2024-01-10 | Stop reason: HOSPADM

## 2024-01-08 RX ORDER — AMINO ACIDS/PROTEIN HYDROLYS 11G-40/45
30 LIQUID IN PACKET (ML) ORAL DAILY
Start: 2024-01-09

## 2024-01-08 RX ORDER — MIDODRINE HYDROCHLORIDE 5 MG/1
5 TABLET ORAL ONCE
Status: COMPLETED | OUTPATIENT
Start: 2024-01-08 | End: 2024-01-08

## 2024-01-08 RX ADMIN — Medication 30 ML: at 09:37

## 2024-01-08 RX ADMIN — INFLUENZA A VIRUS A/VICTORIA/4897/2022 IVR-238 (H1N1) ANTIGEN (FORMALDEHYDE INACTIVATED), INFLUENZA A VIRUS A/DARWIN/9/2021 SAN-010 (H3N2) ANTIGEN (FORMALDEHYDE INACTIVATED), INFLUENZA B VIRUS B/PHUKET/3073/2013 ANTIGEN (FORMALDEHYDE INACTIVATED), AND INFLUENZA B VIRUS B/MICHIGAN/01/2021 ANTIGEN (FORMALDEHYDE INACTIVATED) 0.7 ML: 60; 60; 60; 60 INJECTION, SUSPENSION INTRAMUSCULAR at 16:40

## 2024-01-08 RX ADMIN — HEPARIN SODIUM 5000 UNITS: 5000 INJECTION INTRAVENOUS; SUBCUTANEOUS at 05:29

## 2024-01-08 RX ADMIN — MEROPENEM 1000 MG: 1 INJECTION, POWDER, FOR SOLUTION INTRAVENOUS at 15:25

## 2024-01-08 RX ADMIN — GLYCOPYRROLATE 2 MG: 1 TABLET ORAL at 12:40

## 2024-01-08 RX ADMIN — MEROPENEM 1000 MG: 1 INJECTION, POWDER, FOR SOLUTION INTRAVENOUS at 09:32

## 2024-01-08 RX ADMIN — CARVEDILOL 6.25 MG: 6.25 TABLET, FILM COATED ORAL at 22:35

## 2024-01-08 RX ADMIN — BACLOFEN 10 MG: 10 TABLET ORAL at 22:35

## 2024-01-08 RX ADMIN — BACLOFEN 10 MG: 10 TABLET ORAL at 05:29

## 2024-01-08 RX ADMIN — HEPARIN SODIUM 5000 UNITS: 5000 INJECTION INTRAVENOUS; SUBCUTANEOUS at 22:34

## 2024-01-08 RX ADMIN — GLYCOPYRROLATE 2 MG: 1 TABLET ORAL at 15:25

## 2024-01-08 RX ADMIN — SENNOSIDES AND DOCUSATE SODIUM 2 TABLET: 8.6; 5 TABLET ORAL at 22:35

## 2024-01-08 RX ADMIN — MIDODRINE HYDROCHLORIDE 5 MG: 5 TABLET ORAL at 22:39

## 2024-01-08 RX ADMIN — GLYCOPYRROLATE 2 MG: 1 TABLET ORAL at 22:35

## 2024-01-08 RX ADMIN — HEPARIN SODIUM 5000 UNITS: 5000 INJECTION INTRAVENOUS; SUBCUTANEOUS at 14:45

## 2024-01-08 RX ADMIN — SODIUM CHLORIDE 100 ML: 900 INJECTION INTRAVENOUS at 15:32

## 2024-01-08 RX ADMIN — MEROPENEM 1000 MG: 1 INJECTION, POWDER, FOR SOLUTION INTRAVENOUS at 22:39

## 2024-01-08 RX ADMIN — BACLOFEN 10 MG: 10 TABLET ORAL at 14:45

## 2024-01-08 RX ADMIN — Medication 10 ML: at 22:42

## 2024-01-08 RX ADMIN — SENNOSIDES AND DOCUSATE SODIUM 2 TABLET: 8.6; 5 TABLET ORAL at 09:33

## 2024-01-08 RX ADMIN — Medication 10 ML: at 09:38

## 2024-01-08 NOTE — DISCHARGE PLACEMENT REQUEST
"Case Management  [phone]    Viji Vargas (63 y.o. Female)       Date of Birth   1960    Social Security Number       Address   [address] [place] [address] [address] [facility] [address]    Home Phone   [phone]    MRN   [id number]       Evangelical   Confucianism    Marital Status   Legally                             Admission Date   12/24/23    Admission Type   Elective    Admitting Provider   Viki Littlejohn MD    Attending Provider   Viki Littlejohn MD    Department, Room/Bed   [facility] 6A, N613/1       Discharge Date       Discharge Disposition       Discharge Destination                                 Attending Provider: Viki Littlejohn MD    Allergies: No Known Allergies    Isolation: Contact   Infection: MDR Acinetobacter (12/27/23)   Code Status: CPR    Ht: 162.6 cm (64.02\")   Wt: 68 kg (150 lb)    Admission Cmt: None   Principal Problem: Acute on chronic respiratory failure with hypoxia [J96.21]                   Active Insurance as of 12/24/2023       Primary Coverage       Payor Plan Insurance Group Employer/Plan Group    [place]        Payor Plan Address Payor Plan Phone Number Payor Plan Fax Number Effective Dates    [address] [phone]  3/10/2019 - None Entered    [place] [address]         Subscriber Name Subscriber Birth Date Member ID       VIJI VARGAS 1960 [id number]                     Emergency Contacts        (Rel.) Home Phone Work Phone Mobile Phone    KENIA (POA)WILTON (Daughter) [phone] -- --    timothy esposito (Grandchild) [phone] -- [phone]                 History & Physical        Oumar Motta MD at 12/24/23 1548            Intensive Care Admission Note     Acute on chronic respiratory failure with hypoxia    History of Present Illness     Viji Vargas is a 63 year-old female with history of cardiac arrest and anoxic brain injury in March 2019 s/p Trach " and PEG placement, COPD, HTN and history of MDRs including ESBL Klebsiella that was originally admitted to [facility] through ED after being brought in by EMS on 12/22/23 for respiratory distress. She was hypoxic on 4 L NC trach collar and febrile. Noted large spontaneous pneumothorax on CXR with subsequent insertion of chest tube by ED physician. Cultures drawn, with urine culture growing gram negative bacilli and one blood culture bottle with staph thought to be a contaminate. She has been started on Vanc and Zosyn for broad spectrum coverage. Vasopressor support weaned off. She has continued to only require trach collar for supplemental O2.    Patient has developed right pleural effusion that is not draining from chest tube, concerning for loculated effusion. OSH provider felt the patient would benefit from bronchoscopy and Pulmonology was not available at OSH. Patient is being transferred for higher level of care.     Time spent: 5 minutes  Electronically signed by FÉLIX Diaz, 12/24/23, 4:00 PM EST.    The patient has arrived from the outside hospital to our facility.  Bronchoscopy has been performed per my procedural note.  Patient is arousable but not interactive.  Seems to be saturating well on trach collar.  Hemodynamics are currently stable.    Problem List, Surgical History, Family, Social History, and ROS     Patient Active Problem List    Diagnosis     *Acute on chronic respiratory failure with hypoxia [J96.21]     Pneumonia, unspecified organism [J18.9]     UTI (urinary tract infection) [N39.0]     Pneumonia [J18.9]     Pneumothorax, right [J93.9]     Hypoxia [R09.02]     Tracheostomy present [Z93.0]     Pneumonia of left upper lobe due to infectious organism [J18.9]     Shock, septic [A41.9, R65.21]     Healthcare-associated pneumonia [J18.9]     COPD exacerbation [J44.1]     Sepsis [A41.9]     Cardiopulmonary arrest with successful resuscitation [I46.9]     Acute respiratory failure with  hypoxia and hypercapnia [J96.01, J96.02]     Other pneumothorax [J93.83]     COPD with acute exacerbation [J44.1]     Metabolic acidosis [E87.20]     Hyperglycemia [R73.9]     Anoxic brain injury [G93.1]     Oropharyngeal dysphagia [R13.12]      Past Surgical History:   Procedure Laterality Date    HYSTERECTOMY      Partial     TRACHEOSTOMY AND PEG TUBE INSERTION N/A 3/20/2019    Procedure: TRACHEOSTOMY AND PERCUTANEOUS ENDOSCOPIC GASTROSTOMY TUBE INSERTION;  Surgeon: Nadira Pandey MD;  Location: [facility];  Service: General       No Known Allergies  Current Facility-Administered Medications on File Prior to Encounter   Medication    [COMPLETED] potassium chloride (KLOR-CON) packet 40 mEq    [DISCONTINUED] acetaminophen (TYLENOL) 160 MG/5ML oral solution 650 mg    [DISCONTINUED] acetaminophen (TYLENOL) suppository 650 mg    [DISCONTINUED] acetaminophen (TYLENOL) tablet 650 mg    [DISCONTINUED] albuterol (PROVENTIL) nebulizer solution 0.083% 2.5 mg/3mL    [DISCONTINUED] ALPRAZolam (XANAX) tablet 0.5 mg    [DISCONTINUED] baclofen (LIORESAL) tablet 10 mg    [DISCONTINUED] budesonide (PULMICORT) nebulizer solution 0.5 mg    [DISCONTINUED] carvedilol (COREG) tablet 12.5 mg    [DISCONTINUED] cetirizine (zyrTEC) tablet 10 mg    [DISCONTINUED] docusate sodium (COLACE) liquid 300 mg    [DISCONTINUED] Enoxaparin Sodium (LOVENOX) syringe 40 mg    [DISCONTINUED] glycopyrrolate (ROBINUL) tablet 2 mg    [DISCONTINUED] ipratropium-albuterol (DUO-NEB) nebulizer solution 3 mL    [DISCONTINUED] lansoprazole (PREVACID SOLUTAB) disintegrating tablet Tablet Delayed Release Dispersible 30 mg    [DISCONTINUED] LORazepam (ATIVAN) injection 1 mg    [DISCONTINUED] Magnesium Standard Dose Replacement - Follow Nurse / BPA Driven Protocol    [DISCONTINUED] melatonin tablet 5 mg    [DISCONTINUED] montelukast (SINGULAIR) tablet 10 mg    [DISCONTINUED] nitroglycerin (NITROSTAT) SL tablet 0.4 mg    [DISCONTINUED] norepinephrine (LEVOPHED) 8 mg  in 250mL D5W infusion    [DISCONTINUED] ondansetron (ZOFRAN) injection 4 mg    [DISCONTINUED] Pharmacy to Dose enoxaparin (LOVENOX)    [DISCONTINUED] Pharmacy to dose vancomycin    [DISCONTINUED] Pharmacy to Dose Zosyn    [DISCONTINUED] piperacillin-tazobactam (ZOSYN) 4.5 g in iso-osmotic dextrose 100 mL IVPB (premix)    [DISCONTINUED] polyethylene glycol (MIRALAX) packet 17 g    [DISCONTINUED] Potassium Replacement - Follow Nurse / BPA Driven Protocol    [DISCONTINUED] scopolamine patch 1 mg/72 hr    [DISCONTINUED] sennosides (SENOKOT) 8.8 MG/5ML syrup 10 mL    [DISCONTINUED] sodium chloride 0.9 % flush 10 mL    [DISCONTINUED] sodium chloride 0.9 % flush 10 mL    [DISCONTINUED] sodium chloride 0.9 % flush 10 mL    [DISCONTINUED] sodium chloride 0.9 % flush 10 mL    [DISCONTINUED] sodium chloride 0.9 % flush 10 mL    [DISCONTINUED] sodium chloride 0.9 % flush 10 mL    [DISCONTINUED] sodium chloride 0.9 % flush 10 mL    [DISCONTINUED] sodium chloride 0.9 % flush 20 mL    [DISCONTINUED] sodium chloride 0.9 % infusion 40 mL    [DISCONTINUED] sodium chloride 0.9 % infusion 40 mL    [DISCONTINUED] sodium chloride 0.9 % infusion    [DISCONTINUED] vancomycin 1000 mg/250 mL 0.9% NS (vial-mate)     Current Outpatient Medications on File Prior to Encounter   Medication Sig    acetaminophen (TYLENOL) 325 MG tablet Administer 2 tablets per G tube Every 4 (Four) Hours As Needed for Mild Pain.    albuterol (PROVENTIL) (2.5 MG/3ML) 0.083% nebulizer solution Take 2.5 mg by nebulization Every 6 (Six) Hours As Needed for Shortness of Air.    ALPRAZolam (XANAX) 0.5 MG tablet Administer 1 tablet per G tube 2 (Two) Times a Day.    baclofen (LIORESAL) 10 MG tablet Administer 1 tablet per G tube Every 8 (Eight) Hours.    budesonide (PULMICORT) 0.5 MG/2ML nebulizer solution Take 2 mL by nebulization 2 (Two) Times a Day.    carvedilol (COREG) 12.5 MG tablet Administer 1 tablet per G tube 2 (Two) Times a Day.    cetirizine (zyrTEC) 10 MG  tablet Administer 1 tablet per G tube Daily.    docusate sodium (COLACE) 50 mg/5 mL liquid Take 30 mL by mouth 2 (Two) Times a Day.    [START ON 12/25/2023] Enoxaparin Sodium (LOVENOX) 40 MG/0.4ML solution prefilled syringe syringe Inject 0.4 mL under the skin into the appropriate area as directed Daily. Indications: Prevention of Unwanted Clot in Veins    glycopyrrolate (ROBINUL) 2 MG tablet Take 1 tablet by mouth 3 (Three) Times a Day.    hyoscyamine (ANASPAZ,LEVSIN) 0.125 MG tablet Administer 1 tablet per G tube 3 (Three) Times a Day.    ipratropium-albuterol (DUO-NEB) 0.5-2.5 mg/3 ml nebulizer Take 3 mL by nebulization 4 (Four) Times a Day. Takes at [id number]    LORazepam (ATIVAN) 2 MG/ML injection Infuse 0.5 mL into a venous catheter Every 4 (Four) Hours As Needed for Anxiety for up to 8 days.    montelukast (SINGULAIR) 10 MG tablet Administer 1 tablet per G tube Every Night.    Multiple Vitamins-Minerals (MULTIVITAMIN WITH IRON-MINERALS) liquid Take 15 mL by mouth Daily.    norepinephrine-dextrose (LEVOPHED) 8-5 MG/250ML-% solution infusion Infuse 1.394-20.91 mcg/min into a venous catheter Dose Adjusted By Provider As Needed.    omeprazole (priLOSEC) 20 MG capsule 2 capsules 2 (Two) Times a Day.    Petrolatum-Zinc Oxide 49-15 % ointment Apply 1 application topically 2 (Two) Times a Day As Needed (Excoriation).    piperacillin-tazobactam (ZOSYN) 4-0.5 GM/100ML solution IVPB Infuse 100 mL into a venous catheter Every 8 (Eight) Hours. Infuse over 30 minutes    polyethylene glycol (MIRALAX) 17 g packet Take 17 g by mouth 2 (Two) Times a Day.    Scopolamine 1 MG/3DAYS patch Place 1 patch on the skin as directed by provider Every 72 (Seventy-Two) Hours.    senna 8.6 MG tablet 2 tablets by Per PEG Tube route Every Night.    vancomycin 1000 mg/250 mL 0.9% NS (vial-mate) Infuse 250 mL into a venous catheter Every 12 (Twelve) Hours for 6 doses. Indications: Infection with Dysregulated Inflammatory Response,  Pneumonia     MEDICATION LIST AND ALLERGIES REVIEWED.    No family history on file.  Social History     Tobacco Use    Smoking status: Former     Packs/day: 1     Types: Electronic Cigarette, Cigarettes    Smokeless tobacco: Never   Vaping Use    Vaping Use: Unknown   Substance Use Topics    Alcohol use: Not Currently     Comment: wine     Drug use: No         Review of Systems  Unable to obtain review of systems secondary to underlying anoxic brain injury    Physical Exam and Clinical Information   There were no vitals taken for this visit.  Physical Exam  Constitutional:       Comments: Awake, roving eyes, noninteractive.   HENT:      Head:      Comments: Shiley tracheostomy in place     Nose: Nose normal.      Mouth/Throat:      Mouth: Mucous membranes are moist.   Eyes:      General: No scleral icterus.     Extraocular Movements: Extraocular movements intact.      Pupils: Pupils are equal, round, and reactive to light.   Cardiovascular:      Heart sounds: No murmur heard.  Pulmonary:      Effort: Pulmonary effort is normal. No respiratory distress.      Breath sounds: No stridor. Rhonchi present. No wheezing or rales.   Abdominal:      General: Abdomen is flat. Bowel sounds are normal. There is no distension.      Comments: PEG in place   Musculoskeletal:      Cervical back: Normal range of motion.      Right lower leg: No edema.      Left lower leg: No edema.      Comments: Right femoral triple-lumen central venous catheter   Skin:     General: Skin is warm.   Neurological:      Comments: Not interactive.  Some withdrawal from noxious stimuli.  Positive gag and cough.         Results from last 7 days   Lab Units 12/24/23  0355 12/23/23  0430 12/22/23  0820   WBC 10*3/mm3 13.36* 21.72* 18.48*   HEMOGLOBIN g/dL 8.2* 9.4* 11.3*   PLATELETS 10*3/mm3 211 207 276     Results from last 7 days   Lab Units 12/24/23  0355 12/23/23  0628 12/22/23  0820   SODIUM mmol/L 142 138 137   POTASSIUM mmol/L 3.4* 3.9 4.5   CO2  mmol/L 23.6 23.7 25.8   BUN mg/dL 10 11 19   CREATININE mg/dL 0.45* 0.46* 0.59   GLUCOSE mg/dL 182* 144* 165*     Estimated Creatinine Clearance: 120 mL/min (A) (by C-G formula based on SCr of 0.45 mg/dL (L)).      Results from last 7 days   Lab Units 12/22/23  0846   PH, ARTERIAL pH units 7.468*   PCO2, ARTERIAL mm Hg 39.7   PO2 ART mm Hg 130.0*     Lab Results   Component Value Date    LACTATE 1.3 12/22/2023          I reviewed the patient's results and images.     Impression     Acute on chronic respiratory failure with hypoxia    Anoxic brain injury    Tracheostomy present    Pneumonia    Pneumothorax, right    UTI (urinary tract infection)    Plan/Recommendations     Patient did have a large amount of mucus in the right side.  This was suctioned without difficulty with bronchoscopy.  Is unclear to me whether has been rapid development of what appears to be a loculated effusion on the right.  Will maintain chest tube to current suction.  CT scan of the chest without contrast will be performed to fully evaluate the chest.  Continue with current antimicrobial therapy and we will follow-up cultures including the new culture from the bronchoscopy.  Maintain trach collars for now.  Nutritional support will begin as early as tomorrow.  I did discuss the patient's case with her daughter who is the power of .  I updated her on all of our plans.  Patient will remain a full code per her wishes.    Oumar Motta MD, [facility]  Pulmonary and Critical Care Medicine  12/24/23 15:48 EST       CC: Ranjeet Pina MD     Electronically signed by Oumar Motta MD at 12/24/23 1617        Inpatient Case Management  Consult [[id number]] (Order [id number])  Order  Date: 1/8/2024 Department: [address] Ordering/Authorizing: Walter Sanchez MD     Standing Order Information    Remaining Occurrences Interval Last Released     0/1 Once 1/8/2024              Order History  Inpatient  Date/Time  Action Taken User Additional Information   01/08/24 1030 Sign Fran Davis MD    01/08/24 1031 Release Instance Fran Davis MD (auto-released) Released Order:[id number]     Order Details    Frequency Duration Priority Order Class   Once 1  occurrence Routine Hospital Performed       Comments    Please arrange for skilled facility IV antibiotics with meropenem 1 g IV every 8 hours to continue until 1/27/2024.  This is for complicated right pleural effusion and Acinetobacter pneumonia.  Check CBC, CMP, CRP weekly while on IV antibiotics.  Fax orders to [phone], call [phone] with final arrangements.  Arrange for follow-up with me in 2 weeks postdischarge.  Weekly PICC dressing changes.              Order Questions    Question Answer   Reason for Consult abx              Source Order Set / Preference List    Preference List   [facility] ([id number]) [facility]REFER[facility] IP [[id number]]                 Consult Order Info    ID Description Priority Start Date Start Time   [id number] Inpatient Case Management  Consult Routine 01/08/2024 10:31 AM   Provider Specialty Referred to   ______________________________________ _____________________________________                                   Reprint Order Requisition    Inpatient Case Management  Consult (Order #[id number]) on 1/8/24       Encounter    View Encounter                  Order Provider Info        Office phone Pager E-mail   Ordering User  Fran Davis MD  [phone] -- --   Authorizing Provider  Fran Davis MD  [phone] -- --   Attending Provider  Viki Littlejohn MD  [phone] -- --     Tracking Reports    Cosign Tracking Order Transmittal Tracking

## 2024-01-08 NOTE — DISCHARGE SUMMARY
[facility] Medicine Services  DISCHARGE SUMMARY    Patient Name: Viji Vargas  : 1960  MRN: [id number]    Date of Admission: 2023  3:39 PM  Date of Discharge:  2024  Primary Care Physician: Ranjeet Pina MD    Consults       Date and Time Order Name Status Description    2024  8:05 AM Inpatient Infectious Diseases Consult Completed     2024  9:08 AM Inpatient Gastroenterology Consult Completed             Hospital Course     Presenting Problem:     Active Hospital Problems    Diagnosis  POA    **Acute on chronic respiratory failure with hypoxia [J96.21]  Yes    Pleural effusion on right [J90]  Yes    UTI (urinary tract infection) [N39.0]  Yes    Bronchial pneumonia [J18.0]  Yes    Malfunction of percutaneous endoscopic gastrostomy (PEG) tube [K94.23]  Unknown    Pneumonia [J18.9]  Yes    Pneumothorax, right [J93.9]  Yes    Tracheostomy present [Z93.0]  Not Applicable    Anoxic brain injury [G93.1]  Yes      Resolved Hospital Problems   No resolved problems to display.          Hospital Course:  63-year-old female with past medical history of cardiac arrest and anoxic brain injury in 2019 status post tracheostomy and PEG placement, COPD, hypertension was recent admitted to [facility] for respiratory distress.  Patient was found to have large spontaneous pneumothorax on the chest x-ray and chest tube was placed. Subsequently developed large right loculated effusion. Pt was transferred here on .  Patient underwent bronchoscopy and cultures are growing Acinetobacter.  14 Lao chest tube was placed for drainage of pleural effusion.  Patient was given thrombolytics and dornase as well..  Pleural fluid showed complicated parapneumonic effusions and cultures remain negative.  Patient was not deemed candidate for surgical decortication due to her underlying comorbidities.  She was started on IV meropenem.  Seen by ID with plan to continue IV  antibiotic therapy till 12/27/2024.  Improving and will be discharged back to long-term facility     History of cardiac arrest with anoxic brain injury  Status post trach  Complicated right pleural effusion with Acinetobacter  Patient  had recent hospitalization to [facility] for spontaneous right pneumothorax.  Underwent chest tube placement and eventually developed infected loculated effusion  And also fluid is consistent with complicated parapneumonic effusion  Underwent 14 French chest tube placement with minimal drainage that was eventually removed  Not candidate for decortication per CTS  Culture growing actinobacter  Currently on Merrem  ID consulted.  Plan to continue Merrem till 12/27/2024  Continue scopolamine patch and  robinul for oral secretions. Continue pulmonary toilet  As needed diuresis     Essential hypertension  Continue Coreg      Enteral nutrition  PEG tube replaced by GI service  Continue tube feed     Worsening anemia of chronic disease  Status post 1 unit blood transfusion 1/3/24, hemoglobin has been stable    Discharge Follow Up Recommendations for outpatient labs/diagnostics:  Facility physician 1 to 2 days  Dr. Sanchez in 2 weeks    Day of Discharge     HPI:   Patient seen and examined in the morning.  Comfortable.  Oxygen requirement stable at 4 to 5 L and satting 99%.  Adequate urine output.  No acute events per nursing report    Review of Systems  Unable to obtain review of systems    Vital Signs:   Temp:  [97.5 °F (36.4 °C)-98.8 °F (37.1 °C)] 97.9 °F (36.6 °C)  Heart Rate:  [] 95  Resp:  [16-18] 18  BP: ()/(34-72) 111/67  Flow (L/min):  [5-10] 10      Physical Exam:  General: Nonverbal.    Head: Atraumatic and normocephalic  Eyes: No Icterus. No pallor  Ears:  Ears appear intact with no abnormalities noted  Throat: Tracheostomy in place with lots of secretions   Neck: Supple, trachea midline  Lungs: Diminished air entry with coarse crackles right lung base.     Heart:  Normal S1 and S2, no murmur, no gallop, No JVD, 3+ lower extremity swelling  Abdomen:  Soft, no tenderness, no organomegaly, normal bowel sounds, no organomegaly  Extremities: pulses equal bilaterally  Skin: No bleeding, bruising or rash, normal skin turgor and elasticity  Neurologic: Cranial nerves appear intact with no evidence of facial asymmetry, normal motor and sensory functions in all 4 extremities  Psych: Completely disoriented        Pertinent  and/or Most Recent Results     LAB RESULTS:      Lab 01/09/24  0716 01/08/24  0438 01/07/24  0504 01/06/24  0335 01/05/24  0415   WBC 11.09* 9.64 9.05 9.92 9.98   HEMOGLOBIN 9.6* 10.9* 9.8* 10.1* 9.8*   HEMATOCRIT 30.3* 33.9* 31.4* 31.6* 31.2*   PLATELETS 366 369 468* 504* 489*   NEUTROS ABS 8.18* 5.12 5.42 6.37 6.68   IMMATURE GRANS (ABS) 0.07* 0.11* 0.11* 0.10* 0.15*   LYMPHS ABS 1.74 2.65 2.16 2.10 1.87   MONOS ABS 0.76 1.01* 0.89 0.93* 0.86   EOS ABS 0.29 0.68* 0.43* 0.38 0.35   MCV 93.2 95.8 95.7 93.8 95.4   CRP  --   --  3.70* 8.29*  --          Lab 01/09/24  0716 01/08/24  0439 01/07/24  0504 01/06/24 2027 01/06/24  0335 01/05/24  0415 01/04/24  0512 01/03/24  0354   SODIUM 140 136 134*  --  136 137 139 137   POTASSIUM 4.7 4.4 4.6 5.8* 3.5 3.9 4.4 4.4   CHLORIDE 103 94* 99  --  93* 101 103 101   CO2 29.0 31.0* 27.0  --  31.0* 26.0 27.0 26.0   ANION GAP 8.0 11.0 8.0  --  12.0 10.0 9.0 10.0   BUN 25* 37* 36*  --  30* 17 18 19   CREATININE 0.43* 0.57 0.52*  --  0.55* 0.33* 0.40* 0.40*   EGFR 108.1 101.0 103.3  --  101.9 115.2 110.0 110.0   GLUCOSE 137* 115* 125*  --  131* 88 102* 142*   CALCIUM 8.7 9.4 9.2  --  9.2 9.0 8.8 8.6   MAGNESIUM  --  2.5* 2.7*  --  2.4  --  2.7* 2.6*   PHOSPHORUS  --  3.4 2.8  --  3.8  --   --   --          Lab 01/09/24  0716 01/06/24  0335   TOTAL PROTEIN 6.1 7.1   ALBUMIN 3.0* 3.0*   GLOBULIN 3.1 4.1   ALT (SGPT) 23 19   AST (SGOT) 20 18   BILIRUBIN 0.3 0.5   ALK PHOS 83 85                 Lab 01/03/24  0504   ABO TYPING A    RH TYPING Positive   ANTIBODY SCREEN Negative         Brief Urine Lab Results  (Last result in the past 365 days)        Color   Clarity   Blood   Leuk Est   Nitrite   Protein   CREAT   Urine HCG        12/24/23 1755 Yellow   Cloudy   Moderate (2+)   Small (1+)   Negative   30 mg/dL (1+)                 Microbiology Results (last 10 days)       ** No results found for the last 240 hours. **            XR Chest 1 View    Result Date: 1/5/2024  XR CHEST 1 VW Date of Exam: 1/5/2024 12:47 PM EST Indication: Central line insertion. Comparison: 1/3/2024. Findings: There is been placement of a right-sided subclavian line with the tip terminating deep in the right atrium. There is no pneumothorax. There is a stable tracheostomy tube in place. The heart size is normal. There is a small right pleural effusion reduced in size from yesterday with compressive atelectasis and possible airspace disease. There is also a trace left pleural effusion with atelectasis/airspace disease also improved from yesterday. There may be slight pulmonary vascular congestion similar to yesterday.     Impression: 1. Placement of a right-sided subclavian line with the tip terminating deep in the right atrium. There is no pneumothorax. 2. Bilateral basilar pleural effusions with compressive atelectasis/airspace disease improved from yesterday as described. 3. Questionable slight pulmonary vascular congestion. Electronically Signed: Micheal French MD  1/5/2024 1:43 PM EST  Workstation ID: [id number]    XR Chest 1 View    Result Date: 1/3/2024  XR CHEST 1 VW Date of Exam: 1/3/2024 2:20 AM EST Indication: Pneumonia, pleural effusion. Comparison: 1/2/2024. Findings: It appears that the right basilar pigtail chest tube has been removed. There is a moderate sized right pleural effusion with compressive atelectasis/airspace disease worsened from yesterday. There is a small left pleural effusion with compressive atelectasis/airspace disease similar to  yesterday. The heart size is normal. There may be some underlying pulmonary vascular congestion. There is no pneumothorax. There is a stable tracheostomy tube in place.     Impression: 1. It appears that the right basilar pigtail chest tube has been removed. There is a moderate size right pleural effusion with compressive atelectasis/airspace disease worsened from yesterday. 2. Small left pleural effusion with compressive atelectasis/airspace disease similar to yesterday. 3. Questionable mild pulmonary vascular congestion. Electronically Signed: Micheal French MD  1/3/2024 7:58 AM EST  Workstation ID: [id number]    XR Chest 1 View    Result Date: 1/2/2024  XR CHEST 1 VW Date of Exam: 1/2/2024 3:35 AM EST Indication: Pleural effusion Comparison: 1/1/2024 Findings: Tracheostomy stable above the joby. Improved aeration of the lung bases likely resolving atelectasis. Stable small bilateral pleural effusions right greater than left. Chest tube overlying the right lung base. No pneumothorax.     Impression: 1. Improved aeration of the lung bases likely resolving atelectasis. 2. Stable small bilateral pleural effusions right greater than left. 3. Stable chest tube at the right lung base. No pneumothorax. 4. Stable tracheostomy above the joby. Electronically Signed: Juan Daniel Crocker MD  1/2/2024 7:15 AM EST  Workstation ID: [id number]    XR Chest 1 View    Result Date: 1/1/2024  XR CHEST 1 VW Date of Exam: 1/1/2024 4:12 AM EST Indication: Right effusion Comparison: 12/31/2023 Findings: Tracheostomy tube is noted. Right pleural drain remains in place. Right basilar effusion is slightly improved. Lungs elsewhere appear stable. No pneumothorax is seen.     Impression: Mildly improved right pleural effusion, otherwise stable chest radiograph. Electronically Signed: Chemo Mckeon MD  1/1/2024 8:52 AM EST  Workstation ID: [id number]    XR Chest 1 View    Result Date: 12/31/2023  XR CHEST 1 VW Date of Exam: 12/31/2023 3:45 AM EST Indication:  Right effusion Comparison: Chest x-ray 12/30/2023 Findings: Stable medical support devices. Persistent low lung volumes and accentuated cardiomediastinal silhouette which appears grossly unchanged. Similar linear opacity at the left lower lung compatible with atelectasis. Similar moderate right-sided pleural effusion with adjacent hazy opacities throughout the right lung base which may reflect associated atelectasis. There appears to be mild improved aeration at the right lung base from prior exam. No pneumothorax.     Impression: Mild improved aeration at the right lung base without significant interval change otherwise. Electronically Signed: Harmeet Gibson MD  12/31/2023 8:43 AM EST  Workstation ID: [id number]    XR Chest 1 View    Result Date: 12/30/2023  XR CHEST 1 VW Date of Exam: 12/30/2023 1:14 AM EST Indication: Right effusion Comparison: Chest radiograph dated 12/29/2023 Findings: There is a tracheostomy tube in expected position. There is a smallbore right-sided chest tube in unchanged position. There is a stable moderate right-sided pleural effusion and a small left-sided pleural effusion. There is unchanged bandlike atelectasis  within the left lung base. There is no pneumothorax. The cardiomediastinal silhouette is within normal limits. There is aortic arch atherosclerotic calcification. There are degenerative changes of the thoracic spine.     Impression: 1. Right-sided chest tube in expected position with moderate right-sided pleural effusion. Small left-sided pleural effusion. 2. Tracheostomy tube in expected position.. Electronically Signed: Mario Alcaraz  12/30/2023 8:07 AM EST  Workstation ID: [id number]             Results for orders placed during the hospital encounter of 03/10/19    Transthoracic Echo Complete With Contrast if Necessary Per Protocol    Interpretation Summary  Technically difficult study  1) Borderline LV size with mild reduction in LV systolic function ( EF is 45 to 50%)  2)  Mild to moderate left atrial enlargement  3) Trace MR and TR with normal PA pressures  4) Borderline RV size with normal function  5) Global mild hypokinesis of the LV  6) Moderate calcific plaque along ascending aorta      Plan for Follow-up of Pending Labs/Results:     Discharge Details        Discharge Medications        New Medications        Instructions Start Date   meropenem (MERREM) 1000 mg IVPB in 100ml NS (MBP)   1,000 mg, Intravenous, Every 8 Hours      ProSource No Carb liquid   30 mL, Per PEG Tube, Daily      torsemide 20 MG tablet  Commonly known as: DEMADEX   10 mg, Oral, Daily             Changes to Medications        Instructions Start Date   carvedilol 12.5 MG tablet  Commonly known as: COREG  What changed: how much to take   6.25 mg, Per G Tube, 2 Times Daily             Continue These Medications        Instructions Start Date   baclofen 10 MG tablet  Commonly known as: LIORESAL   10 mg, Per G Tube, Every 8 Hours      budesonide 0.5 MG/2ML nebulizer solution  Commonly known as: PULMICORT   0.5 mg, Nebulization, 2 Times Daily - RT      cetirizine 10 MG tablet  Commonly known as: zyrTEC   10 mg, Per G Tube, Daily      docusate sodium 50 mg/5 mL liquid  Commonly known as: COLACE   300 mg, Per G Tube, 2 Times Daily      famotidine 40 mg/5 mL suspension  Commonly known as: PEPCID   20 mg, Per G Tube, 2 Times Daily      FIRST-MOUTHWASH BLM MT   2 mL, Mouth/Throat, 2 Times Daily, To R side of mouth      glycopyrrolate 2 MG tablet  Commonly known as: ROBINUL   2 mg, Per G Tube, 3 Times Daily      hyoscyamine 0.125 MG tablet  Commonly known as: ANASPAZ,LEVSIN   0.125 mg, Per G Tube, 3 Times Daily      ipratropium-albuterol 0.5-2.5 mg/3 ml nebulizer  Commonly known as: DUO-NEB   3 mL, Nebulization, 4 Times Daily - RT, Takes at [id number]      MiraLax 17 g packet  Generic drug: polyethylene glycol   17 g, 2 Times Daily      montelukast 10 MG tablet  Commonly known as: SINGULAIR   10 mg, Per G Tube,  Nightly      multivitamin with iron-minerals (CENTRUM) liquid   15 mL, Per G Tube, Daily      Petrolatum-Zinc Oxide 49-15 % ointment   1 application , Apply externally, 2 Times Daily PRN      Scopolamine 1 MG/3DAYS patch   1 patch, Transdermal, Every 72 Hours      senna 8.6 MG tablet  Commonly known as: SENOKOT   2 tablets, Per PEG Tube, Nightly               No Known Allergies      Discharge Disposition:  Skilled Nursing Facility (DC - External)    Diet:  Hospital:  Diet Order   Procedures    NPO Diet NPO Type: Strict NPO       Diet Instructions       Diet: Tube Feeding; Continuous; Isosource 1.5 @ 45 mm/h      Discharge Diet: Tube Feeding    Feeding Type: Continuous    Formula & Rate: Isosource 1.5 @ 45 mm/h             Activity:  Activity Instructions       Bedrest With Bathroom Privileges              Restrictions or Other Recommendations:  None        CODE STATUS:    Code Status and Medical Interventions:   Ordered at: 12/25/23 1114     Level Of Support Discussed With:    Next of Kin (If No Surrogate)     Code Status (Patient has no pulse and is not breathing):    CPR (Attempt to Resuscitate)     Medical Interventions (Patient has pulse or is breathing):    Full Support       No future appointments.                Viki Littlejohn MD  01/09/24      Time Spent on Discharge:  I spent  28  minutes on this discharge activity which included: face-to-face encounter with the patient, reviewing the data in the system, coordination of the care with the nursing staff as well as consultants, documentation, and entering orders.

## 2024-01-08 NOTE — DISCHARGE PLACEMENT REQUEST
"Case Management  [phone]    Viji Murray (63 y.o. Female)       Date of Birth   1960    Social Security Number       Address   [address] [place] [address] [address] [place] [id number]    Home Phone   [phone]    MRN   [id number]       Anabaptist   Pentecostalism    Marital Status   Legally                             Admission Date   23    Admission Type   Elective    Admitting Provider   Viki Littlejohn MD    Attending Provider   Viki Littlejohn MD    Department, Room/Bed   Psychiatric 6A, N613/1       Discharge Date       Discharge Disposition   Skilled Nursing Facility (DC - External)    Discharge Destination                                 Attending Provider: Viki Littlejohn MD    Allergies: No Known Allergies    Isolation: Contact   Infection: MDR Acinetobacter (23)   Code Status: CPR    Ht: 162.6 cm (64.02\")   Wt: 68 kg (150 lb)    Admission Cmt: None   Principal Problem: Acute on chronic respiratory failure with hypoxia [J96.21]                   Active Insurance as of 2023       Primary Coverage       Payor Plan Insurance Group Employer/Plan Group    [place]        Payor Plan Address Payor Plan Phone Number Payor Plan Fax Number Effective Dates    [phone]  3/10/2019 - None Entered    [facility] [id number]         Subscriber Name Subscriber Birth Date Member ID       VIJI MURRAY 1960 [id number]                     Emergency Contacts        (Rel.) Home Phone Work Phone Mobile Phone    KENIA (POA)WILTON (Daughter) [phone] -- --    timothy esposito (Grandchild) [phone] -- [phone]                 Discharge Summary        Viki Littlejohn MD at 24 1417              [facility]  DISCHARGE SUMMARY    Patient Name: Viji Murray  : 1960  MRN: [id number]    Date of Admission: 2023  3:39 PM  Date of " Discharge:  1/8/2024  Primary Care Physician: Ranjeet Pina MD    Consults       Date and Time Order Name Status Description    1/5/2024  8:05 AM Inpatient Infectious Diseases Consult Completed     1/4/2024  9:08 AM Inpatient Gastroenterology Consult Completed             Hospital Course     Presenting Problem:     Active Hospital Problems    Diagnosis  POA    **Acute on chronic respiratory failure with hypoxia [J96.21]  Yes    Pleural effusion on right [J90]  Yes    UTI (urinary tract infection) [N39.0]  Yes    Bronchial pneumonia [J18.0]  Yes    Malfunction of percutaneous endoscopic gastrostomy (PEG) tube [K94.23]  Unknown    Pneumonia [J18.9]  Yes    Pneumothorax, right [J93.9]  Yes    Tracheostomy present [Z93.0]  Not Applicable    Anoxic brain injury [G93.1]  Yes      Resolved Hospital Problems   No resolved problems to display.          Hospital Course:  63-year-old female with past medical history of cardiac arrest and anoxic brain injury in March 2019 status post tracheostomy and PEG placement, COPD, hypertension was recent admitted to [facility] for respiratory distress.  Patient was found to have large spontaneous pneumothorax on the chest x-ray and chest tube was placed. Subsequently developed large right loculated effusion. Pt was transferred here on 12/24.  Patient underwent bronchoscopy and cultures are growing Acinetobacter.  14 Ghanaian chest tube was placed for drainage of pleural effusion.  Patient was given thrombolytics and dornase as well..  Pleural fluid showed complicated parapneumonic effusions and cultures remain negative.  Patient was not deemed candidate for surgical decortication due to her underlying comorbidities.  She was started on IV meropenem.  Seen by ID with plan to continue IV antibiotic therapy till 12/27/2024.  Improving and will be discharged back to long-term facility     History of cardiac arrest with anoxic brain injury  Status post trach  Complicated right  pleural effusion with Acinetobacter  Patient  had recent hospitalization to [facility] for spontaneous right pneumothorax.  Underwent chest tube placement and eventually developed infected loculated effusion  And also fluid is consistent with complicated parapneumonic effusion  Underwent 14 French chest tube placement with minimal drainage that was eventually removed  Not candidate for decortication per CTS  Culture growing actinobacter  Currently on Merrem  ID consulted.  Plan to continue Merrem till 12/27/2024  Continue scopolamine patch and  robinul for oral secretions. Continue pulmonary toilet  As needed diuresis     Essential hypertension  Continue Coreg      Enteral nutrition  PEG tube replaced by GI service  Continue tube feed     Worsening anemia of chronic disease  Status post 1 unit blood transfusion 1/3/24, hemoglobin has been stable      Discharge Follow Up Recommendations for outpatient labs/diagnostics:  Facility physician 1 to 2 days  Dr. Sanchez in 2 weeks    Day of Discharge     HPI:   Patient seen and examined this morning.  Appears comfortable.  Ox requirement is down to 5 L, satting 99%.  Nonverbal and cannot contribute to HPI    Review of Systems  Unable to obtain review of systems    Vital Signs:   Temp:  [97.5 °F (36.4 °C)-98.9 °F (37.2 °C)] 97.5 °F (36.4 °C)  Heart Rate:  [72-99] 92  Resp:  [16-22] 18  BP: ()/(34-68) 95/34  Flow (L/min):  [5-11] 5      Physical Exam:  General: Nonverbal.    Head: Atraumatic and normocephalic  Eyes: No Icterus. No pallor  Ears:  Ears appear intact with no abnormalities noted  Throat: Tracheostomy in place with lots of secretions   Neck: Supple, trachea midline  Lungs: Diminished air entry with coarse crackles right lung base.    Heart:  Normal S1 and S2, no murmur, no gallop, No JVD, 3+ lower extremity swelling  Abdomen:  Soft, no tenderness, no organomegaly, normal bowel sounds, no organomegaly  Extremities: pulses equal  bilaterally  Skin: No bleeding, bruising or rash, normal skin turgor and elasticity  Neurologic: Cranial nerves appear intact with no evidence of facial asymmetry, normal motor and sensory functions in all 4 extremities  Psych: Completely disoriented        Pertinent  and/or Most Recent Results     LAB RESULTS:      Lab 01/08/24  0438 01/07/24  0504 01/06/24 0335 01/05/24 0415 01/04/24  0512 01/03/24  0354 01/02/24  0642   WBC 9.64 9.05 9.92 9.98 10.94*   < > 11.11*   HEMOGLOBIN 10.9* 9.8* 10.1* 9.8* 9.4*   < > 7.1*   HEMATOCRIT 33.9* 31.4* 31.6* 31.2* 29.1*   < > 22.4*   PLATELETS 369 468* 504* 489* 451*   < > 374   NEUTROS ABS 5.12 5.42 6.37 6.68 6.98   < > 7.37*   IMMATURE GRANS (ABS) 0.11* 0.11* 0.10* 0.15* 0.23*   < > 0.45*   LYMPHS ABS 2.65 2.16 2.10 1.87 2.32   < > 1.64   MONOS ABS 1.01* 0.89 0.93* 0.86 0.90   < > 1.04*   EOS ABS 0.68* 0.43* 0.38 0.35 0.44*   < > 0.53*   MCV 95.8 95.7 93.8 95.4 93.0   < > 95.3   CRP  --  3.70* 8.29*  --   --   --  6.55*    < > = values in this interval not displayed.         Lab 01/08/24  0439 01/07/24  0504 01/06/24 2027 01/06/24 0335 01/05/24 0415 01/04/24  0512 01/03/24  0354   SODIUM 136 134*  --  136 137 139 137   POTASSIUM 4.4 4.6 5.8* 3.5 3.9 4.4 4.4   CHLORIDE 94* 99  --  93* 101 103 101   CO2 31.0* 27.0  --  31.0* 26.0 27.0 26.0   ANION GAP 11.0 8.0  --  12.0 10.0 9.0 10.0   BUN 37* 36*  --  30* 17 18 19   CREATININE 0.57 0.52*  --  0.55* 0.33* 0.40* 0.40*   EGFR 101.0 103.3  --  101.9 115.2 110.0 110.0   GLUCOSE 115* 125*  --  131* 88 102* 142*   CALCIUM 9.4 9.2  --  9.2 9.0 8.8 8.6   MAGNESIUM 2.5* 2.7*  --  2.4  --  2.7* 2.6*   PHOSPHORUS 3.4 2.8  --  3.8  --   --   --          Lab 01/06/24  0335   TOTAL PROTEIN 7.1   ALBUMIN 3.0*   GLOBULIN 4.1   ALT (SGPT) 19   AST (SGOT) 18   BILIRUBIN 0.5   ALK PHOS 85                 Lab 01/03/24  0504   ABO TYPING A   RH TYPING Positive   ANTIBODY SCREEN Negative         Brief Urine Lab Results  (Last result in the past  365 days)        Color   Clarity   Blood   Leuk Est   Nitrite   Protein   CREAT   Urine HCG        12/24/23 1755 Yellow   Cloudy   Moderate (2+)   Small (1+)   Negative   30 mg/dL (1+)                 Microbiology Results (last 10 days)       ** No results found for the last 240 hours. **            XR Chest 1 View    Result Date: 1/5/2024  XR CHEST 1 VW Date of Exam: 1/5/2024 12:47 PM EST Indication: Central line insertion. Comparison: 1/3/2024. Findings: There is been placement of a right-sided subclavian line with the tip terminating deep in the right atrium. There is no pneumothorax. There is a stable tracheostomy tube in place. The heart size is normal. There is a small right pleural effusion reduced in size from yesterday with compressive atelectasis and possible airspace disease. There is also a trace left pleural effusion with atelectasis/airspace disease also improved from yesterday. There may be slight pulmonary vascular congestion similar to yesterday.     Impression: 1. Placement of a right-sided subclavian line with the tip terminating deep in the right atrium. There is no pneumothorax. 2. Bilateral basilar pleural effusions with compressive atelectasis/airspace disease improved from yesterday as described. 3. Questionable slight pulmonary vascular congestion. Electronically Signed: Micheal French MD  1/5/2024 1:43 PM EST  Workstation ID: [id number]    XR Chest 1 View    Result Date: 1/3/2024  XR CHEST 1 VW Date of Exam: 1/3/2024 2:20 AM EST Indication: Pneumonia, pleural effusion. Comparison: 1/2/2024. Findings: It appears that the right basilar pigtail chest tube has been removed. There is a moderate sized right pleural effusion with compressive atelectasis/airspace disease worsened from yesterday. There is a small left pleural effusion with compressive atelectasis/airspace disease similar to yesterday. The heart size is normal. There may be some underlying pulmonary vascular congestion. There is no  pneumothorax. There is a stable tracheostomy tube in place.     Impression: 1. It appears that the right basilar pigtail chest tube has been removed. There is a moderate size right pleural effusion with compressive atelectasis/airspace disease worsened from yesterday. 2. Small left pleural effusion with compressive atelectasis/airspace disease similar to yesterday. 3. Questionable mild pulmonary vascular congestion. Electronically Signed: Micheal French MD  1/3/2024 7:58 AM EST  Workstation ID: [id number]    XR Chest 1 View    Result Date: 1/2/2024  XR CHEST 1 VW Date of Exam: 1/2/2024 3:35 AM EST Indication: Pleural effusion Comparison: 1/1/2024 Findings: Tracheostomy stable above the joby. Improved aeration of the lung bases likely resolving atelectasis. Stable small bilateral pleural effusions right greater than left. Chest tube overlying the right lung base. No pneumothorax.     Impression: 1. Improved aeration of the lung bases likely resolving atelectasis. 2. Stable small bilateral pleural effusions right greater than left. 3. Stable chest tube at the right lung base. No pneumothorax. 4. Stable tracheostomy above the joby. Electronically Signed: Juan Daniel Crocker MD  1/2/2024 7:15 AM EST  Workstation ID: [id number]    XR Chest 1 View    Result Date: 1/1/2024  XR CHEST 1 VW Date of Exam: 1/1/2024 4:12 AM EST Indication: Right effusion Comparison: 12/31/2023 Findings: Tracheostomy tube is noted. Right pleural drain remains in place. Right basilar effusion is slightly improved. Lungs elsewhere appear stable. No pneumothorax is seen.     Impression: Mildly improved right pleural effusion, otherwise stable chest radiograph. Electronically Signed: Chemo Mckeon MD  1/1/2024 8:52 AM EST  Workstation ID: [id number]    XR Chest 1 View    Result Date: 12/31/2023  XR CHEST 1 VW Date of Exam: 12/31/2023 3:45 AM EST Indication: Right effusion Comparison: Chest x-ray 12/30/2023 Findings: Stable medical support devices. Persistent low  lung volumes and accentuated cardiomediastinal silhouette which appears grossly unchanged. Similar linear opacity at the left lower lung compatible with atelectasis. Similar moderate right-sided pleural effusion with adjacent hazy opacities throughout the right lung base which may reflect associated atelectasis. There appears to be mild improved aeration at the right lung base from prior exam. No pneumothorax.     Impression: Mild improved aeration at the right lung base without significant interval change otherwise. Electronically Signed: Harmeet Gibson MD  12/31/2023 8:43 AM EST  Workstation ID: [id number]    XR Chest 1 View    Result Date: 12/30/2023  XR CHEST 1 VW Date of Exam: 12/30/2023 1:14 AM EST Indication: Right effusion Comparison: Chest radiograph dated 12/29/2023 Findings: There is a tracheostomy tube in expected position. There is a smallbore right-sided chest tube in unchanged position. There is a stable moderate right-sided pleural effusion and a small left-sided pleural effusion. There is unchanged bandlike atelectasis  within the left lung base. There is no pneumothorax. The cardiomediastinal silhouette is within normal limits. There is aortic arch atherosclerotic calcification. There are degenerative changes of the thoracic spine.     Impression: 1. Right-sided chest tube in expected position with moderate right-sided pleural effusion. Small left-sided pleural effusion. 2. Tracheostomy tube in expected position.. Electronically Signed: Mario Alcaraz  12/30/2023 8:07 AM EST  Workstation ID: [id number]    XR Chest 1 View    Result Date: 12/29/2023  XR CHEST 1 VW Date of Exam: 12/29/2023 12:45 AM EST Indication: Right effusion Comparison: Chest CT and chest radiograph December 28, 2023 Findings: Tracheostomy tube appears in expected position. There is a right pleural drainage catheter projecting over the mid right thorax, as before. No definite pneumothorax. There are bilateral pleural effusions,  right greater than left, similar to recent prior exams. Bibasilar consolidation, also right greater than left appears unchanged. No definite new or worsening infiltrate. Heart size and mediastinal contour appear within normal limits and unchanged.     Impression: 1.Bilateral pleural effusions and bibasilar consolidation, right greater than left, as before. 2.Right pleural drainage catheter appears in similar position. No definite pneumothorax. Electronically Signed: Oumar Lb  12/29/2023 7:06 AM EST  Workstation ID: [id number]             Results for orders placed during the hospital encounter of 03/10/19    Transthoracic Echo Complete With Contrast if Necessary Per Protocol    Interpretation Summary  Technically difficult study  1) Borderline LV size with mild reduction in LV systolic function ( EF is 45 to 50%)  2) Mild to moderate left atrial enlargement  3) Trace MR and TR with normal PA pressures  4) Borderline RV size with normal function  5) Global mild hypokinesis of the LV  6) Moderate calcific plaque along ascending aorta      Plan for Follow-up of Pending Labs/Results:     Discharge Details        Discharge Medications        New Medications        Instructions Start Date   meropenem (MERREM) 1000 mg IVPB in 100ml NS (MBP)   1,000 mg, Intravenous, Every 8 Hours      ProSource No Carb liquid   30 mL, Per PEG Tube, Daily   Start Date: January 9, 2024     torsemide 20 MG tablet  Commonly known as: DEMADEX   10 mg, Oral, Daily             Continue These Medications        Instructions Start Date   baclofen 10 MG tablet  Commonly known as: LIORESAL   10 mg, Per G Tube, Every 8 Hours      budesonide 0.5 MG/2ML nebulizer solution  Commonly known as: PULMICORT   0.5 mg, Nebulization, 2 Times Daily - RT      carvedilol 12.5 MG tablet  Commonly known as: COREG   12.5 mg, Per G Tube, 2 Times Daily      cetirizine 10 MG tablet  Commonly known as: zyrTEC   10 mg, Per G Tube, Daily      docusate sodium 50 mg/5 mL  liquid  Commonly known as: COLACE   300 mg, Per G Tube, 2 Times Daily      famotidine 40 mg/5 mL suspension  Commonly known as: PEPCID   20 mg, Per G Tube, 2 Times Daily      FIRST-MOUTHWASH BLM MT   2 mL, Mouth/Throat, 2 Times Daily, To R side of mouth      glycopyrrolate 2 MG tablet  Commonly known as: ROBINUL   2 mg, Per G Tube, 3 Times Daily      hyoscyamine 0.125 MG tablet  Commonly known as: ANASPAZ,LEVSIN   0.125 mg, Per G Tube, 3 Times Daily      ipratropium-albuterol 0.5-2.5 mg/3 ml nebulizer  Commonly known as: DUO-NEB   3 mL, Nebulization, 4 Times Daily - RT, Takes at [id number]      MiraLax 17 g packet  Generic drug: polyethylene glycol   17 g, 2 Times Daily      montelukast 10 MG tablet  Commonly known as: SINGULAIR   10 mg, Per G Tube, Nightly      multivitamin with iron-minerals (CENTRUM) liquid   15 mL, Per G Tube, Daily      Petrolatum-Zinc Oxide 49-15 % ointment   1 application , Apply externally, 2 Times Daily PRN      Scopolamine 1 MG/3DAYS patch   1 patch, Transdermal, Every 72 Hours      senna 8.6 MG tablet  Commonly known as: SENOKOT   2 tablets, Per PEG Tube, Nightly               No Known Allergies      Discharge Disposition:  Skilled Nursing Facility (DC - External)    Diet:  Hospital:  Diet Order   Procedures    NPO Diet NPO Type: Strict NPO       Diet Instructions       Diet: Tube Feeding; Continuous; Isosource 1.5 @ 45 mm/h      Discharge Diet: Tube Feeding    Feeding Type: Continuous    Formula & Rate: Isosource 1.5 @ 45 mm/h             Activity:  Activity Instructions       Bedrest With Bathroom Privileges              Restrictions or Other Recommendations:  None        CODE STATUS:    Code Status and Medical Interventions:   Ordered at: 12/25/23 1114     Level Of Support Discussed With:    Next of Kin (If No Surrogate)     Code Status (Patient has no pulse and is not breathing):    CPR (Attempt to Resuscitate)     Medical Interventions (Patient has pulse or is breathing):     Full Support       Future Appointments   Date Time Provider Department Center   1/8/2024  4:30 PM MED 11  [facility]                 Viki Littlejohn MD  01/08/24      Time Spent on Discharge:  I spent  28  minutes on this discharge activity which included: face-to-face encounter with the patient, reviewing the data in the system, coordination of the care with the nursing staff as well as consultants, documentation, and entering orders.            Electronically signed by Viki Littlejohn MD at 01/08/24 1801

## 2024-01-08 NOTE — PROGRESS NOTES
[facility]    INFECTIOUS DISEASE PROGRESS NOTE    Viji Vargas  1960  [id number]    Date of consult: 1/5/24    Admit date: 12/24/2023    Requesting Provider: Janak Gutiérrez DO  Evaluating physician: Walter Sanchez MD  Reason for Consultation: Complicated right pleural effusion, parapneumonic effusion status post chest tube and thrombolytics, Acinetobacter in patient with anoxic brain injury  Chief Complaint: Above      Subjective   History of present illness:  Patient is a  63 y.o.  Yr old female with a history of cardiac arrest and anoxic brain injury March 2019 status post trach and PEG, COPD, hypertension, multidrug-resistant organisms recently admitted to [facility] on 12/22 for respiratory distress, then transferred and admitted to [facility] on 12/24/2023 with a bronchoscopy revealing bronchial malacia, and culture positive for Acinetobacter sensitive to ampicillin sulbactam and meropenem and gentamicin, resistant to cefepime.  A 14 Icelandic tube was placed and the existing chest tube removed, with instillation of thrombolytics.  At the patient was initially on piperacillin/tazobactam but changed to meropenem on 12/27.  Pleural fluid culture from 12/25 negative.  Blood cultures 12/24 negative.  Urine culture at [facility] positive for Proteus.  Blood culture from [facility] positive for staphylococcal species contaminant.  COVID-19 and influenza a negative.  Pleural fluid cell studies included WBC 1430, RBC 7000, 87% neutrophils, protein 3.8, glucose 18, with placement of right pigtail chest tube 12/22.  CT scan revealed loculated right pleural effusion with large right lower lobe pneumonia.  The patient also had a right pneumothorax.  She was stabilized and moved out of the ICU to the floor on around 1/4/2024.  I was consulted on 1/5/2024.  Patient is a poor historian.    1/8/2024 history reviewed.  No high  fevers or chills.  Not a good historian.  Tolerating meropenem until 1/27 for complicated pleural effusion and possible early empyema.  Previous culture positive for Acinetobacter sputum.    Past Medical History:   Diagnosis Date    COPD (chronic obstructive pulmonary disease)     Hypertension     Pneumonia     Stroke        Past Surgical History:   Procedure Laterality Date    HYSTERECTOMY      Partial     TRACHEOSTOMY AND PEG TUBE INSERTION N/A 3/20/2019    Procedure: TRACHEOSTOMY AND PERCUTANEOUS ENDOSCOPIC GASTROSTOMY TUBE INSERTION;  Surgeon: Nadira Pandey MD;  Location: [facility];  Service: General       Pediatric History   Patient Parents    Not on file     Other Topics Concern    Not on file   Social History Narrative    Not on file   No current cigarettes, alcohol, drug use    family history is not on file.  Unavailable per patient  No Known Allergies    Immunization History   Administered Date(s) Administered    COVID-19 (PFIZER) BIVALENT 12+YRS 10/17/2022    COVID-19 (PFIZER) Purple Cap Monovalent 01/21/2021, 02/11/2021    COVID-19 F23 (MODERNA) 12YRS+ (SPIKEVAX) 12/04/2023    Pneumococcal Conjugate 13-Valent (PCV13) 11/08/2021    Tdap 06/10/2015       Medication:  @Scheduled Meds:baclofen, 10 mg, Per PEG Tube, Q8H  carvedilol, 6.25 mg, Per PEG Tube, Q12H  glycopyrrolate, 2 mg, Per PEG Tube, TID  heparin (porcine), 5,000 Units, Subcutaneous, Q8H  meropenem, 1,000 mg, Intravenous, Q8H  ProSource No Carb, 30 mL, Per PEG Tube, Daily  Scopolamine, 1 patch, Transdermal, Q72H  senna-docusate sodium, 2 tablet, Per G Tube, BID  sodium chloride, 10 mL, Intravenous, Q12H      Continuous Infusions:     PRN Meds:.  acetaminophen    senna-docusate sodium **AND** polyethylene glycol **AND** [DISCONTINUED] bisacodyl **AND** bisacodyl    Calcium Replacement - Follow Nurse / BPA Driven Protocol    dextrose    glucagon (human recombinant)    influenza vaccine    Magnesium Standard Dose Replacement - Follow Nurse / BPA  "Driven Protocol    Phosphorus Replacement - Follow Nurse / BPA Driven Protocol    Potassium Replacement - Follow Nurse / BPA Driven Protocol     Please refer to the medical record for a full medication list    Review of Systems: Difficult to obtain secondary to mental status.      Physical Exam:   Vital Signs   Temp:  [97.9 °F (36.6 °C)-98.9 °F (37.2 °C)] 98.6 °F (37 °C)  Heart Rate:  [] 99  Resp:  [16-22] 18  BP: (101-108)/(50-68) 101/58    Blood pressure 101/58, pulse 99, temperature 98.6 °F (37 °C), temperature source Axillary, resp. rate 18, height 162.6 cm (64.02\"), weight 68 kg (150 lb), SpO2 98%.  GENERAL: Awake and alert, in minimal distress. Appears older than stated age.  Resting in bed.  HEENT:  Normocephalic, atraumatic.  Oropharynx without thrush. Dentition in good repair. No cervical adenopathy. No neck masses.  Ears externally normal, Nose externally normal.  Trach site okay.  EYES:  No conjunctival injection. No icterus. EOM full.  LYMPHATICS: No lymphadenopathy of the neck or axillary or inguinal regions.   HEART: No murmur, gallop, or pericardial friction rub. Reg rate rhythm, No JVD at 45 degrees.  LUNGS: Crackles right greater than left with decreased breath sounds right base. No respiratory distress, no use of accessory muscles.  ABDOMEN: Soft, nontender, nondistended. No appreciable HSM.  Bowel sounds normal.  SKIN: Warm and dry without cutaneous eruptions.  No nodules.    PSYCHIATRIC: Mental status lethargic.  EXT:  No cellulitic change.  NEURO: Oriented to 0, nonfocal.      Results Review:   I reviewed the patient's new clinical results.  I reviewed the patient's new imaging results and agree with the interpretation.  I reviewed the patient's other test results and agree with the interpretation    Results from last 7 days   Lab Units 01/08/24  0438 01/07/24  0504 01/06/24  0335   WBC 10*3/mm3 9.64 9.05 9.92   HEMOGLOBIN g/dL 10.9* 9.8* 10.1*   HEMATOCRIT % 33.9* 31.4* 31.6*   PLATELETS " "10*3/mm3 369 468* 504*     Results from last 7 days   Lab Units 01/08/24  0439   SODIUM mmol/L 136   POTASSIUM mmol/L 4.4   CHLORIDE mmol/L 94*   CO2 mmol/L 31.0*   BUN mg/dL 37*   CREATININE mg/dL 0.57   GLUCOSE mg/dL 115*   CALCIUM mg/dL 9.4     Results from last 7 days   Lab Units 01/06/24  0335   ALK PHOS U/L 85   BILIRUBIN mg/dL 0.5   ALT (SGPT) U/L 19   AST (SGOT) U/L 18         Results from last 7 days   Lab Units 01/07/24  0504   CRP mg/dL 3.70*             Estimated Creatinine Clearance: 93.2 mL/min (by C-G formula based on SCr of 0.57 mg/dL).     Procalitonin Results:       Brief Urine Lab Results  (Last result in the past 365 days)        Color   Clarity   Blood   Leuk Est   Nitrite   Protein   CREAT   Urine HCG        12/24/23 1755 Yellow   Cloudy   Moderate (2+)   Small (1+)   Negative   30 mg/dL (1+)                  No results found for: \"SITE\", \"ALLENTEST\", \"PHART\", \"ZFH3CRT\", \"PO2ART\", \"GIP1ZXZ\", \"BASEEXCESS\", \"H7ZVAIRP\", \"HGBBG\", \"HCTABG\", \"OXYHEMOGLOBI\", \"METHHGBN\", \"CARBOXYHGB\", \"CO2CT\", \"BAROMETRIC\", \"MODALITY\", \"FIO2\"     Microbiology:  12/24/2023 with a bronchoscopy revealing bronchial malacia, and culture positive for Acinetobacter sensitive to ampicillin sulbactam and meropenem and gentamicin, resistant to cefepime, pleural fluid culture negative    Radiology:  Imaging Results (Last 72 Hours)       Procedure Component Value Units Date/Time    XR Chest 1 View [[id number]] Collected: 01/05/24 1341     Updated: 01/05/24 1346    Narrative:      XR CHEST 1 VW    Date of Exam: 1/5/2024 12:47 PM EST    Indication: Central line insertion.    Comparison: 1/3/2024.    Findings:  There is been placement of a right-sided subclavian line with the tip terminating deep in the right atrium. There is no pneumothorax. There is a stable tracheostomy tube in place. The heart size is normal. There is a small right pleural effusion reduced   in size from yesterday with compressive atelectasis and possible airspace " disease. There is also a trace left pleural effusion with atelectasis/airspace disease also improved from yesterday. There may be slight pulmonary vascular congestion similar to   yesterday.      Impression:      Impression:  1. Placement of a right-sided subclavian line with the tip terminating deep in the right atrium. There is no pneumothorax.  2. Bilateral basilar pleural effusions with compressive atelectasis/airspace disease improved from yesterday as described.  3. Questionable slight pulmonary vascular congestion.      Electronically Signed: Micheal French MD    1/5/2024 1:43 PM EST    Workstation ID: [id number]            IMPRESSION:     1.  Right parapneumonic complicated pleural effusion, culture negative from 12/25/2023, with bronchoscopy culture positive for Acinetobacter baumannii, treated with meropenem since 12/27.  Pulmonary evaluation suggested that patient may not resolve the loculated effusions and may require surgery but not a good surgical candidate.  The patient was treated with thrombolytics and chest tube.  Cytology negative.  Seems to be improved on current regimen.  2.  Acinetobacter pneumonia right lower lobe likely related to aspiration from mental status from previous anoxic brain injury 2019.  3.  Anoxic brain injury with encephalopathy chronic and continues.  4.  Anemia, chronic disease.  5.  History of COPD, essential hypertension contributing to issues above.    PLAN:    1.  Diagnostically, continue to follow patient's physical exam, CBC, CMP, CRP, culture data, radiographic studies.  Likely repeat CT scan in a month.  2.  Therapeutically, continue with meropenem until 1/27 for complicated right pleural effusion and severe Acinetobacter pneumonia.  Increased risk for therapeutic failure but not a good surgical candidate for decortication.  3.  Supportive care.  Status post previous trach and PEG 2019.    Case management orders: Please arrange for skilled facility IV antibiotics with  meropenem 1 g IV every 8 hours to continue until 1/27/2024.  This is for complicated right pleural effusion and Acinetobacter pneumonia.  Check CBC, CMP, CRP weekly while on IV antibiotics.  Fax orders to [phone], call [phone] with final arrangements.  Arrange for follow-up with me in 2 weeks postdischarge.  Weekly PICC dressing changes.    I discussed the patient's findings and my recommendations with nursing staff    Thank you for asking me to see Viji Vargas.  Our group would be pleased to follow this patient over the course of their hospitalization and assist with outpatient antimicrobial therapy, as indicated.  Further recommendations depend on the results of the cultures and clinical course.  Poor long-term prognosis.      Walter Sanchez MD  1/8/2024

## 2024-01-08 NOTE — CASE MANAGEMENT/SOCIAL WORK
Case Management Discharge Note      Final Note: Ms. Vargas will likely be discharged later today.  I have confirmed with Shadia at [facility] and [facility] that they will accept her there today.  [facility] has been arrangd to provide transport today at 1615.  Shadia is agreeable to this transprt plan.  Nurse to please call report to [phone].  CM will fax the discharge summary when available to [phone].  Pharmacy in [facility] is unchanged.  I have updated POA/Liliya who remains agreeable to this plan.  She denies any additional discharge needs at this time.         Selected Continued Care - Admitted Since 12/24/2023       Destination Coordination complete.      Service Provider Selected Services Address Phone Fax Patient Preferred    [facility] [address] [address] [phone] --              Durable Medical Equipment    No services have been selected for the patient.                Dialysis/Infusion    No services have been selected for the patient.                Home Medical Care    No services have been selected for the patient.                Therapy    No services have been selected for the patient.                Community Resources    No services have been selected for the patient.                Community & DME    No services have been selected for the patient.                    Selected Continued Care - Prior Encounters Includes continued care and service providers with selected services from prior encounters from 9/25/2023 to 1/8/2024      Discharged on 12/24/2023 Admission date: 12/22/2023 - Discharge disposition: Intermediate Care       Destination       Service Provider Selected Services Address Phone Fax Patient Preferred    [facility] [address] [address] [phone] --                          Transportation Services  Ambulance:  [facility]    Final Discharge Disposition Code: 04 - intermediate care facility

## 2024-01-09 VITALS
SYSTOLIC BLOOD PRESSURE: 107 MMHG | HEIGHT: 64 IN | HEART RATE: 93 BPM | RESPIRATION RATE: 18 BRPM | WEIGHT: 155.4 LBS | TEMPERATURE: 97.9 F | DIASTOLIC BLOOD PRESSURE: 63 MMHG | OXYGEN SATURATION: 96 % | BODY MASS INDEX: 26.53 KG/M2

## 2024-01-09 LAB
ALBUMIN SERPL-MCNC: 3 G/DL (ref 3.5–5.2)
ALBUMIN/GLOB SERPL: 1 G/DL
ALP SERPL-CCNC: 83 U/L (ref 39–117)
ALT SERPL W P-5'-P-CCNC: 23 U/L (ref 1–33)
ANION GAP SERPL CALCULATED.3IONS-SCNC: 8 MMOL/L (ref 5–15)
AST SERPL-CCNC: 20 U/L (ref 1–32)
BASOPHILS # BLD AUTO: 0.05 10*3/MM3 (ref 0–0.2)
BASOPHILS NFR BLD AUTO: 0.5 % (ref 0–1.5)
BILIRUB SERPL-MCNC: 0.3 MG/DL (ref 0–1.2)
BUN SERPL-MCNC: 25 MG/DL (ref 8–23)
BUN/CREAT SERPL: 58.1 (ref 7–25)
CALCIUM SPEC-SCNC: 8.7 MG/DL (ref 8.6–10.5)
CHLORIDE SERPL-SCNC: 103 MMOL/L (ref 98–107)
CO2 SERPL-SCNC: 29 MMOL/L (ref 22–29)
CREAT SERPL-MCNC: 0.43 MG/DL (ref 0.57–1)
DEPRECATED RDW RBC AUTO: 44.2 FL (ref 37–54)
EGFRCR SERPLBLD CKD-EPI 2021: 108.1 ML/MIN/1.73
EOSINOPHIL # BLD AUTO: 0.29 10*3/MM3 (ref 0–0.4)
EOSINOPHIL NFR BLD AUTO: 2.6 % (ref 0.3–6.2)
ERYTHROCYTE [DISTWIDTH] IN BLOOD BY AUTOMATED COUNT: 13 % (ref 12.3–15.4)
GLOBULIN UR ELPH-MCNC: 3.1 GM/DL
GLUCOSE BLDC GLUCOMTR-MCNC: 138 MG/DL (ref 70–130)
GLUCOSE BLDC GLUCOMTR-MCNC: 146 MG/DL (ref 70–130)
GLUCOSE BLDC GLUCOMTR-MCNC: 148 MG/DL (ref 70–130)
GLUCOSE BLDC GLUCOMTR-MCNC: 149 MG/DL (ref 70–130)
GLUCOSE SERPL-MCNC: 137 MG/DL (ref 65–99)
HCT VFR BLD AUTO: 30.3 % (ref 34–46.6)
HGB BLD-MCNC: 9.6 G/DL (ref 12–15.9)
IMM GRANULOCYTES # BLD AUTO: 0.07 10*3/MM3 (ref 0–0.05)
IMM GRANULOCYTES NFR BLD AUTO: 0.6 % (ref 0–0.5)
LYMPHOCYTES # BLD AUTO: 1.74 10*3/MM3 (ref 0.7–3.1)
LYMPHOCYTES NFR BLD AUTO: 15.7 % (ref 19.6–45.3)
MCH RBC QN AUTO: 29.5 PG (ref 26.6–33)
MCHC RBC AUTO-ENTMCNC: 31.7 G/DL (ref 31.5–35.7)
MCV RBC AUTO: 93.2 FL (ref 79–97)
MONOCYTES # BLD AUTO: 0.76 10*3/MM3 (ref 0.1–0.9)
MONOCYTES NFR BLD AUTO: 6.9 % (ref 5–12)
NEUTROPHILS NFR BLD AUTO: 73.7 % (ref 42.7–76)
NEUTROPHILS NFR BLD AUTO: 8.18 10*3/MM3 (ref 1.7–7)
NRBC BLD AUTO-RTO: 0 /100 WBC (ref 0–0.2)
PLATELET # BLD AUTO: 366 10*3/MM3 (ref 140–450)
PMV BLD AUTO: 10.4 FL (ref 6–12)
POTASSIUM SERPL-SCNC: 4.7 MMOL/L (ref 3.5–5.2)
PROT SERPL-MCNC: 6.1 G/DL (ref 6–8.5)
RBC # BLD AUTO: 3.25 10*6/MM3 (ref 3.77–5.28)
SODIUM SERPL-SCNC: 140 MMOL/L (ref 136–145)
WBC NRBC COR # BLD AUTO: 11.09 10*3/MM3 (ref 3.4–10.8)

## 2024-01-09 PROCEDURE — 85025 COMPLETE CBC W/AUTO DIFF WBC: CPT | Performed by: INTERNAL MEDICINE

## 2024-01-09 PROCEDURE — 25010000002 HEPARIN (PORCINE) PER 1000 UNITS: Performed by: INTERNAL MEDICINE

## 2024-01-09 PROCEDURE — 94799 UNLISTED PULMONARY SVC/PX: CPT

## 2024-01-09 PROCEDURE — 82948 REAGENT STRIP/BLOOD GLUCOSE: CPT

## 2024-01-09 PROCEDURE — 25010000002 AMPICILLIN-SULBACTAM PER 1.5 G: Performed by: INTERNAL MEDICINE

## 2024-01-09 PROCEDURE — 80053 COMPREHEN METABOLIC PANEL: CPT | Performed by: INTERNAL MEDICINE

## 2024-01-09 RX ORDER — CARVEDILOL 12.5 MG/1
6.25 TABLET ORAL 2 TIMES DAILY
Start: 2024-01-09

## 2024-01-09 RX ADMIN — MIDODRINE HYDROCHLORIDE 5 MG: 5 TABLET ORAL at 09:58

## 2024-01-09 RX ADMIN — BACLOFEN 10 MG: 10 TABLET ORAL at 05:35

## 2024-01-09 RX ADMIN — Medication 10 ML: at 09:59

## 2024-01-09 RX ADMIN — HEPARIN SODIUM 5000 UNITS: 5000 INJECTION INTRAVENOUS; SUBCUTANEOUS at 05:35

## 2024-01-09 RX ADMIN — BACLOFEN 10 MG: 10 TABLET ORAL at 13:53

## 2024-01-09 RX ADMIN — GLYCOPYRROLATE 2 MG: 1 TABLET ORAL at 09:59

## 2024-01-09 RX ADMIN — CARVEDILOL 6.25 MG: 6.25 TABLET, FILM COATED ORAL at 09:59

## 2024-01-09 RX ADMIN — SENNOSIDES AND DOCUSATE SODIUM 2 TABLET: 8.6; 5 TABLET ORAL at 09:58

## 2024-01-09 RX ADMIN — HEPARIN SODIUM 5000 UNITS: 5000 INJECTION INTRAVENOUS; SUBCUTANEOUS at 13:54

## 2024-01-09 RX ADMIN — MIDODRINE HYDROCHLORIDE 5 MG: 5 TABLET ORAL at 13:53

## 2024-01-09 RX ADMIN — SODIUM CHLORIDE 3 G: 900 INJECTION INTRAVENOUS at 09:59

## 2024-01-09 RX ADMIN — SODIUM CHLORIDE 3 G: 900 INJECTION INTRAVENOUS at 18:40

## 2024-01-09 RX ADMIN — MIDODRINE HYDROCHLORIDE 5 MG: 5 TABLET ORAL at 18:35

## 2024-01-09 RX ADMIN — Medication 30 ML: at 09:58

## 2024-01-09 RX ADMIN — GLYCOPYRROLATE 2 MG: 1 TABLET ORAL at 18:35

## 2024-01-09 NOTE — PROGRESS NOTES
Clinical Nutrition     Nutrition Support Assessment  Reason for Visit: Follow-up protocol, EN    Patient Name: Viji Vargas  YOB: 1960  MRN: [id number]  Date of Encounter: 01/09/24 08:57 EST  Admission date: 12/24/2023    Comments:    Continue current EN regimen:  Isosource 1.5 @ 45ml/hr. Prosource 1x daily. Water flush @ 25ml/hr.   =990ml, 1545kcals (103% est needs), 82g pro (104% est needs), 15g fiber, 752ml water from formula, +550ml water from flushes, 1302ml TFW.     Nutrition Assessment   Admission Diagnosis:  Bronchial pneumonia [J18.0]    Problem List:    Acute on chronic respiratory failure with hypoxia    Anoxic brain injury    Tracheostomy present    Pneumonia    Pneumothorax, right    UTI (urinary tract infection)    Bronchial pneumonia    Pleural effusion on right    Malfunction of percutaneous endoscopic gastrostomy (PEG) tube      PMH:   She  has a past medical history of COPD (chronic obstructive pulmonary disease), Hypertension, Pneumonia, and Stroke.    PSH:  She  has a past surgical history that includes Hysterectomy and tracheostomy and peg tube insertion (N/A, 3/20/2019).    Applicable Nutrition Concerns:   Skin:  Oral: Chronic Trach   GI:  PEG     Applicable Interval History:   Trach/PEG 2019 has been at nursing home facility.     Reported/Observed/Food/Nutrition Related History:     1/9  Pt sleeping during RD visit, was supposed to discharge yesterday but canceled 2/2 low BP per RN note.     1/5  TF running at 25ml/hr. G-tube replaced this am per GI 2/2 clog.     1/2  No significant changes. No-verbal at baseline.  EN @ goal rate and being tolerated. Can possibly transfer to the floor. RN reports patient is contracted at baseline.      12/25  Pt transferred from [facility] for higher level of care including bronchoscopy. Pt in acute on chronic respiratory failure, pneumothorax s/p chest tubes, currently doing well on trach collar for o2. Pt with hx anoxic brain  "injury with trach/PEG placement in 2019. Per review of EMR pt has tolerated isosource 1.5. Most recently at [facility] was receiving Nutren 1.5 which is not available at our facility. Bowels are moving. Phos slightly low this morning and was replaced. Levophed weaned yesterday. Pt lying in bed with no family at bedside at time RD visit. Per intensivist note okay with restarting nutrition.     Labs    Labs Reviewed: Yes     Results from last 7 days   Lab Units 01/09/24  0716 01/08/24  0439 01/07/24  0504 01/06/24 2027 01/06/24  0335   GLUCOSE mg/dL 137* 115* 125*  --  131*   BUN mg/dL 25* 37* 36*  --  30*   CREATININE mg/dL 0.43* 0.57 0.52*  --  0.55*   SODIUM mmol/L 140 136 134*  --  136   CHLORIDE mmol/L 103 94* 99  --  93*   POTASSIUM mmol/L 4.7 4.4 4.6   < > 3.5   PHOSPHORUS mg/dL  --  3.4 2.8  --  3.8   MAGNESIUM mg/dL  --  2.5* 2.7*  --  2.4   ALT (SGPT) U/L 23  --   --   --  19    < > = values in this interval not displayed.       Results from last 7 days   Lab Units 01/09/24  0716 01/07/24  0504 01/06/24  0335 01/03/24  0354   ALBUMIN g/dL 3.0*  --  3.0*  --    PREALBUMIN mg/dL  --   --   --  15.5*   CRP mg/dL  --  3.70* 8.29*  --        Results from last 7 days   Lab Units 01/09/24  0635 01/09/24  0115 01/08/24  2219 01/08/24  1053 01/08/24  0712 01/08/24  0535   GLUCOSE mg/dL 148* 149* 136* 155* 144* 124     Lab Results   Lab Value Date/Time    HGBA1C 5.1 03/10/2019 0412               Medications    Medications Reviewed: Yes  Pertinent:Pericolace, abx, scopalamine patch  Infusion:   PRN:     Intake/Ouptut 24 hrs (0701 - 0700)   I&O's Reviewed: Yes   Intake & Output (last day)         01/08 0701 01/09 0700 01/09 0701  01/10 0700    P.O. 0     Other 25     NG/     Total Intake(mL/kg) 586 (8.3)     Urine (mL/kg/hr) 1150 (0.7)     Emesis/NG output 276     Total Output 1426     Net -840                 Anthropometrics     Height: Height: 162.6 cm (64.02\")  Last Filed Weight: Weight: 70.5 kg (155 lb 6.4 oz) " "(01/09/24 0357)  Method: Weight Method: Bed scale  BMI: BMI (Calculated): 26.7  BMI classification: Overweight: 25.0-29.9kg/m2   IBW:  120 lb    UBW:    Weight     Weight (kg) Weight (lbs)   11/22/2022 69.854 kg  154 lb    1/22/2023 69.854 kg  154 lb    2/11/2023 72.576 kg  160 lb    3/7/2023 72.576 kg  160 lb    4/15/2023 72.576 kg  160 lb    5/5/2023 72.576 kg  160 lb    5/6/2023 69 kg  152 lb 1.9 oz     69 kg  152 lb 1.9 oz    5/7/2023 69.3 kg  152 lb 12.5 oz    5/8/2023 71.4 kg  157 lb 6.5 oz    5/23/2023 71.4 kg  157 lb 6.5 oz    12/22/2023 71.215 kg  157 lb     70.28 kg  154 lb 15 oz    12/23/2023 69.7 kg  153 lb 10.6 oz    12/24/2023 70.5 kg  155 lb 6.8 oz     79 kg  174 lb 2.6 oz    12/25/2023 71.9 kg  158 lb 8.2 oz        Weight change: no significant changes    Nutrition Focused Physical Exam     Date: 12/25    Unable to perform exam due to: Pt unable to participate at time of visit    Needs Assessment   Date: 12/25  Reviewed 1/1    Height used:Height: 162.6 cm (64.02\")64 in  Weights used: 71.9 kg (ABW)    Estimated Calorie needs: ~1500 Kcal/day (21 kcal/kg)  Method:  Kcals/KG 20-22 ABW = 5422-7734  Method:  MSJ 1252X1.2=1502    Estimated Protein needs: ~79 g PRO/day  Method: 1-1.2 g/Kg = 72-86    Estimated Fluid needs: ~ ml/day   Per clinical status    Current Nutrition Prescription     PO: NPO Diet NPO Type: Strict NPO  Oral Nutrition Supplement:   Intake: N/A    EN: IsoSource 1.5  Goal Rate: 50ml/hr   Water Flushes: 25ml/hr  Modular: Prosource -no carb 1/day  Route: PEG  Tube: 20 PEG    At goal over: 22Hrs/day    Rx will supply:   Goal Volume 1100  mL/day     Flush Volume 550 mL/day     Energy 1710 Kcal/day 114 % Est Need   Protein 89 g/day 113 % Est Need   Fiber 17 g/day     Water in   mL     Total Water 1386 mL     Meet DRI Yes        --------------------------------------------------------------------------  Product/Rate verified at bedside: Yes  Infusing Rate at time of visit: " 25ml/hr    Average Delivery from [facility] Days: 1072ml delivered (97% goal volume)    Nutrition Diagnosis   Date:  Updated:   Problem Inadequate oral intake   Etiology Dysphagia 2/2 anoxic brain injury   Signs/Symptoms PEG/EN for nutrition   Status:     Goal:   General: Nutrition support treatment  PO: N/A  EN/PN: Maintain EN    Nutrition Intervention      Follow treatment progress, Care plan reviewed    Isosource 1.5 @ 45ml/hr. Prosource 1x daily. Water flush @ 25ml/hr.     Monitoring/Evaluation:   Per protocol, I&O, Pertinent labs, EN delivery/tolerance, Weight, GI status, Symptoms, POC/GOC      Perla Solares, MS,RD,LD  Time Spent: 20m

## 2024-01-09 NOTE — DISCHARGE SUMMARY
[facility] Medicine Services  DISCHARGE SUMMARY    Patient Name: Viji Vargas  : 1960  MRN: [id number]    Date of Admission: 2023  3:39 PM  Date of Discharge:  2024  Primary Care Physician: Ranjeet Pina MD    Consults       Date and Time Order Name Status Description    2024  8:05 AM Inpatient Infectious Diseases Consult Completed     2024  9:08 AM Inpatient Gastroenterology Consult Completed             Hospital Course     Presenting Problem:     Active Hospital Problems    Diagnosis  POA    **Acute on chronic respiratory failure with hypoxia [J96.21]  Yes    Pleural effusion on right [J90]  Yes    UTI (urinary tract infection) [N39.0]  Yes    Bronchial pneumonia [J18.0]  Yes    Malfunction of percutaneous endoscopic gastrostomy (PEG) tube [K94.23]  Unknown    Pneumonia [J18.9]  Yes    Pneumothorax, right [J93.9]  Yes    Tracheostomy present [Z93.0]  Not Applicable    Anoxic brain injury [G93.1]  Yes      Resolved Hospital Problems   No resolved problems to display.          Hospital Course:  63-year-old female with past medical history of cardiac arrest and anoxic brain injury in 2019 status post tracheostomy and PEG placement, COPD, hypertension was recent admitted to [facility] for respiratory distress.  Patient was found to have large spontaneous pneumothorax on the chest x-ray and chest tube was placed. Subsequently developed large right loculated effusion. Pt was transferred here on .  Patient underwent bronchoscopy and cultures are growing Acinetobacter.  14 Amharic chest tube was placed for drainage of pleural effusion.  Patient was given thrombolytics and dornase as well..  Pleural fluid showed complicated parapneumonic effusions and cultures remain negative.  Patient was not deemed candidate for surgical decortication due to her underlying comorbidities.  She was started on IV meropenem.  Seen by ID with plan to continue IV  antibiotic therapy till 12/27/2024.  Improving and will be discharged back to long-term facility     History of cardiac arrest with anoxic brain injury  Status post trach  Complicated right pleural effusion with Acinetobacter  Patient  had recent hospitalization to [facility] for spontaneous right pneumothorax.  Underwent chest tube placement and eventually developed infected loculated effusion  And also fluid is consistent with complicated parapneumonic effusion  Underwent 14 French chest tube placement with minimal drainage that was eventually removed  Not candidate for decortication per CTS  Culture growing actinobacter  Currently on Merrem  ID consulted.  Plan to continue Merrem till 12/27/2024  Continue scopolamine patch and  robinul for oral secretions. Continue pulmonary toilet  As needed diuresis     Essential hypertension  Continue Coreg      Enteral nutrition  PEG tube replaced by GI service  Continue tube feed     Worsening anemia of chronic disease  Status post 1 unit blood transfusion 1/3/24, hemoglobin has been stable    Addendum 1/9/2024  Patient was supposed to be discharged yesterday.  At time of transfer, she was found to be hypotensive with systolic in the 90s.  Hypotension felt to be secondary to overdiuresis.  Improved with 1 L of normal saline bolus.  Oxygen requirement remained stable at 45 L through trach collar.  Stable for discharge today      Discharge Follow Up Recommendations for outpatient labs/diagnostics:  Facility physician 1 to 2 days  Dr. Sanchez in 2 weeks    Day of Discharge     HPI:   Patient was seen and examined this morning.  Comfortable.  Blood pressure improved after IV fluids given yesterday.  Oxygen saturation is 96% on 5 L nasal cannula.  Remains nonverbal and cannot contribute to HPI  Review of Systems  Unable to obtain review of systems    Vital Signs:   Temp:  [97.8 °F (36.6 °C)-98.8 °F (37.1 °C)] 97.9 °F (36.6 °C)  Heart Rate:  [] 92  Resp:   [16-18] 18  BP: ()/(52-72) 103/59  Flow (L/min):  [5-10] 5      Physical Exam:  General: Nonverbal.    Head: Atraumatic and normocephalic  Eyes: No Icterus. No pallor  Ears:  Ears appear intact with no abnormalities noted  Throat: Tracheostomy in place with lots of secretions   Neck: Supple, trachea midline  Lungs: Diminished air entry with coarse crackles right lung base.    Heart:  Normal S1 and S2, no murmur, no gallop, No JVD, 3+ lower extremity swelling  Abdomen:  Soft, no tenderness, no organomegaly, normal bowel sounds, no organomegaly  Extremities: pulses equal bilaterally  Skin: No bleeding, bruising or rash, normal skin turgor and elasticity  Neurologic: Cranial nerves appear intact with no evidence of facial asymmetry, normal motor and sensory functions in all 4 extremities  Psych: Completely disoriented        Pertinent  and/or Most Recent Results     LAB RESULTS:      Lab 01/09/24  0716 01/08/24  0438 01/07/24  0504 01/06/24  0335 01/05/24  0415   WBC 11.09* 9.64 9.05 9.92 9.98   HEMOGLOBIN 9.6* 10.9* 9.8* 10.1* 9.8*   HEMATOCRIT 30.3* 33.9* 31.4* 31.6* 31.2*   PLATELETS 366 369 468* 504* 489*   NEUTROS ABS 8.18* 5.12 5.42 6.37 6.68   IMMATURE GRANS (ABS) 0.07* 0.11* 0.11* 0.10* 0.15*   LYMPHS ABS 1.74 2.65 2.16 2.10 1.87   MONOS ABS 0.76 1.01* 0.89 0.93* 0.86   EOS ABS 0.29 0.68* 0.43* 0.38 0.35   MCV 93.2 95.8 95.7 93.8 95.4   CRP  --   --  3.70* 8.29*  --          Lab 01/09/24  0716 01/08/24  0439 01/07/24  0504 01/06/24 2027 01/06/24  0335 01/05/24  0415 01/04/24  0512 01/03/24  0354   SODIUM 140 136 134*  --  136 137 139 137   POTASSIUM 4.7 4.4 4.6 5.8* 3.5 3.9 4.4 4.4   CHLORIDE 103 94* 99  --  93* 101 103 101   CO2 29.0 31.0* 27.0  --  31.0* 26.0 27.0 26.0   ANION GAP 8.0 11.0 8.0  --  12.0 10.0 9.0 10.0   BUN 25* 37* 36*  --  30* 17 18 19   CREATININE 0.43* 0.57 0.52*  --  0.55* 0.33* 0.40* 0.40*   EGFR 108.1 101.0 103.3  --  101.9 115.2 110.0 110.0   GLUCOSE 137* 115* 125*  --  131* 88  102* 142*   CALCIUM 8.7 9.4 9.2  --  9.2 9.0 8.8 8.6   MAGNESIUM  --  2.5* 2.7*  --  2.4  --  2.7* 2.6*   PHOSPHORUS  --  3.4 2.8  --  3.8  --   --   --          Lab 01/09/24  0716 01/06/24  0335   TOTAL PROTEIN 6.1 7.1   ALBUMIN 3.0* 3.0*   GLOBULIN 3.1 4.1   ALT (SGPT) 23 19   AST (SGOT) 20 18   BILIRUBIN 0.3 0.5   ALK PHOS 83 85                 Lab 01/03/24  0504   ABO TYPING A   RH TYPING Positive   ANTIBODY SCREEN Negative         Brief Urine Lab Results  (Last result in the past 365 days)        Color   Clarity   Blood   Leuk Est   Nitrite   Protein   CREAT   Urine HCG        12/24/23 1755 Yellow   Cloudy   Moderate (2+)   Small (1+)   Negative   30 mg/dL (1+)                 Microbiology Results (last 10 days)       ** No results found for the last 240 hours. **            XR Chest 1 View    Result Date: 1/5/2024  XR CHEST 1 VW Date of Exam: 1/5/2024 12:47 PM EST Indication: Central line insertion. Comparison: 1/3/2024. Findings: There is been placement of a right-sided subclavian line with the tip terminating deep in the right atrium. There is no pneumothorax. There is a stable tracheostomy tube in place. The heart size is normal. There is a small right pleural effusion reduced in size from yesterday with compressive atelectasis and possible airspace disease. There is also a trace left pleural effusion with atelectasis/airspace disease also improved from yesterday. There may be slight pulmonary vascular congestion similar to yesterday.     Impression: 1. Placement of a right-sided subclavian line with the tip terminating deep in the right atrium. There is no pneumothorax. 2. Bilateral basilar pleural effusions with compressive atelectasis/airspace disease improved from yesterday as described. 3. Questionable slight pulmonary vascular congestion. Electronically Signed: Micheal French MD  1/5/2024 1:43 PM EST  Workstation ID: [id number]    XR Chest 1 View    Result Date: 1/3/2024  XR CHEST 1 VW Date of Exam:  1/3/2024 2:20 AM EST Indication: Pneumonia, pleural effusion. Comparison: 1/2/2024. Findings: It appears that the right basilar pigtail chest tube has been removed. There is a moderate sized right pleural effusion with compressive atelectasis/airspace disease worsened from yesterday. There is a small left pleural effusion with compressive atelectasis/airspace disease similar to yesterday. The heart size is normal. There may be some underlying pulmonary vascular congestion. There is no pneumothorax. There is a stable tracheostomy tube in place.     Impression: 1. It appears that the right basilar pigtail chest tube has been removed. There is a moderate size right pleural effusion with compressive atelectasis/airspace disease worsened from yesterday. 2. Small left pleural effusion with compressive atelectasis/airspace disease similar to yesterday. 3. Questionable mild pulmonary vascular congestion. Electronically Signed: Micheal French MD  1/3/2024 7:58 AM EST  Workstation ID: [id number]    XR Chest 1 View    Result Date: 1/2/2024  XR CHEST 1 VW Date of Exam: 1/2/2024 3:35 AM EST Indication: Pleural effusion Comparison: 1/1/2024 Findings: Tracheostomy stable above the joby. Improved aeration of the lung bases likely resolving atelectasis. Stable small bilateral pleural effusions right greater than left. Chest tube overlying the right lung base. No pneumothorax.     Impression: 1. Improved aeration of the lung bases likely resolving atelectasis. 2. Stable small bilateral pleural effusions right greater than left. 3. Stable chest tube at the right lung base. No pneumothorax. 4. Stable tracheostomy above the joby. Electronically Signed: Juan Daniel Crocker MD  1/2/2024 7:15 AM EST  Workstation ID: [id number]    XR Chest 1 View    Result Date: 1/1/2024  XR CHEST 1 VW Date of Exam: 1/1/2024 4:12 AM EST Indication: Right effusion Comparison: 12/31/2023 Findings: Tracheostomy tube is noted. Right pleural drain remains in place. Right  basilar effusion is slightly improved. Lungs elsewhere appear stable. No pneumothorax is seen.     Impression: Mildly improved right pleural effusion, otherwise stable chest radiograph. Electronically Signed: Chemo Mckeon MD  1/1/2024 8:52 AM EST  Workstation ID: [id number]    XR Chest 1 View    Result Date: 12/31/2023  XR CHEST 1 VW Date of Exam: 12/31/2023 3:45 AM EST Indication: Right effusion Comparison: Chest x-ray 12/30/2023 Findings: Stable medical support devices. Persistent low lung volumes and accentuated cardiomediastinal silhouette which appears grossly unchanged. Similar linear opacity at the left lower lung compatible with atelectasis. Similar moderate right-sided pleural effusion with adjacent hazy opacities throughout the right lung base which may reflect associated atelectasis. There appears to be mild improved aeration at the right lung base from prior exam. No pneumothorax.     Impression: Mild improved aeration at the right lung base without significant interval change otherwise. Electronically Signed: Harmeet Gibson MD  12/31/2023 8:43 AM EST  Workstation ID: [id number]    XR Chest 1 View    Result Date: 12/30/2023  XR CHEST 1 VW Date of Exam: 12/30/2023 1:14 AM EST Indication: Right effusion Comparison: Chest radiograph dated 12/29/2023 Findings: There is a tracheostomy tube in expected position. There is a smallbore right-sided chest tube in unchanged position. There is a stable moderate right-sided pleural effusion and a small left-sided pleural effusion. There is unchanged bandlike atelectasis  within the left lung base. There is no pneumothorax. The cardiomediastinal silhouette is within normal limits. There is aortic arch atherosclerotic calcification. There are degenerative changes of the thoracic spine.     Impression: 1. Right-sided chest tube in expected position with moderate right-sided pleural effusion. Small left-sided pleural effusion. 2. Tracheostomy tube in expected position..  Electronically Signed: Mario Lissa  12/30/2023 8:07 AM EST  Workstation ID: [id number]             Results for orders placed during the hospital encounter of 03/10/19    Transthoracic Echo Complete With Contrast if Necessary Per Protocol    Interpretation Summary  Technically difficult study  1) Borderline LV size with mild reduction in LV systolic function ( EF is 45 to 50%)  2) Mild to moderate left atrial enlargement  3) Trace MR and TR with normal PA pressures  4) Borderline RV size with normal function  5) Global mild hypokinesis of the LV  6) Moderate calcific plaque along ascending aorta      Plan for Follow-up of Pending Labs/Results:     Discharge Details        Discharge Medications        New Medications        Instructions Start Date   meropenem (MERREM) 1000 mg IVPB in 100ml NS (MBP)   1,000 mg, Intravenous, Every 8 Hours      ProSource No Carb liquid   30 mL, Per PEG Tube, Daily      torsemide 20 MG tablet  Commonly known as: DEMADEX   10 mg, Oral, Daily             Changes to Medications        Instructions Start Date   carvedilol 12.5 MG tablet  Commonly known as: COREG  What changed: how much to take   6.25 mg, Per G Tube, 2 Times Daily             Continue These Medications        Instructions Start Date   baclofen 10 MG tablet  Commonly known as: LIORESAL   10 mg, Per G Tube, Every 8 Hours      budesonide 0.5 MG/2ML nebulizer solution  Commonly known as: PULMICORT   0.5 mg, Nebulization, 2 Times Daily - RT      cetirizine 10 MG tablet  Commonly known as: zyrTEC   10 mg, Per G Tube, Daily      docusate sodium 50 mg/5 mL liquid  Commonly known as: COLACE   300 mg, Per G Tube, 2 Times Daily      famotidine 40 mg/5 mL suspension  Commonly known as: PEPCID   20 mg, Per G Tube, 2 Times Daily      FIRST-MOUTHWASH BLM MT   2 mL, Mouth/Throat, 2 Times Daily, To R side of mouth      glycopyrrolate 2 MG tablet  Commonly known as: ROBINUL   2 mg, Per G Tube, 3 Times Daily      hyoscyamine 0.125 MG  tablet  Commonly known as: ANASPAZ,LEVSIN   0.125 mg, Per G Tube, 3 Times Daily      ipratropium-albuterol 0.5-2.5 mg/3 ml nebulizer  Commonly known as: DUO-NEB   3 mL, Nebulization, 4 Times Daily - RT, Takes at [id number]      MiraLax 17 g packet  Generic drug: polyethylene glycol   17 g, 2 Times Daily      montelukast 10 MG tablet  Commonly known as: SINGULAIR   10 mg, Per G Tube, Nightly      multivitamin with iron-minerals (CENTRUM) liquid   15 mL, Per G Tube, Daily      Petrolatum-Zinc Oxide 49-15 % ointment   1 application , Apply externally, 2 Times Daily PRN      Scopolamine 1 MG/3DAYS patch   1 patch, Transdermal, Every 72 Hours      senna 8.6 MG tablet  Commonly known as: SENOKOT   2 tablets, Per PEG Tube, Nightly               No Known Allergies      Discharge Disposition:  Skilled Nursing Facility ([place] - External)    Diet:  Hospital:  Diet Order   Procedures    NPO Diet NPO Type: Strict NPO       Diet Instructions       Diet: Tube Feeding; Continuous; Isosource 1.5 @ 45 mm/h      Discharge Diet: Tube Feeding    Feeding Type: Continuous    Formula & Rate: Isosource 1.5 @ 45 mm/h             Activity:  Activity Instructions       Bedrest With Bathroom Privileges              Restrictions or Other Recommendations:  None        CODE STATUS:    Code Status and Medical Interventions:   Ordered at: 12/25/23 1114     Level Of Support Discussed With:    Next of Kin (If No Surrogate)     Code Status (Patient has no pulse and is not breathing):    CPR (Attempt to Resuscitate)     Medical Interventions (Patient has pulse or is breathing):    Full Support       Future Appointments   Date Time Provider Department Center   1/9/2024  7:30 PM MED 11  [facility]                   Viki Littlejohn MD  01/09/24      Time Spent on Discharge:  I spent  28  minutes on this discharge activity which included: face-to-face encounter with the patient, reviewing the data in the system, coordination of the care  with the nursing staff as well as consultants, documentation, and entering orders.           no

## 2024-01-09 NOTE — CASE MANAGEMENT/SOCIAL WORK
Case Management Discharge Note      Final Note: Per discussion in MDR, pt is medically ready for discharge. I spoke with Shadia in admissions at [facility] and they are able to accept pt back today(LTC bed). Transportation arranged with [facility] to transport pt back to [facility] today(Tues, 1/9) at 1930. PCS in drop box. [facility] retrieved discharge summary, from epic. Per Shadia, the facility can accomodate the IV antibiotics Pt's nurse can call report to  [phone] and send a copy of the discharge summary with pt. Any controlled meds will be escribed to the facility's pharmacy.  I spoke with pt's daughter, Liliya and she is agreeable to discharge plan.      Durable Medical Equipment    No services have been selected for the patient.                Dialysis/Infusion    No services have been selected for the patient.                Home Medical Care    No services have been selected for the patient.                Therapy    No services have been selected for the patient.                Community Resources    No services have been selected for the patient.                Community & DME    No services have been selected for the patient.                    Selected Continued Care - Prior Encounters Includes continued care and service providers with selected services from prior encounters from 9/25/2023 to 1/9/2024      Discharged on 12/24/2023 Admission date: 12/22/2023 - Discharge disposition: Intermediate Care       Destination       Service Provider Selected Services Address Phone Fax Patient Preferred    [facility] [address] [place] [id number] [phone] --                          Transportation Services  Ambulance: [facility]    Final Discharge Disposition Code: 04 - intermediate care facility

## 2024-01-09 NOTE — PLAN OF CARE
Goal Outcome Evaluation:  Plan of Care Reviewed With: other (see comments)      Patient scheduled to go back to [facility] this afternoon.  Upon EMS arrival BP dropped to systolic in the 80's.  Dr. Littlejohn made aware of patient change in condition. One ltr normal saline bolus was given and discharge was cancelled.  Patient's BP improved after bolus dose given.  Will continue with POC....

## 2024-01-18 ENCOUNTER — LAB REQUISITION (OUTPATIENT)
Dept: LAB | Facility: HOSPITAL | Age: 64
End: 2024-01-18
Payer: MEDICARE

## 2024-01-18 DIAGNOSIS — J96.21 ACUTE AND CHRONIC RESPIRATORY FAILURE WITH HYPOXIA: ICD-10-CM

## 2024-01-18 LAB
ALBUMIN SERPL-MCNC: 3.3 G/DL (ref 3.5–5.2)
ALBUMIN/GLOB SERPL: 1 G/DL
ALP SERPL-CCNC: 74 U/L (ref 39–117)
ALT SERPL W P-5'-P-CCNC: 15 U/L (ref 1–33)
ANION GAP SERPL CALCULATED.3IONS-SCNC: 9.2 MMOL/L (ref 5–15)
AST SERPL-CCNC: 15 U/L (ref 1–32)
BASOPHILS # BLD AUTO: 0.05 10*3/MM3 (ref 0–0.2)
BASOPHILS NFR BLD AUTO: 0.6 % (ref 0–1.5)
BILIRUB SERPL-MCNC: 0.2 MG/DL (ref 0–1.2)
BUN SERPL-MCNC: 23 MG/DL (ref 8–23)
BUN/CREAT SERPL: 54.8 (ref 7–25)
CALCIUM SPEC-SCNC: 8.8 MG/DL (ref 8.6–10.5)
CHLORIDE SERPL-SCNC: 101 MMOL/L (ref 98–107)
CO2 SERPL-SCNC: 28.8 MMOL/L (ref 22–29)
CREAT SERPL-MCNC: 0.42 MG/DL (ref 0.57–1)
CRP SERPL-MCNC: 1.92 MG/DL (ref 0–0.5)
DEPRECATED RDW RBC AUTO: 41.7 FL (ref 37–54)
EGFRCR SERPLBLD CKD-EPI 2021: 108.7 ML/MIN/1.73
EOSINOPHIL # BLD AUTO: 0.68 10*3/MM3 (ref 0–0.4)
EOSINOPHIL NFR BLD AUTO: 8.4 % (ref 0.3–6.2)
ERYTHROCYTE [DISTWIDTH] IN BLOOD BY AUTOMATED COUNT: 12.8 % (ref 12.3–15.4)
GLOBULIN UR ELPH-MCNC: 3.3 GM/DL
GLUCOSE SERPL-MCNC: 108 MG/DL (ref 65–99)
HCT VFR BLD AUTO: 26.4 % (ref 34–46.6)
HGB BLD-MCNC: 8.8 G/DL (ref 12–15.9)
IMM GRANULOCYTES # BLD AUTO: 0.04 10*3/MM3 (ref 0–0.05)
IMM GRANULOCYTES NFR BLD AUTO: 0.5 % (ref 0–0.5)
LYMPHOCYTES # BLD AUTO: 1.82 10*3/MM3 (ref 0.7–3.1)
LYMPHOCYTES NFR BLD AUTO: 22.6 % (ref 19.6–45.3)
MCH RBC QN AUTO: 30 PG (ref 26.6–33)
MCHC RBC AUTO-ENTMCNC: 33.3 G/DL (ref 31.5–35.7)
MCV RBC AUTO: 90.1 FL (ref 79–97)
MONOCYTES # BLD AUTO: 0.58 10*3/MM3 (ref 0.1–0.9)
MONOCYTES NFR BLD AUTO: 7.2 % (ref 5–12)
NEUTROPHILS NFR BLD AUTO: 4.9 10*3/MM3 (ref 1.7–7)
NEUTROPHILS NFR BLD AUTO: 60.7 % (ref 42.7–76)
NRBC BLD AUTO-RTO: 0 /100 WBC (ref 0–0.2)
PLATELET # BLD AUTO: 265 10*3/MM3 (ref 140–450)
PMV BLD AUTO: 12.9 FL (ref 6–12)
POTASSIUM SERPL-SCNC: 4.1 MMOL/L (ref 3.5–5.2)
PROT SERPL-MCNC: 6.6 G/DL (ref 6–8.5)
RBC # BLD AUTO: 2.93 10*6/MM3 (ref 3.77–5.28)
SODIUM SERPL-SCNC: 139 MMOL/L (ref 136–145)
WBC NRBC COR # BLD AUTO: 8.07 10*3/MM3 (ref 3.4–10.8)

## 2024-01-18 PROCEDURE — 85025 COMPLETE CBC W/AUTO DIFF WBC: CPT

## 2024-01-18 PROCEDURE — 80053 COMPREHEN METABOLIC PANEL: CPT | Performed by: FAMILY MEDICINE

## 2024-01-18 PROCEDURE — 86140 C-REACTIVE PROTEIN: CPT | Performed by: FAMILY MEDICINE

## 2024-01-21 ENCOUNTER — APPOINTMENT (OUTPATIENT)
Dept: GENERAL RADIOLOGY | Facility: HOSPITAL | Age: 64
End: 2024-01-21
Payer: MEDICARE

## 2024-01-21 ENCOUNTER — HOSPITAL ENCOUNTER (EMERGENCY)
Facility: HOSPITAL | Age: 64
Discharge: HOME | End: 2024-01-21
Attending: EMERGENCY MEDICINE | Admitting: EMERGENCY MEDICINE
Payer: MEDICARE

## 2024-01-21 VITALS
TEMPERATURE: 100.5 F | BODY MASS INDEX: 26.46 KG/M2 | DIASTOLIC BLOOD PRESSURE: 53 MMHG | RESPIRATION RATE: 20 BRPM | HEART RATE: 81 BPM | HEIGHT: 64 IN | SYSTOLIC BLOOD PRESSURE: 120 MMHG | OXYGEN SATURATION: 100 % | WEIGHT: 155 LBS

## 2024-01-21 DIAGNOSIS — J18.9 PNEUMONIA OF RIGHT LUNG DUE TO INFECTIOUS ORGANISM, UNSPECIFIED PART OF LUNG: Primary | ICD-10-CM

## 2024-01-21 LAB
ALBUMIN SERPL-MCNC: 3.3 G/DL (ref 3.5–5.2)
ALBUMIN/GLOB SERPL: 1 G/DL
ALP SERPL-CCNC: 79 U/L (ref 39–117)
ALT SERPL W P-5'-P-CCNC: 14 U/L (ref 1–33)
ANION GAP SERPL CALCULATED.3IONS-SCNC: 7.4 MMOL/L (ref 5–15)
AST SERPL-CCNC: 12 U/L (ref 1–32)
BACTERIA UR QL AUTO: ABNORMAL /HPF
BASOPHILS # BLD AUTO: 0.05 10*3/MM3 (ref 0–0.2)
BASOPHILS NFR BLD AUTO: 0.5 % (ref 0–1.5)
BILIRUB SERPL-MCNC: 0.2 MG/DL (ref 0–1.2)
BILIRUB UR QL STRIP: NEGATIVE
BUN SERPL-MCNC: 28 MG/DL (ref 8–23)
BUN/CREAT SERPL: 54.9 (ref 7–25)
CALCIUM SPEC-SCNC: 8.8 MG/DL (ref 8.6–10.5)
CHLORIDE SERPL-SCNC: 98 MMOL/L (ref 98–107)
CLARITY UR: ABNORMAL
CO2 SERPL-SCNC: 29.6 MMOL/L (ref 22–29)
COLOR UR: YELLOW
CREAT SERPL-MCNC: 0.51 MG/DL (ref 0.57–1)
D-LACTATE SERPL-SCNC: 0.9 MMOL/L (ref 0.5–2)
DEPRECATED RDW RBC AUTO: 43.8 FL (ref 37–54)
EGFRCR SERPLBLD CKD-EPI 2021: 103.7 ML/MIN/1.73
EOSINOPHIL # BLD AUTO: 0.47 10*3/MM3 (ref 0–0.4)
EOSINOPHIL NFR BLD AUTO: 4.4 % (ref 0.3–6.2)
ERYTHROCYTE [DISTWIDTH] IN BLOOD BY AUTOMATED COUNT: 13.1 % (ref 12.3–15.4)
GLOBULIN UR ELPH-MCNC: 3.2 GM/DL
GLUCOSE SERPL-MCNC: 109 MG/DL (ref 65–99)
GLUCOSE UR STRIP-MCNC: NEGATIVE MG/DL
HCT VFR BLD AUTO: 27.4 % (ref 34–46.6)
HGB BLD-MCNC: 8.8 G/DL (ref 12–15.9)
HGB UR QL STRIP.AUTO: ABNORMAL
IMM GRANULOCYTES # BLD AUTO: 0.06 10*3/MM3 (ref 0–0.05)
IMM GRANULOCYTES NFR BLD AUTO: 0.6 % (ref 0–0.5)
KETONES UR QL STRIP: NEGATIVE
LEUKOCYTE ESTERASE UR QL STRIP.AUTO: ABNORMAL
LYMPHOCYTES # BLD AUTO: 2.41 10*3/MM3 (ref 0.7–3.1)
LYMPHOCYTES NFR BLD AUTO: 22.7 % (ref 19.6–45.3)
MCH RBC QN AUTO: 29.7 PG (ref 26.6–33)
MCHC RBC AUTO-ENTMCNC: 32.1 G/DL (ref 31.5–35.7)
MCV RBC AUTO: 92.6 FL (ref 79–97)
MONOCYTES # BLD AUTO: 0.87 10*3/MM3 (ref 0.1–0.9)
MONOCYTES NFR BLD AUTO: 8.2 % (ref 5–12)
NEUTROPHILS NFR BLD AUTO: 6.78 10*3/MM3 (ref 1.7–7)
NEUTROPHILS NFR BLD AUTO: 63.6 % (ref 42.7–76)
NITRITE UR QL STRIP: NEGATIVE
NRBC BLD AUTO-RTO: 0 /100 WBC (ref 0–0.2)
PH UR STRIP.AUTO: 7 [PH] (ref 5–8)
PLATELET # BLD AUTO: 266 10*3/MM3 (ref 140–450)
PMV BLD AUTO: 11.9 FL (ref 6–12)
POTASSIUM SERPL-SCNC: 4.1 MMOL/L (ref 3.5–5.2)
PROCALCITONIN SERPL-MCNC: 0.04 NG/ML (ref 0–0.25)
PROT SERPL-MCNC: 6.5 G/DL (ref 6–8.5)
PROT UR QL STRIP: NEGATIVE
RBC # BLD AUTO: 2.96 10*6/MM3 (ref 3.77–5.28)
RBC # UR STRIP: ABNORMAL /HPF
REF LAB TEST METHOD: ABNORMAL
SODIUM SERPL-SCNC: 135 MMOL/L (ref 136–145)
SP GR UR STRIP: 1.01 (ref 1–1.03)
SQUAMOUS #/AREA URNS HPF: ABNORMAL /HPF
UROBILINOGEN UR QL STRIP: ABNORMAL
WBC # UR STRIP: ABNORMAL /HPF
WBC NRBC COR # BLD AUTO: 10.64 10*3/MM3 (ref 3.4–10.8)

## 2024-01-21 PROCEDURE — 93005 ELECTROCARDIOGRAM TRACING: CPT | Performed by: NURSE PRACTITIONER

## 2024-01-21 PROCEDURE — 85025 COMPLETE CBC W/AUTO DIFF WBC: CPT | Performed by: NURSE PRACTITIONER

## 2024-01-21 PROCEDURE — 99284 EMERGENCY DEPT VISIT MOD MDM: CPT

## 2024-01-21 PROCEDURE — 80053 COMPREHEN METABOLIC PANEL: CPT | Performed by: NURSE PRACTITIONER

## 2024-01-21 PROCEDURE — 84145 PROCALCITONIN (PCT): CPT | Performed by: NURSE PRACTITIONER

## 2024-01-21 PROCEDURE — P9612 CATHETERIZE FOR URINE SPEC: HCPCS

## 2024-01-21 PROCEDURE — 36415 COLL VENOUS BLD VENIPUNCTURE: CPT

## 2024-01-21 PROCEDURE — 81001 URINALYSIS AUTO W/SCOPE: CPT | Performed by: NURSE PRACTITIONER

## 2024-01-21 PROCEDURE — 71045 X-RAY EXAM CHEST 1 VIEW: CPT

## 2024-01-21 PROCEDURE — 83605 ASSAY OF LACTIC ACID: CPT | Performed by: NURSE PRACTITIONER

## 2024-01-21 NOTE — ED PROVIDER NOTES
Subjective  History of Present Illness:    Chief Complaint: Cough, shortness of breath  History of Present Illness: This is a 63-year-old female patient comes into the ED today as a transfer from skilled nursing facility via EMS.  Patient is unable to verbalize due to tracheostomy.  EMS states that patient was sent due to worsening shortness of breath, pneumonia and fever.      Nurses Notes reviewed and agree, including vitals, allergies, social history and prior medical history.       Allergies:    Patient has no known allergies.      Past Surgical History:   Procedure Laterality Date    HYSTERECTOMY      Partial     TRACHEOSTOMY AND PEG TUBE INSERTION N/A 3/20/2019    Procedure: TRACHEOSTOMY AND PERCUTANEOUS ENDOSCOPIC GASTROSTOMY TUBE INSERTION;  Surgeon: Nadira Pandey MD;  Location: [facility];  Service: General         Social History     Socioeconomic History    Marital status: Legally    Tobacco Use    Smoking status: Former     Packs/day: 1     Types: Electronic Cigarette, Cigarettes    Smokeless tobacco: Never   Vaping Use    Vaping Use: Unknown   Substance and Sexual Activity    Alcohol use: Not Currently     Comment: wine     Drug use: No    Sexual activity: Defer         History reviewed. No pertinent family history.    REVIEW OF SYSTEMS: All systems reviewed and not pertinent unless noted.    Review of Systems   Unable to perform ROS: Dementia       Objective    Physical Exam  Vitals and nursing note reviewed.   Constitutional:       Appearance: Normal appearance. She is ill-appearing.   HENT:      Head: Normocephalic and atraumatic.   Eyes:      Extraocular Movements: Extraocular movements intact.      Pupils: Pupils are equal, round, and reactive to light.   Neck:      Trachea: Tracheostomy present.   Cardiovascular:      Rate and Rhythm: Normal rate and regular rhythm.      Pulses: Normal pulses.      Heart sounds: Normal heart sounds.   Pulmonary:      Effort: Pulmonary effort is normal.       Breath sounds: Examination of the right-lower field reveals decreased breath sounds. Decreased breath sounds and wheezing present.   Abdominal:      General: Abdomen is flat. Bowel sounds are normal.      Palpations: Abdomen is soft.   Musculoskeletal:      Cervical back: Normal range of motion and neck supple.   Skin:     Capillary Refill: Capillary refill takes less than 2 seconds.   Neurological:      General: No focal deficit present.      Mental Status: Mental status is at baseline.      Sensory: Sensation is intact.   Psychiatric:         Attention and Perception: Attention and perception normal.         Mood and Affect: Affect normal.         Speech: Speech normal.         Behavior: Behavior is cooperative.           Procedures    ED Course:    ED Course as of 01/21/24 1041   Sun Jan 21, 2024   1034 X-ray shows right atelectatic opacity could be pneumonia. [KH]   1038 Patient had negative lactic acid, negative procalcitonin.  White blood cell count is within normal limits.  X-ray does show some right-sided atelectasis that could be pneumonia.  Patient is being treated with antibiotic for her pneumonia.  After copious amounts of secretions was noted with respiratory was requested to do trach care and inline suction.  Small to moderate amount of secretions were removed and trach site.  Patient was placed on oxygen mask at home levels and immediately had oxygen saturations of 100%. [KH]      ED Course User Index  [KH] Manoj Lubin APRN       Lab Results (last 24 hours)       Procedure Component Value Units Date/Time    Urinalysis With Culture If Indicated - Urine, Catheter [[id number]]  (Abnormal) Collected: 01/21/24 0932    Specimen: Urine, Catheter Updated: 01/21/24 0949     Color, UA Yellow     Appearance, UA Cloudy     pH, UA 7.0     Specific Gravity, UA 1.013     Glucose, UA Negative     Ketones, UA Negative     Bilirubin, UA Negative     Blood, UA Small (1+)     Protein, UA Negative     Leuk  Esterase, UA Small (1+)     Nitrite, UA Negative     Urobilinogen, UA 0.2 E.U./dL    Narrative:      In absence of clinical symptoms, the presence of pyuria, bacteria, and/or nitrites on the urinalysis result does not correlate with infection.    Urinalysis, Microscopic Only - Urine, Catheter [[id number]]  (Abnormal) Collected: 01/21/24 0932    Specimen: Urine, Catheter Updated: 01/21/24 0956     RBC, UA 3-5 /HPF      WBC, UA 0-2 /HPF      Comment: Urine culture not indicated.        Bacteria, UA None Seen /HPF      Squamous Epithelial Cells, UA 13-20 /HPF      Methodology Manual Light Microscopy    CBC & Differential [[id number]]  (Abnormal) Collected: 01/21/24 0935    Specimen: Blood Updated: 01/21/24 0947    Narrative:      The following orders were created for panel order CBC & Differential.  Procedure                               Abnormality         Status                     ---------                               -----------         ------                     CBC Auto Differential[[id number]]        Abnormal            Final result                 Please view results for these tests on the individual orders.    Comprehensive Metabolic Panel [[id number]]  (Abnormal) Collected: 01/21/24 0935    Specimen: Blood Updated: 01/21/24 1007     Glucose 109 mg/dL      BUN 28 mg/dL      Creatinine 0.51 mg/dL      Sodium 135 mmol/L      Potassium 4.1 mmol/L      Chloride 98 mmol/L      CO2 29.6 mmol/L      Calcium 8.8 mg/dL      Total Protein 6.5 g/dL      Albumin 3.3 g/dL      ALT (SGPT) 14 U/L      AST (SGOT) 12 U/L      Alkaline Phosphatase 79 U/L      Total Bilirubin 0.2 mg/dL      Globulin 3.2 gm/dL      A/G Ratio 1.0 g/dL      BUN/Creatinine Ratio 54.9     Anion Gap 7.4 mmol/L      eGFR 103.7 mL/min/1.73     Narrative:      GFR Normal >60  Chronic Kidney Disease <60  Kidney Failure <15      Lactic Acid, Plasma [[id number]]  (Normal) Collected: 01/21/24 0935    Specimen: Blood Updated: 01/21/24 1003     Lactate 0.9  "mmol/L     Procalcitonin [[id number]]  (Normal) Collected: 01/21/24 0935    Specimen: Blood Updated: 01/21/24 1015     Procalcitonin 0.04 ng/mL     Narrative:      As a Marker for Sepsis (Non-Neonates):    1. <0.5 ng/mL represents a low risk of severe sepsis and/or septic shock.  2. >2 ng/mL represents a high risk of severe sepsis and/or septic shock.    As a Marker for Lower Respiratory Tract Infections that require antibiotic therapy:    PCT on Admission    Antibiotic Therapy       6-12 Hrs later    >0.5                Strongly Recommended  >0.25 - <0.5        Recommended   0.1 - 0.25          Discouraged              Remeasure/reassess PCT  <0.1                Strongly Discouraged     Remeasure/reassess PCT    As 28 day mortality risk marker: \"Change in Procalcitonin Result\" (>80% or <=80%) if Day 0 (or Day 1) and Day 4 values are available. Refer to http://www.[facility]-pct-calculator.com    Change in PCT <=80%  A decrease of PCT levels below or equal to 80% defines a positive change in PCT test result representing a higher risk for 28-day all-cause mortality of patients diagnosed with severe sepsis for septic shock.    Change in PCT >80%  A decrease of PCT levels of more than 80% defines a negative change in PCT result representing a lower risk for 28-day all-cause mortality of patients diagnosed with severe sepsis or septic shock.       CBC Auto Differential [[id number]]  (Abnormal) Collected: 01/21/24 0935    Specimen: Blood Updated: 01/21/24 0947     WBC 10.64 10*3/mm3      RBC 2.96 10*6/mm3      Hemoglobin 8.8 g/dL      Hematocrit 27.4 %      MCV 92.6 fL      MCH 29.7 pg      MCHC 32.1 g/dL      RDW 13.1 %      RDW-SD 43.8 fl      MPV 11.9 fL      Platelets 266 10*3/mm3      Neutrophil % 63.6 %      Lymphocyte % 22.7 %      Monocyte % 8.2 %      Eosinophil % 4.4 %      Basophil % 0.5 %      Immature Grans % 0.6 %      Neutrophils, Absolute 6.78 10*3/mm3      Lymphocytes, Absolute 2.41 10*3/mm3      Monocytes, " Absolute 0.87 10*3/mm3      Eosinophils, Absolute 0.47 10*3/mm3      Basophils, Absolute 0.05 10*3/mm3      Immature Grans, Absolute 0.06 10*3/mm3      nRBC 0.0 /100 WBC              XR Chest 1 View    Result Date: 1/21/2024  PROCEDURE: XR CHEST 1 VW-  INDICATION:  Shortness of breath  FINDINGS:  A portable view of the chest was obtained.  Comparison is made to a prior exam dated 1/5/2024.   There is no change in support tubes and lines. Heart size is normal. Lung volumes remain low. There is been interval worsening of right perihilar and right basilar opacity. Left basilar atelectasis is stable. A small right effusion is stable.      Impression: Worsening right perihilar and right basilar opacity. Pneumonia not excluded. Otherwise, no change.   This report was signed and finalized on 1/21/2024 10:24 AM by Maritza Casper MD.        Medical Decision Making  This is a 63-year-old female patient comes into the ED today as a transfer from skilled nursing facility via EMS.  Patient is unable to verbalize due to tracheostomy.  EMS states that patient was sent due to worsening shortness of breath, pneumonia and fever.    DDX: includes but is not limited to: Sepsis, respiratory failure, pneumonia, other    Amount and/or Complexity of Data Reviewed  Independent Historian:      Details: All details obtained came through EMS report  External Data Reviewed:      Details: I have personally reviewed labs, radiology EKG and notes from patient's chart  Labs: ordered. Decision-making details documented in ED Course.     Details: I have personally reviewed and documented all results  Radiology: ordered. Decision-making details documented in ED Course.     Details: I have personally reviewed and documented all results  ECG/medicine tests: ordered. Decision-making details documented in ED Course.     Details: I have personally reviewed and documented all results  Discussion of management or test interpretation with external  provider(s): Discussed assessment, treatment and plan with ER attending    Risk  Risk Details: I have discussed with patient the finding of the test preformed today. Patient has been diagnosed with pneumonia of the right lung and will be discharged home.  Patient requested to follow-up with primary care provider within the next 7 days for reevaluation. Strict return precautions have been given and patient verbalizes understanding                  Final diagnoses:   Pneumonia of right lung due to infectious organism, unspecified part of lung          Manoj Lubin, APRN  01/21/24 1041

## 2024-01-21 NOTE — ED NOTES
Called [facility] EMS at this time requesting non emergent transportation to [facility].

## 2024-02-18 ENCOUNTER — APPOINTMENT (OUTPATIENT)
Dept: GENERAL RADIOLOGY | Facility: HOSPITAL | Age: 64
End: 2024-02-18
Payer: MEDICARE

## 2024-02-18 ENCOUNTER — HOSPITAL ENCOUNTER (EMERGENCY)
Facility: HOSPITAL | Age: 64
Discharge: NURSING, REHAB OR LONG-TERM CARE | End: 2024-02-18
Attending: STUDENT IN AN ORGANIZED HEALTH CARE EDUCATION/TRAINING PROGRAM | Admitting: STUDENT IN AN ORGANIZED HEALTH CARE EDUCATION/TRAINING PROGRAM
Payer: MEDICARE

## 2024-02-18 VITALS
WEIGHT: 155 LBS | TEMPERATURE: 98.6 F | RESPIRATION RATE: 18 BRPM | HEART RATE: 72 BPM | DIASTOLIC BLOOD PRESSURE: 62 MMHG | OXYGEN SATURATION: 100 % | HEIGHT: 64 IN | SYSTOLIC BLOOD PRESSURE: 95 MMHG | BODY MASS INDEX: 26.46 KG/M2

## 2024-02-18 DIAGNOSIS — J06.9 VIRAL URI WITH COUGH: Primary | ICD-10-CM

## 2024-02-18 LAB
ALBUMIN SERPL-MCNC: 3.7 G/DL (ref 3.5–5.2)
ALBUMIN/GLOB SERPL: 1 G/DL
ALP SERPL-CCNC: 97 U/L (ref 39–117)
ALT SERPL W P-5'-P-CCNC: 15 U/L (ref 1–33)
ANION GAP SERPL CALCULATED.3IONS-SCNC: 10.9 MMOL/L (ref 5–15)
AST SERPL-CCNC: 11 U/L (ref 1–32)
BACTERIA UR QL AUTO: ABNORMAL /HPF
BASOPHILS # BLD AUTO: 0.04 10*3/MM3 (ref 0–0.2)
BASOPHILS NFR BLD AUTO: 0.4 % (ref 0–1.5)
BILIRUB SERPL-MCNC: 0.3 MG/DL (ref 0–1.2)
BILIRUB UR QL STRIP: NEGATIVE
BUN SERPL-MCNC: 30 MG/DL (ref 8–23)
BUN/CREAT SERPL: 39 (ref 7–25)
CALCIUM SPEC-SCNC: 9.5 MG/DL (ref 8.6–10.5)
CHLORIDE SERPL-SCNC: 95 MMOL/L (ref 98–107)
CLARITY UR: CLEAR
CO2 SERPL-SCNC: 33.1 MMOL/L (ref 22–29)
COLOR UR: YELLOW
CREAT SERPL-MCNC: 0.77 MG/DL (ref 0.57–1)
CRP SERPL-MCNC: 2.11 MG/DL (ref 0–0.5)
D-LACTATE SERPL-SCNC: 1.2 MMOL/L (ref 0.5–2)
DEPRECATED RDW RBC AUTO: 43 FL (ref 37–54)
EGFRCR SERPLBLD CKD-EPI 2021: 85.7 ML/MIN/1.73
EOSINOPHIL # BLD AUTO: 0.36 10*3/MM3 (ref 0–0.4)
EOSINOPHIL NFR BLD AUTO: 4 % (ref 0.3–6.2)
ERYTHROCYTE [DISTWIDTH] IN BLOOD BY AUTOMATED COUNT: 13.2 % (ref 12.3–15.4)
GLOBULIN UR ELPH-MCNC: 3.6 GM/DL
GLUCOSE SERPL-MCNC: 107 MG/DL (ref 65–99)
GLUCOSE UR STRIP-MCNC: NEGATIVE MG/DL
HCT VFR BLD AUTO: 32 % (ref 34–46.6)
HGB BLD-MCNC: 10.3 G/DL (ref 12–15.9)
HGB UR QL STRIP.AUTO: NEGATIVE
HYALINE CASTS UR QL AUTO: ABNORMAL /LPF
IMM GRANULOCYTES # BLD AUTO: 0.04 10*3/MM3 (ref 0–0.05)
IMM GRANULOCYTES NFR BLD AUTO: 0.4 % (ref 0–0.5)
KETONES UR QL STRIP: NEGATIVE
LEUKOCYTE ESTERASE UR QL STRIP.AUTO: ABNORMAL
LYMPHOCYTES # BLD AUTO: 2.95 10*3/MM3 (ref 0.7–3.1)
LYMPHOCYTES NFR BLD AUTO: 32.6 % (ref 19.6–45.3)
MAGNESIUM SERPL-MCNC: 2.7 MG/DL (ref 1.6–2.4)
MCH RBC QN AUTO: 29.3 PG (ref 26.6–33)
MCHC RBC AUTO-ENTMCNC: 32.2 G/DL (ref 31.5–35.7)
MCV RBC AUTO: 90.9 FL (ref 79–97)
MONOCYTES # BLD AUTO: 0.71 10*3/MM3 (ref 0.1–0.9)
MONOCYTES NFR BLD AUTO: 7.8 % (ref 5–12)
NEUTROPHILS NFR BLD AUTO: 4.95 10*3/MM3 (ref 1.7–7)
NEUTROPHILS NFR BLD AUTO: 54.8 % (ref 42.7–76)
NITRITE UR QL STRIP: NEGATIVE
NRBC BLD AUTO-RTO: 0 /100 WBC (ref 0–0.2)
NT-PROBNP SERPL-MCNC: 142.3 PG/ML (ref 0–900)
PH UR STRIP.AUTO: 6 [PH] (ref 5–8)
PLATELET # BLD AUTO: 285 10*3/MM3 (ref 140–450)
PMV BLD AUTO: 11.5 FL (ref 6–12)
POTASSIUM SERPL-SCNC: 3.9 MMOL/L (ref 3.5–5.2)
PROCALCITONIN SERPL-MCNC: 0.08 NG/ML (ref 0–0.25)
PROT SERPL-MCNC: 7.3 G/DL (ref 6–8.5)
PROT UR QL STRIP: NEGATIVE
RBC # BLD AUTO: 3.52 10*6/MM3 (ref 3.77–5.28)
RBC # UR STRIP: ABNORMAL /HPF
REF LAB TEST METHOD: ABNORMAL
SODIUM SERPL-SCNC: 139 MMOL/L (ref 136–145)
SP GR UR STRIP: 1.01 (ref 1–1.03)
SQUAMOUS #/AREA URNS HPF: ABNORMAL /HPF
TROPONIN T SERPL HS-MCNC: 28 NG/L
UROBILINOGEN UR QL STRIP: ABNORMAL
WBC # UR STRIP: ABNORMAL /HPF
WBC NRBC COR # BLD AUTO: 9.05 10*3/MM3 (ref 3.4–10.8)

## 2024-02-18 PROCEDURE — 83735 ASSAY OF MAGNESIUM: CPT | Performed by: STUDENT IN AN ORGANIZED HEALTH CARE EDUCATION/TRAINING PROGRAM

## 2024-02-18 PROCEDURE — 99284 EMERGENCY DEPT VISIT MOD MDM: CPT

## 2024-02-18 PROCEDURE — 84145 PROCALCITONIN (PCT): CPT | Performed by: STUDENT IN AN ORGANIZED HEALTH CARE EDUCATION/TRAINING PROGRAM

## 2024-02-18 PROCEDURE — 83880 ASSAY OF NATRIURETIC PEPTIDE: CPT | Performed by: STUDENT IN AN ORGANIZED HEALTH CARE EDUCATION/TRAINING PROGRAM

## 2024-02-18 PROCEDURE — 36415 COLL VENOUS BLD VENIPUNCTURE: CPT

## 2024-02-18 PROCEDURE — 86140 C-REACTIVE PROTEIN: CPT | Performed by: STUDENT IN AN ORGANIZED HEALTH CARE EDUCATION/TRAINING PROGRAM

## 2024-02-18 PROCEDURE — 84484 ASSAY OF TROPONIN QUANT: CPT | Performed by: STUDENT IN AN ORGANIZED HEALTH CARE EDUCATION/TRAINING PROGRAM

## 2024-02-18 PROCEDURE — 85025 COMPLETE CBC W/AUTO DIFF WBC: CPT | Performed by: STUDENT IN AN ORGANIZED HEALTH CARE EDUCATION/TRAINING PROGRAM

## 2024-02-18 PROCEDURE — 71045 X-RAY EXAM CHEST 1 VIEW: CPT

## 2024-02-18 PROCEDURE — 93005 ELECTROCARDIOGRAM TRACING: CPT | Performed by: STUDENT IN AN ORGANIZED HEALTH CARE EDUCATION/TRAINING PROGRAM

## 2024-02-18 PROCEDURE — 83605 ASSAY OF LACTIC ACID: CPT | Performed by: STUDENT IN AN ORGANIZED HEALTH CARE EDUCATION/TRAINING PROGRAM

## 2024-02-18 PROCEDURE — 81001 URINALYSIS AUTO W/SCOPE: CPT | Performed by: STUDENT IN AN ORGANIZED HEALTH CARE EDUCATION/TRAINING PROGRAM

## 2024-02-18 PROCEDURE — 80053 COMPREHEN METABOLIC PANEL: CPT | Performed by: STUDENT IN AN ORGANIZED HEALTH CARE EDUCATION/TRAINING PROGRAM

## 2024-02-18 NOTE — ED PROVIDER NOTES
"Subjective:  History of Present Illness:    Patient is a 63-year-old female history of hypertension, CVA with nonverbal status, contractures, recurrent pneumonia who presents today with purulent discharge from her trach.  Patient skilled nursing facility was concerned that she was developing another pneumonia and she now presents to our emergency department for evaluation.  Afebrile on arrival, clear to auscultation, no increased work of breathing noted.  With her history unable to obtain secondary to patient's altered mental status.  At her mental baseline per EMS.      Nurses Notes reviewed and agree, including vitals, allergies, social history and prior medical history.     REVIEW OF SYSTEMS: All systems reviewed and not pertinent unless noted.  Review of Systems   Unable to perform ROS: Dementia       Past Medical History:   Diagnosis Date    COPD (chronic obstructive pulmonary disease)     Hypertension     Pneumonia     Stroke        Allergies:    Patient has no known allergies.      Past Surgical History:   Procedure Laterality Date    HYSTERECTOMY      Partial     TRACHEOSTOMY AND PEG TUBE INSERTION N/A 3/20/2019    Procedure: TRACHEOSTOMY AND PERCUTANEOUS ENDOSCOPIC GASTROSTOMY TUBE INSERTION;  Surgeon: Nadira Pandey MD;  Location: [facility];  Service: General         Social History     Socioeconomic History    Marital status: Legally    Tobacco Use    Smoking status: Former     Packs/day: 1     Types: Electronic Cigarette, Cigarettes    Smokeless tobacco: Never   Vaping Use    Vaping Use: Unknown   Substance and Sexual Activity    Alcohol use: Not Currently     Comment: wine     Drug use: No    Sexual activity: Defer         History reviewed. No pertinent family history.    Objective  Physical Exam:  BP 95/62 (BP Location: Left arm, Patient Position: Lying)   Pulse 72   Temp 98.6 °F (37 °C) (Axillary)   Resp 18   Ht 162.6 cm (64\")   Wt 70.3 kg (155 lb)   SpO2 100%   BMI 26.61 kg/m²  "     Physical Exam  Constitutional:       General: She is not in acute distress.     Appearance: Normal appearance. She is obese. She is not ill-appearing.   HENT:      Head: Normocephalic and atraumatic.      Nose: Nose normal. Congestion present. No rhinorrhea.      Mouth/Throat:      Mouth: Mucous membranes are dry.      Pharynx: Oropharynx is clear. No oropharyngeal exudate or posterior oropharyngeal erythema.   Eyes:      Extraocular Movements: Extraocular movements intact.      Conjunctiva/sclera: Conjunctivae normal.      Pupils: Pupils are equal, round, and reactive to light.   Cardiovascular:      Rate and Rhythm: Normal rate and regular rhythm.      Pulses: Normal pulses.      Heart sounds: Normal heart sounds.   Pulmonary:      Effort: Pulmonary effort is normal. No respiratory distress.      Breath sounds: Normal breath sounds.      Comments: On home trach collar settings, no increased work of breathing noted, clear to auscultation bilateral  Chest:      Chest wall: No tenderness.   Abdominal:      General: Abdomen is flat. Bowel sounds are normal. There is no distension.      Palpations: Abdomen is soft.      Tenderness: There is no abdominal tenderness. There is no guarding or rebound.   Musculoskeletal:         General: No swelling or tenderness. Normal range of motion.      Cervical back: Normal range of motion and neck supple.      Right lower leg: No edema.      Left lower leg: No edema.   Skin:     General: Skin is warm and dry.      Capillary Refill: Capillary refill takes less than 2 seconds.   Neurological:      Mental Status: She is alert. Mental status is at baseline. She is disoriented.      Comments: Patient with contractures to all legs and arms, nonverbal, patient's baseline per EMS   Psychiatric:      Comments: Unable to assess given the patient's altered mental status         Procedures    ED Course:    ED Course as of 02/18/24 2141   Sun Feb 18, 2024   1839 EKG interpreted by me, normal  sinus rhythm no concerning ST changes noted, rate of 81 [JE]      ED Course User Index  [JE] Delio Hale MD       Lab Results (last 24 hours)       Procedure Component Value Units Date/Time    Urinalysis With Culture If Indicated - Urine, Clean Catch [[id number]]  (Abnormal) Collected: 02/18/24 1850    Specimen: Urine, Clean Catch Updated: 02/18/24 1901     Color, UA Yellow     Appearance, UA Clear     pH, UA 6.0     Specific Gravity, UA 1.009     Glucose, UA Negative     Ketones, UA Negative     Bilirubin, UA Negative     Blood, UA Negative     Protein, UA Negative     Leuk Esterase, UA Moderate (2+)     Nitrite, UA Negative     Urobilinogen, UA 0.2 E.U./dL    Narrative:      In absence of clinical symptoms, the presence of pyuria, bacteria, and/or nitrites on the urinalysis result does not correlate with infection.    Urinalysis, Microscopic Only - Urine, Clean Catch [[id number]]  (Abnormal) Collected: 02/18/24 1850    Specimen: Urine, Clean Catch Updated: 02/18/24 1909     RBC, UA None Seen /HPF      WBC, UA 3-5 /HPF      Comment: Urine culture not indicated.        Bacteria, UA 3+ /HPF      Squamous Epithelial Cells, UA 3-6 /HPF      Hyaline Casts, UA None Seen /LPF      Methodology Manual Light Microscopy    CBC & Differential [[id number]]  (Abnormal) Collected: 02/18/24 1855    Specimen: Blood Updated: 02/18/24 1900    Narrative:      The following orders were created for panel order CBC & Differential.  Procedure                               Abnormality         Status                     ---------                               -----------         ------                     CBC Auto Differential[[id number]]        Abnormal            Final result                 Please view results for these tests on the individual orders.    Comprehensive Metabolic Panel [[id number]]  (Abnormal) Collected: 02/18/24 1855    Specimen: Blood Updated: 02/18/24 1917     Glucose 107 mg/dL      BUN 30 mg/dL      Creatinine 0.77  mg/dL      Sodium 139 mmol/L      Potassium 3.9 mmol/L      Chloride 95 mmol/L      CO2 33.1 mmol/L      Calcium 9.5 mg/dL      Total Protein 7.3 g/dL      Albumin 3.7 g/dL      ALT (SGPT) 15 U/L      AST (SGOT) 11 U/L      Alkaline Phosphatase 97 U/L      Total Bilirubin 0.3 mg/dL      Globulin 3.6 gm/dL      A/G Ratio 1.0 g/dL      BUN/Creatinine Ratio 39.0     Anion Gap 10.9 mmol/L      eGFR 85.7 mL/min/1.73     Narrative:      GFR Normal >60  Chronic Kidney Disease <60  Kidney Failure <15      BNP [[id number]]  (Normal) Collected: 02/18/24 1855    Specimen: Blood Updated: 02/18/24 1920     proBNP 142.3 pg/mL     Narrative:      This assay is used as an aid in the diagnosis of individuals suspected of having heart failure. It can be used as an aid in the diagnosis of acute decompensated heart failure (ADHF) in patients presenting with signs and symptoms of ADHF to the emergency department (ED). In addition, NT-proBNP of <300 pg/mL indicates ADHF is not likely.    Age Range Result Interpretation  NT-proBNP Concentration (pg/mL:      <50             Positive            >450                   Gray                 300-450                    Negative             <300    50-75           Positive            >900                  Gray                300-900                  Negative            <300      >75             Positive            >1800                  Gray                300-1800                  Negative            <300    High Sensitivity Troponin T [[id number]]  (Abnormal) Collected: 02/18/24 1855    Specimen: Blood Updated: 02/18/24 1920     HS Troponin T 28 ng/L     Narrative:      High Sensitive Troponin T Reference Range:  <14.0 ng/L- Negative Female for AMI  <22.0 ng/L- Negative Male for AMI  >=14 - Abnormal Female indicating possible myocardial injury.  >=22 - Abnormal Male indicating possible myocardial injury.   Clinicians would have to utilize clinical acumen, EKG, Troponin, and serial changes to  "determine if it is an Acute Myocardial Infarction or myocardial injury due to an underlying chronic condition.         C-reactive Protein [[id number]]  (Abnormal) Collected: 02/18/24 1855    Specimen: Blood Updated: 02/18/24 1917     C-Reactive Protein 2.11 mg/dL     Procalcitonin [[id number]]  (Normal) Collected: 02/18/24 1855    Specimen: Blood Updated: 02/18/24 1948     Procalcitonin 0.08 ng/mL     Narrative:      As a Marker for Sepsis (Non-Neonates):    1. <0.5 ng/mL represents a low risk of severe sepsis and/or septic shock.  2. >2 ng/mL represents a high risk of severe sepsis and/or septic shock.    As a Marker for Lower Respiratory Tract Infections that require antibiotic therapy:    PCT on Admission    Antibiotic Therapy       6-12 Hrs later    >0.5                Strongly Recommended  >0.25 - <0.5        Recommended   0.1 - 0.25          Discouraged              Remeasure/reassess PCT  <0.1                Strongly Discouraged     Remeasure/reassess PCT    As 28 day mortality risk marker: \"Change in Procalcitonin Result\" (>80% or <=80%) if Day 0 (or Day 1) and Day 4 values are available. Refer to http://www.[employer]-pct-calculator.com    Change in PCT <=80%  A decrease of PCT levels below or equal to 80% defines a positive change in PCT test result representing a higher risk for 28-day all-cause mortality of patients diagnosed with severe sepsis for septic shock.    Change in PCT >80%  A decrease of PCT levels of more than 80% defines a negative change in PCT result representing a lower risk for 28-day all-cause mortality of patients diagnosed with severe sepsis or septic shock.       Lactic Acid, Plasma [[id number]]  (Normal) Collected: 02/18/24 1855    Specimen: Blood Updated: 02/18/24 1915     Lactate 1.2 mmol/L     Magnesium [[id number]]  (Abnormal) Collected: 02/18/24 1855    Specimen: Blood Updated: 02/18/24 1917     Magnesium 2.7 mg/dL     CBC Auto Differential [[id number]]  (Abnormal) Collected: 02/18/24 " 1855    Specimen: Blood Updated: 02/18/24 1900     WBC 9.05 10*3/mm3      RBC 3.52 10*6/mm3      Hemoglobin 10.3 g/dL      Hematocrit 32.0 %      MCV 90.9 fL      MCH 29.3 pg      MCHC 32.2 g/dL      RDW 13.2 %      RDW-SD 43.0 fl      MPV 11.5 fL      Platelets 285 10*3/mm3      Neutrophil % 54.8 %      Lymphocyte % 32.6 %      Monocyte % 7.8 %      Eosinophil % 4.0 %      Basophil % 0.4 %      Immature Grans % 0.4 %      Neutrophils, Absolute 4.95 10*3/mm3      Lymphocytes, Absolute 2.95 10*3/mm3      Monocytes, Absolute 0.71 10*3/mm3      Eosinophils, Absolute 0.36 10*3/mm3      Basophils, Absolute 0.04 10*3/mm3      Immature Grans, Absolute 0.04 10*3/mm3      nRBC 0.0 /100 WBC              No radiology results from the last 24 hrs       MDM     Amount and/or Complexity of Data Reviewed  Decide to obtain previous medical records or to obtain history from someone other than the patient: yes  Independent visualization of images, tracings, or specimens: yes        Initial impression of presenting illness: Cough, purulence    DDX: includes but is not limited to: Pneumonia, viral URI clear to auscultation    Patient arrives stable with vitals interpreted by myself.     Pertinent features from physical exam: Clear to auscultation, regular rate and rhythm, no murmur, patient stable on home O2 settings, stable neuroexam.    Initial diagnostic plan: CBC, CMP, troponin, CRP, Pro-Teo, lactic acid, magnesium, EKG, chest x-ray    Results from initial plan were reviewed and interpreted by me revealing no concern for bacterial pneumonia on lab work or chest x-ray    Diagnostic information from other sources: Reviewed past medical records and discussed with EMS on arrival    Interventions / Re-evaluation: Observed in emergency department for over 3 hours no change in vital signs    Results/clinical rationale were discussed with facility during nursing report    Consultations/Discussion of results with other physicians: No  concern for bacterial pneumonia at this time, patient's chest x-ray clear.  Suspect viral illness is etiology of patient's increased tracheal secretions.  Encouraged patient to follow-up with her chronic care providers at facility.    Disposition plan: Discharge  -----        Final diagnoses:   Viral URI with cough          Delio Hale MD  02/18/24 2244

## 2024-02-19 NOTE — DISCHARGE INSTRUCTIONS
Viji was evaluated due to concerns for pneumonia.  We got labs and chest x-ray that showed no concern for pneumonia or bacterial infection.  She is now stable for discharge.  We believe that she may have a viral illness which has been the etiology of her increased oral secretions.  Would have her continue to follow with her chronic care providers.  She is now stable for discharge.

## 2024-02-19 NOTE — ED NOTES
Report given to [facility] and [facility] at this time

## 2024-07-12 ENCOUNTER — LAB REQUISITION (OUTPATIENT)
Dept: LAB | Facility: HOSPITAL | Age: 64
End: 2024-07-12
Payer: MEDICARE

## 2024-07-12 ENCOUNTER — APPOINTMENT (OUTPATIENT)
Dept: GENERAL RADIOLOGY | Facility: HOSPITAL | Age: 64
End: 2024-07-12
Payer: MEDICARE

## 2024-07-12 ENCOUNTER — HOSPITAL ENCOUNTER (EMERGENCY)
Facility: HOSPITAL | Age: 64
Discharge: HOME | End: 2024-07-12
Attending: STUDENT IN AN ORGANIZED HEALTH CARE EDUCATION/TRAINING PROGRAM
Payer: MEDICARE

## 2024-07-12 VITALS
TEMPERATURE: 99.4 F | WEIGHT: 155 LBS | DIASTOLIC BLOOD PRESSURE: 55 MMHG | SYSTOLIC BLOOD PRESSURE: 99 MMHG | OXYGEN SATURATION: 98 % | RESPIRATION RATE: 20 BRPM | BODY MASS INDEX: 26.46 KG/M2 | HEART RATE: 86 BPM | HEIGHT: 64 IN

## 2024-07-12 DIAGNOSIS — N39.0 URINARY TRACT INFECTION, SITE NOT SPECIFIED: ICD-10-CM

## 2024-07-12 DIAGNOSIS — N30.00 ACUTE CYSTITIS WITHOUT HEMATURIA: Primary | ICD-10-CM

## 2024-07-12 LAB
ALBUMIN SERPL-MCNC: 3.5 G/DL (ref 3.5–5.2)
ALBUMIN/GLOB SERPL: 1 G/DL
ALP SERPL-CCNC: 96 U/L (ref 39–117)
ALT SERPL W P-5'-P-CCNC: 24 U/L (ref 1–33)
ANION GAP SERPL CALCULATED.3IONS-SCNC: 10.8 MMOL/L (ref 5–15)
AST SERPL-CCNC: 18 U/L (ref 1–32)
BACTERIA UR QL AUTO: ABNORMAL /HPF
BACTERIA UR QL AUTO: ABNORMAL /HPF
BASOPHILS # BLD AUTO: 0.06 10*3/MM3 (ref 0–0.2)
BASOPHILS NFR BLD AUTO: 0.6 % (ref 0–1.5)
BILIRUB SERPL-MCNC: 0.3 MG/DL (ref 0–1.2)
BILIRUB UR QL STRIP: NEGATIVE
BILIRUB UR QL STRIP: NEGATIVE
BUN SERPL-MCNC: 27 MG/DL (ref 8–23)
BUN/CREAT SERPL: 43.5 (ref 7–25)
CALCIUM SPEC-SCNC: 9.2 MG/DL (ref 8.6–10.5)
CHLORIDE SERPL-SCNC: 100 MMOL/L (ref 98–107)
CLARITY UR: ABNORMAL
CLARITY UR: CLEAR
CO2 SERPL-SCNC: 28.2 MMOL/L (ref 22–29)
COLOR UR: YELLOW
COLOR UR: YELLOW
CREAT SERPL-MCNC: 0.62 MG/DL (ref 0.57–1)
CRP SERPL-MCNC: 2.82 MG/DL (ref 0–0.5)
D-LACTATE SERPL-SCNC: 1.2 MMOL/L (ref 0.5–2)
DEPRECATED RDW RBC AUTO: 48.2 FL (ref 37–54)
EGFRCR SERPLBLD CKD-EPI 2021: 99 ML/MIN/1.73
EOSINOPHIL # BLD AUTO: 0.27 10*3/MM3 (ref 0–0.4)
EOSINOPHIL NFR BLD AUTO: 2.6 % (ref 0.3–6.2)
ERYTHROCYTE [DISTWIDTH] IN BLOOD BY AUTOMATED COUNT: 14 % (ref 12.3–15.4)
FLUAV SUBTYP SPEC NAA+PROBE: NOT DETECTED
FLUBV RNA ISLT QL NAA+PROBE: NOT DETECTED
GLOBULIN UR ELPH-MCNC: 3.5 GM/DL
GLUCOSE SERPL-MCNC: 123 MG/DL (ref 65–99)
GLUCOSE UR STRIP-MCNC: NEGATIVE MG/DL
GLUCOSE UR STRIP-MCNC: NEGATIVE MG/DL
HCT VFR BLD AUTO: 29.6 % (ref 34–46.6)
HGB BLD-MCNC: 9.3 G/DL (ref 12–15.9)
HGB UR QL STRIP.AUTO: NEGATIVE
HGB UR QL STRIP.AUTO: NEGATIVE
HYALINE CASTS UR QL AUTO: ABNORMAL /LPF
HYALINE CASTS UR QL AUTO: ABNORMAL /LPF
IMM GRANULOCYTES # BLD AUTO: 0.06 10*3/MM3 (ref 0–0.05)
IMM GRANULOCYTES NFR BLD AUTO: 0.6 % (ref 0–0.5)
KETONES UR QL STRIP: NEGATIVE
KETONES UR QL STRIP: NEGATIVE
LEUKOCYTE ESTERASE UR QL STRIP.AUTO: ABNORMAL
LEUKOCYTE ESTERASE UR QL STRIP.AUTO: ABNORMAL
LYMPHOCYTES # BLD AUTO: 2.19 10*3/MM3 (ref 0.7–3.1)
LYMPHOCYTES NFR BLD AUTO: 20.9 % (ref 19.6–45.3)
MAGNESIUM SERPL-MCNC: 2.4 MG/DL (ref 1.6–2.4)
MCH RBC QN AUTO: 29.3 PG (ref 26.6–33)
MCHC RBC AUTO-ENTMCNC: 31.4 G/DL (ref 31.5–35.7)
MCV RBC AUTO: 93.4 FL (ref 79–97)
MONOCYTES # BLD AUTO: 0.8 10*3/MM3 (ref 0.1–0.9)
MONOCYTES NFR BLD AUTO: 7.6 % (ref 5–12)
MUCOUS THREADS URNS QL MICRO: ABNORMAL /HPF
NEUTROPHILS NFR BLD AUTO: 67.7 % (ref 42.7–76)
NEUTROPHILS NFR BLD AUTO: 7.09 10*3/MM3 (ref 1.7–7)
NITRITE UR QL STRIP: NEGATIVE
NITRITE UR QL STRIP: NEGATIVE
NRBC BLD AUTO-RTO: 0 /100 WBC (ref 0–0.2)
NT-PROBNP SERPL-MCNC: 200.1 PG/ML (ref 0–900)
PH UR STRIP.AUTO: 6.5 [PH] (ref 5–8)
PH UR STRIP.AUTO: 7 [PH] (ref 5–8)
PLATELET # BLD AUTO: 260 10*3/MM3 (ref 140–450)
PMV BLD AUTO: 13 FL (ref 6–12)
POTASSIUM SERPL-SCNC: 4.5 MMOL/L (ref 3.5–5.2)
PROCALCITONIN SERPL-MCNC: 0.07 NG/ML (ref 0–0.25)
PROT SERPL-MCNC: 7 G/DL (ref 6–8.5)
PROT UR QL STRIP: NEGATIVE
PROT UR QL STRIP: NEGATIVE
RBC # BLD AUTO: 3.17 10*6/MM3 (ref 3.77–5.28)
RBC # UR STRIP: ABNORMAL /HPF
RBC # UR STRIP: ABNORMAL /HPF
REF LAB TEST METHOD: ABNORMAL
REF LAB TEST METHOD: ABNORMAL
SARS-COV-2 RNA RESP QL NAA+PROBE: NOT DETECTED
SODIUM SERPL-SCNC: 139 MMOL/L (ref 136–145)
SP GR UR STRIP: 1.01 (ref 1–1.03)
SP GR UR STRIP: 1.01 (ref 1–1.03)
SQUAMOUS #/AREA URNS HPF: ABNORMAL /HPF
SQUAMOUS #/AREA URNS HPF: ABNORMAL /HPF
TROPONIN T SERPL HS-MCNC: 31 NG/L
UROBILINOGEN UR QL STRIP: ABNORMAL
UROBILINOGEN UR QL STRIP: ABNORMAL
WBC # UR STRIP: ABNORMAL /HPF
WBC # UR STRIP: ABNORMAL /HPF
WBC NRBC COR # BLD AUTO: 10.47 10*3/MM3 (ref 3.4–10.8)

## 2024-07-12 PROCEDURE — 84484 ASSAY OF TROPONIN QUANT: CPT | Performed by: STUDENT IN AN ORGANIZED HEALTH CARE EDUCATION/TRAINING PROGRAM

## 2024-07-12 PROCEDURE — 87186 SC STD MICRODIL/AGAR DIL: CPT | Performed by: FAMILY MEDICINE

## 2024-07-12 PROCEDURE — 81001 URINALYSIS AUTO W/SCOPE: CPT | Performed by: FAMILY MEDICINE

## 2024-07-12 PROCEDURE — 80053 COMPREHEN METABOLIC PANEL: CPT | Performed by: STUDENT IN AN ORGANIZED HEALTH CARE EDUCATION/TRAINING PROGRAM

## 2024-07-12 PROCEDURE — 71045 X-RAY EXAM CHEST 1 VIEW: CPT

## 2024-07-12 PROCEDURE — 83880 ASSAY OF NATRIURETIC PEPTIDE: CPT | Performed by: STUDENT IN AN ORGANIZED HEALTH CARE EDUCATION/TRAINING PROGRAM

## 2024-07-12 PROCEDURE — 87086 URINE CULTURE/COLONY COUNT: CPT | Performed by: STUDENT IN AN ORGANIZED HEALTH CARE EDUCATION/TRAINING PROGRAM

## 2024-07-12 PROCEDURE — 83735 ASSAY OF MAGNESIUM: CPT | Performed by: STUDENT IN AN ORGANIZED HEALTH CARE EDUCATION/TRAINING PROGRAM

## 2024-07-12 PROCEDURE — 87636 SARSCOV2 & INF A&B AMP PRB: CPT | Performed by: STUDENT IN AN ORGANIZED HEALTH CARE EDUCATION/TRAINING PROGRAM

## 2024-07-12 PROCEDURE — 87181 SC STD AGAR DILUTION PER AGT: CPT | Performed by: FAMILY MEDICINE

## 2024-07-12 PROCEDURE — 87077 CULTURE AEROBIC IDENTIFY: CPT | Performed by: FAMILY MEDICINE

## 2024-07-12 PROCEDURE — 83605 ASSAY OF LACTIC ACID: CPT | Performed by: STUDENT IN AN ORGANIZED HEALTH CARE EDUCATION/TRAINING PROGRAM

## 2024-07-12 PROCEDURE — 86140 C-REACTIVE PROTEIN: CPT | Performed by: STUDENT IN AN ORGANIZED HEALTH CARE EDUCATION/TRAINING PROGRAM

## 2024-07-12 PROCEDURE — 85025 COMPLETE CBC W/AUTO DIFF WBC: CPT | Performed by: STUDENT IN AN ORGANIZED HEALTH CARE EDUCATION/TRAINING PROGRAM

## 2024-07-12 PROCEDURE — 36415 COLL VENOUS BLD VENIPUNCTURE: CPT

## 2024-07-12 PROCEDURE — 87077 CULTURE AEROBIC IDENTIFY: CPT | Performed by: STUDENT IN AN ORGANIZED HEALTH CARE EDUCATION/TRAINING PROGRAM

## 2024-07-12 PROCEDURE — 99284 EMERGENCY DEPT VISIT MOD MDM: CPT

## 2024-07-12 PROCEDURE — P9612 CATHETERIZE FOR URINE SPEC: HCPCS

## 2024-07-12 PROCEDURE — 93005 ELECTROCARDIOGRAM TRACING: CPT | Performed by: STUDENT IN AN ORGANIZED HEALTH CARE EDUCATION/TRAINING PROGRAM

## 2024-07-12 PROCEDURE — 84145 PROCALCITONIN (PCT): CPT | Performed by: STUDENT IN AN ORGANIZED HEALTH CARE EDUCATION/TRAINING PROGRAM

## 2024-07-12 PROCEDURE — 81001 URINALYSIS AUTO W/SCOPE: CPT | Performed by: STUDENT IN AN ORGANIZED HEALTH CARE EDUCATION/TRAINING PROGRAM

## 2024-07-12 PROCEDURE — 87186 SC STD MICRODIL/AGAR DIL: CPT | Performed by: STUDENT IN AN ORGANIZED HEALTH CARE EDUCATION/TRAINING PROGRAM

## 2024-07-12 PROCEDURE — 87086 URINE CULTURE/COLONY COUNT: CPT | Performed by: FAMILY MEDICINE

## 2024-07-12 RX ORDER — SULFAMETHOXAZOLE AND TRIMETHOPRIM 200; 40 MG/5ML; MG/5ML
160 SUSPENSION ORAL 2 TIMES DAILY
Qty: 280 ML | Refills: 0 | Status: SHIPPED | OUTPATIENT
Start: 2024-07-12 | End: 2024-07-19

## 2024-07-12 RX ORDER — SULFAMETHOXAZOLE AND TRIMETHOPRIM 200; 40 MG/5ML; MG/5ML
160 SUSPENSION ORAL ONCE
Status: COMPLETED | OUTPATIENT
Start: 2024-07-12 | End: 2024-07-12

## 2024-07-12 RX ADMIN — SULFAMETHOXAZOLE AND TRIMETHOPRIM 160 MG: 200; 40 SUSPENSION ORAL at 07:57

## 2024-07-12 NOTE — DISCHARGE INSTRUCTIONS
Viji was evaluated for fever.  We got labs tested your urine and got a chest x-ray.  This showed that she had a urinary tract infection.  We have given her a dose of Bactrim in the emergency department she is now stable for discharge.  Will treat her with Bactrim for 7 days based on her past susceptibilities that are available to me here in [place].  She continues to have fever in spite of this antibiotic therapy please [place] to emergency department for further evaluation.  You are now stable for discharge.

## 2024-07-12 NOTE — ED NOTES
Called [place] dispatch att requesting non emergent transport back to [facility].

## 2024-07-12 NOTE — ED PROVIDER NOTES
"Subjective:  History of Present Illness:    Patient is a 63-year-old female history of nonverbal status, tracheostomy secondary to CVA, anoxic brain injury secondary to cardiac arrest, hypertension, COPD, past history of pneumonia who presents today with fever.  On vitals checked this morning patient was noted to have a fever of 101.4.  Transported to our emergency department.  Patient has sputum production to her trach but per EMS this is normal for the patient.  No other abnormal behavior.  Further history not be obtained secondary to patient's nonverbal status.      Nurses Notes reviewed and agree, including vitals, allergies, social history and prior medical history.     REVIEW OF SYSTEMS: All systems reviewed and not pertinent unless noted.  Review of Systems   Unable to perform ROS: Patient nonverbal       Past Medical History:   Diagnosis Date    COPD (chronic obstructive pulmonary disease)     Hypertension     Pneumonia     Stroke        Allergies:    Patient has no known allergies.      Past Surgical History:   Procedure Laterality Date    HYSTERECTOMY      Partial     TRACHEOSTOMY AND PEG TUBE INSERTION N/A 3/20/2019    Procedure: TRACHEOSTOMY AND PERCUTANEOUS ENDOSCOPIC GASTROSTOMY TUBE INSERTION;  Surgeon: Nadira Pandey MD;  Location: [facility];  Service: General         Social History     Socioeconomic History    Marital status: Legally    Tobacco Use    Smoking status: Former     Current packs/day: 1.00     Types: Electronic Cigarette, Cigarettes    Smokeless tobacco: Never   Vaping Use    Vaping status: Unknown   Substance and Sexual Activity    Alcohol use: Not Currently     Comment: wine     Drug use: No    Sexual activity: Defer         History reviewed. No pertinent family history.    Objective  Physical Exam:  /49   Pulse 78   Temp 99.4 °F (37.4 °C) (Axillary)   Resp 20   Ht 162.6 cm (64\")   Wt 70.3 kg (155 lb)   SpO2 99%   BMI 26.61 kg/m²      Physical " Exam  Constitutional:       General: She is not in acute distress.     Appearance: She is obese. She is ill-appearing. She is not toxic-appearing.   HENT:      Head: Normocephalic and atraumatic.      Nose: Nose normal. No congestion or rhinorrhea.      Mouth/Throat:      Mouth: Mucous membranes are dry.      Pharynx: Oropharynx is clear. No oropharyngeal exudate or posterior oropharyngeal erythema.   Eyes:      Extraocular Movements: Extraocular movements intact.      Conjunctiva/sclera: Conjunctivae normal.      Pupils: Pupils are equal, round, and reactive to light.   Cardiovascular:      Rate and Rhythm: Normal rate and regular rhythm.      Pulses: Normal pulses.      Heart sounds: Normal heart sounds.   Pulmonary:      Effort: Pulmonary effort is normal. No respiratory distress.      Breath sounds: Normal breath sounds.   Abdominal:      General: Abdomen is flat. Bowel sounds are normal. There is no distension.      Palpations: Abdomen is soft.      Tenderness: There is no abdominal tenderness. There is no guarding or rebound.   Musculoskeletal:         General: No swelling or tenderness. Normal range of motion.      Cervical back: Normal range of motion and neck supple.   Skin:     General: Skin is warm and dry.      Capillary Refill: Capillary refill takes less than 2 seconds.      Comments: No sacral decubitus noted on exam   Neurological:      Mental Status: She is alert.      Comments: Patient nonverbal at baseline with contractures to all 4 extremities.  This is the patient's mental baseline per EMS   Psychiatric:      Comments: Unable to assess given the patient's nonverbal status.         Procedures    ED Course:    ED Course as of 07/12/24 0754   Fri Jul 12, 2024   0654 EKG interpreted by me, normal sinus rhythm no concerning ST changes noted, rate of 87 [JE]   0703 Per my review of patient's H&H, hemoglobin stable. [JE]   0715 No acute process per my depend interpretation of patient's chest x-ray  prior to radiology overread [JE]      ED Course User Index  [JE] Delio Hale MD       Lab Results (last 24 hours)       Procedure Component Value Units Date/Time    Urinalysis With Culture If Indicated - Urine, Catheter [[id number]]  (Abnormal) Collected: 07/12/24 0646    Specimen: Urine, Catheter Updated: 07/12/24 0657     Color, UA Yellow     Appearance, UA Cloudy     pH, UA 7.0     Specific Gravity, UA 1.010     Glucose, UA Negative     Ketones, UA Negative     Bilirubin, UA Negative     Blood, UA Negative     Protein, UA Negative     Leuk Esterase, UA Large (3+)     Nitrite, UA Negative     Urobilinogen, UA 0.2 E.U./dL    Narrative:      In absence of clinical symptoms, the presence of pyuria, bacteria, and/or nitrites on the urinalysis result does not correlate with infection.    Urinalysis, Microscopic Only - Urine, Catheter [[id number]]  (Abnormal) Collected: 07/12/24 0646    Specimen: Urine, Catheter Updated: 07/12/24 0704     RBC, UA None Seen /HPF      WBC, UA 21-50 /HPF      Bacteria, UA 3+ /HPF      Squamous Epithelial Cells, UA 7-12 /HPF      Hyaline Casts, UA None Seen /LPF      Mucus, UA Trace /HPF      Methodology Manual Light Microscopy    Urine Culture - Urine, Urine, Catheter [[id number]] Collected: 07/12/24 0646    Specimen: Urine, Catheter Updated: 07/12/24 0704    COVID-19 and FLU A/B PCR, 1 HR TAT - Swab, Nasopharynx [[id number]]  (Normal) Collected: 07/12/24 0647    Specimen: Swab from Nasopharynx Updated: 07/12/24 0714     COVID19 Not Detected     Influenza A PCR Not Detected     Influenza B PCR Not Detected    Narrative:      Fact sheet for providers: https://www.fda.gov/media/457082/download    Fact sheet for patients: https://www.fda.gov/media/996958/download    Test performed by PCR.    CBC & Differential [[id number]]  (Abnormal) Collected: 07/12/24 0654    Specimen: Blood Updated: 07/12/24 0702    Narrative:      The following orders were created for panel order CBC &  Differential.  Procedure                               Abnormality         Status                     ---------                               -----------         ------                     CBC Auto Differential[[id number]]        Abnormal            Final result               Scan Slide[[id number]]                                                                    Please view results for these tests on the individual orders.    Comprehensive Metabolic Panel [[id number]]  (Abnormal) Collected: 07/12/24 0654    Specimen: Blood Updated: 07/12/24 0722     Glucose 123 mg/dL      BUN 27 mg/dL      Creatinine 0.62 mg/dL      Sodium 139 mmol/L      Potassium 4.5 mmol/L      Comment: Slight hemolysis detected by analyzer. Result may be falsely elevated.        Chloride 100 mmol/L      CO2 28.2 mmol/L      Calcium 9.2 mg/dL      Total Protein 7.0 g/dL      Albumin 3.5 g/dL      ALT (SGPT) 24 U/L      AST (SGOT) 18 U/L      Comment: Slight hemolysis detected by analyzer. Result may be falsely elevated.        Alkaline Phosphatase 96 U/L      Total Bilirubin 0.3 mg/dL      Globulin 3.5 gm/dL      A/G Ratio 1.0 g/dL      BUN/Creatinine Ratio 43.5     Anion Gap 10.8 mmol/L      eGFR 99.0 mL/min/1.73     Narrative:      GFR Normal >60  Chronic Kidney Disease <60  Kidney Failure <15      High Sensitivity Troponin T [[id number]]  (Abnormal) Collected: 07/12/24 0654    Specimen: Blood Updated: 07/12/24 0721     HS Troponin T 31 ng/L     Narrative:      High Sensitive Troponin T Reference Range:  <14.0 ng/L- Negative Female for AMI  <22.0 ng/L- Negative Male for AMI  >=14 - Abnormal Female indicating possible myocardial injury.  >=22 - Abnormal Male indicating possible myocardial injury.   Clinicians would have to utilize clinical acumen, EKG, Troponin, and serial changes to determine if it is an Acute Myocardial Infarction or myocardial injury due to an underlying chronic condition.         BNP [[id number]]  (Normal) Collected: 07/12/24  "0654    Specimen: Blood Updated: 07/12/24 0719     proBNP 200.1 pg/mL     Narrative:      This assay is used as an aid in the diagnosis of individuals suspected of having heart failure. It can be used as an aid in the diagnosis of acute decompensated heart failure (ADHF) in patients presenting with signs and symptoms of ADHF to the emergency department (ED). In addition, NT-proBNP of <300 pg/mL indicates ADHF is not likely.    Age Range Result Interpretation  NT-proBNP Concentration (pg/mL:      <50             Positive            >450                   Gray                 300-450                    Negative             <300    50-75           Positive            >900                  Gray                300-900                  Negative            <300      >75             Positive            >1800                  Gray                300-1800                  Negative            <300    C-reactive Protein [[id number]]  (Abnormal) Collected: 07/12/24 0654    Specimen: Blood Updated: 07/12/24 0721     C-Reactive Protein 2.82 mg/dL     Procalcitonin [[id number]]  (Normal) Collected: 07/12/24 0654    Specimen: Blood Updated: 07/12/24 0726     Procalcitonin 0.07 ng/mL     Narrative:      As a Marker for Sepsis (Non-Neonates):    1. <0.5 ng/mL represents a low risk of severe sepsis and/or septic shock.  2. >2 ng/mL represents a high risk of severe sepsis and/or septic shock.    As a Marker for Lower Respiratory Tract Infections that require antibiotic therapy:    PCT on Admission    Antibiotic Therapy       6-12 Hrs later    >0.5                Strongly Recommended  >0.25 - <0.5        Recommended   0.1 - 0.25          Discouraged              Remeasure/reassess PCT  <0.1                Strongly Discouraged     Remeasure/reassess PCT    As 28 day mortality risk marker: \"Change in Procalcitonin Result\" (>80% or <=80%) if Day 0 (or Day 1) and Day 4 values are available. Refer to " http://www.[facility]-pct-calculator.com    Change in PCT <=80%  A decrease of PCT levels below or equal to 80% defines a positive change in PCT test result representing a higher risk for 28-day all-cause mortality of patients diagnosed with severe sepsis for septic shock.    Change in PCT >80%  A decrease of PCT levels of more than 80% defines a negative change in PCT result representing a lower risk for 28-day all-cause mortality of patients diagnosed with severe sepsis or septic shock.       Lactic Acid, Plasma [[id number]]  (Normal) Collected: 07/12/24 0654    Specimen: Blood Updated: 07/12/24 0717     Lactate 1.2 mmol/L     Magnesium [[id number]]  (Normal) Collected: 07/12/24 0654    Specimen: Blood Updated: 07/12/24 0722     Magnesium 2.4 mg/dL     CBC Auto Differential [[id number]]  (Abnormal) Collected: 07/12/24 0654    Specimen: Blood Updated: 07/12/24 0702     WBC 10.47 10*3/mm3      RBC 3.17 10*6/mm3      Hemoglobin 9.3 g/dL      Hematocrit 29.6 %      MCV 93.4 fL      MCH 29.3 pg      MCHC 31.4 g/dL      RDW 14.0 %      RDW-SD 48.2 fl      MPV 13.0 fL      Platelets 260 10*3/mm3      Neutrophil % 67.7 %      Lymphocyte % 20.9 %      Monocyte % 7.6 %      Eosinophil % 2.6 %      Basophil % 0.6 %      Immature Grans % 0.6 %      Neutrophils, Absolute 7.09 10*3/mm3      Lymphocytes, Absolute 2.19 10*3/mm3      Monocytes, Absolute 0.80 10*3/mm3      Eosinophils, Absolute 0.27 10*3/mm3      Basophils, Absolute 0.06 10*3/mm3      Immature Grans, Absolute 0.06 10*3/mm3      nRBC 0.0 /100 WBC              XR Chest 1 View    Result Date: 7/12/2024   PROCEDURE: XR CHEST 1 VW-  HISTORY: Fever, eval pneumonia  COMPARISON: February 18, 2024.  FINDINGS: The heart is normal in size. The mediastinum is unremarkable. Decreased inspiratory effort noted with crowding of the lung markings in both lung bases. There are new linear bibasilar densities, likely minimal atelectasis. Medially in the left lung base there are few linear  densities which are actually slightly improved compared to the prior. No new area of consolidation seen.. There is no pneumothorax.  There are no acute osseous abnormalities. Tracheostomy tube is in stable position. Aortic mural calcifications noted.      Impression: Stable position of the tracheostomy tube..  Mild bilateral atelectasis but no new area of consolidation seen..    This report was signed and finalized on 7/12/2024 7:35 AM by Briana Erickson MD.          MDM     Amount and/or Complexity of Data Reviewed  Decide to obtain previous medical records or to obtain history from someone other than the patient: yes        Initial impression of presenting illness: Fever    DDX: includes but is not limited to: Bacterial ammonia, viral URI, UTI, sepsis    Patient arrives stable with vitals interpreted by myself.     Pertinent features from physical exam: Patient with upper airway sounds with secretions but otherwise clear to auscultation, nontender to abdominal palpation, no concern for sacral decub on skin assessment.    Initial diagnostic plan: CBC, CMP, UA, troponin, EKG, chest x-ray, CRP, Pro-Teo, lactic acid, magnesium    Results from initial plan were reviewed and interpreted by me revealing no concern for sepsis on inflammatory markers, patient with what appears to be positive UA on cath sample    Diagnostic information from other sources: Discussed with EMS on arrival and reviewed patient's nursing home documentation as well as past medical records including patient's past urine cultures with susceptibilities    Interventions / Re-evaluation: Given my review of patient's past urine cultures, will order Bactrim given patient has drug-resistant Klebsiella and Proteus both susceptible to Bactrim and her culture profile    Results/clinical rationale were discussed with nursing staff via nursing report    Consultations/Discussion of results with other physicians: Discussed negative workup in emergency department,  no concern for sepsis or pneumonia, given positive UA we will treat for urinary tract infection.  Given past susceptibilities will treat with Bactrim and urine culture still pending.  Given strict return precaution for further fevers in spite of antibiotic therapy.    Disposition plan: Discharge  -----        Final diagnoses:   Acute cystitis without hematuria          Delio Hale MD  07/12/24 4276

## 2024-07-14 ENCOUNTER — TELEPHONE (OUTPATIENT)
Dept: EMERGENCY DEPT | Facility: HOSPITAL | Age: 64
End: 2024-07-14
Payer: MEDICARE

## 2024-07-14 LAB — BACTERIA SPEC AEROBE CULT: ABNORMAL

## 2024-07-16 LAB — BACTERIA SPEC AEROBE CULT: ABNORMAL

## 2024-07-19 ENCOUNTER — LAB REQUISITION (OUTPATIENT)
Dept: LAB | Facility: HOSPITAL | Age: 64
End: 2024-07-19
Payer: MEDICARE

## 2024-07-19 DIAGNOSIS — J96.21 ACUTE AND CHRONIC RESPIRATORY FAILURE WITH HYPOXIA: ICD-10-CM

## 2024-07-19 LAB
BACTERIA UR QL AUTO: ABNORMAL /HPF
BILIRUB UR QL STRIP: NEGATIVE
CLARITY UR: ABNORMAL
COLOR UR: YELLOW
GLUCOSE UR STRIP-MCNC: NEGATIVE MG/DL
HGB UR QL STRIP.AUTO: NEGATIVE
HYALINE CASTS UR QL AUTO: ABNORMAL /LPF
KETONES UR QL STRIP: NEGATIVE
LEUKOCYTE ESTERASE UR QL STRIP.AUTO: ABNORMAL
NITRITE UR QL STRIP: NEGATIVE
PH UR STRIP.AUTO: 8 [PH] (ref 5–8)
PROT UR QL STRIP: NEGATIVE
RBC # UR STRIP: ABNORMAL /HPF
REF LAB TEST METHOD: ABNORMAL
SP GR UR STRIP: 1.01 (ref 1–1.03)
SQUAMOUS #/AREA URNS HPF: ABNORMAL /HPF
UROBILINOGEN UR QL STRIP: ABNORMAL
WBC # UR STRIP: ABNORMAL /HPF

## 2024-07-19 PROCEDURE — 87186 SC STD MICRODIL/AGAR DIL: CPT | Performed by: FAMILY MEDICINE

## 2024-07-19 PROCEDURE — 87077 CULTURE AEROBIC IDENTIFY: CPT | Performed by: FAMILY MEDICINE

## 2024-07-19 PROCEDURE — 87181 SC STD AGAR DILUTION PER AGT: CPT | Performed by: FAMILY MEDICINE

## 2024-07-19 PROCEDURE — 87086 URINE CULTURE/COLONY COUNT: CPT | Performed by: FAMILY MEDICINE

## 2024-07-19 PROCEDURE — 87205 SMEAR GRAM STAIN: CPT | Performed by: FAMILY MEDICINE

## 2024-07-19 PROCEDURE — 87070 CULTURE OTHR SPECIMN AEROBIC: CPT | Performed by: FAMILY MEDICINE

## 2024-07-19 PROCEDURE — 81001 URINALYSIS AUTO W/SCOPE: CPT | Performed by: FAMILY MEDICINE

## 2024-07-21 LAB — BACTERIA SPEC AEROBE CULT: ABNORMAL

## 2024-07-25 LAB
BACTERIA SPEC RESP CULT: ABNORMAL
GRAM STN SPEC: ABNORMAL

## 2024-09-16 ENCOUNTER — HOSPITAL ENCOUNTER (EMERGENCY)
Facility: HOSPITAL | Age: 64
Discharge: NURSING, REHAB OR LONG-TERM CARE | End: 2024-09-16
Attending: STUDENT IN AN ORGANIZED HEALTH CARE EDUCATION/TRAINING PROGRAM | Admitting: STUDENT IN AN ORGANIZED HEALTH CARE EDUCATION/TRAINING PROGRAM
Payer: MEDICARE

## 2024-09-16 ENCOUNTER — APPOINTMENT (OUTPATIENT)
Dept: GENERAL RADIOLOGY | Facility: HOSPITAL | Age: 64
End: 2024-09-16
Payer: MEDICARE

## 2024-09-16 VITALS
TEMPERATURE: 97.9 F | RESPIRATION RATE: 14 BRPM | BODY MASS INDEX: 26.61 KG/M2 | WEIGHT: 155 LBS | HEART RATE: 86 BPM | OXYGEN SATURATION: 100 % | SYSTOLIC BLOOD PRESSURE: 122 MMHG | DIASTOLIC BLOOD PRESSURE: 74 MMHG

## 2024-09-16 DIAGNOSIS — Z13.9 ENCOUNTER FOR MEDICAL SCREENING EXAMINATION: Primary | ICD-10-CM

## 2024-09-16 DIAGNOSIS — J98.11 ATELECTASIS OF LEFT LUNG: ICD-10-CM

## 2024-09-16 LAB
ANION GAP SERPL CALCULATED.3IONS-SCNC: 13.9 MMOL/L (ref 5–15)
BASOPHILS # BLD AUTO: 0.06 10*3/MM3 (ref 0–0.2)
BASOPHILS NFR BLD AUTO: 0.6 % (ref 0–1.5)
BUN SERPL-MCNC: 32 MG/DL (ref 8–23)
BUN/CREAT SERPL: 47.1 (ref 7–25)
CALCIUM SPEC-SCNC: 10 MG/DL (ref 8.6–10.5)
CHLORIDE SERPL-SCNC: 97 MMOL/L (ref 98–107)
CO2 SERPL-SCNC: 29.1 MMOL/L (ref 22–29)
CREAT SERPL-MCNC: 0.68 MG/DL (ref 0.57–1)
DEPRECATED RDW RBC AUTO: 50.4 FL (ref 37–54)
EGFRCR SERPLBLD CKD-EPI 2021: 96.8 ML/MIN/1.73
EOSINOPHIL # BLD AUTO: 0.49 10*3/MM3 (ref 0–0.4)
EOSINOPHIL NFR BLD AUTO: 4.7 % (ref 0.3–6.2)
ERYTHROCYTE [DISTWIDTH] IN BLOOD BY AUTOMATED COUNT: 15 % (ref 12.3–15.4)
GLUCOSE SERPL-MCNC: 127 MG/DL (ref 65–99)
HCT VFR BLD AUTO: 28.4 % (ref 34–46.6)
HGB BLD-MCNC: 9.2 G/DL (ref 12–15.9)
IMM GRANULOCYTES # BLD AUTO: 0.05 10*3/MM3 (ref 0–0.05)
IMM GRANULOCYTES NFR BLD AUTO: 0.5 % (ref 0–0.5)
LYMPHOCYTES # BLD AUTO: 2.49 10*3/MM3 (ref 0.7–3.1)
LYMPHOCYTES NFR BLD AUTO: 23.7 % (ref 19.6–45.3)
MCH RBC QN AUTO: 30.4 PG (ref 26.6–33)
MCHC RBC AUTO-ENTMCNC: 32.4 G/DL (ref 31.5–35.7)
MCV RBC AUTO: 93.7 FL (ref 79–97)
MONOCYTES # BLD AUTO: 0.87 10*3/MM3 (ref 0.1–0.9)
MONOCYTES NFR BLD AUTO: 8.3 % (ref 5–12)
NEUTROPHILS NFR BLD AUTO: 6.56 10*3/MM3 (ref 1.7–7)
NEUTROPHILS NFR BLD AUTO: 62.2 % (ref 42.7–76)
NRBC BLD AUTO-RTO: 0 /100 WBC (ref 0–0.2)
PLATELET # BLD AUTO: 264 10*3/MM3 (ref 140–450)
PMV BLD AUTO: 12.5 FL (ref 6–12)
POTASSIUM SERPL-SCNC: 3.5 MMOL/L (ref 3.5–5.2)
PROCALCITONIN SERPL-MCNC: 0.1 NG/ML (ref 0–0.25)
RBC # BLD AUTO: 3.03 10*6/MM3 (ref 3.77–5.28)
SODIUM SERPL-SCNC: 140 MMOL/L (ref 136–145)
WBC NRBC COR # BLD AUTO: 10.52 10*3/MM3 (ref 3.4–10.8)

## 2024-09-16 PROCEDURE — 85025 COMPLETE CBC W/AUTO DIFF WBC: CPT | Performed by: STUDENT IN AN ORGANIZED HEALTH CARE EDUCATION/TRAINING PROGRAM

## 2024-09-16 PROCEDURE — 80048 BASIC METABOLIC PNL TOTAL CA: CPT | Performed by: STUDENT IN AN ORGANIZED HEALTH CARE EDUCATION/TRAINING PROGRAM

## 2024-09-16 PROCEDURE — 99283 EMERGENCY DEPT VISIT LOW MDM: CPT

## 2024-09-16 PROCEDURE — 71045 X-RAY EXAM CHEST 1 VIEW: CPT

## 2024-09-16 PROCEDURE — 84145 PROCALCITONIN (PCT): CPT | Performed by: STUDENT IN AN ORGANIZED HEALTH CARE EDUCATION/TRAINING PROGRAM

## 2024-09-16 RX ORDER — SODIUM CHLORIDE 0.9 % (FLUSH) 0.9 %
10 SYRINGE (ML) INJECTION AS NEEDED
Status: DISCONTINUED | OUTPATIENT
Start: 2024-09-16 | End: 2024-09-16 | Stop reason: HOSPADM

## 2024-09-27 ENCOUNTER — LAB REQUISITION (OUTPATIENT)
Dept: LAB | Facility: HOSPITAL | Age: 64
DRG: 871 | End: 2024-09-27
Payer: MEDICARE

## 2024-09-27 DIAGNOSIS — Z93.0 TRACHEOSTOMY STATUS: ICD-10-CM

## 2024-09-27 PROCEDURE — 87077 CULTURE AEROBIC IDENTIFY: CPT | Performed by: FAMILY MEDICINE

## 2024-09-27 PROCEDURE — 87186 SC STD MICRODIL/AGAR DIL: CPT | Performed by: FAMILY MEDICINE

## 2024-09-27 PROCEDURE — 87205 SMEAR GRAM STAIN: CPT | Performed by: FAMILY MEDICINE

## 2024-09-27 PROCEDURE — 87070 CULTURE OTHR SPECIMN AEROBIC: CPT | Performed by: FAMILY MEDICINE

## 2024-09-28 ENCOUNTER — HOSPITAL ENCOUNTER (INPATIENT)
Facility: HOSPITAL | Age: 64
LOS: 3 days | Discharge: NURSING, REHAB OR LONG-TERM CARE | DRG: 871 | End: 2024-10-01
Attending: STUDENT IN AN ORGANIZED HEALTH CARE EDUCATION/TRAINING PROGRAM | Admitting: INTERNAL MEDICINE
Payer: MEDICARE

## 2024-09-28 ENCOUNTER — APPOINTMENT (OUTPATIENT)
Dept: GENERAL RADIOLOGY | Facility: HOSPITAL | Age: 64
DRG: 871 | End: 2024-09-28
Payer: MEDICARE

## 2024-09-28 ENCOUNTER — APPOINTMENT (OUTPATIENT)
Dept: CT IMAGING | Facility: HOSPITAL | Age: 64
DRG: 871 | End: 2024-09-28
Payer: MEDICARE

## 2024-09-28 DIAGNOSIS — A41.9 SEPSIS, DUE TO UNSPECIFIED ORGANISM, UNSPECIFIED WHETHER ACUTE ORGAN DYSFUNCTION PRESENT: ICD-10-CM

## 2024-09-28 DIAGNOSIS — J18.9 PNEUMONIA OF LEFT LOWER LOBE DUE TO INFECTIOUS ORGANISM: Primary | ICD-10-CM

## 2024-09-28 DIAGNOSIS — N30.01 ACUTE CYSTITIS WITH HEMATURIA: ICD-10-CM

## 2024-09-28 PROBLEM — N39.0 ACUTE UTI: Status: ACTIVE | Noted: 2024-09-28

## 2024-09-28 LAB
ALBUMIN SERPL-MCNC: 2.9 G/DL (ref 3.5–5.2)
ALBUMIN/GLOB SERPL: 0.7 G/DL
ALP SERPL-CCNC: 98 U/L (ref 39–117)
ALT SERPL W P-5'-P-CCNC: 30 U/L (ref 1–33)
ANION GAP SERPL CALCULATED.3IONS-SCNC: 11.8 MMOL/L (ref 5–15)
AST SERPL-CCNC: 16 U/L (ref 1–32)
ATMOSPHERIC PRESS: 725 MMHG
BACTERIA UR QL AUTO: ABNORMAL /HPF
BASE EXCESS BLDV CALC-SCNC: 6.8 MMOL/L (ref 0–2)
BASOPHILS # BLD AUTO: 0.06 10*3/MM3 (ref 0–0.2)
BASOPHILS NFR BLD AUTO: 0.3 % (ref 0–1.5)
BDY SITE: ABNORMAL
BILIRUB SERPL-MCNC: 0.3 MG/DL (ref 0–1.2)
BILIRUB UR QL STRIP: NEGATIVE
BUN SERPL-MCNC: 47 MG/DL (ref 8–23)
BUN/CREAT SERPL: 60.3 (ref 7–25)
CALCIUM SPEC-SCNC: 9.4 MG/DL (ref 8.6–10.5)
CHLORIDE SERPL-SCNC: 99 MMOL/L (ref 98–107)
CLARITY UR: ABNORMAL
CO2 SERPL-SCNC: 27.2 MMOL/L (ref 22–29)
COHGB MFR BLD: 1.2 % (ref 0–5)
COLOR UR: ABNORMAL
CREAT SERPL-MCNC: 0.78 MG/DL (ref 0.57–1)
D-LACTATE SERPL-SCNC: 0.4 MMOL/L (ref 0.5–2)
DEPRECATED RDW RBC AUTO: 52.6 FL (ref 37–54)
EGFRCR SERPLBLD CKD-EPI 2021: 84.4 ML/MIN/1.73
EOSINOPHIL # BLD AUTO: 1.09 10*3/MM3 (ref 0–0.4)
EOSINOPHIL NFR BLD AUTO: 6.3 % (ref 0.3–6.2)
ERYTHROCYTE [DISTWIDTH] IN BLOOD BY AUTOMATED COUNT: 14.8 % (ref 12.3–15.4)
FLUAV RNA RESP QL NAA+PROBE: NOT DETECTED
FLUBV RNA RESP QL NAA+PROBE: NOT DETECTED
GAS FLOW AIRWAY: 4 LPM
GEN 5 2HR TROPONIN T REFLEX: 48 NG/L
GLOBULIN UR ELPH-MCNC: 3.9 GM/DL
GLUCOSE BLDC GLUCOMTR-MCNC: 135 MG/DL (ref 70–130)
GLUCOSE SERPL-MCNC: 116 MG/DL (ref 65–99)
GLUCOSE UR STRIP-MCNC: NEGATIVE MG/DL
HCO3 BLDV-SCNC: 32.4 MMOL/L (ref 22–28)
HCT VFR BLD AUTO: 26.4 % (ref 34–46.6)
HGB BLD-MCNC: 8.3 G/DL (ref 12–15.9)
HGB UR QL STRIP.AUTO: ABNORMAL
HYALINE CASTS UR QL AUTO: ABNORMAL /LPF
IMM GRANULOCYTES # BLD AUTO: 0.13 10*3/MM3 (ref 0–0.05)
IMM GRANULOCYTES NFR BLD AUTO: 0.8 % (ref 0–0.5)
KETONES UR QL STRIP: NEGATIVE
LEUKOCYTE ESTERASE UR QL STRIP.AUTO: ABNORMAL
LYMPHOCYTES # BLD AUTO: 1.62 10*3/MM3 (ref 0.7–3.1)
LYMPHOCYTES NFR BLD AUTO: 9.4 % (ref 19.6–45.3)
Lab: ABNORMAL
MCH RBC QN AUTO: 30.3 PG (ref 26.6–33)
MCHC RBC AUTO-ENTMCNC: 31.4 G/DL (ref 31.5–35.7)
MCV RBC AUTO: 96.4 FL (ref 79–97)
METHGB BLD QL: 0.1 % (ref 0–3)
MODALITY: ABNORMAL
MONOCYTES # BLD AUTO: 0.82 10*3/MM3 (ref 0.1–0.9)
MONOCYTES NFR BLD AUTO: 4.8 % (ref 5–12)
NEUTROPHILS NFR BLD AUTO: 13.51 10*3/MM3 (ref 1.7–7)
NEUTROPHILS NFR BLD AUTO: 78.4 % (ref 42.7–76)
NITRITE UR QL STRIP: NEGATIVE
NRBC BLD AUTO-RTO: 0.2 /100 WBC (ref 0–0.2)
OXYHGB MFR BLDV: 51.5 % (ref 40–70)
PCO2 BLDV: 51.6 MM HG (ref 40–50)
PH BLDV: 7.41 PH UNITS (ref 7.32–7.42)
PH UR STRIP.AUTO: 7.5 [PH] (ref 5–8)
PLATELET # BLD AUTO: 250 10*3/MM3 (ref 140–450)
PMV BLD AUTO: 13 FL (ref 6–12)
PO2 BLDV: 31 MM HG (ref 30–50)
POTASSIUM SERPL-SCNC: 4.6 MMOL/L (ref 3.5–5.2)
PROCALCITONIN SERPL-MCNC: 0.45 NG/ML (ref 0–0.25)
PROT SERPL-MCNC: 6.8 G/DL (ref 6–8.5)
PROT UR QL STRIP: ABNORMAL
RBC # BLD AUTO: 2.74 10*6/MM3 (ref 3.77–5.28)
RBC # UR STRIP: ABNORMAL /HPF
REF LAB TEST METHOD: ABNORMAL
SAO2 % BLDCOV: 52.2 % (ref 45–75)
SARS-COV-2 RNA RESP QL NAA+PROBE: NOT DETECTED
SODIUM SERPL-SCNC: 138 MMOL/L (ref 136–145)
SP GR UR STRIP: 1.01 (ref 1–1.03)
SQUAMOUS #/AREA URNS HPF: ABNORMAL /HPF
TROPONIN T DELTA: 1 NG/L
TROPONIN T SERPL HS-MCNC: 47 NG/L
UROBILINOGEN UR QL STRIP: ABNORMAL
VENTILATOR MODE: ABNORMAL
WBC # UR STRIP: ABNORMAL /HPF
WBC NRBC COR # BLD AUTO: 17.23 10*3/MM3 (ref 3.4–10.8)

## 2024-09-28 PROCEDURE — 25010000002 PIPERACILLIN SOD-TAZOBACTAM PER 1 G: Performed by: INTERNAL MEDICINE

## 2024-09-28 PROCEDURE — 70450 CT HEAD/BRAIN W/O DYE: CPT

## 2024-09-28 PROCEDURE — 84145 PROCALCITONIN (PCT): CPT | Performed by: STUDENT IN AN ORGANIZED HEALTH CARE EDUCATION/TRAINING PROGRAM

## 2024-09-28 PROCEDURE — 81001 URINALYSIS AUTO W/SCOPE: CPT | Performed by: STUDENT IN AN ORGANIZED HEALTH CARE EDUCATION/TRAINING PROGRAM

## 2024-09-28 PROCEDURE — 87086 URINE CULTURE/COLONY COUNT: CPT | Performed by: STUDENT IN AN ORGANIZED HEALTH CARE EDUCATION/TRAINING PROGRAM

## 2024-09-28 PROCEDURE — 25010000002 CEFTRIAXONE PER 250 MG: Performed by: STUDENT IN AN ORGANIZED HEALTH CARE EDUCATION/TRAINING PROGRAM

## 2024-09-28 PROCEDURE — 93005 ELECTROCARDIOGRAM TRACING: CPT | Performed by: STUDENT IN AN ORGANIZED HEALTH CARE EDUCATION/TRAINING PROGRAM

## 2024-09-28 PROCEDURE — 25810000003 SODIUM CHLORIDE 0.9 % SOLUTION: Performed by: STUDENT IN AN ORGANIZED HEALTH CARE EDUCATION/TRAINING PROGRAM

## 2024-09-28 PROCEDURE — 25810000003 DEXTROSE 5 % AND SODIUM CHLORIDE 0.9 % 5-0.9 % SOLUTION: Performed by: INTERNAL MEDICINE

## 2024-09-28 PROCEDURE — 80053 COMPREHEN METABOLIC PANEL: CPT | Performed by: STUDENT IN AN ORGANIZED HEALTH CARE EDUCATION/TRAINING PROGRAM

## 2024-09-28 PROCEDURE — 87081 CULTURE SCREEN ONLY: CPT | Performed by: INTERNAL MEDICINE

## 2024-09-28 PROCEDURE — 87636 SARSCOV2 & INF A&B AMP PRB: CPT | Performed by: STUDENT IN AN ORGANIZED HEALTH CARE EDUCATION/TRAINING PROGRAM

## 2024-09-28 PROCEDURE — 87641 MR-STAPH DNA AMP PROBE: CPT | Performed by: INTERNAL MEDICINE

## 2024-09-28 PROCEDURE — 25010000002 ENOXAPARIN PER 10 MG: Performed by: INTERNAL MEDICINE

## 2024-09-28 PROCEDURE — 25010000002 AZITHROMYCIN PER 500 MG: Performed by: STUDENT IN AN ORGANIZED HEALTH CARE EDUCATION/TRAINING PROGRAM

## 2024-09-28 PROCEDURE — 82820 HEMOGLOBIN-OXYGEN AFFINITY: CPT

## 2024-09-28 PROCEDURE — 83605 ASSAY OF LACTIC ACID: CPT | Performed by: STUDENT IN AN ORGANIZED HEALTH CARE EDUCATION/TRAINING PROGRAM

## 2024-09-28 PROCEDURE — 85025 COMPLETE CBC W/AUTO DIFF WBC: CPT | Performed by: STUDENT IN AN ORGANIZED HEALTH CARE EDUCATION/TRAINING PROGRAM

## 2024-09-28 PROCEDURE — P9612 CATHETERIZE FOR URINE SPEC: HCPCS

## 2024-09-28 PROCEDURE — 99285 EMERGENCY DEPT VISIT HI MDM: CPT

## 2024-09-28 PROCEDURE — 94640 AIRWAY INHALATION TREATMENT: CPT

## 2024-09-28 PROCEDURE — 87040 BLOOD CULTURE FOR BACTERIA: CPT | Performed by: STUDENT IN AN ORGANIZED HEALTH CARE EDUCATION/TRAINING PROGRAM

## 2024-09-28 PROCEDURE — 71045 X-RAY EXAM CHEST 1 VIEW: CPT

## 2024-09-28 PROCEDURE — 84484 ASSAY OF TROPONIN QUANT: CPT | Performed by: STUDENT IN AN ORGANIZED HEALTH CARE EDUCATION/TRAINING PROGRAM

## 2024-09-28 PROCEDURE — 25510000001 IOPAMIDOL 61 % SOLUTION: Performed by: STUDENT IN AN ORGANIZED HEALTH CARE EDUCATION/TRAINING PROGRAM

## 2024-09-28 PROCEDURE — 99223 1ST HOSP IP/OBS HIGH 75: CPT | Performed by: INTERNAL MEDICINE

## 2024-09-28 PROCEDURE — 74177 CT ABD & PELVIS W/CONTRAST: CPT

## 2024-09-28 PROCEDURE — 25810000003 SODIUM CHLORIDE 0.9 % SOLUTION 250 ML FLEX CONT: Performed by: STUDENT IN AN ORGANIZED HEALTH CARE EDUCATION/TRAINING PROGRAM

## 2024-09-28 PROCEDURE — 82805 BLOOD GASES W/O2 SATURATION: CPT

## 2024-09-28 PROCEDURE — 71260 CT THORAX DX C+: CPT

## 2024-09-28 PROCEDURE — 82948 REAGENT STRIP/BLOOD GLUCOSE: CPT

## 2024-09-28 PROCEDURE — 36415 COLL VENOUS BLD VENIPUNCTURE: CPT

## 2024-09-28 RX ORDER — ENOXAPARIN SODIUM 100 MG/ML
40 INJECTION SUBCUTANEOUS NIGHTLY
Status: DISCONTINUED | OUTPATIENT
Start: 2024-09-28 | End: 2024-10-01 | Stop reason: HOSPADM

## 2024-09-28 RX ORDER — BISACODYL 5 MG/1
5 TABLET, DELAYED RELEASE ORAL DAILY PRN
Status: DISCONTINUED | OUTPATIENT
Start: 2024-09-28 | End: 2024-10-01 | Stop reason: HOSPADM

## 2024-09-28 RX ORDER — SODIUM CHLORIDE 9 MG/ML
250 INJECTION, SOLUTION INTRAVENOUS CONTINUOUS
Status: DISCONTINUED | OUTPATIENT
Start: 2024-09-28 | End: 2024-09-28

## 2024-09-28 RX ORDER — FAMOTIDINE 20 MG/1
20 TABLET, FILM COATED ORAL DAILY
Status: DISCONTINUED | OUTPATIENT
Start: 2024-09-28 | End: 2024-10-01 | Stop reason: HOSPADM

## 2024-09-28 RX ORDER — ACETAMINOPHEN 650 MG/1
650 SUPPOSITORY RECTAL EVERY 4 HOURS PRN
Status: DISCONTINUED | OUTPATIENT
Start: 2024-09-28 | End: 2024-09-30

## 2024-09-28 RX ORDER — ACETAMINOPHEN 160 MG/5ML
650 SOLUTION ORAL EVERY 4 HOURS PRN
Status: DISCONTINUED | OUTPATIENT
Start: 2024-09-28 | End: 2024-09-30

## 2024-09-28 RX ORDER — POLYETHYLENE GLYCOL 3350 17 G/17G
17 POWDER, FOR SOLUTION ORAL DAILY PRN
Status: DISCONTINUED | OUTPATIENT
Start: 2024-09-28 | End: 2024-10-01 | Stop reason: HOSPADM

## 2024-09-28 RX ORDER — FAMOTIDINE 20 MG/1
20 TABLET, FILM COATED ORAL DAILY
Status: DISCONTINUED | OUTPATIENT
Start: 2024-09-28 | End: 2024-09-28

## 2024-09-28 RX ORDER — IPRATROPIUM BROMIDE AND ALBUTEROL SULFATE 2.5; .5 MG/3ML; MG/3ML
3 SOLUTION RESPIRATORY (INHALATION)
Status: DISCONTINUED | OUTPATIENT
Start: 2024-09-28 | End: 2024-10-01 | Stop reason: HOSPADM

## 2024-09-28 RX ORDER — BACLOFEN 10 MG/1
10 TABLET ORAL EVERY 8 HOURS
Status: DISCONTINUED | OUTPATIENT
Start: 2024-09-28 | End: 2024-09-29

## 2024-09-28 RX ORDER — SACCHAROMYCES BOULARDII 250 MG
250 CAPSULE ORAL 2 TIMES DAILY
Status: DISCONTINUED | OUTPATIENT
Start: 2024-09-28 | End: 2024-10-01 | Stop reason: HOSPADM

## 2024-09-28 RX ORDER — GLYCOPYRROLATE 1 MG/1
2 TABLET ORAL 3 TIMES DAILY
Status: DISCONTINUED | OUTPATIENT
Start: 2024-09-28 | End: 2024-10-01 | Stop reason: HOSPADM

## 2024-09-28 RX ORDER — ONDANSETRON 2 MG/ML
4 INJECTION INTRAMUSCULAR; INTRAVENOUS EVERY 6 HOURS PRN
Status: DISCONTINUED | OUTPATIENT
Start: 2024-09-28 | End: 2024-10-01 | Stop reason: HOSPADM

## 2024-09-28 RX ORDER — CETIRIZINE HYDROCHLORIDE 10 MG/1
10 TABLET ORAL DAILY
COMMUNITY

## 2024-09-28 RX ORDER — DEXTROSE MONOHYDRATE AND SODIUM CHLORIDE 5; .9 G/100ML; G/100ML
100 INJECTION, SOLUTION INTRAVENOUS CONTINUOUS
Status: DISCONTINUED | OUTPATIENT
Start: 2024-09-28 | End: 2024-09-30

## 2024-09-28 RX ORDER — MIDODRINE HYDROCHLORIDE 5 MG/1
2.5 TABLET ORAL
Status: DISCONTINUED | OUTPATIENT
Start: 2024-09-29 | End: 2024-09-28

## 2024-09-28 RX ORDER — MIDODRINE HYDROCHLORIDE 5 MG/1
2.5 TABLET ORAL
Status: DISCONTINUED | OUTPATIENT
Start: 2024-09-28 | End: 2024-09-29

## 2024-09-28 RX ORDER — IOPAMIDOL 612 MG/ML
100 INJECTION, SOLUTION INTRAVASCULAR
Status: COMPLETED | OUTPATIENT
Start: 2024-09-28 | End: 2024-09-28

## 2024-09-28 RX ORDER — MIDODRINE HYDROCHLORIDE 5 MG/1
2.5 TABLET ORAL
Status: DISCONTINUED | OUTPATIENT
Start: 2024-09-28 | End: 2024-09-28

## 2024-09-28 RX ORDER — SODIUM CHLORIDE 9 MG/ML
40 INJECTION, SOLUTION INTRAVENOUS AS NEEDED
Status: DISCONTINUED | OUTPATIENT
Start: 2024-09-28 | End: 2024-10-01 | Stop reason: HOSPADM

## 2024-09-28 RX ORDER — BUDESONIDE 0.5 MG/2ML
0.5 INHALANT ORAL
Status: DISCONTINUED | OUTPATIENT
Start: 2024-09-28 | End: 2024-10-01 | Stop reason: HOSPADM

## 2024-09-28 RX ORDER — SODIUM CHLORIDE 0.9 % (FLUSH) 0.9 %
10 SYRINGE (ML) INJECTION EVERY 12 HOURS SCHEDULED
Status: DISCONTINUED | OUTPATIENT
Start: 2024-09-28 | End: 2024-10-01 | Stop reason: HOSPADM

## 2024-09-28 RX ORDER — AMOXICILLIN 250 MG
2 CAPSULE ORAL 2 TIMES DAILY PRN
Status: DISCONTINUED | OUTPATIENT
Start: 2024-09-28 | End: 2024-10-01 | Stop reason: HOSPADM

## 2024-09-28 RX ORDER — ACETAMINOPHEN 325 MG/1
650 TABLET ORAL EVERY 4 HOURS PRN
Status: DISCONTINUED | OUTPATIENT
Start: 2024-09-28 | End: 2024-09-30

## 2024-09-28 RX ORDER — ASCORBIC ACID, THIAMINE, RIBOFLAVIN, NIACINAMIDE, PYRIDOXINE, FOLIC ACID, COBALAMIN, BIOTIN, PANTOTHENIC ACID 100; 1.5; 1.7; 20; 10; 1; 6; 300; 1 MG/1; MG/1; MG/1; MG/1; MG/1; MG/1; UG/1; UG/1; MG/1
15 TABLET, COATED ORAL DAILY
Status: DISCONTINUED | OUTPATIENT
Start: 2024-09-28 | End: 2024-10-01 | Stop reason: HOSPADM

## 2024-09-28 RX ORDER — SODIUM CHLORIDE 0.9 % (FLUSH) 0.9 %
10 SYRINGE (ML) INJECTION AS NEEDED
Status: DISCONTINUED | OUTPATIENT
Start: 2024-09-28 | End: 2024-10-01 | Stop reason: HOSPADM

## 2024-09-28 RX ORDER — IPRATROPIUM BROMIDE AND ALBUTEROL SULFATE 2.5; .5 MG/3ML; MG/3ML
3 SOLUTION RESPIRATORY (INHALATION)
Status: DISCONTINUED | OUTPATIENT
Start: 2024-09-28 | End: 2024-09-28

## 2024-09-28 RX ORDER — L.ACID,PARA/B.BIFIDUM/S.THERM 8B CELL
1 CAPSULE ORAL 2 TIMES DAILY
Status: DISCONTINUED | OUTPATIENT
Start: 2024-09-28 | End: 2024-09-28

## 2024-09-28 RX ORDER — BISACODYL 10 MG
10 SUPPOSITORY, RECTAL RECTAL DAILY PRN
Status: DISCONTINUED | OUTPATIENT
Start: 2024-09-28 | End: 2024-10-01 | Stop reason: HOSPADM

## 2024-09-28 RX ORDER — FAMOTIDINE 20 MG/1
20 TABLET, FILM COATED ORAL DAILY
Status: DISCONTINUED | OUTPATIENT
Start: 2024-09-29 | End: 2024-09-28

## 2024-09-28 RX ADMIN — FAMOTIDINE 20 MG: 20 TABLET, FILM COATED ORAL at 19:40

## 2024-09-28 RX ADMIN — SODIUM CHLORIDE 1000 ML: 9 INJECTION, SOLUTION INTRAVENOUS at 13:13

## 2024-09-28 RX ADMIN — IPRATROPIUM BROMIDE AND ALBUTEROL SULFATE 3 ML: .5; 3 SOLUTION RESPIRATORY (INHALATION) at 19:28

## 2024-09-28 RX ADMIN — MIDODRINE HYDROCHLORIDE 2.5 MG: 5 TABLET ORAL at 19:40

## 2024-09-28 RX ADMIN — PIPERACILLIN SODIUM AND TAZOBACTAM SODIUM 3.38 G: 3; .375 INJECTION, POWDER, LYOPHILIZED, FOR SOLUTION INTRAVENOUS at 19:39

## 2024-09-28 RX ADMIN — Medication 10 ML: at 20:11

## 2024-09-28 RX ADMIN — BUDESONIDE 0.5 MG: 0.5 INHALANT RESPIRATORY (INHALATION) at 19:28

## 2024-09-28 RX ADMIN — DEXTROSE AND SODIUM CHLORIDE 100 ML/HR: 5; 900 INJECTION, SOLUTION INTRAVENOUS at 16:19

## 2024-09-28 RX ADMIN — IOPAMIDOL 100 ML: 612 INJECTION, SOLUTION INTRAVENOUS at 12:19

## 2024-09-28 RX ADMIN — SODIUM CHLORIDE 2000 MG: 9 INJECTION, SOLUTION INTRAVENOUS at 12:05

## 2024-09-28 RX ADMIN — Medication 250 MG: at 20:11

## 2024-09-28 RX ADMIN — AZITHROMYCIN DIHYDRATE 500 MG: 500 INJECTION, POWDER, LYOPHILIZED, FOR SOLUTION INTRAVENOUS at 13:14

## 2024-09-28 RX ADMIN — SODIUM CHLORIDE 1000 ML: 9 INJECTION, SOLUTION INTRAVENOUS at 11:37

## 2024-09-28 RX ADMIN — SODIUM CHLORIDE 250 ML/HR: 9 INJECTION, SOLUTION INTRAVENOUS at 14:45

## 2024-09-28 RX ADMIN — ENOXAPARIN SODIUM 40 MG: 100 INJECTION SUBCUTANEOUS at 19:40

## 2024-09-28 RX ADMIN — BACLOFEN 10 MG: 10 TABLET ORAL at 19:40

## 2024-09-28 RX ADMIN — GLYCOPYRROLATE 2 MG: 1 TABLET ORAL at 19:40

## 2024-09-28 RX ADMIN — Medication 15 ML: at 20:11

## 2024-09-28 NOTE — Clinical Note
Level of Care: Critical Care [6]   Diagnosis: PNA (pneumonia) [[id number]]   Certification: I Certify That Inpatient Hospital Services Are Medically Necessary For Greater Than 2 Midnights

## 2024-09-28 NOTE — ED PROVIDER NOTES
"     [facility] 3  Emergency Department Encounter  Emergency Medicine Physician Note       Pt Name: Viji Vargas  MRN: [id number]  Pt :   1960  Room Number:  322/1  Date of encounter:  2024  PCP: Ranjeet Pina MD  ED Physician: Olegario España DO    HPI:  Viji Vargas is a 63 y.o. female who presents to the ED with chief complaint of altered mental status. Patient is a long-term resident of the skilled nursing facility. Past medical history of nonverbal status, tracheostomy secondary to CVA, anoxic brain injury, COPD, pneumonia, hypertension. Per report, nursing staff reported that patient was \"altered from her normal baseline\". Patient may have been \"shaking more than normal.\" They were unable to describe the change to EMS.  Unknown onset or duration.  Vital signs stable and route.  POC glucose within normal limits. Patient is unable to provide further history given chronic condition.     HPI limited secondary to patient's mental status.    PAST MEDICAL HISTORY  Past Medical History:   Diagnosis Date    COPD (chronic obstructive pulmonary disease)     Hypertension     Pneumonia     Stroke      Current Outpatient Medications   Medication Instructions    baclofen (LIORESAL) 10 mg, Per G Tube, Every 8 Hours    budesonide (PULMICORT) 0.5 mg, Nebulization, 2 Times Daily - RT    carvedilol (COREG) 6.25 mg, Per G Tube, 2 Times Daily    cetirizine (ZYRTEC) 10 mg, Per G Tube, Daily    docusate sodium (COLACE) 300 mg, Per G Tube, 2 Times Daily    DPH-Lido-AlHydr-MgHydr-Simeth (FIRST-MOUTHWASH BLM MT) 2 mL, Mouth/Throat, 2 Times Daily, To R side of mouth    glycopyrrolate (ROBINUL) 2 mg, Per G Tube, 4 Times Daily    hyoscyamine (ANASPAZ,LEVSIN) 0.125 mg, Per G Tube, Every 4 Hours    ipratropium-albuterol (DUO-NEB) 0.5-2.5 mg/3 ml nebulizer 3 mL, Nebulization, 4 Times Daily - RT, Takes at [id number]    Multiple Vitamins-Minerals (MULTIVITAMIN WITH IRON-MINERALS) liquid 15 " mL, Per G Tube, Daily    Nutritional Supplements (ProSource No Carb) liquid 30 mL, Per PEG Tube, Daily    OMEPRAZOLE 2MG/ML LIQUID 2 mL, Per G Tube, Daily    polyethylene glycol (MIRALAX) 17 g, 2 Times Daily    Scopolamine 1 MG/3DAYS patch 1 patch, Transdermal, Every 72 Hours    senna 8.6 MG tablet 2 tablets, Per PEG Tube, Nightly    torsemide (DEMADEX) 10 mg, Oral, Daily      PAST SURGICAL HISTORY  Past Surgical History:   Procedure Laterality Date    HYSTERECTOMY      Partial     TRACHEOSTOMY AND PEG TUBE INSERTION N/A 3/20/2019    Procedure: TRACHEOSTOMY AND PERCUTANEOUS ENDOSCOPIC GASTROSTOMY TUBE INSERTION;  Surgeon: Nadira Pandey MD;  Location: [facility];  Service: General       FAMILY HISTORY  History reviewed. No pertinent family history.    SOCIAL HISTORY  Social History     Socioeconomic History    Marital status: Legally    Tobacco Use    Smoking status: Former     Current packs/day: 1.00     Types: Electronic Cigarette, Cigarettes    Smokeless tobacco: Never   Vaping Use    Vaping status: Unknown   Substance and Sexual Activity    Alcohol use: Not Currently     Comment: wine     Drug use: No    Sexual activity: Defer     ALLERGIES  Patient has no known allergies.    REVIEW OF SYSTEMS  All systems reviewed and negative except for those discussed in HPI.     PHYSICAL EXAM  ED Triage Vitals [09/28/24 0851]   Temp Heart Rate Resp BP SpO2   96.9 °F (36.1 °C) 97 18 141/82 100 %      Temp src Heart Rate Source Patient Position BP Location FiO2 (%)   Oral Monitor -- -- --     I have reviewed the triage vital signs and nursing notes.    General: Awake.  Appears chronically ill, but nontoxic.  No acute distress.  Head: Normocephalic.  Atraumatic.  Eyes: Pupils 2 mm.  PERRLA.  No scleral icterus.  Neck: Tracheostomy present.  Cardiovascular: Regular rate and rhythm.  No murmurs.  No rubs.  2+ distal pulses bilaterally.  Respiratory: Equal breath sounds bilaterally.  No rales.  No rhonchi.  No  wheezing.  GI: PEG tube present.  Abdomen is soft.  Nondistended.  Nontender to palpation.  No rebound.  No guarding.  No CVA tenderness.    : Sancehz catheter present.  Draining appropriately.  MSK: Contractures present.  Skin: No erythema.  No edema. No pallor. No cyanosis.    LAB RESULTS  Recent Results (from the past 24 hour(s))   COVID-19 and FLU A/B PCR, 1 HR TAT - Swab, Nasopharynx    Collection Time: 09/28/24  9:35 AM    Specimen: Nasopharynx; Swab   Result Value Ref Range    COVID19 Not Detected Not Detected - Ref. Range    Influenza A PCR Not Detected Not Detected    Influenza B PCR Not Detected Not Detected   High Sensitivity Troponin T    Collection Time: 09/28/24  9:44 AM    Specimen: Blood   Result Value Ref Range    HS Troponin T 47 (H) <14 ng/L   Comprehensive Metabolic Panel    Collection Time: 09/28/24  9:44 AM    Specimen: Blood   Result Value Ref Range    Glucose 116 (H) 65 - 99 mg/dL    BUN 47 (H) 8 - 23 mg/dL    Creatinine 0.78 0.57 - 1.00 mg/dL    Sodium 138 136 - 145 mmol/L    Potassium 4.6 3.5 - 5.2 mmol/L    Chloride 99 98 - 107 mmol/L    CO2 27.2 22.0 - 29.0 mmol/L    Calcium 9.4 8.6 - 10.5 mg/dL    Total Protein 6.8 6.0 - 8.5 g/dL    Albumin 2.9 (L) 3.5 - 5.2 g/dL    ALT (SGPT) 30 1 - 33 U/L    AST (SGOT) 16 1 - 32 U/L    Alkaline Phosphatase 98 39 - 117 U/L    Total Bilirubin 0.3 0.0 - 1.2 mg/dL    Globulin 3.9 gm/dL    A/G Ratio 0.7 g/dL    BUN/Creatinine Ratio 60.3 (H) 7.0 - 25.0    Anion Gap 11.8 5.0 - 15.0 mmol/L    eGFR 84.4 >60.0 mL/min/1.73   CBC Auto Differential    Collection Time: 09/28/24  9:44 AM    Specimen: Blood   Result Value Ref Range    WBC 17.23 (H) 3.40 - 10.80 10*3/mm3    RBC 2.74 (L) 3.77 - 5.28 10*6/mm3    Hemoglobin 8.3 (L) 12.0 - 15.9 g/dL    Hematocrit 26.4 (L) 34.0 - 46.6 %    MCV 96.4 79.0 - 97.0 fL    MCH 30.3 26.6 - 33.0 pg    MCHC 31.4 (L) 31.5 - 35.7 g/dL    RDW 14.8 12.3 - 15.4 %    RDW-SD 52.6 37.0 - 54.0 fl    MPV 13.0 (H) 6.0 - 12.0 fL    Platelets 250  140 - 450 10*3/mm3    Neutrophil % 78.4 (H) 42.7 - 76.0 %    Lymphocyte % 9.4 (L) 19.6 - 45.3 %    Monocyte % 4.8 (L) 5.0 - 12.0 %    Eosinophil % 6.3 (H) 0.3 - 6.2 %    Basophil % 0.3 0.0 - 1.5 %    Immature Grans % 0.8 (H) 0.0 - 0.5 %    Neutrophils, Absolute 13.51 (H) 1.70 - 7.00 10*3/mm3    Lymphocytes, Absolute 1.62 0.70 - 3.10 10*3/mm3    Monocytes, Absolute 0.82 0.10 - 0.90 10*3/mm3    Eosinophils, Absolute 1.09 (H) 0.00 - 0.40 10*3/mm3    Basophils, Absolute 0.06 0.00 - 0.20 10*3/mm3    Immature Grans, Absolute 0.13 (H) 0.00 - 0.05 10*3/mm3    nRBC 0.2 0.0 - 0.2 /100 WBC   Blood Gas, Venous With Co-Ox    Collection Time: 09/28/24 10:05 AM    Specimen: Venous Blood   Result Value Ref Range    Site OTHER     pH, Venous 7.406 7.320 - 7.420 pH Units    pCO2, Venous 51.6 (H) 40.0 - 50.0 mm Hg    pO2, Venous 31.0 30.0 - 50.0 mm Hg    HCO3, Venous 32.4 (H) 22.0 - 28.0 mmol/L    Base Excess, Venous 6.8 (H) 0.0 - 2.0 mmol/L    O2 Saturation, Venous 52.2 45.0 - 75.0 %    Oxyhemoglobin Venous 51.5 40.0 - 70.0 %    Methemoglobin Venous 0.1 0.0 - 3.0 %    Carboxyhemoglobin Venous 1.2 0.0 - 5.0 %    Barometric Pressure for Blood Gas 725 mmHg    Modality Aerosol Trach Collar     Flow Rate 4.0 lpm    Ventilator Mode NA     Collected by [id number]    Urinalysis With Culture If Indicated - Urine, Catheter    Collection Time: 09/28/24 10:51 AM    Specimen: Urine, Catheter   Result Value Ref Range    Color, UA Orange (A) Yellow, Straw    Appearance, UA Turbid (A) Clear    pH, UA 7.5 5.0 - 8.0    Specific Gravity, UA 1.015 1.005 - 1.030    Glucose, UA Negative Negative    Ketones, UA Negative Negative    Bilirubin, UA Negative Negative    Blood, UA Large (3+) (A) Negative    Protein, UA >=300 mg/dL (3+) (A) Negative    Leuk Esterase, UA Large (3+) (A) Negative    Nitrite, UA Negative Negative    Urobilinogen, UA 0.2 E.U./dL 0.2 - 1.0 E.U./dL   Urinalysis, Microscopic Only - Urine, Catheter    Collection Time: 09/28/24 10:51 AM     Specimen: Urine, Catheter   Result Value Ref Range    RBC, UA Unable to determine due to loaded field (A) None Seen, 0-2 /HPF    WBC, UA Too Numerous to Count (A) None Seen, 0-2 /HPF    Bacteria, UA 1+ (A) None Seen /HPF    Squamous Epithelial Cells, UA 3-6 (A) None Seen, 0-2 /HPF    Hyaline Casts, UA None Seen None Seen /LPF    Methodology Manual Light Microscopy    High Sensitivity Troponin T 2Hr    Collection Time: 09/28/24 11:40 AM    Specimen: Blood   Result Value Ref Range    HS Troponin T 48 (H) <14 ng/L    Troponin T Delta 1 >=-4 - <+4 ng/L   Procalcitonin    Collection Time: 09/28/24 11:40 AM    Specimen: Blood   Result Value Ref Range    Procalcitonin 0.45 (H) 0.00 - 0.25 ng/mL   Lactic Acid, Plasma    Collection Time: 09/28/24  1:11 PM    Specimen: Blood   Result Value Ref Range    Lactate 0.4 (L) 0.5 - 2.0 mmol/L   POC Glucose Once    Collection Time: 09/28/24  8:47 PM    Specimen: Blood   Result Value Ref Range    Glucose 135 (H) 70 - 130 mg/dL   POC Glucose Finger Q6H    Collection Time: 09/29/24 12:13 AM    Specimen: Finger; Blood   Result Value Ref Range    Glucose 147 (H) 70 - 130 mg/dL   Comprehensive Metabolic Panel    Collection Time: 09/29/24 12:47 AM    Specimen: Blood   Result Value Ref Range    Glucose 130 (H) 65 - 99 mg/dL    BUN 25 (H) 8 - 23 mg/dL    Creatinine 0.60 0.57 - 1.00 mg/dL    Sodium 142 136 - 145 mmol/L    Potassium 3.8 3.5 - 5.2 mmol/L    Chloride 108 (H) 98 - 107 mmol/L    CO2 24.5 22.0 - 29.0 mmol/L    Calcium 8.6 8.6 - 10.5 mg/dL    Total Protein 6.1 6.0 - 8.5 g/dL    Albumin 2.8 (L) 3.5 - 5.2 g/dL    ALT (SGPT) 24 1 - 33 U/L    AST (SGOT) 13 1 - 32 U/L    Alkaline Phosphatase 82 39 - 117 U/L    Total Bilirubin 0.2 0.0 - 1.2 mg/dL    Globulin 3.3 gm/dL    A/G Ratio 0.8 g/dL    BUN/Creatinine Ratio 41.7 (H) 7.0 - 25.0    Anion Gap 9.5 5.0 - 15.0 mmol/L    eGFR 99.8 >60.0 mL/min/1.73   High Sensitivity Troponin T    Collection Time: 09/29/24 12:47 AM    Specimen: Blood    Result Value Ref Range    HS Troponin T 51 (H) <14 ng/L   Lactic Acid, Plasma    Collection Time: 09/29/24 12:47 AM    Specimen: Blood   Result Value Ref Range    Lactate 0.8 0.5 - 2.0 mmol/L   CBC Auto Differential    Collection Time: 09/29/24 12:47 AM    Specimen: Blood   Result Value Ref Range    WBC 11.18 (H) 3.40 - 10.80 10*3/mm3    RBC 2.37 (L) 3.77 - 5.28 10*6/mm3    Hemoglobin 7.1 (L) 12.0 - 15.9 g/dL    Hematocrit 22.9 (L) 34.0 - 46.6 %    MCV 96.6 79.0 - 97.0 fL    MCH 30.0 26.6 - 33.0 pg    MCHC 31.0 (L) 31.5 - 35.7 g/dL    RDW 14.6 12.3 - 15.4 %    RDW-SD 51.6 37.0 - 54.0 fl    MPV 12.3 (H) 6.0 - 12.0 fL    Platelets 256 140 - 450 10*3/mm3    Neutrophil % 68.3 42.7 - 76.0 %    Lymphocyte % 15.7 (L) 19.6 - 45.3 %    Monocyte % 7.9 5.0 - 12.0 %    Eosinophil % 7.4 (H) 0.3 - 6.2 %    Basophil % 0.3 0.0 - 1.5 %    Immature Grans % 0.4 0.0 - 0.5 %    Neutrophils, Absolute 7.64 (H) 1.70 - 7.00 10*3/mm3    Lymphocytes, Absolute 1.75 0.70 - 3.10 10*3/mm3    Monocytes, Absolute 0.88 0.10 - 0.90 10*3/mm3    Eosinophils, Absolute 0.83 (H) 0.00 - 0.40 10*3/mm3    Basophils, Absolute 0.03 0.00 - 0.20 10*3/mm3    Immature Grans, Absolute 0.05 0.00 - 0.05 10*3/mm3    nRBC 0.0 0.0 - 0.2 /100 WBC   Blood Gas, Venous With Co-Ox    Collection Time: 09/29/24 12:50 AM    Specimen: Venous Blood   Result Value Ref Range    Site OTHER     pH, Venous 7.346 7.320 - 7.420 pH Units    pCO2, Venous 49.4 40.0 - 50.0 mm Hg    pO2, Venous 37.9 30.0 - 50.0 mm Hg    HCO3, Venous 27.0 22.0 - 28.0 mmol/L    Base Excess, Venous 1.1 0.0 - 2.0 mmol/L    O2 Saturation, Venous 67.6 45.0 - 75.0 %    Oxyhemoglobin Venous 66.5 40.0 - 70.0 %    Methemoglobin Venous 0.3 0.0 - 3.0 %    Carboxyhemoglobin Venous 1.4 0.0 - 5.0 %    Barometric Pressure for Blood Gas 728 mmHg    Modality Aerosol Trach Collar     Flow Rate 2.0 lpm    Ventilator Mode NA     Collected by RN        RADIOLOGY  CT Head Without Contrast    Result Date: 9/29/2024  FINAL REPORT  TECHNIQUE: null CLINICAL HISTORY: unresponsiveness, low BP COMPARISON: null FINDINGS: CT head without contrast Comparison: CT/SR - CT HEAD WO CONTRAST - 9/28/24 09:31 EDT Findings: Extensive gliosis and encephalomalacia throughout the cerebrum along with central greater than cortical atrophy like changes are similar to the prior exam. No midline shift or hemorrhage. No intra-axial mass. Enophthalmos. Orbits otherwise unremarkable. Sinuses and mastoids clear. No fracture.     Impression: 1. No acute findings. If there is concern for an acute infarcts superimposed on extensive chronic disease, recommend MRI. Authenticated and Electronically Signed by Desi Weaver MD on 09/29/2024 02:20:05 AM    CT Chest With Contrast Diagnostic, CT Abdomen Pelvis With Contrast    Result Date: 9/28/2024  PROCEDURE: CT CHEST W CONTRAST DIAGNOSTIC-, CT ABDOMEN PELVIS W CONTRAST-  HISTORY: Leukocytosis, AMS  COMPARISON: CT chest without December 28, 2023 and CT abdomen and pelvis February 11, 2023 without contrast.  PROCEDURE: The patient was injected with IV contrast. Axial images were obtained from the lung apex to the pubic symphysis by computed tomography. This study was performed with techniques to keep radiation doses as low as reasonably achievable, (ALARA). Individualized dose reduction techniques using automated exposure control or adjustment of mA and/or kV according to the patient size were employed.  FINDINGS:  CHEST: There is asymmetric enlargement of the left lobe of the thyroid with a hypodense nodule, question interval enlargement; recommend nonemergent thyroid ultrasound. Tracheostomy tube again noted and appears in similar position to the prior exam. Vascular calcifications noted. There is no axillary adenopathy. There is no hilar or mediastinal adenopathy.  The heart size is normal. There is no pericardial or pleural effusion. There are predominantly linear densities in the lower lobes, left greater than right.  Question a small amount of airspace disease posterior left lower lobe, pneumonia not excluded.  ABDOMEN: The liver is homogenous with no focal abnormality. Gallbladder present with no CT visible stones. The spleen is unremarkable. No adrenal mass is present.  The pancreas is unremarkable. The kidneys demonstrate bilateral circumscribed hypodense lesions consistent with cysts, similar to prior exam. There is moderate right hydronephrosis with mild enhancement of the right ureteral wall down to the bladder suggesting ureteritis likely secondary to cystitis. There is very mild dilatation of the left renal collecting system but no enhancement of the ureteral wall. IV contrast given but no definite obstructing stone seen. The aorta is normal in caliber. There is no free fluid or adenopathy.  PELVIS: The GI tract demonstrates stable position of gastrostomy tube. There is a small umbilical hernia containing fat and a knuckle of transverse colon that is nonobstructed. There is diverticulosis without evidence of diverticulitis. The appendix is not identified. The urinary bladder is completely collapsed with a Sanchez catheter. There is moderate wall thickening and a few bubbles of air in the bladder. There is also infiltration of the surrounding fat suggesting cystitis. There is no free fluid, adenopathy, or inflammatory process.      Small area of focal airspace disease posterior left lower lobe, possible pneumonia.  Cystitis and right ureteritis with moderate right hydronephrosis.  Diverticulosis   CTDI: 20.97 mGy DLP:1329.48 mGy.cm  This report was signed and finalized on 9/28/2024 12:41 PM by Briana Erickson MD.      XR Chest 1 View    Result Date: 9/28/2024  PROCEDURE: XR CHEST 1 VW-  HISTORY: Altered mental status  COMPARISON: September 16, 2024..  FINDINGS: The heart is normal in size. Tracheostomy appears to be in stable position from prior. There is stable linear atelectasis or scar compared to prior exam. No new area of  consolidation is seen.. The mediastinum is unremarkable. There is no pneumothorax.  There are no acute osseous abnormalities.      Stable chest including stable position of tracheostomy tube..      This report was signed and finalized on 9/28/2024 9:50 AM by Briana Erickson MD.      CT Head Without Contrast    Result Date: 9/28/2024  PROCEDURE: CT HEAD WO CONTRAST-  HISTORY: Altered mental status, history of anoxic brain injury in March 2019.  COMPARISON: March 10, 2019..  TECHNIQUE: Multiple axial CT images were performed from the foramen magnum to the vertex. Individualized dose reduction techniques using automated exposure control or adjustment of mA and/or kV according to the patient size were employed.  FINDINGS: There is moderately severe generalized cerebral atrophy which is pronounced for age and has developed since the prior exam.. The ventricles are enlarged. There is previous study suggested cerebral edema. There our large confluent areas of encephalomalacia with sparing of the cortex bilaterally as well as the basal ganglia midbrain, yamilet and medial cerebellum. Findings are consistent with previous history of anoxic brain injury. No masses are identified. No extra-axial fluid is seen. The paranasal sinuses are unremarkable.      Interval development of moderately severe atrophy and large areas of encephalomalacia involving the cerebrum with sparing of the basal ganglia, midbrain, yamilet and medial cerebellum in this patient with a history of previous anoxic brain injury.     CTDI: 42.37 mGy DLP:712.87 mGy.cm  This report was signed and finalized on 9/28/2024 9:47 AM by Briana Erickson MD.       PROCEDURES  Critical Care    Performed by: Olegario España DO  Authorized by: Olegario España DO    Comments:      CRITICAL CARE PROCEDURE NOTE  Authorized and performed by: Dr. España  Total critical care time: Approximately 45 minutes.  Patient was critically ill due to: Pneumonia, UTI, sepsis  Interventions: IV  fluids, IV antibiotics, close airway/mental status/hemodynamic monitoring    Due to a high probability of clinically significant, life threatening deterioration, the patient required my highest level of preparedness to intervene emergently and I personally spent this critical care time directly and personally managing the patient.  This critical care time included obtaining a history; examining the patient; pulse oximetry; ordering and review of studies; arranging urgent treatment with development of a management plan; evaluation of patient's response to treatment; frequent reassessment; and, discussions with other providers.    This critical care time was performed to assess and manage the high probability of imminent, life-threatening deterioration that could result in multiorgan failure.  It was exclusive of separately billable procedures and treating other patients and teaching time.    Please see MDM section and the rest of the note for further information on patient assessment and interventions.      RISK STRATIFICATION    MEDICAL DECISION MAKING  63 y.o. female with past medical history listed above who presents with reported altered mental status.    Patient arrives via EMS.  I have reviewed the EMS documentation/notes and included that information in my HPI.    Vital signs within normal limits.  Pulse ox 100% on baseline oxygen.    Based on clinical presentation and physical exam, differential diagnosis includes, but is not limited to, toxic versus metabolic encephalopathy, intracranial structure abnormality, acute coronary syndrome, pneumonia, UTI.    At least 3 different tests have been ordered on this patient.    Please see ED course below for my interpretation of the ED workup.  ED Course as of 09/29/24 0604   Sat Sep 28, 2024   0933 ECG 12 Lead Altered Mental Status  EKG per my interpretation sinus tachycardia, rate 101, normal axis, no ST segment elevation or depression, normal QRS QTC intervals,  limited by baseline artifact.   [JS]   1132 Patient has 2/4 SIRS criteria (HR > 90, leukocytosis) with UTI on UA, therefore ED sepsis bundle was initiated.     I ordered IV fluid resuscitation and empiric IV antibiotics.    Infection suspected at 11:32. [JS]   1250 I reviewed the labs listed above. Notable findings are highlighted below.    Old laboratory data was reviewed from the medical records and compared to today's results.   [JS]   1250 CBC & Differential(!) [JS]   1250 WBC(!): 17.23 [JS]   1250 Hemoglobin(!): 8.3 [JS]   1251 Comprehensive Metabolic Panel(!) [JS]   1251 HS Troponin T(!): 47 [JS]   1251 High Sensitivity Troponin T 2Hr(!) [JS]   1251 HS Troponin T(!): 48 [JS]   1251 Blood Gas, Venous With Co-Ox(!) [JS]   1251 pH, Venous: 7.406 [JS]   1251 Urinalysis With Culture If Indicated - Urine, Catheter(!) [JS]   1251 Leukocytes, UA(!): Large (3+) [JS]   1251 Blood, UA(!): Large (3+) [JS]   1251 WBC, UA(!): Too Numerous to Count [JS]   1251 Squamous Epithelial Cells, UA(!): 3-6 [JS]   1251 COVID19: Not Detected [JS]   1251 Influenza A PCR: Not Detected [JS]   1251 Influenza B PCR: Not Detected [JS]   1251 I have independently reviewed and interpreted the imaging listed above.  My interpretations are listed below:    -Chest x-ray negative for infiltrate.    -CT head negative for intracranial hemorrhage or mass effect.    -CT chest abdomen pelvis left lower lobe infiltrate, no small bowel obstruction     [JS]   1300 Sepsis re-evaluation performed. [JS]      ED Course User Index  [JS] Olegario España DO     Medications administered in ED:  Medications   Pharmacy to Dose Zosyn (has no administration in time range)   sodium chloride 0.9 % flush 10 mL (10 mL Intravenous Given 9/28/24 2011)   sodium chloride 0.9 % flush 10 mL (has no administration in time range)   sodium chloride 0.9 % infusion 40 mL (has no administration in time range)   acetaminophen (TYLENOL) tablet 650 mg (has no administration in time  range)     Or   acetaminophen (TYLENOL) 160 MG/5ML oral solution 650 mg (has no administration in time range)     Or   acetaminophen (TYLENOL) suppository 650 mg (has no administration in time range)   ondansetron (ZOFRAN) injection 4 mg (has no administration in time range)   Pharmacy to Dose enoxaparin (LOVENOX) (has no administration in time range)   dextrose 5 % and sodium chloride 0.9 % infusion (100 mL/hr Intravenous New Bag 9/29/24 0250)   sennosides-docusate (PERICOLACE) 8.6-50 MG per tablet 2 tablet (has no administration in time range)     And   polyethylene glycol (MIRALAX) packet 17 g (has no administration in time range)     And   bisacodyl (DULCOLAX) EC tablet 5 mg (has no administration in time range)     And   bisacodyl (DULCOLAX) suppository 10 mg (has no administration in time range)   baclofen (LIORESAL) tablet 10 mg (10 mg Per G Tube Given 9/29/24 0250)   budesonide (PULMICORT) nebulizer solution 0.5 mg (0.5 mg Nebulization Given 9/28/24 1928)   glycopyrrolate (ROBINUL) tablet 2 mg (2 mg Per G Tube Given 9/28/24 1940)   Multi-Gem liquid 15 mL (15 mL Per G Tube Given 9/28/24 2011)   ipratropium-albuterol (DUO-NEB) nebulizer solution 3 mL (3 mL Nebulization Given 9/29/24 0007)   piperacillin-tazobactam (ZOSYN) IVPB 3.375 g IVPB in 100 mL NS (VTB) (3.375 g Intravenous New Bag 9/29/24 0020)   Enoxaparin Sodium (LOVENOX) syringe 40 mg (40 mg Subcutaneous Given 9/28/24 1940)   famotidine (PEPCID) tablet 20 mg (20 mg Per G Tube Given 9/28/24 1940)   midodrine (PROAMATINE) tablet 2.5 mg (2.5 mg Per G Tube Given 9/28/24 1940)   saccharomyces boulardii (FLORASTOR) capsule 250 mg (250 mg Per G Tube Given 9/28/24 2011)   sodium chloride 0.9 % bolus 1,000 mL (0 mL Intravenous Stopped 9/28/24 1303)   cefTRIAXone (ROCEPHIN) 2,000 mg in sodium chloride 0.9 % 100 mL IVPB-VTB (0 mg Intravenous Stopped 9/28/24 1225)   iopamidol (ISOVUE-300) 61 % injection 100 mL (100 mL Intravenous Given 9/28/24 1219)    azithromycin (ZITHROMAX) 500 mg in sodium chloride 0.9 % 250 mL IVPB-VTB (0 mg Intravenous Stopped 9/28/24 1422)   sodium chloride 0.9 % bolus 1,000 mL (0 mL Intravenous Stopped 9/28/24 1445)   piperacillin-tazobactam (ZOSYN) IVPB 3.375 g IVPB in 100 mL NS (VTB) (3.375 g Intravenous New Bag 9/28/24 1939)   sodium chloride 0.9 % bolus 500 mL (500 mL Intravenous New Bag 9/29/24 0030)     Patient will require medical admission for further workup and management.    Case discussed with Dr. Lagunas (hospitalist) who agrees to evaluate and admit the patient.  We discussed the HPI, pertinent PMHx, ED course and workup.    REPEAT VITAL SIGNS  AS OF 06:04 EDT VITALS:  BP - 92/76  HR - 71  TEMP - 97.5 °F (36.4 °C) (Axillary)  O2 SATS - 100%    DIAGNOSIS  Final diagnoses:   Pneumonia of left lower lobe due to infectious organism   Acute cystitis with hematuria   Sepsis, due to unspecified organism, unspecified whether acute organ dysfunction present     DISPOSITION  ED Disposition       ED Disposition   Decision to Admit    Condition   --    Comment   Level of Care: Telemetry [5]   Diagnosis: Acute UTI [[id number]]   Admitting Physician: ANGELES YOUNG [[id number]]   Attending Physician: ANGELES YOUNG [[id number]]   Certification: I Certify That Inpatient Hospital Services Are Medically Necessary For Greater Than 2 Midnights               Please note that portions of this document were completed with voice recognition software.        Olegario España DO  09/29/24 0605

## 2024-09-28 NOTE — H&P
[facility]   HISTORY AND PHYSICAL      Name:  Viji Vargas   Age:  63 y.o.  Sex:  female  :  1960  MRN:  [id number]   Visit Number:  [id number]  Admission Date:  2024  Date Of Service:  24  Primary Care Physician:  Ranjeet Pina MD    Chief Complaint:     Altered mental status.    History Of Presenting Illness:      Viji Vargas  is a 63-year-old male resident of nursing home facility, with history of anoxic injury status postcardiac arrest in 3/2019, status post trach and PEG, COPD, hypertension was brought to the emergency room by EMS with altered mental status.  Per report, nursing noticed the patient to be altered from her normal baseline of unknown duration.  Unfortunately patient is nonverbal.  Patient's last admission was to  in 2024 where she was treated for UTI and pneumonia.    In the emergency room, patient's initial temperature was 96.9, pulse 97, blood pressure 140/80 but subsequently she dropped to 81/44.  Patient's initial pulse oxygen saturation was 100% on trach collar at 4 L.  Troponin 47.  CMP was unremarkable except for a BUN of 47 and albumin of 2.9.  White count 17, hemoglobin 8.3.  Procalcitonin was elevated at 0.45.  Lactic acid was 0.4.  COVID and flu test were negative.  Noncontrast CT of the head showed interval development of moderately severe atrophy and large areas of encephalomalacia in the patient with history of previous anoxic brain injury.  Chest x-ray showed stable features including stable position of tracheostomy tube.  CT chest with contrast showed small areas of focal airspace disease posterior left lower lobe, possible pneumonia.  CT of the abdomen and pelvis showed cystitis with right ureteritis with moderate right hydronephrosis.  Diverticulosis noted.  Urine analysis showed too numerous to count WBCs and large RBCs.  Blood cultures and urine cultures were sent from the emergency room.  Patient was given a liter  of normal saline bolus with improvement in her hypotension.  She was given 2 g of IV ceftriaxone and 500 mg of IV azithromycin and was subsequently admitted to the medical ICU for further management of left lower lobe pneumonia, urinary tract infection, hypotension and metabolic encephalopathy.    Review Of Systems:    All systems were reviewed and negative except as mentioned in history of presenting illness, assessment and plan.    Past Medical History: Patient  has a past medical history of COPD (chronic obstructive pulmonary disease), Hypertension, Pneumonia, and Stroke.    Past Surgical History: Patient  has a past surgical history that includes Hysterectomy and tracheostomy and peg tube insertion (N/A, 3/20/2019).    Social History: Patient  reports that she has quit smoking. Her smoking use included electronic cigarette and cigarettes. She has never used smokeless tobacco. She reports that she does not currently use alcohol. She reports that she does not use drugs.    Family History:  Patient family history is unknown.    Allergies:      Patient has no known allergies.    Home Medications:    Prior to Admission Medications       Prescriptions Last Dose Informant Patient Reported? Taking?    baclofen (LIORESAL) 10 MG tablet   Yes No    Administer 1 tablet per G tube Every 8 (Eight) Hours.    budesonide (PULMICORT) 0.5 MG/2ML nebulizer solution   No No    Take 2 mL by nebulization 2 (Two) Times a Day.    carvedilol (COREG) 12.5 MG tablet   No No    Administer 0.5 tablets per G tube 2 (Two) Times a Day.    cetirizine (zyrTEC) 10 MG tablet   Yes No    Administer 1 tablet per G tube Daily.    docusate sodium (COLACE) 50 mg/5 mL liquid   Yes No    Administer 30 mL per G tube 2 (Two) Times a Day.    DPH-Lido-AlHydr-MgHydr-Simeth (FIRST-MOUTHWASH BLM MT)   Yes No    Apply 2 mL to the mouth or throat 2 (Two) Times a Day. To R side of mouth    famotidine (PEPCID) 40 mg/5 mL suspension   Yes No    Administer 2.5 mL per  G tube 2 (Two) Times a Day.    glycopyrrolate (ROBINUL) 2 MG tablet   Yes No    Administer 1 tablet per G tube 3 (Three) Times a Day.    hyoscyamine (ANASPAZ,LEVSIN) 0.125 MG tablet   Yes No    Administer 1 tablet per G tube 3 (Three) Times a Day.    ipratropium-albuterol (DUO-NEB) 0.5-2.5 mg/3 ml nebulizer   No No    Take 3 mL by nebulization 4 (Four) Times a Day. Takes at [id number]    montelukast (SINGULAIR) 10 MG tablet   Yes No    Administer 1 tablet per G tube Every Night.    Multiple Vitamins-Minerals (MULTIVITAMIN WITH IRON-MINERALS) liquid   Yes No    Administer 15 mL per G tube Daily.    Nutritional Supplements (ProSource No Carb) liquid   No No    30 mL by Per PEG Tube route Daily.    Petrolatum-Zinc Oxide 49-15 % ointment   Yes No    Apply 1 application topically 2 (Two) Times a Day As Needed (Excoriation).    polyethylene glycol (MiraLax) 17 g packet   Yes No    17 g 2 (Two) Times a Day.    Scopolamine 1 MG/3DAYS patch   Yes No    Place 1 patch on the skin as directed by provider Every 72 (Seventy-Two) Hours.    senna 8.6 MG tablet   Yes No    2 tablets by Per PEG Tube route Every Night.    torsemide (DEMADEX) 20 MG tablet   No No    Take 0.5 tablets by mouth Daily.     ED Medications:    Medications   sodium chloride 0.9 % infusion (250 mL/hr Intravenous New Bag 9/28/24 1445)   sodium chloride 0.9 % bolus 1,000 mL (0 mL Intravenous Stopped 9/28/24 1303)   cefTRIAXone (ROCEPHIN) 2,000 mg in sodium chloride 0.9 % 100 mL IVPB-VTB (0 mg Intravenous Stopped 9/28/24 1225)   iopamidol (ISOVUE-300) 61 % injection 100 mL (100 mL Intravenous Given 9/28/24 1219)   azithromycin (ZITHROMAX) 500 mg in sodium chloride 0.9 % 250 mL IVPB-VTB (0 mg Intravenous Stopped 9/28/24 1422)   sodium chloride 0.9 % bolus 1,000 mL (0 mL Intravenous Stopped 9/28/24 1445)     Vital Signs:  Temp:  [96.9 °F (36.1 °C)] 96.9 °F (36.1 °C)  Heart Rate:  [] 102  Resp:  [18] 18  BP: ()/(43-96) 116/64         "09/28/24  0915   Weight: 70.3 kg (155 lb)     Body mass index is 26.61 kg/m².    Physical Exam:     Most recent vital Signs: /64   Pulse 102   Temp 96.9 °F (36.1 °C) (Oral)   Resp 18   Ht 162.6 cm (64\")   Wt 70.3 kg (155 lb)   SpO2 100%   BMI 26.61 kg/m²     Physical Exam  Constitutional:       General: She is not in acute distress.     Appearance: She is ill-appearing.      Comments: Patient is nonverbal and unable to follow commands.   HENT:      Head: Normocephalic.      Right Ear: External ear normal.      Left Ear: External ear normal.      Nose: Nose normal.      Mouth/Throat:      Mouth: Mucous membranes are moist.   Eyes:      Conjunctiva/sclera: Conjunctivae normal.   Neck:      Comments: Tracheostomy with trach collar noted.  Cardiovascular:      Rate and Rhythm: Normal rate and regular rhythm.      Pulses: Normal pulses.      Heart sounds: Normal heart sounds. No murmur heard.  Pulmonary:      Breath sounds: Rales present. No wheezing.      Comments: Patient does have gurgling sounds heard in the throat.  Bilateral basal crackles heard.  No wheezing.  Abdominal:      General: Bowel sounds are normal.      Palpations: Abdomen is soft.      Tenderness: There is no guarding.      Comments: PEG tube noted in the epigastric region.  Sanchez catheter is in place.   Musculoskeletal:      Right lower leg: No edema.      Left lower leg: No edema.   Skin:     General: Skin is warm.      Findings: No erythema or rash.   Neurological:      Mental Status: She is alert. Mental status is at baseline.      Comments: Alert but nonverbal.  Severe contractures noted in bilateral upper and lower extremities.  Bilateral foot drop noted.   Psychiatric:      Comments: Patient is nonverbal.       Laboratory data:    I have reviewed the labs done in the emergency room.    Results from last 7 days   Lab Units 09/28/24  0944   SODIUM mmol/L 138   POTASSIUM mmol/L 4.6   CHLORIDE mmol/L 99   CO2 mmol/L 27.2   BUN mg/dL 47* "   CREATININE mg/dL 0.78   CALCIUM mg/dL 9.4   BILIRUBIN mg/dL 0.3   ALK PHOS U/L 98   ALT (SGPT) U/L 30   AST (SGOT) U/L 16   GLUCOSE mg/dL 116*     Results from last 7 days   Lab Units 09/28/24  0944   WBC 10*3/mm3 17.23*   HEMOGLOBIN g/dL 8.3*   HEMATOCRIT % 26.4*   PLATELETS 10*3/mm3 250       Results from last 7 days   Lab Units 09/28/24  1140 09/28/24  0944   HSTROP T ng/L 48* 47*       Results from last 7 days   Lab Units 09/28/24  1051   COLOR UA  [place]*   GLUCOSE UA  Negative   KETONES UA  Negative   BLOOD UA  Large (3+)*   LEUKOCYTES UA  Large (3+)*   PH, URINE  7.5   BILIRUBIN UA  Negative   UROBILINOGEN UA  0.2 E.U./dL   RBC UA /HPF Unable to determine due to loaded field*   WBC UA /HPF Too Numerous to Count*     EKG:      EKG done in the emergency room was reviewed by me.  It shows sinus tachycardia 101.  Normal axis.  Significant baseline artifact noted.    Radiology:    CT Chest With Contrast Diagnostic    Result Date: 9/28/2024  PROCEDURE: CT CHEST W CONTRAST DIAGNOSTIC-, CT ABDOMEN PELVIS W CONTRAST-  HISTORY: Leukocytosis, AMS  COMPARISON: CT chest without December 28, 2023 and CT abdomen and pelvis February 11, 2023 without contrast.  PROCEDURE: The patient was injected with IV contrast. Axial images were obtained from the lung apex to the pubic symphysis by computed tomography. This study was performed with techniques to keep radiation doses as low as reasonably achievable, (ALARA). Individualized dose reduction techniques using automated exposure control or adjustment of mA and/or kV according to the patient size were employed.  FINDINGS:  CHEST: There is asymmetric enlargement of the left lobe of the thyroid with a hypodense nodule, question interval enlargement; recommend nonemergent thyroid ultrasound. Tracheostomy tube again noted and appears in similar position to the prior exam. Vascular calcifications noted. There is no axillary adenopathy. There is no hilar or mediastinal adenopathy.   The heart size is normal. There is no pericardial or pleural effusion. There are predominantly linear densities in the lower lobes, left greater than right. Question a small amount of airspace disease posterior left lower lobe, pneumonia not excluded.  ABDOMEN: The liver is homogenous with no focal abnormality. Gallbladder present with no CT visible stones. The spleen is unremarkable. No adrenal mass is present.  The pancreas is unremarkable. The kidneys demonstrate bilateral circumscribed hypodense lesions consistent with cysts, similar to prior exam. There is moderate right hydronephrosis with mild enhancement of the right ureteral wall down to the bladder suggesting ureteritis likely secondary to cystitis. There is very mild dilatation of the left renal collecting system but no enhancement of the ureteral wall. IV contrast given but no definite obstructing stone seen. The aorta is normal in caliber. There is no free fluid or adenopathy.  PELVIS: The GI tract demonstrates stable position of gastrostomy tube. There is a small umbilical hernia containing fat and a knuckle of transverse colon that is nonobstructed. There is diverticulosis without evidence of diverticulitis. The appendix is not identified. The urinary bladder is completely collapsed with a Sanchez catheter. There is moderate wall thickening and a few bubbles of air in the bladder. There is also infiltration of the surrounding fat suggesting cystitis. There is no free fluid, adenopathy, or inflammatory process.      Small area of focal airspace disease posterior left lower lobe, possible pneumonia.  Cystitis and right ureteritis with moderate right hydronephrosis.  Diverticulosis   CTDI: 20.97 mGy DLP:1329.48 mGy.cm  This report was signed and finalized on 9/28/2024 12:41 PM by Briana Erickson MD.      CT Abdomen Pelvis With Contrast    Result Date: 9/28/2024  PROCEDURE: CT CHEST W CONTRAST DIAGNOSTIC-, CT ABDOMEN PELVIS W CONTRAST-  HISTORY: Leukocytosis,  AMS  COMPARISON: CT chest without December 28, 2023 and CT abdomen and pelvis February 11, 2023 without contrast.  PROCEDURE: The patient was injected with IV contrast. Axial images were obtained from the lung apex to the pubic symphysis by computed tomography. This study was performed with techniques to keep radiation doses as low as reasonably achievable, (ALARA). Individualized dose reduction techniques using automated exposure control or adjustment of mA and/or kV according to the patient size were employed.  FINDINGS:  CHEST: There is asymmetric enlargement of the left lobe of the thyroid with a hypodense nodule, question interval enlargement; recommend nonemergent thyroid ultrasound. Tracheostomy tube again noted and appears in similar position to the prior exam. Vascular calcifications noted. There is no axillary adenopathy. There is no hilar or mediastinal adenopathy.  The heart size is normal. There is no pericardial or pleural effusion. There are predominantly linear densities in the lower lobes, left greater than right. Question a small amount of airspace disease posterior left lower lobe, pneumonia not excluded.  ABDOMEN: The liver is homogenous with no focal abnormality. Gallbladder present with no CT visible stones. The spleen is unremarkable. No adrenal mass is present.  The pancreas is unremarkable. The kidneys demonstrate bilateral circumscribed hypodense lesions consistent with cysts, similar to prior exam. There is moderate right hydronephrosis with mild enhancement of the right ureteral wall down to the bladder suggesting ureteritis likely secondary to cystitis. There is very mild dilatation of the left renal collecting system but no enhancement of the ureteral wall. IV contrast given but no definite obstructing stone seen. The aorta is normal in caliber. There is no free fluid or adenopathy.  PELVIS: The GI tract demonstrates stable position of gastrostomy tube. There is a small umbilical hernia  containing fat and a knuckle of transverse colon that is nonobstructed. There is diverticulosis without evidence of diverticulitis. The appendix is not identified. The urinary bladder is completely collapsed with a Sanchez catheter. There is moderate wall thickening and a few bubbles of air in the bladder. There is also infiltration of the surrounding fat suggesting cystitis. There is no free fluid, adenopathy, or inflammatory process.      Small area of focal airspace disease posterior left lower lobe, possible pneumonia.  Cystitis and right ureteritis with moderate right hydronephrosis.  Diverticulosis   CTDI: 20.97 mGy DLP:1329.48 mGy.cm  This report was signed and finalized on 9/28/2024 12:41 PM by Briana Erickson MD.      XR Chest 1 View    Result Date: 9/28/2024  PROCEDURE: XR CHEST 1 VW-  HISTORY: Altered mental status  COMPARISON: September 16, 2024..  FINDINGS: The heart is normal in size. Tracheostomy appears to be in stable position from prior. There is stable linear atelectasis or scar compared to prior exam. No new area of consolidation is seen.. The mediastinum is unremarkable. There is no pneumothorax.  There are no acute osseous abnormalities.      Stable chest including stable position of tracheostomy tube..      This report was signed and finalized on 9/28/2024 9:50 AM by Briana Erickson MD.      CT Head Without Contrast    Result Date: 9/28/2024  PROCEDURE: CT HEAD WO CONTRAST-  HISTORY: Altered mental status, history of anoxic brain injury in March 2019.  COMPARISON: March 10, 2019..  TECHNIQUE: Multiple axial CT images were performed from the foramen magnum to the vertex. Individualized dose reduction techniques using automated exposure control or adjustment of mA and/or kV according to the patient size were employed.  FINDINGS: There is moderately severe generalized cerebral atrophy which is pronounced for age and has developed since the prior exam.. The ventricles are enlarged. There is previous study  suggested cerebral edema. There our large confluent areas of encephalomalacia with sparing of the cortex bilaterally as well as the basal ganglia midbrain, yamilet and medial cerebellum. Findings are consistent with previous history of anoxic brain injury. No masses are identified. No extra-axial fluid is seen. The paranasal sinuses are unremarkable.      Interval development of moderately severe atrophy and large areas of encephalomalacia involving the cerebrum with sparing of the basal ganglia, midbrain, yamilet and medial cerebellum in this patient with a history of previous anoxic brain injury.     CTDI: 42.37 mGy DLP:712.87 mGy.cm  This report was signed and finalized on 9/28/2024 9:47 AM by Briana Erickson MD.       Assessment:    Acute metabolic encephalopathy secondary to #2 and #3, POA.  Left lower lobe healthcare associated pneumonia, POA.  Acute complicated urinary tract infection, POA.  Right ureteritis/right hydronephrosis, POA.  Sepsis secondary to #2 and #3, POA.  History of anoxic brain injury status post trach and PEG.  Chronic anemia.  Functional quadriplegia.    Plan:    Sepsis/left lower lobe pneumonia/complicated UTI.  - Unfortunately, patient has poor prognosis due to her anoxic brain injury and she is at high risk for recurrent infections especially aspiration pneumonia and urinary tract infections.  - Patient does seem to have right hydronephrosis but her renal function seems to be fairly within normal range.  - She received 2 g of IV ceftriaxone in the emergency room but due to high risk for healthcare associated infections, we will change her antibiotics to Zosyn.  - Obtain nasal swab for MRSA.  - Blood and urine cultures have been sent from the emergency room.  - Keep her n.p.o. tonight and restart her on tube feeds tomorrow.    Hypotension.  - Patient did have transient hypotension and responded to IV fluids well.  - We will continue dextrose with normal saline at 100 mL/h.  - We will place her on  low-dose midodrine at 2.5 mg 3 times daily.    Right hydronephrosis.  - Likely secondary to urinary tract infection and urethritis.  - I do not suspect need for intervention at this time but if needed, we will consult urology on 9/30/2024.    Overall the patient's prognosis is extremely poor due to poor functional status and recurrent infections.  We will admit her to telemetry floor.    I tried to call the patient's daughter Liliya over the phone but unfortunately there was no response.    Risk Assessment: High  DVT Prophylaxis: Enoxaparin  Code Status: Full  Diet: NPO.      Paul Pandey MD  09/28/24  15:15 EDT    Dictated utilizing Dragon dictation.

## 2024-09-29 ENCOUNTER — APPOINTMENT (OUTPATIENT)
Dept: CT IMAGING | Facility: HOSPITAL | Age: 64
DRG: 871 | End: 2024-09-29
Payer: MEDICARE

## 2024-09-29 LAB
ALBUMIN SERPL-MCNC: 2.8 G/DL (ref 3.5–5.2)
ALBUMIN/GLOB SERPL: 0.8 G/DL
ALP SERPL-CCNC: 82 U/L (ref 39–117)
ALT SERPL W P-5'-P-CCNC: 24 U/L (ref 1–33)
ANION GAP SERPL CALCULATED.3IONS-SCNC: 11.1 MMOL/L (ref 5–15)
ANION GAP SERPL CALCULATED.3IONS-SCNC: 9.5 MMOL/L (ref 5–15)
AST SERPL-CCNC: 13 U/L (ref 1–32)
ATMOSPHERIC PRESS: 728 MMHG
BACTERIA SPEC AEROBE CULT: NORMAL
BASE EXCESS BLDV CALC-SCNC: 1.1 MMOL/L (ref 0–2)
BASOPHILS # BLD AUTO: 0.03 10*3/MM3 (ref 0–0.2)
BASOPHILS NFR BLD AUTO: 0.3 % (ref 0–1.5)
BDY SITE: NORMAL
BILIRUB SERPL-MCNC: 0.2 MG/DL (ref 0–1.2)
BUN SERPL-MCNC: 22 MG/DL (ref 8–23)
BUN SERPL-MCNC: 25 MG/DL (ref 8–23)
BUN/CREAT SERPL: 36.7 (ref 7–25)
BUN/CREAT SERPL: 41.7 (ref 7–25)
CALCIUM SPEC-SCNC: 8.6 MG/DL (ref 8.6–10.5)
CALCIUM SPEC-SCNC: 8.7 MG/DL (ref 8.6–10.5)
CHLORIDE SERPL-SCNC: 108 MMOL/L (ref 98–107)
CHLORIDE SERPL-SCNC: 108 MMOL/L (ref 98–107)
CO2 SERPL-SCNC: 22.9 MMOL/L (ref 22–29)
CO2 SERPL-SCNC: 24.5 MMOL/L (ref 22–29)
COHGB MFR BLD: 1.4 % (ref 0–5)
CREAT SERPL-MCNC: 0.6 MG/DL (ref 0.57–1)
CREAT SERPL-MCNC: 0.6 MG/DL (ref 0.57–1)
D-LACTATE SERPL-SCNC: 0.8 MMOL/L (ref 0.5–2)
DEPRECATED RDW RBC AUTO: 51.6 FL (ref 37–54)
DEPRECATED RDW RBC AUTO: 52.1 FL (ref 37–54)
EGFRCR SERPLBLD CKD-EPI 2021: 99.8 ML/MIN/1.73
EGFRCR SERPLBLD CKD-EPI 2021: 99.8 ML/MIN/1.73
EOSINOPHIL # BLD AUTO: 0.83 10*3/MM3 (ref 0–0.4)
EOSINOPHIL NFR BLD AUTO: 7.4 % (ref 0.3–6.2)
ERYTHROCYTE [DISTWIDTH] IN BLOOD BY AUTOMATED COUNT: 14.6 % (ref 12.3–15.4)
ERYTHROCYTE [DISTWIDTH] IN BLOOD BY AUTOMATED COUNT: 14.7 % (ref 12.3–15.4)
GAS FLOW AIRWAY: 2 LPM
GLOBULIN UR ELPH-MCNC: 3.3 GM/DL
GLUCOSE BLDC GLUCOMTR-MCNC: 133 MG/DL (ref 70–130)
GLUCOSE BLDC GLUCOMTR-MCNC: 143 MG/DL (ref 70–130)
GLUCOSE BLDC GLUCOMTR-MCNC: 147 MG/DL (ref 70–130)
GLUCOSE BLDC GLUCOMTR-MCNC: 183 MG/DL (ref 70–130)
GLUCOSE SERPL-MCNC: 122 MG/DL (ref 65–99)
GLUCOSE SERPL-MCNC: 130 MG/DL (ref 65–99)
HCO3 BLDV-SCNC: 27 MMOL/L (ref 22–28)
HCT VFR BLD AUTO: 22.9 % (ref 34–46.6)
HCT VFR BLD AUTO: 23.4 % (ref 34–46.6)
HGB BLD-MCNC: 7.1 G/DL (ref 12–15.9)
HGB BLD-MCNC: 7.3 G/DL (ref 12–15.9)
IMM GRANULOCYTES # BLD AUTO: 0.05 10*3/MM3 (ref 0–0.05)
IMM GRANULOCYTES NFR BLD AUTO: 0.4 % (ref 0–0.5)
LYMPHOCYTES # BLD AUTO: 1.75 10*3/MM3 (ref 0.7–3.1)
LYMPHOCYTES NFR BLD AUTO: 15.7 % (ref 19.6–45.3)
Lab: NORMAL
MCH RBC QN AUTO: 30 PG (ref 26.6–33)
MCH RBC QN AUTO: 30.3 PG (ref 26.6–33)
MCHC RBC AUTO-ENTMCNC: 31 G/DL (ref 31.5–35.7)
MCHC RBC AUTO-ENTMCNC: 31.2 G/DL (ref 31.5–35.7)
MCV RBC AUTO: 96.6 FL (ref 79–97)
MCV RBC AUTO: 97.1 FL (ref 79–97)
METHGB BLD QL: 0.3 % (ref 0–3)
MODALITY: NORMAL
MONOCYTES # BLD AUTO: 0.88 10*3/MM3 (ref 0.1–0.9)
MONOCYTES NFR BLD AUTO: 7.9 % (ref 5–12)
MRSA DNA SPEC QL NAA+PROBE: NORMAL
NEUTROPHILS NFR BLD AUTO: 68.3 % (ref 42.7–76)
NEUTROPHILS NFR BLD AUTO: 7.64 10*3/MM3 (ref 1.7–7)
NRBC BLD AUTO-RTO: 0 /100 WBC (ref 0–0.2)
OXYHGB MFR BLDV: 66.5 % (ref 40–70)
PCO2 BLDV: 49.4 MM HG (ref 40–50)
PH BLDV: 7.35 PH UNITS (ref 7.32–7.42)
PLATELET # BLD AUTO: 241 10*3/MM3 (ref 140–450)
PLATELET # BLD AUTO: 256 10*3/MM3 (ref 140–450)
PMV BLD AUTO: 12.3 FL (ref 6–12)
PMV BLD AUTO: 12.8 FL (ref 6–12)
PO2 BLDV: 37.9 MM HG (ref 30–50)
POTASSIUM SERPL-SCNC: 3.7 MMOL/L (ref 3.5–5.2)
POTASSIUM SERPL-SCNC: 3.8 MMOL/L (ref 3.5–5.2)
PROT SERPL-MCNC: 6.1 G/DL (ref 6–8.5)
RBC # BLD AUTO: 2.37 10*6/MM3 (ref 3.77–5.28)
RBC # BLD AUTO: 2.41 10*6/MM3 (ref 3.77–5.28)
SAO2 % BLDCOV: 67.6 % (ref 45–75)
SODIUM SERPL-SCNC: 142 MMOL/L (ref 136–145)
SODIUM SERPL-SCNC: 142 MMOL/L (ref 136–145)
TROPONIN T SERPL HS-MCNC: 51 NG/L
VENTILATOR MODE: NORMAL
WBC NRBC COR # BLD AUTO: 11.18 10*3/MM3 (ref 3.4–10.8)
WBC NRBC COR # BLD AUTO: 9.67 10*3/MM3 (ref 3.4–10.8)

## 2024-09-29 PROCEDURE — 99232 SBSQ HOSP IP/OBS MODERATE 35: CPT | Performed by: INTERNAL MEDICINE

## 2024-09-29 PROCEDURE — 94664 DEMO&/EVAL PT USE INHALER: CPT

## 2024-09-29 PROCEDURE — 82820 HEMOGLOBIN-OXYGEN AFFINITY: CPT

## 2024-09-29 PROCEDURE — 85025 COMPLETE CBC W/AUTO DIFF WBC: CPT | Performed by: INTERNAL MEDICINE

## 2024-09-29 PROCEDURE — 94799 UNLISTED PULMONARY SVC/PX: CPT

## 2024-09-29 PROCEDURE — 85027 COMPLETE CBC AUTOMATED: CPT | Performed by: INTERNAL MEDICINE

## 2024-09-29 PROCEDURE — 87081 CULTURE SCREEN ONLY: CPT | Performed by: INTERNAL MEDICINE

## 2024-09-29 PROCEDURE — 82805 BLOOD GASES W/O2 SATURATION: CPT

## 2024-09-29 PROCEDURE — 94761 N-INVAS EAR/PLS OXIMETRY MLT: CPT

## 2024-09-29 PROCEDURE — 82948 REAGENT STRIP/BLOOD GLUCOSE: CPT

## 2024-09-29 PROCEDURE — 82948 REAGENT STRIP/BLOOD GLUCOSE: CPT | Performed by: INTERNAL MEDICINE

## 2024-09-29 PROCEDURE — 94660 CPAP INITIATION&MGMT: CPT

## 2024-09-29 PROCEDURE — 80053 COMPREHEN METABOLIC PANEL: CPT | Performed by: INTERNAL MEDICINE

## 2024-09-29 PROCEDURE — 25810000003 DEXTROSE 5 % AND SODIUM CHLORIDE 0.9 % 5-0.9 % SOLUTION: Performed by: INTERNAL MEDICINE

## 2024-09-29 PROCEDURE — 70450 CT HEAD/BRAIN W/O DYE: CPT

## 2024-09-29 PROCEDURE — 83605 ASSAY OF LACTIC ACID: CPT | Performed by: INTERNAL MEDICINE

## 2024-09-29 PROCEDURE — 25810000003 SODIUM CHLORIDE 0.9 % SOLUTION: Performed by: INTERNAL MEDICINE

## 2024-09-29 PROCEDURE — 25010000002 ENOXAPARIN PER 10 MG: Performed by: INTERNAL MEDICINE

## 2024-09-29 PROCEDURE — 84484 ASSAY OF TROPONIN QUANT: CPT | Performed by: INTERNAL MEDICINE

## 2024-09-29 PROCEDURE — 25010000002 PIPERACILLIN SOD-TAZOBACTAM PER 1 G: Performed by: INTERNAL MEDICINE

## 2024-09-29 RX ORDER — MIDODRINE HYDROCHLORIDE 5 MG/1
5 TABLET ORAL
Status: DISCONTINUED | OUTPATIENT
Start: 2024-09-29 | End: 2024-10-01 | Stop reason: HOSPADM

## 2024-09-29 RX ORDER — BACLOFEN 10 MG/1
10 TABLET ORAL EVERY 12 HOURS SCHEDULED
Status: DISCONTINUED | OUTPATIENT
Start: 2024-09-29 | End: 2024-10-01 | Stop reason: HOSPADM

## 2024-09-29 RX ADMIN — BUDESONIDE 0.5 MG: 0.5 INHALANT RESPIRATORY (INHALATION) at 19:12

## 2024-09-29 RX ADMIN — Medication 15 ML: at 09:08

## 2024-09-29 RX ADMIN — IPRATROPIUM BROMIDE AND ALBUTEROL SULFATE 3 ML: .5; 3 SOLUTION RESPIRATORY (INHALATION) at 07:03

## 2024-09-29 RX ADMIN — PIPERACILLIN SODIUM AND TAZOBACTAM SODIUM 3.38 G: 3; .375 INJECTION, POWDER, LYOPHILIZED, FOR SOLUTION INTRAVENOUS at 00:20

## 2024-09-29 RX ADMIN — BACLOFEN 10 MG: 10 TABLET ORAL at 20:23

## 2024-09-29 RX ADMIN — Medication 10 ML: at 20:23

## 2024-09-29 RX ADMIN — DEXTROSE AND SODIUM CHLORIDE 100 ML/HR: 5; 900 INJECTION, SOLUTION INTRAVENOUS at 02:50

## 2024-09-29 RX ADMIN — Medication 250 MG: at 20:23

## 2024-09-29 RX ADMIN — GLYCOPYRROLATE 2 MG: 1 TABLET ORAL at 20:23

## 2024-09-29 RX ADMIN — SODIUM CHLORIDE 500 ML: 9 INJECTION, SOLUTION INTRAVENOUS at 00:30

## 2024-09-29 RX ADMIN — MIDODRINE HYDROCHLORIDE 5 MG: 5 TABLET ORAL at 10:57

## 2024-09-29 RX ADMIN — PIPERACILLIN SODIUM AND TAZOBACTAM SODIUM 3.38 G: 3; .375 INJECTION, POWDER, LYOPHILIZED, FOR SOLUTION INTRAVENOUS at 10:57

## 2024-09-29 RX ADMIN — GLYCOPYRROLATE 2 MG: 1 TABLET ORAL at 15:34

## 2024-09-29 RX ADMIN — Medication 250 MG: at 09:08

## 2024-09-29 RX ADMIN — ACETAMINOPHEN 650 MG: 650 SOLUTION ORAL at 09:08

## 2024-09-29 RX ADMIN — MIDODRINE HYDROCHLORIDE 5 MG: 5 TABLET ORAL at 17:29

## 2024-09-29 RX ADMIN — MIDODRINE HYDROCHLORIDE 2.5 MG: 5 TABLET ORAL at 06:34

## 2024-09-29 RX ADMIN — IPRATROPIUM BROMIDE AND ALBUTEROL SULFATE 3 ML: .5; 3 SOLUTION RESPIRATORY (INHALATION) at 00:07

## 2024-09-29 RX ADMIN — PIPERACILLIN AND TAZOBACTAM 4.5 G: 4; .5 INJECTION, POWDER, FOR SOLUTION INTRAVENOUS at 17:29

## 2024-09-29 RX ADMIN — Medication 10 ML: at 09:09

## 2024-09-29 RX ADMIN — BUDESONIDE 0.5 MG: 0.5 INHALANT RESPIRATORY (INHALATION) at 07:03

## 2024-09-29 RX ADMIN — BISACODYL 10 MG: 10 SUPPOSITORY RECTAL at 09:08

## 2024-09-29 RX ADMIN — BACLOFEN 10 MG: 10 TABLET ORAL at 02:50

## 2024-09-29 RX ADMIN — FAMOTIDINE 20 MG: 20 TABLET, FILM COATED ORAL at 09:08

## 2024-09-29 RX ADMIN — GLYCOPYRROLATE 2 MG: 1 TABLET ORAL at 09:08

## 2024-09-29 RX ADMIN — IPRATROPIUM BROMIDE AND ALBUTEROL SULFATE 3 ML: .5; 3 SOLUTION RESPIRATORY (INHALATION) at 19:12

## 2024-09-29 RX ADMIN — DEXTROSE AND SODIUM CHLORIDE 100 ML/HR: 5; 900 INJECTION, SOLUTION INTRAVENOUS at 15:35

## 2024-09-29 RX ADMIN — IPRATROPIUM BROMIDE AND ALBUTEROL SULFATE 3 ML: .5; 3 SOLUTION RESPIRATORY (INHALATION) at 12:52

## 2024-09-29 RX ADMIN — ENOXAPARIN SODIUM 40 MG: 100 INJECTION SUBCUTANEOUS at 20:23

## 2024-09-29 NOTE — CASE MANAGEMENT/SOCIAL WORK
Discharge Planning Assessment  [facility]     Patient Name: Viji Vargas  MRN: [id number]  Today's Date: 9/29/2024    Admit Date: 9/28/2024    Plan: return to [facility] Long term [facility]   Discharge Needs Assessment       Row Name 09/29/24 0926       Living Environment    People in Home facility resident    Current Living Arrangements extended care facility    Potentially Unsafe Housing Conditions none    In the past 12 months has the electric, gas, oil, or water company threatened to shut off services in your home? No    Primary Care Provided by other (see comments)    Provides Primary Care For no one, unable/limited ability to care for self    Family Caregiver if Needed child(aliyah), adult    Quality of Family Relationships helpful    Able to Return to Prior Arrangements yes       Resource/Environmental Concerns    Transportation Concerns none       Transportation Needs    In the past 12 months, has lack of transportation kept you from medical appointments or from getting medications? no    In the past 12 months, has lack of transportation kept you from meetings, work, or from getting things needed for daily living? No       Food Insecurity    Within the past 12 months, you worried that your food would run out before you got the money to buy more. Never true       Transition Planning    Patient/Family Anticipates Transition to long-term care facility    Transportation Anticipated health plan transportation       Discharge Needs Assessment    Readmission Within the Last 30 Days no previous admission in last 30 days    Current Outpatient/Agency/Support Group skilled nursing facility    Equipment Currently Used at Home bath bench;medication pump;hospital bed;oxygen;wheelchair;trach supplies  all needs met by SNF    Concerns to be Addressed discharge planning    Anticipated Changes Related to Illness inability to care for self    Discharge Facility/Level of Care Needs nursing facility, skilled     Provided Post Acute Provider List? N/A    Provided Post Acute Provider Quality & Resource List? N/A                   Discharge Plan       Row Name 09/29/24 0931       Plan    Plan return to [facility] Long term care    Patient/Family in Agreement with Plan yes    Plan Comments Called pt NITIN [place] pt has been resident of [facility] for 5 years She is total care has trach  feeding tube plan is to return  there upon DC .                  Continued Care and Services - Admitted Since 9/28/2024    No active coordination exists for this encounter.          Demographic Summary       Row Name 09/29/24 0925       General Information    Admission Type inpatient    Arrived From emergency department    Required Notices Provided Important Message from Medicare    Referral Source admission list    Reason for Consult discharge planning    Preferred Language English                   Functional Status       Row Name 09/29/24 0925       Functional Status    Usual Activity Tolerance poor       Physical Activity    On average, how many days per week do you engage in moderate to strenuous exercise (like a brisk walk)? 0 days    On average, how many minutes do you engage in exercise at this level? 0 min    Number of minutes of exercise per week 0       Functional Status, IADL    Medications completely dependent    Meal Preparation completely dependent    Housekeeping completely dependent    Laundry completely dependent    Shopping completely dependent       Mental Status    General Appearance WDL WDL                   Psychosocial    No documentation.                  Abuse/Neglect    No documentation.                  Legal    No documentation.                  Substance Abuse    No documentation.                  Patient Forms    No documentation.                     Litzy Mace RN

## 2024-09-29 NOTE — PAYOR COMM NOTE
"  INPATIENT ADMISSION, SUBMITTED IN ESSETTE; AUTH#[id number]  UR MANAGER; DEEPIKA BLAKE [phone] AND [phone]  NPI:  [id number]  TAX ID:  [id number]      Viji Vargas (63 y.o. Female)       Date of Birth   1960    Social Security Number       Address   [address] [place] [address] [address] [place] [address]    Home Phone   [phone]    MRN   [id number]       Judaism   Samaritan    Marital Status   Legally                             Admission Date   9/28/24    Admission Type   Emergency    Admitting Provider   Paul Pandey MD    Attending Provider   Paul Pandey MD    Department, Room/Bed   [facility] 3, 322/1       Discharge Date       Discharge Disposition       Discharge Destination                                 Attending Provider: Paul Pandey MD    Allergies: No Known Allergies    Isolation: None   Infection: MDR Acinetobacter (12/27/23), ESBL E coli (07/16/24)   Code Status: CPR    Ht: 162.6 cm (64\")   Wt: 75.1 kg (165 lb 9.1 oz)    Admission Cmt: None   Principal Problem: Pneumonia of left lower lobe due to infectious organism [J18.9]                   Active Insurance as of 9/28/2024       Primary Coverage       Payor Plan Insurance Group Employer/Plan Group    MEDICARE MEDICARE B ONLY        Payor Plan Address Payor Plan Phone Number Payor Plan Fax Number Effective Dates    [address] [address] [phone]  11/1/2023 - None Entered    [facility] [id number]         Subscriber Name Subscriber Birth Date Member ID       VIJI VARGAS 1960 [id number]               Secondary Coverage       Payor Plan Insurance Group Employer/Plan Group    [place]        Payor Plan Address Payor Plan Phone Number Payor Plan Fax Number Effective Dates    [address] [phone]  3/10/2019 - None Entered    [facility] [id number]         Subscriber Name Subscriber Birth Date Member ID       VIJI VARGAS 1960 [id number]               "       Emergency Contacts        (Rel.) Home Phone Work Phone Mobile Phone    KENIA (POA)WILTON (Daughter) [phone] -- --    timothy esposito (Grandchild) [phone] -- [phone]                 History & Physical        Paul Pandey MD at 24 1514            [facility]   HISTORY AND PHYSICAL      Name:  Viji Vargas   Age:  63 y.o.  Sex:  female  :  1960  MRN:  [id number]   Visit Number:  [id number]  Admission Date:  2024  Date Of Service:  24  Primary Care Physician:  Ranjeet Pina MD    Chief Complaint:     Altered mental status.    History Of Presenting Illness:      Viji Vargas  is a 63-year-old male resident of nursing home facility, with history of anoxic injury status postcardiac arrest in 3/2019, status post trach and PEG, COPD, hypertension was brought to the emergency room by EMS with altered mental status.  Per report, nursing noticed the patient to be altered from her normal baseline of unknown duration.  Unfortunately patient is nonverbal.  Patient's last admission was to  in 2024 where she was treated for UTI and pneumonia.    In the emergency room, patient's initial temperature was 96.9, pulse 97, blood pressure 140/80 but subsequently she dropped to 81/44.  Patient's initial pulse oxygen saturation was 100% on trach collar at 4 L.  Troponin 47.  CMP was unremarkable except for a BUN of 47 and albumin of 2.9.  White count 17, hemoglobin 8.3.  Procalcitonin was elevated at 0.45.  Lactic acid was 0.4.  COVID and flu test were negative.  Noncontrast CT of the head showed interval development of moderately severe atrophy and large areas of encephalomalacia in the patient with history of previous anoxic brain injury.  Chest x-ray showed stable features including stable position of tracheostomy tube.  CT chest with contrast showed small areas of focal airspace disease posterior left lower lobe, possible pneumonia.   CT of the abdomen and pelvis showed cystitis with right ureteritis with moderate right hydronephrosis.  Diverticulosis noted.  Urine analysis showed too numerous to count WBCs and large RBCs.  Blood cultures and urine cultures were sent from the emergency room.  Patient was given a liter of normal saline bolus with improvement in her hypotension.  She was given 2 g of IV ceftriaxone and 500 mg of IV azithromycin and was subsequently admitted to the medical ICU for further management of left lower lobe pneumonia, urinary tract infection, hypotension and metabolic encephalopathy.    Review Of Systems:    All systems were reviewed and negative except as mentioned in history of presenting illness, assessment and plan.    Past Medical History: Patient  has a past medical history of COPD (chronic obstructive pulmonary disease), Hypertension, Pneumonia, and Stroke.    Past Surgical History: Patient  has a past surgical history that includes Hysterectomy and tracheostomy and peg tube insertion (N/A, 3/20/2019).    Social History: Patient  reports that she has quit smoking. Her smoking use included electronic cigarette and cigarettes. She has never used smokeless tobacco. She reports that she does not currently use alcohol. She reports that she does not use drugs.    Family History:  Patient family history is unknown.    Allergies:      Patient has no known allergies.    Home Medications:    Prior to Admission Medications       Prescriptions Last Dose Informant Patient Reported? Taking?    baclofen (LIORESAL) 10 MG tablet   Yes No    Administer 1 tablet per G tube Every 8 (Eight) Hours.    budesonide (PULMICORT) 0.5 MG/2ML nebulizer solution   No No    Take 2 mL by nebulization 2 (Two) Times a Day.    carvedilol (COREG) 12.5 MG tablet   No No    Administer 0.5 tablets per G tube 2 (Two) Times a Day.    cetirizine (zyrTEC) 10 MG tablet   Yes No    Administer 1 tablet per G tube Daily.    docusate sodium (COLACE) 50 mg/5 mL  liquid   Yes No    Administer 30 mL per G tube 2 (Two) Times a Day.    DPH-Lido-AlHydr-MgHydr-Simeth (FIRST-MOUTHWASH BLM MT)   Yes No    Apply 2 mL to the mouth or throat 2 (Two) Times a Day. To R side of mouth    famotidine (PEPCID) 40 mg/5 mL suspension   Yes No    Administer 2.5 mL per G tube 2 (Two) Times a Day.    glycopyrrolate (ROBINUL) 2 MG tablet   Yes No    Administer 1 tablet per G tube 3 (Three) Times a Day.    hyoscyamine (ANASPAZ,LEVSIN) 0.125 MG tablet   Yes No    Administer 1 tablet per G tube 3 (Three) Times a Day.    ipratropium-albuterol (DUO-NEB) 0.5-2.5 mg/3 ml nebulizer   No No    Take 3 mL by nebulization 4 (Four) Times a Day. Takes at [phone]    montelukast (SINGULAIR) 10 MG tablet   Yes No    Administer 1 tablet per G tube Every Night.    Multiple Vitamins-Minerals (MULTIVITAMIN WITH IRON-MINERALS) liquid   Yes No    Administer 15 mL per G tube Daily.    Nutritional Supplements (ProSource No Carb) liquid   No No    30 mL by Per PEG Tube route Daily.    Petrolatum-Zinc Oxide 49-15 % ointment   Yes No    Apply 1 application topically 2 (Two) Times a Day As Needed (Excoriation).    polyethylene glycol (MiraLax) 17 g packet   Yes No    17 g 2 (Two) Times a Day.    Scopolamine 1 MG/3DAYS patch   Yes No    Place 1 patch on the skin as directed by provider Every 72 (Seventy-Two) Hours.    senna 8.6 MG tablet   Yes No    2 tablets by Per PEG Tube route Every Night.    torsemide (DEMADEX) 20 MG tablet   No No    Take 0.5 tablets by mouth Daily.     ED Medications:    Medications   sodium chloride 0.9 % infusion (250 mL/hr Intravenous New Bag 9/28/24 1445)   sodium chloride 0.9 % bolus 1,000 mL (0 mL Intravenous Stopped 9/28/24 1303)   cefTRIAXone (ROCEPHIN) 2,000 mg in sodium chloride 0.9 % 100 mL IVPB-VTB (0 mg Intravenous Stopped 9/28/24 1225)   iopamidol (ISOVUE-300) 61 % injection 100 mL (100 mL Intravenous Given 9/28/24 1212)   azithromycin (ZITHROMAX) 500 mg in sodium chloride 0.9  "% 250 mL IVPB-VTB (0 mg Intravenous Stopped 9/28/24 1422)   sodium chloride 0.9 % bolus 1,000 mL (0 mL Intravenous Stopped 9/28/24 1445)     Vital Signs:  Temp:  [96.9 °F (36.1 °C)] 96.9 °F (36.1 °C)  Heart Rate:  [] 102  Resp:  [18] 18  BP: ()/(43-96) 116/64        09/28/24  0915   Weight: 70.3 kg (155 lb)     Body mass index is 26.61 kg/m².    Physical Exam:     Most recent vital Signs: /64   Pulse 102   Temp 96.9 °F (36.1 °C) (Oral)   Resp 18   Ht 162.6 cm (64\")   Wt 70.3 kg (155 lb)   SpO2 100%   BMI 26.61 kg/m²     Physical Exam  Constitutional:       General: She is not in acute distress.     Appearance: She is ill-appearing.      Comments: Patient is nonverbal and unable to follow commands.   HENT:      Head: Normocephalic.      Right Ear: External ear normal.      Left Ear: External ear normal.      Nose: Nose normal.      Mouth/Throat:      Mouth: Mucous membranes are moist.   Eyes:      Conjunctiva/sclera: Conjunctivae normal.   Neck:      Comments: Tracheostomy with trach collar noted.  Cardiovascular:      Rate and Rhythm: Normal rate and regular rhythm.      Pulses: Normal pulses.      Heart sounds: Normal heart sounds. No murmur heard.  Pulmonary:      Breath sounds: Rales present. No wheezing.      Comments: Patient does have gurgling sounds heard in the throat.  Bilateral basal crackles heard.  No wheezing.  Abdominal:      General: Bowel sounds are normal.      Palpations: Abdomen is soft.      Tenderness: There is no guarding.      Comments: PEG tube noted in the epigastric region.  Sanchez catheter is in place.   Musculoskeletal:      Right lower leg: No edema.      Left lower leg: No edema.   Skin:     General: Skin is warm.      Findings: No erythema or rash.   Neurological:      Mental Status: She is alert. Mental status is at baseline.      Comments: Alert but nonverbal.  Severe contractures noted in bilateral upper and lower extremities.  Bilateral foot drop noted. "   Psychiatric:      Comments: Patient is nonverbal.       Laboratory data:    I have reviewed the labs done in the emergency room.    Results from last 7 days   Lab Units 09/28/24  0944   SODIUM mmol/L 138   POTASSIUM mmol/L 4.6   CHLORIDE mmol/L 99   CO2 mmol/L 27.2   BUN mg/dL 47*   CREATININE mg/dL 0.78   CALCIUM mg/dL 9.4   BILIRUBIN mg/dL 0.3   ALK PHOS U/L 98   ALT (SGPT) U/L 30   AST (SGOT) U/L 16   GLUCOSE mg/dL 116*     Results from last 7 days   Lab Units 09/28/24  0944   WBC 10*3/mm3 17.23*   HEMOGLOBIN g/dL 8.3*   HEMATOCRIT % 26.4*   PLATELETS 10*3/mm3 250       Results from last 7 days   Lab Units 09/28/24  1140 09/28/24  0944   HSTROP T ng/L 48* 47*       Results from last 7 days   Lab Units 09/28/24  1051   COLOR UA  [place]*   GLUCOSE UA  Negative   KETONES UA  Negative   BLOOD UA  Large (3+)*   LEUKOCYTES UA  Large (3+)*   PH, URINE  7.5   BILIRUBIN UA  Negative   UROBILINOGEN UA  0.2 E.U./dL   RBC UA /HPF Unable to determine due to loaded field*   WBC UA /HPF Too Numerous to Count*     EKG:      EKG done in the emergency room was reviewed by me.  It shows sinus tachycardia 101.  Normal axis.  Significant baseline artifact noted.    Radiology:    CT Chest With Contrast Diagnostic    Result Date: 9/28/2024  PROCEDURE: CT CHEST W CONTRAST DIAGNOSTIC-, CT ABDOMEN PELVIS W CONTRAST-  HISTORY: Leukocytosis, AMS  COMPARISON: CT chest without December 28, 2023 and CT abdomen and pelvis February 11, 2023 without contrast.  PROCEDURE: The patient was injected with IV contrast. Axial images were obtained from the lung apex to the pubic symphysis by computed tomography. This study was performed with techniques to keep radiation doses as low as reasonably achievable, (ALARA). Individualized dose reduction techniques using automated exposure control or adjustment of mA and/or kV according to the patient size were employed.  FINDINGS:  CHEST: There is asymmetric enlargement of the left lobe of the thyroid with a  hypodense nodule, question interval enlargement; recommend nonemergent thyroid ultrasound. Tracheostomy tube again noted and appears in similar position to the prior exam. Vascular calcifications noted. There is no axillary adenopathy. There is no hilar or mediastinal adenopathy.  The heart size is normal. There is no pericardial or pleural effusion. There are predominantly linear densities in the lower lobes, left greater than right. Question a small amount of airspace disease posterior left lower lobe, pneumonia not excluded.  ABDOMEN: The liver is homogenous with no focal abnormality. Gallbladder present with no CT visible stones. The spleen is unremarkable. No adrenal mass is present.  The pancreas is unremarkable. The kidneys demonstrate bilateral circumscribed hypodense lesions consistent with cysts, similar to prior exam. There is moderate right hydronephrosis with mild enhancement of the right ureteral wall down to the bladder suggesting ureteritis likely secondary to cystitis. There is very mild dilatation of the left renal collecting system but no enhancement of the ureteral wall. IV contrast given but no definite obstructing stone seen. The aorta is normal in caliber. There is no free fluid or adenopathy.  PELVIS: The GI tract demonstrates stable position of gastrostomy tube. There is a small umbilical hernia containing fat and a knuckle of transverse colon that is nonobstructed. There is diverticulosis without evidence of diverticulitis. The appendix is not identified. The urinary bladder is completely collapsed with a Sanchez catheter. There is moderate wall thickening and a few bubbles of air in the bladder. There is also infiltration of the surrounding fat suggesting cystitis. There is no free fluid, adenopathy, or inflammatory process.      Small area of focal airspace disease posterior left lower lobe, possible pneumonia.  Cystitis and right ureteritis with moderate right hydronephrosis.   Diverticulosis   CTDI: 20.97 mGy DLP:1329.48 mGy.cm  This report was signed and finalized on 9/28/2024 12:41 PM by Briana Erickson MD.      CT Abdomen Pelvis With Contrast    Result Date: 9/28/2024  PROCEDURE: CT CHEST W CONTRAST DIAGNOSTIC-, CT ABDOMEN PELVIS W CONTRAST-  HISTORY: Leukocytosis, AMS  COMPARISON: CT chest without December 28, 2023 and CT abdomen and pelvis February 11, 2023 without contrast.  PROCEDURE: The patient was injected with IV contrast. Axial images were obtained from the lung apex to the pubic symphysis by computed tomography. This study was performed with techniques to keep radiation doses as low as reasonably achievable, (ALARA). Individualized dose reduction techniques using automated exposure control or adjustment of mA and/or kV according to the patient size were employed.  FINDINGS:  CHEST: There is asymmetric enlargement of the left lobe of the thyroid with a hypodense nodule, question interval enlargement; recommend nonemergent thyroid ultrasound. Tracheostomy tube again noted and appears in similar position to the prior exam. Vascular calcifications noted. There is no axillary adenopathy. There is no hilar or mediastinal adenopathy.  The heart size is normal. There is no pericardial or pleural effusion. There are predominantly linear densities in the lower lobes, left greater than right. Question a small amount of airspace disease posterior left lower lobe, pneumonia not excluded.  ABDOMEN: The liver is homogenous with no focal abnormality. Gallbladder present with no CT visible stones. The spleen is unremarkable. No adrenal mass is present.  The pancreas is unremarkable. The kidneys demonstrate bilateral circumscribed hypodense lesions consistent with cysts, similar to prior exam. There is moderate right hydronephrosis with mild enhancement of the right ureteral wall down to the bladder suggesting ureteritis likely secondary to cystitis. There is very mild dilatation of the left  renal collecting system but no enhancement of the ureteral wall. IV contrast given but no definite obstructing stone seen. The aorta is normal in caliber. There is no free fluid or adenopathy.  PELVIS: The GI tract demonstrates stable position of gastrostomy tube. There is a small umbilical hernia containing fat and a knuckle of transverse colon that is nonobstructed. There is diverticulosis without evidence of diverticulitis. The appendix is not identified. The urinary bladder is completely collapsed with a Sanchez catheter. There is moderate wall thickening and a few bubbles of air in the bladder. There is also infiltration of the surrounding fat suggesting cystitis. There is no free fluid, adenopathy, or inflammatory process.      Small area of focal airspace disease posterior left lower lobe, possible pneumonia.  Cystitis and right ureteritis with moderate right hydronephrosis.  Diverticulosis   CTDI: 20.97 mGy DLP:1329.48 mGy.cm  This report was signed and finalized on 9/28/2024 12:41 PM by Briana Erickson MD.      XR Chest 1 View    Result Date: 9/28/2024  PROCEDURE: XR CHEST 1 VW-  HISTORY: Altered mental status  COMPARISON: September 16, 2024..  FINDINGS: The heart is normal in size. Tracheostomy appears to be in stable position from prior. There is stable linear atelectasis or scar compared to prior exam. No new area of consolidation is seen.. The mediastinum is unremarkable. There is no pneumothorax.  There are no acute osseous abnormalities.      Stable chest including stable position of tracheostomy tube..      This report was signed and finalized on 9/28/2024 9:50 AM by Briana Erickson MD.      CT Head Without Contrast    Result Date: 9/28/2024  PROCEDURE: CT HEAD WO CONTRAST-  HISTORY: Altered mental status, history of anoxic brain injury in March 2019.  COMPARISON: March 10, 2019..  TECHNIQUE: Multiple axial CT images were performed from the foramen magnum to the vertex. Individualized dose reduction  techniques using automated exposure control or adjustment of mA and/or kV according to the patient size were employed.  FINDINGS: There is moderately severe generalized cerebral atrophy which is pronounced for age and has developed since the prior exam.. The ventricles are enlarged. There is previous study suggested cerebral edema. There our large confluent areas of encephalomalacia with sparing of the cortex bilaterally as well as the basal ganglia midbrain, yamilet and medial cerebellum. Findings are consistent with previous history of anoxic brain injury. No masses are identified. No extra-axial fluid is seen. The paranasal sinuses are unremarkable.      Interval development of moderately severe atrophy and large areas of encephalomalacia involving the cerebrum with sparing of the basal ganglia, midbrain, yamilet and medial cerebellum in this patient with a history of previous anoxic brain injury.     CTDI: 42.37 mGy DLP:712.87 mGy.cm  This report was signed and finalized on 9/28/2024 9:47 AM by Briana Erickson MD.       Assessment:    Acute metabolic encephalopathy secondary to #2 and #3, POA.  Left lower lobe healthcare associated pneumonia, POA.  Acute complicated urinary tract infection, POA.  Right ureteritis/right hydronephrosis, POA.  Sepsis secondary to #2 and #3, POA.  History of anoxic brain injury status post trach and PEG.  Chronic anemia.  Functional quadriplegia.    Plan:    Sepsis/left lower lobe pneumonia/complicated UTI.  - Unfortunately, patient has poor prognosis due to her anoxic brain injury and she is at high risk for recurrent infections especially aspiration pneumonia and urinary tract infections.  - Patient does seem to have right hydronephrosis but her renal function seems to be fairly within normal range.  - She received 2 g of IV ceftriaxone in the emergency room but due to high risk for healthcare associated infections, we will change her antibiotics to Zosyn.  - Obtain nasal swab for MRSA.  -  Blood and urine cultures have been sent from the emergency room.  - Keep her n.p.o. tonight and restart her on tube feeds tomorrow.    Hypotension.  - Patient did have transient hypotension and responded to IV fluids well.  - We will continue dextrose with normal saline at 100 mL/h.  - We will place her on low-dose midodrine at 2.5 mg 3 times daily.    Right hydronephrosis.  - Likely secondary to urinary tract infection and urethritis.  - I do not suspect need for intervention at this time but if needed, we will consult urology on 2024.    Overall the patient's prognosis is extremely poor due to poor functional status and recurrent infections.  We will admit her to telemetry floor.    I tried to call the patient's daughter Liliya over the phone but unfortunately there was no response.    Risk Assessment: High  DVT Prophylaxis: Enoxaparin  Code Status: Full  Diet: NPO.      Paul Pandey MD  24  15:15 EDT    Dictated utilizing Dragon dictation.    Electronically signed by Paul Pandey MD at 24 1542          Emergency Department Notes        Sarika Hooker RN at 24 1749          Report called to LARRY Hale, 3rd Floor Med/Surg.    Electronically signed by Sarika Hooker RN at 24 1749       Olegario España DO at 24 0857        Procedure Orders    1. Critical Care [[id number]] ordered by Olegario España DO                        [facility]  [facility] Encounter  Emergency Medicine Physician Note       Pt Name: Viji Vargas  MRN: [id number]  Pt :   1960  Room Number:  322/1  Date of encounter:  2024  PCP: Ranjeet Pina MD  ED Physician: Olegario España DO    HPI:  Viji Vargas is a 63 y.o. female who presents to the ED with chief complaint of altered mental status. Patient is a long-term resident of the skilled nursing facility. Past medical history of nonverbal status, tracheostomy secondary to CVA, anoxic brain injury,  "COPD, pneumonia, hypertension. Per report, nursing staff reported that patient was \"altered from her normal baseline\". Patient may have been \"shaking more than normal.\" They were unable to describe the change to EMS.  Unknown onset or duration.  Vital signs stable and route.  POC glucose within normal limits. Patient is unable to provide further history given chronic condition.     HPI limited secondary to patient's mental status.    PAST MEDICAL HISTORY  Past Medical History:   Diagnosis Date    COPD (chronic obstructive pulmonary disease)     Hypertension     Pneumonia     Stroke      Current Outpatient Medications   Medication Instructions    baclofen (LIORESAL) 10 mg, Per G Tube, Every 8 Hours    budesonide (PULMICORT) 0.5 mg, Nebulization, 2 Times Daily - RT    carvedilol (COREG) 6.25 mg, Per G Tube, 2 Times Daily    cetirizine (ZYRTEC) 10 mg, Per G Tube, Daily    docusate sodium (COLACE) 300 mg, Per G Tube, 2 Times Daily    DPH-Lido-AlHydr-MgHydr-Simeth (FIRST-MOUTHWASH BLM MT) 2 mL, Mouth/Throat, 2 Times Daily, To R side of mouth    glycopyrrolate (ROBINUL) 2 mg, Per G Tube, 4 Times Daily    hyoscyamine (ANASPAZ,LEVSIN) 0.125 mg, Per G Tube, Every 4 Hours    ipratropium-albuterol (DUO-NEB) 0.5-2.5 mg/3 ml nebulizer 3 mL, Nebulization, 4 Times Daily - RT, Takes at [id number]    Multiple Vitamins-Minerals (MULTIVITAMIN WITH IRON-MINERALS) liquid 15 mL, Per G Tube, Daily    Nutritional Supplements (ProSource No Carb) liquid 30 mL, Per PEG Tube, Daily    OMEPRAZOLE 2MG/ML LIQUID 2 mL, Per G Tube, Daily    polyethylene glycol (MIRALAX) 17 g, 2 Times Daily    Scopolamine 1 MG/3DAYS patch 1 patch, Transdermal, Every 72 Hours    senna 8.6 MG tablet 2 tablets, Per PEG Tube, Nightly    torsemide (DEMADEX) 10 mg, Oral, Daily      PAST SURGICAL HISTORY  Past Surgical History:   Procedure Laterality Date    HYSTERECTOMY      Partial     TRACHEOSTOMY AND PEG TUBE INSERTION N/A 3/20/2019    Procedure: TRACHEOSTOMY " AND PERCUTANEOUS ENDOSCOPIC GASTROSTOMY TUBE INSERTION;  Surgeon: Nadira Pandey MD;  Location: [facility];  Service: General       FAMILY HISTORY  History reviewed. No pertinent family history.    SOCIAL HISTORY  Social History     Socioeconomic History    Marital status: Legally    Tobacco Use    Smoking status: Former     Current packs/day: 1.00     Types: Electronic Cigarette, Cigarettes    Smokeless tobacco: Never   Vaping Use    Vaping status: Unknown   Substance and Sexual Activity    Alcohol use: Not Currently     Comment: wine     Drug use: No    Sexual activity: Defer     ALLERGIES  Patient has no known allergies.    REVIEW OF SYSTEMS  All systems reviewed and negative except for those discussed in HPI.     PHYSICAL EXAM  ED Triage Vitals [09/28/24 0851]   Temp Heart Rate Resp BP SpO2   96.9 °F (36.1 °C) 97 18 141/82 100 %      Temp src Heart Rate Source Patient Position BP Location FiO2 (%)   Oral Monitor -- -- --     I have reviewed the triage vital signs and nursing notes.    General: Awake.  Appears chronically ill, but nontoxic.  No acute distress.  Head: Normocephalic.  Atraumatic.  Eyes: Pupils 2 mm.  PERRLA.  No scleral icterus.  Neck: Tracheostomy present.  Cardiovascular: Regular rate and rhythm.  No murmurs.  No rubs.  2+ distal pulses bilaterally.  Respiratory: Equal breath sounds bilaterally.  No rales.  No rhonchi.  No wheezing.  GI: PEG tube present.  Abdomen is soft.  Nondistended.  Nontender to palpation.  No rebound.  No guarding.  No CVA tenderness.    : Sanchez catheter present.  Draining appropriately.  MSK: Contractures present.  Skin: No erythema.  No edema. No pallor. No cyanosis.    LAB RESULTS  Recent Results (from the past 24 hour(s))   COVID-19 and FLU A/B PCR, 1 HR TAT - Swab, Nasopharynx    Collection Time: 09/28/24  9:35 AM    Specimen: Nasopharynx; Swab   Result Value Ref Range    COVID19 Not Detected Not Detected - Ref. Range    Influenza A PCR Not Detected Not  Detected    Influenza B PCR Not Detected Not Detected   High Sensitivity Troponin T    Collection Time: 09/28/24  9:44 AM    Specimen: Blood   Result Value Ref Range    HS Troponin T 47 (H) <14 ng/L   Comprehensive Metabolic Panel    Collection Time: 09/28/24  9:44 AM    Specimen: Blood   Result Value Ref Range    Glucose 116 (H) 65 - 99 mg/dL    BUN 47 (H) 8 - 23 mg/dL    Creatinine 0.78 0.57 - 1.00 mg/dL    Sodium 138 136 - 145 mmol/L    Potassium 4.6 3.5 - 5.2 mmol/L    Chloride 99 98 - 107 mmol/L    CO2 27.2 22.0 - 29.0 mmol/L    Calcium 9.4 8.6 - 10.5 mg/dL    Total Protein 6.8 6.0 - 8.5 g/dL    Albumin 2.9 (L) 3.5 - 5.2 g/dL    ALT (SGPT) 30 1 - 33 U/L    AST (SGOT) 16 1 - 32 U/L    Alkaline Phosphatase 98 39 - 117 U/L    Total Bilirubin 0.3 0.0 - 1.2 mg/dL    Globulin 3.9 gm/dL    A/G Ratio 0.7 g/dL    BUN/Creatinine Ratio 60.3 (H) 7.0 - 25.0    Anion Gap 11.8 5.0 - 15.0 mmol/L    eGFR 84.4 >60.0 mL/min/1.73   CBC Auto Differential    Collection Time: 09/28/24  9:44 AM    Specimen: Blood   Result Value Ref Range    WBC 17.23 (H) 3.40 - 10.80 10*3/mm3    RBC 2.74 (L) 3.77 - 5.28 10*6/mm3    Hemoglobin 8.3 (L) 12.0 - 15.9 g/dL    Hematocrit 26.4 (L) 34.0 - 46.6 %    MCV 96.4 79.0 - 97.0 fL    MCH 30.3 26.6 - 33.0 pg    MCHC 31.4 (L) 31.5 - 35.7 g/dL    RDW 14.8 12.3 - 15.4 %    RDW-SD 52.6 37.0 - 54.0 fl    MPV 13.0 (H) 6.0 - 12.0 fL    Platelets 250 140 - 450 10*3/mm3    Neutrophil % 78.4 (H) 42.7 - 76.0 %    Lymphocyte % 9.4 (L) 19.6 - 45.3 %    Monocyte % 4.8 (L) 5.0 - 12.0 %    Eosinophil % 6.3 (H) 0.3 - 6.2 %    Basophil % 0.3 0.0 - 1.5 %    Immature Grans % 0.8 (H) 0.0 - 0.5 %    Neutrophils, Absolute 13.51 (H) 1.70 - 7.00 10*3/mm3    Lymphocytes, Absolute 1.62 0.70 - 3.10 10*3/mm3    Monocytes, Absolute 0.82 0.10 - 0.90 10*3/mm3    Eosinophils, Absolute 1.09 (H) 0.00 - 0.40 10*3/mm3    Basophils, Absolute 0.06 0.00 - 0.20 10*3/mm3    Immature Grans, Absolute 0.13 (H) 0.00 - 0.05 10*3/mm3    nRBC 0.2 0.0  - 0.2 /100 WBC   Blood Gas, Venous With Co-Ox    Collection Time: 09/28/24 10:05 AM    Specimen: Venous Blood   Result Value Ref Range    Site OTHER     pH, Venous 7.406 7.320 - 7.420 pH Units    pCO2, Venous 51.6 (H) 40.0 - 50.0 mm Hg    pO2, Venous 31.0 30.0 - 50.0 mm Hg    HCO3, Venous 32.4 (H) 22.0 - 28.0 mmol/L    Base Excess, Venous 6.8 (H) 0.0 - 2.0 mmol/L    O2 Saturation, Venous 52.2 45.0 - 75.0 %    Oxyhemoglobin Venous 51.5 40.0 - 70.0 %    Methemoglobin Venous 0.1 0.0 - 3.0 %    Carboxyhemoglobin Venous 1.2 0.0 - 5.0 %    Barometric Pressure for Blood Gas 725 mmHg    Modality Aerosol Trach Collar     Flow Rate 4.0 lpm    Ventilator Mode NA     Collected by [id number]    Urinalysis With Culture If Indicated - Urine, Catheter    Collection Time: 09/28/24 10:51 AM    Specimen: Urine, Catheter   Result Value Ref Range    Color, UA Orange (A) Yellow, Straw    Appearance, UA Turbid (A) Clear    pH, UA 7.5 5.0 - 8.0    Specific Gravity, UA 1.015 1.005 - 1.030    Glucose, UA Negative Negative    Ketones, UA Negative Negative    Bilirubin, UA Negative Negative    Blood, UA Large (3+) (A) Negative    Protein, UA >=300 mg/dL (3+) (A) Negative    Leuk Esterase, UA Large (3+) (A) Negative    Nitrite, UA Negative Negative    Urobilinogen, UA 0.2 E.U./dL 0.2 - 1.0 E.U./dL   Urinalysis, Microscopic Only - Urine, Catheter    Collection Time: 09/28/24 10:51 AM    Specimen: Urine, Catheter   Result Value Ref Range    RBC, UA Unable to determine due to loaded field (A) None Seen, 0-2 /HPF    WBC, UA Too Numerous to Count (A) None Seen, 0-2 /HPF    Bacteria, UA 1+ (A) None Seen /HPF    Squamous Epithelial Cells, UA 3-6 (A) None Seen, 0-2 /HPF    Hyaline Casts, UA None Seen None Seen /LPF    Methodology Manual Light Microscopy    High Sensitivity Troponin T 2Hr    Collection Time: 09/28/24 11:40 AM    Specimen: Blood   Result Value Ref Range    HS Troponin T 48 (H) <14 ng/L    Troponin T Delta 1 >=-4 - <+4 ng/L   Procalcitonin     Collection Time: 09/28/24 11:40 AM    Specimen: Blood   Result Value Ref Range    Procalcitonin 0.45 (H) 0.00 - 0.25 ng/mL   Lactic Acid, Plasma    Collection Time: 09/28/24  1:11 PM    Specimen: Blood   Result Value Ref Range    Lactate 0.4 (L) 0.5 - 2.0 mmol/L   POC Glucose Once    Collection Time: 09/28/24  8:47 PM    Specimen: Blood   Result Value Ref Range    Glucose 135 (H) 70 - 130 mg/dL   POC Glucose Finger Q6H    Collection Time: 09/29/24 12:13 AM    Specimen: Finger; Blood   Result Value Ref Range    Glucose 147 (H) 70 - 130 mg/dL   Comprehensive Metabolic Panel    Collection Time: 09/29/24 12:47 AM    Specimen: Blood   Result Value Ref Range    Glucose 130 (H) 65 - 99 mg/dL    BUN 25 (H) 8 - 23 mg/dL    Creatinine 0.60 0.57 - 1.00 mg/dL    Sodium 142 136 - 145 mmol/L    Potassium 3.8 3.5 - 5.2 mmol/L    Chloride 108 (H) 98 - 107 mmol/L    CO2 24.5 22.0 - 29.0 mmol/L    Calcium 8.6 8.6 - 10.5 mg/dL    Total Protein 6.1 6.0 - 8.5 g/dL    Albumin 2.8 (L) 3.5 - 5.2 g/dL    ALT (SGPT) 24 1 - 33 U/L    AST (SGOT) 13 1 - 32 U/L    Alkaline Phosphatase 82 39 - 117 U/L    Total Bilirubin 0.2 0.0 - 1.2 mg/dL    Globulin 3.3 gm/dL    A/G Ratio 0.8 g/dL    BUN/Creatinine Ratio 41.7 (H) 7.0 - 25.0    Anion Gap 9.5 5.0 - 15.0 mmol/L    eGFR 99.8 >60.0 mL/min/1.73   High Sensitivity Troponin T    Collection Time: 09/29/24 12:47 AM    Specimen: Blood   Result Value Ref Range    HS Troponin T 51 (H) <14 ng/L   Lactic Acid, Plasma    Collection Time: 09/29/24 12:47 AM    Specimen: Blood   Result Value Ref Range    Lactate 0.8 0.5 - 2.0 mmol/L   CBC Auto Differential    Collection Time: 09/29/24 12:47 AM    Specimen: Blood   Result Value Ref Range    WBC 11.18 (H) 3.40 - 10.80 10*3/mm3    RBC 2.37 (L) 3.77 - 5.28 10*6/mm3    Hemoglobin 7.1 (L) 12.0 - 15.9 g/dL    Hematocrit 22.9 (L) 34.0 - 46.6 %    MCV 96.6 79.0 - 97.0 fL    MCH 30.0 26.6 - 33.0 pg    MCHC 31.0 (L) 31.5 - 35.7 g/dL    RDW 14.6 12.3 - 15.4 %    RDW-SD 51.6  37.0 - 54.0 fl    MPV 12.3 (H) 6.0 - 12.0 fL    Platelets 256 140 - 450 10*3/mm3    Neutrophil % 68.3 42.7 - 76.0 %    Lymphocyte % 15.7 (L) 19.6 - 45.3 %    Monocyte % 7.9 5.0 - 12.0 %    Eosinophil % 7.4 (H) 0.3 - 6.2 %    Basophil % 0.3 0.0 - 1.5 %    Immature Grans % 0.4 0.0 - 0.5 %    Neutrophils, Absolute 7.64 (H) 1.70 - 7.00 10*3/mm3    Lymphocytes, Absolute 1.75 0.70 - 3.10 10*3/mm3    Monocytes, Absolute 0.88 0.10 - 0.90 10*3/mm3    Eosinophils, Absolute 0.83 (H) 0.00 - 0.40 10*3/mm3    Basophils, Absolute 0.03 0.00 - 0.20 10*3/mm3    Immature Grans, Absolute 0.05 0.00 - 0.05 10*3/mm3    nRBC 0.0 0.0 - 0.2 /100 WBC   Blood Gas, Venous With Co-Ox    Collection Time: 09/29/24 12:50 AM    Specimen: Venous Blood   Result Value Ref Range    Site OTHER     pH, Venous 7.346 7.320 - 7.420 pH Units    pCO2, Venous 49.4 40.0 - 50.0 mm Hg    pO2, Venous 37.9 30.0 - 50.0 mm Hg    HCO3, Venous 27.0 22.0 - 28.0 mmol/L    Base Excess, Venous 1.1 0.0 - 2.0 mmol/L    O2 Saturation, Venous 67.6 45.0 - 75.0 %    Oxyhemoglobin Venous 66.5 40.0 - 70.0 %    Methemoglobin Venous 0.3 0.0 - 3.0 %    Carboxyhemoglobin Venous 1.4 0.0 - 5.0 %    Barometric Pressure for Blood Gas 728 mmHg    Modality Aerosol Trach Collar     Flow Rate 2.0 lpm    Ventilator Mode NA     Collected by RN        RADIOLOGY  CT Head Without Contrast    Result Date: 9/29/2024  FINAL REPORT TECHNIQUE: null CLINICAL HISTORY: unresponsiveness, low BP COMPARISON: null FINDINGS: CT head without contrast Comparison: CT/SR - CT HEAD WO CONTRAST - 9/28/24 09:31 EDT Findings: Extensive gliosis and encephalomalacia throughout the cerebrum along with central greater than cortical atrophy like changes are similar to the prior exam. No midline shift or hemorrhage. No intra-axial mass. Enophthalmos. Orbits otherwise unremarkable. Sinuses and mastoids clear. No fracture.     Impression: 1. No acute findings. If there is concern for an acute infarcts superimposed on extensive  chronic disease, recommend MRI. Authenticated and Electronically Signed by Desi Weaver MD on 09/29/2024 02:20:05 AM    CT Chest With Contrast Diagnostic, CT Abdomen Pelvis With Contrast    Result Date: 9/28/2024  PROCEDURE: CT CHEST W CONTRAST DIAGNOSTIC-, CT ABDOMEN PELVIS W CONTRAST-  HISTORY: Leukocytosis, AMS  COMPARISON: CT chest without December 28, 2023 and CT abdomen and pelvis February 11, 2023 without contrast.  PROCEDURE: The patient was injected with IV contrast. Axial images were obtained from the lung apex to the pubic symphysis by computed tomography. This study was performed with techniques to keep radiation doses as low as reasonably achievable, (ALARA). Individualized dose reduction techniques using automated exposure control or adjustment of mA and/or kV according to the patient size were employed.  FINDINGS:  CHEST: There is asymmetric enlargement of the left lobe of the thyroid with a hypodense nodule, question interval enlargement; recommend nonemergent thyroid ultrasound. Tracheostomy tube again noted and appears in similar position to the prior exam. Vascular calcifications noted. There is no axillary adenopathy. There is no hilar or mediastinal adenopathy.  The heart size is normal. There is no pericardial or pleural effusion. There are predominantly linear densities in the lower lobes, left greater than right. Question a small amount of airspace disease posterior left lower lobe, pneumonia not excluded.  ABDOMEN: The liver is homogenous with no focal abnormality. Gallbladder present with no CT visible stones. The spleen is unremarkable. No adrenal mass is present.  The pancreas is unremarkable. The kidneys demonstrate bilateral circumscribed hypodense lesions consistent with cysts, similar to prior exam. There is moderate right hydronephrosis with mild enhancement of the right ureteral wall down to the bladder suggesting ureteritis likely secondary to cystitis. There is very mild  dilatation of the left renal collecting system but no enhancement of the ureteral wall. IV contrast given but no definite obstructing stone seen. The aorta is normal in caliber. There is no free fluid or adenopathy.  PELVIS: The GI tract demonstrates stable position of gastrostomy tube. There is a small umbilical hernia containing fat and a knuckle of transverse colon that is nonobstructed. There is diverticulosis without evidence of diverticulitis. The appendix is not identified. The urinary bladder is completely collapsed with a Sanchez catheter. There is moderate wall thickening and a few bubbles of air in the bladder. There is also infiltration of the surrounding fat suggesting cystitis. There is no free fluid, adenopathy, or inflammatory process.      Small area of focal airspace disease posterior left lower lobe, possible pneumonia.  Cystitis and right ureteritis with moderate right hydronephrosis.  Diverticulosis   CTDI: 20.97 mGy DLP:1329.48 mGy.cm  This report was signed and finalized on 9/28/2024 12:41 PM by Briana Erickson MD.      XR Chest 1 View    Result Date: 9/28/2024  PROCEDURE: XR CHEST 1 VW-  HISTORY: Altered mental status  COMPARISON: September 16, 2024..  FINDINGS: The heart is normal in size. Tracheostomy appears to be in stable position from prior. There is stable linear atelectasis or scar compared to prior exam. No new area of consolidation is seen.. The mediastinum is unremarkable. There is no pneumothorax.  There are no acute osseous abnormalities.      Stable chest including stable position of tracheostomy tube..      This report was signed and finalized on 9/28/2024 9:50 AM by Briana Erickson MD.      CT Head Without Contrast    Result Date: 9/28/2024  PROCEDURE: CT HEAD WO CONTRAST-  HISTORY: Altered mental status, history of anoxic brain injury in March 2019.  COMPARISON: March 10, 2019..  TECHNIQUE: Multiple axial CT images were performed from the foramen magnum to the vertex. Individualized  dose reduction techniques using automated exposure control or adjustment of mA and/or kV according to the patient size were employed.  FINDINGS: There is moderately severe generalized cerebral atrophy which is pronounced for age and has developed since the prior exam.. The ventricles are enlarged. There is previous study suggested cerebral edema. There our large confluent areas of encephalomalacia with sparing of the cortex bilaterally as well as the basal ganglia midbrain, yamilet and medial cerebellum. Findings are consistent with previous history of anoxic brain injury. No masses are identified. No extra-axial fluid is seen. The paranasal sinuses are unremarkable.      Interval development of moderately severe atrophy and large areas of encephalomalacia involving the cerebrum with sparing of the basal ganglia, midbrain, yamilet and medial cerebellum in this patient with a history of previous anoxic brain injury.     CTDI: 42.37 mGy DLP:712.87 mGy.cm  This report was signed and finalized on 9/28/2024 9:47 AM by Briana Erickson MD.       PROCEDURES  Critical Care    Performed by: Olegario España DO  Authorized by: Olegario España DO    Comments:      CRITICAL CARE PROCEDURE NOTE  Authorized and performed by: Dr. España  Total critical care time: Approximately 45 minutes.  Patient was critically ill due to: Pneumonia, UTI, sepsis  Interventions: IV fluids, IV antibiotics, close airway/mental status/hemodynamic monitoring    Due to a high probability of clinically significant, life threatening deterioration, the patient required my highest level of preparedness to intervene emergently and I personally spent this critical care time directly and personally managing the patient.  This critical care time included obtaining a history; examining the patient; pulse oximetry; ordering and review of studies; arranging urgent treatment with development of a management plan; evaluation of patient's response to treatment; frequent  reassessment; and, discussions with other providers.    This critical care time was performed to assess and manage the high probability of imminent, life-threatening deterioration that could result in multiorgan failure.  It was exclusive of separately billable procedures and treating other patients and teaching time.    Please see MDM section and the rest of the note for further information on patient assessment and interventions.      RISK STRATIFICATION    MEDICAL DECISION MAKING  63 y.o. female with past medical history listed above who presents with reported altered mental status.    Patient arrives via EMS.  I have reviewed the EMS documentation/notes and included that information in my HPI.    Vital signs within normal limits.  Pulse ox 100% on baseline oxygen.    Based on clinical presentation and physical exam, differential diagnosis includes, but is not limited to, toxic versus metabolic encephalopathy, intracranial structure abnormality, acute coronary syndrome, pneumonia, UTI.    At least 3 different tests have been ordered on this patient.    Please see ED course below for my interpretation of the ED workup.  ED Course as of 09/29/24 0604   Sat Sep 28, 2024   0933 ECG 12 Lead Altered Mental Status  EKG per my interpretation sinus tachycardia, rate 101, normal axis, no ST segment elevation or depression, normal QRS QTC intervals, limited by baseline artifact.   [JS]   1132 Patient has 2/4 SIRS criteria (HR > 90, leukocytosis) with UTI on UA, therefore ED sepsis bundle was initiated.     I ordered IV fluid resuscitation and empiric IV antibiotics.    Infection suspected at 11:32. [JS]   1250 I reviewed the labs listed above. Notable findings are highlighted below.    Old laboratory data was reviewed from the medical records and compared to today's results.   [JS]   1250 CBC & Differential(!) [JS]   1250 WBC(!): 17.23 [JS]   1250 Hemoglobin(!): 8.3 [JS]   1251 Comprehensive Metabolic Panel(!) [JS]   1251  HS Troponin T(!): 47 [JS]   1251 High Sensitivity Troponin T 2Hr(!) [JS]   1251 HS Troponin T(!): 48 [JS]   1251 Blood Gas, Venous With Co-Ox(!) [JS]   1251 pH, Venous: 7.406 [JS]   1251 Urinalysis With Culture If Indicated - Urine, Catheter(!) [JS]   1251 Leukocytes, UA(!): Large (3+) [JS]   1251 Blood, UA(!): Large (3+) [JS]   1251 WBC, UA(!): Too Numerous to Count [JS]   1251 Squamous Epithelial Cells, UA(!): 3-6 [JS]   1251 COVID19: Not Detected [JS]   1251 Influenza A PCR: Not Detected [JS]   1251 Influenza B PCR: Not Detected [JS]   1251 I have independently reviewed and interpreted the imaging listed above.  My interpretations are listed below:    -Chest x-ray negative for infiltrate.    -CT head negative for intracranial hemorrhage or mass effect.    -CT chest abdomen pelvis left lower lobe infiltrate, no small bowel obstruction     [JS]   1300 Sepsis re-evaluation performed. [JS]      ED Course User Index  [JS] Olegario España DO     Medications administered in ED:  Medications   Pharmacy to Dose Zosyn (has no administration in time range)   sodium chloride 0.9 % flush 10 mL (10 mL Intravenous Given 9/28/24 2011)   sodium chloride 0.9 % flush 10 mL (has no administration in time range)   sodium chloride 0.9 % infusion 40 mL (has no administration in time range)   acetaminophen (TYLENOL) tablet 650 mg (has no administration in time range)     Or   acetaminophen (TYLENOL) 160 MG/5ML oral solution 650 mg (has no administration in time range)     Or   acetaminophen (TYLENOL) suppository 650 mg (has no administration in time range)   ondansetron (ZOFRAN) injection 4 mg (has no administration in time range)   Pharmacy to Dose enoxaparin (LOVENOX) (has no administration in time range)   dextrose 5 % and sodium chloride 0.9 % infusion (100 mL/hr Intravenous New Bag 9/29/24 0250)   sennosides-docusate (PERICOLACE) 8.6-50 MG per tablet 2 tablet (has no administration in time range)     And   polyethylene glycol  (MIRALAX) packet 17 g (has no administration in time range)     And   bisacodyl (DULCOLAX) EC tablet 5 mg (has no administration in time range)     And   bisacodyl (DULCOLAX) suppository 10 mg (has no administration in time range)   baclofen (LIORESAL) tablet 10 mg (10 mg Per G Tube Given 9/29/24 0250)   budesonide (PULMICORT) nebulizer solution 0.5 mg (0.5 mg Nebulization Given 9/28/24 1928)   glycopyrrolate (ROBINUL) tablet 2 mg (2 mg Per G Tube Given 9/28/24 1940)   Multi-Gem liquid 15 mL (15 mL Per G Tube Given 9/28/24 2011)   ipratropium-albuterol (DUO-NEB) nebulizer solution 3 mL (3 mL Nebulization Given 9/29/24 0007)   piperacillin-tazobactam (ZOSYN) IVPB 3.375 g IVPB in 100 mL NS (VTB) (3.375 g Intravenous New Bag 9/29/24 0020)   Enoxaparin Sodium (LOVENOX) syringe 40 mg (40 mg Subcutaneous Given 9/28/24 1940)   famotidine (PEPCID) tablet 20 mg (20 mg Per G Tube Given 9/28/24 1940)   midodrine (PROAMATINE) tablet 2.5 mg (2.5 mg Per G Tube Given 9/28/24 1940)   saccharomyces boulardii (FLORASTOR) capsule 250 mg (250 mg Per G Tube Given 9/28/24 2011)   sodium chloride 0.9 % bolus 1,000 mL (0 mL Intravenous Stopped 9/28/24 1303)   cefTRIAXone (ROCEPHIN) 2,000 mg in sodium chloride 0.9 % 100 mL IVPB-VTB (0 mg Intravenous Stopped 9/28/24 1225)   iopamidol (ISOVUE-300) 61 % injection 100 mL (100 mL Intravenous Given 9/28/24 1219)   azithromycin (ZITHROMAX) 500 mg in sodium chloride 0.9 % 250 mL IVPB-VTB (0 mg Intravenous Stopped 9/28/24 1422)   sodium chloride 0.9 % bolus 1,000 mL (0 mL Intravenous Stopped 9/28/24 1445)   piperacillin-tazobactam (ZOSYN) IVPB 3.375 g IVPB in 100 mL NS (VTB) (3.375 g Intravenous New Bag 9/28/24 1939)   sodium chloride 0.9 % bolus 500 mL (500 mL Intravenous New Bag 9/29/24 0030)     Patient will require medical admission for further workup and management.    Case discussed with Dr. Lagunas (hospitalist) who agrees to evaluate and admit the patient.  We discussed the HPI, pertinent  PMHx, ED course and workup.    REPEAT VITAL SIGNS  AS OF 06:04 EDT VITALS:  BP - 92/76  HR - 71  TEMP - 97.5 °F (36.4 °C) (Axillary)  O2 SATS - 100%    DIAGNOSIS  Final diagnoses:   Pneumonia of left lower lobe due to infectious organism   Acute cystitis with hematuria   Sepsis, due to unspecified organism, unspecified whether acute organ dysfunction present     DISPOSITION  ED Disposition       ED Disposition   Decision to Admit    Condition   --    Comment   Level of Care: Telemetry [5]   Diagnosis: Acute UTI [177972]   Admitting Physician: PAUL PANDEY [[id number]]   Attending Physician: PAUL PANDEY [[id number]]   Certification: I Certify That Inpatient Hospital Services Are Medically Necessary For Greater Than 2 Midnights               Please note that portions of this document were completed with voice recognition software.        Olegario España DO  09/29/24 0605      Electronically signed by Olegario España DO at 09/29/24 0605       Current Facility-Administered Medications   Medication Dose Route Frequency Provider Last Rate Last Admin    acetaminophen (TYLENOL) tablet 650 mg  650 mg Oral Q4H PRN Paul Pandey MD        Or    acetaminophen (TYLENOL) 160 MG/5ML oral solution 650 mg  650 mg Oral Q4H PRN Paul Pandey MD   650 mg at 09/29/24 0908    Or    acetaminophen (TYLENOL) suppository 650 mg  650 mg Rectal Q4H PRN Paul Pandey MD        baclofen (LIORESAL) tablet 10 mg  10 mg Per G Tube Q12H Paul Pandey MD        sennosides-docusate (PERICOLACE) 8.6-50 MG per tablet 2 tablet  2 tablet Oral BID PRN Paul Pandey MD        And    polyethylene glycol (MIRALAX) packet 17 g  17 g Oral Daily PRN Paul Pandey MD        And    bisacodyl (DULCOLAX) EC tablet 5 mg  5 mg Oral Daily PRN Paul Pandey MD        And    bisacodyl (DULCOLAX) suppository 10 mg  10 mg Rectal Daily PRN Paul Pandey MD   10 mg at 09/29/24 0908    budesonide (PULMICORT) nebulizer solution 0.5 mg  0.5 mg Nebulization BID - RT Paul Pandey MD    0.5 mg at 09/29/24 0703    dextrose 5 % and sodium chloride 0.9 % infusion  100 mL/hr Intravenous Continuous Paul Pandey  mL/hr at 09/29/24 0250 100 mL/hr at 09/29/24 0250    Enoxaparin Sodium (LOVENOX) syringe 40 mg  40 mg Subcutaneous Nightly Paul Pandey MD   40 mg at 09/28/24 1940    famotidine (PEPCID) tablet 20 mg  20 mg Per G Tube Daily Paul Pandey MD   20 mg at 09/29/24 0908    glycopyrrolate (ROBINUL) tablet 2 mg  2 mg Per G Tube TID Paul Pandey MD   2 mg at 09/29/24 0908    ipratropium-albuterol (DUO-NEB) nebulizer solution 3 mL  3 mL Nebulization Q6H - RT Paul Pandey MD   3 mL at 09/29/24 0703    midodrine (PROAMATINE) tablet 5 mg  5 mg Per G Tube TID AC Paul Pandey MD   5 mg at 09/29/24 1057    Multi-Gem liquid 15 mL  15 mL Per G Tube Daily Paul Pandey MD   15 mL at 09/29/24 0908    ondansetron (ZOFRAN) injection 4 mg  4 mg Intravenous Q6H PRN Paul Pandey MD        Pharmacy to Dose enoxaparin (LOVENOX)   Does not apply Continuous PRN Paul Pandey MD        Pharmacy to Dose Zosyn   Does not apply Continuous PRN Paul Pandey MD        piperacillin-tazobactam (ZOSYN) IVPB 3.375 g IVPB in 100 mL NS (VTB)  3.375 g Intravenous Q8H Paul Pandey MD   3.375 g at 09/29/24 1057    saccharomyces boulardii (FLORASTOR) capsule 250 mg  250 mg Per G Tube BID Paul Pandey MD   250 mg at 09/29/24 0908    sodium chloride 0.9 % flush 10 mL  10 mL Intravenous Q12H Paul Pandey MD   10 mL at 09/29/24 0909    sodium chloride 0.9 % flush 10 mL  10 mL Intravenous PRN Paul Pandey MD        sodium chloride 0.9 % infusion 40 mL  40 mL Intravenous PRN Paul Pandey MD            Physician Progress Notes (last 24 hours)        Kirill Vazquez DO at 09/29/24 0102          Responded to rapid response to the patient's bedside.  Patient with decreased level of consciousness.  Patient difficult to arouse.  Patient maintaining oxygen sats.  Patient's vitals all stable.  Patient  minimally responsive.   Patient's blood gas showed mild hypercapnia but was stable as compared to her previous swelling and compensated.  CT scan pending.  CBC results reviewed.    Electronically signed by Kirill Vazquez DO at 09/29/24 0103

## 2024-09-29 NOTE — PROGRESS NOTES
RT EQUIPMENT DEVICE RELATED - SKIN ASSESSMENT    James Score:  James Score: 8     RT Medical Equipment/Device:     NIV Mask:  Under-the-nose   size:  attached to trach    Skin Assessment:      Stoma:  Intact    Device Skin Pressure Protection:  Absorbent pad utilzed/changed    Nurse Notification:  No    Mono Leroy, RRT

## 2024-09-29 NOTE — NURSING NOTE
Rapid response called on patient due to low pressure and decreased level of consciousness and difficulty to arouse. Patient's blood pressure has been low this shift. Patient non-verbal and contracted at baseline. IV infusing per order.

## 2024-09-29 NOTE — PLAN OF CARE
Problem: Adult Inpatient Plan of Care  Goal: Absence of Hospital-Acquired Illness or Injury  Intervention: Prevent Skin Injury  Recent Flowsheet Documentation  Taken 9/29/2024 0703 by Mono Leroy, RRT  Skin Protection: tubing/devices free from skin contact     Problem: Skin Injury Risk Increased  Goal: Skin Health and Integrity  Intervention: Optimize Skin Protection  Recent Flowsheet Documentation  Taken 9/29/2024 0703 by Mono Leroy, RRT  Skin Protection: tubing/devices free from skin contact     Problem: Respiratory Compromise (Pneumonia)  Goal: Effective Oxygenation and Ventilation  Intervention: Optimize Oxygenation and Ventilation  Recent Flowsheet Documentation  Taken 9/29/2024 1046 by Mono Leroy, RRT  Airway/Ventilation Management: airway patency maintained  Taken 9/29/2024 0703 by Mono Leroy, RRT  Airway/Ventilation Management: airway patency maintained     Problem: Noninvasive Ventilation Acute  Goal: Effective Unassisted Ventilation and Oxygenation  Intervention: Monitor and Manage Noninvasive Ventilation  Recent Flowsheet Documentation  Taken 9/29/2024 1046 by Mono Leroy, RRT  Airway/Ventilation Management: airway patency maintained  NPPV/CPAP Maintenance: (pt not wearing at this time) other (see comments)  Taken 9/29/2024 0703 by Mono Leroy RRT  Airway/Ventilation Management: airway patency maintained  NPPV/CPAP Maintenance: (pt not wearing at this time) other (see comments)   Goal Outcome Evaluation:

## 2024-09-29 NOTE — PROGRESS NOTES
RT EQUIPMENT DEVICE RELATED - SKIN ASSESSMENT    James Score:  James Score: 8     RT Medical Equipment/Device:     NIV Mask:  Under-the-nose   size:       Skin Assessment:      Stoma:  Intact    Device Skin Pressure Protection:  Pressure points protected    Nurse Notification:  Caryn Haile, RRT

## 2024-09-29 NOTE — PLAN OF CARE
Problem: Adult Inpatient Plan of Care  Goal: Plan of Care Review  Outcome: Progressing  Goal: Patient-Specific Goal (Individualized)  Outcome: Progressing  Goal: Absence of Hospital-Acquired Illness or Injury  Outcome: Progressing  Intervention: Identify and Manage Fall Risk  Recent Flowsheet Documentation  Taken 9/28/2024 1830 by Geoffrey Pompa RN  Safety Promotion/Fall Prevention: safety round/check completed  Intervention: Prevent Skin Injury  Recent Flowsheet Documentation  Taken 9/28/2024 1830 by Geoffrey Pompa RN  Body Position: supine, legs elevated  Skin Protection: tubing/devices free from skin contact  Intervention: Prevent and Manage VTE (Venous Thromboembolism) Risk  Recent Flowsheet Documentation  Taken 9/28/2024 1830 by Geoffrey Pompa RN  Activity Management: bedrest  Range of Motion: ROM (range of motion) performed  Goal: Optimal Comfort and Wellbeing  Outcome: Progressing  Intervention: Provide Person-Centered Care  Recent Flowsheet Documentation  Taken 9/28/2024 1830 by Geoffrey Pompa RN  Trust Relationship/Rapport: care explained  Goal: Readiness for Transition of Care  Outcome: Progressing  Intervention: Mutually Develop Transition Plan  Recent Flowsheet Documentation  Taken 9/28/2024 1838 by Geoffrey Pompa RN  Equipment Currently Used at Home: other (see comments)  Transportation Anticipated: health plan transportation  Patient/Family Anticipated Services at Transition: none  Patient/Family Anticipates Transition to: long-term care facility     Problem: Skin Injury Risk Increased  Goal: Skin Health and Integrity  Outcome: Progressing  Intervention: Optimize Skin Protection  Recent Flowsheet Documentation  Taken 9/28/2024 1830 by Geoffrey Pompa RN  Pressure Reduction Techniques:   weight shift assistance provided   pressure points protected   positioned off wounds  Head of Bed (HOB) Positioning: HOB at 30 degrees  Pressure Reduction Devices: positioning supports utilized  Skin Protection:  tubing/devices free from skin contact     Problem: Fluid Imbalance (Pneumonia)  Goal: Fluid Balance  Outcome: Progressing     Problem: Infection (Pneumonia)  Goal: Resolution of Infection Signs and Symptoms  Outcome: Progressing     Problem: Respiratory Compromise (Pneumonia)  Goal: Effective Oxygenation and Ventilation  Outcome: Progressing  Intervention: Optimize Oxygenation and Ventilation  Recent Flowsheet Documentation  Taken 9/28/2024 1830 by Geoffrey Pompa RN  Head of Bed (HOB) Positioning: HOB at 30 degrees     Problem: UTI (Urinary Tract Infection)  Goal: Improved Infection Symptoms  Outcome: Progressing     Problem: COPD (Chronic Obstructive Pulmonary Disease) Comorbidity  Goal: Maintenance of COPD Symptom Control  Outcome: Progressing  Intervention: Maintain COPD-Symptom Control  Recent Flowsheet Documentation  Taken 9/28/2024 1830 by Geoffrey Pompa RN  Medication Review/Management: medications reviewed     Problem: Hypertension Comorbidity  Goal: Blood Pressure in Desired Range  Outcome: Progressing  Intervention: Maintain Blood Pressure Management  Recent Flowsheet Documentation  Taken 9/28/2024 1830 by Geoffrey Pompa RN  Medication Review/Management: medications reviewed   Goal Outcome Evaluation:   New ED Admit. VSS

## 2024-09-29 NOTE — PROGRESS NOTES
Responded to rapid response to the patient's bedside.  Patient with decreased level of consciousness.  Patient difficult to arouse.  Patient maintaining oxygen sats.  Patient's vitals all stable.  Patient  minimally responsive.  Patient's blood gas showed mild hypercapnia but was stable as compared to her previous swelling and compensated.  CT scan pending.  CBC results reviewed.

## 2024-09-29 NOTE — PLAN OF CARE
Goal Outcome Evaluation:  Plan of Care Reviewed With: patient        Progress: improving  Outcome Evaluation: VSS on trach collar- BP improved and stable- IVF and IV abx continued. Pt. nonverbal and requires total assistance with repositioning in bed every two hours and oral care. Tube feedings restarted today and tolerating. Plan of care ongoing.

## 2024-09-29 NOTE — CODE DOCUMENTATION
PCT in pts room checking BGL. BGL was within acceptable range however pt systolic pressure was noted to be in the 70's.  PCT informed primary RN at that time. RN went into assess pt and found her unresponsive. RN attempted to arouse pt and when she was unsuccessful a RRT was called. During RRT ordered ABG was unable to be collected and VBG was collected during lab draw instead. VBG was noted to be WNL, Dr. Vazquez at bedside and was made aware. Pt placed on CPAP via trach at this time. CT of head was ordered. BP returned normotensive, HR 82.

## 2024-09-29 NOTE — PROGRESS NOTES
[facility]    Name:  Viji Vargas   Age:  63 y.o.  Sex:  female  :  1960  MRN:  [id number]   Visit Number:  [id number]  Admission Date:  2024  Date Of Service:  24  Primary Care Physician:  Ranjeet Pina MD     LOS: 1 day :    Chief Complaint:      Follow-up of altered mental status and hypotension.    Subjective:    Viji Vargas was seen and examined this morning.  She is currently lying down in the bed and opens eyes on sternal rub.  She is less alert compared to yesterday but  may be sleeping.  She had a similar episode at night and did have a repeat CT scan of the head which did not show any new changes.  Hypotensive episodes have improved since this morning.  She is currently on IV fluids.    Hospital Course:    Viji Vargas  is a 63-year-old male resident of nursing home facility, with history of anoxic injury status postcardiac arrest in 3/2019, status post trach and PEG, COPD, hypertension was brought to the emergency room by EMS with altered mental status.  Per report, nursing noticed the patient to be altered from her normal baseline of unknown duration.  Unfortunately patient is nonverbal.  Patient's last admission was to  in 2024 where she was treated for UTI and pneumonia.     In the emergency room, patient's initial temperature was 96.9, pulse 97, blood pressure 140/80 but subsequently she dropped to 81/44.  Patient's initial pulse oxygen saturation was 100% on trach collar at 4 L.  Troponin 47.  CMP was unremarkable except for a BUN of 47 and albumin of 2.9.  White count 17, hemoglobin 8.3.  Procalcitonin was elevated at 0.45.  Lactic acid was 0.4.  COVID and flu test were negative.  Noncontrast CT of the head showed interval development of moderately severe atrophy and large areas of encephalomalacia in the patient with history of previous anoxic brain injury.  Chest x-ray showed stable features including stable  position of tracheostomy tube.  CT chest with contrast showed small areas of focal airspace disease posterior left lower lobe, possible pneumonia.  CT of the abdomen and pelvis showed cystitis with right ureteritis with moderate right hydronephrosis.  Diverticulosis noted.  Urine analysis showed too numerous to count WBCs and large RBCs.  Blood cultures and urine cultures were sent from the emergency room.  Patient was given a liter of normal saline bolus with improvement in her hypotension.  She was given 2 g of IV ceftriaxone and 500 mg of IV azithromycin and was subsequently admitted to the medical ICU for further management of left lower lobe pneumonia, urinary tract infection, hypotension and metabolic encephalopathy.    Review of Systems:     All systems were reviewed and negative except as mentioned in subjective, assessment and plan.    Vital Signs:    Temp:  [97.4 °F (36.3 °C)-97.9 °F (36.6 °C)] 97.7 °F (36.5 °C)  Heart Rate:  [] 96  Resp:  [16-20] 18  BP: ()/(30-76) 136/69    Intake and output:    I/O last 3 completed shifts:  In: 500 [I.V.:250; IV Piggyback:250]  Out: 1720 [Urine:1720]  I/O this shift:  In: 160 [Other:60; IV Piggyback:100]  Out: -     Physical Examination:    General Appearance:  Alert on sternal rub but drowsy.  Nonverbal.   Head:  Atraumatic and normocephalic.   Eyes: Conjunctivae and sclerae normal, no icterus. No pallor.   Throat: No oral lesions, no thrush, oral mucosa moist.  Trach collar in place.   Neck: Supple, trachea midline, no thyromegaly.   Lungs:   Breath sounds heard bilaterally equally.  Bilateral scattered crackles and wheezing heard.  No Pleural rub or bronchial breathing.  Gurgling sounds in the throat have significantly improved compared to yesterday.   Heart:  Normal S1 and S2, no murmur, no gallop, no rub. No JVD.   Abdomen:   Normal bowel sounds, no masses, no organomegaly. Soft, nontender, obese, no rebound tenderness.  PEG tube noted in the epigastric  region.  Sanchez catheter is in place.   Extremities: Supple, no edema, no cyanosis, no clubbing.   Skin: No bleeding or rash.   Neurologic: Alert on sternal rub.  Nonverbal.  Severe upper and lower extremity contractures and bilateral foot drop noted.     Laboratory results:    Results from last 7 days   Lab Units 09/29/24  0622 09/29/24  0047 09/28/24  0944   SODIUM mmol/L 142 142 138   POTASSIUM mmol/L 3.7 3.8 4.6   CHLORIDE mmol/L 108* 108* 99   CO2 mmol/L 22.9 24.5 27.2   BUN mg/dL 22 25* 47*   CREATININE mg/dL 0.60 0.60 0.78   CALCIUM mg/dL 8.7 8.6 9.4   BILIRUBIN mg/dL  --  0.2 0.3   ALK PHOS U/L  --  82 98   ALT (SGPT) U/L  --  24 30   AST (SGOT) U/L  --  13 16   GLUCOSE mg/dL 122* 130* 116*     Results from last 7 days   Lab Units 09/29/24  0852 09/29/24  0047 09/28/24  0944   WBC 10*3/mm3 9.67 11.18* 17.23*   HEMOGLOBIN g/dL 7.3* 7.1* 8.3*   HEMATOCRIT % 23.4* 22.9* 26.4*   PLATELETS 10*3/mm3 241 256 250         Results from last 7 days   Lab Units 09/29/24  0047 09/28/24  1140 09/28/24  0944   HSTROP T ng/L 51* 48* 47*     Results from last 7 days   Lab Units 09/28/24  1152 09/28/24  1140 09/28/24  1051   BLOODCX  No growth at 24 hours No growth at 24 hours  --    URINECX   --   --  >100,000 CFU/mL Mixed Jolly Isolated     I have reviewed the patient's laboratory results.    Radiology results:    CT Head Without Contrast    Result Date: 9/29/2024  FINAL REPORT TECHNIQUE: null CLINICAL HISTORY: unresponsiveness, low BP COMPARISON: null FINDINGS: CT head without contrast Comparison: CT/SR - CT HEAD WO CONTRAST - 9/28/24 09:31 EDT Findings: Extensive gliosis and encephalomalacia throughout the cerebrum along with central greater than cortical atrophy like changes are similar to the prior exam. No midline shift or hemorrhage. No intra-axial mass. Enophthalmos. Orbits otherwise unremarkable. Sinuses and mastoids clear. No fracture.     Impression: Impression: 1. No acute findings. If there is concern for an  acute infarcts superimposed on extensive chronic disease, recommend MRI. Authenticated and Electronically Signed by Desi Weaver MD on 09/29/2024 02:20:05 AM    CT Chest With Contrast Diagnostic    Result Date: 9/28/2024  PROCEDURE: CT CHEST W CONTRAST DIAGNOSTIC-, CT ABDOMEN PELVIS W CONTRAST-  HISTORY: Leukocytosis, AMS  COMPARISON: CT chest without December 28, 2023 and CT abdomen and pelvis February 11, 2023 without contrast.  PROCEDURE: The patient was injected with IV contrast. Axial images were obtained from the lung apex to the pubic symphysis by computed tomography. This study was performed with techniques to keep radiation doses as low as reasonably achievable, (ALARA). Individualized dose reduction techniques using automated exposure control or adjustment of mA and/or kV according to the patient size were employed.  FINDINGS:  CHEST: There is asymmetric enlargement of the left lobe of the thyroid with a hypodense nodule, question interval enlargement; recommend nonemergent thyroid ultrasound. Tracheostomy tube again noted and appears in similar position to the prior exam. Vascular calcifications noted. There is no axillary adenopathy. There is no hilar or mediastinal adenopathy.  The heart size is normal. There is no pericardial or pleural effusion. There are predominantly linear densities in the lower lobes, left greater than right. Question a small amount of airspace disease posterior left lower lobe, pneumonia not excluded.  ABDOMEN: The liver is homogenous with no focal abnormality. Gallbladder present with no CT visible stones. The spleen is unremarkable. No adrenal mass is present.  The pancreas is unremarkable. The kidneys demonstrate bilateral circumscribed hypodense lesions consistent with cysts, similar to prior exam. There is moderate right hydronephrosis with mild enhancement of the right ureteral wall down to the bladder suggesting ureteritis likely secondary to cystitis. There is very  mild dilatation of the left renal collecting system but no enhancement of the ureteral wall. IV contrast given but no definite obstructing stone seen. The aorta is normal in caliber. There is no free fluid or adenopathy.  PELVIS: The GI tract demonstrates stable position of gastrostomy tube. There is a small umbilical hernia containing fat and a knuckle of transverse colon that is nonobstructed. There is diverticulosis without evidence of diverticulitis. The appendix is not identified. The urinary bladder is completely collapsed with a Sanchez catheter. There is moderate wall thickening and a few bubbles of air in the bladder. There is also infiltration of the surrounding fat suggesting cystitis. There is no free fluid, adenopathy, or inflammatory process.      Impression: Small area of focal airspace disease posterior left lower lobe, possible pneumonia.  Cystitis and right ureteritis with moderate right hydronephrosis.  Diverticulosis   CTDI: 20.97 mGy DLP:1329.48 mGy.cm  This report was signed and finalized on 9/28/2024 12:41 PM by Briana Erickson MD.      CT Abdomen Pelvis With Contrast    Result Date: 9/28/2024  PROCEDURE: CT CHEST W CONTRAST DIAGNOSTIC-, CT ABDOMEN PELVIS W CONTRAST-  HISTORY: Leukocytosis, AMS  COMPARISON: CT chest without December 28, 2023 and CT abdomen and pelvis February 11, 2023 without contrast.  PROCEDURE: The patient was injected with IV contrast. Axial images were obtained from the lung apex to the pubic symphysis by computed tomography. This study was performed with techniques to keep radiation doses as low as reasonably achievable, (ALARA). Individualized dose reduction techniques using automated exposure control or adjustment of mA and/or kV according to the patient size were employed.  FINDINGS:  CHEST: There is asymmetric enlargement of the left lobe of the thyroid with a hypodense nodule, question interval enlargement; recommend nonemergent thyroid ultrasound. Tracheostomy tube  again noted and appears in similar position to the prior exam. Vascular calcifications noted. There is no axillary adenopathy. There is no hilar or mediastinal adenopathy.  The heart size is normal. There is no pericardial or pleural effusion. There are predominantly linear densities in the lower lobes, left greater than right. Question a small amount of airspace disease posterior left lower lobe, pneumonia not excluded.  ABDOMEN: The liver is homogenous with no focal abnormality. Gallbladder present with no CT visible stones. The spleen is unremarkable. No adrenal mass is present.  The pancreas is unremarkable. The kidneys demonstrate bilateral circumscribed hypodense lesions consistent with cysts, similar to prior exam. There is moderate right hydronephrosis with mild enhancement of the right ureteral wall down to the bladder suggesting ureteritis likely secondary to cystitis. There is very mild dilatation of the left renal collecting system but no enhancement of the ureteral wall. IV contrast given but no definite obstructing stone seen. The aorta is normal in caliber. There is no free fluid or adenopathy.  PELVIS: The GI tract demonstrates stable position of gastrostomy tube. There is a small umbilical hernia containing fat and a knuckle of transverse colon that is nonobstructed. There is diverticulosis without evidence of diverticulitis. The appendix is not identified. The urinary bladder is completely collapsed with a Sanchez catheter. There is moderate wall thickening and a few bubbles of air in the bladder. There is also infiltration of the surrounding fat suggesting cystitis. There is no free fluid, adenopathy, or inflammatory process.      Impression: Small area of focal airspace disease posterior left lower lobe, possible pneumonia.  Cystitis and right ureteritis with moderate right hydronephrosis.  Diverticulosis   CTDI: 20.97 mGy DLP:1329.48 mGy.cm  This report was signed and finalized on 9/28/2024 12:41  PM by Briana Erickson MD.      XR Chest 1 View    Result Date: 9/28/2024  PROCEDURE: XR CHEST 1 VW-  HISTORY: Altered mental status  COMPARISON: September 16, 2024..  FINDINGS: The heart is normal in size. Tracheostomy appears to be in stable position from prior. There is stable linear atelectasis or scar compared to prior exam. No new area of consolidation is seen.. The mediastinum is unremarkable. There is no pneumothorax.  There are no acute osseous abnormalities.      Impression: Stable chest including stable position of tracheostomy tube..      This report was signed and finalized on 9/28/2024 9:50 AM by Briana Erickson MD.      CT Head Without Contrast    Result Date: 9/28/2024  PROCEDURE: CT HEAD WO CONTRAST-  HISTORY: Altered mental status, history of anoxic brain injury in March 2019.  COMPARISON: March 10, 2019..  TECHNIQUE: Multiple axial CT images were performed from the foramen magnum to the vertex. Individualized dose reduction techniques using automated exposure control or adjustment of mA and/or kV according to the patient size were employed.  FINDINGS: There is moderately severe generalized cerebral atrophy which is pronounced for age and has developed since the prior exam.. The ventricles are enlarged. There is previous study suggested cerebral edema. There our large confluent areas of encephalomalacia with sparing of the cortex bilaterally as well as the basal ganglia midbrain, yamilet and medial cerebellum. Findings are consistent with previous history of anoxic brain injury. No masses are identified. No extra-axial fluid is seen. The paranasal sinuses are unremarkable.      Impression: Interval development of moderately severe atrophy and large areas of encephalomalacia involving the cerebrum with sparing of the basal ganglia, midbrain, yamilet and medial cerebellum in this patient with a history of previous anoxic brain injury.     CTDI: 42.37 mGy DLP:712.87 mGy.cm  This report was signed and finalized on  9/28/2024 9:47 AM by Briana Erickson MD.     I have reviewed the patient's radiology reports.    Medication Review:     I have reviewed the patient's active and prn medications.     Problem List:      Pneumonia of left lower lobe due to infectious organism    Acute UTI    Anoxic brain injury    Sepsis    Assessment:    Acute metabolic encephalopathy secondary to #2 and #3, POA.  Left lower lobe healthcare associated pneumonia, POA.  Acute complicated urinary tract infection, POA.  Right ureteritis/right hydronephrosis, POA.  Sepsis secondary to #2 and #3, POA.  History of anoxic brain injury status post trach and PEG.  Chronic anemia.  Functional quadriplegia.    Plan:    Sepsis/left lower lobe pneumonia/complicated UTI.  - Unfortunately, patient has poor prognosis due to her anoxic brain injury and she is at high risk for recurrent infections especially aspiration pneumonia and urinary tract infections.  - Patient does seem to have right hydronephrosis but her renal function seems to be fairly within normal range.  - She received 2 g of IV ceftriaxone in the emergency room but due to high risk for healthcare associated infections, we will change her antibiotics to Zosyn.  - Obtain nasal swab for MRSA.  - Blood and urine cultures have been negative so far.  - Nutrition consult for tube feeds.     Hypotension.  - Patient did have transient hypotension and responded to IV fluids well.  - Continue dextrose with normal saline at 100 mL/h.  - Continue low-dose midodrine at 5 mg 3 times daily.     Right hydronephrosis.  - Likely secondary to urinary tract infection and urethritis.  - I do not suspect need for intervention at this time but if needed, we will consult urology on 9/30/2024.    Discussed with nursing staff.  Patient's prognosis is extremely poor due to her advanced anoxic encephalopathy.  Interestingly, patient is still a full code at the nursing home facility.  She would benefit from changing her CODE STATUS to  DNR/DNI and we will consult palliative care services tomorrow.    I have reviewed the copied text and it is accurate as of 09/29/24    DVT Prophylaxis: Enoxaparin  Code Status: Full  Diet: Tube feeds  Discharge Plan: Pending    Paul Pandey MD  09/29/24  12:56 EDT    Dictated utilizing Dragon dictation.

## 2024-09-30 LAB
ANION GAP SERPL CALCULATED.3IONS-SCNC: 10.6 MMOL/L (ref 5–15)
BACTERIA SPEC RESP CULT: ABNORMAL
BUN SERPL-MCNC: 13 MG/DL (ref 8–23)
BUN/CREAT SERPL: 23.2 (ref 7–25)
CALCIUM SPEC-SCNC: 8.5 MG/DL (ref 8.6–10.5)
CHLORIDE SERPL-SCNC: 111 MMOL/L (ref 98–107)
CO2 SERPL-SCNC: 21.4 MMOL/L (ref 22–29)
CREAT SERPL-MCNC: 0.56 MG/DL (ref 0.57–1)
DEPRECATED RDW RBC AUTO: 51.1 FL (ref 37–54)
EGFRCR SERPLBLD CKD-EPI 2021: 101.4 ML/MIN/1.73
ERYTHROCYTE [DISTWIDTH] IN BLOOD BY AUTOMATED COUNT: 14.7 % (ref 12.3–15.4)
GLUCOSE BLDC GLUCOMTR-MCNC: 129 MG/DL (ref 70–130)
GLUCOSE BLDC GLUCOMTR-MCNC: 150 MG/DL (ref 70–130)
GLUCOSE BLDC GLUCOMTR-MCNC: 164 MG/DL (ref 70–130)
GLUCOSE BLDC GLUCOMTR-MCNC: 165 MG/DL (ref 70–130)
GLUCOSE SERPL-MCNC: 149 MG/DL (ref 65–99)
GRAM STN SPEC: ABNORMAL
HBA1C MFR BLD: 5.3 % (ref 4.8–5.6)
HCT VFR BLD AUTO: 23.6 % (ref 34–46.6)
HGB BLD-MCNC: 7.5 G/DL (ref 12–15.9)
MCH RBC QN AUTO: 30 PG (ref 26.6–33)
MCHC RBC AUTO-ENTMCNC: 31.8 G/DL (ref 31.5–35.7)
MCV RBC AUTO: 94.4 FL (ref 79–97)
PLATELET # BLD AUTO: 270 10*3/MM3 (ref 140–450)
PMV BLD AUTO: 12.9 FL (ref 6–12)
POTASSIUM SERPL-SCNC: 3.5 MMOL/L (ref 3.5–5.2)
RBC # BLD AUTO: 2.5 10*6/MM3 (ref 3.77–5.28)
SODIUM SERPL-SCNC: 143 MMOL/L (ref 136–145)
WBC NRBC COR # BLD AUTO: 6.3 10*3/MM3 (ref 3.4–10.8)

## 2024-09-30 PROCEDURE — 94664 DEMO&/EVAL PT USE INHALER: CPT

## 2024-09-30 PROCEDURE — 25010000002 ENOXAPARIN PER 10 MG: Performed by: INTERNAL MEDICINE

## 2024-09-30 PROCEDURE — 85027 COMPLETE CBC AUTOMATED: CPT | Performed by: INTERNAL MEDICINE

## 2024-09-30 PROCEDURE — 80048 BASIC METABOLIC PNL TOTAL CA: CPT | Performed by: INTERNAL MEDICINE

## 2024-09-30 PROCEDURE — 94799 UNLISTED PULMONARY SVC/PX: CPT

## 2024-09-30 PROCEDURE — 83036 HEMOGLOBIN GLYCOSYLATED A1C: CPT | Performed by: INTERNAL MEDICINE

## 2024-09-30 PROCEDURE — 94761 N-INVAS EAR/PLS OXIMETRY MLT: CPT

## 2024-09-30 PROCEDURE — 82948 REAGENT STRIP/BLOOD GLUCOSE: CPT

## 2024-09-30 PROCEDURE — 25010000002 PIPERACILLIN SOD-TAZOBACTAM PER 1 G: Performed by: INTERNAL MEDICINE

## 2024-09-30 PROCEDURE — 99232 SBSQ HOSP IP/OBS MODERATE 35: CPT | Performed by: INTERNAL MEDICINE

## 2024-09-30 PROCEDURE — 25810000003 DEXTROSE 5 % AND SODIUM CHLORIDE 0.9 % 5-0.9 % SOLUTION: Performed by: INTERNAL MEDICINE

## 2024-09-30 PROCEDURE — 94660 CPAP INITIATION&MGMT: CPT

## 2024-09-30 RX ORDER — ACETAMINOPHEN 325 MG/1
650 TABLET ORAL EVERY 4 HOURS PRN
Status: DISCONTINUED | OUTPATIENT
Start: 2024-09-30 | End: 2024-10-01 | Stop reason: HOSPADM

## 2024-09-30 RX ORDER — LANOLIN ALCOHOL/MO/W.PET/CERES
1 CREAM (GRAM) TOPICAL AS NEEDED
COMMUNITY

## 2024-09-30 RX ORDER — FERROUS SULFATE 300 MG/5ML
300 LIQUID (ML) ORAL DAILY
Status: DISCONTINUED | OUTPATIENT
Start: 2024-09-30 | End: 2024-10-01 | Stop reason: HOSPADM

## 2024-09-30 RX ORDER — ACETAMINOPHEN 650 MG/1
650 SUPPOSITORY RECTAL EVERY 4 HOURS PRN
Status: DISCONTINUED | OUTPATIENT
Start: 2024-09-30 | End: 2024-10-01 | Stop reason: HOSPADM

## 2024-09-30 RX ORDER — ACETAMINOPHEN 160 MG/5ML
650 SOLUTION ORAL EVERY 4 HOURS PRN
Status: DISCONTINUED | OUTPATIENT
Start: 2024-09-30 | End: 2024-10-01 | Stop reason: HOSPADM

## 2024-09-30 RX ORDER — MUPIROCIN 20 MG/G
1 OINTMENT TOPICAL 2 TIMES DAILY
COMMUNITY

## 2024-09-30 RX ADMIN — MIDODRINE HYDROCHLORIDE 5 MG: 5 TABLET ORAL at 17:29

## 2024-09-30 RX ADMIN — PIPERACILLIN AND TAZOBACTAM 4.5 G: 4; .5 INJECTION, POWDER, FOR SOLUTION INTRAVENOUS at 17:29

## 2024-09-30 RX ADMIN — PIPERACILLIN AND TAZOBACTAM 4.5 G: 4; .5 INJECTION, POWDER, FOR SOLUTION INTRAVENOUS at 10:04

## 2024-09-30 RX ADMIN — IPRATROPIUM BROMIDE AND ALBUTEROL SULFATE 3 ML: .5; 3 SOLUTION RESPIRATORY (INHALATION) at 00:50

## 2024-09-30 RX ADMIN — Medication 15 ML: at 08:55

## 2024-09-30 RX ADMIN — GLYCOPYRROLATE 2 MG: 1 TABLET ORAL at 08:57

## 2024-09-30 RX ADMIN — Medication 250 MG: at 08:57

## 2024-09-30 RX ADMIN — BACLOFEN 10 MG: 10 TABLET ORAL at 08:55

## 2024-09-30 RX ADMIN — DEXTROSE AND SODIUM CHLORIDE 100 ML/HR: 5; 900 INJECTION, SOLUTION INTRAVENOUS at 04:03

## 2024-09-30 RX ADMIN — Medication 300 MG: at 11:49

## 2024-09-30 RX ADMIN — BUDESONIDE 0.5 MG: 0.5 INHALANT RESPIRATORY (INHALATION) at 07:14

## 2024-09-30 RX ADMIN — BISACODYL 10 MG: 10 SUPPOSITORY RECTAL at 16:08

## 2024-09-30 RX ADMIN — BUDESONIDE 0.5 MG: 0.5 INHALANT RESPIRATORY (INHALATION) at 19:57

## 2024-09-30 RX ADMIN — Medication 10 ML: at 21:48

## 2024-09-30 RX ADMIN — GLYCOPYRROLATE 2 MG: 1 TABLET ORAL at 16:08

## 2024-09-30 RX ADMIN — FAMOTIDINE 20 MG: 20 TABLET, FILM COATED ORAL at 08:57

## 2024-09-30 RX ADMIN — IPRATROPIUM BROMIDE AND ALBUTEROL SULFATE 3 ML: .5; 3 SOLUTION RESPIRATORY (INHALATION) at 13:03

## 2024-09-30 RX ADMIN — IPRATROPIUM BROMIDE AND ALBUTEROL SULFATE 3 ML: .5; 3 SOLUTION RESPIRATORY (INHALATION) at 07:14

## 2024-09-30 RX ADMIN — MIDODRINE HYDROCHLORIDE 5 MG: 5 TABLET ORAL at 11:49

## 2024-09-30 RX ADMIN — Medication 250 MG: at 21:48

## 2024-09-30 RX ADMIN — Medication 10 ML: at 08:58

## 2024-09-30 RX ADMIN — ENOXAPARIN SODIUM 40 MG: 100 INJECTION SUBCUTANEOUS at 21:48

## 2024-09-30 RX ADMIN — IPRATROPIUM BROMIDE AND ALBUTEROL SULFATE 3 ML: .5; 3 SOLUTION RESPIRATORY (INHALATION) at 19:57

## 2024-09-30 RX ADMIN — GLYCOPYRROLATE 2 MG: 1 TABLET ORAL at 21:48

## 2024-09-30 RX ADMIN — BACLOFEN 10 MG: 10 TABLET ORAL at 21:48

## 2024-09-30 RX ADMIN — PIPERACILLIN AND TAZOBACTAM 4.5 G: 4; .5 INJECTION, POWDER, FOR SOLUTION INTRAVENOUS at 01:18

## 2024-09-30 RX ADMIN — MIDODRINE HYDROCHLORIDE 5 MG: 5 TABLET ORAL at 06:22

## 2024-09-30 RX ADMIN — ACETAMINOPHEN 650 MG: 650 SOLUTION ORAL at 08:55

## 2024-09-30 NOTE — PLAN OF CARE
Problem: Noninvasive Ventilation Acute  Goal: Effective Unassisted Ventilation and Oxygenation  Outcome: Progressing  Intervention: Monitor and Manage Noninvasive Ventilation  Flowsheets (Taken 9/30/2024 5479)  Airway/Ventilation Management: (pt not wearing) --   Goal Outcome Evaluation:

## 2024-09-30 NOTE — PROGRESS NOTES
[facility]    [facility]    Name:  Viji Vargas   Age:  63 y.o.  Sex:  female  :  1960  MRN:  [id number]   Visit Number:  [id number]  Admission Date:  2024  Date Of Service:  24  Primary Care Physician:  Ranjeet Pina MD     LOS: 2 days :    Chief Complaint:      Follow-up of altered mental status and hypotension.    Subjective:    Viji Vargas was seen and examined this morning.  She is more alert compared to yesterday.  Tolerating tube feeds.  She was seen by wound care nurse today.  No significant overnight events.  We will discontinue the IV fluids after the current bag.    Hospital Course:    Viji Vargas  is a 63-year-old male resident of nursing home facility, with history of anoxic injury status postcardiac arrest in 3/2019, status post trach and PEG, COPD, hypertension was brought to the emergency room by EMS with altered mental status.  Per report, nursing noticed the patient to be altered from her normal baseline of unknown duration.  Unfortunately patient is nonverbal.  Patient's last admission was to  in 2024 where she was treated for UTI and pneumonia.     In the emergency room, patient's initial temperature was 96.9, pulse 97, blood pressure 140/80 but subsequently she dropped to 81/44.  Patient's initial pulse oxygen saturation was 100% on trach collar at 4 L.  Troponin 47.  CMP was unremarkable except for a BUN of 47 and albumin of 2.9.  White count 17, hemoglobin 8.3.  Procalcitonin was elevated at 0.45.  Lactic acid was 0.4.  COVID and flu test were negative.  Noncontrast CT of the head showed interval development of moderately severe atrophy and large areas of encephalomalacia in the patient with history of previous anoxic brain injury.  Chest x-ray showed stable features including stable position of tracheostomy tube.  CT chest with contrast showed small areas of focal airspace disease posterior left lower lobe, possible  pneumonia.  CT of the abdomen and pelvis showed cystitis with right ureteritis with moderate right hydronephrosis.  Diverticulosis noted.  Urine analysis showed too numerous to count WBCs and large RBCs.  Blood cultures and urine cultures were sent from the emergency room.  Patient was given a liter of normal saline bolus with improvement in her hypotension.  She was given 2 g of IV ceftriaxone and 500 mg of IV azithromycin and was subsequently admitted to the medical ICU for further management of left lower lobe pneumonia, urinary tract infection, hypotension and metabolic encephalopathy.    Patient was continued on IV fluids for another day.  She was continued on tube feeds and Zosyn for pneumonia.  She was seen by wound care services.  Palliative care services were consulted to discuss goals of care and CODE STATUS.    Review of Systems:     All systems were reviewed and negative except as mentioned in subjective, assessment and plan.    Vital Signs:    Temp:  [96.4 °F (35.8 °C)-98.9 °F (37.2 °C)] 97.7 °F (36.5 °C)  Heart Rate:  [] 85  Resp:  [18-22] 20  BP: ()/(40-64) 115/56    Intake and output:    I/O last 3 completed shifts:  In: 4615 [I.V.:3734; Other:220; NG/GT:461; IV Piggyback:200]  Out: 2650 [Urine:2650]  I/O this shift:  In: 1457 [I.V.:768; Other:261; NG/GT:328; IV Piggyback:100]  Out: 1150 [Urine:1150]    Physical Examination:    General Appearance:  Alert on sternal rub but drowsy.  Nonverbal.   Head:  Atraumatic and normocephalic.   Eyes: Conjunctivae and sclerae normal, no icterus. No pallor.   Throat: No oral lesions, no thrush, oral mucosa moist.  Trach collar in place.   Neck: Supple, trachea midline, no thyromegaly.   Lungs:   Breath sounds heard bilaterally equally.  Bilateral scattered crackles and wheezing heard.  No Pleural rub or bronchial breathing.  Gurgling sounds in the throat have significantly improved since admission.   Heart:  Normal S1 and S2, no murmur, no gallop, no  rub. No JVD.   Abdomen:   Normal bowel sounds, no masses, no organomegaly. Soft, nontender, obese, no rebound tenderness.  PEG tube noted in the epigastric region.  Sanchez catheter is in place.   Extremities: Supple, no edema, no cyanosis, no clubbing.   Skin: No bleeding or rash.   Neurologic: Alert on sternal rub.  Nonverbal.  Severe upper and lower extremity contractures and bilateral foot drop noted.     Laboratory results:    Results from last 7 days   Lab Units 09/30/24  0657 09/29/24  0622 09/29/24  0047 09/28/24  0944   SODIUM mmol/L 143 142 142 138   POTASSIUM mmol/L 3.5 3.7 3.8 4.6   CHLORIDE mmol/L 111* 108* 108* 99   CO2 mmol/L 21.4* 22.9 24.5 27.2   BUN mg/dL 13 22 25* 47*   CREATININE mg/dL 0.56* 0.60 0.60 0.78   CALCIUM mg/dL 8.5* 8.7 8.6 9.4   BILIRUBIN mg/dL  --   --  0.2 0.3   ALK PHOS U/L  --   --  82 98   ALT (SGPT) U/L  --   --  24 30   AST (SGOT) U/L  --   --  13 16   GLUCOSE mg/dL 149* 122* 130* 116*     Results from last 7 days   Lab Units 09/30/24  0657 09/29/24  0852 09/29/24  0047   WBC 10*3/mm3 6.30 9.67 11.18*   HEMOGLOBIN g/dL 7.5* 7.3* 7.1*   HEMATOCRIT % 23.6* 23.4* 22.9*   PLATELETS 10*3/mm3 270 241 256         Results from last 7 days   Lab Units 09/29/24  0047 09/28/24  1140 09/28/24  0944   HSTROP T ng/L 51* 48* 47*     Results from last 7 days   Lab Units 09/28/24  1152 09/28/24  1140 09/28/24  1051   BLOODCX  No growth at 2 days No growth at 2 days  --    URINECX   --   --  >100,000 CFU/mL Mixed Jolly Isolated     I have reviewed the patient's laboratory results.    Radiology results:    CT Head Without Contrast    Result Date: 9/29/2024  FINAL REPORT TECHNIQUE: null CLINICAL HISTORY: unresponsiveness, low BP COMPARISON: null FINDINGS: CT head without contrast Comparison: CT/SR - CT HEAD WO CONTRAST - 9/28/24 09:31 EDT Findings: Extensive gliosis and encephalomalacia throughout the cerebrum along with central greater than cortical atrophy like changes are similar to the prior  exam. No midline shift or hemorrhage. No intra-axial mass. Enophthalmos. Orbits otherwise unremarkable. Sinuses and mastoids clear. No fracture.     Impression: Impression: 1. No acute findings. If there is concern for an acute infarcts superimposed on extensive chronic disease, recommend MRI. Authenticated and Electronically Signed by Desi Weaver MD on 09/29/2024 02:20:05 AM   I have reviewed the patient's radiology reports.    Medication Review:     I have reviewed the patient's active and prn medications.     Problem List:      Pneumonia of left lower lobe due to infectious organism    Acute UTI    Anoxic brain injury    Sepsis    Assessment:    Acute metabolic encephalopathy secondary to #2 and #3, POA.  Left lower lobe healthcare associated pneumonia, POA.  Acute complicated urinary tract infection, POA.  Right ureteritis/right hydronephrosis, POA.  Sepsis secondary to #2 and #3, POA.  History of anoxic brain injury status post trach and PEG.  Chronic anemia.  Functional quadriplegia.    Plan:    Sepsis/left lower lobe pneumonia/complicated UTI.  - Unfortunately, patient has poor prognosis due to her anoxic brain injury and she is at high risk for recurrent infections especially aspiration pneumonia and urinary tract infections.  - Patient does seem to have right hydronephrosis but her renal function seems to be fairly within normal range.  - She received 2 g of IV ceftriaxone in the emergency room but due to high risk for healthcare associated infections, we will change her antibiotics to Zosyn.  - Nasal swab for MRSA is negative.  Urine culture grew mixed andree.  - Blood and urine cultures have been negative so far.     Hypotension.  - Patient did have transient hypotension and responded to IV fluids well.  - Continue low-dose midodrine at 5 mg 3 times daily.     Right hydronephrosis.  - Likely secondary to urinary tract infection and urethritis.  - We will obtain bilateral renal  ultrasound.    Discussed with nursing staff and multidisciplinary team.  Patient's prognosis is extremely poor due to her advanced anoxic encephalopathy.  Interestingly, patient is still a full code at the nursing home facility.  She would benefit from changing her CODE STATUS to DNR/DNI and we will consult palliative care services.    I have reviewed the copied text and it is accurate as of 09/30/24    DVT Prophylaxis: Enoxaparin  Code Status: Full  Diet: Tube feeds  Discharge Plan: Pending-hopefully back to SNF in the next 2 to 3 days.    Paul Pandey MD  09/30/24  16:55 EDT    Dictated utilizing Dragon dictation.

## 2024-09-30 NOTE — CASE MANAGEMENT/SOCIAL WORK
Case Management/Social Work    Patient Name:  Viji Vargas  YOB: 1960  MRN: [id number]  Admit Date:  9/28/2024        Pt is a LTC resident at [facility]. Pt can return when medically ready. SW following.       Electronically signed by:  SAMINA Valle  09/30/24 09:21 EDT

## 2024-09-30 NOTE — CONSULTS
"Tube Feeding Assessment    Patient Name: Viji Vargas  YOB: 1960  MRN: [id number]  Admission date: 9/28/2024    Comment:    Recommendations:     1. Initiate Isosource 1.5 @ 30 mL/hr. Advance as tolerated 10 mL q 8 hrs to goal of 55 mL/hr.     2. FW at 100 mL q 4 hrs    3. Administer Edilberto BID. Directions:Mix 1 Edilberto packet with 4 oz. of water. Flush feeding tube with 30 mL of water before and after administering Edilberto.     Provides:  1975 kcal, 87 g pro 1605 mL FW    Encounter Information     Trending Narrative 9/30: RD consulted for tube feeding.  Pt has Isosource 1.5 running at 50 mL/hr. RD will adjust current order and add Edilberto for wound healing.    H&P  Past Medical History:   Diagnosis Date    COPD (chronic obstructive pulmonary disease)     Hypertension     Pneumonia     Stroke          Past Surgical History:   Procedure Laterality Date    HYSTERECTOMY      Partial     TRACHEOSTOMY AND PEG TUBE INSERTION N/A 3/20/2019    Procedure: TRACHEOSTOMY AND PERCUTANEOUS ENDOSCOPIC GASTROSTOMY TUBE INSERTION;  Surgeon: Nadira Pandey MD;  Location: [facility];  Service: General        Anthropometrics     Current Height, Weight Height: 162.6 cm (64\")  Weight: 77 kg (169 lb 12.1 oz) (09/29/24 1546)     Trending Weight History Wt Readings from Last 30 Encounters:   09/29/24 1546 77 kg (169 lb 12.1 oz)   09/29/24 0500 75.1 kg (165 lb 9.1 oz)   09/28/24 1817 75.2 kg (165 lb 12.6 oz)   09/28/24 0915 70.3 kg (155 lb)   09/16/24 0228 70.3 kg (155 lb)   07/12/24 0637 70.3 kg (155 lb)   02/18/24 1818 70.3 kg (155 lb)   01/21/24 0917 70.3 kg (155 lb)   01/09/24 0357 70.5 kg (155 lb 6.4 oz)   01/08/24 0313 68 kg (150 lb)   01/06/24 0351 68.5 kg (151 lb)   01/05/24 1509 70.9 kg (156 lb 6.4 oz)   01/04/24 0600 70.9 kg (156 lb 4.9 oz)   01/03/24 0600 70.9 kg (156 lb 4.9 oz)   01/02/24 0600 73.8 kg (162 lb 11.2 oz)   01/01/24 0600 74.7 kg (164 lb 10.9 oz)   12/26/23 0430 69.7 kg (153 lb 10.6 oz)   12/25/23 0600 " 71.9 kg (158 lb 8.2 oz)   12/24/23 1600 79 kg (174 lb 2.6 oz)   12/24/23 0400 70.5 kg (155 lb 6.8 oz)   12/23/23 0500 69.7 kg (153 lb 10.6 oz)   12/22/23 1900 70.3 kg (154 lb 15 oz)   12/22/23 0818 71.2 kg (157 lb)   05/23/23 0326 71.4 kg (157 lb 6.5 oz)   05/08/23 0500 71.4 kg (157 lb 6.5 oz)   05/07/23 0622 69.3 kg (152 lb 12.5 oz)   05/06/23 0552 69 kg (152 lb 1.9 oz)   05/06/23 0136 69 kg (152 lb 1.9 oz)   05/05/23 2207 72.6 kg (160 lb)   04/15/23 1931 72.6 kg (160 lb)   03/07/23 1309 72.6 kg (160 lb)   02/11/23 0152 72.6 kg (160 lb)   01/22/23 0620 69.9 kg (154 lb)   11/22/22 1531 69.9 kg (154 lb)   11/12/22 2146 69.9 kg (154 lb)   10/22/22 0356 68 kg (150 lb)   08/13/22 1109 70.5 kg (155 lb 6.4 oz)   04/10/22 0920 79.4 kg (175 lb)   03/31/22 1117 79.4 kg (175 lb)   03/30/22 1638 79.4 kg (175 lb)   02/22/22 1328 79.4 kg (175 lb 0.7 oz)   01/24/22 0124 79.4 kg (175 lb)   01/12/22 0556 74.3 kg (163 lb 12.8 oz)   01/11/22 0340 73.3 kg (161 lb 9.6 oz)   01/10/22 0935 70.7 kg (155 lb 13.8 oz)   01/10/22 0300 70.6 kg (155 lb 10.3 oz)   01/09/22 2152 70.3 kg (155 lb)   12/30/21 1953 70.3 kg (155 lb)   12/30/21 1147 70.3 kg (155 lb)   10/22/21 0917 70.3 kg (155 lb)   09/29/21 0046 70.3 kg (155 lb)   08/24/21 1437 70.5 kg (155 lb 6 oz)   08/22/21 1642 72.3 kg (159 lb 6.4 oz)   06/22/21 1333 70.1 kg (154 lb 7 oz)       BMI Body mass index is 29.14 kg/m².     Labs     Pertinent Labs Results from last 7 days   Lab Units 09/29/24  0622 09/29/24  0047 09/28/24  0944   SODIUM mmol/L 142 142 138   POTASSIUM mmol/L 3.7 3.8 4.6   CHLORIDE mmol/L 108* 108* 99   CO2 mmol/L 22.9 24.5 27.2   BUN mg/dL 22 25* 47*   CREATININE mg/dL 0.60 0.60 0.78   CALCIUM mg/dL 8.7 8.6 9.4   BILIRUBIN mg/dL  --  0.2 0.3   ALK PHOS U/L  --  82 98   ALT (SGPT) U/L  --  24 30   AST (SGOT) U/L  --  13 16   GLUCOSE mg/dL 122* 130* 116*        Results from last 7 days   Lab Units 09/30/24  0657   HEMOGLOBIN g/dL 7.5*   HEMATOCRIT % 23.6*            Lab  Results   Component Value Date    HGBA1C 5.6 06/03/2024        Medications  Schedule Medications baclofen, 10 mg, Per G Tube, Q12H  budesonide, 0.5 mg, Nebulization, BID - RT  enoxaparin, 40 mg, Subcutaneous, Nightly  famotidine, 20 mg, Per G Tube, Daily  glycopyrrolate, 2 mg, Per G Tube, TID  ipratropium-albuterol, 3 mL, Nebulization, Q6H - RT  midodrine, 5 mg, Per G Tube, TID AC  Multi-Gem, 15 mL, Per G Tube, Daily  piperacillin-tazobactam, 4.5 g, Intravenous, Q8H  saccharomyces boulardii, 250 mg, Per G Tube, BID  sodium chloride, 10 mL, Intravenous, Q12H       Infusions  dextrose 5 % and sodium chloride 0.9 %, 100 mL/hr, Last Rate: 100 mL/hr (09/30/24 0403)  Pharmacy to Dose enoxaparin (LOVENOX),   Pharmacy to Dose Zosyn,         PRN Medications    acetaminophen **OR** acetaminophen **OR** acetaminophen    senna-docusate sodium **AND** polyethylene glycol **AND** bisacodyl **AND** bisacodyl    ondansetron    Pharmacy to Dose enoxaparin (LOVENOX)    Pharmacy to Dose Zosyn    sodium chloride    sodium chloride     Physical Findings        Edema  2+     Bowel Function LBM: 9/29     Tubes PEG     Skin Stage 3 PI coccyx, stage 1 PI R anterior ankle, stage 1 PI L anterior foot       Estimated/Assessed Needs       Energy Requirements    EST Needs, Method, Wt used 7945-9691 kcal (25-30 kcal/kg CBW)       Protein Requirements    EST Needs, Method, Wt used 92 g pro (1.2 g pro/kg CBW)       Fluid Requirements     Estimated Needs (mL/day) 0434-9443     Current Tube Feed Orders & Evaluation      Enteral Nutrition     Enteral Route PEG   Orders, Modulars, Flushes    Residual/Tolerance    Vasopressors    Noninvasive MAP (mmHg) 77   Lactate (mmol/L) .8   Propofol (mL/hr)     Tube Feed Observation Isosource 1.5 running at 50 mL/hr     Nutrition Diagnosis         Nutrition Dx Problem 1 Altered nutrition route r/t dysphagia AEB PEG/need for EN.      Nutrition Dx Problem 2 Increased nutrient needs r/t skin integrity AEB Stage 3 PI  coccyx, stage 1 PI R anterior ankle, stage 1 PI L anterior foot.       Intervention Goal         Intervention Goal(s) Tolerate EN  No significant wt changes     Nutrition Intervention        RD Action Will provide EN recommendations      Enteral Nutrition Prescription     Enteral Prescription Recommendations:     1. Initiate Isosource 1.5 @ 30 mL/hr. Advance as tolerated 10 mL q 8 hrs to goal of 55 mL/hr.     2. FW at 100 mL q 4 hrs    3. Administer Edilberto BID. Directions:Mix 1 Edilberto packet with 4 oz. of water. Flush feeding tube with 30 mL of water before and after administering Edilberto.     Provides:  1975 kcal, 87 g pro 1605 mL FW           Monitor/Evaluation        Monitor Per protocol, I&O, Pertinent labs, EN delivery/tolerance, Weight, Skin status, GI status, Symptoms, POC/GOC, Swallow function, Hemodynamic stability     RD to f/up    Electronically signed by:  Lorena Trevino RD  09/30/24 07:43 EDT

## 2024-09-30 NOTE — PROGRESS NOTES
RT EQUIPMENT DEVICE RELATED - SKIN ASSESSMENT    James Score:  James Score: 8     RT Medical Equipment/Device:     Tracheostomy - Are sutures present:  No    Skin Assessment:      Stoma:  Intact    Device Skin Pressure Protection:  Absorbent pad utilzed/changed    Nurse Notification:  No    Sarika العراقي, CRT

## 2024-09-30 NOTE — NURSING NOTE
Seen for wound consult. Non communicative. Phillip JUAREZ assisted.   Arms, hands, legs and feet contracted. Only opens eyes to physical stimulation.   Left foot pressure injury has resolved. Right lateral ankle has also resolved. Right anterior ankle has slough covering unstageable pressure injury. Orders to leave open to air and paint with povidone/iodine.   Coccyx stage 3 pressure injury. Orders to Clean with wound cleanser, skin barrier to periwound, multidex gel to wound bed, cover with bordered dressing. If change more than 2 times a day due to incontinence just use barrier cream.   Standing orders for care include a turning schedule, barrier cream twice daily and prn after cleansing, float heels off bed with pillows, encourage increase mobility as appropriate and tolerated to reduce pressure, and a nutrition consult for dietary needs. Staff to contact provider and re-consult wound nurse for new skin issues or lack of improvement with current orders. Thank you for the consult. If you have questions or concerns do not hesitate to contact me.

## 2024-09-30 NOTE — CASE MANAGEMENT/SOCIAL WORK
Case Management/Social Work    Patient Name:  Viji Vargas  YOB: 1960  MRN: [id number]  Admit Date:  9/28/2024        08:43 EDT  Met with patient at bedside. Patient plans to return to [facility] at discharge. CM will continue to follow.      Electronically signed by:  Walter Stack RN  09/30/24 08:43 EDT

## 2024-09-30 NOTE — PLAN OF CARE
Problem: Noninvasive Ventilation Acute  Goal: Effective Unassisted Ventilation and Oxygenation  Outcome: Progressing   Goal Outcome Evaluation:            On s/b

## 2024-09-30 NOTE — PLAN OF CARE
Problem: Adult Inpatient Plan of Care  Goal: Plan of Care Review  Outcome: Progressing  Goal: Patient-Specific Goal (Individualized)  Outcome: Progressing  Goal: Absence of Hospital-Acquired Illness or Injury  Outcome: Progressing  Intervention: Identify and Manage Fall Risk  Recent Flowsheet Documentation  Taken 9/30/2024 0000 by Rama Lopez RN  Safety Promotion/Fall Prevention:   activity supervised   assistive device/personal items within reach   clutter free environment maintained   safety round/check completed  Taken 9/29/2024 2200 by Rama Lopez RN  Safety Promotion/Fall Prevention:   activity supervised   assistive device/personal items within reach   clutter free environment maintained   safety round/check completed  Taken 9/29/2024 2020 by Rama Lopez RN  Safety Promotion/Fall Prevention:   activity supervised   assistive device/personal items within reach   clutter free environment maintained   safety round/check completed  Intervention: Prevent Skin Injury  Recent Flowsheet Documentation  Taken 9/30/2024 0000 by Rama Lopez RN  Body Position:   tilted   left  Skin Protection:   adhesive use limited   tubing/devices free from skin contact   incontinence pads utilized  Taken 9/29/2024 2200 by Rama Lopez RN  Body Position:   tilted   right  Taken 9/29/2024 2020 by Rama Lopez RN  Body Position:   turned   left  Skin Protection:   adhesive use limited   tubing/devices free from skin contact   incontinence pads utilized  Intervention: Prevent and Manage VTE (Venous Thromboembolism) Risk  Recent Flowsheet Documentation  Taken 9/30/2024 0000 by Rama Loepz RN  Activity Management: activity encouraged  Taken 9/29/2024 2200 by Rama Lopez RN  Activity Management: activity encouraged  Taken 9/29/2024 2020 by Rama Lopez RN  Activity Management: bedrest  Intervention: Prevent Infection  Recent Flowsheet Documentation  Taken 9/30/2024 0000 by Jessica  Rama JENSEN RN  Infection Prevention: environmental surveillance performed  Taken 9/29/2024 2200 by Rama Lopez RN  Infection Prevention: environmental surveillance performed  Taken 9/29/2024 2020 by Rama Lopez RN  Infection Prevention: environmental surveillance performed  Goal: Optimal Comfort and Wellbeing  Outcome: Progressing  Intervention: Provide Person-Centered Care  Recent Flowsheet Documentation  Taken 9/29/2024 2020 by Rama Lopez RN  Trust Relationship/Rapport:   care explained   emotional support provided  Goal: Readiness for Transition of Care  Outcome: Progressing     Problem: Skin Injury Risk Increased  Goal: Skin Health and Integrity  Outcome: Progressing  Intervention: Optimize Skin Protection  Recent Flowsheet Documentation  Taken 9/30/2024 0000 by Rama Lopez RN  Pressure Reduction Techniques:   frequent weight shift encouraged   heels elevated off bed   positioned off wounds   weight shift assistance provided  Head of Bed (HOB) Positioning: HOB elevated  Pressure Reduction Devices: pressure-redistributing mattress utilized  Skin Protection:   adhesive use limited   tubing/devices free from skin contact   incontinence pads utilized  Taken 9/29/2024 2200 by Rama Lopez RN  Head of Bed (HOB) Positioning: HOB elevated  Taken 9/29/2024 2020 by Rama Lopez RN  Pressure Reduction Techniques:   frequent weight shift encouraged   heels elevated off bed   positioned off wounds   pressure points protected  Head of Bed (HOB) Positioning: HOB at 30-45 degrees  Pressure Reduction Devices: pressure-redistributing mattress utilized  Skin Protection:   adhesive use limited   tubing/devices free from skin contact   incontinence pads utilized     Problem: Fluid Imbalance (Pneumonia)  Goal: Fluid Balance  Outcome: Progressing     Problem: Infection (Pneumonia)  Goal: Resolution of Infection Signs and Symptoms  Outcome: Progressing     Problem: Respiratory Compromise  (Pneumonia)  Goal: Effective Oxygenation and Ventilation  Outcome: Progressing  Intervention: Optimize Oxygenation and Ventilation  Recent Flowsheet Documentation  Taken 9/30/2024 0000 by Rama Lopez RN  Head of Bed (HOB) Positioning: HOB elevated  Taken 9/29/2024 2200 by Rama Lopez RN  Head of Bed (HOB) Positioning: HOB elevated  Taken 9/29/2024 2020 by Rama Lopez RN  Head of Bed (HOB) Positioning: HOB at 30-45 degrees     Problem: UTI (Urinary Tract Infection)  Goal: Improved Infection Symptoms  Outcome: Progressing     Problem: COPD (Chronic Obstructive Pulmonary Disease) Comorbidity  Goal: Maintenance of COPD Symptom Control  Outcome: Progressing  Intervention: Maintain COPD-Symptom Control  Recent Flowsheet Documentation  Taken 9/30/2024 0000 by Rama Lopez RN  Medication Review/Management: medications reviewed  Taken 9/29/2024 2200 by Rama Lopez RN  Medication Review/Management: medications reviewed  Taken 9/29/2024 2020 by Rama Lopez RN  Medication Review/Management: medications reviewed     Problem: Hypertension Comorbidity  Goal: Blood Pressure in Desired Range  Outcome: Progressing  Intervention: Maintain Blood Pressure Management  Recent Flowsheet Documentation  Taken 9/30/2024 0000 by Rama Lopez RN  Medication Review/Management: medications reviewed  Taken 9/29/2024 2200 by Rama Lopez RN  Medication Review/Management: medications reviewed  Taken 9/29/2024 2020 by Rama Lopez RN  Medication Review/Management: medications reviewed     Problem: Noninvasive Ventilation Acute  Goal: Effective Unassisted Ventilation and Oxygenation  Outcome: Progressing     Problem: Fall Injury Risk  Goal: Absence of Fall and Fall-Related Injury  Outcome: Progressing  Intervention: Identify and Manage Contributors  Recent Flowsheet Documentation  Taken 9/30/2024 0000 by Rama Lopez RN  Medication Review/Management: medications reviewed  Taken 9/29/2024  2200 by Rama Lopez RN  Medication Review/Management: medications reviewed  Taken 9/29/2024 2020 by Rama Lopez RN  Medication Review/Management: medications reviewed  Intervention: Promote Injury-Free Environment  Recent Flowsheet Documentation  Taken 9/30/2024 0000 by Rama Lopez RN  Safety Promotion/Fall Prevention:   activity supervised   assistive device/personal items within reach   clutter free environment maintained   safety round/check completed  Taken 9/29/2024 2200 by Rama Lopez RN  Safety Promotion/Fall Prevention:   activity supervised   assistive device/personal items within reach   clutter free environment maintained   safety round/check completed  Taken 9/29/2024 2020 by Rama Lopez RN  Safety Promotion/Fall Prevention:   activity supervised   assistive device/personal items within reach   clutter free environment maintained   safety round/check completed   Goal Outcome Evaluation:         Plan of care reviewed with patient. No significant changes this shift.

## 2024-09-30 NOTE — PLAN OF CARE
Goal Outcome Evaluation:  Plan of Care Reviewed With: patient        Progress: no change  Outcome Evaluation: VSS on trach collar- BP stable. IVF discontinued. Tube feeds at goal rate-pt. tolerating. IV ABX continued. Repositioned in bed every two hours- requires total assistance. Pt. rested well between care. POC discussed with pt. and ongoing.

## 2024-10-01 VITALS
OXYGEN SATURATION: 94 % | WEIGHT: 171.08 LBS | SYSTOLIC BLOOD PRESSURE: 123 MMHG | RESPIRATION RATE: 16 BRPM | BODY MASS INDEX: 29.21 KG/M2 | HEIGHT: 64 IN | DIASTOLIC BLOOD PRESSURE: 63 MMHG | HEART RATE: 81 BPM | TEMPERATURE: 98.6 F

## 2024-10-01 LAB
GLUCOSE BLDC GLUCOMTR-MCNC: 146 MG/DL (ref 70–130)
VRE SPEC QL CULT: NORMAL

## 2024-10-01 PROCEDURE — 94761 N-INVAS EAR/PLS OXIMETRY MLT: CPT

## 2024-10-01 PROCEDURE — 99239 HOSP IP/OBS DSCHRG MGMT >30: CPT | Performed by: INTERNAL MEDICINE

## 2024-10-01 PROCEDURE — 94660 CPAP INITIATION&MGMT: CPT

## 2024-10-01 PROCEDURE — 94664 DEMO&/EVAL PT USE INHALER: CPT

## 2024-10-01 PROCEDURE — 94799 UNLISTED PULMONARY SVC/PX: CPT

## 2024-10-01 PROCEDURE — 82948 REAGENT STRIP/BLOOD GLUCOSE: CPT | Performed by: INTERNAL MEDICINE

## 2024-10-01 PROCEDURE — 25010000002 PIPERACILLIN SOD-TAZOBACTAM PER 1 G: Performed by: INTERNAL MEDICINE

## 2024-10-01 RX ORDER — MIDODRINE HYDROCHLORIDE 5 MG/1
5 TABLET ORAL
Qty: 90 TABLET | Refills: 0 | Status: SHIPPED | OUTPATIENT
Start: 2024-10-01 | End: 2024-10-31

## 2024-10-01 RX ORDER — CEFTRIAXONE 1 G/1
2 INJECTION, POWDER, FOR SOLUTION INTRAMUSCULAR; INTRAVENOUS EVERY 24 HOURS
Qty: 6 G | Refills: 0 | Status: SHIPPED | OUTPATIENT
Start: 2024-10-01 | End: 2024-10-04

## 2024-10-01 RX ORDER — SACCHAROMYCES BOULARDII 250 MG
250 CAPSULE ORAL 2 TIMES DAILY
Qty: 60 CAPSULE | Refills: 0 | Status: SHIPPED | OUTPATIENT
Start: 2024-10-01 | End: 2024-10-31

## 2024-10-01 RX ADMIN — ACETAMINOPHEN 650 MG: 650 SOLUTION ORAL at 09:35

## 2024-10-01 RX ADMIN — IPRATROPIUM BROMIDE AND ALBUTEROL SULFATE 3 ML: .5; 3 SOLUTION RESPIRATORY (INHALATION) at 00:37

## 2024-10-01 RX ADMIN — Medication 300 MG: at 09:35

## 2024-10-01 RX ADMIN — BACLOFEN 10 MG: 10 TABLET ORAL at 09:36

## 2024-10-01 RX ADMIN — BUDESONIDE 0.5 MG: 0.5 INHALANT RESPIRATORY (INHALATION) at 07:06

## 2024-10-01 RX ADMIN — Medication 15 ML: at 09:35

## 2024-10-01 RX ADMIN — IPRATROPIUM BROMIDE AND ALBUTEROL SULFATE 3 ML: .5; 3 SOLUTION RESPIRATORY (INHALATION) at 07:06

## 2024-10-01 RX ADMIN — Medication 10 ML: at 09:36

## 2024-10-01 RX ADMIN — FAMOTIDINE 20 MG: 20 TABLET, FILM COATED ORAL at 09:36

## 2024-10-01 RX ADMIN — Medication 250 MG: at 09:35

## 2024-10-01 RX ADMIN — MIDODRINE HYDROCHLORIDE 5 MG: 5 TABLET ORAL at 10:42

## 2024-10-01 RX ADMIN — MIDODRINE HYDROCHLORIDE 5 MG: 5 TABLET ORAL at 06:10

## 2024-10-01 RX ADMIN — GLYCOPYRROLATE 2 MG: 1 TABLET ORAL at 09:35

## 2024-10-01 RX ADMIN — PIPERACILLIN AND TAZOBACTAM 4.5 G: 4; .5 INJECTION, POWDER, FOR SOLUTION INTRAVENOUS at 01:37

## 2024-10-01 NOTE — CASE MANAGEMENT/SOCIAL WORK
Case Management/Social Work    Patient Name:  Viji Vargas  YOB: 1960  MRN: [id number]  Admit Date:  9/28/2024        Pt able to return to [facility] resident when medically ready. Likely dc back to facility today. SW updated [place].       Electronically signed by:  SAMINA Valle  10/01/24 08:43 EDT

## 2024-10-01 NOTE — CASE MANAGEMENT/SOCIAL WORK
Case Management Discharge Note      Final Note: Return to [facility]    Provided Post Acute Provider List?: N/A  Provided Post Acute Provider Quality & Resource List?: N/A    Selected Continued Care - Admitted Since 9/28/2024       Destination Coordination complete.      Service Provider Selected Services Address Phone Fax Patient Preferred    [facility] [address] [place] [id number] [phone] --              Durable Medical Equipment    No services have been selected for the patient.                Dialysis/Infusion    No services have been selected for the patient.                Home Medical Care    No services have been selected for the patient.                Therapy    No services have been selected for the patient.                Community Resources    No services have been selected for the patient.                Community & DME    No services have been selected for the patient.                    Transportation Services  Ambulance: [facility]    Final Discharge Disposition Code: 04 - intermediate care facility

## 2024-10-01 NOTE — PLAN OF CARE
Problem: Noninvasive Ventilation Acute  Goal: Effective Unassisted Ventilation and Oxygenation  Outcome: Progressing   Goal Outcome Evaluation:                 RT EQUIPMENT DEVICE RELATED - SKIN ASSESSMENT    James Score:  James Score: 10     RT Medical Equipment/Device:     NIV Mask:  Under-the-nose   size:  m    Skin Assessment:      Nose:  Intact    Device Skin Pressure Protection:  Skin-to-device areas padded:  None Required    Nurse Notification:  No    Trice Jackson, RRT

## 2024-10-01 NOTE — PLAN OF CARE
Problem: Adult Inpatient Plan of Care  Goal: Plan of Care Review  Outcome: Progressing  Goal: Patient-Specific Goal (Individualized)  Outcome: Progressing  Goal: Absence of Hospital-Acquired Illness or Injury  Outcome: Progressing  Intervention: Identify and Manage Fall Risk  Recent Flowsheet Documentation  Taken 10/1/2024 0200 by Rama Lopez RN  Safety Promotion/Fall Prevention:   activity supervised   assistive device/personal items within reach   clutter free environment maintained   safety round/check completed  Taken 10/1/2024 0000 by Rama Lopez RN  Safety Promotion/Fall Prevention:   activity supervised   assistive device/personal items within reach   clutter free environment maintained   safety round/check completed  Taken 9/30/2024 2130 by Rama Lopez RN  Safety Promotion/Fall Prevention:   activity supervised   assistive device/personal items within reach   clutter free environment maintained   safety round/check completed  Taken 9/30/2024 2000 by Rama Lopez RN  Safety Promotion/Fall Prevention:   activity supervised   assistive device/personal items within reach   clutter free environment maintained   safety round/check completed  Intervention: Prevent Skin Injury  Recent Flowsheet Documentation  Taken 10/1/2024 0200 by Rama Lopez RN  Body Position: sitting up in bed  Taken 10/1/2024 0000 by Rama Lopez RN  Body Position:   tilted   left  Taken 9/30/2024 2130 by Rama Lopez RN  Body Position: supine, legs elevated  Skin Protection:   adhesive use limited   tubing/devices free from skin contact   incontinence pads utilized  Taken 9/30/2024 2000 by Rama Lopez RN  Body Position:   turned   right  Intervention: Prevent and Manage VTE (Venous Thromboembolism) Risk  Recent Flowsheet Documentation  Taken 10/1/2024 0200 by Rama Lopez RN  Activity Management: activity encouraged  Taken 10/1/2024 0000 by Rama Lopez RN  Activity Management:  activity encouraged  Taken 9/30/2024 2130 by Rama Lopez RN  Activity Management: bedrest  Taken 9/30/2024 2000 by Rama Lopez RN  Activity Management: activity encouraged  Intervention: Prevent Infection  Recent Flowsheet Documentation  Taken 10/1/2024 0200 by Rama Lopez RN  Infection Prevention: environmental surveillance performed  Taken 10/1/2024 0000 by Rama Lopez RN  Infection Prevention: environmental surveillance performed  Taken 9/30/2024 2130 by Rama Lopez RN  Infection Prevention: environmental surveillance performed  Taken 9/30/2024 2000 by Rama Lopez RN  Infection Prevention: environmental surveillance performed  Goal: Optimal Comfort and Wellbeing  Outcome: Progressing  Goal: Readiness for Transition of Care  Outcome: Progressing     Problem: Skin Injury Risk Increased  Goal: Skin Health and Integrity  Outcome: Progressing  Intervention: Optimize Skin Protection  Recent Flowsheet Documentation  Taken 10/1/2024 0200 by Rama Lopez RN  Head of Bed (HOB) Positioning: HOB elevated  Taken 10/1/2024 0000 by Rama Lopez RN  Head of Bed (HOB) Positioning: HOB elevated  Taken 9/30/2024 2130 by Rama Lopez RN  Pressure Reduction Techniques:   frequent weight shift encouraged   heels elevated off bed   positioned off wounds   pressure points protected  Head of Bed (HOB) Positioning: HOB at 30-45 degrees  Pressure Reduction Devices:   pressure-redistributing mattress utilized   foam padding utilized  Skin Protection:   adhesive use limited   tubing/devices free from skin contact   incontinence pads utilized  Taken 9/30/2024 2000 by Rama Lopez RN  Head of Bed (HOB) Positioning: HOB elevated     Problem: Fluid Imbalance (Pneumonia)  Goal: Fluid Balance  Outcome: Progressing     Problem: Infection (Pneumonia)  Goal: Resolution of Infection Signs and Symptoms  Outcome: Progressing     Problem: Respiratory Compromise (Pneumonia)  Goal: Effective  Oxygenation and Ventilation  Outcome: Progressing  Intervention: Optimize Oxygenation and Ventilation  Recent Flowsheet Documentation  Taken 10/1/2024 0200 by Rama Lopez RN  Head of Bed (HOB) Positioning: HOB elevated  Taken 10/1/2024 0000 by Rama Lopez RN  Head of Bed (HOB) Positioning: HOB elevated  Taken 9/30/2024 2130 by Rama Lopez RN  Head of Bed (HOB) Positioning: HOB at 30-45 degrees  Taken 9/30/2024 2000 by Rama Lopez RN  Head of Bed (HOB) Positioning: HOB elevated     Problem: UTI (Urinary Tract Infection)  Goal: Improved Infection Symptoms  Outcome: Progressing     Problem: COPD (Chronic Obstructive Pulmonary Disease) Comorbidity  Goal: Maintenance of COPD Symptom Control  Outcome: Progressing  Intervention: Maintain COPD-Symptom Control  Recent Flowsheet Documentation  Taken 10/1/2024 0200 by Rama Lopez RN  Medication Review/Management: medications reviewed  Taken 10/1/2024 0000 by Rama Lopez RN  Medication Review/Management: medications reviewed  Taken 9/30/2024 2130 by Rama Lopez RN  Medication Review/Management: medications reviewed  Taken 9/30/2024 2000 by Rama Lopez RN  Medication Review/Management: medications reviewed     Problem: Hypertension Comorbidity  Goal: Blood Pressure in Desired Range  Outcome: Progressing  Intervention: Maintain Blood Pressure Management  Recent Flowsheet Documentation  Taken 10/1/2024 0200 by Rama Lopez RN  Medication Review/Management: medications reviewed  Taken 10/1/2024 0000 by Rama Lopez RN  Medication Review/Management: medications reviewed  Taken 9/30/2024 2130 by Rama Lopez RN  Medication Review/Management: medications reviewed  Taken 9/30/2024 2000 by Rama Lopez RN  Medication Review/Management: medications reviewed     Problem: Noninvasive Ventilation Acute  Goal: Effective Unassisted Ventilation and Oxygenation  Outcome: Progressing     Problem: Fall Injury  Risk  Goal: Absence of Fall and Fall-Related Injury  Outcome: Progressing  Intervention: Identify and Manage Contributors  Recent Flowsheet Documentation  Taken 10/1/2024 0200 by Rama Lopez RN  Medication Review/Management: medications reviewed  Taken 10/1/2024 0000 by Rama Lopez RN  Medication Review/Management: medications reviewed  Taken 9/30/2024 2130 by Rama Lopez RN  Medication Review/Management: medications reviewed  Taken 9/30/2024 2000 by Rama Lopez RN  Medication Review/Management: medications reviewed  Intervention: Promote Injury-Free Environment  Recent Flowsheet Documentation  Taken 10/1/2024 0200 by Rama Lopez RN  Safety Promotion/Fall Prevention:   activity supervised   assistive device/personal items within reach   clutter free environment maintained   safety round/check completed  Taken 10/1/2024 0000 by Rama Lopez RN  Safety Promotion/Fall Prevention:   activity supervised   assistive device/personal items within reach   clutter free environment maintained   safety round/check completed  Taken 9/30/2024 2130 by Rama Lopez RN  Safety Promotion/Fall Prevention:   activity supervised   assistive device/personal items within reach   clutter free environment maintained   safety round/check completed  Taken 9/30/2024 2000 by Rama Lopez RN  Safety Promotion/Fall Prevention:   activity supervised   assistive device/personal items within reach   clutter free environment maintained   safety round/check completed   Goal Outcome Evaluation:         Plan of care reviewed with patient. Patient has rested between care tonight. Trach care done as needed. Patient has required suctioning as needed for small amount of thick white secretions. No other significant changes this shift.

## 2024-10-01 NOTE — PLAN OF CARE
Goal Outcome Evaluation: Patient being discharged to [facility] today via EMS

## 2024-10-01 NOTE — NURSING NOTE
Report given to LARRY Carnes at [facility] at this time. All questions answered appropriately.

## 2024-10-01 NOTE — PROGRESS NOTES
"Dietitian Follow-up    Patient Name: Viji Vargas  YOB: 1960  MRN: [id number]  Admission date: 9/28/2024    Comment:    Clinical Nutrition Follow-up   Encounter Information        Trending Narrative     10/1: TF running @ goal rate of 55mL/hr and tolerating well.     9/30: RD consulted for tube feeding.  Pt has Isosource 1.5 running at 50 mL/hr. RD will adjust current order and add Edilberto for wound healing.         Anthropometrics        Current Height, Weight Height: 162.6 cm (64\")  Weight: 77.6 kg (171 lb 1.2 oz) (10/01/24 0523)       Trending Weight Hx     This admission:              PTA:     Wt Readings from Last 30 Encounters:   10/01/24 0523 77.6 kg (171 lb 1.2 oz)   09/30/24 0823 81.3 kg (179 lb 3.7 oz)   09/29/24 1546 77 kg (169 lb 12.1 oz)   09/29/24 0500 75.1 kg (165 lb 9.1 oz)   09/28/24 1817 75.2 kg (165 lb 12.6 oz)   09/28/24 0915 70.3 kg (155 lb)   09/16/24 0228 70.3 kg (155 lb)   07/12/24 0637 70.3 kg (155 lb)   02/18/24 1818 70.3 kg (155 lb)   01/21/24 0917 70.3 kg (155 lb)   01/09/24 0357 70.5 kg (155 lb 6.4 oz)   01/08/24 0313 68 kg (150 lb)   01/06/24 0351 68.5 kg (151 lb)   01/05/24 1509 70.9 kg (156 lb 6.4 oz)   01/04/24 0600 70.9 kg (156 lb 4.9 oz)   01/03/24 0600 70.9 kg (156 lb 4.9 oz)   01/02/24 0600 73.8 kg (162 lb 11.2 oz)   01/01/24 0600 74.7 kg (164 lb 10.9 oz)   12/26/23 0430 69.7 kg (153 lb 10.6 oz)   12/25/23 0600 71.9 kg (158 lb 8.2 oz)   12/24/23 1600 79 kg (174 lb 2.6 oz)   12/24/23 0400 70.5 kg (155 lb 6.8 oz)   12/23/23 0500 69.7 kg (153 lb 10.6 oz)   12/22/23 1900 70.3 kg (154 lb 15 oz)   12/22/23 0818 71.2 kg (157 lb)   05/23/23 0326 71.4 kg (157 lb 6.5 oz)   05/08/23 0500 71.4 kg (157 lb 6.5 oz)   05/07/23 0622 69.3 kg (152 lb 12.5 oz)   05/06/23 0552 69 kg (152 lb 1.9 oz)   05/06/23 0136 69 kg (152 lb 1.9 oz)   05/05/23 2207 72.6 kg (160 lb)   04/15/23 1931 72.6 kg (160 lb)   03/07/23 1309 72.6 kg (160 lb)   02/11/23 0152 72.6 kg (160 lb)   01/22/23 0620 " 69.9 kg (154 lb)   11/22/22 1531 69.9 kg (154 lb)   11/12/22 2146 69.9 kg (154 lb)   10/22/22 0356 68 kg (150 lb)   08/13/22 1109 70.5 kg (155 lb 6.4 oz)   04/10/22 0920 79.4 kg (175 lb)   03/31/22 1117 79.4 kg (175 lb)   03/30/22 1638 79.4 kg (175 lb)   02/22/22 1328 79.4 kg (175 lb 0.7 oz)   01/24/22 0124 79.4 kg (175 lb)   01/12/22 0556 74.3 kg (163 lb 12.8 oz)   01/11/22 0340 73.3 kg (161 lb 9.6 oz)   01/10/22 0935 70.7 kg (155 lb 13.8 oz)   01/10/22 0300 70.6 kg (155 lb 10.3 oz)   01/09/22 2152 70.3 kg (155 lb)   12/30/21 1953 70.3 kg (155 lb)   12/30/21 1147 70.3 kg (155 lb)   10/22/21 0917 70.3 kg (155 lb)   09/29/21 0046 70.3 kg (155 lb)   08/24/21 1437 70.5 kg (155 lb 6 oz)   08/22/21 1642 72.3 kg (159 lb 6.4 oz)   06/22/21 1333 70.1 kg (154 lb 7 oz)      BMI kg/m2 Body mass index is 29.37 kg/m².     Labs        Pertinent Labs Results from last 7 days   Lab Units 09/30/24  0657 09/29/24  0622 09/29/24  0047 09/28/24  0944   SODIUM mmol/L 143 142 142 138   POTASSIUM mmol/L 3.5 3.7 3.8 4.6   CHLORIDE mmol/L 111* 108* 108* 99   CO2 mmol/L 21.4* 22.9 24.5 27.2   BUN mg/dL 13 22 25* 47*   CREATININE mg/dL 0.56* 0.60 0.60 0.78   CALCIUM mg/dL 8.5* 8.7 8.6 9.4   BILIRUBIN mg/dL  --   --  0.2 0.3   ALK PHOS U/L  --   --  82 98   ALT (SGPT) U/L  --   --  24 30   AST (SGOT) U/L  --   --  13 16   GLUCOSE mg/dL 149* 122* 130* 116*     Results from last 7 days   Lab Units 09/30/24  0657   HEMOGLOBIN g/dL 7.5*   HEMATOCRIT % 23.6*         Medications    Scheduled Medications baclofen, 10 mg, Per G Tube, Q12H  budesonide, 0.5 mg, Nebulization, BID - RT  enoxaparin, 40 mg, Subcutaneous, Nightly  famotidine, 20 mg, Per G Tube, Daily  Ferrous Sulfate, 300 mg, Oral, Daily  glycopyrrolate, 2 mg, Per G Tube, TID  ipratropium-albuterol, 3 mL, Nebulization, Q6H - RT  midodrine, 5 mg, Per G Tube, TID AC  Multi-Gem, 15 mL, Per G Tube, Daily  piperacillin-tazobactam, 4.5 g, Intravenous, Q8H  saccharomyces boulardii, 250 mg, Per G  Tube, BID  sodium chloride, 10 mL, Intravenous, Q12H        Infusions Pharmacy to Dose enoxaparin (LOVENOX),   Pharmacy to Dose Zosyn,         PRN Medications   acetaminophen **OR** acetaminophen **OR** acetaminophen    senna-docusate sodium **AND** polyethylene glycol **AND** bisacodyl **AND** bisacodyl    ondansetron    Pharmacy to Dose enoxaparin (LOVENOX)    Pharmacy to Dose Zosyn    sodium chloride    sodium chloride     Physical Findings        Trending Physical   Appearance, NFPE    --  Edema  Edema noted    Bowel Function 9/30   Tubes PEG   Chewing/Swallowing NPO   Skin Stage 3 PI coccyx, stage 1 PI R anterior ankle, stage 1 PI L anterior foot      --  Current Nutrition Orders & Evaluation of Intake       Oral Nutrition     Food Allergies    Current PO Diet NPO Diet NPO Type: Tube Feeding   Supplement    PO Evaluation     Trending % PO Intake NPO     Nutrition Diagnosis         Nutrition Dx Problem 1 Altered nutrition route r/t dysphagia AEB PEG/need for EN.       Nutrition Dx Problem 2 Increased nutrient needs r/t skin integrity AEB Stage 3 PI coccyx, stage 1 PI R anterior ankle, stage 1 PI L anterior foot.        Intervention Goal         Intervention Goal(s) Tolerate EN  No significant wt changes        Nutrition Intervention        RD Action No action at this time      Nutrition Prescription          Diet Prescription    Supplement Prescription    Enteral Nutrition Prescription  Isosource 1.5 @ 30 mL/hr. Advance 10 mL q8hrs to goal of 55 mL/hr.      2. FW at 100 mL q 4 hrs     3. Administer Edilberto BID. Directions:Mix 1 Edilberto packet with 4 oz. of water. Flush feeding tube with 30 mL of water before and after administering Edilberto.      Provides:  1975 kcal, 87 g pro 1605 mL FW   TPN Prescription       Monitor/Evaluation        Monitor Per protocol, Pertinent labs, EN delivery/tolerance, Weight, Skin status, GI status, Symptoms, Hemodynamic stability     RD to follow-up.   Electronically signed by:  Delfin  SUHAS Torres  10/01/24 09:21 EDT

## 2024-10-01 NOTE — DISCHARGE SUMMARY
[facility]   DISCHARGE SUMMARY      Name:  Viji Vargas   Age:  63 y.o.  Sex:  female  :  1960  MRN:  [id number]   Visit Number:  [id number]    Admission Date:  2024  Date of Discharge:  10/1/2024  Primary Care Physician:  Ranjeet Pina MD      Discharge Diagnoses:     Acute metabolic encephalopathy secondary to #2 and #3, POA.  Left lower lobe healthcare associated pneumonia, POA.  Acute complicated urinary tract infection, POA.  Right ureteritis/right hydronephrosis, POA.  Sepsis secondary to #2 and #3, POA.  History of anoxic brain injury status post trach and PEG.  Chronic anemia.  Functional quadriplegia.    Problem List:     Active Hospital Problems    Diagnosis  POA    **Pneumonia of left lower lobe due to infectious organism [J18.9]  Yes    Acute UTI [N39.0]  Yes    Pneumonia, unspecified organism [J18.9]  Yes    Sepsis [A41.9]  Yes    Anoxic brain injury [G93.1]  Yes      Resolved Hospital Problems   No resolved problems to display.     Presenting Problem:    Chief Complaint   Patient presents with    Altered Mental Status      Consults:     Consulting Physician(s)                     None              Procedures Performed:        History of presenting illness/Hospital Course:    Viji Vargas  is a 63-year-old male resident of nursing home facility, with history of anoxic injury status postcardiac arrest in 3/2019, status post trach and PEG, COPD, hypertension was brought to the emergency room by EMS with altered mental status.  Per report, nursing noticed the patient to be altered from her normal baseline of unknown duration.  Unfortunately patient is nonverbal.  Patient's last admission was to  in 2024 where she was treated for UTI and pneumonia.     In the emergency room, patient's initial temperature was 96.9, pulse 97, blood pressure 140/80 but subsequently she dropped to 81/44.  Patient's initial pulse oxygen saturation was 100% on trach collar  at 4 L.  Troponin 47.  CMP was unremarkable except for a BUN of 47 and albumin of 2.9.  White count 17, hemoglobin 8.3.  Procalcitonin was elevated at 0.45.  Lactic acid was 0.4.  COVID and flu test were negative.  Noncontrast CT of the head showed interval development of moderately severe atrophy and large areas of encephalomalacia in the patient with history of previous anoxic brain injury.  Chest x-ray showed stable features including stable position of tracheostomy tube.  CT chest with contrast showed small areas of focal airspace disease posterior left lower lobe, possible pneumonia.  CT of the abdomen and pelvis showed cystitis with right ureteritis with moderate right hydronephrosis.  Diverticulosis noted.  Urine analysis showed too numerous to count WBCs and large RBCs.  Blood cultures and urine cultures were sent from the emergency room.  Patient was given a liter of normal saline bolus with improvement in her hypotension.  She was given 2 g of IV ceftriaxone and 500 mg of IV azithromycin and was subsequently admitted to the medical ICU for further management of left lower lobe pneumonia, urinary tract infection, hypotension and metabolic encephalopathy.     Patient was continued on IV fluids for another day. Patient stable on RA. Returned to baseline functioning. Discharged back to LTC facility to complete antibiotic course. Follow up with outpatient primary care.     Vital Signs:    Temp:  [96.4 °F (35.8 °C)-98.6 °F (37 °C)] 98.6 °F (37 °C)  Heart Rate:  [76-97] 85  Resp:  [16-20] 16  BP: (111-134)/(46-63) 125/63    Physical Exam:    General Appearance:  Alert on sternal rub but drowsy.  Nonverbal.   Head:  Atraumatic and normocephalic.   Eyes: Conjunctivae and sclerae normal, no icterus. No pallor.   Throat: No oral lesions, no thrush, oral mucosa moist.  Trach collar in place.   Neck: Supple, trachea midline, no thyromegaly.   Lungs:   Breath sounds heard bilaterally equally.  Bilateral scattered  crackles and wheezing heard.  No Pleural rub or bronchial breathing.  Gurgling sounds in the throat have significantly improved since admission.   Heart:  Normal S1 and S2, no murmur, no gallop, no rub. No JVD.   Abdomen:   Normal bowel sounds, no masses, no organomegaly. Soft, nontender, obese, no rebound tenderness.  PEG tube noted in the epigastric region.  Sanchez catheter is in place.   Extremities: Supple, no edema, no cyanosis, no clubbing.   Skin: No bleeding or rash.   Neurologic: Alert on sternal rub.  Nonverbal.  Severe upper and lower extremity contractures and bilateral foot drop noted.     Pertinent Lab Results:     Results from last 7 days   Lab Units 09/30/24  0657 09/29/24  0622 09/29/24  0047 09/28/24  0944   SODIUM mmol/L 143 142 142 138   POTASSIUM mmol/L 3.5 3.7 3.8 4.6   CHLORIDE mmol/L 111* 108* 108* 99   CO2 mmol/L 21.4* 22.9 24.5 27.2   BUN mg/dL 13 22 25* 47*   CREATININE mg/dL 0.56* 0.60 0.60 0.78   CALCIUM mg/dL 8.5* 8.7 8.6 9.4   BILIRUBIN mg/dL  --   --  0.2 0.3   ALK PHOS U/L  --   --  82 98   ALT (SGPT) U/L  --   --  24 30   AST (SGOT) U/L  --   --  13 16   GLUCOSE mg/dL 149* 122* 130* 116*     Results from last 7 days   Lab Units 09/30/24  0657 09/29/24  0852 09/29/24  0047   WBC 10*3/mm3 6.30 9.67 11.18*   HEMOGLOBIN g/dL 7.5* 7.3* 7.1*   HEMATOCRIT % 23.6* 23.4* 22.9*   PLATELETS 10*3/mm3 270 241 256         Results from last 7 days   Lab Units 09/29/24  0047 09/28/24  1140 09/28/24  0944   HSTROP T ng/L 51* 48* 47*                     Results from last 7 days   Lab Units 09/28/24  1152 09/28/24  1140 09/28/24  1051   BLOODCX  No growth at 2 days No growth at 2 days  --    URINECX   --   --  >100,000 CFU/mL Mixed Jolly Isolated       Pertinent Radiology Results:    Imaging Results (All)       Procedure Component Value Units Date/Time    CT Head Without Contrast [[id number]] Collected: 09/29/24 0220     Updated: 09/29/24 0221    Narrative:      FINAL  REPORT    TECHNIQUE:  null    CLINICAL HISTORY:  unresponsiveness, low BP    COMPARISON:  null    FINDINGS:  CT head without contrast    Comparison: CT/SR - CT HEAD WO CONTRAST - 9/28/24 09:31 EDT    Findings:    Extensive gliosis and encephalomalacia throughout the cerebrum along with central greater than cortical atrophy like changes are similar to the prior exam.    No midline shift or hemorrhage.    No intra-axial mass.    Enophthalmos. Orbits otherwise unremarkable.    Sinuses and mastoids clear.    No fracture.      Impression:      Impression:    1. No acute findings. If there is concern for an acute infarcts superimposed on extensive chronic disease, recommend MRI.    Authenticated and Electronically Signed by Desi Weaver MD  on 09/29/2024 02:20:05 AM    CT Chest With Contrast Diagnostic [[id number]] Collected: 09/28/24 1231     Updated: 09/28/24 1243    Narrative:      PROCEDURE: CT CHEST W CONTRAST DIAGNOSTIC-, CT ABDOMEN PELVIS W  CONTRAST-     HISTORY: Leukocytosis, AMS     COMPARISON: CT chest without December 28, 2023 and CT abdomen and pelvis  February 11, 2023 without contrast.     PROCEDURE: The patient was injected with IV contrast. Axial images were  obtained from the lung apex to the pubic symphysis by computed  tomography. This study was performed with techniques to keep radiation  doses as low as reasonably achievable, (ALARA). Individualized dose  reduction techniques using automated exposure control or adjustment of  mA and/or kV according to the patient size were employed.     FINDINGS:     CHEST: There is asymmetric enlargement of the left lobe of the thyroid  with a hypodense nodule, question interval enlargement; recommend  nonemergent thyroid ultrasound. Tracheostomy tube again noted and  appears in similar position to the prior exam. Vascular calcifications  noted. There is no axillary adenopathy. There is no hilar or mediastinal  adenopathy.  The heart size is normal. There is no  pericardial or  pleural effusion. There are predominantly linear densities in the lower  lobes, left greater than right. Question a small amount of airspace  disease posterior left lower lobe, pneumonia not excluded.     ABDOMEN: The liver is homogenous with no focal abnormality. Gallbladder  present with no CT visible stones. The spleen is unremarkable. No  adrenal mass is present.  The pancreas is unremarkable. The kidneys  demonstrate bilateral circumscribed hypodense lesions consistent with  cysts, similar to prior exam. There is moderate right hydronephrosis  with mild enhancement of the right ureteral wall down to the bladder  suggesting ureteritis likely secondary to cystitis. There is very mild  dilatation of the left renal collecting system but no enhancement of the  ureteral wall. IV contrast given but no definite obstructing stone seen.  The aorta is normal in caliber. There is no free fluid or adenopathy.     PELVIS: The GI tract demonstrates stable position of gastrostomy tube.  There is a small umbilical hernia containing fat and a knuckle of  transverse colon that is nonobstructed. There is diverticulosis without  evidence of diverticulitis. The appendix is not identified. The urinary  bladder is completely collapsed with a Sanchez catheter. There is moderate  wall thickening and a few bubbles of air in the bladder. There is also  infiltration of the surrounding fat suggesting cystitis. There is no  free fluid, adenopathy, or inflammatory process.       Impression:      Small area of focal airspace disease posterior left lower  lobe, possible pneumonia.     Cystitis and right ureteritis with moderate right hydronephrosis.     Diverticulosis        CTDI: 20.97 mGy  DLP:1329.48 mGy.cm     This report was signed and finalized on 9/28/2024 12:41 PM by Briana Erickson MD.       CT Abdomen Pelvis With Contrast [[id number]] Collected: 09/28/24 1231     Updated: 09/28/24 1243    Narrative:      PROCEDURE: CT CHEST  W CONTRAST DIAGNOSTIC-, CT ABDOMEN PELVIS W  CONTRAST-     HISTORY: Leukocytosis, AMS     COMPARISON: CT chest without December 28, 2023 and CT abdomen and pelvis  February 11, 2023 without contrast.     PROCEDURE: The patient was injected with IV contrast. Axial images were  obtained from the lung apex to the pubic symphysis by computed  tomography. This study was performed with techniques to keep radiation  doses as low as reasonably achievable, (ALARA). Individualized dose  reduction techniques using automated exposure control or adjustment of  mA and/or kV according to the patient size were employed.     FINDINGS:     CHEST: There is asymmetric enlargement of the left lobe of the thyroid  with a hypodense nodule, question interval enlargement; recommend  nonemergent thyroid ultrasound. Tracheostomy tube again noted and  appears in similar position to the prior exam. Vascular calcifications  noted. There is no axillary adenopathy. There is no hilar or mediastinal  adenopathy.  The heart size is normal. There is no pericardial or  pleural effusion. There are predominantly linear densities in the lower  lobes, left greater than right. Question a small amount of airspace  disease posterior left lower lobe, pneumonia not excluded.     ABDOMEN: The liver is homogenous with no focal abnormality. Gallbladder  present with no CT visible stones. The spleen is unremarkable. No  adrenal mass is present.  The pancreas is unremarkable. The kidneys  demonstrate bilateral circumscribed hypodense lesions consistent with  cysts, similar to prior exam. There is moderate right hydronephrosis  with mild enhancement of the right ureteral wall down to the bladder  suggesting ureteritis likely secondary to cystitis. There is very mild  dilatation of the left renal collecting system but no enhancement of the  ureteral wall. IV contrast given but no definite obstructing stone seen.  The aorta is normal in caliber. There is no free fluid  or adenopathy.     PELVIS: The GI tract demonstrates stable position of gastrostomy tube.  There is a small umbilical hernia containing fat and a knuckle of  transverse colon that is nonobstructed. There is diverticulosis without  evidence of diverticulitis. The appendix is not identified. The urinary  bladder is completely collapsed with a Sanchez catheter. There is moderate  wall thickening and a few bubbles of air in the bladder. There is also  infiltration of the surrounding fat suggesting cystitis. There is no  free fluid, adenopathy, or inflammatory process.       Impression:      Small area of focal airspace disease posterior left lower  lobe, possible pneumonia.     Cystitis and right ureteritis with moderate right hydronephrosis.     Diverticulosis        CTDI: 20.97 mGy  DLP:1329.48 mGy.cm     This report was signed and finalized on 9/28/2024 12:41 PM by Briana Erickson MD.       XR Chest 1 View [[id number]] Collected: 09/28/24 0949     Updated: 09/28/24 0952    Narrative:      PROCEDURE: XR CHEST 1 VW-     HISTORY: Altered mental status     COMPARISON: September 16, 2024..     FINDINGS: The heart is normal in size. Tracheostomy appears to be in  stable position from prior. There is stable linear atelectasis or scar  compared to prior exam. No new area of consolidation is seen.. The  mediastinum is unremarkable. There is no pneumothorax.  There are no  acute osseous abnormalities.       Impression:      Stable chest including stable position of tracheostomy  tube..                 This report was signed and finalized on 9/28/2024 9:50 AM by Briana Erickson MD.       CT Head Without Contrast [[id number]] Collected: 09/28/24 0943     Updated: 09/28/24 0950    Narrative:      PROCEDURE: CT HEAD WO CONTRAST-     HISTORY: Altered mental status, history of anoxic brain injury in March 2019.     COMPARISON: March 10, 2019..     TECHNIQUE: Multiple axial CT images were performed from the foramen  magnum to the vertex.  Individualized dose reduction techniques using  automated exposure control or adjustment of mA and/or kV according to  the patient size were employed.     FINDINGS: There is moderately severe generalized cerebral atrophy which  is pronounced for age and has developed since the prior exam.. The  ventricles are enlarged. There is previous study suggested cerebral  edema. There our large confluent areas of encephalomalacia with sparing  of the cortex bilaterally as well as the basal ganglia midbrain, yamilet  and medial cerebellum. Findings are consistent with previous history of  anoxic brain injury. No masses are identified. No extra-axial fluid is  seen. The paranasal sinuses are unremarkable.       Impression:      Interval development of moderately severe atrophy and large  areas of encephalomalacia involving the cerebrum with sparing of the  basal ganglia, midbrain, yamilet and medial cerebellum in this patient with  a history of previous anoxic brain injury.              CTDI: 42.37 mGy  DLP:712.87 mGy.cm     This report was signed and finalized on 9/28/2024 9:47 AM by Briana Erickson MD.               Echo:    Results for orders placed during the hospital encounter of 03/10/19    Transthoracic Echo Complete With Contrast if Necessary Per Protocol    Interpretation Summary  Technically difficult study  1) Borderline LV size with mild reduction in LV systolic function ( EF is 45 to 50%)  2) Mild to moderate left atrial enlargement  3) Trace MR and TR with normal PA pressures  4) Borderline RV size with normal function  5) Global mild hypokinesis of the LV  6) Moderate calcific plaque along ascending aorta    Condition on Discharge:      Stable.    Code status during the hospital stay:    Code Status and Medical Interventions: CPR (Attempt to Resuscitate); Full Support   Ordered at: 09/28/24 1544     Code Status (Patient has no pulse and is not breathing):    CPR (Attempt to Resuscitate)     Medical Interventions (Patient  has pulse or is breathing):    Full Support     Discharge Disposition:    Long Term Care (DC - External)    Discharge Medications:       Discharge Medications        New Medications        Instructions Start Date   cefTRIAXone 1 g injection  Commonly known as: ROCEPHIN   2 g, Intramuscular, Every 24 Hours      midodrine 5 MG tablet  Commonly known as: PROAMATINE   5 mg, Per G Tube, 3 Times Daily Before Meals      saccharomyces boulardii 250 MG capsule  Commonly known as: FLORASTOR   250 mg, Per G Tube, 2 Times Daily             Continue These Medications        Instructions Start Date   baclofen 10 MG tablet  Commonly known as: LIORESAL   10 mg, Per G Tube, Every 8 Hours      budesonide 0.5 MG/2ML nebulizer solution  Commonly known as: PULMICORT   0.5 mg, Nebulization, 2 Times Daily - RT      cetirizine 10 MG tablet  Commonly known as: zyrTEC   10 mg, Per G Tube, Daily      docusate sodium 50 mg/5 mL liquid  Commonly known as: COLACE   300 mg, Per G Tube, 2 Times Daily      Eucerin cream   1 Application, Topical, As Needed, Apply to bilateral arms as needed for dry skin      glycopyrrolate 2 MG tablet  Commonly known as: ROBINUL   2 mg, Per G Tube, 4 Times Daily      hyoscyamine 0.125 MG tablet  Commonly known as: ANASPAZ,LEVSIN   0.125 mg, Per G Tube, Every 4 Hours      ipratropium-albuterol 0.5-2.5 mg/3 ml nebulizer  Commonly known as: DUO-NEB   3 mL, Nebulization, 4 Times Daily - RT, Takes at [id number]      MiraLax 17 g packet  Generic drug: polyethylene glycol   17 g, 2 Times Daily      multivitamin with iron-minerals (CENTRUM) liquid   15 mL, Per G Tube, Daily      mupirocin 2 % ointment  Commonly known as: BACTROBAN   1 Application, Topical, 2 Times Daily, Apply to G-tube site twice daily      Omeprazole 2 MG/ML suspension  Commonly known as: PRILOSEC   10 mL, Per G Tube, 2 Times Daily      Scopolamine 1 MG/3DAYS patch   1 patch, Transdermal, Every 72 Hours      senna 8.6 MG tablet  Commonly known  as: SENOKOT   2 tablets, Per PEG Tube, 2 Times Daily      torsemide 20 MG tablet  Commonly known as: DEMADEX   10 mg, Oral, Daily             Stop These Medications      carvedilol 12.5 MG tablet  Commonly known as: COREG     FIRST-MOUTHWASH BLM MT     ProSource No Carb liquid            Discharge Diet:     Diet Instructions       Diet: Tube Feeding; Continuous; Jevity 1.5 Goal Rate of 55mL/hr.  Water flush 100mL every 4 hours      Discharge Diet: Tube Feeding    Feeding Type: Continuous    Formula & Rate: Jevity 1.5 Goal Rate of 55mL/hr.  Water flush 100mL every 4 hours          Activity at Discharge:     Activity Instructions       Activity as Tolerated            Follow-up Appointments:    Additional Instructions for the Follow-ups that You Need to Schedule       Discharge Follow-up with PCP   As directed       Currently Documented PCP:    Ranjeet Pina MD    PCP Phone Number:    [phone]     Follow Up Details: 1 week               Contact information for follow-up providers       Ranjeet Pina MD .    Specialty: Family Medicine  Why: 1 week  Contact information:  [address] [id number] [phone]                       Contact information for after-discharge care       Destination       [facility] .    Service: Nursing Home  Contact information:  [address] [facility] [address] [phone]                                 No future appointments.  Test Results Pending at Discharge:    Pending Labs       Order Current Status    Acinetobacter Screen - Swab, Axilla, Left In process    Blood Culture - Blood, Chest, Left Preliminary result    Blood Culture - Blood, Hand, Digit Right Preliminary result    VRE Culture - Swab, Per Rectum Preliminary result               Walter Barragan DO  10/01/24  09:33 EDT    Time: I spent >30 minutes on this discharge activity which included: face-to-face encounter with the patient, reviewing the data in the  system, coordination of the care with the nursing staff as well as consultants, documentation, and entering orders.     Dictated utilizing Dragon dictation.

## 2024-10-01 NOTE — SIGNIFICANT NOTE
Palliative care consulted to further review GOC.  Unfortunately PC unable to reach [facility] for GOC discussions. Patient is slated for discharge back to LTC facility.  Will discontinue consult.     21-Jun-2023 18:38

## 2024-10-02 LAB — ACINETOBACTER SCREEN CX: NORMAL

## 2024-10-03 LAB
BACTERIA SPEC AEROBE CULT: NORMAL
BACTERIA SPEC AEROBE CULT: NORMAL

## 2025-02-12 ENCOUNTER — HOSPITAL ENCOUNTER (EMERGENCY)
Facility: HOSPITAL | Age: 65
Discharge: HOME | End: 2025-02-13
Attending: STUDENT IN AN ORGANIZED HEALTH CARE EDUCATION/TRAINING PROGRAM | Admitting: STUDENT IN AN ORGANIZED HEALTH CARE EDUCATION/TRAINING PROGRAM
Payer: MEDICARE

## 2025-02-12 VITALS
OXYGEN SATURATION: 98 % | BODY MASS INDEX: 29.21 KG/M2 | TEMPERATURE: 98.5 F | RESPIRATION RATE: 22 BRPM | HEART RATE: 102 BPM | SYSTOLIC BLOOD PRESSURE: 129 MMHG | WEIGHT: 171.08 LBS | DIASTOLIC BLOOD PRESSURE: 81 MMHG | HEIGHT: 64 IN

## 2025-02-12 DIAGNOSIS — D53.9 ANEMIA ASSOCIATED WITH NUTRITIONAL DEFICIENCY: Primary | ICD-10-CM

## 2025-02-12 LAB
ABO GROUP BLD: NORMAL
ALBUMIN SERPL-MCNC: 3.3 G/DL (ref 3.5–5.2)
ALBUMIN/GLOB SERPL: 0.8 G/DL
ALP SERPL-CCNC: 83 U/L (ref 39–117)
ALT SERPL W P-5'-P-CCNC: 18 U/L (ref 1–33)
ANION GAP SERPL CALCULATED.3IONS-SCNC: 12.4 MMOL/L (ref 5–15)
AST SERPL-CCNC: 18 U/L (ref 1–32)
BASOPHILS # BLD AUTO: 0.03 10*3/MM3 (ref 0–0.2)
BASOPHILS NFR BLD AUTO: 0.3 % (ref 0–1.5)
BILIRUB SERPL-MCNC: <0.2 MG/DL (ref 0–1.2)
BLD GP AB SCN SERPL QL: NEGATIVE
BUN SERPL-MCNC: 13 MG/DL (ref 8–23)
BUN/CREAT SERPL: 29.5 (ref 7–25)
CALCIUM SPEC-SCNC: 8.8 MG/DL (ref 8.6–10.5)
CHLORIDE SERPL-SCNC: 101 MMOL/L (ref 98–107)
CO2 SERPL-SCNC: 27.6 MMOL/L (ref 22–29)
CREAT SERPL-MCNC: 0.44 MG/DL (ref 0.57–1)
DEPRECATED RDW RBC AUTO: 49.4 FL (ref 37–54)
EGFRCR SERPLBLD CKD-EPI 2021: 106.8 ML/MIN/1.73
EOSINOPHIL # BLD AUTO: 0.53 10*3/MM3 (ref 0–0.4)
EOSINOPHIL NFR BLD AUTO: 5.8 % (ref 0.3–6.2)
ERYTHROCYTE [DISTWIDTH] IN BLOOD BY AUTOMATED COUNT: 15.9 % (ref 12.3–15.4)
GLOBULIN UR ELPH-MCNC: 4 GM/DL
GLUCOSE SERPL-MCNC: 96 MG/DL (ref 65–99)
HCT VFR BLD AUTO: 24.2 % (ref 34–46.6)
HGB BLD-MCNC: 7.3 G/DL (ref 12–15.9)
IMM GRANULOCYTES # BLD AUTO: 0.04 10*3/MM3 (ref 0–0.05)
IMM GRANULOCYTES NFR BLD AUTO: 0.4 % (ref 0–0.5)
IRON 24H UR-MRATE: 13 MCG/DL (ref 37–145)
IRON SATN MFR SERPL: 5 % (ref 20–50)
LYMPHOCYTES # BLD AUTO: 1.68 10*3/MM3 (ref 0.7–3.1)
LYMPHOCYTES NFR BLD AUTO: 18.4 % (ref 19.6–45.3)
MAGNESIUM SERPL-MCNC: 2.5 MG/DL (ref 1.6–2.4)
MCH RBC QN AUTO: 25.4 PG (ref 26.6–33)
MCHC RBC AUTO-ENTMCNC: 30.2 G/DL (ref 31.5–35.7)
MCV RBC AUTO: 84.3 FL (ref 79–97)
MONOCYTES # BLD AUTO: 0.66 10*3/MM3 (ref 0.1–0.9)
MONOCYTES NFR BLD AUTO: 7.2 % (ref 5–12)
NEUTROPHILS NFR BLD AUTO: 6.17 10*3/MM3 (ref 1.7–7)
NEUTROPHILS NFR BLD AUTO: 67.9 % (ref 42.7–76)
NRBC BLD AUTO-RTO: 0 /100 WBC (ref 0–0.2)
PLATELET # BLD AUTO: 412 10*3/MM3 (ref 140–450)
PMV BLD AUTO: 11.5 FL (ref 6–12)
POTASSIUM SERPL-SCNC: 4.2 MMOL/L (ref 3.5–5.2)
PROT SERPL-MCNC: 7.3 G/DL (ref 6–8.5)
RBC # BLD AUTO: 2.87 10*6/MM3 (ref 3.77–5.28)
RH BLD: POSITIVE
SODIUM SERPL-SCNC: 141 MMOL/L (ref 136–145)
T&S EXPIRATION DATE: NORMAL
TIBC SERPL-MCNC: 265 MCG/DL (ref 298–536)
TRANSFERRIN SERPL-MCNC: 178 MG/DL (ref 200–360)
WBC NRBC COR # BLD AUTO: 9.11 10*3/MM3 (ref 3.4–10.8)

## 2025-02-12 PROCEDURE — 82550 ASSAY OF CK (CPK): CPT | Performed by: INTERNAL MEDICINE

## 2025-02-12 PROCEDURE — 86900 BLOOD TYPING SEROLOGIC ABO: CPT

## 2025-02-12 PROCEDURE — 86850 RBC ANTIBODY SCREEN: CPT | Performed by: STUDENT IN AN ORGANIZED HEALTH CARE EDUCATION/TRAINING PROGRAM

## 2025-02-12 PROCEDURE — 80053 COMPREHEN METABOLIC PANEL: CPT | Performed by: STUDENT IN AN ORGANIZED HEALTH CARE EDUCATION/TRAINING PROGRAM

## 2025-02-12 PROCEDURE — 84466 ASSAY OF TRANSFERRIN: CPT | Performed by: STUDENT IN AN ORGANIZED HEALTH CARE EDUCATION/TRAINING PROGRAM

## 2025-02-12 PROCEDURE — 93005 ELECTROCARDIOGRAM TRACING: CPT | Performed by: STUDENT IN AN ORGANIZED HEALTH CARE EDUCATION/TRAINING PROGRAM

## 2025-02-12 PROCEDURE — P9016 RBC LEUKOCYTES REDUCED: HCPCS

## 2025-02-12 PROCEDURE — 36430 TRANSFUSION BLD/BLD COMPNT: CPT

## 2025-02-12 PROCEDURE — 99283 EMERGENCY DEPT VISIT LOW MDM: CPT | Performed by: STUDENT IN AN ORGANIZED HEALTH CARE EDUCATION/TRAINING PROGRAM

## 2025-02-12 PROCEDURE — 86920 COMPATIBILITY TEST SPIN: CPT

## 2025-02-12 PROCEDURE — 83540 ASSAY OF IRON: CPT | Performed by: STUDENT IN AN ORGANIZED HEALTH CARE EDUCATION/TRAINING PROGRAM

## 2025-02-12 PROCEDURE — 86900 BLOOD TYPING SEROLOGIC ABO: CPT | Performed by: STUDENT IN AN ORGANIZED HEALTH CARE EDUCATION/TRAINING PROGRAM

## 2025-02-12 PROCEDURE — 86140 C-REACTIVE PROTEIN: CPT | Performed by: INTERNAL MEDICINE

## 2025-02-12 PROCEDURE — 85025 COMPLETE CBC W/AUTO DIFF WBC: CPT | Performed by: STUDENT IN AN ORGANIZED HEALTH CARE EDUCATION/TRAINING PROGRAM

## 2025-02-12 PROCEDURE — 83735 ASSAY OF MAGNESIUM: CPT | Performed by: STUDENT IN AN ORGANIZED HEALTH CARE EDUCATION/TRAINING PROGRAM

## 2025-02-12 PROCEDURE — 86901 BLOOD TYPING SEROLOGIC RH(D): CPT | Performed by: STUDENT IN AN ORGANIZED HEALTH CARE EDUCATION/TRAINING PROGRAM

## 2025-02-12 RX ORDER — SACCHAROMYCES BOULARDII 250 MG
250 CAPSULE ORAL 2 TIMES DAILY
COMMUNITY

## 2025-02-12 RX ORDER — PIPERACILLIN SODIUM, TAZOBACTAM SODIUM 3; .375 G/15ML; G/15ML
3.38 INJECTION, POWDER, LYOPHILIZED, FOR SOLUTION INTRAVENOUS EVERY 6 HOURS
COMMUNITY
Start: 2025-01-25 | End: 2025-02-28

## 2025-02-12 RX ORDER — MODIFIED LANOLIN
1 OINTMENT (GRAM) TOPICAL
COMMUNITY

## 2025-02-12 RX ORDER — SODIUM HYPOCHLORITE 1.25 MG/ML
SOLUTION TOPICAL
COMMUNITY

## 2025-02-12 RX ORDER — DAPTOMYCIN 50 MG/ML
550 INJECTION, POWDER, LYOPHILIZED, FOR SOLUTION INTRAVENOUS
COMMUNITY
Start: 2025-01-25 | End: 2025-02-28

## 2025-02-12 NOTE — ED PROVIDER NOTES
"Subjective:  History of Present Illness:    Patient is a 64-year-old female with history of hypertension, COPD, CVA who presents today with concerns for anemia.  Routine labs are all showing anemia today, has had steady decline over the last month.  No melanotic stools or blood loss per staff.  Patient is nonverbal secondary to anoxic brain injury.  Further history unknown be obtained secondary to this.  No respiratory distress.      Nurses Notes reviewed and agree, including vitals, allergies, social history and prior medical history.     REVIEW OF SYSTEMS: All systems reviewed and not pertinent unless noted.  Review of Systems   Unable to perform ROS: Patient nonverbal       Past Medical History:   Diagnosis Date    Abdominal distention 2024    Acute and chronic respiratory failure with hypoxia 2024    Adult hypertrophic pyloric stenosis 2024    COPD (chronic obstructive pulmonary disease)     Hypertension     Osteomyelitis     Pneumonia     Stroke        Allergies:    Patient has no known allergies.      Past Surgical History:   Procedure Laterality Date    HYSTERECTOMY      Partial     TRACHEOSTOMY AND PEG TUBE INSERTION N/A 3/20/2019    Procedure: TRACHEOSTOMY AND PERCUTANEOUS ENDOSCOPIC GASTROSTOMY TUBE INSERTION;  Surgeon: Nadira Pandey MD;  Location: [facility];  Service: General         Social History     Socioeconomic History    Marital status: Legally    Tobacco Use    Smoking status: Former     Current packs/day: 1.00     Types: Electronic Cigarette, Cigarettes    Smokeless tobacco: Never   Vaping Use    Vaping status: Unknown   Substance and Sexual Activity    Alcohol use: Not Currently     Comment: wine     Drug use: No    Sexual activity: Defer         History reviewed. No pertinent family history.    Objective  Physical Exam:  /81   Pulse 102   Temp 98.5 °F (36.9 °C) (Axillary)   Resp 22   Ht 162.6 cm (64\")   Wt 77.6 kg (171 lb 1.2 oz)   SpO2 98%   BMI 29.37 kg/m²  "     Physical Exam  Constitutional:       General: She is not in acute distress.     Appearance: She is obese. She is ill-appearing. She is not toxic-appearing.   HENT:      Head: Normocephalic and atraumatic.      Nose: Nose normal. No congestion or rhinorrhea.      Mouth/Throat:      Mouth: Mucous membranes are dry.      Pharynx: Oropharynx is clear. No oropharyngeal exudate or posterior oropharyngeal erythema.      Comments: Trach in place, clear  Eyes:      Extraocular Movements: Extraocular movements intact.      Conjunctiva/sclera: Conjunctivae normal.      Pupils: Pupils are equal, round, and reactive to light.   Cardiovascular:      Rate and Rhythm: Normal rate and regular rhythm.      Pulses: Normal pulses.      Heart sounds: Normal heart sounds.   Pulmonary:      Effort: Pulmonary effort is normal. No respiratory distress.      Breath sounds: Normal breath sounds.   Abdominal:      General: Abdomen is flat. Bowel sounds are normal. There is no distension.      Palpations: Abdomen is soft.      Tenderness: There is no abdominal tenderness. There is no guarding or rebound.   Musculoskeletal:         General: No swelling or tenderness. Normal range of motion.      Cervical back: Normal range of motion and neck supple.      Left lower leg: No edema.   Skin:     General: Skin is warm and dry.      Capillary Refill: Capillary refill takes less than 2 seconds.   Neurological:      Mental Status: She is alert.      Comments: Patient is nonverbal given anoxic brain injury, responsive to painful stimuli, contractures to bilateral lower extremities   Psychiatric:      Comments: Unable to assess given the patient's nonverbal status         Procedures    ED Course:    ED Course as of 02/12/25 2337   Wed Feb 12, 2025   1811 EKG interpreted by me, normal sinus rhythm no concerning ST changes noted, rate 97 [JE]      ED Course User Index  [JE] Delio Hale MD       Lab Results (last 24 hours)       Procedure  Component Value Units Date/Time    CBC & Differential [[id number]]  (Abnormal) Collected: 02/12/25 1749    Specimen: Blood Updated: 02/12/25 1757    Narrative:      The following orders were created for panel order CBC & Differential.  Procedure                               Abnormality         Status                     ---------                               -----------         ------                     CBC Auto Differential[[id number]]        Abnormal            Final result                 Please view results for these tests on the individual orders.    Comprehensive Metabolic Panel [[id number]]  (Abnormal) Collected: 02/12/25 1749    Specimen: Blood Updated: 02/12/25 1813     Glucose 96 mg/dL      BUN 13 mg/dL      Creatinine 0.44 mg/dL      Sodium 141 mmol/L      Potassium 4.2 mmol/L      Chloride 101 mmol/L      CO2 27.6 mmol/L      Calcium 8.8 mg/dL      Total Protein 7.3 g/dL      Albumin 3.3 g/dL      ALT (SGPT) 18 U/L      AST (SGOT) 18 U/L      Alkaline Phosphatase 83 U/L      Total Bilirubin <0.2 mg/dL      Globulin 4.0 gm/dL      A/G Ratio 0.8 g/dL      BUN/Creatinine Ratio 29.5     Anion Gap 12.4 mmol/L      eGFR 106.8 mL/min/1.73     Narrative:      GFR Categories in Chronic Kidney Disease (CKD)      GFR Category          GFR (mL/min/1.73)    Interpretation  G1                     90 or greater         Normal or high (1)  G2                      60-89                Mild decrease (1)  G3a                   45-59                Mild to moderate decrease  G3b                   30-44                Moderate to severe decrease  G4                    15-29                Severe decrease  G5                    14 or less           Kidney failure          (1)In the absence of evidence of kidney disease, neither GFR category G1 or G2 fulfill the criteria for CKD.    eGFR calculation 2021 CKD-EPI creatinine equation, which does not include race as a factor    Magnesium [[id number]]  (Abnormal) Collected:  02/12/25 1749    Specimen: Blood Updated: 02/12/25 1813     Magnesium 2.5 mg/dL     CBC Auto Differential [[id number]]  (Abnormal) Collected: 02/12/25 1749    Specimen: Blood Updated: 02/12/25 1757     WBC 9.11 10*3/mm3      RBC 2.87 10*6/mm3      Hemoglobin 7.3 g/dL      Hematocrit 24.2 %      MCV 84.3 fL      MCH 25.4 pg      MCHC 30.2 g/dL      RDW 15.9 %      RDW-SD 49.4 fl      MPV 11.5 fL      Platelets 412 10*3/mm3      Neutrophil % 67.9 %      Lymphocyte % 18.4 %      Monocyte % 7.2 %      Eosinophil % 5.8 %      Basophil % 0.3 %      Immature Grans % 0.4 %      Neutrophils, Absolute 6.17 10*3/mm3      Lymphocytes, Absolute 1.68 10*3/mm3      Monocytes, Absolute 0.66 10*3/mm3      Eosinophils, Absolute 0.53 10*3/mm3      Basophils, Absolute 0.03 10*3/mm3      Immature Grans, Absolute 0.04 10*3/mm3      nRBC 0.0 /100 WBC     Iron Profile [[id number]]  (Abnormal) Collected: 02/12/25 1749    Specimen: Blood Updated: 02/12/25 1813     Iron 13 mcg/dL      Iron Saturation (TSAT) 5 %      Transferrin 178 mg/dL      TIBC 265 mcg/dL              No radiology results from the last 24 hrs       MDM      Initial impression of presenting illness: Anemia    DDX: includes but is not limited to: Blood loss anemia, iron deficiency anemia, anemia of chronic disease,  malnutrition    Patient arrives stable with vitals interpreted by myself.     Pertinent features from physical exam: Clear to auscultation, regular rate and rhythm, no murmur, nontender to abdominal palpation.    Initial diagnostic plan: CBC, CMP, magnesium, type and screen, EKG, iron panel    Results from initial plan were reviewed and interpreted by me revealing patient's presentation appears to be consistent with anemia chronic disease, does have hemoglobin 7.2, however given the chronicity of her symptoms will transfuse today    Diagnostic information from other sources: Discussed with EMS on arrival and reviewed past medical records    Interventions /  Re-evaluation: Given 1 unit packed red blood cells    Results/clinical rationale were discussed with nursing staff via nursing report    Consultations/Discussion of results with other physicians: Discussed treatment of anemia today, concern for anemia of chronic disease.  Encouraged nursing staff to continue to have patient follow with her primary care providers.    Disposition plan: Discharge  -----        Final diagnoses:   Anemia associated with nutritional deficiency          Delio Hale MD  02/12/25 [id number]

## 2025-02-13 LAB
CK SERPL-CCNC: 63 U/L (ref 20–180)
CRP SERPL-MCNC: 12.76 MG/DL (ref 0–0.5)

## 2025-02-13 NOTE — DISCHARGE INSTRUCTIONS
Wicho was evaluated for anemia.  We got lab work and found her hemoglobin 7.2.  However, given the chronic nature of her hemoglobin we decided to transfuse her.  She was given a unit of blood she is now stable for discharge.  We did send an iron panel and we believe her anemia secondary to anemia of chronic disease.  We recommend she continues to follow with her chronic care providers.  She is now stable for discharge.

## 2025-02-14 LAB
BH BB BLOOD EXPIRATION DATE: NORMAL
BH BB BLOOD TYPE BARCODE: 6200
BH BB DISPENSE STATUS: NORMAL
BH BB PRODUCT CODE: NORMAL
BH BB UNIT NUMBER: NORMAL
CROSSMATCH INTERPRETATION: NORMAL
UNIT  ABO: NORMAL
UNIT  RH: NORMAL

## 2025-03-17 ENCOUNTER — TRANSCRIBE ORDERS (OUTPATIENT)
Dept: LAB | Facility: HOSPITAL | Age: 65
End: 2025-03-17
Payer: MEDICARE

## 2025-03-17 DIAGNOSIS — R14.0 ABDOMINAL DISTENTION: Primary | ICD-10-CM

## 2025-03-17 LAB
BACTERIA UR QL AUTO: ABNORMAL /HPF
BILIRUB UR QL STRIP: NEGATIVE
CLARITY UR: ABNORMAL
COLOR UR: YELLOW
GLUCOSE UR STRIP-MCNC: NEGATIVE MG/DL
HGB UR QL STRIP.AUTO: ABNORMAL
HYALINE CASTS UR QL AUTO: ABNORMAL /LPF
KETONES UR QL STRIP: NEGATIVE
LEUKOCYTE ESTERASE UR QL STRIP.AUTO: ABNORMAL
NITRITE UR QL STRIP: POSITIVE
PH UR STRIP.AUTO: 6.5 [PH] (ref 5–8)
PROT UR QL STRIP: ABNORMAL
RBC # UR STRIP: ABNORMAL /HPF
REF LAB TEST METHOD: ABNORMAL
SP GR UR STRIP: 1.01 (ref 1–1.03)
SQUAMOUS #/AREA URNS HPF: ABNORMAL /HPF
UROBILINOGEN UR QL STRIP: ABNORMAL
WBC # UR STRIP: ABNORMAL /HPF
YEAST URNS QL MICRO: ABNORMAL /HPF

## 2025-03-17 PROCEDURE — 87077 CULTURE AEROBIC IDENTIFY: CPT | Performed by: FAMILY MEDICINE

## 2025-03-17 PROCEDURE — 87086 URINE CULTURE/COLONY COUNT: CPT | Performed by: FAMILY MEDICINE

## 2025-03-17 PROCEDURE — 87186 SC STD MICRODIL/AGAR DIL: CPT | Performed by: FAMILY MEDICINE

## 2025-03-17 PROCEDURE — 81001 URINALYSIS AUTO W/SCOPE: CPT | Performed by: FAMILY MEDICINE

## 2025-03-18 ENCOUNTER — APPOINTMENT (OUTPATIENT)
Dept: CARDIOLOGY | Facility: HOSPITAL | Age: 65
End: 2025-03-18
Payer: MEDICARE

## 2025-03-18 ENCOUNTER — APPOINTMENT (OUTPATIENT)
Dept: CT IMAGING | Facility: HOSPITAL | Age: 65
End: 2025-03-18
Payer: MEDICARE

## 2025-03-18 ENCOUNTER — APPOINTMENT (OUTPATIENT)
Dept: GENERAL RADIOLOGY | Facility: HOSPITAL | Age: 65
End: 2025-03-18
Payer: MEDICARE

## 2025-03-18 ENCOUNTER — HOSPITAL ENCOUNTER (INPATIENT)
Facility: HOSPITAL | Age: 65
LOS: 3 days | Discharge: NURSING, REHAB OR LONG-TERM CARE | End: 2025-03-21
Attending: EMERGENCY MEDICINE | Admitting: FAMILY MEDICINE
Payer: MEDICARE

## 2025-03-18 DIAGNOSIS — T83.511A URINARY TRACT INFECTION ASSOCIATED WITH INDWELLING URETHRAL CATHETER, INITIAL ENCOUNTER: ICD-10-CM

## 2025-03-18 DIAGNOSIS — I47.29 NON-SUSTAINED VENTRICULAR TACHYCARDIA: ICD-10-CM

## 2025-03-18 DIAGNOSIS — N39.0 URINARY TRACT INFECTION ASSOCIATED WITH INDWELLING URETHRAL CATHETER, INITIAL ENCOUNTER: ICD-10-CM

## 2025-03-18 DIAGNOSIS — J69.0 ASPIRATION PNEUMONIA, UNSPECIFIED ASPIRATION PNEUMONIA TYPE, UNSPECIFIED LATERALITY, UNSPECIFIED PART OF LUNG: Primary | ICD-10-CM

## 2025-03-18 LAB
ALBUMIN SERPL-MCNC: 2.7 G/DL (ref 3.5–5.2)
ALBUMIN/GLOB SERPL: 0.6 G/DL
ALP SERPL-CCNC: 104 U/L (ref 39–117)
ALT SERPL W P-5'-P-CCNC: 26 U/L (ref 1–33)
ANION GAP SERPL CALCULATED.3IONS-SCNC: 10.6 MMOL/L (ref 5–15)
AORTIC DIMENSIONLESS INDEX: 0.76 (DI)
AST SERPL-CCNC: 21 U/L (ref 1–32)
AV MEAN PRESS GRAD SYS DOP V1V2: 3 MMHG
AV VMAX SYS DOP: 113 CM/SEC
BACTERIA UR QL AUTO: ABNORMAL /HPF
BASOPHILS # BLD AUTO: 0.09 10*3/MM3 (ref 0–0.2)
BASOPHILS NFR BLD AUTO: 0.8 % (ref 0–1.5)
BH CV ECHO MEAS - AO MAX PG: 5.1 MMHG
BH CV ECHO MEAS - AO ROOT DIAM: 2.8 CM
BH CV ECHO MEAS - AO V2 VTI: 18.7 CM
BH CV ECHO MEAS - AVA(I,D): 2.43 CM2
BH CV ECHO MEAS - EDV(CUBED): 40.7 ML
BH CV ECHO MEAS - EDV(MOD-SP2): 75.3 ML
BH CV ECHO MEAS - EDV(MOD-SP4): 62 ML
BH CV ECHO MEAS - EF(MOD-SP2): 62.4 %
BH CV ECHO MEAS - EF(MOD-SP4): 61 %
BH CV ECHO MEAS - ESV(CUBED): 13.1 ML
BH CV ECHO MEAS - ESV(MOD-SP2): 28.3 ML
BH CV ECHO MEAS - ESV(MOD-SP4): 24.2 ML
BH CV ECHO MEAS - FS: 31.4 %
BH CV ECHO MEAS - IVS/LVPW: 0.98 CM
BH CV ECHO MEAS - IVSD: 0.93 CM
BH CV ECHO MEAS - LA DIMENSION: 3.1 CM
BH CV ECHO MEAS - LAT PEAK E' VEL: 10.2 CM/SEC
BH CV ECHO MEAS - LV DIASTOLIC VOL/BSA (35-75): 33.9 CM2
BH CV ECHO MEAS - LV MASS(C)D: 92 GRAMS
BH CV ECHO MEAS - LV MAX PG: 1.74 MMHG
BH CV ECHO MEAS - LV MEAN PG: 1 MMHG
BH CV ECHO MEAS - LV SYSTOLIC VOL/BSA (12-30): 13.2 CM2
BH CV ECHO MEAS - LV V1 MAX: 66 CM/SEC
BH CV ECHO MEAS - LV V1 VTI: 14.2 CM
BH CV ECHO MEAS - LVIDD: 3.4 CM
BH CV ECHO MEAS - LVIDS: 2.36 CM
BH CV ECHO MEAS - LVOT AREA: 3.2 CM2
BH CV ECHO MEAS - LVOT DIAM: 2.02 CM
BH CV ECHO MEAS - LVPWD: 0.95 CM
BH CV ECHO MEAS - MED PEAK E' VEL: 10.1 CM/SEC
BH CV ECHO MEAS - MV A MAX VEL: 118 CM/SEC
BH CV ECHO MEAS - MV DEC SLOPE: 455 CM/SEC2
BH CV ECHO MEAS - MV DEC TIME: 0.2 SEC
BH CV ECHO MEAS - MV E MAX VEL: 91.3 CM/SEC
BH CV ECHO MEAS - MV E/A: 0.77
BH CV ECHO MEAS - MV MAX PG: 8 MMHG
BH CV ECHO MEAS - MV MEAN PG: 5 MMHG
BH CV ECHO MEAS - MV V2 VTI: 22.2 CM
BH CV ECHO MEAS - MVA(VTI): 2.05 CM2
BH CV ECHO MEAS - PA ACC TIME: 0.08 SEC
BH CV ECHO MEAS - PA V2 MAX: 122 CM/SEC
BH CV ECHO MEAS - RV MAX PG: 4.8 MMHG
BH CV ECHO MEAS - RV V1 MAX: 109 CM/SEC
BH CV ECHO MEAS - RV V1 VTI: 20.9 CM
BH CV ECHO MEAS - SV(LVOT): 45.5 ML
BH CV ECHO MEAS - SV(MOD-SP2): 47 ML
BH CV ECHO MEAS - SV(MOD-SP4): 37.8 ML
BH CV ECHO MEAS - SVI(LVOT): 24.9 ML/M2
BH CV ECHO MEAS - SVI(MOD-SP2): 25.7 ML/M2
BH CV ECHO MEAS - SVI(MOD-SP4): 20.7 ML/M2
BH CV ECHO MEASUREMENTS AVERAGE E/E' RATIO: 9
BH CV XLRA - TDI S': 13.5 CM/SEC
BILIRUB SERPL-MCNC: 0.2 MG/DL (ref 0–1.2)
BILIRUB UR QL STRIP: NEGATIVE
BUN SERPL-MCNC: 33 MG/DL (ref 8–23)
BUN/CREAT SERPL: 55.9 (ref 7–25)
CALCIUM SPEC-SCNC: 9 MG/DL (ref 8.6–10.5)
CHLORIDE SERPL-SCNC: 97 MMOL/L (ref 98–107)
CLARITY UR: ABNORMAL
CO2 SERPL-SCNC: 28.4 MMOL/L (ref 22–29)
COLOR UR: YELLOW
CREAT SERPL-MCNC: 0.59 MG/DL (ref 0.57–1)
D-LACTATE SERPL-SCNC: 1.5 MMOL/L (ref 0.5–2)
DEPRECATED RDW RBC AUTO: 50.5 FL (ref 37–54)
EGFRCR SERPLBLD CKD-EPI 2021: 99.5 ML/MIN/1.73
EOSINOPHIL # BLD AUTO: 0.71 10*3/MM3 (ref 0–0.4)
EOSINOPHIL NFR BLD AUTO: 6.3 % (ref 0.3–6.2)
ERYTHROCYTE [DISTWIDTH] IN BLOOD BY AUTOMATED COUNT: 16.9 % (ref 12.3–15.4)
FLUAV SUBTYP SPEC NAA+PROBE: NOT DETECTED
FLUBV RNA ISLT QL NAA+PROBE: NOT DETECTED
GLOBULIN UR ELPH-MCNC: 4.7 GM/DL
GLUCOSE BLDC GLUCOMTR-MCNC: 205 MG/DL (ref 70–130)
GLUCOSE BLDC GLUCOMTR-MCNC: 212 MG/DL (ref 70–130)
GLUCOSE SERPL-MCNC: 189 MG/DL (ref 65–99)
GLUCOSE UR STRIP-MCNC: NEGATIVE MG/DL
HBA1C MFR BLD: 6.5 % (ref 4.8–5.6)
HCT VFR BLD AUTO: 29.2 % (ref 34–46.6)
HGB BLD-MCNC: 8.6 G/DL (ref 12–15.9)
HGB UR QL STRIP.AUTO: ABNORMAL
HYALINE CASTS UR QL AUTO: ABNORMAL /LPF
IMM GRANULOCYTES # BLD AUTO: 0.39 10*3/MM3 (ref 0–0.05)
IMM GRANULOCYTES NFR BLD AUTO: 3.5 % (ref 0–0.5)
KETONES UR QL STRIP: NEGATIVE
LEFT ATRIUM VOLUME INDEX: 12.2 ML/M2
LEUKOCYTE ESTERASE UR QL STRIP.AUTO: ABNORMAL
LV EF BIPLANE MOD: 62.2 %
LYMPHOCYTES # BLD AUTO: 1.26 10*3/MM3 (ref 0.7–3.1)
LYMPHOCYTES NFR BLD AUTO: 11.2 % (ref 19.6–45.3)
MCH RBC QN AUTO: 24.2 PG (ref 26.6–33)
MCHC RBC AUTO-ENTMCNC: 29.5 G/DL (ref 31.5–35.7)
MCV RBC AUTO: 82 FL (ref 79–97)
MONOCYTES # BLD AUTO: 0.85 10*3/MM3 (ref 0.1–0.9)
MONOCYTES NFR BLD AUTO: 7.5 % (ref 5–12)
MRSA DNA SPEC QL NAA+PROBE: NORMAL
NEUTROPHILS NFR BLD AUTO: 70.7 % (ref 42.7–76)
NEUTROPHILS NFR BLD AUTO: 8 10*3/MM3 (ref 1.7–7)
NITRITE UR QL STRIP: NEGATIVE
NRBC BLD AUTO-RTO: 0 /100 WBC (ref 0–0.2)
PH UR STRIP.AUTO: 6 [PH] (ref 5–8)
PLATELET # BLD AUTO: 571 10*3/MM3 (ref 140–450)
PMV BLD AUTO: 10.8 FL (ref 6–12)
POTASSIUM SERPL-SCNC: 4.7 MMOL/L (ref 3.5–5.2)
PROCALCITONIN SERPL-MCNC: 1.55 NG/ML (ref 0–0.25)
PROT SERPL-MCNC: 7.4 G/DL (ref 6–8.5)
PROT UR QL STRIP: ABNORMAL
RBC # BLD AUTO: 3.56 10*6/MM3 (ref 3.77–5.28)
RBC # UR STRIP: ABNORMAL /HPF
REF LAB TEST METHOD: ABNORMAL
RENAL EPI CELLS #/AREA URNS HPF: ABNORMAL /HPF
SARS-COV-2 RNA RESP QL NAA+PROBE: NOT DETECTED
SODIUM SERPL-SCNC: 136 MMOL/L (ref 136–145)
SP GR UR STRIP: 1.02 (ref 1–1.03)
SQUAMOUS #/AREA URNS HPF: ABNORMAL /HPF
TRANS CELLS #/AREA URNS HPF: ABNORMAL /HPF
UROBILINOGEN UR QL STRIP: ABNORMAL
WBC # UR STRIP: ABNORMAL /HPF
WBC NRBC COR # BLD AUTO: 11.3 10*3/MM3 (ref 3.4–10.8)

## 2025-03-18 PROCEDURE — 25010000002 AMIODARONE IN DEXTROSE 5% 360-4.14 MG/200ML-% SOLUTION: Performed by: FAMILY MEDICINE

## 2025-03-18 PROCEDURE — 87186 SC STD MICRODIL/AGAR DIL: CPT | Performed by: INTERNAL MEDICINE

## 2025-03-18 PROCEDURE — 74176 CT ABD & PELVIS W/O CONTRAST: CPT

## 2025-03-18 PROCEDURE — 93005 ELECTROCARDIOGRAM TRACING: CPT | Performed by: EMERGENCY MEDICINE

## 2025-03-18 PROCEDURE — 99285 EMERGENCY DEPT VISIT HI MDM: CPT | Performed by: EMERGENCY MEDICINE

## 2025-03-18 PROCEDURE — 25810000003 SODIUM CHLORIDE 0.9 % SOLUTION 500 ML FLEX CONT: Performed by: FAMILY MEDICINE

## 2025-03-18 PROCEDURE — 87070 CULTURE OTHR SPECIMN AEROBIC: CPT | Performed by: INTERNAL MEDICINE

## 2025-03-18 PROCEDURE — 25010000002 PIPERACILLIN SOD-TAZOBACTAM PER 1 G: Performed by: EMERGENCY MEDICINE

## 2025-03-18 PROCEDURE — 87086 URINE CULTURE/COLONY COUNT: CPT | Performed by: EMERGENCY MEDICINE

## 2025-03-18 PROCEDURE — C1751 CATH, INF, PER/CENT/MIDLINE: HCPCS

## 2025-03-18 PROCEDURE — 93306 TTE W/DOPPLER COMPLETE: CPT | Performed by: INTERNAL MEDICINE

## 2025-03-18 PROCEDURE — 87636 SARSCOV2 & INF A&B AMP PRB: CPT | Performed by: EMERGENCY MEDICINE

## 2025-03-18 PROCEDURE — 87641 MR-STAPH DNA AMP PROBE: CPT | Performed by: INTERNAL MEDICINE

## 2025-03-18 PROCEDURE — 25010000002 PIPERACILLIN SOD-TAZOBACTAM PER 1 G: Performed by: FAMILY MEDICINE

## 2025-03-18 PROCEDURE — 99223 1ST HOSP IP/OBS HIGH 75: CPT | Performed by: FAMILY MEDICINE

## 2025-03-18 PROCEDURE — 81001 URINALYSIS AUTO W/SCOPE: CPT | Performed by: EMERGENCY MEDICINE

## 2025-03-18 PROCEDURE — 87205 SMEAR GRAM STAIN: CPT | Performed by: INTERNAL MEDICINE

## 2025-03-18 PROCEDURE — 85025 COMPLETE CBC W/AUTO DIFF WBC: CPT | Performed by: EMERGENCY MEDICINE

## 2025-03-18 PROCEDURE — 25810000003 SODIUM CHLORIDE 0.9 % SOLUTION: Performed by: EMERGENCY MEDICINE

## 2025-03-18 PROCEDURE — 51702 INSERT TEMP BLADDER CATH: CPT

## 2025-03-18 PROCEDURE — 87077 CULTURE AEROBIC IDENTIFY: CPT | Performed by: EMERGENCY MEDICINE

## 2025-03-18 PROCEDURE — 87040 BLOOD CULTURE FOR BACTERIA: CPT | Performed by: EMERGENCY MEDICINE

## 2025-03-18 PROCEDURE — 87186 SC STD MICRODIL/AGAR DIL: CPT | Performed by: EMERGENCY MEDICINE

## 2025-03-18 PROCEDURE — 82948 REAGENT STRIP/BLOOD GLUCOSE: CPT

## 2025-03-18 PROCEDURE — 25010000002 VANCOMYCIN 1.5 G RECONSTITUTED SOLUTION 1 EACH VIAL: Performed by: FAMILY MEDICINE

## 2025-03-18 PROCEDURE — 80053 COMPREHEN METABOLIC PANEL: CPT | Performed by: EMERGENCY MEDICINE

## 2025-03-18 PROCEDURE — 87081 CULTURE SCREEN ONLY: CPT | Performed by: INTERNAL MEDICINE

## 2025-03-18 PROCEDURE — 25810000003 SODIUM CHLORIDE 0.9 % SOLUTION 250 ML FLEX CONT: Performed by: FAMILY MEDICINE

## 2025-03-18 PROCEDURE — 25010000002 AMIODARONE IN DEXTROSE 5% 150-4.21 MG/100ML-% SOLUTION: Performed by: EMERGENCY MEDICINE

## 2025-03-18 PROCEDURE — 87147 CULTURE TYPE IMMUNOLOGIC: CPT | Performed by: EMERGENCY MEDICINE

## 2025-03-18 PROCEDURE — 3E0G76Z INTRODUCTION OF NUTRITIONAL SUBSTANCE INTO UPPER GI, VIA NATURAL OR ARTIFICIAL OPENING: ICD-10-PCS | Performed by: FAMILY MEDICINE

## 2025-03-18 PROCEDURE — 25810000003 SODIUM CHLORIDE 0.9 % SOLUTION: Performed by: FAMILY MEDICINE

## 2025-03-18 PROCEDURE — 93306 TTE W/DOPPLER COMPLETE: CPT

## 2025-03-18 PROCEDURE — 87154 CUL TYP ID BLD PTHGN 6+ TRGT: CPT | Performed by: EMERGENCY MEDICINE

## 2025-03-18 PROCEDURE — 94761 N-INVAS EAR/PLS OXIMETRY MLT: CPT

## 2025-03-18 PROCEDURE — 71045 X-RAY EXAM CHEST 1 VIEW: CPT

## 2025-03-18 PROCEDURE — 94799 UNLISTED PULMONARY SVC/PX: CPT

## 2025-03-18 PROCEDURE — 36415 COLL VENOUS BLD VENIPUNCTURE: CPT

## 2025-03-18 PROCEDURE — 02HV33Z INSERTION OF INFUSION DEVICE INTO SUPERIOR VENA CAVA, PERCUTANEOUS APPROACH: ICD-10-PCS | Performed by: EMERGENCY MEDICINE

## 2025-03-18 PROCEDURE — 83605 ASSAY OF LACTIC ACID: CPT | Performed by: EMERGENCY MEDICINE

## 2025-03-18 PROCEDURE — 25010000002 VANCOMYCIN 1 G RECONSTITUTED SOLUTION 1 EACH VIAL: Performed by: FAMILY MEDICINE

## 2025-03-18 PROCEDURE — 87481 CANDIDA DNA AMP PROBE: CPT | Performed by: INTERNAL MEDICINE

## 2025-03-18 PROCEDURE — 87077 CULTURE AEROBIC IDENTIFY: CPT | Performed by: INTERNAL MEDICINE

## 2025-03-18 PROCEDURE — 83036 HEMOGLOBIN GLYCOSYLATED A1C: CPT | Performed by: INTERNAL MEDICINE

## 2025-03-18 PROCEDURE — 84145 PROCALCITONIN (PCT): CPT | Performed by: EMERGENCY MEDICINE

## 2025-03-18 PROCEDURE — 25810000003 SODIUM CHLORIDE 0.9 % SOLUTION: Performed by: INTERNAL MEDICINE

## 2025-03-18 RX ORDER — SODIUM CHLORIDE 9 MG/ML
40 INJECTION, SOLUTION INTRAVENOUS AS NEEDED
Status: DISCONTINUED | OUTPATIENT
Start: 2025-03-18 | End: 2025-03-21 | Stop reason: HOSPADM

## 2025-03-18 RX ORDER — MIDODRINE HYDROCHLORIDE 5 MG/1
5 TABLET ORAL
Status: DISCONTINUED | OUTPATIENT
Start: 2025-03-18 | End: 2025-03-21 | Stop reason: HOSPADM

## 2025-03-18 RX ORDER — BUDESONIDE 0.5 MG/2ML
0.5 INHALANT ORAL
Status: DISCONTINUED | OUTPATIENT
Start: 2025-03-18 | End: 2025-03-21 | Stop reason: HOSPADM

## 2025-03-18 RX ORDER — SENNOSIDES 8.8 MG/5ML
10 LIQUID ORAL 2 TIMES DAILY
COMMUNITY

## 2025-03-18 RX ORDER — SCOPOLAMINE 1 MG/3D
1 PATCH, EXTENDED RELEASE TRANSDERMAL
Status: DISCONTINUED | OUTPATIENT
Start: 2025-03-18 | End: 2025-03-21 | Stop reason: HOSPADM

## 2025-03-18 RX ORDER — SODIUM HYPOCHLORITE 1.25 MG/ML
SOLUTION TOPICAL EVERY 12 HOURS
Status: DISCONTINUED | OUTPATIENT
Start: 2025-03-18 | End: 2025-03-21 | Stop reason: HOSPADM

## 2025-03-18 RX ORDER — ONDANSETRON 2 MG/ML
4 INJECTION INTRAMUSCULAR; INTRAVENOUS EVERY 6 HOURS PRN
Status: DISCONTINUED | OUTPATIENT
Start: 2025-03-18 | End: 2025-03-21 | Stop reason: HOSPADM

## 2025-03-18 RX ORDER — SODIUM CHLORIDE 0.9 % (FLUSH) 0.9 %
10 SYRINGE (ML) INJECTION EVERY 12 HOURS SCHEDULED
Status: DISCONTINUED | OUTPATIENT
Start: 2025-03-18 | End: 2025-03-21 | Stop reason: HOSPADM

## 2025-03-18 RX ORDER — ACETAMINOPHEN 325 MG/1
650 TABLET ORAL EVERY 8 HOURS PRN
COMMUNITY

## 2025-03-18 RX ORDER — HYDROCODONE BITARTRATE AND ACETAMINOPHEN 7.5; 325 MG/1; MG/1
1 TABLET ORAL EVERY 4 HOURS
COMMUNITY

## 2025-03-18 RX ORDER — SACCHAROMYCES BOULARDII 250 MG
250 CAPSULE ORAL 2 TIMES DAILY
Status: DISCONTINUED | OUTPATIENT
Start: 2025-03-18 | End: 2025-03-21 | Stop reason: HOSPADM

## 2025-03-18 RX ORDER — ONDANSETRON 4 MG/1
4 TABLET, ORALLY DISINTEGRATING ORAL EVERY 6 HOURS PRN
Status: DISCONTINUED | OUTPATIENT
Start: 2025-03-18 | End: 2025-03-21 | Stop reason: HOSPADM

## 2025-03-18 RX ORDER — IPRATROPIUM BROMIDE AND ALBUTEROL SULFATE 2.5; .5 MG/3ML; MG/3ML
3 SOLUTION RESPIRATORY (INHALATION)
Status: DISCONTINUED | OUTPATIENT
Start: 2025-03-18 | End: 2025-03-18

## 2025-03-18 RX ORDER — LANSOPRAZOLE 30 MG/1
30 TABLET, ORALLY DISINTEGRATING, DELAYED RELEASE ORAL
Status: DISCONTINUED | OUTPATIENT
Start: 2025-03-18 | End: 2025-03-21 | Stop reason: HOSPADM

## 2025-03-18 RX ORDER — NITROGLYCERIN 0.4 MG/1
0.4 TABLET SUBLINGUAL
Status: DISCONTINUED | OUTPATIENT
Start: 2025-03-18 | End: 2025-03-21 | Stop reason: HOSPADM

## 2025-03-18 RX ORDER — SODIUM CHLORIDE 9 MG/ML
50 INJECTION, SOLUTION INTRAVENOUS CONTINUOUS
Status: ACTIVE | OUTPATIENT
Start: 2025-03-18 | End: 2025-03-19

## 2025-03-18 RX ORDER — DOCUSATE SODIUM 50 MG/5 ML
300 LIQUID (ML) ORAL 2 TIMES DAILY
Status: DISCONTINUED | OUTPATIENT
Start: 2025-03-18 | End: 2025-03-21 | Stop reason: HOSPADM

## 2025-03-18 RX ORDER — ARGININE/GLUTAMINE/CALCIUM BMB 7G-7G-1.5G
1 POWDER IN PACKET (EA) ORAL 2 TIMES DAILY
Status: DISCONTINUED | OUTPATIENT
Start: 2025-03-18 | End: 2025-03-21 | Stop reason: HOSPADM

## 2025-03-18 RX ORDER — GLYCOPYRROLATE 1 MG/1
2 TABLET ORAL 4 TIMES DAILY
Status: DISCONTINUED | OUTPATIENT
Start: 2025-03-18 | End: 2025-03-21 | Stop reason: HOSPADM

## 2025-03-18 RX ORDER — SODIUM CHLORIDE 0.9 % (FLUSH) 0.9 %
10 SYRINGE (ML) INJECTION AS NEEDED
Status: DISCONTINUED | OUTPATIENT
Start: 2025-03-18 | End: 2025-03-21 | Stop reason: HOSPADM

## 2025-03-18 RX ORDER — IPRATROPIUM BROMIDE AND ALBUTEROL SULFATE 2.5; .5 MG/3ML; MG/3ML
3 SOLUTION RESPIRATORY (INHALATION)
Status: DISCONTINUED | OUTPATIENT
Start: 2025-03-18 | End: 2025-03-19

## 2025-03-18 RX ADMIN — Medication 1 PACKET: at 09:48

## 2025-03-18 RX ADMIN — AMIODARONE HYDROCHLORIDE 150 MG: 1.5 INJECTION, SOLUTION INTRAVENOUS at 05:01

## 2025-03-18 RX ADMIN — DOCUSATE SODIUM 300 MG: 50 LIQUID ORAL at 20:35

## 2025-03-18 RX ADMIN — AMIODARONE HYDROCHLORIDE 0.5 MG/MIN: 1.8 INJECTION, SOLUTION INTRAVENOUS at 21:06

## 2025-03-18 RX ADMIN — IPRATROPIUM BROMIDE AND ALBUTEROL SULFATE 3 ML: 2.5; .5 SOLUTION RESPIRATORY (INHALATION) at 12:51

## 2025-03-18 RX ADMIN — DAKIN'S SOLUTION 0.125% (QUARTER STRENGTH): 0.12 SOLUTION at 14:54

## 2025-03-18 RX ADMIN — SCOPOLAMINE 1 PATCH: 1.5 PATCH, EXTENDED RELEASE TRANSDERMAL at 06:14

## 2025-03-18 RX ADMIN — AMIODARONE HYDROCHLORIDE 1 MG/MIN: 1.8 INJECTION, SOLUTION INTRAVENOUS at 05:37

## 2025-03-18 RX ADMIN — PIPERACILLIN AND TAZOBACTAM 3.38 G: 3; .375 INJECTION, POWDER, FOR SOLUTION INTRAVENOUS at 12:00

## 2025-03-18 RX ADMIN — PIPERACILLIN AND TAZOBACTAM 3.38 G: 3; .375 INJECTION, POWDER, FOR SOLUTION INTRAVENOUS at 20:32

## 2025-03-18 RX ADMIN — MIDODRINE HYDROCHLORIDE 5 MG: 5 TABLET ORAL at 11:01

## 2025-03-18 RX ADMIN — IPRATROPIUM BROMIDE AND ALBUTEROL SULFATE 3 ML: 2.5; .5 SOLUTION RESPIRATORY (INHALATION) at 19:06

## 2025-03-18 RX ADMIN — Medication 10 ML: at 09:25

## 2025-03-18 RX ADMIN — GLYCOPYRROLATE 2 MG: 1 TABLET ORAL at 20:33

## 2025-03-18 RX ADMIN — DOCUSATE SODIUM 300 MG: 50 LIQUID ORAL at 09:22

## 2025-03-18 RX ADMIN — VANCOMYCIN HYDROCHLORIDE 1500 MG: 1.5 INJECTION, POWDER, LYOPHILIZED, FOR SOLUTION INTRAVENOUS at 06:12

## 2025-03-18 RX ADMIN — VANCOMYCIN HYDROCHLORIDE 1000 MG: 1 INJECTION, POWDER, LYOPHILIZED, FOR SOLUTION INTRAVENOUS at 21:52

## 2025-03-18 RX ADMIN — SODIUM CHLORIDE 50 ML/HR: 9 INJECTION, SOLUTION INTRAVENOUS at 06:13

## 2025-03-18 RX ADMIN — GLYCOPYRROLATE 2 MG: 1 TABLET ORAL at 11:22

## 2025-03-18 RX ADMIN — Medication 1 PACKET: at 20:54

## 2025-03-18 RX ADMIN — Medication 10 ML: at 20:54

## 2025-03-18 RX ADMIN — GLYCOPYRROLATE 2 MG: 1 TABLET ORAL at 07:38

## 2025-03-18 RX ADMIN — SODIUM CHLORIDE 500 ML: 9 INJECTION, SOLUTION INTRAVENOUS at 03:38

## 2025-03-18 RX ADMIN — AMIODARONE HYDROCHLORIDE 1 MG/MIN: 1.8 INJECTION, SOLUTION INTRAVENOUS at 11:04

## 2025-03-18 RX ADMIN — MIDODRINE HYDROCHLORIDE 5 MG: 5 TABLET ORAL at 07:42

## 2025-03-18 RX ADMIN — MIDODRINE HYDROCHLORIDE 5 MG: 5 TABLET ORAL at 17:08

## 2025-03-18 RX ADMIN — Medication 250 MG: at 09:23

## 2025-03-18 RX ADMIN — IPRATROPIUM BROMIDE AND ALBUTEROL SULFATE 3 ML: 2.5; .5 SOLUTION RESPIRATORY (INHALATION) at 07:24

## 2025-03-18 RX ADMIN — AMIODARONE HYDROCHLORIDE 0.5 MG/MIN: 1.8 INJECTION, SOLUTION INTRAVENOUS at 11:13

## 2025-03-18 RX ADMIN — GLYCOPYRROLATE 2 MG: 1 TABLET ORAL at 17:08

## 2025-03-18 RX ADMIN — Medication 250 MG: at 20:36

## 2025-03-18 RX ADMIN — SODIUM CHLORIDE 1000 ML: 9 INJECTION, SOLUTION INTRAVENOUS at 09:41

## 2025-03-18 RX ADMIN — APIXABAN 5 MG: 5 TABLET, FILM COATED ORAL at 09:22

## 2025-03-18 RX ADMIN — LANSOPRAZOLE 30 MG: 30 TABLET, ORALLY DISINTEGRATING ORAL at 06:13

## 2025-03-18 RX ADMIN — BUDESONIDE 0.5 MG: 0.5 INHALANT RESPIRATORY (INHALATION) at 07:24

## 2025-03-18 RX ADMIN — PIPERACILLIN AND TAZOBACTAM 4.5 G: 4; .5 INJECTION, POWDER, FOR SOLUTION INTRAVENOUS at 04:47

## 2025-03-18 RX ADMIN — APIXABAN 5 MG: 5 TABLET, FILM COATED ORAL at 20:33

## 2025-03-18 NOTE — PAYOR COMM NOTE
"TO:MK  FROM:TYRESE JHA, RN PHONE [phone]  CLINICALS REF# [id number]    Viji Vargas (64 y.o. Female)       Date of Birth   1960    Social Security Number       Address   [address] [place] [address]    Home Phone   [phone]    MRN   [id number]       Buddhism   [employer]    Marital Status   Legally                             Admission Date   3/18/2025    Admission Type   Emergency    Admitting Provider   Jessica Estevez DO    Attending Provider   Walter Barragan DO    Department, Room/Bed   [facility] EMERGENCY DEPARTMENT, 04/04       Discharge Date       Discharge Disposition       Discharge Destination                                 Attending Provider: Wlater Barragan DO    Allergies: No Known Allergies    Isolation: None   Infection: MDR Acinetobacter (12/27/23), ESBL E coli (07/16/24), ESBL (02/12/25), COVID (rule out) (03/18/25)   Code Status: CPR    Ht: 162.6 cm (64\")   Wt: 77.6 kg (171 lb 1.2 oz)    Admission Cmt: None   Principal Problem: Non-sustained ventricular tachycardia [I47.29]                   Active Insurance as of 3/18/2025       Primary Coverage       Payor Plan Insurance Group Employer/Plan Group    MEDICARE MEDICARE B ONLY        Payor Plan Address Payor Plan Phone Number Payor Plan Fax Number Effective Dates    [address] [address] [phone]  11/1/2023 - None Entered    [facility] [id number]         Subscriber Name Subscriber Birth Date Member ID       VIJI VARGAS 1960 [id number]               Secondary Coverage       Payor Plan Insurance Group Employer/Plan Group    [place]        Payor Plan Address Payor Plan Phone Number Payor Plan Fax Number Effective Dates    [address] [phone]  3/10/2019 - None Entered    [place] [address]         Subscriber Name Subscriber Birth Date Member ID       VIJI VARGAS 1960 [id number]                     Emergency Contacts "        (Rel.) Home Phone Work Phone Mobile Phone    KENIA (POA)WILTON (Daughter) [phone] -- [phone]    timothy esposito (Grandchild) [phone] -- [phone]                 History & Physical        Jessica Estevez, DO at 25 0517            [facility]   HISTORY AND PHYSICAL      Name:  Viji Vargas   Age:  64 y.o.  Sex:  female  :  1960  MRN:  [id number]   Visit Number:  [id number]  Admission Date:  3/18/2025  Date Of Service:  25  Primary Care Physician:  Ranjeet Pina MD    Chief Complaint:     Low oxygen    History Of Presenting Illness:      Patient is a chronically ill unfortunate 64-year-old female with a history of chronic respiratory failure with hypoxia, status post tracheostomy, PEG tube, indwelling Sanchez catheter, nonverbal status from anoxic brain injury, osteomyelitis/skin ulcerations, prior CVA,  who presented via EMS due to reported low oxygen.  Patient resident of nursing facility, they noted increased secretions, she had been short of breath prior but after suctioning shortness of breath had improved per report.  Typically wears 4 L of oxygen through trach mask.  Patient at baseline is nonverbal and history otherwise limited at this time.  Of note, patient was treated in January at  secondary to osteomyelitis/ulcerations of sacrum, following with ID at .    In the ER, she was overall hemodynamically stable with no fever and blood pressure normal.  She did have evidence of nonsustained ventricular tachycardia, currently on amiodarone drip.  Received small fluid bolus.  X-ray does not show overt opacity, difficult study noted.  Urinalysis concerning for UTI.  Elevated procalcitonin noted.  Secondary concern for pneumonia/tracheitis/UTI, we were asked to admit    Review Of Systems:    All systems were reviewed and negative except as mentioned in history of presenting illness, assessment and plan.    Past  Medical History: Patient  has a past medical history of Abdominal distention (2024), Acute and chronic respiratory failure with hypoxia (2024), Adult hypertrophic pyloric stenosis (2024), COPD (chronic obstructive pulmonary disease), Hypertension, Osteomyelitis, Pneumonia, and Stroke.    Past Surgical History: Patient  has a past surgical history that includes Hysterectomy and tracheostomy and peg tube insertion (N/A, 3/20/2019).    Social History: Patient  reports that she has quit smoking. Her smoking use included electronic cigarette and cigarettes. She has never used smokeless tobacco. She reports that she does not currently use alcohol. She reports that she does not use drugs.    Family History:  Patient's family history has been reviewed and found to be noncontributory.     Allergies:      Patient has no known allergies.    Home Medications:    Prior to Admission Medications       Prescriptions Last Dose Informant Patient Reported? Taking?    apixaban (ELIQUIS) 5 MG tablet tablet   Yes No    Take 1 tablet by mouth.    baclofen (LIORESAL) 10 MG tablet   Yes No    Administer 1 tablet per G tube Every 8 (Eight) Hours.    budesonide (PULMICORT) 0.5 MG/2ML nebulizer solution   No No    Take 2 mL by nebulization 2 (Two) Times a Day.    cetirizine (zyrTEC) 10 MG tablet   Yes No    Administer 1 tablet per G tube Daily.    docusate sodium (COLACE) 50 mg/5 mL liquid   Yes No    Administer 30 mL per G tube 2 (Two) Times a Day.    glycopyrrolate (ROBINUL) 2 MG tablet   Yes No    Administer 1 tablet per G tube 4 (Four) Times a Day.    hyoscyamine (ANASPAZ,LEVSIN) 0.125 MG tablet   Yes No    Administer 1 tablet per G tube Every 4 (Four) Hours.    ipratropium-albuterol (DUO-NEB) 0.5-2.5 mg/3 ml nebulizer   No No    Take 3 mL by nebulization 4 (Four) Times a Day. Takes at [phone]    Lansinoh Lanolin cream   Yes No    Apply 1 Application topically to the appropriate area as directed.    midodrine (PROAMATINE) 5 MG  tablet   No No    Administer 1 tablet per G tube 3 (Three) Times a Day Before Meals for 30 days.    Multiple Vitamins-Minerals (MULTIVITAMIN WITH IRON-MINERALS) liquid   Yes No    Administer 15 mL per G tube Daily.    mupirocin (BACTROBAN) 2 % ointment   Yes No    Apply 1 Application topically to the appropriate area as directed 2 (Two) Times a Day. Apply to G-tube site twice daily    OMEPRAZOLE 2MG/ML LIQUID   Yes No    Administer 10 mL per G tube 2 (Two) Times a Day.    polyethylene glycol (MiraLax) 17 g packet   Yes No    17 g 2 (Two) Times a Day.    saccharomyces boulardii (FLORASTOR) 250 MG capsule   Yes No    Take 1 capsule by mouth 2 (Two) Times a Day.    Scopolamine 1 MG/3DAYS patch   Yes No    Place 1 patch on the skin as directed by provider Every 72 (Seventy-Two) Hours.    senna 8.6 MG tablet   Yes No    2 tablets by Per PEG Tube route 2 (Two) Times a Day.    Skin Protectants, Misc. (Eucerin) cream   Yes No    Apply 1 Application topically to the appropriate area as directed As Needed for Dry Skin. Apply to bilateral arms as needed for dry skin    sodium hypochlorite (DAKIN'S 1/4 STRENGTH) 0.125 % solution topical solution 0.125%   Yes No    Apply  topically to the appropriate area as directed.    torsemide (DEMADEX) 20 MG tablet   No No    Take 0.5 tablets by mouth Daily.          ED Medications:    Medications   amiodarone 360 mg in 200 mL D5W infusion (has no administration in time range)     Followed by   amiodarone 360 mg in 200 mL D5W infusion (has no administration in time range)   Pharmacy to dose vancomycin (has no administration in time range)   vancomycin (VANCOCIN) 1,500 mg in sodium chloride 0.9 % 500 mL IVPB (has no administration in time range)   sodium chloride 0.9 % bolus 500 mL (0 mL Intravenous Stopped 3/18/25 8767)   piperacillin-tazobactam (ZOSYN) IVPB 4.5 g IVPB in 100 mL NS (VTB) (0 g Intravenous Stopped 3/18/25 5585)   amiodarone 150 mg in 100 mL D5W (loading dose) (0 mg  "Intravenous Stopped 3/18/25 0514)     Vital Signs:  Temp:  [99.1 °F (37.3 °C)] 99.1 °F (37.3 °C)  Heart Rate:  [108-121] 108  Resp:  [20] 20  BP: ()/(54-79) 120/72        03/18/25  0242   Weight: 77.6 kg (171 lb 1.2 oz)     Body mass index is 29.37 kg/m².    Physical Exam:     Most recent vital Signs: /72   Pulse 108   Temp 99.1 °F (37.3 °C) (Rectal)   Resp 20   Ht 162.6 cm (64\")   Wt 77.6 kg (171 lb 1.2 oz)   SpO2 100%   BMI 29.37 kg/m²     Physical Exam  Constitutional:       Appearance: She is ill-appearing.   HENT:      Mouth/Throat:      Mouth: Mucous membranes are dry.   Eyes:      Pupils: Pupils are equal, round, and reactive to light.   Cardiovascular:      Rate and Rhythm: Regular rhythm. Tachycardia present.      Pulses: Normal pulses.      Heart sounds: Murmur heard.   Pulmonary:      Breath sounds: Rhonchi and rales present.      Comments: Trach  Abdominal:      General: There is no distension.      Tenderness: There is no abdominal tenderness.      Comments: G-tube   Genitourinary:     Comments: Sanchez catheter  Skin:     General: Skin is warm.      Capillary Refill: Capillary refill takes less than 2 seconds.      Comments: Decubitus ulcer, coccyx   Neurological:      General: No focal deficit present.      Mental Status: Mental status is at baseline.      Comments: Nonverbal         Laboratory data:    I have reviewed the labs done in the emergency room.    Results from last 7 days   Lab Units 03/18/25  0345   SODIUM mmol/L 136   POTASSIUM mmol/L 4.7   CHLORIDE mmol/L 97*   CO2 mmol/L 28.4   BUN mg/dL 33*   CREATININE mg/dL 0.59   CALCIUM mg/dL 9.0   BILIRUBIN mg/dL 0.2   ALK PHOS U/L 104   ALT (SGPT) U/L 26   AST (SGOT) U/L 21   GLUCOSE mg/dL 189*     Results from last 7 days   Lab Units 03/18/25  0345   WBC 10*3/mm3 11.30*   HEMOGLOBIN g/dL 8.6*   HEMATOCRIT % 29.2*   PLATELETS 10*3/mm3 571*                             Results from last 7 days   Lab Units 03/18/25  0458 " 03/17/25  1300   COLOR UA  Yellow Yellow   GLUCOSE UA  Negative Negative   KETONES UA  Negative Negative   BLOOD UA  Large (3+)* Small (1+)*   LEUKOCYTES UA  Large (3+)* Large (3+)*   PH, URINE  6.0 6.5   BILIRUBIN UA  Negative Negative   UROBILINOGEN UA  0.2 E.U./dL 0.2 E.U./dL   RBC UA /HPF  --  0-2   WBC UA /HPF  --  21-50*       Pain Management Panel          Latest Ref Rng & Units 3/10/2019   Pain Management Panel   Amphetamine, Urine Qual Negative Negative    Barbiturates Screen, Urine Negative Negative    Benzodiazepine Screen, Urine Negative Negative    Buprenorphine, Screen, Urine Negative Positive    Cocaine Screen, Urine Negative Negative    Methadone Screen , Urine Negative Negative    Methamphetamine, Ur Negative Negative        EKG:      EKG edition rate is sinus tachycardia, no obvious ischemic changes.  Repeat EKG showed evidence of likely nonsustained ventricular tachycardia.    Radiology:    No radiology results for the last 3 days    Assessment:    Nonsustained ventricular tachycardia, POA  Suspected pneumonia versus tracheitis, POA  Complicated UTI in setting of indwelling Sanchez catheter  History of sacral osteomyelitis  Nonverbal  PEG tube in place  Anoxic brain injury    Plan:    Nonsustained V. tach:  Echo from last year with normal ejection fraction, some diastolic dysfunction noted.  May be related to underlying hypoxia, early sepsis potentially with multiple infectious processes at play.  Will add a 2D echocardiogram continue on amiodarone.  Consider cardiology consultation.  Patient is on Eliquis due to prior PE.    Pneumonia/tracheitis/UTI:  Patient has a history of complicated pneumonia with parapneumonic effusion, increased secretions currently requiring frequent suctioning.  UTI with prior ESBL noted.  Currently will treat with vancomycin and Zosyn based on prior cultures, consider broadening if any worsening.  Send blood and urine cultures.    Sacral osteomyelitis:  Has been followed  by [facility], was on 6 to 8 weeks of antibiotics per last discharge summary.    Nonverbal/PEG tube/anoxic brain injury:  Complicates all aspects of care.  Overall prognosis is fairly poor.  Quality of life is also poor.    Further recommendations depend upon clinical course.    Risk Assessment: High  DVT Prophylaxis: Eliquis  Code Status: Full  Diet: Tube feeds    Advance Care Planning  ACP discussion was declined by the patient. Patient has an advance directive in EMR which is still valid.            Jessica Estevez DO  25  05:18 EDT    Dictated utilizing Dragon dictation.    Electronically signed by Jessica Estevez DO at 25 0533          Emergency Department Notes        Sandro Arboleda MD at 25 0311        Procedure Orders    1. Central Line At Bedside [[id number]] ordered by Sandro Arboleda MD                  EMERGENCY DEPARTMENT ENCOUNTER    Pt Name: Viji Vargas  MRN: [id number]  Pt :   1960  Room Number:    Date of encounter:  3/18/2025  PCP: Ranjeet Pina MD  ED Provider: Sandro Arboleda MD    Historian: EMS, nursing staff      HPI:  Chief Complaint: Dyspnea        Context: Viji Vargas is a 64 y.o. female who presents to the ED c/o dyspnea.  Patient has a past medical history significant for anoxic brain injury with previous cardiac arrest in 2019, stroke, tracheostomy and PEG tube, COPD and PE on eliquis BID.  Patient presents to the Emergency Department from [facility] with reports of dyspnea.  Nursing home reports that they suctioned her tracheostomy with resolution of dyspnea.  Patient requires 4 L of oxygen at baseline.  Patient non-verbal at baseline and unable to provide any history.      PAST MEDICAL HISTORY  Past Medical History:   Diagnosis Date    Abdominal distention     Acute and chronic respiratory failure with hypoxia     Adult hypertrophic pyloric stenosis     COPD (chronic obstructive pulmonary disease)     Hypertension      Osteomyelitis     Pneumonia     Stroke          PAST SURGICAL HISTORY  Past Surgical History:   Procedure Laterality Date    HYSTERECTOMY      Partial     TRACHEOSTOMY AND PEG TUBE INSERTION N/A 3/20/2019    Procedure: TRACHEOSTOMY AND PERCUTANEOUS ENDOSCOPIC GASTROSTOMY TUBE INSERTION;  Surgeon: Nadira Pandey MD;  Location: [facility];  Service: General         FAMILY HISTORY  History reviewed. No pertinent family history.      SOCIAL HISTORY  Social History     Socioeconomic History    Marital status: Legally    Tobacco Use    Smoking status: Former     Current packs/day: 1.00     Types: Electronic Cigarette, Cigarettes    Smokeless tobacco: Never   Vaping Use    Vaping status: Unknown   Substance and Sexual Activity    Alcohol use: Not Currently     Comment: wine     Drug use: No    Sexual activity: Defer         ALLERGIES  Patient has no known allergies.        REVIEW OF SYSTEMS    All systems reviewed and negative except for those discussed in HPI.       PHYSICAL EXAM    I have reviewed the triage vital signs and nursing notes.    ED Triage Vitals [03/18/25 0242]   Temp Heart Rate Resp BP SpO2   99.1 °F (37.3 °C) 117 20 102/79 100 %      Temp src Heart Rate Source Patient Position BP Location FiO2 (%)   Rectal Monitor Lying Left arm --         General: Moderate acute distress  Skin: Large sacral decubitus ulcer  Head: normocephalic, atraumatic  Nose: normal nasal mucosa, no visible deformity.  Mouth: dry mucous membranes.  Neck: supple.  Chest: no retractions, no visible deformity  Cardiovascular: tachycardic, regular rhythm.  Lungs: Rhonchi in the bases bilaterally.    Abdomen: soft, non-distended. No rebound tenderness, no guarding. No peritonitis. PEG tube in place.  Extremities: Contractures about the bilateral upper extremities.  Neuro:  opens eyes spontaneously, non-verbal.        LAB RESULTS  Recent Results (from the past 24 hours)   Urinalysis With Culture If Indicated - Urine, Clean Catch     Collection Time: 03/17/25  1:00 PM    Specimen: Urine, Clean Catch   Result Value Ref Range    Color, UA Yellow Yellow, Straw    Appearance, UA Cloudy (A) Clear    pH, UA 6.5 5.0 - 8.0    Specific Gravity, UA 1.015 1.005 - 1.030    Glucose, UA Negative Negative    Ketones, UA Negative Negative    Bilirubin, UA Negative Negative    Blood, UA Small (1+) (A) Negative    Protein, UA 30 mg/dL (1+) (A) Negative    Leuk Esterase, UA Large (3+) (A) Negative    Nitrite, UA Positive (A) Negative    Urobilinogen, UA 0.2 E.U./dL 0.2 - 1.0 E.U./dL   Urinalysis, Microscopic Only - Urine, Clean Catch    Collection Time: 03/17/25  1:00 PM    Specimen: Urine, Clean Catch   Result Value Ref Range    RBC, UA 0-2 None Seen, 0-2 /HPF    WBC, UA 21-50 (A) None Seen, 0-2 /HPF    Bacteria, UA 3+ (A) None Seen /HPF    Squamous Epithelial Cells, UA None Seen None Seen, 0-2 /HPF    Yeast, UA Small/1+ Budding Yeast (A) None Seen /HPF    Hyaline Casts, UA 0-2 None Seen /LPF    Methodology Manual Light Microscopy    Comprehensive Metabolic Panel    Collection Time: 03/18/25  3:45 AM    Specimen: Blood   Result Value Ref Range    Glucose 189 (H) 65 - 99 mg/dL    BUN 33 (H) 8 - 23 mg/dL    Creatinine 0.59 0.57 - 1.00 mg/dL    Sodium 136 136 - 145 mmol/L    Potassium 4.7 3.5 - 5.2 mmol/L    Chloride 97 (L) 98 - 107 mmol/L    CO2 28.4 22.0 - 29.0 mmol/L    Calcium 9.0 8.6 - 10.5 mg/dL    Total Protein 7.4 6.0 - 8.5 g/dL    Albumin 2.7 (L) 3.5 - 5.2 g/dL    ALT (SGPT) 26 1 - 33 U/L    AST (SGOT) 21 1 - 32 U/L    Alkaline Phosphatase 104 39 - 117 U/L    Total Bilirubin 0.2 0.0 - 1.2 mg/dL    Globulin 4.7 gm/dL    A/G Ratio 0.6 g/dL    BUN/Creatinine Ratio 55.9 (H) 7.0 - 25.0    Anion Gap 10.6 5.0 - 15.0 mmol/L    eGFR 99.5 >60.0 mL/min/1.73   CBC Auto Differential    Collection Time: 03/18/25  3:45 AM    Specimen: Blood   Result Value Ref Range    WBC 11.30 (H) 3.40 - 10.80 10*3/mm3    RBC 3.56 (L) 3.77 - 5.28 10*6/mm3    Hemoglobin 8.6 (L) 12.0 -  15.9 g/dL    Hematocrit 29.2 (L) 34.0 - 46.6 %    MCV 82.0 79.0 - 97.0 fL    MCH 24.2 (L) 26.6 - 33.0 pg    MCHC 29.5 (L) 31.5 - 35.7 g/dL    RDW 16.9 (H) 12.3 - 15.4 %    RDW-SD 50.5 37.0 - 54.0 fl    MPV 10.8 6.0 - 12.0 fL    Platelets 571 (H) 140 - 450 10*3/mm3    Neutrophil % 70.7 42.7 - 76.0 %    Lymphocyte % 11.2 (L) 19.6 - 45.3 %    Monocyte % 7.5 5.0 - 12.0 %    Eosinophil % 6.3 (H) 0.3 - 6.2 %    Basophil % 0.8 0.0 - 1.5 %    Immature Grans % 3.5 (H) 0.0 - 0.5 %    Neutrophils, Absolute 8.00 (H) 1.70 - 7.00 10*3/mm3    Lymphocytes, Absolute 1.26 0.70 - 3.10 10*3/mm3    Monocytes, Absolute 0.85 0.10 - 0.90 10*3/mm3    Eosinophils, Absolute 0.71 (H) 0.00 - 0.40 10*3/mm3    Basophils, Absolute 0.09 0.00 - 0.20 10*3/mm3    Immature Grans, Absolute 0.39 (H) 0.00 - 0.05 10*3/mm3    nRBC 0.0 0.0 - 0.2 /100 WBC   Lactic Acid, Plasma    Collection Time: 03/18/25  3:45 AM    Specimen: Blood   Result Value Ref Range    Lactate 1.5 0.5 - 2.0 mmol/L   Procalcitonin    Collection Time: 03/18/25  3:45 AM    Specimen: Blood   Result Value Ref Range    Procalcitonin 1.55 (H) 0.00 - 0.25 ng/mL   Urinalysis With Microscopic If Indicated (No Culture) - Indwelling Urethral Catheter    Collection Time: 03/18/25  4:58 AM    Specimen: Indwelling Urethral Catheter; Urine   Result Value Ref Range    Color, UA Yellow Yellow, Straw    Appearance, UA Turbid (A) Clear    pH, UA 6.0 5.0 - 8.0    Specific Gravity, UA 1.016 1.005 - 1.030    Glucose, UA Negative Negative    Ketones, UA Negative Negative    Bilirubin, UA Negative Negative    Blood, UA Large (3+) (A) Negative    Protein,  mg/dL (2+) (A) Negative    Leuk Esterase, UA Large (3+) (A) Negative    Nitrite, UA Negative Negative    Urobilinogen, UA 0.2 E.U./dL 0.2 - 1.0 E.U./dL   Urinalysis, Microscopic Only - Indwelling Urethral Catheter    Collection Time: 03/18/25  4:58 AM    Specimen: Indwelling Urethral Catheter; Urine   Result Value Ref Range    RBC, UA 21-50 (A) None  Seen, 0-2 /HPF    WBC, UA 21-50 (A) None Seen, 0-2 /HPF    Bacteria, UA 3+ (A) None Seen /HPF    Squamous Epithelial Cells, UA 21-30 (A) None Seen, 0-2 /HPF    Transitional Epithelial Cells, UA 3-6 (A) 0 - 2 /HPF    Renal Epithelial Cells, UA 3-6 (A) 0 - 2 /HPF    Hyaline Casts, UA 0-2 None Seen /LPF    Methodology Manual Light Microscopy        If labs were ordered, I independently reviewed the results and considered them in treating the patient.  See medical decision making discussion section for my interpretation of lab results.        RADIOLOGY  No Radiology Exams Resulted Within Past 24 Hours    I ordered and independently reviewed the above noted radiographic studies.  See radiologist's dictation for official interpretation.    Per my independent reading:      Portable chest radiograph obtained and based on my independent reading shows poor inspiratory effort.  Tracheostomy in appropriate position.        PROCEDURES    Central Line At Bedside    Date/Time: 3/18/2025 6:18 AM    Performed by: Sandro Arboleda MD  Authorized by: Walter Barragan DO    Consent:     Consent obtained:  Emergent situation  Universal protocol:     Patient identity confirmed:  Arm band  Pre-procedure details:     Indication(s): central venous access and insufficient peripheral access      Hand hygiene: Hand hygiene performed prior to insertion      Sterile barrier technique: All elements of maximal sterile technique followed      Skin preparation:  Chlorhexidine    Skin preparation agent: Skin preparation agent completely dried prior to procedure    Sedation:     Sedation type:  None  Anesthesia:     Anesthesia method:  Local infiltration    Local anesthetic:  Lidocaine 1% w/o epi  Procedure details:     Location:  R femoral    Patient position:  Supine    Procedural supplies:  Triple lumen    Catheter size:  7 Fr    Landmarks identified: yes      Ultrasound guidance: yes      Ultrasound guidance timing: real time      Sterile ultrasound  techniques: Sterile gel and sterile probe covers were used      Number of attempts:  1  Post-procedure details:     Post-procedure:  Dressing applied and line sutured    Assessment:  Blood return through all ports    Procedure completion:  Tolerated well, no immediate complications      ECG 12 Lead Rhythm Change   Final Result      ECG 12 Lead Tachycardia   Final Result          MEDICATIONS GIVEN IN ER    Medications   amiodarone 360 mg in 200 mL D5W infusion (1 mg/min Intravenous New Bag 3/18/25 0537)     Followed by   amiodarone 360 mg in 200 mL D5W infusion (has no administration in time range)   vancomycin (VANCOCIN) 1,500 mg in sodium chloride 0.9 % 500 mL IVPB (1,500 mg Intravenous New Bag 3/18/25 0612)   apixaban (ELIQUIS) tablet 5 mg (has no administration in time range)   budesonide (PULMICORT) nebulizer solution 0.5 mg (has no administration in time range)   docusate sodium (COLACE) liquid 300 mg (has no administration in time range)   glycopyrrolate (ROBINUL) tablet 2 mg (has no administration in time range)   ipratropium-albuterol (DUO-NEB) nebulizer solution 3 mL (has no administration in time range)   midodrine (PROAMATINE) tablet 5 mg (has no administration in time range)   lansoprazole (PREVACID SOLUTAB) disintegrating tablet Tablet Delayed Release Dispersible 30 mg (30 mg Per G Tube Given 3/18/25 0613)   saccharomyces boulardii (FLORASTOR) capsule 250 mg (has no administration in time range)   scopolamine patch 1 mg/72 hr (1 patch Transdermal Medication Applied 3/18/25 0614)   sodium chloride 0.9 % flush 10 mL (has no administration in time range)   sodium chloride 0.9 % flush 10 mL (has no administration in time range)   sodium chloride 0.9 % infusion 40 mL (has no administration in time range)   nitroglycerin (NITROSTAT) SL tablet 0.4 mg (has no administration in time range)   Potassium Replacement - Follow Nurse / BPA Driven Protocol (has no administration in time range)   Magnesium Standard Dose  Replacement - Follow Nurse / BPA Driven Protocol (has no administration in time range)   Phosphorus Replacement - Follow Nurse / BPA Driven Protocol (has no administration in time range)   Calcium Replacement - Follow Nurse / BPA Driven Protocol (has no administration in time range)   sodium chloride 0.9 % infusion (50 mL/hr Intravenous New Bag 3/18/25 0613)   ondansetron ODT (ZOFRAN-ODT) disintegrating tablet 4 mg (has no administration in time range)     Or   ondansetron (ZOFRAN) injection 4 mg (has no administration in time range)   Pharmacy to dose vancomycin (has no administration in time range)   piperacillin-tazobactam (ZOSYN) IVPB 3.375 g IVPB in 100 mL NS (VTB) (has no administration in time range)   vancomycin (VANCOCIN) 1,000 mg in sodium chloride 0.9 % 250 mL IVPB-VTB (has no administration in time range)   sodium chloride 0.9 % bolus 500 mL (0 mL Intravenous Stopped 3/18/25 0502)   piperacillin-tazobactam (ZOSYN) IVPB 4.5 g IVPB in 100 mL NS (VTB) (0 g Intravenous Stopped 3/18/25 0514)   amiodarone 150 mg in 100 mL D5W (loading dose) (0 mg Intravenous Stopped 3/18/25 0514)         MEDICAL DECISION MAKING, PROGRESS, and CONSULTS    All labs, if obtained, have been independently reviewed by me.  All radiology studies, if obtained, have been reviewed by me and the radiologist dictating the report.  All EKG's, if obtained, have been independently viewed and interpreted by me/my attending physician.      Discussion below represents my analysis of pertinent findings related to patient's condition, differential diagnosis, treatment plan and final disposition.                         Differential diagnosis:    Differential includes aspiration, pneumonia, viral syndrome, pulmonary embolism, other acute emergency.    Medical Decision Making Discussion:    Vitals reviewed and demonstrate tachycardia and lower limit of normal blood pressure but otherwise are normal.    EKG obtained and based on my independent  reading shows sinus tachycardia.  No acute ischemic changes.  Normal MN, QRS and QT intervals.    Labs reviewed.  Patient has elevated procalcitonin.  Mild hyperglycemia.  Baseline anemia.  Slight leukocytosis.    Patient has indwelling Sanchez catheter.  Have ordered replacement Sanchez catheter and UA from the new catheter.    Clinically, I am concerned the patient has aspirated.  Blood cultures obtained.  Patient given broad-spectrum IV antibiotics.    While in the emergency department patient has had a run of nonsustained V. tach.  This was captured on repeat EKG.  Patient given amiodarone bolus and ordered amiodarone drip.    Patient has an adequate peripheral IV access.  Central line was placed.    Case discussed with Dr. Estevez who has accepted the patient for hospitalization.    45 minutes of critical care provided. This time excludes other billable procedures. Time does include preparation of documents, medical consultations, review of old records, and direct bedside care. Patient is at high risk for life-threatening deterioration due to aspiration, UTI, non-sustained v-tach.      Additional sources:    - Discussed/ obtained information from independent historians:  EMS, nursing staff    - External (non-ED) record review:  history and physical from January 2025 documenting anoxic brain injury with previous cardiac arrest in March 2019, stroke, tracheostomy and PEG tube, COPD.  Hospitalized for concern of infected sacral wound.  Taken to the OR for debridement.    Reviewed discharge summary from January 31, 2025 documenting sacral decubitus ulcer with sacral osteomyelitis.  Recent pulmonary Amish for which she is on Eliquis twice a day.    - Chronic or social conditions impacting care: anoxic brain injury, non-verbal state    Shared Decision Making:  After my consideration of clinical presentation and any laboratory/radiology studies obtained, I discussed the findings with the patient/patient representative  who is in agreement with the treatment plan and the final disposition.   Risks and benefits of discharge and/or observation/admission were discussed.    Orders placed during this visit:  Orders Placed This Encounter   Procedures    Insert Central Line At Bedside    Blood Culture - Blood,    Blood Culture - Blood,    COVID-19 and FLU A/B PCR, 1 HR TAT - Swab, Nasopharynx    MRSA Screen, PCR (Inpatient) - Swab, Nares    Urine Culture - Urine, Indwelling Urethral Catheter    XR Chest 1 View    CT Abdomen Pelvis Without Contrast    Comprehensive Metabolic Panel    CBC Auto Differential    Lactic Acid, Plasma    Procalcitonin    Urinalysis With Microscopic If Indicated (No Culture) - Indwelling Urethral Catheter    Urinalysis, Microscopic Only - Urine, Clean Catch    Basic Metabolic Panel    CBC (No Diff)    Vancomycin, Random    NPO Diet NPO Type: Strict NPO, Tube Feeding    Replace Sanchez Catheter    Assess Need for Indwelling Urinary Catheter - Follow Removal Protocol    Urinary Catheter Care    Vital Signs    Intake & Output    Weigh Patient    Oral Care    Place Sequential Compression Device    Maintain Sequential Compression Device    Maintain IV Access    Telemetry - Place Orders & Notify Provider of Results When Patient Experiences Acute Chest Pain, Dysrhythmia or Respiratory Distress    May Be Off Telemetry for Tests    Code Status and Medical Interventions: CPR (Attempt to Resuscitate); Full Support    Inpatient Nutrition Consult    ECG 12 Lead Tachycardia    ECG 12 Lead Rhythm Change    Adult Transthoracic Echo Complete W/ Cont if Necessary Per Protocol    Wound Ostomy Eval & Treat    Insert Peripheral IV    Inpatient Admission         AS OF 06:47 EDT VITALS:    BP - 129/79  HR - 117  TEMP - 99.1 °F (37.3 °C) (Rectal)  O2 SATS - 100%                  DIAGNOSIS  Final diagnoses:   Aspiration pneumonia, unspecified aspiration pneumonia type, unspecified laterality, unspecified part of lung   Urinary tract  infection associated with indwelling urethral catheter, initial encounter   Non-sustained ventricular tachycardia         DISPOSITION  Admit      Please note that portions of this document were completed with voice recognition software.        Sandro Arboleda MD  03/18/25 0628       Sandro Arboleda MD  03/18/25 0647      Electronically signed by Sandro Arboleda MD at 03/18/25 0647       Vital Signs (last day)       Date/Time Temp Temp src Pulse Resp BP Patient Position SpO2    03/18/25 0730 -- -- 111 -- 116/63 -- 100    03/18/25 0724 -- -- 109 -- -- -- 100    03/18/25 0615 -- -- 117 -- -- -- --    03/18/25 0541 -- -- 121 -- 129/79 -- 100    03/18/25 0530 -- -- 113 -- 98/74 -- 100    03/18/25 0512 -- -- 108 -- -- -- --    03/18/25 0500 -- -- 121 -- 120/72 -- 100    03/18/25 0430 -- -- 114 -- 101/54 -- 98    03/18/25 0400 -- -- 116 -- 117/66 -- 100    03/18/25 0350 -- -- 112 -- 108/64 -- 100    03/18/25 0330 -- -- 113 -- 95/71 -- 100    03/18/25 0300 -- -- 111 -- 107/68 -- 100    03/18/25 0242 99.1 (37.3) Rectal 117 20 102/79 Lying 100          Current Facility-Administered Medications   Medication Dose Route Frequency Provider Last Rate Last Admin    amiodarone 360 mg in 200 mL D5W infusion  1 mg/min Intravenous Continuous Jessica Estevez DO 33.3 mL/hr at 03/18/25 0537 1 mg/min at 03/18/25 0537    Followed by    amiodarone 360 mg in 200 mL D5W infusion  0.5 mg/min Intravenous Continuous Jessica Estevez DO        apixaban (ELIQUIS) tablet 5 mg  5 mg Per G Tube Q12H Jessica Estevez DO        budesonide (PULMICORT) nebulizer solution 0.5 mg  0.5 mg Nebulization BID - RT Jessica Estevez DO   0.5 mg at 03/18/25 0724    Calcium Replacement - Follow Nurse / BPA Driven Protocol   Not Applicable PRN Jessica Estevez DO        docusate sodium (COLACE) liquid 300 mg  300 mg Per G Tube BID Jessica Estevez DO        glycopyrrolate (ROBINUL) tablet 2 mg  2 mg Per G Tube 4x Daily Herb  Jessica Rivera DO   2 mg at 03/18/25 0738    ipratropium-albuterol (DUO-NEB) nebulizer solution 3 mL  3 mL Nebulization 4x Daily - RT Jessica Estevez DO   3 mL at 03/18/25 0724    lansoprazole (PREVACID SOLUTAB) disintegrating tablet Tablet Delayed Release Dispersible 30 mg  30 mg Per G Tube Q AM Jessica Estevez DO   30 mg at 03/18/25 0613    Magnesium Standard Dose Replacement - Follow Nurse / BPA Driven Protocol   Not Applicable PRN Jessica Estevez DO        midodrine (PROAMATINE) tablet 5 mg  5 mg Per G Tube TID AC Jessica Estevez DO   5 mg at 03/18/25 0742    nitroglycerin (NITROSTAT) SL tablet 0.4 mg  0.4 mg Sublingual Q5 Min PRN Jessica Estevez DO        ondansetron ODT (ZOFRAN-ODT) disintegrating tablet 4 mg  4 mg Oral Q6H PRN Jessica Estevez DO        Or    ondansetron (ZOFRAN) injection 4 mg  4 mg Intravenous Q6H PRN Jessica Estevez DO        Pharmacy to dose vancomycin   Not Applicable Continuous PRN Jessica Estevez DO        Phosphorus Replacement - Follow Nurse / BPA Driven Protocol   Not Applicable PRN Jessica Estevez DO        piperacillin-tazobactam (ZOSYN) IVPB 3.375 g IVPB in 100 mL NS (VTB)  3.375 g Intravenous Q8H Jessica Estevez DO        Potassium Replacement - Follow Nurse / BPA Driven Protocol   Not Applicable PRN Jessica Estevez DO        saccharomyces boulardii (FLORASTOR) capsule 250 mg  250 mg Per G Tube BID Jessica Estevez DO        scopolamine patch 1 mg/72 hr  1 patch Transdermal Q72H Jessica Estevez DO   1 patch at 03/18/25 0614    sodium chloride 0.9 % flush 10 mL  10 mL Intravenous Q12H Jessica Estevez DO        sodium chloride 0.9 % flush 10 mL  10 mL Intravenous PRN Jessica Estevez DO        sodium chloride 0.9 % infusion 40 mL  40 mL Intravenous PRN Jessica Estevez DO        sodium chloride 0.9 % infusion  50 mL/hr Intravenous Continuous Jessica Estevez  Miguel, DO 50 mL/hr at 03/18/25 0613 50 mL/hr at 03/18/25 0613    vancomycin (VANCOCIN) 1,000 mg in sodium chloride 0.9 % 250 mL IVPB-VTB  1,000 mg Intravenous Q12H Jessica Estevez,          Current Outpatient Medications   Medication Sig Dispense Refill    apixaban (ELIQUIS) 5 MG tablet tablet Take 1 tablet by mouth.      baclofen (LIORESAL) 10 MG tablet Administer 1 tablet per G tube Every 8 (Eight) Hours.      budesonide (PULMICORT) 0.5 MG/2ML nebulizer solution Take 2 mL by nebulization 2 (Two) Times a Day.      cetirizine (zyrTEC) 10 MG tablet Administer 1 tablet per G tube Daily.      docusate sodium (COLACE) 50 mg/5 mL liquid Administer 30 mL per G tube 2 (Two) Times a Day.      glycopyrrolate (ROBINUL) 2 MG tablet Administer 1 tablet per G tube 4 (Four) Times a Day.      hyoscyamine (ANASPAZ,LEVSIN) 0.125 MG tablet Administer 1 tablet per G tube Every 4 (Four) Hours.      ipratropium-albuterol (DUO-NEB) 0.5-2.5 mg/3 ml nebulizer Take 3 mL by nebulization 4 (Four) Times a Day. Takes at [id number]      Lansinoh Lanolin cream Apply 1 Application topically to the appropriate area as directed.      midodrine (PROAMATINE) 5 MG tablet Administer 1 tablet per G tube 3 (Three) Times a Day Before Meals for 30 days. 90 tablet 0    Multiple Vitamins-Minerals (MULTIVITAMIN WITH IRON-MINERALS) liquid Administer 15 mL per G tube Daily.      mupirocin (BACTROBAN) 2 % ointment Apply 1 Application topically to the appropriate area as directed 2 (Two) Times a Day. Apply to G-tube site twice daily      OMEPRAZOLE 2MG/ML LIQUID Administer 10 mL per G tube 2 (Two) Times a Day.      polyethylene glycol (MiraLax) 17 g packet 17 g 2 (Two) Times a Day.      saccharomyces boulardii (FLORASTOR) 250 MG capsule Take 1 capsule by mouth 2 (Two) Times a Day.      Scopolamine 1 MG/3DAYS patch Place 1 patch on the skin as directed by provider Every 72 (Seventy-Two) Hours.      senna 8.6 MG tablet 2 tablets by Per PEG Tube  route 2 (Two) Times a Day.      Skin Protectants, Misc. (Eucerin) cream Apply 1 Application topically to the appropriate area as directed As Needed for Dry Skin. Apply to bilateral arms as needed for dry skin      sodium hypochlorite (DAKIN'S 1/4 STRENGTH) 0.125 % solution topical solution 0.125% Apply  topically to the appropriate area as directed.      torsemide (DEMADEX) 20 MG tablet Take 0.5 tablets by mouth Daily.       Lab Results (last 24 hours)       Procedure Component Value Units Date/Time    Urine Culture - Urine, Indwelling Urethral Catheter [[id number]] Collected: 03/18/25 0458    Specimen: Urine from Indwelling Urethral Catheter Updated: 03/18/25 0623    MRSA Screen, PCR (Inpatient) - Swab, Nares [[id number]] Collected: 03/18/25 0615    Specimen: Swab from Nares Updated: 03/18/25 0618    Urinalysis, Microscopic Only - Indwelling Urethral Catheter [[id number]]  (Abnormal) Collected: 03/18/25 0458    Specimen: Urine from Indwelling Urethral Catheter Updated: 03/18/25 0521     RBC, UA 21-50 /HPF      WBC, UA 21-50 /HPF      Bacteria, UA 3+ /HPF      Squamous Epithelial Cells, UA 21-30 /HPF      Transitional Epithelial Cells, UA 3-6 /HPF      Renal Epithelial Cells, UA 3-6 /HPF      Hyaline Casts, UA 0-2 /LPF      Methodology Manual Light Microscopy    Urinalysis With Microscopic If Indicated (No Culture) - Indwelling Urethral Catheter [[id number]]  (Abnormal) Collected: 03/18/25 0458    Specimen: Urine from Indwelling Urethral Catheter Updated: 03/18/25 0505     Color, UA Yellow     Appearance, UA Turbid     pH, UA 6.0     Specific Gravity, UA 1.016     Glucose, UA Negative     Ketones, UA Negative     Bilirubin, UA Negative     Blood, UA Large (3+)     Protein,  mg/dL (2+)     Leuk Esterase, UA Large (3+)     Nitrite, UA Negative     Urobilinogen, UA 0.2 E.U./dL    Procalcitonin [[id number]]  (Abnormal) Collected: 03/18/25 0345    Specimen: Blood Updated: 03/18/25 0429     Procalcitonin 1.55 ng/mL      "Narrative:      As a Marker for Sepsis (Non-Neonates):    1. <0.5 ng/mL represents a low risk of severe sepsis and/or septic shock.  2. >2 ng/mL represents a high risk of severe sepsis and/or septic shock.    As a Marker for Lower Respiratory Tract Infections that require antibiotic therapy:    PCT on Admission    Antibiotic Therapy       6-12 Hrs later    >0.5                Strongly Recommended  >0.25 - <0.5        Recommended   0.1 - 0.25          Discouraged              Remeasure/reassess PCT  <0.1                Strongly Discouraged     Remeasure/reassess PCT    As 28 day mortality risk marker: \"Change in Procalcitonin Result\" (>80% or <=80%) if Day 0 (or Day 1) and Day 4 values are available. Refer to http://www.[facility]-pct-calculator.com    Change in PCT <=80%  A decrease of PCT levels below or equal to 80% defines a positive change in PCT test result representing a higher risk for 28-day all-cause mortality of patients diagnosed with severe sepsis for septic shock.    Change in PCT >80%  A decrease of PCT levels of more than 80% defines a negative change in PCT result representing a lower risk for 28-day all-cause mortality of patients diagnosed with severe sepsis or septic shock.       CBC Auto Differential [[id number]]  (Abnormal) Collected: 03/18/25 0345    Specimen: Blood Updated: 03/18/25 0425     WBC 11.30 10*3/mm3      RBC 3.56 10*6/mm3      Hemoglobin 8.6 g/dL      Hematocrit 29.2 %      MCV 82.0 fL      MCH 24.2 pg      MCHC 29.5 g/dL      RDW 16.9 %      RDW-SD 50.5 fl      MPV 10.8 fL      Platelets 571 10*3/mm3      Neutrophil % 70.7 %      Lymphocyte % 11.2 %      Monocyte % 7.5 %      Eosinophil % 6.3 %      Basophil % 0.8 %      Immature Grans % 3.5 %      Neutrophils, Absolute 8.00 10*3/mm3      Lymphocytes, Absolute 1.26 10*3/mm3      Monocytes, Absolute 0.85 10*3/mm3      Eosinophils, Absolute 0.71 10*3/mm3      Basophils, Absolute 0.09 10*3/mm3      Immature Grans, Absolute 0.39 10*3/mm3  "     nRBC 0.0 /100 WBC     Comprehensive Metabolic Panel [[id number]]  (Abnormal) Collected: 03/18/25 0345    Specimen: Blood Updated: 03/18/25 0420     Glucose 189 mg/dL      Comment: Glucose >180, Hemoglobin A1C recommended.        BUN 33 mg/dL      Creatinine 0.59 mg/dL      Sodium 136 mmol/L      Potassium 4.7 mmol/L      Chloride 97 mmol/L      CO2 28.4 mmol/L      Calcium 9.0 mg/dL      Total Protein 7.4 g/dL      Albumin 2.7 g/dL      ALT (SGPT) 26 U/L      AST (SGOT) 21 U/L      Alkaline Phosphatase 104 U/L      Total Bilirubin 0.2 mg/dL      Globulin 4.7 gm/dL      A/G Ratio 0.6 g/dL      BUN/Creatinine Ratio 55.9     Anion Gap 10.6 mmol/L      eGFR 99.5 mL/min/1.73     Narrative:      GFR Categories in Chronic Kidney Disease (CKD)      GFR Category          GFR (mL/min/1.73)    Interpretation  G1                     90 or greater         Normal or high (1)  G2                      60-89                Mild decrease (1)  G3a                   45-59                Mild to moderate decrease  G3b                   30-44                Moderate to severe decrease  G4                    15-29                Severe decrease  G5                    14 or less           Kidney failure          (1)In the absence of evidence of kidney disease, neither GFR category G1 or G2 fulfill the criteria for CKD.    eGFR calculation 2021 CKD-EPI creatinine equation, which does not include race as a factor    Lactic Acid, Plasma [[id number]]  (Normal) Collected: 03/18/25 0345    Specimen: Blood Updated: 03/18/25 0417     Lactate 1.5 mmol/L     Blood Culture - Blood, Arm, Right [[id number]] Collected: 03/18/25 0345    Specimen: Blood from Arm, Right Updated: 03/18/25 0400    Blood Culture - Blood, Arm, Right [[id number]] Collected: 03/18/25 0352    Specimen: Blood from Arm, Right Updated: 03/18/25 0359          Imaging Results (Last 24 Hours)       Procedure Component Value Units Date/Time    XR Chest 1 View [[id number]] Resulted:  03/18/25 0323     Updated: 03/18/25 0323          Physician Progress Notes (last 24 hours)  Notes from 03/17/25 0744 through 03/18/25 0744   No notes of this type exist for this encounter.       Consult Notes (last 24 hours)  Notes from 03/17/25 0744 through 03/18/25 0744   No notes of this type exist for this encounter.

## 2025-03-18 NOTE — NURSING NOTE
Seen for wound consult. Non-verbal. Care explained. Anali JUAREZ assisted.   Contractures affecting most body parts, including foot deformity.   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 Undermining from 8 o'clock-5 o'clock. Malodorous even after cleansing. Wound culture obtained after cleaning wound with normal saline. Care provided and orders written to Clean sacral wound with dakins, lightly pack with dakins moist gauze, cover with dry gauze, abd pad then secure with tape every 12 hours. Would benefit from some sharps debridement of slough areas or can wait to see if the dakins removes the slough.   Wedge used to turn patient on right side. Pillow used to elevate heels and pad bony prominences.   Standing orders for care include a turning schedule, barrier cream twice daily and prn after cleansing, float heels off bed with pillows, encourage increase mobility as appropriate and tolerated to reduce pressure, and a nutrition consult for dietary needs. Staff to contact provider and re-consult wound nurse for new skin issues or lack of improvement with current orders. Thank you for the consult. If you have questions or concerns do not hesitate to contact me.

## 2025-03-18 NOTE — CONSULTS
"Tube Feeding Assessment    Patient Name: Viji Vargas  YOB: 1960  MRN: [id number]  Admission date: 3/18/2025    Comment:    EN Recommendations:    1. Initiate Isosource 1.5 at 25 mL/hr. Advance 10 mL q 6 hrs as tolerated to goal rate of 55 mL/hr x 22 hrs.    2. FW at 100 q 4 hrs.    3. Administer Edilberto BID via PEG. Flush feeding tube with 30 mL before and after administering Edilberto. Mix Edilberto with 120 mL of water until Edilberto is completely dissolved and administer via PEG.    Total Regimen Provides: 1995 kcal, 87 g pro, 1884 mL of FW      Encounter Information     Trending Narrative 3/18: Pt is in the ED and noted for low oxygen on admission. PEG in place and previously on Isosource 1.5. Noted for osteomyelitis/ulcerations of sacrum. Will provide EN recommendations.    H&P  Past Medical History:   Diagnosis Date    Abdominal distention 2024    Acute and chronic respiratory failure with hypoxia 2024    Adult hypertrophic pyloric stenosis 2024    COPD (chronic obstructive pulmonary disease)     Hypertension     Osteomyelitis     Pneumonia     Stroke          Past Surgical History:   Procedure Laterality Date    HYSTERECTOMY      Partial     TRACHEOSTOMY AND PEG TUBE INSERTION N/A 3/20/2019    Procedure: TRACHEOSTOMY AND PERCUTANEOUS ENDOSCOPIC GASTROSTOMY TUBE INSERTION;  Surgeon: Nadira Pandey MD;  Location: [facility];  Service: General        Anthropometrics     Current Height, Weight Height: 162.6 cm (64\")  Weight: 77.6 kg (171 lb 1.2 oz) (03/18/25 0242)     Trending Weight History Wt Readings from Last 30 Encounters:   03/18/25 0242 77.6 kg (171 lb 1.2 oz)   02/12/25 1729 77.6 kg (171 lb 1.2 oz)   10/01/24 0523 77.6 kg (171 lb 1.2 oz)   09/30/24 0823 81.3 kg (179 lb 3.7 oz)   09/29/24 1546 77 kg (169 lb 12.1 oz)   09/29/24 0500 75.1 kg (165 lb 9.1 oz)   09/28/24 1817 75.2 kg (165 lb 12.6 oz)   09/28/24 0915 70.3 kg (155 lb)   09/16/24 0228 70.3 kg (155 lb)   07/12/24 0637 70.3 kg (155 lb) "   02/18/24 1818 70.3 kg (155 lb)   01/21/24 0917 70.3 kg (155 lb)   01/09/24 0357 70.5 kg (155 lb 6.4 oz)   01/08/24 0313 68 kg (150 lb)   01/06/24 0351 68.5 kg (151 lb)   01/05/24 1509 70.9 kg (156 lb 6.4 oz)   01/04/24 0600 70.9 kg (156 lb 4.9 oz)   01/03/24 0600 70.9 kg (156 lb 4.9 oz)   01/02/24 0600 73.8 kg (162 lb 11.2 oz)   01/01/24 0600 74.7 kg (164 lb 10.9 oz)   12/26/23 0430 69.7 kg (153 lb 10.6 oz)   12/25/23 0600 71.9 kg (158 lb 8.2 oz)   12/24/23 1600 79 kg (174 lb 2.6 oz)   12/24/23 0400 70.5 kg (155 lb 6.8 oz)   12/23/23 0500 69.7 kg (153 lb 10.6 oz)   12/22/23 1900 70.3 kg (154 lb 15 oz)   12/22/23 0818 71.2 kg (157 lb)   05/23/23 0326 71.4 kg (157 lb 6.5 oz)   05/08/23 0500 71.4 kg (157 lb 6.5 oz)   05/07/23 0622 69.3 kg (152 lb 12.5 oz)   05/06/23 0552 69 kg (152 lb 1.9 oz)   05/06/23 0136 69 kg (152 lb 1.9 oz)   05/05/23 2207 72.6 kg (160 lb)   04/15/23 1931 72.6 kg (160 lb)   03/07/23 1309 72.6 kg (160 lb)   02/11/23 0152 72.6 kg (160 lb)   01/22/23 0620 69.9 kg (154 lb)   11/22/22 1531 69.9 kg (154 lb)   11/12/22 2146 69.9 kg (154 lb)   10/22/22 0356 68 kg (150 lb)   08/13/22 1109 70.5 kg (155 lb 6.4 oz)   04/10/22 0920 79.4 kg (175 lb)   03/31/22 1117 79.4 kg (175 lb)   03/30/22 1638 79.4 kg (175 lb)   02/22/22 1328 79.4 kg (175 lb 0.7 oz)   01/24/22 0124 79.4 kg (175 lb)   01/12/22 0556 74.3 kg (163 lb 12.8 oz)   01/11/22 0340 73.3 kg (161 lb 9.6 oz)   01/10/22 0935 70.7 kg (155 lb 13.8 oz)   01/10/22 0300 70.6 kg (155 lb 10.3 oz)   01/09/22 2152 70.3 kg (155 lb)   12/30/21 1953 70.3 kg (155 lb)   12/30/21 1147 70.3 kg (155 lb)   10/22/21 0917 70.3 kg (155 lb)   09/29/21 0046 70.3 kg (155 lb)   08/24/21 1437 70.5 kg (155 lb 6 oz)       BMI Body mass index is 29.37 kg/m².     Labs     Pertinent Labs Results from last 7 days   Lab Units 03/18/25  0345   SODIUM mmol/L 136   POTASSIUM mmol/L 4.7   CHLORIDE mmol/L 97*   CO2 mmol/L 28.4   BUN mg/dL 33*   CREATININE mg/dL 0.59   CALCIUM mg/dL 9.0    BILIRUBIN mg/dL 0.2   ALK PHOS U/L 104   ALT (SGPT) U/L 26   AST (SGOT) U/L 21   GLUCOSE mg/dL 189*        Results from last 7 days   Lab Units 03/18/25  0345   HEMOGLOBIN g/dL 8.6*   HEMATOCRIT % 29.2*            Lab Results   Component Value Date    HGBA1C 5.2 11/29/2024        Medications  Schedule Medications apixaban, 5 mg, Per G Tube, Q12H  budesonide, 0.5 mg, Nebulization, BID - RT  docusate sodium, 300 mg, Per G Tube, BID  glycopyrrolate, 2 mg, Per G Tube, 4x Daily  ipratropium-albuterol, 3 mL, Nebulization, 4x Daily - RT  lansoprazole, 30 mg, Per G Tube, Q AM  midodrine, 5 mg, Per G Tube, TID AC  piperacillin-tazobactam, 3.375 g, Intravenous, Q8H  saccharomyces boulardii, 250 mg, Per G Tube, BID  Scopolamine, 1 patch, Transdermal, Q72H  sodium chloride, 10 mL, Intravenous, Q12H  vancomycin, 1,000 mg, Intravenous, Q12H  vancomycin, 1,500 mg, Intravenous, Once       Infusions  amiodarone, 1 mg/min, Last Rate: 1 mg/min (03/18/25 0537)   Followed by  amiodarone, 0.5 mg/min  Pharmacy to dose vancomycin,   sodium chloride, 50 mL/hr, Last Rate: 50 mL/hr (03/18/25 0613)        PRN Medications    Calcium Replacement - Follow Nurse / BPA Driven Protocol    Magnesium Standard Dose Replacement - Follow Nurse / BPA Driven Protocol    nitroglycerin    ondansetron ODT **OR** ondansetron    Pharmacy to dose vancomycin    Phosphorus Replacement - Follow Nurse / BPA Driven Protocol    Potassium Replacement - Follow Nurse / BPA Driven Protocol    sodium chloride    sodium chloride     Physical Findings        Edema  no edema   Bowel Function None   Tubes Central Line/PICC and G-Tube/PEG   Skin Sacral osteomyelitis      Estimated/Assessed Needs       Energy Requirements    EST Needs, Method, Wt used 6107-4862 kcal (25-30 kcal/kg AjBW)       Protein Requirements    EST Needs, Method, Wt used  g pro (1.5-2 g pro/kg AjBW)       Fluid Requirements     Estimated Needs (mL/day) 6174-0178 mL/day     Current Tube Feed Orders &  Evaluation      Enteral Nutrition     Enteral Route PEG   Orders, Modulars, Flushes    Residual/Tolerance    Vasopressors    Noninvasive MAP (mmHg) 86   Lactate (mmol/L) 1.5   Propofol (mL/hr)     Tube Feed Observation      Nutrition Diagnosis         Nutrition Dx Problem 1 Increased nutrient needs r/t to skin integrity AEB sacral osteomyelitis.      Nutrition Dx Problem 2 Difficulty chewing/swallowing r/t anoxic brain injury AEB PEG tube in place for enteral nutrition.        Intervention Goal         Intervention Goal(s) Tolerate EN  No weight changes     Nutrition Intervention        RD Action Provide EN recommendations and ordered A1C      Enteral Nutrition Prescription     Enteral Prescription Recommendations:    1. Initiate Isosource 1.5 at 25 mL/hr. Advance 10 mL q 6 hrs as tolerated to goal rate of 55 mL/hr x 22 hrs.    2. FW at 100 q 4 hrs.    3. Administer Edilberto BID via PEG. Flush feeding tube with 30 mL before and after administering Edilberto. Mix Edilberto with 120 mL of water until Edilberto is completely dissolved and administer via PEG.    Total Regimen Provides: 1995 kcal, 87 g pro, 1884 mL of FW         Monitor/Evaluation        Monitor Per protocol, I&O, Pertinent labs, EN delivery/tolerance, Weight, Skin status, GI status, Symptoms, POC/GOC, Swallow function, Hemodynamic stability     RD to f/up    Electronically signed by:  Lorena Trevino RD  03/18/25 07:40 EDT

## 2025-03-18 NOTE — CASE MANAGEMENT/SOCIAL WORK
Case Management/Social Work    Patient Name:  Viji Vargas  YOB: 1960  MRN: [id number]  Admit Date:  3/18/2025    Attempted to reach patient's daughter (and guardian), Liliya Epperson, to initiate DC planning.  No answer at this time at [phone]; left voicemail for return call.  Case Management to follow.      Electronically signed by:  Sharon Zamudio RN  03/18/25 08:59 EDT

## 2025-03-18 NOTE — CASE MANAGEMENT/SOCIAL WORK
Discharge Planning Assessment  [facility]     Patient Name: Viji Vargas  MRN: [id number]  Today's Date: 3/18/2025    Admit Date: 3/18/2025    Plan: Plan for patient to return to [facility]; no needs at this time   Discharge Needs Assessment       Row Name 03/18/25 1017       Living Environment    People in Home facility resident    Unique Family Situation Long term residen at [facility]    Current Living Arrangements extended care facility    Duration at Residence 6 years    Potentially Unsafe Housing Conditions patient unable to answer    In the past 12 months has the electric, gas, oil, or water company threatened to shut off services in your home? Pt Unable    Primary Care Provided by other (see comments)  staff at [facility]    Provides Primary Care For no one, unable/limited ability to care for self    Family Caregiver if Needed none    Quality of Family Relationships involved    Able to Return to Prior Arrangements yes       Resource/Environmental Concerns    Resource/Environmental Concerns none    Transportation Concerns none       Transportation Needs    In the past 12 months, has lack of transportation kept you from medical appointments or from getting medications? Pt Unable    In the past 12 months, has lack of transportation kept you from meetings, work, or from getting things needed for daily living? Pt Unable       Food Insecurity    Within the past 12 months, you worried that your food would run out before you got the money to buy more. Pt Unable    Within the past 12 months, the food you bought just didn't last and you didn't have money to get more. Pt Unable       Transition Planning    Patient/Family Anticipates Transition to long-term care facility    Patient/Family Anticipated Services at Transition none    Transportation Anticipated other (see comments)  EMS       Discharge Needs Assessment    Readmission Within the Last 30 Days no previous admission  in last 30 days    Current Outpatient/Agency/Support Group skilled nursing facility    Equipment Currently Used at Home hospital bed    Concerns to be Addressed discharge planning    Do you want help finding or keeping work or a job? Patient unable to answer    Do you want help with school or training? For example, starting or completing job training or getting a high school diploma, GED or equivalent Patient unable to answer    Anticipated Changes Related to Illness none    Equipment Needed After Discharge none    Discharge Facility/Level of Care Needs nursing facility, skilled    Provided Post Acute Provider List? N/A    Provided Post Acute Provider Quality & Resource List? N/A    Offered/Gave Vendor List no    Patient's Choice of Community Agency(s) current resident at [facility]    Current Discharge Risk chronically ill;dependent with mobility/activities of daily living;cognitively impaired;physical impairment                   Discharge Plan       Row Name 03/18/25 1019       Plan    Plan Plan for patient to return to [facility]; no needs at this time    Patient/Family in Agreement with Plan yes    Provided Post Acute Provider List? N/A    Provided Post Acute Provider Quality & Resource List? N/A    Plan Comments Patient is non-verbal; yevgeniyian (Liliya Epperson [phone]) is able to assist with assessment.  She is a current patient of Dr. Pina; she resides at [facility] and gets her medications from [facility].  She is dependent upon staff at nursing home for all ADL's.  The patient's guardian declines the need for additional DME or services at [facility].  At the time of [facility] the plan is for the patient to return to [facility] and [facility].  Questions and concerns were addressed, IMM delivered to the patient's guardian at the time of this conversation. Will provide additional resources and information upon request.    Final Note Plan to return to [facility]                        Demographic Summary       Row Name 03/18/25 1015       General Information    Admission Type inpatient    Arrived From emergency department    Required Notices Provided Important Message from Medicare    Referral Source admission list    Reason for Consult discharge planning    Preferred Language English       Contact Information    Permission Granted to Share Info With guardian    Contact Information Comments Patient is non-verbal; violeta (Liliya Epperson [phone]) is able to assist with assessment                   Functional Status       Row Name 03/18/25 1016       Functional Status    Usual Activity Tolerance fair    Current Activity Tolerance poor       Physical Activity    On average, how many days per week do you engage in moderate to strenuous exercise (like a brisk walk)? Pt Unable    On average, how many minutes do you engage in exercise at this level? Pt Unable       Assessment of Health Literacy    How often do you have someone help you read hospital materials? Always  Patient is non-verbal; violeta (Liliya Epperson [phone]) is able to assist with assessment    How often do you have problems learning about your medical condition because of difficulty understanding written information? Always    How often do you have a problem understanding what is told to you about your medical condition? Always    How confident are you filling out medical forms by yourself? Not at all    Health Literacy Low       Functional Status, IADL    Medications completely dependent    Meal Preparation completely dependent    Housekeeping completely dependent    Laundry completely dependent    Shopping completely dependent    If for any reason you need help with day-to-day activities such as bathing, preparing meals, shopping, managing finances, etc., do you get the help you need? Patient unable to answer       Mental Status    General Appearance WDL WDL       Mental Status Summary    Recent  Changes in Mental Status/Cognitive Functioning unable to assess    Mental Status Comments Patient is non-verbal; guradian (Liliya Epperson [phone]) is able to assist with assessment                   Psychosocial    No documentation.                  Abuse/Neglect       Row Name 03/18/25 1016       Personal Safety    Feels Unsafe at Home or Work/School unable to answer (comment required)    Feels Threatened by Someone unable to answer (comment required)    Does Anyone Try to Keep You From Having Contact with Others or Doing Things Outside Your Home? unable to answer (comment required)    Physical Signs of Abuse Present patient unable to answer                   Legal       Row Name 03/18/25 1016       Financial Resource Strain    How hard is it for you to pay for the very basics like food, housing, medical care, and heating? Pt Unable                   Substance Abuse       Row Name 03/18/25 1016       Substance Use    Substance Use Status never used                   Patient Forms       Row Name 03/18/25 1021       Patient Forms    Important Message from Medicare ([facility]) Delivered    Delivered to Support person  Liliya Epperson, daughter and guardian    Method of delivery Telephone  [phone]                      Sharon Zamudio, RN

## 2025-03-18 NOTE — ED PROVIDER NOTES
EMERGENCY DEPARTMENT ENCOUNTER    Pt Name: Viji Vargas  MRN: [id number]  Pt :   1960  Room Number:    Date of encounter:  3/18/2025  PCP: Ranjeet Pina MD  ED Provider: Sandro Arboleda MD    Historian: EMS, nursing staff      HPI:  Chief Complaint: Dyspnea        Context: Viji Vargas is a 64 y.o. female who presents to the ED c/o dyspnea.  Patient has a past medical history significant for anoxic brain injury with previous cardiac arrest in 2019, stroke, tracheostomy and PEG tube, COPD and PE on eliquis BID.  Patient presents to the Emergency Department from [facility] with reports of dyspnea.  Nursing home reports that they suctioned her tracheostomy with resolution of dyspnea.  Patient requires 4 L of oxygen at baseline.  Patient non-verbal at baseline and unable to provide any history.      PAST MEDICAL HISTORY  Past Medical History:   Diagnosis Date    Abdominal distention     Acute and chronic respiratory failure with hypoxia     Adult hypertrophic pyloric stenosis     COPD (chronic obstructive pulmonary disease)     Hypertension     Osteomyelitis     Pneumonia     Stroke          PAST SURGICAL HISTORY  Past Surgical History:   Procedure Laterality Date    HYSTERECTOMY      Partial     TRACHEOSTOMY AND PEG TUBE INSERTION N/A 3/20/2019    Procedure: TRACHEOSTOMY AND PERCUTANEOUS ENDOSCOPIC GASTROSTOMY TUBE INSERTION;  Surgeon: Nadira Pandey MD;  Location: [facility];  Service: General         FAMILY HISTORY  History reviewed. No pertinent family history.      SOCIAL HISTORY  Social History     Socioeconomic History    Marital status: Legally    Tobacco Use    Smoking status: Former     Current packs/day: 1.00     Types: Electronic Cigarette, Cigarettes    Smokeless tobacco: Never   Vaping Use    Vaping status: Unknown   Substance and Sexual Activity    Alcohol use: Not Currently     Comment: wine     Drug use: No    Sexual activity: Defer          ALLERGIES  Patient has no known allergies.        REVIEW OF SYSTEMS    All systems reviewed and negative except for those discussed in HPI.       PHYSICAL EXAM    I have reviewed the triage vital signs and nursing notes.    ED Triage Vitals [03/18/25 0242]   Temp Heart Rate Resp BP SpO2   99.1 °F (37.3 °C) 117 20 102/79 100 %      Temp src Heart Rate Source Patient Position BP Location FiO2 (%)   Rectal Monitor Lying Left arm --         General: Moderate acute distress  Skin: Large sacral decubitus ulcer  Head: normocephalic, atraumatic  Nose: normal nasal mucosa, no visible deformity.  Mouth: dry mucous membranes.  Neck: supple.  Chest: no retractions, no visible deformity  Cardiovascular: tachycardic, regular rhythm.  Lungs: Rhonchi in the bases bilaterally.    Abdomen: soft, non-distended. No rebound tenderness, no guarding. No peritonitis. PEG tube in place.  Extremities: Contractures about the bilateral upper extremities.  Neuro:  opens eyes spontaneously, non-verbal.        LAB RESULTS  Recent Results (from the past 24 hours)   Urinalysis With Culture If Indicated - Urine, Clean Catch    Collection Time: 03/17/25  1:00 PM    Specimen: Urine, Clean Catch   Result Value Ref Range    Color, UA Yellow Yellow, Straw    Appearance, UA Cloudy (A) Clear    pH, UA 6.5 5.0 - 8.0    Specific Gravity, UA 1.015 1.005 - 1.030    Glucose, UA Negative Negative    Ketones, UA Negative Negative    Bilirubin, UA Negative Negative    Blood, UA Small (1+) (A) Negative    Protein, UA 30 mg/dL (1+) (A) Negative    Leuk Esterase, UA Large (3+) (A) Negative    Nitrite, UA Positive (A) Negative    Urobilinogen, UA 0.2 E.U./dL 0.2 - 1.0 E.U./dL   Urinalysis, Microscopic Only - Urine, Clean Catch    Collection Time: 03/17/25  1:00 PM    Specimen: Urine, Clean Catch   Result Value Ref Range    RBC, UA 0-2 None Seen, 0-2 /HPF    WBC, UA 21-50 (A) None Seen, 0-2 /HPF    Bacteria, UA 3+ (A) None Seen /HPF    Squamous Epithelial  Cells, UA None Seen None Seen, 0-2 /HPF    Yeast, UA Small/1+ Budding Yeast (A) None Seen /HPF    Hyaline Casts, UA 0-2 None Seen /LPF    Methodology Manual Light Microscopy    Comprehensive Metabolic Panel    Collection Time: 03/18/25  3:45 AM    Specimen: Blood   Result Value Ref Range    Glucose 189 (H) 65 - 99 mg/dL    BUN 33 (H) 8 - 23 mg/dL    Creatinine 0.59 0.57 - 1.00 mg/dL    Sodium 136 136 - 145 mmol/L    Potassium 4.7 3.5 - 5.2 mmol/L    Chloride 97 (L) 98 - 107 mmol/L    CO2 28.4 22.0 - 29.0 mmol/L    Calcium 9.0 8.6 - 10.5 mg/dL    Total Protein 7.4 6.0 - 8.5 g/dL    Albumin 2.7 (L) 3.5 - 5.2 g/dL    ALT (SGPT) 26 1 - 33 U/L    AST (SGOT) 21 1 - 32 U/L    Alkaline Phosphatase 104 39 - 117 U/L    Total Bilirubin 0.2 0.0 - 1.2 mg/dL    Globulin 4.7 gm/dL    A/G Ratio 0.6 g/dL    BUN/Creatinine Ratio 55.9 (H) 7.0 - 25.0    Anion Gap 10.6 5.0 - 15.0 mmol/L    eGFR 99.5 >60.0 mL/min/1.73   CBC Auto Differential    Collection Time: 03/18/25  3:45 AM    Specimen: Blood   Result Value Ref Range    WBC 11.30 (H) 3.40 - 10.80 10*3/mm3    RBC 3.56 (L) 3.77 - 5.28 10*6/mm3    Hemoglobin 8.6 (L) 12.0 - 15.9 g/dL    Hematocrit 29.2 (L) 34.0 - 46.6 %    MCV 82.0 79.0 - 97.0 fL    MCH 24.2 (L) 26.6 - 33.0 pg    MCHC 29.5 (L) 31.5 - 35.7 g/dL    RDW 16.9 (H) 12.3 - 15.4 %    RDW-SD 50.5 37.0 - 54.0 fl    MPV 10.8 6.0 - 12.0 fL    Platelets 571 (H) 140 - 450 10*3/mm3    Neutrophil % 70.7 42.7 - 76.0 %    Lymphocyte % 11.2 (L) 19.6 - 45.3 %    Monocyte % 7.5 5.0 - 12.0 %    Eosinophil % 6.3 (H) 0.3 - 6.2 %    Basophil % 0.8 0.0 - 1.5 %    Immature Grans % 3.5 (H) 0.0 - 0.5 %    Neutrophils, Absolute 8.00 (H) 1.70 - 7.00 10*3/mm3    Lymphocytes, Absolute 1.26 0.70 - 3.10 10*3/mm3    Monocytes, Absolute 0.85 0.10 - 0.90 10*3/mm3    Eosinophils, Absolute 0.71 (H) 0.00 - 0.40 10*3/mm3    Basophils, Absolute 0.09 0.00 - 0.20 10*3/mm3    Immature Grans, Absolute 0.39 (H) 0.00 - 0.05 10*3/mm3    nRBC 0.0 0.0 - 0.2 /100 WBC    Lactic Acid, Plasma    Collection Time: 03/18/25  3:45 AM    Specimen: Blood   Result Value Ref Range    Lactate 1.5 0.5 - 2.0 mmol/L   Procalcitonin    Collection Time: 03/18/25  3:45 AM    Specimen: Blood   Result Value Ref Range    Procalcitonin 1.55 (H) 0.00 - 0.25 ng/mL   Urinalysis With Microscopic If Indicated (No Culture) - Indwelling Urethral Catheter    Collection Time: 03/18/25  4:58 AM    Specimen: Indwelling Urethral Catheter; Urine   Result Value Ref Range    Color, UA Yellow Yellow, Straw    Appearance, UA Turbid (A) Clear    pH, UA 6.0 5.0 - 8.0    Specific Gravity, UA 1.016 1.005 - 1.030    Glucose, UA Negative Negative    Ketones, UA Negative Negative    Bilirubin, UA Negative Negative    Blood, UA Large (3+) (A) Negative    Protein,  mg/dL (2+) (A) Negative    Leuk Esterase, UA Large (3+) (A) Negative    Nitrite, UA Negative Negative    Urobilinogen, UA 0.2 E.U./dL 0.2 - 1.0 E.U./dL   Urinalysis, Microscopic Only - Indwelling Urethral Catheter    Collection Time: 03/18/25  4:58 AM    Specimen: Indwelling Urethral Catheter; Urine   Result Value Ref Range    RBC, UA 21-50 (A) None Seen, 0-2 /HPF    WBC, UA 21-50 (A) None Seen, 0-2 /HPF    Bacteria, UA 3+ (A) None Seen /HPF    Squamous Epithelial Cells, UA 21-30 (A) None Seen, 0-2 /HPF    Transitional Epithelial Cells, UA 3-6 (A) 0 - 2 /HPF    Renal Epithelial Cells, UA 3-6 (A) 0 - 2 /HPF    Hyaline Casts, UA 0-2 None Seen /LPF    Methodology Manual Light Microscopy        If labs were ordered, I independently reviewed the results and considered them in treating the patient.  See medical decision making discussion section for my interpretation of lab results.        RADIOLOGY  No Radiology Exams Resulted Within Past 24 Hours    I ordered and independently reviewed the above noted radiographic studies.  See radiologist's dictation for official interpretation.    Per my independent reading:      Portable chest radiograph obtained and based on  my independent reading shows poor inspiratory effort.  Tracheostomy in appropriate position.        PROCEDURES    Central Line At Bedside    Date/Time: 3/18/2025 6:18 AM    Performed by: Sandro Arboleda MD  Authorized by: Walter Barragan DO    Consent:     Consent obtained:  Emergent situation  Universal protocol:     Patient identity confirmed:  Arm band  Pre-procedure details:     Indication(s): central venous access and insufficient peripheral access      Hand hygiene: Hand hygiene performed prior to insertion      Sterile barrier technique: All elements of maximal sterile technique followed      Skin preparation:  Chlorhexidine    Skin preparation agent: Skin preparation agent completely dried prior to procedure    Sedation:     Sedation type:  None  Anesthesia:     Anesthesia method:  Local infiltration    Local anesthetic:  Lidocaine 1% w/o epi  Procedure details:     Location:  R femoral    Patient position:  Supine    Procedural supplies:  Triple lumen    Catheter size:  7 Fr    Landmarks identified: yes      Ultrasound guidance: yes      Ultrasound guidance timing: real time      Sterile ultrasound techniques: Sterile gel and sterile probe covers were used      Number of attempts:  1  Post-procedure details:     Post-procedure:  Dressing applied and line sutured    Assessment:  Blood return through all ports    Procedure completion:  Tolerated well, no immediate complications      ECG 12 Lead Rhythm Change   Final Result      ECG 12 Lead Tachycardia   Final Result          MEDICATIONS GIVEN IN ER    Medications   amiodarone 360 mg in 200 mL D5W infusion (1 mg/min Intravenous New Bag 3/18/25 0537)     Followed by   amiodarone 360 mg in 200 mL D5W infusion (has no administration in time range)   vancomycin (VANCOCIN) 1,500 mg in sodium chloride 0.9 % 500 mL IVPB (1,500 mg Intravenous New Bag 3/18/25 0612)   apixaban (ELIQUIS) tablet 5 mg (has no administration in time range)   budesonide (PULMICORT) nebulizer  solution 0.5 mg (has no administration in time range)   docusate sodium (COLACE) liquid 300 mg (has no administration in time range)   glycopyrrolate (ROBINUL) tablet 2 mg (has no administration in time range)   ipratropium-albuterol (DUO-NEB) nebulizer solution 3 mL (has no administration in time range)   midodrine (PROAMATINE) tablet 5 mg (has no administration in time range)   lansoprazole (PREVACID SOLUTAB) disintegrating tablet Tablet Delayed Release Dispersible 30 mg (30 mg Per G Tube Given 3/18/25 0613)   saccharomyces boulardii (FLORASTOR) capsule 250 mg (has no administration in time range)   scopolamine patch 1 mg/72 hr (1 patch Transdermal Medication Applied 3/18/25 0614)   sodium chloride 0.9 % flush 10 mL (has no administration in time range)   sodium chloride 0.9 % flush 10 mL (has no administration in time range)   sodium chloride 0.9 % infusion 40 mL (has no administration in time range)   nitroglycerin (NITROSTAT) SL tablet 0.4 mg (has no administration in time range)   Potassium Replacement - Follow Nurse / BPA Driven Protocol (has no administration in time range)   Magnesium Standard Dose Replacement - Follow Nurse / BPA Driven Protocol (has no administration in time range)   Phosphorus Replacement - Follow Nurse / BPA Driven Protocol (has no administration in time range)   Calcium Replacement - Follow Nurse / BPA Driven Protocol (has no administration in time range)   sodium chloride 0.9 % infusion (50 mL/hr Intravenous New Bag 3/18/25 0613)   ondansetron ODT (ZOFRAN-ODT) disintegrating tablet 4 mg (has no administration in time range)     Or   ondansetron (ZOFRAN) injection 4 mg (has no administration in time range)   Pharmacy to dose vancomycin (has no administration in time range)   piperacillin-tazobactam (ZOSYN) IVPB 3.375 g IVPB in 100 mL NS (VTB) (has no administration in time range)   vancomycin (VANCOCIN) 1,000 mg in sodium chloride 0.9 % 250 mL IVPB-VTB (has no administration in time  range)   sodium chloride 0.9 % bolus 500 mL (0 mL Intravenous Stopped 3/18/25 0502)   piperacillin-tazobactam (ZOSYN) IVPB 4.5 g IVPB in 100 mL NS (VTB) (0 g Intravenous Stopped 3/18/25 0514)   amiodarone 150 mg in 100 mL D5W (loading dose) (0 mg Intravenous Stopped 3/18/25 0514)         MEDICAL DECISION MAKING, PROGRESS, and CONSULTS    All labs, if obtained, have been independently reviewed by me.  All radiology studies, if obtained, have been reviewed by me and the radiologist dictating the report.  All EKG's, if obtained, have been independently viewed and interpreted by me/my attending physician.      Discussion below represents my analysis of pertinent findings related to patient's condition, differential diagnosis, treatment plan and final disposition.                         Differential diagnosis:    Differential includes aspiration, pneumonia, viral syndrome, pulmonary embolism, other acute emergency.    Medical Decision Making Discussion:    Vitals reviewed and demonstrate tachycardia and lower limit of normal blood pressure but otherwise are normal.    EKG obtained and based on my independent reading shows sinus tachycardia.  No acute ischemic changes.  Normal WA, QRS and QT intervals.    Labs reviewed.  Patient has elevated procalcitonin.  Mild hyperglycemia.  Baseline anemia.  Slight leukocytosis.    Patient has indwelling Sanchez catheter.  Have ordered replacement Sanchez catheter and UA from the new catheter.    Clinically, I am concerned the patient has aspirated.  Blood cultures obtained.  Patient given broad-spectrum IV antibiotics.    While in the emergency department patient has had a run of nonsustained V. tach.  This was captured on repeat EKG.  Patient given amiodarone bolus and ordered amiodarone drip.    Patient has an adequate peripheral IV access.  Central line was placed.    Case discussed with Dr. Estevez who has accepted the patient for hospitalization.    45 minutes of critical care  provided. This time excludes other billable procedures. Time does include preparation of documents, medical consultations, review of old records, and direct bedside care. Patient is at high risk for life-threatening deterioration due to aspiration, UTI, non-sustained v-tach.      Additional sources:    - Discussed/ obtained information from independent historians:  EMS, nursing staff    - External (non-ED) record review:  history and physical from January 2025 documenting anoxic brain injury with previous cardiac arrest in March 2019, stroke, tracheostomy and PEG tube, COPD.  Hospitalized for concern of infected sacral wound.  Taken to the OR for debridement.    Reviewed discharge summary from January 31, 2025 documenting sacral decubitus ulcer with sacral osteomyelitis.  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    - Chronic or social conditions impacting care: anoxic brain injury, non-verbal state    Shared Decision Making:  After my consideration of clinical presentation and any laboratory/radiology studies obtained, I discussed the findings with the patient/patient representative who is in agreement with the treatment plan and the final disposition.   Risks and benefits of discharge and/or observation/admission were discussed.    Orders placed during this visit:  Orders Placed This Encounter   Procedures    Insert Central Line At Bedside    Blood Culture - Blood,    Blood Culture - Blood,    COVID-19 and FLU A/B PCR, 1 HR TAT - Swab, Nasopharynx    MRSA Screen, PCR (Inpatient) - Swab, Nares    Urine Culture - Urine, Indwelling Urethral Catheter    XR Chest 1 View    CT Abdomen Pelvis Without Contrast    Comprehensive Metabolic Panel    CBC Auto Differential    Lactic Acid, Plasma    Procalcitonin    Urinalysis With Microscopic If Indicated (No Culture) - Indwelling Urethral Catheter    Urinalysis, Microscopic Only - Urine, Clean Catch    Basic Metabolic Panel    CBC (No Diff)     Vancomycin, Random    NPO Diet NPO Type: Strict NPO, Tube Feeding    Replace Sanchez Catheter    Assess Need for Indwelling Urinary Catheter - Follow Removal Protocol    Urinary Catheter Care    Vital Signs    Intake & Output    Weigh Patient    Oral Care    Place Sequential Compression Device    Maintain Sequential Compression Device    Maintain IV Access    Telemetry - Place Orders & Notify Provider of Results When Patient Experiences Acute Chest Pain, Dysrhythmia or Respiratory Distress    May Be Off Telemetry for Tests    Code Status and Medical Interventions: CPR (Attempt to Resuscitate); Full Support    Inpatient Nutrition Consult    ECG 12 Lead Tachycardia    ECG 12 Lead Rhythm Change    Adult Transthoracic Echo Complete W/ Cont if Necessary Per Protocol    Wound Ostomy Eval & Treat    Insert Peripheral IV    Inpatient Admission         AS OF 06:47 EDT VITALS:    BP - 129/79  HR - 117  TEMP - 99.1 °F (37.3 °C) (Rectal)  O2 SATS - 100%                  DIAGNOSIS  Final diagnoses:   Aspiration pneumonia, unspecified aspiration pneumonia type, unspecified laterality, unspecified part of lung   Urinary tract infection associated with indwelling urethral catheter, initial encounter   Non-sustained ventricular tachycardia         DISPOSITION  Admit      Please note that portions of this document were completed with voice recognition software.        Sandro Arboleda MD  03/18/25 0628       Sandro Arboleda MD  03/18/25 0647

## 2025-03-18 NOTE — H&P
[facility]   HISTORY AND PHYSICAL      Name:  Viji Vargas   Age:  64 y.o.  Sex:  female  :  1960  MRN:  [id number]   Visit Number:  [id number]  Admission Date:  3/18/2025  Date Of Service:  25  Primary Care Physician:  Ranjeet Pina MD    Chief Complaint:     Low oxygen    History Of Presenting Illness:      Patient is a chronically ill unfortunate 64-year-old female with a history of chronic respiratory failure with hypoxia, status post tracheostomy, PEG tube, indwelling Sanchez catheter, nonverbal status from anoxic brain injury, osteomyelitis/skin ulcerations, prior CVA,  who presented via EMS due to reported low oxygen.  Patient resident of nursing facility, they noted increased secretions, she had been short of breath prior but after suctioning shortness of breath had improved per report.  Typically wears 4 L of oxygen through trach mask.  Patient at baseline is nonverbal and history otherwise limited at this time.  Of note, patient was treated in January at  secondary to osteomyelitis/ulcerations of sacrum, following with ID at .    In the ER, she was overall hemodynamically stable with no fever and blood pressure normal.  She did have evidence of nonsustained ventricular tachycardia, currently on amiodarone drip.  Received small fluid bolus.  X-ray does not show overt opacity, difficult study noted.  Urinalysis concerning for UTI.  Elevated procalcitonin noted.  Secondary concern for pneumonia/tracheitis/UTI, we were asked to admit    Review Of Systems:    All systems were reviewed and negative except as mentioned in history of presenting illness, assessment and plan.    Past Medical History: Patient  has a past medical history of Abdominal distention (), Acute and chronic respiratory failure with hypoxia (), Adult hypertrophic pyloric stenosis (), COPD (chronic obstructive pulmonary disease), Hypertension, Osteomyelitis, Pneumonia, and  Stroke.    Past Surgical History: Patient  has a past surgical history that includes Hysterectomy and tracheostomy and peg tube insertion (N/A, 3/20/2019).    Social History: Patient  reports that she has quit smoking. Her smoking use included electronic cigarette and cigarettes. She has never used smokeless tobacco. She reports that she does not currently use alcohol. She reports that she does not use drugs.    Family History:  Patient's family history has been reviewed and found to be noncontributory.     Allergies:      Patient has no known allergies.    Home Medications:    Prior to Admission Medications       Prescriptions Last Dose Informant Patient Reported? Taking?    apixaban (ELIQUIS) 5 MG tablet tablet   Yes No    Take 1 tablet by mouth.    baclofen (LIORESAL) 10 MG tablet   Yes No    Administer 1 tablet per G tube Every 8 (Eight) Hours.    budesonide (PULMICORT) 0.5 MG/2ML nebulizer solution   No No    Take 2 mL by nebulization 2 (Two) Times a Day.    cetirizine (zyrTEC) 10 MG tablet   Yes No    Administer 1 tablet per G tube Daily.    docusate sodium (COLACE) 50 mg/5 mL liquid   Yes No    Administer 30 mL per G tube 2 (Two) Times a Day.    glycopyrrolate (ROBINUL) 2 MG tablet   Yes No    Administer 1 tablet per G tube 4 (Four) Times a Day.    hyoscyamine (ANASPAZ,LEVSIN) 0.125 MG tablet   Yes No    Administer 1 tablet per G tube Every 4 (Four) Hours.    ipratropium-albuterol (DUO-NEB) 0.5-2.5 mg/3 ml nebulizer   No No    Take 3 mL by nebulization 4 (Four) Times a Day. Takes at [id number]    Lansinoh Lanolin cream   Yes No    Apply 1 Application topically to the appropriate area as directed.    midodrine (PROAMATINE) 5 MG tablet   No No    Administer 1 tablet per G tube 3 (Three) Times a Day Before Meals for 30 days.    Multiple Vitamins-Minerals (MULTIVITAMIN WITH IRON-MINERALS) liquid   Yes No    Administer 15 mL per G tube Daily.    mupirocin (BACTROBAN) 2 % ointment   Yes No    Apply 1  Application topically to the appropriate area as directed 2 (Two) Times a Day. Apply to G-tube site twice daily    OMEPRAZOLE 2MG/ML LIQUID   Yes No    Administer 10 mL per G tube 2 (Two) Times a Day.    polyethylene glycol (MiraLax) 17 g packet   Yes No    17 g 2 (Two) Times a Day.    saccharomyces boulardii (FLORASTOR) 250 MG capsule   Yes No    Take 1 capsule by mouth 2 (Two) Times a Day.    Scopolamine 1 MG/3DAYS patch   Yes No    Place 1 patch on the skin as directed by provider Every 72 (Seventy-Two) Hours.    senna 8.6 MG tablet   Yes No    2 tablets by Per PEG Tube route 2 (Two) Times a Day.    Skin Protectants, Misc. (Eucerin) cream   Yes No    Apply 1 Application topically to the appropriate area as directed As Needed for Dry Skin. Apply to bilateral arms as needed for dry skin    sodium hypochlorite (DAKIN'S 1/4 STRENGTH) 0.125 % solution topical solution 0.125%   Yes No    Apply  topically to the appropriate area as directed.    torsemide (DEMADEX) 20 MG tablet   No No    Take 0.5 tablets by mouth Daily.          ED Medications:    Medications   amiodarone 360 mg in 200 mL D5W infusion (has no administration in time range)     Followed by   amiodarone 360 mg in 200 mL D5W infusion (has no administration in time range)   Pharmacy to dose vancomycin (has no administration in time range)   vancomycin (VANCOCIN) 1,500 mg in sodium chloride 0.9 % 500 mL IVPB (has no administration in time range)   sodium chloride 0.9 % bolus 500 mL (0 mL Intravenous Stopped 3/18/25 0502)   piperacillin-tazobactam (ZOSYN) IVPB 4.5 g IVPB in 100 mL NS (VTB) (0 g Intravenous Stopped 3/18/25 0514)   amiodarone 150 mg in 100 mL D5W (loading dose) (0 mg Intravenous Stopped 3/18/25 0514)     Vital Signs:  Temp:  [99.1 °F (37.3 °C)] 99.1 °F (37.3 °C)  Heart Rate:  [108-121] 108  Resp:  [20] 20  BP: ()/(54-79) 120/72        03/18/25  0242   Weight: 77.6 kg (171 lb 1.2 oz)     Body mass index is 29.37 kg/m².    Physical Exam:  "    Most recent vital Signs: /72   Pulse 108   Temp 99.1 °F (37.3 °C) (Rectal)   Resp 20   Ht 162.6 cm (64\")   Wt 77.6 kg (171 lb 1.2 oz)   SpO2 100%   BMI 29.37 kg/m²     Physical Exam  Constitutional:       Appearance: She is ill-appearing.   HENT:      Mouth/Throat:      Mouth: Mucous membranes are dry.   Eyes:      Pupils: Pupils are equal, round, and reactive to light.   Cardiovascular:      Rate and Rhythm: Regular rhythm. Tachycardia present.      Pulses: Normal pulses.      Heart sounds: Murmur heard.   Pulmonary:      Breath sounds: Rhonchi and rales present.      Comments: Trach  Abdominal:      General: There is no distension.      Tenderness: There is no abdominal tenderness.      Comments: G-tube   Genitourinary:     Comments: Sanchez catheter  Skin:     General: Skin is warm.      Capillary Refill: Capillary refill takes less than 2 seconds.      Comments: Decubitus ulcer, coccyx   Neurological:      General: No focal deficit present.      Mental Status: Mental status is at baseline.      Comments: Nonverbal         Laboratory data:    I have reviewed the labs done in the emergency room.    Results from last 7 days   Lab Units 03/18/25  0345   SODIUM mmol/L 136   POTASSIUM mmol/L 4.7   CHLORIDE mmol/L 97*   CO2 mmol/L 28.4   BUN mg/dL 33*   CREATININE mg/dL 0.59   CALCIUM mg/dL 9.0   BILIRUBIN mg/dL 0.2   ALK PHOS U/L 104   ALT (SGPT) U/L 26   AST (SGOT) U/L 21   GLUCOSE mg/dL 189*     Results from last 7 days   Lab Units 03/18/25  0345   WBC 10*3/mm3 11.30*   HEMOGLOBIN g/dL 8.6*   HEMATOCRIT % 29.2*   PLATELETS 10*3/mm3 571*                             Results from last 7 days   Lab Units 03/18/25  0458 03/17/25  1300   COLOR UA  Yellow Yellow   GLUCOSE UA  Negative Negative   KETONES UA  Negative Negative   BLOOD UA  Large (3+)* Small (1+)*   LEUKOCYTES UA  Large (3+)* Large (3+)*   PH, URINE  6.0 6.5   BILIRUBIN UA  Negative Negative   UROBILINOGEN UA  0.2 E.U./dL 0.2 E.U./dL   RBC UA " /HPF  --  0-2   WBC UA /HPF  --  21-50*       Pain Management Panel          Latest Ref Rng & Units 3/10/2019   Pain Management Panel   Amphetamine, Urine Qual Negative Negative    Barbiturates Screen, Urine Negative Negative    Benzodiazepine Screen, Urine Negative Negative    Buprenorphine, Screen, Urine Negative Positive    Cocaine Screen, Urine Negative Negative    Methadone Screen , Urine Negative Negative    Methamphetamine, Ur Negative Negative        EKG:      EKG edition rate is sinus tachycardia, no obvious ischemic changes.  Repeat EKG showed evidence of likely nonsustained ventricular tachycardia.    Radiology:    No radiology results for the last 3 days    Assessment:    Nonsustained ventricular tachycardia, POA  Suspected pneumonia versus tracheitis, POA  Complicated UTI in setting of indwelling Sanchez catheter  History of sacral osteomyelitis  Nonverbal  PEG tube in place  Anoxic brain injury    Plan:    Nonsustained V. tach:  Echo from last year with normal ejection fraction, some diastolic dysfunction noted.  May be related to underlying hypoxia, early sepsis potentially with multiple infectious processes at play.  Will add a 2D echocardiogram continue on amiodarone.  Consider cardiology consultation.  Patient is on Eliquis due to prior PE.    Pneumonia/tracheitis/UTI:  Patient has a history of complicated pneumonia with parapneumonic effusion, increased secretions currently requiring frequent suctioning.  UTI with prior ESBL noted.  Currently will treat with vancomycin and Zosyn based on prior cultures, consider broadening if any worsening.  Send blood and urine cultures.    Sacral osteomyelitis:  Has been followed by [facility], was on 6 to 8 weeks of antibiotics per last discharge summary.    Nonverbal/PEG tube/anoxic brain injury:  Complicates all aspects of care.  Overall prognosis is fairly poor.  Quality of life is also poor.    Further recommendations depend upon clinical course.    Risk  Assessment: High  DVT Prophylaxis: Eliquis  Code Status: Full  Diet: Tube feeds    Advance Care Planning   ACP discussion was declined by the patient. Patient has an advance directive in EMR which is still valid.            Jessica Estevez DO  03/18/25  05:18 EDT    Dictated utilizing Dragon dictation.

## 2025-03-19 LAB
ABO GROUP BLD: NORMAL
ANION GAP SERPL CALCULATED.3IONS-SCNC: 11.1 MMOL/L (ref 5–15)
BACTERIA BLD CULT: ABNORMAL
BLD GP AB SCN SERPL QL: NEGATIVE
BOTTLE TYPE: ABNORMAL
BUN SERPL-MCNC: 26 MG/DL (ref 8–23)
BUN/CREAT SERPL: 52 (ref 7–25)
CALCIUM SPEC-SCNC: 8.6 MG/DL (ref 8.6–10.5)
CHLORIDE SERPL-SCNC: 103 MMOL/L (ref 98–107)
CO2 SERPL-SCNC: 24.9 MMOL/L (ref 22–29)
CREAT SERPL-MCNC: 0.5 MG/DL (ref 0.57–1)
DEPRECATED RDW RBC AUTO: 51.6 FL (ref 37–54)
EGFRCR SERPLBLD CKD-EPI 2021: 103.6 ML/MIN/1.73
ERYTHROCYTE [DISTWIDTH] IN BLOOD BY AUTOMATED COUNT: 17.1 % (ref 12.3–15.4)
GLUCOSE BLDC GLUCOMTR-MCNC: 191 MG/DL (ref 70–130)
GLUCOSE BLDC GLUCOMTR-MCNC: 230 MG/DL (ref 70–130)
GLUCOSE BLDC GLUCOMTR-MCNC: 280 MG/DL (ref 70–130)
GLUCOSE SERPL-MCNC: 231 MG/DL (ref 65–99)
HCT VFR BLD AUTO: 21 % (ref 34–46.6)
HCT VFR BLD AUTO: 21.7 % (ref 34–46.6)
HGB BLD-MCNC: 6.2 G/DL (ref 12–15.9)
HGB BLD-MCNC: 6.2 G/DL (ref 12–15.9)
MCH RBC QN AUTO: 24.5 PG (ref 26.6–33)
MCHC RBC AUTO-ENTMCNC: 29.5 G/DL (ref 31.5–35.7)
MCV RBC AUTO: 83 FL (ref 79–97)
PLATELET # BLD AUTO: 543 10*3/MM3 (ref 140–450)
PMV BLD AUTO: 10.4 FL (ref 6–12)
POTASSIUM SERPL-SCNC: 4 MMOL/L (ref 3.5–5.2)
RBC # BLD AUTO: 2.53 10*6/MM3 (ref 3.77–5.28)
RH BLD: POSITIVE
SODIUM SERPL-SCNC: 139 MMOL/L (ref 136–145)
T&S EXPIRATION DATE: NORMAL
VANCOMYCIN SERPL-MCNC: 23.3 MCG/ML (ref 5–40)
WBC NRBC COR # BLD AUTO: 11.47 10*3/MM3 (ref 3.4–10.8)

## 2025-03-19 PROCEDURE — 80202 ASSAY OF VANCOMYCIN: CPT | Performed by: FAMILY MEDICINE

## 2025-03-19 PROCEDURE — 94761 N-INVAS EAR/PLS OXIMETRY MLT: CPT

## 2025-03-19 PROCEDURE — 94664 DEMO&/EVAL PT USE INHALER: CPT

## 2025-03-19 PROCEDURE — 82948 REAGENT STRIP/BLOOD GLUCOSE: CPT | Performed by: INTERNAL MEDICINE

## 2025-03-19 PROCEDURE — 82948 REAGENT STRIP/BLOOD GLUCOSE: CPT

## 2025-03-19 PROCEDURE — 25010000002 FUROSEMIDE PER 20 MG: Performed by: INTERNAL MEDICINE

## 2025-03-19 PROCEDURE — 85018 HEMOGLOBIN: CPT | Performed by: INTERNAL MEDICINE

## 2025-03-19 PROCEDURE — 86920 COMPATIBILITY TEST SPIN: CPT

## 2025-03-19 PROCEDURE — 86850 RBC ANTIBODY SCREEN: CPT | Performed by: INTERNAL MEDICINE

## 2025-03-19 PROCEDURE — 25810000003 SODIUM CHLORIDE 0.9 % SOLUTION: Performed by: FAMILY MEDICINE

## 2025-03-19 PROCEDURE — 94799 UNLISTED PULMONARY SVC/PX: CPT

## 2025-03-19 PROCEDURE — 86900 BLOOD TYPING SEROLOGIC ABO: CPT | Performed by: INTERNAL MEDICINE

## 2025-03-19 PROCEDURE — 99232 SBSQ HOSP IP/OBS MODERATE 35: CPT | Performed by: INTERNAL MEDICINE

## 2025-03-19 PROCEDURE — 85014 HEMATOCRIT: CPT | Performed by: INTERNAL MEDICINE

## 2025-03-19 PROCEDURE — 86901 BLOOD TYPING SEROLOGIC RH(D): CPT | Performed by: INTERNAL MEDICINE

## 2025-03-19 PROCEDURE — 80048 BASIC METABOLIC PNL TOTAL CA: CPT | Performed by: FAMILY MEDICINE

## 2025-03-19 PROCEDURE — 86900 BLOOD TYPING SEROLOGIC ABO: CPT

## 2025-03-19 PROCEDURE — P9016 RBC LEUKOCYTES REDUCED: HCPCS

## 2025-03-19 PROCEDURE — 63710000001 INSULIN REGULAR HUMAN PER 5 UNITS: Performed by: INTERNAL MEDICINE

## 2025-03-19 PROCEDURE — 36430 TRANSFUSION BLD/BLD COMPNT: CPT

## 2025-03-19 PROCEDURE — 25010000002 PIPERACILLIN SOD-TAZOBACTAM PER 1 G: Performed by: FAMILY MEDICINE

## 2025-03-19 PROCEDURE — 85027 COMPLETE CBC AUTOMATED: CPT | Performed by: FAMILY MEDICINE

## 2025-03-19 PROCEDURE — 63710000001 DIPHENHYDRAMINE PER 50 MG: Performed by: INTERNAL MEDICINE

## 2025-03-19 RX ORDER — FUROSEMIDE 10 MG/ML
20 INJECTION INTRAMUSCULAR; INTRAVENOUS ONCE
Status: COMPLETED | OUTPATIENT
Start: 2025-03-19 | End: 2025-03-19

## 2025-03-19 RX ORDER — ACETAMINOPHEN 650 MG/1
650 SUPPOSITORY RECTAL EVERY 4 HOURS PRN
Status: DISCONTINUED | OUTPATIENT
Start: 2025-03-19 | End: 2025-03-21 | Stop reason: HOSPADM

## 2025-03-19 RX ORDER — DIPHENHYDRAMINE HCL 25 MG
25 CAPSULE ORAL ONCE
Status: COMPLETED | OUTPATIENT
Start: 2025-03-19 | End: 2025-03-19

## 2025-03-19 RX ORDER — NICOTINE POLACRILEX 4 MG
15 LOZENGE BUCCAL
Status: DISCONTINUED | OUTPATIENT
Start: 2025-03-19 | End: 2025-03-21 | Stop reason: HOSPADM

## 2025-03-19 RX ORDER — DIPHENHYDRAMINE HYDROCHLORIDE 50 MG/ML
25 INJECTION, SOLUTION INTRAMUSCULAR; INTRAVENOUS ONCE
Status: COMPLETED | OUTPATIENT
Start: 2025-03-19 | End: 2025-03-19

## 2025-03-19 RX ORDER — DEXTROSE MONOHYDRATE 25 G/50ML
25 INJECTION, SOLUTION INTRAVENOUS
Status: DISCONTINUED | OUTPATIENT
Start: 2025-03-19 | End: 2025-03-21 | Stop reason: HOSPADM

## 2025-03-19 RX ORDER — IPRATROPIUM BROMIDE AND ALBUTEROL SULFATE 2.5; .5 MG/3ML; MG/3ML
3 SOLUTION RESPIRATORY (INHALATION)
Status: DISCONTINUED | OUTPATIENT
Start: 2025-03-19 | End: 2025-03-21 | Stop reason: HOSPADM

## 2025-03-19 RX ORDER — ACETAMINOPHEN 325 MG/1
650 TABLET ORAL EVERY 6 HOURS PRN
Status: DISCONTINUED | OUTPATIENT
Start: 2025-03-19 | End: 2025-03-21 | Stop reason: HOSPADM

## 2025-03-19 RX ADMIN — MIDODRINE HYDROCHLORIDE 5 MG: 5 TABLET ORAL at 06:18

## 2025-03-19 RX ADMIN — FUROSEMIDE 20 MG: 10 INJECTION, SOLUTION INTRAMUSCULAR; INTRAVENOUS at 14:36

## 2025-03-19 RX ADMIN — Medication 250 MG: at 09:07

## 2025-03-19 RX ADMIN — INSULIN HUMAN 2 UNITS: 100 INJECTION, SOLUTION PARENTERAL at 18:05

## 2025-03-19 RX ADMIN — DOCUSATE SODIUM 300 MG: 50 LIQUID ORAL at 09:06

## 2025-03-19 RX ADMIN — PIPERACILLIN AND TAZOBACTAM 3.38 G: 3; .375 INJECTION, POWDER, FOR SOLUTION INTRAVENOUS at 20:51

## 2025-03-19 RX ADMIN — IPRATROPIUM BROMIDE AND ALBUTEROL SULFATE 3 ML: 2.5; .5 SOLUTION RESPIRATORY (INHALATION) at 19:10

## 2025-03-19 RX ADMIN — IPRATROPIUM BROMIDE AND ALBUTEROL SULFATE 3 ML: 2.5; .5 SOLUTION RESPIRATORY (INHALATION) at 00:18

## 2025-03-19 RX ADMIN — MIDODRINE HYDROCHLORIDE 5 MG: 5 TABLET ORAL at 13:17

## 2025-03-19 RX ADMIN — INSULIN HUMAN 4 UNITS: 100 INJECTION, SOLUTION PARENTERAL at 13:17

## 2025-03-19 RX ADMIN — DOCUSATE SODIUM 300 MG: 50 LIQUID ORAL at 20:48

## 2025-03-19 RX ADMIN — SODIUM CHLORIDE 500 ML: 9 INJECTION, SOLUTION INTRAVENOUS at 20:51

## 2025-03-19 RX ADMIN — GLYCOPYRROLATE 2 MG: 1 TABLET ORAL at 13:17

## 2025-03-19 RX ADMIN — Medication 250 MG: at 20:50

## 2025-03-19 RX ADMIN — Medication 1 PACKET: at 10:56

## 2025-03-19 RX ADMIN — DAKIN'S SOLUTION 0.125% (QUARTER STRENGTH): 0.12 SOLUTION at 13:17

## 2025-03-19 RX ADMIN — BUDESONIDE 0.5 MG: 0.5 INHALANT RESPIRATORY (INHALATION) at 07:36

## 2025-03-19 RX ADMIN — LANSOPRAZOLE 30 MG: 30 TABLET, ORALLY DISINTEGRATING ORAL at 06:18

## 2025-03-19 RX ADMIN — PIPERACILLIN AND TAZOBACTAM 3.38 G: 3; .375 INJECTION, POWDER, FOR SOLUTION INTRAVENOUS at 13:16

## 2025-03-19 RX ADMIN — BUDESONIDE 0.5 MG: 0.5 INHALANT RESPIRATORY (INHALATION) at 19:10

## 2025-03-19 RX ADMIN — GLYCOPYRROLATE 2 MG: 1 TABLET ORAL at 09:06

## 2025-03-19 RX ADMIN — DIPHENHYDRAMINE HYDROCHLORIDE 25 MG: 25 CAPSULE ORAL at 10:56

## 2025-03-19 RX ADMIN — PIPERACILLIN AND TAZOBACTAM 3.38 G: 3; .375 INJECTION, POWDER, FOR SOLUTION INTRAVENOUS at 04:02

## 2025-03-19 RX ADMIN — ACETAMINOPHEN 650 MG: 325 TABLET, FILM COATED ORAL at 17:56

## 2025-03-19 RX ADMIN — GLYCOPYRROLATE 2 MG: 1 TABLET ORAL at 20:50

## 2025-03-19 RX ADMIN — IPRATROPIUM BROMIDE AND ALBUTEROL SULFATE 3 ML: 2.5; .5 SOLUTION RESPIRATORY (INHALATION) at 13:04

## 2025-03-19 RX ADMIN — DAKIN'S SOLUTION 0.125% (QUARTER STRENGTH): 0.12 SOLUTION at 04:02

## 2025-03-19 RX ADMIN — GLYCOPYRROLATE 2 MG: 1 TABLET ORAL at 17:56

## 2025-03-19 RX ADMIN — IPRATROPIUM BROMIDE AND ALBUTEROL SULFATE 3 ML: 2.5; .5 SOLUTION RESPIRATORY (INHALATION) at 07:36

## 2025-03-19 RX ADMIN — Medication 1 PACKET: at 20:48

## 2025-03-19 NOTE — PLAN OF CARE
Goal Outcome Evaluation:  Plan of Care Reviewed With: patient        Progress: no change  Outcome Evaluation: No acute events this shift. Vital Signs Stable. Patient tolerated 1 units PRBCs.

## 2025-03-19 NOTE — CASE MANAGEMENT/SOCIAL WORK
Case Management/Social Work    Patient Name:  Viji Vargas  YOB: 1960  MRN: [id number]  Admit Date:  3/18/2025        Pt is a LTC resident at [facility] and can return when medically ready.       Electronically signed by:  SAMINA Valle  03/19/25 16:17 EDT

## 2025-03-19 NOTE — CASE MANAGEMENT/SOCIAL WORK
Case Management/Social Work    Patient Name:  Viji Vargas  YOB: 1960  MRN: [id number]  Admit Date:  3/18/2025        15:55 EDT   Discharge Plan       Row Name 03/19/25 1553       Plan    Plan Return to [facility] term care                        Electronically signed by:  Litzy Mace RN  03/19/25 15:55 EDT

## 2025-03-19 NOTE — PROGRESS NOTES
Pharmacy Consult-Vancomycin Dosing    Viji Vargas is a  64 y.o. female receiving vancomycin therapy.     Indication: pna, ssti  Consulting Provider: Herb    Goal AUC: 400-600 mg/:L*hr    Current Antimicrobial Therapy  Anti-Infectives (From admission, onward)      Ordered     Dose/Rate Route Frequency Start Stop    03/18/25 0540  vancomycin (VANCOCIN) 1,000 mg in sodium chloride 0.9 % 250 mL IVPB-VTB        Ordering Provider: Jessica Estevez DO    1,000 mg  250 mL/hr over 60 Minutes Intravenous Every 12 Hours 03/18/25 2200 03/23/25 2159    03/18/25 0524  piperacillin-tazobactam (ZOSYN) IVPB 3.375 g IVPB in 100 mL NS (VTB)        Ordering Provider: Jessica Estevez DO    3.375 g  over 4 Hours Intravenous Every 8 Hours 03/18/25 1200 03/23/25 1159    03/18/25 0524  Pharmacy to dose vancomycin        Ordering Provider: Jessica Estevez DO     Not Applicable Continuous PRN 03/18/25 0523 03/23/25 0522    03/18/25 0448  vancomycin (VANCOCIN) 1,500 mg in sodium chloride 0.9 % 500 mL IVPB        Ordering Provider: Jessica Estevez DO    1,500 mg  333.3 mL/hr over 90 Minutes Intravenous Once 03/18/25 0504 03/18/25 0851    03/18/25 0443  Pharmacy to dose vancomycin  Status:  Discontinued        Ordering Provider: Jessica Estevez DO     Not Applicable Once 03/18/25 0459 03/18/25 0621    03/18/25 0432  piperacillin-tazobactam (ZOSYN) IVPB 4.5 g IVPB in 100 mL NS (VTB)        Ordering Provider: Sandro Arboleda MD    4.5 g  over 30 Minutes Intravenous Once 03/18/25 0448 03/18/25 0514            Labs  Results from last 7 days   Lab Units 03/19/25  0643 03/18/25  0345   WBC 10*3/mm3 11.47* 11.30*   CREATININE mg/dL 0.50* 0.59      Estimated Creatinine Clearance: 107.8 mL/min (A) (by C-G formula based on SCr of 0.5 mg/dL (L)).  Temp Readings from Last 1 Encounters:   03/19/25 98.1 °F (36.7 °C) (Axillary)       Microbiology Culture results  Microbiology Results (last 10 days)       Procedure  Component Value - Date/Time    Wound Culture - Wound, Spine, Sacral [[id number]] Collected: 03/18/25 1453    Lab Status: Preliminary result Specimen: Wound from Spine, Sacral Updated: 03/18/25 1552     Gram Stain Rare (1+) WBCs seen      No organisms seen    MRSA Screen, PCR (Inpatient) - Swab, Nares [[id number]]  (Normal) Collected: 03/18/25 0615    Lab Status: Final result Specimen: Swab from Nares Updated: 03/18/25 1346     MRSA PCR No MRSA Detected    Narrative:      The negative predictive value of this diagnostic test is high and should only be used to consider de-escalating anti-MRSA therapy. A positive result may indicate colonization with MRSA and must be correlated clinically.    COVID-19 and FLU A/B PCR, 1 HR TAT - Swab, Nasopharynx [[id number]]  (Normal) Collected: 03/18/25 0439    Lab Status: Final result Specimen: Swab from Nasopharynx Updated: 03/18/25 1010     COVID19 Not Detected     Influenza A PCR Not Detected     Influenza B PCR Not Detected    Narrative:      Fact sheet for providers: https://www.fda.gov/media/198980/download    Fact sheet for patients: https://www.fda.gov/media/277798/download    Test performed by PCR.    Blood Culture - Blood, Arm, Right [[id number]]  (Normal) Collected: 03/18/25 0352    Lab Status: Preliminary result Specimen: Blood from Arm, Right Updated: 03/19/25 0400     Blood Culture No growth at 24 hours    Blood Culture - Blood, Arm, Right [[id number]]  (Normal) Collected: 03/18/25 0345    Lab Status: Preliminary result Specimen: Blood from Arm, Right Updated: 03/19/25 0400     Blood Culture No growth at 24 hours    Urine Culture - Urine, Urine, Clean Catch [[id number]]  (Abnormal) Collected: 03/17/25 1300    Lab Status: Preliminary result Specimen: Urine, Clean Catch Updated: 03/18/25 1329     Urine Culture >100,000 CFU/mL Gram Negative Bacilli    Narrative:      Colonization of the urinary tract without infection is common. Treatment is discouraged unless the patient is  "symptomatic, pregnant, or undergoing an invasive urologic procedure.            Evaluation of Dosing     Last Dose Received in the ED/Outside Facility: yes  Is Patient on Dialysis or Renal Replacement: no    Ht - 162.6 cm (64.02\")  Wt - 72.2 kg (159 lb 2.8 oz)    Evaluation of Level    Results from last 7 days   Lab Units 03/19/25  0643   VANCOMYCIN RM mcg/mL 23.30                   Insight[employer] AUC Calculation        Predicted Steady State AUC on Current Dose: 762 mg/L*hr  _________________________________    Predicted Steady State AUC on New Dose: 512 mg/L*hr    Assessment/Plan    Pharmacy to dose vancomycin for pna, ssti. Goal -600 mg/L*hr.  Patient received loading dose of vancomycin 1500 mg (~19.3mg/kg) IV on 03/18/25 @ 0600. Initiated maintenance dose of vancomycin 1000 mg (~12.9mg/kg) IV Q12hr on 3/18/25 @ 2200.  Vancomycin random level on 3/19/25 @ 0600 resulted at 23.30. Optimize dose to Vancomycin 1000mg IV q18hr  Assess clearance by vancomycin random level on 3/21/25 @ 0600.  Pharmacy will continue to monitor renal function, cultures and sensitivities, and clinical status to adjust regimen as necessary.      Thank you,  Melvi Jimenez, PharmD  03/19/25 07:58 EDT    "

## 2025-03-19 NOTE — PLAN OF CARE
Problem: Adult Inpatient Plan of Care  Goal: Plan of Care Review  Outcome: Progressing  Goal: Patient-Specific Goal (Individualized)  Outcome: Progressing  Goal: Absence of Hospital-Acquired Illness or Injury  Outcome: Progressing  Intervention: Identify and Manage Fall Risk  Recent Flowsheet Documentation  Taken 3/19/2025 0407 by Angelic Ludwig RNA  Safety Promotion/Fall Prevention:   safety round/check completed   activity supervised   assistive device/personal items within reach   clutter free environment maintained  Taken 3/19/2025 0200 by Angelic Ludwig RNA  Safety Promotion/Fall Prevention:   safety round/check completed   activity supervised   assistive device/personal items within reach   clutter free environment maintained  Taken 3/19/2025 0010 by Angelic Ludwig RNA  Safety Promotion/Fall Prevention:   assistive device/personal items within reach   clutter free environment maintained   safety round/check completed  Taken 3/18/2025 2210 by Angelic Ludwig RNA  Safety Promotion/Fall Prevention:   safety round/check completed   assistive device/personal items within reach   clutter free environment maintained  Taken 3/18/2025 2030 by Angelic Ludwig RNA  Safety Promotion/Fall Prevention:   safety round/check completed   activity supervised   clutter free environment maintained  Intervention: Prevent Skin Injury  Recent Flowsheet Documentation  Taken 3/19/2025 0407 by Angelic Ludwig RNA  Body Position:   turned   right   heels elevated   legs elevated  Taken 3/19/2025 0200 by Angelic Ludwig RNA  Body Position:   turned   right   heels elevated   legs elevated  Taken 3/19/2025 0010 by Angelic Ludwig RNA  Body Position:   turned   right   heels elevated   legs elevated  Taken 3/18/2025 2210 by Angelic Ludwig RNA  Body Position:   tilted   right  Taken 3/18/2025 2030 by Angelic Ludwig RNA  Body Position:   turned   right   heels elevated   upper extremity elevated  Intervention:  Prevent and Manage VTE (Venous Thromboembolism) Risk  Recent Flowsheet Documentation  Taken 3/18/2025 2030 by Angelic Ludwig RNA  VTE Prevention/Management: SCDs (sequential compression devices) off  Goal: Optimal Comfort and Wellbeing  Outcome: Progressing  Intervention: Provide Person-Centered Care  Recent Flowsheet Documentation  Taken 3/18/2025 2030 by Angelic Ludwig RNA  Trust Relationship/Rapport:   care explained   emotional support provided   reassurance provided  Goal: Readiness for Transition of Care  Outcome: Progressing     Problem: Fall Injury Risk  Goal: Absence of Fall and Fall-Related Injury  Outcome: Progressing  Intervention: Promote Injury-Free Environment  Recent Flowsheet Documentation  Taken 3/19/2025 0407 by Angelic Ludwig RNA  Safety Promotion/Fall Prevention:   safety round/check completed   activity supervised   assistive device/personal items within reach   clutter free environment maintained  Taken 3/19/2025 0200 by Angelic Ludwig RNA  Safety Promotion/Fall Prevention:   safety round/check completed   activity supervised   assistive device/personal items within reach   clutter free environment maintained  Taken 3/19/2025 0010 by Angelic Ludwig RNA  Safety Promotion/Fall Prevention:   assistive device/personal items within reach   clutter free environment maintained   safety round/check completed  Taken 3/18/2025 2210 by Angelic Ludwig RNA  Safety Promotion/Fall Prevention:   safety round/check completed   assistive device/personal items within reach   clutter free environment maintained  Taken 3/18/2025 2030 by Angelic Ludwig RNA  Safety Promotion/Fall Prevention:   safety round/check completed   activity supervised   clutter free environment maintained     Problem: Skin Injury Risk Increased  Goal: Skin Health and Integrity  Outcome: Progressing  Intervention: Optimize Skin Protection  Recent Flowsheet Documentation  Taken 3/19/2025 0407 by Angelic Ludwig  RNA  Activity Management: activity minimized  Head of Bed (HOB) Positioning: HOB elevated  Taken 3/19/2025 0200 by Angelic Ludwig RNA  Activity Management: activity minimized  Head of Bed (HOB) Positioning: HOB at 30-45 degrees  Taken 3/19/2025 0010 by Angelic Ludwig RNA  Activity Management: activity minimized  Head of Bed (HOB) Positioning: HOB at 30-45 degrees  Taken 3/18/2025 2210 by Angelic Ludwig RNA  Activity Management: activity minimized  Head of Bed (HOB) Positioning: HOB at 45 degrees  Taken 3/18/2025 2030 by Angelic Ludwig RNA  Activity Management: activity minimized  Head of Bed (HOB) Positioning: HOB at 45 degrees     Problem: Comorbidity Management  Goal: Blood Glucose Level Within Target Range  Outcome: Progressing  Goal: Blood Pressure in Desired Range  Outcome: Progressing     Problem: Dysrhythmia  Goal: Normalized Cardiac Rhythm  Outcome: Progressing  Intervention: Monitor and Manage Cardiac Rhythm Effect  Recent Flowsheet Documentation  Taken 3/18/2025 2030 by Angelic Ludwig RNA  VTE Prevention/Management: SCDs (sequential compression devices) off     Problem: Pneumonia  Goal: Fluid Balance  Outcome: Progressing  Goal: Absence of Infection Signs and Symptoms  Outcome: Progressing  Goal: Effective Oxygenation and Ventilation  Outcome: Progressing  Intervention: Promote Airway Secretion Clearance  Recent Flowsheet Documentation  Taken 3/19/2025 0407 by Angelic Ludwig RNA  Activity Management: activity minimized  Taken 3/19/2025 0200 by Angelic Ludwig RNA  Activity Management: activity minimized  Taken 3/19/2025 0010 by Angelic Ludwig RNA  Activity Management: activity minimized  Taken 3/18/2025 2210 by Angelic Ludwig RNA  Activity Management: activity minimized  Taken 3/18/2025 2030 by Angelic Ludwig RNA  Activity Management: activity minimized  Cough And Deep Breathing: unable to perform  Intervention: Optimize Oxygenation and Ventilation  Recent Flowsheet  Documentation  Taken 3/19/2025 0407 by Angelic Ludwig RNA  Head of Bed (HOB) Positioning: HOB elevated  Taken 3/19/2025 0200 by Angelic Ludwig RNA  Head of Bed (HOB) Positioning: HOB at 30-45 degrees  Taken 3/19/2025 0010 by Angelic Ludwig RNA  Head of Bed (HOB) Positioning: HOB at 30-45 degrees  Taken 3/18/2025 2210 by Angelic Ludwig RNA  Head of Bed (HOB) Positioning: HOB at 45 degrees  Taken 3/18/2025 2030 by Angelic Ludwig RNA  Head of Bed (HOB) Positioning: HOB at 45 degrees     Problem: UTI (Urinary Tract Infection)  Goal: Improved Infection Symptoms  Outcome: Progressing   Goal Outcome Evaluation:   Plan of care reviewed with patient. No acute changes or events noted at this time. Sputum culture collected for thick tenacious secretions. Wound dressing changed as ordered.

## 2025-03-19 NOTE — PROGRESS NOTES
[facility]    Name:  Viji Vargas   Age:  64 y.o.  Sex:  female  :  1960  MRN:  [id number]   Visit Number:  [id number]  Admission Date:  3/18/2025  Date Of Service:  25  Primary Care Physician:  Ranjeet Pina MD     LOS: 1 day :    Chief Complaint:      Hypoxia    Subjective:    Patient examined this morning.  No significant events overnight.  No arrhythmias noted, off amiodarone.  Tolerating tube feeds well.  No evidence of gross bleeding.    Hospital Course:    Patient is a chronically ill unfortunate 64-year-old female with a history of chronic respiratory failure with hypoxia, status post tracheostomy, PEG tube, indwelling Sanchez catheter, nonverbal status from anoxic brain injury, osteomyelitis/skin ulcerations, prior CVA,  who presented via EMS due to reported low oxygen.  Patient resident of nursing facility, they noted increased secretions, she had been short of breath prior but after suctioning shortness of breath had improved per report.  Typically wears 4 L of oxygen through trach mask.  Patient at baseline is nonverbal and history otherwise limited at this time.  Of note, patient was treated in January at  secondary to osteomyelitis/ulcerations of sacrum, following with ID at .     In the ER, she was overall hemodynamically stable with no fever and blood pressure normal.  She did have evidence of nonsustained ventricular tachycardia, currently on amiodarone drip.  Received small fluid bolus.  X-ray does not show overt opacity, difficult study noted.  Urinalysis concerning for UTI.  Elevated procalcitonin noted.  Secondary concern for pneumonia/tracheitis/UTI, we were asked to admit    Review of Systems:     All systems were reviewed and negative except as mentioned in subjective, assessment and plan.    Vital Signs:    Temp:  [97.7 °F (36.5 °C)-99.2 °F (37.3 °C)] 97.8 °F (36.6 °C)  Heart Rate:  [] 111  Resp:  [18-25] 25  BP:  ()/(41-78) 109/63    Intake and output:    I/O last 3 completed shifts:  In: 4332.1 [I.V.:2250.6; Other:360; NG/GT:621.5; IV Piggyback:1100]  Out: 1325 [Urine:1325]  I/O this shift:  In: 425 [Other:100; NG/GT:325]  Out: -     Physical Examination:    General Appearance:  Alert, no acute distress.   Head:  Atraumatic and normocephalic.   Eyes: Conjunctivae and sclerae normal, no icterus. No pallor.   Throat: No oral lesions, no thrush, oral mucosa moist.   Neck: Supple, trachea midline, no thyromegaly.   Lungs:   Breath sounds heard bilaterally equally.  No wheezing or crackles.    Heart:  Normal S1 and S2, no murmur, no gallop, no rub. No JVD.   Abdomen:   PEG tube present, soft, nontender, nondistended   Extremities: Supple, no edema, no cyanosis, no clubbing.   Skin: No bleeding or rash.   Neurologic: Alert, nonverbal.  Extremities contracted.  At baseline.     Laboratory results:    Results from last 7 days   Lab Units 03/19/25  0643 03/18/25  0345   SODIUM mmol/L 139 136   POTASSIUM mmol/L 4.0 4.7   CHLORIDE mmol/L 103 97*   CO2 mmol/L 24.9 28.4   BUN mg/dL 26* 33*   CREATININE mg/dL 0.50* 0.59   CALCIUM mg/dL 8.6 9.0   BILIRUBIN mg/dL  --  0.2   ALK PHOS U/L  --  104   ALT (SGPT) U/L  --  26   AST (SGOT) U/L  --  21   GLUCOSE mg/dL 231* 189*     Results from last 7 days   Lab Units 03/19/25  0933 03/19/25  0643 03/18/25  0345   WBC 10*3/mm3  --  11.47* 11.30*   HEMOGLOBIN g/dL 6.2* 6.2* 8.6*   HEMATOCRIT % 21.7* 21.0* 29.2*   PLATELETS 10*3/mm3  --  543* 571*             Results from last 7 days   Lab Units 03/18/25  1453 03/18/25  0458 03/18/25  0352 03/18/25  0345 03/17/25  1300   BLOODCX   --   --  No growth at 24 hours No growth at 24 hours  --    URINECX   --  >100,000 CFU/mL Gram Negative Bacilli*  --   --  >100,000 CFU/mL Gram Negative Bacilli*   WOUNDCX  Culture in progress  --   --   --   --      Recent Labs     06/03/24  0630 11/29/24  2245 12/09/24  1864   BASEEXCESS 3.0 9.7* 6.6*      I have  reviewed the patient's laboratory results.    Radiology results:    CT Abdomen Pelvis Without Contrast  Result Date: 3/18/2025  PROCEDURE: CT ABDOMEN PELVIS WO CONTRAST-  HISTORY: complicated uti, hematuria; J69.0-Pneumonitis due to inhalation of food and vomit; T83.511A-Infection and inflammatory reaction due to indwelling urethral catheter, initial encounter; N39.0-Urinary tract infection, site not specified; I47.29-Other ventricular tachycardia  COMPARISON: September 2024.  PROCEDURE: Axial images were obtained from the lung bases through the pubic symphysis without intravenous contrast.  FINDINGS:  ABDOMEN: Patient motion artifact limits images. There is streak artifact from patient's arm position. Minimal bilateral pleural effusions are seen. The heart size is normal. There are vascular calcifications. The limited noncontrast images of the liver are normal. The gallbladder is present. There are no CT visualized gallstones. The spleen is normal. No adrenal masses are seen.  The pancreas has an unremarkable unenhanced appearance. The aorta is normal in caliber. There is no significant free fluid or adenopathy. Hypodense lesion of the lateral right kidney is stable from the prior exam. There is no nephrolithiasis. There is no hydronephrosis. A PEG tube is present. Limited noncontrast images of the bowel are unremarkable.  PELVIS: The appendix is normal. The urinary bladder is collapsed by Sanchez catheter. There is no significant adenopathy. There is a significant decubitus ulcer in the posterior tissues overlying the sacrum which has developed since the prior exam. There is partial destruction of the distal sacrum and coccygeal segments, new. No contrast was given, but no definite fluid collection is seen. There is increased attenuation of the presacral fat, similar to the prior exam with minimal free fluid.      Impression: No hydronephrosis or nephrolithiasis.  Interval development of significant sacral decubitus  ulcer with partial destruction of the distal sacrum and coccygeal segments. No contrast given, but no definite fluid collection is seen. Findings are concerning for osteomyelitis.   CTDI: 20.57 mGy DLP: 962.25 mGy.cm   This study was performed with techniques to keep radiation doses as low as reasonably achievable (ALARA). Individualized dose reduction techniques using automated exposure control or adjustment of mA and/or kV according to the patient size were employed.     Images were reviewed, interpreted, and dictated by Dr. Briana Erickson MD Transcribed by Shawna Cordero PA-C.  This report was signed and finalized on 3/18/2025 12:17 PM by Briana Erickson MD.      XR Chest 1 View  Result Date: 3/18/2025  PROCEDURE: XR CHEST 1 VW-  HISTORY: Dyspnea, shortness of breath  COMPARISON: September 28, 2024..  FINDINGS: Tracheostomy again noted with tip 4 cm above the joby. The heart is upper limits of normal in size.. There is decreased inspiratory effort with bibasilar linear densities similar to prior and consistent with scar. No new area of consolidation seen. There is blunting of the costophrenic angles bilaterally, small effusion versus scar.. The mediastinum is unremarkable. There is no pneumothorax.  There are no acute osseous abnormalities. Apical lordotic positioning noted.      Impression: Stable position of tracheostomy tube..  Decreased inspiratory effort with stable bibasilar scar and suspected bilateral pleural scarring versus small effusion.   This report was signed and finalized on 3/18/2025 8:50 AM by Briana Erickson MD.      I have reviewed the patient's radiology reports.    Medication Review:     I have reviewed the patient's active and prn medications.     Problem List:      Non-sustained ventricular tachycardia      Assessment:    Nonsustained ventricular tachycardia, POA  Suspected pneumonia versus tracheitis, POA  Complicated UTI in setting of indwelling Sanchez catheter  History of sacral  osteomyelitis  Nonverbal  PEG tube in place  Anoxic brain injury  Acute on chronic anemia  Staph Epi Bacteremia - contaminant    Plan:    Nonsustained V. tach:  -Echo from last year with normal ejection fraction, some diastolic dysfunction noted.  -May be related to underlying hypoxia, early sepsis potentially with multiple infectious processes at play.    -Patient placed on amiodarone on admission. Completed 24hrs. No further arrhythmia noted  -2D echocardiogram revealed EF 60 to 65% with normal diastolic function.  No significant valvular abnormalities.     Pneumonia/tracheitis  Gram-negative bacillus UTI:  -Patient has a history of complicated pneumonia with parapneumonic effusion, increased secretions currently requiring frequent suctioning.  Continue with ropinirole and reportedly  -UTI with prior ESBL noted.    -Continue Zosyn ; vancomycin discontinued due to negative MRSA screen     Staph epi bacteremia  -Contaminant; no further workup indicated     Sacral osteomyelitis:  Has been followed by  ID, was on 6 to 8 weeks of antibiotics per last discharge summary.     Nonverbal/PEG tube/anoxic brain injury:  Complicates all aspects of care.  Overall prognosis is fairly poor.  Quality of life is also poor.     Acute on chronic anemia  -Patient had decrease of hemoglobin from 8.6-6.2.  This was confirmed with repeat H&H.  -No evidence of gross bleeding.  However, patient noted to have significant amount of RBCs on initial UA.  -1 unit PRBC transfusion  -Hold chronic Eliquis    DVT Prophylaxis: SCDs (Hold Eliquis)  Code Status: Full  Diet: Tube feeds  Discharge Plan: Pending    Walter Barragan DO  03/19/25  13:32 EDT    Dictated utilizing Dragon dictation.

## 2025-03-19 NOTE — CONSULTS
"Tube Feeding Assessment    Patient Name: Viji Vargas  YOB: 1960  MRN: [id number]  Admission date: 3/18/2025    Comment: Glucose levels are elevated. Will adjust EN to help with glycemic control.     EN Recommendations:    1. Finish last bag of Isosource 1.5. Once previous bag is completed adjust formula to Diabetisource AC. Start Diabetisource AC rate at 55 mL. Advance 10 mL q 6 hrs as tolerated to goal rate of 65 mL/hr x 22 hrs.    2. FW at 75 q 4 hrs.    3. Administer Edilberto BID via PEG. Flush feeding tube with 30 mL before and after administering Edilberto. Mix Edilberto with 120 mL of water until Edilberto is completely dissolved and administer via PEG.    Total Regimen Provides: 1896 kcal, 90 g pro, 1976 mL of FW, 160 g carbohydrate      Encounter Information     Trending Narrative 3/19:  Isosource 1.5 running at goal of 55 mL/hr. RN reported pt is tolerating tube feeding. Pt with elevated glucose levels and A1C=6.5. Current tube feeding formula and Edilberto is providing 229 g carbohydrates. Will adjust tube feeding to Diabetisource AC to help with glucose levels.    3/18: Pt is in the ED and noted for low oxygen on admission. PEG in place and previously on Isosource 1.5. Noted for osteomyelitis/ulcerations of sacrum. Will provide EN recommendations.    H&P  Past Medical History:   Diagnosis Date    Abdominal distention 2024    Acute and chronic respiratory failure with hypoxia 2024    Adult hypertrophic pyloric stenosis 2024    COPD (chronic obstructive pulmonary disease)     Hypertension     Osteomyelitis     Pneumonia     Stroke          Past Surgical History:   Procedure Laterality Date    HYSTERECTOMY      Partial     TRACHEOSTOMY AND PEG TUBE INSERTION N/A 3/20/2019    Procedure: TRACHEOSTOMY AND PERCUTANEOUS ENDOSCOPIC GASTROSTOMY TUBE INSERTION;  Surgeon: Nadira Pandey MD;  Location: [facility];  Service: General        Anthropometrics     Current Height, Weight Height: 162.6 cm (64.02\")  Weight: " 72.2 kg (159 lb 2.8 oz) (03/19/25 0619)     Trending Weight History Wt Readings from Last 30 Encounters:   03/19/25 0619 72.2 kg (159 lb 2.8 oz)   03/18/25 1341 73.2 kg (161 lb 6 oz)   03/18/25 1251 73.2 kg (161 lb 6 oz)   03/18/25 0242 77.6 kg (171 lb 1.2 oz)   02/12/25 1729 77.6 kg (171 lb 1.2 oz)   10/01/24 0523 77.6 kg (171 lb 1.2 oz)   09/30/24 0823 81.3 kg (179 lb 3.7 oz)   09/29/24 1546 77 kg (169 lb 12.1 oz)   09/29/24 0500 75.1 kg (165 lb 9.1 oz)   09/28/24 1817 75.2 kg (165 lb 12.6 oz)   09/28/24 0915 70.3 kg (155 lb)   09/16/24 0228 70.3 kg (155 lb)   07/12/24 0637 70.3 kg (155 lb)   02/18/24 1818 70.3 kg (155 lb)   01/21/24 0917 70.3 kg (155 lb)   01/09/24 0357 70.5 kg (155 lb 6.4 oz)   01/08/24 0313 68 kg (150 lb)   01/06/24 0351 68.5 kg (151 lb)   01/05/24 1509 70.9 kg (156 lb 6.4 oz)   01/04/24 0600 70.9 kg (156 lb 4.9 oz)   01/03/24 0600 70.9 kg (156 lb 4.9 oz)   01/02/24 0600 73.8 kg (162 lb 11.2 oz)   01/01/24 0600 74.7 kg (164 lb 10.9 oz)   12/26/23 0430 69.7 kg (153 lb 10.6 oz)   12/25/23 0600 71.9 kg (158 lb 8.2 oz)   12/24/23 1600 79 kg (174 lb 2.6 oz)   12/24/23 0400 70.5 kg (155 lb 6.8 oz)   12/23/23 0500 69.7 kg (153 lb 10.6 oz)   12/22/23 1900 70.3 kg (154 lb 15 oz)   12/22/23 0818 71.2 kg (157 lb)   05/23/23 0326 71.4 kg (157 lb 6.5 oz)   05/08/23 0500 71.4 kg (157 lb 6.5 oz)   05/07/23 0622 69.3 kg (152 lb 12.5 oz)   05/06/23 0552 69 kg (152 lb 1.9 oz)   05/06/23 0136 69 kg (152 lb 1.9 oz)   05/05/23 2207 72.6 kg (160 lb)   04/15/23 1931 72.6 kg (160 lb)   03/07/23 1309 72.6 kg (160 lb)   02/11/23 0152 72.6 kg (160 lb)   01/22/23 0620 69.9 kg (154 lb)   11/22/22 1531 69.9 kg (154 lb)   11/12/22 2146 69.9 kg (154 lb)   10/22/22 0356 68 kg (150 lb)   08/13/22 1109 70.5 kg (155 lb 6.4 oz)   04/10/22 0920 79.4 kg (175 lb)   03/31/22 1117 79.4 kg (175 lb)   03/30/22 1638 79.4 kg (175 lb)   02/22/22 1328 79.4 kg (175 lb 0.7 oz)   01/24/22 0124 79.4 kg (175 lb)   01/12/22 0556 74.3 kg (163 lb  12.8 oz)   01/11/22 0340 73.3 kg (161 lb 9.6 oz)   01/10/22 0935 70.7 kg (155 lb 13.8 oz)   01/10/22 0300 70.6 kg (155 lb 10.3 oz)   01/09/22 2152 70.3 kg (155 lb)   12/30/21 1953 70.3 kg (155 lb)   12/30/21 1147 70.3 kg (155 lb)   10/22/21 0917 70.3 kg (155 lb)   09/29/21 0046 70.3 kg (155 lb)   08/24/21 1437 70.5 kg (155 lb 6 oz)       BMI Body mass index is 27.31 kg/m².     Labs     Pertinent Labs Results from last 7 days   Lab Units 03/19/25  0643 03/18/25  0345   SODIUM mmol/L 139 136   POTASSIUM mmol/L 4.0 4.7   CHLORIDE mmol/L 103 97*   CO2 mmol/L 24.9 28.4   BUN mg/dL 26* 33*   CREATININE mg/dL 0.50* 0.59   CALCIUM mg/dL 8.6 9.0   BILIRUBIN mg/dL  --  0.2   ALK PHOS U/L  --  104   ALT (SGPT) U/L  --  26   AST (SGOT) U/L  --  21   GLUCOSE mg/dL 231* 189*        Results from last 7 days   Lab Units 03/19/25  0933   HEMOGLOBIN g/dL 6.2*   HEMATOCRIT % 21.7*            Lab Results   Component Value Date    HGBA1C 6.50 (H) 03/18/2025        Medications  Schedule Medications [Held by provider] apixaban, 5 mg, Per G Tube, Q12H  budesonide, 0.5 mg, Nebulization, BID - RT  docusate sodium, 300 mg, Per G Tube, BID  furosemide, 20 mg, Intravenous, Once  glycopyrrolate, 2 mg, Per G Tube, 4x Daily  ipratropium-albuterol, 3 mL, Nebulization, Q6H - RT  Edilberto, 1 packet, Per PEG Tube, BID  lansoprazole, 30 mg, Per G Tube, Q AM  midodrine, 5 mg, Per G Tube, TID AC  piperacillin-tazobactam, 3.375 g, Intravenous, Q8H  saccharomyces boulardii, 250 mg, Per G Tube, BID  Scopolamine, 1 patch, Transdermal, Q72H  sodium chloride, 10 mL, Intravenous, Q12H  sodium hypochlorite, , Topical, Q12H       Infusions         PRN Medications    Calcium Replacement - Follow Nurse / BPA Driven Protocol    Magnesium Standard Dose Replacement - Follow Nurse / BPA Driven Protocol    nitroglycerin    ondansetron ODT **OR** ondansetron    Phosphorus Replacement - Follow Nurse / BPA Driven Protocol    Potassium Replacement - Follow Nurse / BPA Driven  Protocol    sodium chloride    sodium chloride     Physical Findings        Edema  no edema   Bowel Function None   Tubes Central Line/PICC and G-Tube/PEG   Skin Sacral osteomyelitis      Estimated/Assessed Needs       Energy Requirements    EST Needs, Method, Wt used 8715-3235 kcal (25-30 kcal/kg AjBW)       Protein Requirements    EST Needs, Method, Wt used  g pro (1.5-2 g pro/kg AjBW)       Fluid Requirements     Estimated Needs (mL/day) 0588-3718 mL/day     Current Tube Feed Orders & Evaluation      Enteral Nutrition     Enteral Route PEG   Orders, Modulars, Flushes    Residual/Tolerance    Vasopressors    Noninvasive MAP (mmHg) 86   Lactate (mmol/L) 1.5   Propofol (mL/hr)     Tube Feed Observation      Nutrition Diagnosis         Nutrition Dx Problem 1 Increased nutrient needs r/t to skin integrity AEB sacral osteomyelitis.      Nutrition Dx Problem 2 Difficulty chewing/swallowing r/t anoxic brain injury AEB PEG tube in place for enteral nutrition.        Intervention Goal         Intervention Goal(s) Tolerate EN  No weight changes  Glucose WNL     Nutrition Intervention        RD Action Provide EN recommendations and ordered A1C      Enteral Nutrition Prescription     Enteral Prescription Recommendations:    1. Adjust formula to Diabetisource AC. Start Diabetisource AC rate at 55 mL. Advance 10 mL q 6 hrs as tolerated to goal rate of 65 mL/hr x 22 hrs.    2. FW at 75 q 4 hrs.    3. Administer Edilberto BID via PEG. Flush feeding tube with 30 mL before and after administering Edilberto. Mix Edilberto with 120 mL of water until Edilberto is completely dissolved and administer via PEG.    Total Regimen Provides: 1896 kcal, 90 g pro, 1976 mL of FW, 160 g carbohydrate         Monitor/Evaluation        Monitor Per protocol, I&O, Pertinent labs, EN delivery/tolerance, Weight, Skin status, GI status, Symptoms, POC/GOC, Swallow function, Hemodynamic stability     RD to f/up    Electronically signed by:  Lorena Trevino RD  03/19/25  11:56 EDT

## 2025-03-20 LAB
ACINETOBACTER SCREEN CX: NORMAL
ANION GAP SERPL CALCULATED.3IONS-SCNC: 12.9 MMOL/L (ref 5–15)
BACTERIA SPEC AEROBE CULT: ABNORMAL
BACTERIA SPEC AEROBE CULT: ABNORMAL
BH BB BLOOD EXPIRATION DATE: NORMAL
BH BB BLOOD TYPE BARCODE: 6200
BH BB DISPENSE STATUS: NORMAL
BH BB PRODUCT CODE: NORMAL
BH BB UNIT NUMBER: NORMAL
BUN SERPL-MCNC: 25 MG/DL (ref 8–23)
BUN/CREAT SERPL: 41.7 (ref 7–25)
CALCIUM SPEC-SCNC: 9 MG/DL (ref 8.6–10.5)
CANDIDA AURIS PCR (MAKO): NOT DETECTED
CHLORIDE SERPL-SCNC: 101 MMOL/L (ref 98–107)
CO2 SERPL-SCNC: 24.1 MMOL/L (ref 22–29)
CREAT SERPL-MCNC: 0.6 MG/DL (ref 0.57–1)
CROSSMATCH INTERPRETATION: NORMAL
DEPRECATED RDW RBC AUTO: 50.3 FL (ref 37–54)
EGFRCR SERPLBLD CKD-EPI 2021: 99.1 ML/MIN/1.73
ERYTHROCYTE [DISTWIDTH] IN BLOOD BY AUTOMATED COUNT: 16.3 % (ref 12.3–15.4)
GLUCOSE BLDC GLUCOMTR-MCNC: 193 MG/DL (ref 70–130)
GLUCOSE BLDC GLUCOMTR-MCNC: 218 MG/DL (ref 70–130)
GLUCOSE BLDC GLUCOMTR-MCNC: 238 MG/DL (ref 70–130)
GLUCOSE BLDC GLUCOMTR-MCNC: 242 MG/DL (ref 70–130)
GLUCOSE SERPL-MCNC: 196 MG/DL (ref 65–99)
GRAM STN SPEC: ABNORMAL
HCT VFR BLD AUTO: 28.6 % (ref 34–46.6)
HGB BLD-MCNC: 8.6 G/DL (ref 12–15.9)
ISOLATED FROM: ABNORMAL
MCH RBC QN AUTO: 25.1 PG (ref 26.6–33)
MCHC RBC AUTO-ENTMCNC: 30.1 G/DL (ref 31.5–35.7)
MCV RBC AUTO: 83.6 FL (ref 79–97)
PLATELET # BLD AUTO: 588 10*3/MM3 (ref 140–450)
PMV BLD AUTO: 10.4 FL (ref 6–12)
POTASSIUM SERPL-SCNC: 3.9 MMOL/L (ref 3.5–5.2)
RBC # BLD AUTO: 3.42 10*6/MM3 (ref 3.77–5.28)
SODIUM SERPL-SCNC: 138 MMOL/L (ref 136–145)
UNIT  ABO: NORMAL
UNIT  RH: NORMAL
VRE SPEC QL CULT: NORMAL
WBC NRBC COR # BLD AUTO: 13.61 10*3/MM3 (ref 3.4–10.8)

## 2025-03-20 PROCEDURE — 25810000003 SODIUM CHLORIDE 0.9 % SOLUTION 1,000 ML FLEX CONT: Performed by: FAMILY MEDICINE

## 2025-03-20 PROCEDURE — 85027 COMPLETE CBC AUTOMATED: CPT | Performed by: INTERNAL MEDICINE

## 2025-03-20 PROCEDURE — 63710000001 INSULIN REGULAR HUMAN PER 5 UNITS: Performed by: INTERNAL MEDICINE

## 2025-03-20 PROCEDURE — 82948 REAGENT STRIP/BLOOD GLUCOSE: CPT

## 2025-03-20 PROCEDURE — 94799 UNLISTED PULMONARY SVC/PX: CPT

## 2025-03-20 PROCEDURE — 25010000002 PIPERACILLIN SOD-TAZOBACTAM PER 1 G: Performed by: FAMILY MEDICINE

## 2025-03-20 PROCEDURE — 80048 BASIC METABOLIC PNL TOTAL CA: CPT | Performed by: INTERNAL MEDICINE

## 2025-03-20 PROCEDURE — 82948 REAGENT STRIP/BLOOD GLUCOSE: CPT | Performed by: INTERNAL MEDICINE

## 2025-03-20 PROCEDURE — 94761 N-INVAS EAR/PLS OXIMETRY MLT: CPT

## 2025-03-20 PROCEDURE — 99232 SBSQ HOSP IP/OBS MODERATE 35: CPT | Performed by: INTERNAL MEDICINE

## 2025-03-20 RX ORDER — METOPROLOL TARTRATE 1 MG/ML
5 INJECTION, SOLUTION INTRAVENOUS EVERY 4 HOURS PRN
Status: DISCONTINUED | OUTPATIENT
Start: 2025-03-20 | End: 2025-03-21 | Stop reason: HOSPADM

## 2025-03-20 RX ORDER — AMIODARONE HYDROCHLORIDE 200 MG/1
200 TABLET ORAL EVERY 12 HOURS SCHEDULED
Status: DISCONTINUED | OUTPATIENT
Start: 2025-03-20 | End: 2025-03-21 | Stop reason: HOSPADM

## 2025-03-20 RX ADMIN — Medication 10 ML: at 21:50

## 2025-03-20 RX ADMIN — AMIODARONE HYDROCHLORIDE 200 MG: 200 TABLET ORAL at 15:41

## 2025-03-20 RX ADMIN — INSULIN HUMAN 3 UNITS: 100 INJECTION, SOLUTION PARENTERAL at 11:54

## 2025-03-20 RX ADMIN — IPRATROPIUM BROMIDE AND ALBUTEROL SULFATE 3 ML: 2.5; .5 SOLUTION RESPIRATORY (INHALATION) at 07:10

## 2025-03-20 RX ADMIN — INSULIN HUMAN 3 UNITS: 100 INJECTION, SOLUTION PARENTERAL at 05:54

## 2025-03-20 RX ADMIN — SODIUM CHLORIDE 40 ML: 9 INJECTION, SOLUTION INTRAVENOUS at 21:51

## 2025-03-20 RX ADMIN — GLYCOPYRROLATE 2 MG: 1 TABLET ORAL at 21:50

## 2025-03-20 RX ADMIN — GLYCOPYRROLATE 2 MG: 1 TABLET ORAL at 11:53

## 2025-03-20 RX ADMIN — Medication 10 ML: at 08:42

## 2025-03-20 RX ADMIN — BUDESONIDE 0.5 MG: 0.5 INHALANT RESPIRATORY (INHALATION) at 07:10

## 2025-03-20 RX ADMIN — INSULIN HUMAN 3 UNITS: 100 INJECTION, SOLUTION PARENTERAL at 00:16

## 2025-03-20 RX ADMIN — Medication 250 MG: at 08:42

## 2025-03-20 RX ADMIN — DAKIN'S SOLUTION 0.125% (QUARTER STRENGTH): 0.12 SOLUTION at 21:52

## 2025-03-20 RX ADMIN — Medication 250 MG: at 21:50

## 2025-03-20 RX ADMIN — METOPROLOL TARTRATE 5 MG: 1 INJECTION, SOLUTION INTRAVENOUS at 08:41

## 2025-03-20 RX ADMIN — Medication 1 PACKET: at 11:53

## 2025-03-20 RX ADMIN — PIPERACILLIN AND TAZOBACTAM 3.38 G: 3; .375 INJECTION, POWDER, FOR SOLUTION INTRAVENOUS at 11:54

## 2025-03-20 RX ADMIN — IPRATROPIUM BROMIDE AND ALBUTEROL SULFATE 3 ML: 2.5; .5 SOLUTION RESPIRATORY (INHALATION) at 13:19

## 2025-03-20 RX ADMIN — BUDESONIDE 0.5 MG: 0.5 INHALANT RESPIRATORY (INHALATION) at 18:58

## 2025-03-20 RX ADMIN — PIPERACILLIN AND TAZOBACTAM 3.38 G: 3; .375 INJECTION, POWDER, FOR SOLUTION INTRAVENOUS at 21:51

## 2025-03-20 RX ADMIN — GLYCOPYRROLATE 2 MG: 1 TABLET ORAL at 08:40

## 2025-03-20 RX ADMIN — MIDODRINE HYDROCHLORIDE 5 MG: 5 TABLET ORAL at 06:05

## 2025-03-20 RX ADMIN — GLYCOPYRROLATE 2 MG: 1 TABLET ORAL at 18:04

## 2025-03-20 RX ADMIN — Medication 10 ML: at 00:48

## 2025-03-20 RX ADMIN — DAKIN'S SOLUTION 0.125% (QUARTER STRENGTH): 0.12 SOLUTION at 11:54

## 2025-03-20 RX ADMIN — MIDODRINE HYDROCHLORIDE 5 MG: 5 TABLET ORAL at 11:53

## 2025-03-20 RX ADMIN — MIDODRINE HYDROCHLORIDE 5 MG: 5 TABLET ORAL at 18:04

## 2025-03-20 RX ADMIN — LANSOPRAZOLE 30 MG: 30 TABLET, ORALLY DISINTEGRATING ORAL at 05:55

## 2025-03-20 RX ADMIN — DOCUSATE SODIUM 300 MG: 50 LIQUID ORAL at 08:41

## 2025-03-20 RX ADMIN — DAKIN'S SOLUTION 0.125% (QUARTER STRENGTH): 0.12 SOLUTION at 03:50

## 2025-03-20 RX ADMIN — IPRATROPIUM BROMIDE AND ALBUTEROL SULFATE 3 ML: 2.5; .5 SOLUTION RESPIRATORY (INHALATION) at 18:58

## 2025-03-20 RX ADMIN — Medication 1 PACKET: at 21:52

## 2025-03-20 RX ADMIN — DOCUSATE SODIUM 300 MG: 50 LIQUID ORAL at 21:50

## 2025-03-20 RX ADMIN — INSULIN HUMAN 2 UNITS: 100 INJECTION, SOLUTION PARENTERAL at 18:04

## 2025-03-20 RX ADMIN — PIPERACILLIN AND TAZOBACTAM 3.38 G: 3; .375 INJECTION, POWDER, FOR SOLUTION INTRAVENOUS at 03:50

## 2025-03-20 NOTE — PROGRESS NOTES
"Tube Feeding Assessment    Patient Name: Viji Vargas  YOB: 1960  MRN: [id number]  Admission date: 3/18/2025    Comment:     EN Recommendations:    1. Initiate Diabetisource AC rate at 55 mL. Advance 10 mL q 6 hrs as tolerated to goal rate of 65 mL/hr x 22 hrs.    2. FW at 75 q 4 hrs.    3. Administer Edilberto BID via PEG. Flush feeding tube with 30 mL before and after administering Edilberto. Mix Edilberto with 120 mL of water until Edilberto is completely dissolved and administer via PEG.    Total Regimen Provides: 1896 kcal, 90 g pro, 1976 mL of FW, 160 g carbohydrate    Encounter Information     Trending Narrative 3/20: Diabetisource AC running at 55 mL/hr. Pt is tolerating new tube feeding formula. Glucose levels remain elevated.     3/19:  Isosource 1.5 running at goal of 55 mL/hr. RN reported pt is tolerating tube feeding. Pt with elevated glucose levels and A1C=6.5. Current tube feeding formula and Edilberto is providing 229 g carbohydrates. Will adjust tube feeding to Diabetisource AC to help with glucose levels.    3/18: Pt is in the ED and noted for low oxygen on admission. PEG in place and previously on Isosource 1.5. Noted for osteomyelitis/ulcerations of sacrum. Will provide EN recommendations.    H&P  Past Medical History:   Diagnosis Date    Abdominal distention 2024    Acute and chronic respiratory failure with hypoxia 2024    Adult hypertrophic pyloric stenosis 2024    COPD (chronic obstructive pulmonary disease)     Hypertension     Osteomyelitis     Pneumonia     Stroke          Past Surgical History:   Procedure Laterality Date    HYSTERECTOMY      Partial     TRACHEOSTOMY AND PEG TUBE INSERTION N/A 3/20/2019    Procedure: TRACHEOSTOMY AND PERCUTANEOUS ENDOSCOPIC GASTROSTOMY TUBE INSERTION;  Surgeon: Nadira Pandey MD;  Location: [facility];  Service: General        Anthropometrics     Current Height, Weight Height: 162.6 cm (64.02\")  Weight: 74.1 kg (163 lb 5.8 oz) (03/20/25 0611)   "   Trending Weight History Wt Readings from Last 30 Encounters:   03/20/25 0611 74.1 kg (163 lb 5.8 oz)   03/19/25 0619 72.2 kg (159 lb 2.8 oz)   03/18/25 1341 73.2 kg (161 lb 6 oz)   03/18/25 1251 73.2 kg (161 lb 6 oz)   03/18/25 0242 77.6 kg (171 lb 1.2 oz)   02/12/25 1729 77.6 kg (171 lb 1.2 oz)   10/01/24 0523 77.6 kg (171 lb 1.2 oz)   09/30/24 0823 81.3 kg (179 lb 3.7 oz)   09/29/24 1546 77 kg (169 lb 12.1 oz)   09/29/24 0500 75.1 kg (165 lb 9.1 oz)   09/28/24 1817 75.2 kg (165 lb 12.6 oz)   09/28/24 0915 70.3 kg (155 lb)   09/16/24 0228 70.3 kg (155 lb)   07/12/24 0637 70.3 kg (155 lb)   02/18/24 1818 70.3 kg (155 lb)   01/21/24 0917 70.3 kg (155 lb)   01/09/24 0357 70.5 kg (155 lb 6.4 oz)   01/08/24 0313 68 kg (150 lb)   01/06/24 0351 68.5 kg (151 lb)   01/05/24 1509 70.9 kg (156 lb 6.4 oz)   01/04/24 0600 70.9 kg (156 lb 4.9 oz)   01/03/24 0600 70.9 kg (156 lb 4.9 oz)   01/02/24 0600 73.8 kg (162 lb 11.2 oz)   01/01/24 0600 74.7 kg (164 lb 10.9 oz)   12/26/23 0430 69.7 kg (153 lb 10.6 oz)   12/25/23 0600 71.9 kg (158 lb 8.2 oz)   12/24/23 1600 79 kg (174 lb 2.6 oz)   12/24/23 0400 70.5 kg (155 lb 6.8 oz)   12/23/23 0500 69.7 kg (153 lb 10.6 oz)   12/22/23 1900 70.3 kg (154 lb 15 oz)   12/22/23 0818 71.2 kg (157 lb)   05/23/23 0326 71.4 kg (157 lb 6.5 oz)   05/08/23 0500 71.4 kg (157 lb 6.5 oz)   05/07/23 0622 69.3 kg (152 lb 12.5 oz)   05/06/23 0552 69 kg (152 lb 1.9 oz)   05/06/23 0136 69 kg (152 lb 1.9 oz)   05/05/23 2207 72.6 kg (160 lb)   04/15/23 1931 72.6 kg (160 lb)   03/07/23 1309 72.6 kg (160 lb)   02/11/23 0152 72.6 kg (160 lb)   01/22/23 0620 69.9 kg (154 lb)   11/22/22 1531 69.9 kg (154 lb)   11/12/22 2146 69.9 kg (154 lb)   10/22/22 0356 68 kg (150 lb)   08/13/22 1109 70.5 kg (155 lb 6.4 oz)   04/10/22 0920 79.4 kg (175 lb)   03/31/22 1117 79.4 kg (175 lb)   03/30/22 1638 79.4 kg (175 lb)   02/22/22 1328 79.4 kg (175 lb 0.7 oz)   01/24/22 0124 79.4 kg (175 lb)   01/12/22 0556 74.3 kg (163 lb 12.8  oz)   01/11/22 0340 73.3 kg (161 lb 9.6 oz)   01/10/22 0935 70.7 kg (155 lb 13.8 oz)   01/10/22 0300 70.6 kg (155 lb 10.3 oz)   01/09/22 2152 70.3 kg (155 lb)   12/30/21 1953 70.3 kg (155 lb)   12/30/21 1147 70.3 kg (155 lb)   10/22/21 0917 70.3 kg (155 lb)   09/29/21 0046 70.3 kg (155 lb)   08/24/21 1437 70.5 kg (155 lb 6 oz)       BMI Body mass index is 28.03 kg/m².     Labs     Pertinent Labs Results from last 7 days   Lab Units 03/20/25  0744 03/19/25  0643 03/18/25  0345   SODIUM mmol/L 138 139 136   POTASSIUM mmol/L 3.9 4.0 4.7   CHLORIDE mmol/L 101 103 97*   CO2 mmol/L 24.1 24.9 28.4   BUN mg/dL 25* 26* 33*   CREATININE mg/dL 0.60 0.50* 0.59   CALCIUM mg/dL 9.0 8.6 9.0   BILIRUBIN mg/dL  --   --  0.2   ALK PHOS U/L  --   --  104   ALT (SGPT) U/L  --   --  26   AST (SGOT) U/L  --   --  21   GLUCOSE mg/dL 196* 231* 189*        Results from last 7 days   Lab Units 03/20/25  0744   HEMOGLOBIN g/dL 8.6*   HEMATOCRIT % 28.6*            Lab Results   Component Value Date    HGBA1C 6.50 (H) 03/18/2025        Medications  Schedule Medications [Held by provider] apixaban, 5 mg, Per G Tube, Q12H  budesonide, 0.5 mg, Nebulization, BID - RT  docusate sodium, 300 mg, Per G Tube, BID  glycopyrrolate, 2 mg, Per G Tube, 4x Daily  insulin regular, 2-7 Units, Subcutaneous, Q6H  ipratropium-albuterol, 3 mL, Nebulization, Q6H While Awake - RT  Edilberto, 1 packet, Per PEG Tube, BID  lansoprazole, 30 mg, Per G Tube, Q AM  midodrine, 5 mg, Per G Tube, TID AC  piperacillin-tazobactam, 3.375 g, Intravenous, Q8H  saccharomyces boulardii, 250 mg, Per G Tube, BID  Scopolamine, 1 patch, Transdermal, Q72H  sodium chloride, 10 mL, Intravenous, Q12H  sodium hypochlorite, , Topical, Q12H       Infusions         PRN Medications    acetaminophen **OR** acetaminophen    Calcium Replacement - Follow Nurse / BPA Driven Protocol    dextrose    dextrose    glucagon (human recombinant)    Magnesium Standard Dose Replacement - Follow Nurse / BPA Driven  Protocol    metoprolol tartrate    nitroglycerin    ondansetron ODT **OR** ondansetron    Phosphorus Replacement - Follow Nurse / BPA Driven Protocol    Potassium Replacement - Follow Nurse / BPA Driven Protocol    sodium chloride    sodium chloride     Physical Findings        Edema  no edema   Bowel Function None   Tubes Central Line/PICC and G-Tube/PEG   Skin Sacral osteomyelitis      Estimated/Assessed Needs       Energy Requirements    EST Needs, Method, Wt used 4259-4799 kcal (25-30 kcal/kg AjBW)       Protein Requirements    EST Needs, Method, Wt used  g pro (1.5-2 g pro/kg AjBW)       Fluid Requirements     Estimated Needs (mL/day) 8109-7673 mL/day     Current Tube Feed Orders & Evaluation      Enteral Nutrition     Enteral Route PEG   Orders, Modulars, Flushes    Residual/Tolerance    Vasopressors    Noninvasive MAP (mmHg) 86   Lactate (mmol/L) 1.5   Propofol (mL/hr)     Tube Feed Observation      Nutrition Diagnosis         Nutrition Dx Problem 1 Increased nutrient needs r/t to skin integrity AEB sacral osteomyelitis.      Nutrition Dx Problem 2 Difficulty chewing/swallowing r/t anoxic brain injury AEB PEG tube in place for enteral nutrition.        Intervention Goal         Intervention Goal(s) Tolerate EN  No weight changes  Glucose WNL     Nutrition Intervention        RD Action Provide EN recommendations and ordered A1C      Enteral Nutrition Prescription     Enteral Prescription Recommendations:    1. Initiate Diabetisource AC rate at 55 mL. Advance 10 mL q 6 hrs as tolerated to goal rate of 65 mL/hr x 22 hrs.    2. FW at 75 q 4 hrs.    3. Administer Edilberto BID via PEG. Flush feeding tube with 30 mL before and after administering Edilberto. Mix Edilberto with 120 mL of water until Edilberto is completely dissolved and administer via PEG.    Total Regimen Provides: 1896 kcal, 90 g pro, 1976 mL of FW, 160 g carbohydrate         Monitor/Evaluation        Monitor Per protocol, I&O, Pertinent labs, EN  delivery/tolerance, Weight, Skin status, GI status, Symptoms, POC/GOC, Swallow function, Hemodynamic stability     RD to f/up    Electronically signed by:  Lorena Trevino RD  03/20/25 13:18 EDT

## 2025-03-20 NOTE — PLAN OF CARE
Problem: Adult Inpatient Plan of Care  Goal: Plan of Care Review  Outcome: Progressing  Goal: Patient-Specific Goal (Individualized)  Outcome: Progressing  Goal: Absence of Hospital-Acquired Illness or Injury  Outcome: Progressing  Intervention: Identify and Manage Fall Risk  Recent Flowsheet Documentation  Taken 3/20/2025 0404 by Angelic Ludwig RNA  Safety Promotion/Fall Prevention:   safety round/check completed   activity supervised   assistive device/personal items within reach   clutter free environment maintained  Taken 3/20/2025 0226 by Angelic Ludwig RNA  Safety Promotion/Fall Prevention:   safety round/check completed   activity supervised   assistive device/personal items within reach   clutter free environment maintained  Taken 3/20/2025 0015 by Angelic Ludwig RNA  Safety Promotion/Fall Prevention:   activity supervised   assistive device/personal items within reach   clutter free environment maintained   safety round/check completed  Taken 3/19/2025 2218 by Angelic Ludwig RNA  Safety Promotion/Fall Prevention:   safety round/check completed   room organization consistent   activity supervised   assistive device/personal items within reach   clutter free environment maintained  Taken 3/19/2025 2030 by Angelic Ludwig RNA  Safety Promotion/Fall Prevention:   activity supervised   assistive device/personal items within reach   clutter free environment maintained   safety round/check completed  Intervention: Prevent Skin Injury  Recent Flowsheet Documentation  Taken 3/20/2025 0404 by Angelic Ludwig RNA  Body Position:   turned   right   legs elevated   heels elevated  Taken 3/20/2025 0226 by Angelic Ludwig RNA  Body Position:   turned   left   legs elevated   heels elevated  Taken 3/20/2025 0015 by Angelic Ludwig RNA  Body Position:   turned   right   heels elevated   legs elevated  Taken 3/19/2025 2218 by Angelic Ludwig RNA  Body Position:   tilted   right   heels elevated   legs  elevated  Taken 3/19/2025 2030 by Angelic Ludwig RNA  Body Position:   turned   right   heels elevated   legs elevated  Skin Protection: incontinence pads utilized  Intervention: Prevent and Manage VTE (Venous Thromboembolism) Risk  Recent Flowsheet Documentation  Taken 3/19/2025 2030 by Angelic Ludwig RNA  VTE Prevention/Management: SCDs (sequential compression devices) off  Goal: Optimal Comfort and Wellbeing  Outcome: Progressing  Intervention: Provide Person-Centered Care  Recent Flowsheet Documentation  Taken 3/19/2025 2030 by Angelic Ludwig RNA  Trust Relationship/Rapport:   care explained   emotional support provided   reassurance provided  Goal: Readiness for Transition of Care  Outcome: Progressing     Problem: Fall Injury Risk  Goal: Absence of Fall and Fall-Related Injury  Outcome: Progressing  Intervention: Identify and Manage Contributors  Recent Flowsheet Documentation  Taken 3/20/2025 0015 by Angelic Ludwig RNA  Medication Review/Management: medications reviewed  Intervention: Promote Injury-Free Environment  Recent Flowsheet Documentation  Taken 3/20/2025 0404 by Angelic Ludwig RNA  Safety Promotion/Fall Prevention:   safety round/check completed   activity supervised   assistive device/personal items within reach   clutter free environment maintained  Taken 3/20/2025 0226 by Angelic Ludwig RNA  Safety Promotion/Fall Prevention:   safety round/check completed   activity supervised   assistive device/personal items within reach   clutter free environment maintained  Taken 3/20/2025 0015 by Angelic Ludwig RNA  Safety Promotion/Fall Prevention:   activity supervised   assistive device/personal items within reach   clutter free environment maintained   safety round/check completed  Taken 3/19/2025 2218 by Angelic Ludwig RNA  Safety Promotion/Fall Prevention:   safety round/check completed   room organization consistent   activity supervised   assistive device/personal items within  reach   clutter free environment maintained  Taken 3/19/2025 2030 by Angelic Ludwig RNA  Safety Promotion/Fall Prevention:   activity supervised   assistive device/personal items within reach   clutter free environment maintained   safety round/check completed     Problem: Skin Injury Risk Increased  Goal: Skin Health and Integrity  Outcome: Progressing  Intervention: Optimize Skin Protection  Recent Flowsheet Documentation  Taken 3/20/2025 0404 by Angelic Luwdig RNA  Activity Management: activity minimized  Head of Bed (HOB) Positioning: HOB elevated  Taken 3/20/2025 0226 by Angelic Ludwig RNA  Activity Management: activity minimized  Head of Bed (HOB) Positioning: HOB at 30-45 degrees  Taken 3/20/2025 0015 by Angelic Ludwig RNA  Activity Management: activity minimized  Head of Bed (HOB) Positioning: HOB elevated  Taken 3/19/2025 2218 by Angelic Ludwig RNA  Activity Management: activity minimized  Head of Bed (HOB) Positioning: HOB elevated  Taken 3/19/2025 2030 by Angelic Ludwig RNA  Activity Management: activity encouraged  Pressure Reduction Techniques: weight shift assistance provided  Head of Bed (HOB) Positioning: HOB at 30-45 degrees  Skin Protection: incontinence pads utilized     Problem: Comorbidity Management  Goal: Blood Glucose Level Within Target Range  Outcome: Progressing  Intervention: Monitor and Manage Glycemia  Recent Flowsheet Documentation  Taken 3/20/2025 0015 by Angelic Ludwig RNA  Medication Review/Management: medications reviewed  Goal: Blood Pressure in Desired Range  Outcome: Progressing  Intervention: Maintain Blood Pressure Management  Recent Flowsheet Documentation  Taken 3/20/2025 0015 by Angelic Ludwig RNA  Medication Review/Management: medications reviewed     Problem: Dysrhythmia  Goal: Normalized Cardiac Rhythm  Outcome: Progressing  Intervention: Monitor and Manage Cardiac Rhythm Effect  Recent Flowsheet Documentation  Taken 3/19/2025 2030 by Oziel  MAITE Atwood  VTE Prevention/Management: SCDs (sequential compression devices) off     Problem: Pneumonia  Goal: Fluid Balance  Outcome: Progressing  Goal: Absence of Infection Signs and Symptoms  Outcome: Progressing  Goal: Effective Oxygenation and Ventilation  Outcome: Progressing  Intervention: Promote Airway Secretion Clearance  Recent Flowsheet Documentation  Taken 3/20/2025 0404 by Angelic Ludwig RNA  Activity Management: activity minimized  Taken 3/20/2025 0226 by Angelic Ludwig RNA  Activity Management: activity minimized  Taken 3/20/2025 0015 by Angelic Ludwig RNA  Activity Management: activity minimized  Taken 3/19/2025 2218 by Angelic Ludwig RNA  Activity Management: activity minimized  Taken 3/19/2025 2030 by Angelic Ludwig RNA  Activity Management: activity encouraged  Intervention: Optimize Oxygenation and Ventilation  Recent Flowsheet Documentation  Taken 3/20/2025 0404 by Angelic Ludwig RNA  Head of Bed (HOB) Positioning: HOB elevated  Taken 3/20/2025 0226 by Angelic Ludwig RNA  Head of Bed (HOB) Positioning: HOB at 30-45 degrees  Taken 3/20/2025 0015 by Angelic Ludwig RNA  Head of Bed (HOB) Positioning: HOB elevated  Taken 3/19/2025 2218 by Angelic Ludwig RNA  Head of Bed (HOB) Positioning: HOB elevated  Taken 3/19/2025 2030 by Angelic Ludwig RNA  Head of Bed (HOB) Positioning: HOB at 30-45 degrees     Problem: UTI (Urinary Tract Infection)  Goal: Improved Infection Symptoms  Outcome: Progressing   Goal Outcome Evaluation:        Plan of care reviewed with patient. No acute changes or events noted at this time. Patient got tachy throughout the night and NS bolus ordered heart rate slightly improved. Tube feed changed to Diabetisource per order.

## 2025-03-20 NOTE — CASE MANAGEMENT/SOCIAL WORK
Case Management/Social Work    Patient Name:  Vjii Vargas  YOB: 1960  MRN: [id number]  Admit Date:  3/18/2025        14:05 EDT   Discharge Plan       Row Name 03/20/25 1404       Plan    Plan Discharge  plan is to return to [facility] long term care                        Electronically signed by:  Litzy Mace RN  03/20/25 14:05 EDT

## 2025-03-20 NOTE — PROGRESS NOTES
[facility]    Name:  Viji Vargas   Age:  64 y.o.  Sex:  female  :  1960  MRN:  [id number]   Visit Number:  [id number]  Admission Date:  3/18/2025  Date Of Service:  25  Primary Care Physician:  Ranjeet Pina MD     LOS: 2 days :    Chief Complaint:      Hypoxia    Subjective:    Patient examined this morning.  No significant events overnight.  No arrhythmias noted, off amiodarone.  Tolerating tube feeds well.  No evidence of gross bleeding.    Hospital Course:    Patient is a chronically ill unfortunate 64-year-old female with a history of chronic respiratory failure with hypoxia, status post tracheostomy, PEG tube, indwelling Sanchez catheter, nonverbal status from anoxic brain injury, osteomyelitis/skin ulcerations, prior CVA,  who presented via EMS due to reported low oxygen.  Patient resident of nursing facility, they noted increased secretions, she had been short of breath prior but after suctioning shortness of breath had improved per report.  Typically wears 4 L of oxygen through trach mask.  Patient at baseline is nonverbal and history otherwise limited at this time.  Of note, patient was treated in January at  secondary to osteomyelitis/ulcerations of sacrum, following with ID at .     In the ER, she was overall hemodynamically stable with no fever and blood pressure normal.  She did have evidence of nonsustained ventricular tachycardia, currently on amiodarone drip.  Received small fluid bolus.  X-ray does not show overt opacity, difficult study noted.  Urinalysis concerning for UTI.  Elevated procalcitonin noted.  Secondary concern for pneumonia/tracheitis/UTI, we were asked to admit    Review of Systems:     All systems were reviewed and negative except as mentioned in subjective, assessment and plan.    Vital Signs:    Temp:  [97.4 °F (36.3 °C)-99.8 °F (37.7 °C)] 97.6 °F (36.4 °C)  Heart Rate:  [106-126] 109  Resp:  [18-25] 22  BP:  (109-154)/(56-98) 126/72    Intake and output:    I/O last 3 completed shifts:  In: 4006.5 [I.V.:1368; Blood:350; Other:660; NG/GT:1628.5]  Out: 4200 [Urine:4200]  No intake/output data recorded.    Physical Examination: Examined again 3/20/25    General Appearance:  Alert, no acute distress.   Head:  Atraumatic and normocephalic.   Eyes: Conjunctivae and sclerae normal, no icterus. No pallor.   Throat: No oral lesions, no thrush, oral mucosa moist.   Neck: Supple, trachea midline, no thyromegaly.   Lungs:   Breath sounds heard bilaterally equally.  No wheezing or crackles.    Heart:  Normal S1 and S2, no murmur, no gallop, no rub. No JVD.   Abdomen:   PEG tube present, soft, nontender, nondistended   Extremities: Supple, no edema, no cyanosis, no clubbing.   Skin: No bleeding or rash.   Neurologic: Alert, nonverbal.  Extremities contracted.  At baseline.     Laboratory results:    Results from last 7 days   Lab Units 03/20/25  0744 03/19/25  0643 03/18/25  0345   SODIUM mmol/L 138 139 136   POTASSIUM mmol/L 3.9 4.0 4.7   CHLORIDE mmol/L 101 103 97*   CO2 mmol/L 24.1 24.9 28.4   BUN mg/dL 25* 26* 33*   CREATININE mg/dL 0.60 0.50* 0.59   CALCIUM mg/dL 9.0 8.6 9.0   BILIRUBIN mg/dL  --   --  0.2   ALK PHOS U/L  --   --  104   ALT (SGPT) U/L  --   --  26   AST (SGOT) U/L  --   --  21   GLUCOSE mg/dL 196* 231* 189*     Results from last 7 days   Lab Units 03/20/25  0744 03/19/25  0933 03/19/25  0643 03/18/25  0345   WBC 10*3/mm3 13.61*  --  11.47* 11.30*   HEMOGLOBIN g/dL 8.6* 6.2* 6.2* 8.6*   HEMATOCRIT % 28.6* 21.7* 21.0* 29.2*   PLATELETS 10*3/mm3 588*  --  543* 571*             Results from last 7 days   Lab Units 03/18/25  1453 03/18/25  0458 03/18/25  0352 03/18/25  0345 03/17/25  1300   BLOODCX   --   --  No growth at 2 days Staphylococcus, coagulase negative*  --    URINECX   --  >100,000 CFU/mL Gram Negative Bacilli*  --   --  >100,000 CFU/mL Proteus mirabilis*   WOUNDCX  Scant growth (1+) Gram Negative  Bacilli*  Scant growth (1+) Normal Skin Jolly  --   --   --   --      Recent Labs     06/03/24  0630 11/29/24  2245 12/09/24  2124   BASEEXCESS 3.0 9.7* 6.6*      I have reviewed the patient's laboratory results.    Radiology results:    No radiology results from the last 24 hrs    I have reviewed the patient's radiology reports.    Medication Review:     I have reviewed the patient's active and prn medications.     Problem List:      Non-sustained ventricular tachycardia      Assessment:    Nonsustained ventricular tachycardia, POA  Suspected pneumonia versus tracheitis, POA  Complicated UTI in setting of indwelling Sanchez catheter  History of sacral osteomyelitis  Nonverbal  PEG tube in place  Anoxic brain injury  Acute on chronic anemia  Staph Epi Bacteremia - contaminant    Plan:    Nonsustained V. tach:  -Echo from last year with normal ejection fraction, some diastolic dysfunction noted.  -May be related to underlying hypoxia, early sepsis potentially with multiple infectious processes at play.    -Patient placed on amiodarone on admission. Completed 24hrs. No further arrhythmia noted  -2D echocardiogram revealed EF 60 to 65% with normal diastolic function.  No significant valvular abnormalities.     Pneumonia/tracheitis  Gram-negative bacillus UTI:  -Patient has a history of complicated pneumonia with parapneumonic effusion, increased secretions currently requiring frequent suctioning.  Continue with ropinirole and reportedly  -UTI with prior ESBL noted.    -Continue Zosyn ; vancomycin discontinued due to negative MRSA screen     Staph epi bacteremia  -Contaminant; no further workup indicated     Sacral osteomyelitis:  Has been followed by  ID, was on 6 to 8 weeks of antibiotics per last discharge summary.     Nonverbal/PEG tube/anoxic brain injury:  Complicates all aspects of care.  Overall prognosis is fairly poor.  Quality of life is also poor.     Acute on chronic anemia  -Patient had decrease of  hemoglobin from 8.6-6.2.  This was confirmed with repeat H&H.  -No evidence of gross bleeding.  However, patient noted to have significant amount of RBCs on initial UA.  -1 unit PRBC transfusion 3/20  -Hold chronic Eliquis  -H&H stable    DVT Prophylaxis: SCDs (Hold Eliquis)  Code Status: Full  Diet: Tube feeds  Discharge Plan: Pending    Walter Barragan DO  03/20/25  12:08 EDT    Dictated utilizing Dragon dictation.

## 2025-03-20 NOTE — PLAN OF CARE
Goal Outcome Evaluation:  Plan of Care Reviewed With: patient        Problem: Adult Inpatient Plan of Care  Goal: Plan of Care Review  Outcome: Progressing  Flowsheets (Taken 3/20/2025 1839)  Plan of Care Reviewed With: patient  Goal: Patient-Specific Goal (Individualized)  Outcome: Progressing  Goal: Absence of Hospital-Acquired Illness or Injury  Outcome: Progressing  Intervention: Identify and Manage Fall Risk  Recent Flowsheet Documentation  Taken 3/20/2025 1800 by Jacqueline Brizuela RN  Safety Promotion/Fall Prevention:   assistive device/personal items within reach   clutter free environment maintained   activity supervised   toileting scheduled   safety round/check completed   room organization consistent   fall prevention program maintained   lighting adjusted   nonskid shoes/slippers when out of bed  Taken 3/20/2025 1600 by Jacqueline Brizuela RN  Safety Promotion/Fall Prevention:   activity supervised   assistive device/personal items within reach   clutter free environment maintained   toileting scheduled   safety round/check completed   room organization consistent   fall prevention program maintained   lighting adjusted   nonskid shoes/slippers when out of bed  Taken 3/20/2025 1400 by Jacqueline Brizuela RN  Safety Promotion/Fall Prevention:   activity supervised   assistive device/personal items within reach   clutter free environment maintained   room organization consistent   safety round/check completed   toileting scheduled   fall prevention program maintained   lighting adjusted   nonskid shoes/slippers when out of bed  Taken 3/20/2025 1200 by Jacqueline Brizuela RN  Safety Promotion/Fall Prevention:   assistive device/personal items within reach   clutter free environment maintained   activity supervised   toileting scheduled   safety round/check completed   room organization consistent   fall prevention program maintained   lighting adjusted   nonskid shoes/slippers when out of bed  Taken 3/20/2025 1000 by Gelacio  LARRY Phillips  Safety Promotion/Fall Prevention:   clutter free environment maintained   assistive device/personal items within reach   activity supervised   toileting scheduled   safety round/check completed   room organization consistent   fall prevention program maintained   lighting adjusted   nonskid shoes/slippers when out of bed  Taken 3/20/2025 0800 by Jacqueline Brizuela RN  Safety Promotion/Fall Prevention:   activity supervised   assistive device/personal items within reach   clutter free environment maintained   toileting scheduled   safety round/check completed   room organization consistent   fall prevention program maintained   lighting adjusted   nonskid shoes/slippers when out of bed  Intervention: Prevent Skin Injury  Recent Flowsheet Documentation  Taken 3/20/2025 1800 by Jacqueline Brizuela RN  Body Position:   right   turned  Skin Protection:   incontinence pads utilized   transparent dressing maintained  Taken 3/20/2025 1600 by Jacqueline Brizuela RN  Body Position:   turned   left  Taken 3/20/2025 1200 by Jacqueline Brizuela RN  Body Position:   turned   left  Taken 3/20/2025 1000 by Jacqueline Brizuela RN  Body Position:   tilted   right  Skin Protection:   incontinence pads utilized   transparent dressing maintained  Taken 3/20/2025 0800 by Jacqueline Brizuela RN  Body Position:   tilted   right  Skin Protection:   incontinence pads utilized   transparent dressing maintained  Intervention: Prevent Infection  Recent Flowsheet Documentation  Taken 3/20/2025 1800 by Jacqueline Brizuela RN  Infection Prevention:   rest/sleep promoted   single patient room provided   hand hygiene promoted   equipment surfaces disinfected   environmental surveillance performed  Taken 3/20/2025 1600 by Jacqueline Brizuela RN  Infection Prevention:   rest/sleep promoted   single patient room provided   hand hygiene promoted   equipment surfaces disinfected   environmental surveillance performed  Taken 3/20/2025 1400 by Jacqueline Brizuela RN  Infection  Prevention:   rest/sleep promoted   single patient room provided   hand hygiene promoted   equipment surfaces disinfected   environmental surveillance performed  Taken 3/20/2025 1200 by Jacqueline Brizuela RN  Infection Prevention:   rest/sleep promoted   single patient room provided   hand hygiene promoted   equipment surfaces disinfected   environmental surveillance performed  Taken 3/20/2025 1000 by Jacqueline Brizuela RN  Infection Prevention:   rest/sleep promoted   single patient room provided   hand hygiene promoted   equipment surfaces disinfected   environmental surveillance performed  Taken 3/20/2025 0800 by Jacqueline Brizuela RN  Infection Prevention:   single patient room provided   rest/sleep promoted   hand hygiene promoted   equipment surfaces disinfected   environmental surveillance performed  Goal: Optimal Comfort and Wellbeing  Outcome: Progressing  Intervention: Provide Person-Centered Care  Recent Flowsheet Documentation  Taken 3/20/2025 0800 by Jacqueline Brizuela RN  Trust Relationship/Rapport: care explained  Goal: Readiness for Transition of Care  Outcome: Progressing     Problem: Fall Injury Risk  Goal: Absence of Fall and Fall-Related Injury  Outcome: Progressing  Intervention: Identify and Manage Contributors  Recent Flowsheet Documentation  Taken 3/20/2025 1600 by Jacqueline Brizuela RN  Medication Review/Management: medications reviewed  Taken 3/20/2025 1200 by Jacqueline Brizuela RN  Medication Review/Management: medications reviewed  Taken 3/20/2025 0800 by Jacqueline Brizuela RN  Medication Review/Management: medications reviewed  Intervention: Promote Injury-Free Environment  Recent Flowsheet Documentation  Taken 3/20/2025 1800 by Jacqueline Brizuela RN  Safety Promotion/Fall Prevention:   assistive device/personal items within reach   clutter free environment maintained   activity supervised   toileting scheduled   safety round/check completed   room organization consistent   fall prevention program maintained   lighting  adjusted   nonskid shoes/slippers when out of bed  Taken 3/20/2025 1600 by Jacqueline Brizuela RN  Safety Promotion/Fall Prevention:   activity supervised   assistive device/personal items within reach   clutter free environment maintained   toileting scheduled   safety round/check completed   room organization consistent   fall prevention program maintained   lighting adjusted   nonskid shoes/slippers when out of bed  Taken 3/20/2025 1400 by Jacqueline Brizuela RN  Safety Promotion/Fall Prevention:   activity supervised   assistive device/personal items within reach   clutter free environment maintained   room organization consistent   safety round/check completed   toileting scheduled   fall prevention program maintained   lighting adjusted   nonskid shoes/slippers when out of bed  Taken 3/20/2025 1200 by Jacqueline Brizuela RN  Safety Promotion/Fall Prevention:   assistive device/personal items within reach   clutter free environment maintained   activity supervised   toileting scheduled   safety round/check completed   room organization consistent   fall prevention program maintained   lighting adjusted   nonskid shoes/slippers when out of bed  Taken 3/20/2025 1000 by Jacqueline Brizuela RN  Safety Promotion/Fall Prevention:   clutter free environment maintained   assistive device/personal items within reach   activity supervised   toileting scheduled   safety round/check completed   room organization consistent   fall prevention program maintained   lighting adjusted   nonskid shoes/slippers when out of bed  Taken 3/20/2025 0800 by Jacqueline Brizuela RN  Safety Promotion/Fall Prevention:   activity supervised   assistive device/personal items within reach   clutter free environment maintained   toileting scheduled   safety round/check completed   room organization consistent   fall prevention program maintained   lighting adjusted   nonskid shoes/slippers when out of bed     Problem: Skin Injury Risk Increased  Goal: Skin Health and  Integrity  Outcome: Progressing  Intervention: Optimize Skin Protection  Recent Flowsheet Documentation  Taken 3/20/2025 1800 by Jacqueline Brizuela RN  Activity Management: activity encouraged  Pressure Reduction Techniques:   frequent weight shift encouraged   weight shift assistance provided   pressure points protected   positioned off wounds   heels elevated off bed  Head of Bed (HOB) Positioning: HOB elevated  Skin Protection:   incontinence pads utilized   transparent dressing maintained  Taken 3/20/2025 1600 by Jacqueline Brizuela RN  Head of Bed (HOB) Positioning: HOB elevated  Taken 3/20/2025 1400 by Jacqueline Brizuela RN  Head of Bed (HOB) Positioning: HOB elevated  Taken 3/20/2025 1200 by Jacqueline Brizuela RN  Activity Management: activity encouraged  Head of Bed (HOB) Positioning: HOB elevated  Taken 3/20/2025 1000 by Jacqueline Brizuela RN  Activity Management: activity encouraged  Pressure Reduction Techniques:   frequent weight shift encouraged   weight shift assistance provided   pressure points protected   heels elevated off bed   positioned off wounds  Head of Bed (HOB) Positioning: HOB elevated  Skin Protection:   incontinence pads utilized   transparent dressing maintained  Taken 3/20/2025 0800 by Jacqueline Brizuela RN  Activity Management: activity encouraged  Pressure Reduction Techniques:   frequent weight shift encouraged   weight shift assistance provided   pressure points protected   heels elevated off bed  Head of Bed (HOB) Positioning: HOB elevated  Skin Protection:   incontinence pads utilized   transparent dressing maintained     Problem: Comorbidity Management  Goal: Blood Glucose Level Within Target Range  Outcome: Progressing  Intervention: Monitor and Manage Glycemia  Recent Flowsheet Documentation  Taken 3/20/2025 1600 by Jacqueline Brizuela RN  Medication Review/Management: medications reviewed  Taken 3/20/2025 1200 by Jacqueline Brizuela RN  Medication Review/Management: medications reviewed  Taken 3/20/2025 0800  by Jacqueline Brizuela RN  Medication Review/Management: medications reviewed  Goal: Blood Pressure in Desired Range  Outcome: Progressing  Intervention: Maintain Blood Pressure Management  Recent Flowsheet Documentation  Taken 3/20/2025 1600 by Jacqueline Brizuela RN  Medication Review/Management: medications reviewed  Taken 3/20/2025 1200 by Jacqueline Brizuela RN  Medication Review/Management: medications reviewed  Taken 3/20/2025 0800 by Jacqueline Brizuela RN  Medication Review/Management: medications reviewed     Problem: Dysrhythmia  Goal: Normalized Cardiac Rhythm  Outcome: Progressing     Problem: Pneumonia  Goal: Fluid Balance  Outcome: Progressing  Goal: Absence of Infection Signs and Symptoms  Outcome: Progressing  Intervention: Prevent Infection Progression  Recent Flowsheet Documentation  Taken 3/20/2025 1800 by Jacqueline Brizuela RN  Isolation Precautions: precautions maintained  Taken 3/20/2025 1600 by Jacqueline Brizuela RN  Isolation Precautions: precautions maintained  Taken 3/20/2025 1400 by Jacqueline Brizuela RN  Isolation Precautions: precautions maintained  Taken 3/20/2025 1200 by Jacqueline Brizuela RN  Isolation Precautions:   precautions maintained   contact  Taken 3/20/2025 0800 by Jacqueline Brizuela RN  Isolation Precautions:   precautions maintained   contact  Goal: Effective Oxygenation and Ventilation  Outcome: Progressing  Intervention: Promote Airway Secretion Clearance  Recent Flowsheet Documentation  Taken 3/20/2025 1800 by Jacqueline Brizuela RN  Activity Management: activity encouraged  Taken 3/20/2025 1200 by Jacqueline Brizuela RN  Activity Management: activity encouraged  Taken 3/20/2025 1000 by Jacqueline Brizuela RN  Activity Management: activity encouraged  Taken 3/20/2025 0800 by Jacqueline Brizuela RN  Activity Management: activity encouraged  Intervention: Optimize Oxygenation and Ventilation  Recent Flowsheet Documentation  Taken 3/20/2025 1800 by Jacqueline Brizuela RN  Head of Bed (HOB) Positioning: HOB elevated  Taken 3/20/2025 1600  by Jacqueline Brizuela RN  Head of Bed ([facility]) Positioning: HOB elevated  Taken 3/20/2025 1400 by Jacqueline Brizuela RN  Head of Bed ([facility]) Positioning: HOB elevated  Taken 3/20/2025 1200 by Jacqueline Brizuela RN  Head of Bed ([facility]) Positioning: HOB elevated  Taken 3/20/2025 1000 by Jacqueline Brizuela RN  Head of Bed ([facility]) Positioning: HOB elevated  Taken 3/20/2025 0800 by Jacqueline Brizuela RN  Head of Bed ([facility]) Positioning: HOB elevated     Problem: UTI (Urinary Tract Infection)  Goal: Improved Infection Symptoms  Outcome: Progressing     Problem: Enteral Nutrition  Goal: Absence of Aspiration Signs and Symptoms  Outcome: Progressing  Intervention: Minimize Aspiration Risk  Recent Flowsheet Documentation  Taken 3/20/2025 1800 by Jacqueline Brizuela RN  Head of Bed ([facility]) Positioning: HOB elevated  Taken 3/20/2025 1752 by Jacqueline Brizuela RN  Oral Care:   swabbed with antiseptic solution   lip/mouth moisturizer applied  Taken 3/20/2025 1600 by Jacqueline Brizuela RN  Head of Bed ([facility]) Positioning: HOB elevated  Taken 3/20/2025 1400 by Jacqueline Brizuela RN  Head of Bed ([facility]) Positioning: HOB elevated  Taken 3/20/2025 1200 by Jacqueline Brizuela RN  Head of Bed ([facility]) Positioning: HOB elevated  Taken 3/20/2025 1000 by Jacqueline Brizuela RN  Head of Bed ([facility]) Positioning: HOB elevated  Taken 3/20/2025 0800 by Jacqueline Brizuela RN  Head of Bed ([facility]) Positioning: HOB elevated  Taken 3/20/2025 0737 by Jacqueline Brizuela RN  Oral Care:   lip/mouth moisturizer applied   swabbed with antiseptic solution  Goal: Safe, Effective Therapy Delivery  Outcome: Progressing  Goal: Feeding Tolerance  Outcome: Progressing

## 2025-03-21 VITALS
SYSTOLIC BLOOD PRESSURE: 111 MMHG | OXYGEN SATURATION: 99 % | HEIGHT: 64 IN | WEIGHT: 162.48 LBS | HEART RATE: 103 BPM | BODY MASS INDEX: 27.74 KG/M2 | DIASTOLIC BLOOD PRESSURE: 47 MMHG | TEMPERATURE: 97.6 F | RESPIRATION RATE: 20 BRPM

## 2025-03-21 PROBLEM — I47.29 NON-SUSTAINED VENTRICULAR TACHYCARDIA: Status: RESOLVED | Noted: 2025-03-18 | Resolved: 2025-03-21

## 2025-03-21 LAB
ANION GAP SERPL CALCULATED.3IONS-SCNC: 7.6 MMOL/L (ref 5–15)
BACTERIA SPEC AEROBE CULT: ABNORMAL
BACTERIA SPEC RESP CULT: NORMAL
BUN SERPL-MCNC: 22 MG/DL (ref 8–23)
BUN/CREAT SERPL: 52.4 (ref 7–25)
CALCIUM SPEC-SCNC: 8.4 MG/DL (ref 8.6–10.5)
CHLORIDE SERPL-SCNC: 101 MMOL/L (ref 98–107)
CO2 SERPL-SCNC: 28.4 MMOL/L (ref 22–29)
CREAT SERPL-MCNC: 0.42 MG/DL (ref 0.57–1)
DEPRECATED RDW RBC AUTO: 46 FL (ref 37–54)
EGFRCR SERPLBLD CKD-EPI 2021: 108 ML/MIN/1.73
ERYTHROCYTE [DISTWIDTH] IN BLOOD BY AUTOMATED COUNT: 16 % (ref 12.3–15.4)
GLUCOSE BLDC GLUCOMTR-MCNC: 162 MG/DL (ref 70–130)
GLUCOSE BLDC GLUCOMTR-MCNC: 193 MG/DL (ref 70–130)
GLUCOSE BLDC GLUCOMTR-MCNC: 193 MG/DL (ref 70–130)
GLUCOSE BLDC GLUCOMTR-MCNC: 211 MG/DL (ref 70–130)
GLUCOSE SERPL-MCNC: 185 MG/DL (ref 65–99)
GRAM STN SPEC: NORMAL
HCT VFR BLD AUTO: 26.9 % (ref 34–46.6)
HGB BLD-MCNC: 8.5 G/DL (ref 12–15.9)
MCH RBC QN AUTO: 24.9 PG (ref 26.6–33)
MCHC RBC AUTO-ENTMCNC: 31.6 G/DL (ref 31.5–35.7)
MCV RBC AUTO: 78.9 FL (ref 79–97)
PLATELET # BLD AUTO: 513 10*3/MM3 (ref 140–450)
PMV BLD AUTO: 10.4 FL (ref 6–12)
POTASSIUM SERPL-SCNC: 4 MMOL/L (ref 3.5–5.2)
RBC # BLD AUTO: 3.41 10*6/MM3 (ref 3.77–5.28)
SODIUM SERPL-SCNC: 137 MMOL/L (ref 136–145)
WBC NRBC COR # BLD AUTO: 14.32 10*3/MM3 (ref 3.4–10.8)

## 2025-03-21 PROCEDURE — 25010000002 PIPERACILLIN SOD-TAZOBACTAM PER 1 G: Performed by: FAMILY MEDICINE

## 2025-03-21 PROCEDURE — 94799 UNLISTED PULMONARY SVC/PX: CPT

## 2025-03-21 PROCEDURE — 63710000001 INSULIN REGULAR HUMAN PER 5 UNITS: Performed by: INTERNAL MEDICINE

## 2025-03-21 PROCEDURE — 99239 HOSP IP/OBS DSCHRG MGMT >30: CPT | Performed by: INTERNAL MEDICINE

## 2025-03-21 PROCEDURE — 82948 REAGENT STRIP/BLOOD GLUCOSE: CPT | Performed by: INTERNAL MEDICINE

## 2025-03-21 PROCEDURE — 94761 N-INVAS EAR/PLS OXIMETRY MLT: CPT

## 2025-03-21 PROCEDURE — 80048 BASIC METABOLIC PNL TOTAL CA: CPT | Performed by: INTERNAL MEDICINE

## 2025-03-21 PROCEDURE — 82948 REAGENT STRIP/BLOOD GLUCOSE: CPT

## 2025-03-21 PROCEDURE — 85027 COMPLETE CBC AUTOMATED: CPT | Performed by: INTERNAL MEDICINE

## 2025-03-21 RX ORDER — AMIODARONE HYDROCHLORIDE 200 MG/1
200 TABLET ORAL EVERY 12 HOURS SCHEDULED
Qty: 20 TABLET | Refills: 0 | Status: SHIPPED | OUTPATIENT
Start: 2025-03-21 | End: 2025-03-31

## 2025-03-21 RX ADMIN — PIPERACILLIN AND TAZOBACTAM 3.38 G: 3; .375 INJECTION, POWDER, FOR SOLUTION INTRAVENOUS at 12:21

## 2025-03-21 RX ADMIN — Medication 1 PACKET: at 08:40

## 2025-03-21 RX ADMIN — PIPERACILLIN AND TAZOBACTAM 3.38 G: 3; .375 INJECTION, POWDER, FOR SOLUTION INTRAVENOUS at 03:01

## 2025-03-21 RX ADMIN — INSULIN HUMAN 2 UNITS: 100 INJECTION, SOLUTION PARENTERAL at 00:00

## 2025-03-21 RX ADMIN — Medication 10 ML: at 08:29

## 2025-03-21 RX ADMIN — DAKIN'S SOLUTION 0.125% (QUARTER STRENGTH): 0.12 SOLUTION at 12:37

## 2025-03-21 RX ADMIN — IPRATROPIUM BROMIDE AND ALBUTEROL SULFATE 3 ML: 2.5; .5 SOLUTION RESPIRATORY (INHALATION) at 07:20

## 2025-03-21 RX ADMIN — MIDODRINE HYDROCHLORIDE 5 MG: 5 TABLET ORAL at 06:02

## 2025-03-21 RX ADMIN — IPRATROPIUM BROMIDE AND ALBUTEROL SULFATE 3 ML: 2.5; .5 SOLUTION RESPIRATORY (INHALATION) at 12:27

## 2025-03-21 RX ADMIN — AMIODARONE HYDROCHLORIDE 200 MG: 200 TABLET ORAL at 08:29

## 2025-03-21 RX ADMIN — INSULIN HUMAN 2 UNITS: 100 INJECTION, SOLUTION PARENTERAL at 06:01

## 2025-03-21 RX ADMIN — GLYCOPYRROLATE 2 MG: 1 TABLET ORAL at 12:22

## 2025-03-21 RX ADMIN — SCOPOLAMINE 1 PATCH: 1.5 PATCH, EXTENDED RELEASE TRANSDERMAL at 05:28

## 2025-03-21 RX ADMIN — LANSOPRAZOLE 30 MG: 30 TABLET, ORALLY DISINTEGRATING ORAL at 05:28

## 2025-03-21 RX ADMIN — Medication 250 MG: at 08:29

## 2025-03-21 RX ADMIN — GLYCOPYRROLATE 2 MG: 1 TABLET ORAL at 08:29

## 2025-03-21 RX ADMIN — INSULIN HUMAN 3 UNITS: 100 INJECTION, SOLUTION PARENTERAL at 12:29

## 2025-03-21 RX ADMIN — MIDODRINE HYDROCHLORIDE 5 MG: 5 TABLET ORAL at 12:22

## 2025-03-21 RX ADMIN — DOCUSATE SODIUM 300 MG: 50 LIQUID ORAL at 08:28

## 2025-03-21 RX ADMIN — DAKIN'S SOLUTION 0.125% (QUARTER STRENGTH): 0.12 SOLUTION at 00:20

## 2025-03-21 RX ADMIN — BUDESONIDE 0.5 MG: 0.5 INHALANT RESPIRATORY (INHALATION) at 07:20

## 2025-03-21 RX ADMIN — ACETAMINOPHEN 650 MG: 325 TABLET, FILM COATED ORAL at 05:27

## 2025-03-21 NOTE — CASE MANAGEMENT/SOCIAL WORK
Case Management Discharge Note      Final Note: DC via EMS back to [facility] for LTC placement.    Provided Post Acute Provider List?: N/A  Provided Post Acute Provider Quality & Resource List?: N/A    Selected Continued Care - Discharged on 3/21/2025 Admission date: 3/18/2025 - Discharge disposition: Long Term Care (DC - External)      Destination Coordination complete.      Service Provider Services Address Phone Fax Patient Preferred    [facility] [address] [place] [id number] [phone] --              Durable Medical Equipment    No services have been selected for the patient.                Dialysis/Infusion    No services have been selected for the patient.                Home Medical Care    No services have been selected for the patient.                Therapy    No services have been selected for the patient.                Community Resources    No services have been selected for the patient.                Community & DME    No services have been selected for the patient.                    Transportation Services  Ambulance: [facility]    Final Discharge Disposition Code: 04 - intermediate care facility

## 2025-03-21 NOTE — PROGRESS NOTES
Tube Feeding Assessment    Patient Name: Viji Vargas  YOB: 1960  MRN: [id number]  Admission date: 3/18/2025    Comment:     EN Recommendations:    1. Initiate Diabetisource AC rate at 55 mL. Advance 10 mL q 6 hrs as tolerated to goal rate of 65 mL/hr x 22 hrs.    2. FW at 75 q 4 hrs.    3. Administer Edilberto BID via PEG. Flush feeding tube with 30 mL before and after administering Edilberto. Mix Edilberto with 120 mL of water until Edilberto is completely dissolved and administer via PEG.    Total Regimen Provides: 1896 kcal, 90 g pro, 1976 mL of FW, 160 g carbohydrate    Encounter Information     Trending Narrative 3/21: Pt currently running @ goal rate - glucose trending down.    3/20: Diabetisource AC running at 55 mL/hr. Pt is tolerating new tube feeding formula. Glucose levels remain elevated.     3/19:  Isosource 1.5 running at goal of 55 mL/hr. RN reported pt is tolerating tube feeding. Pt with elevated glucose levels and A1C=6.5. Current tube feeding formula and Edilberto is providing 229 g carbohydrates. Will adjust tube feeding to Diabetisource AC to help with glucose levels.    3/18: Pt is in the ED and noted for low oxygen on admission. PEG in place and previously on Isosource 1.5. Noted for osteomyelitis/ulcerations of sacrum. Will provide EN recommendations.    H&P  Past Medical History:   Diagnosis Date    Abdominal distention 2024    Acute and chronic respiratory failure with hypoxia 2024    Adult hypertrophic pyloric stenosis 2024    COPD (chronic obstructive pulmonary disease)     Hypertension     Osteomyelitis     Pneumonia     Stroke          Past Surgical History:   Procedure Laterality Date    HYSTERECTOMY      Partial     TRACHEOSTOMY AND PEG TUBE INSERTION N/A 3/20/2019    Procedure: TRACHEOSTOMY AND PERCUTANEOUS ENDOSCOPIC GASTROSTOMY TUBE INSERTION;  Surgeon: Nadira Pandey MD;  Location: [facility];  Service: General        Anthropometrics     Current Height, Weight Height: 162.6 cm  "(64.02\")  Weight: 73.7 kg (162 lb 7.7 oz) (03/21/25 0459)     Trending Weight History Wt Readings from Last 30 Encounters:   03/21/25 0459 73.7 kg (162 lb 7.7 oz)   03/20/25 0611 74.1 kg (163 lb 5.8 oz)   03/19/25 0619 72.2 kg (159 lb 2.8 oz)   03/18/25 1341 73.2 kg (161 lb 6 oz)   03/18/25 1251 73.2 kg (161 lb 6 oz)   03/18/25 0242 77.6 kg (171 lb 1.2 oz)   02/12/25 1729 77.6 kg (171 lb 1.2 oz)   10/01/24 0523 77.6 kg (171 lb 1.2 oz)   09/30/24 0823 81.3 kg (179 lb 3.7 oz)   09/29/24 1546 77 kg (169 lb 12.1 oz)   09/29/24 0500 75.1 kg (165 lb 9.1 oz)   09/28/24 1817 75.2 kg (165 lb 12.6 oz)   09/28/24 0915 70.3 kg (155 lb)   09/16/24 0228 70.3 kg (155 lb)   07/12/24 0637 70.3 kg (155 lb)   02/18/24 1818 70.3 kg (155 lb)   01/21/24 0917 70.3 kg (155 lb)   01/09/24 0357 70.5 kg (155 lb 6.4 oz)   01/08/24 0313 68 kg (150 lb)   01/06/24 0351 68.5 kg (151 lb)   01/05/24 1509 70.9 kg (156 lb 6.4 oz)   01/04/24 0600 70.9 kg (156 lb 4.9 oz)   01/03/24 0600 70.9 kg (156 lb 4.9 oz)   01/02/24 0600 73.8 kg (162 lb 11.2 oz)   01/01/24 0600 74.7 kg (164 lb 10.9 oz)   12/26/23 0430 69.7 kg (153 lb 10.6 oz)   12/25/23 0600 71.9 kg (158 lb 8.2 oz)   12/24/23 1600 79 kg (174 lb 2.6 oz)   12/24/23 0400 70.5 kg (155 lb 6.8 oz)   12/23/23 0500 69.7 kg (153 lb 10.6 oz)   12/22/23 1900 70.3 kg (154 lb 15 oz)   12/22/23 0818 71.2 kg (157 lb)   05/23/23 0326 71.4 kg (157 lb 6.5 oz)   05/08/23 0500 71.4 kg (157 lb 6.5 oz)   05/07/23 0622 69.3 kg (152 lb 12.5 oz)   05/06/23 0552 69 kg (152 lb 1.9 oz)   05/06/23 0136 69 kg (152 lb 1.9 oz)   05/05/23 2207 72.6 kg (160 lb)   04/15/23 1931 72.6 kg (160 lb)   03/07/23 1309 72.6 kg (160 lb)   02/11/23 0152 72.6 kg (160 lb)   01/22/23 0620 69.9 kg (154 lb)   11/22/22 1531 69.9 kg (154 lb)   11/12/22 2146 69.9 kg (154 lb)   10/22/22 0356 68 kg (150 lb)   08/13/22 1109 70.5 kg (155 lb 6.4 oz)   04/10/22 0920 79.4 kg (175 lb)   03/31/22 1117 79.4 kg (175 lb)   03/30/22 1638 79.4 kg (175 lb) "   02/22/22 1328 79.4 kg (175 lb 0.7 oz)   01/24/22 0124 79.4 kg (175 lb)   01/12/22 0556 74.3 kg (163 lb 12.8 oz)   01/11/22 0340 73.3 kg (161 lb 9.6 oz)   01/10/22 0935 70.7 kg (155 lb 13.8 oz)   01/10/22 0300 70.6 kg (155 lb 10.3 oz)   01/09/22 2152 70.3 kg (155 lb)   12/30/21 1953 70.3 kg (155 lb)   12/30/21 1147 70.3 kg (155 lb)   10/22/21 0917 70.3 kg (155 lb)   09/29/21 0046 70.3 kg (155 lb)   08/24/21 1437 70.5 kg (155 lb 6 oz)       BMI Body mass index is 27.88 kg/m².     Labs     Pertinent Labs Results from last 7 days   Lab Units 03/20/25  0744 03/19/25  0643 03/18/25  0345   SODIUM mmol/L 138 139 136   POTASSIUM mmol/L 3.9 4.0 4.7   CHLORIDE mmol/L 101 103 97*   CO2 mmol/L 24.1 24.9 28.4   BUN mg/dL 25* 26* 33*   CREATININE mg/dL 0.60 0.50* 0.59   CALCIUM mg/dL 9.0 8.6 9.0   BILIRUBIN mg/dL  --   --  0.2   ALK PHOS U/L  --   --  104   ALT (SGPT) U/L  --   --  26   AST (SGOT) U/L  --   --  21   GLUCOSE mg/dL 196* 231* 189*        Results from last 7 days   Lab Units 03/21/25  0644   HEMOGLOBIN g/dL 8.5*   HEMATOCRIT % 26.9*            Lab Results   Component Value Date    HGBA1C 6.50 (H) 03/18/2025        Medications  Schedule Medications amiodarone, 200 mg, Oral, Q12H  [Held by provider] apixaban, 5 mg, Per G Tube, Q12H  budesonide, 0.5 mg, Nebulization, BID - RT  docusate sodium, 300 mg, Per G Tube, BID  glycopyrrolate, 2 mg, Per G Tube, 4x Daily  insulin regular, 2-7 Units, Subcutaneous, Q6H  ipratropium-albuterol, 3 mL, Nebulization, Q6H While Awake - RT  Edilberto, 1 packet, Per PEG Tube, BID  lansoprazole, 30 mg, Per G Tube, Q AM  midodrine, 5 mg, Per G Tube, TID AC  piperacillin-tazobactam, 3.375 g, Intravenous, Q8H  saccharomyces boulardii, 250 mg, Per G Tube, BID  Scopolamine, 1 patch, Transdermal, Q72H  sodium chloride, 10 mL, Intravenous, Q12H  sodium hypochlorite, , Topical, Q12H       Infusions         PRN Medications    acetaminophen **OR** acetaminophen    Calcium Replacement - Follow Nurse /  BPA Driven Protocol    dextrose    dextrose    glucagon (human recombinant)    Magnesium Standard Dose Replacement - Follow Nurse / BPA Driven Protocol    metoprolol tartrate    nitroglycerin    ondansetron ODT **OR** ondansetron    Phosphorus Replacement - Follow Nurse / BPA Driven Protocol    Potassium Replacement - Follow Nurse / BPA Driven Protocol    sodium chloride    sodium chloride     Physical Findings        Edema  no edema   Bowel Function None   Tubes Central Line/PICC and G-Tube/PEG   Skin Sacral osteomyelitis      Estimated/Assessed Needs       Energy Requirements    EST Needs, Method, Wt used 8593-9487 kcal (25-30 kcal/kg AjBW)       Protein Requirements    EST Needs, Method, Wt used  g pro (1.5-2 g pro/kg AjBW)       Fluid Requirements     Estimated Needs (mL/day) 2724-6062 mL/day     Current Tube Feed Orders & Evaluation      Enteral Nutrition     Enteral Route PEG   Orders, Modulars, Flushes    Residual/Tolerance    Vasopressors    Noninvasive MAP (mmHg) 99   Lactate (mmol/L) 1.5   Propofol (mL/hr)     Tube Feed Observation      Nutrition Diagnosis         Nutrition Dx Problem 1 Increased nutrient needs r/t to skin integrity AEB sacral osteomyelitis.      Nutrition Dx Problem 2 Difficulty chewing/swallowing r/t anoxic brain injury AEB PEG tube in place for enteral nutrition.        Intervention Goal         Intervention Goal(s) Tolerate EN  No weight changes  Glucose WNL     Nutrition Intervention        RD Action Provide EN recommendations and ordered A1C      Enteral Nutrition Prescription     Enteral Prescription Recommendations:    1. Initiate Diabetisource AC rate at 55 mL. Advance 10 mL q 6 hrs as tolerated to goal rate of 65 mL/hr x 22 hrs.    2. FW at 75 q 4 hrs.    3. Administer Edilberto BID via PEG. Flush feeding tube with 30 mL before and after administering Edilberto. Mix Edilberto with 120 mL of water until Edilberto is completely dissolved and administer via PEG.    Total Regimen Provides:  1896 kcal, 90 g pro, 1976 mL of FW, 160 g carbohydrate         Monitor/Evaluation        Monitor Per protocol, I&O, Pertinent labs, EN delivery/tolerance, Weight, Skin status, GI status, Symptoms, POC/GOC, Swallow function, Hemodynamic stability     RD to f/up    Electronically signed by:  Delfin Torres RD  03/21/25 07:16 EDT

## 2025-03-21 NOTE — PLAN OF CARE
Goal Outcome Evaluation:           Progress: no change  Outcome Evaluation: wound care done as ordered, q2 hour and PRN turn and change, Gtube WNL and infusing as ordered, patient pale, resp increased, will continue to monitor

## 2025-03-21 NOTE — DISCHARGE SUMMARY
"    [facility]      Name:  Viji Vargas   Age:  64 y.o.  Sex:  female  :  1960  MRN:  [id number]   Visit Number:  [id number]    Admission Date:  3/18/2025  Date of Discharge:  3/21/2025  Primary Care Physician:  Ranjeet Pina MD      Discharge Diagnoses:     Nonsustained ventricular tachycardia, POA  Suspected pneumonia versus tracheitis, POA  Complicated UTI in setting of indwelling Sanchez catheter  History of sacral osteomyelitis  Nonverbal  PEG tube in place  Anoxic brain injury  Acute on chronic anemia  Staph Epi Bacteremia - contaminan    Problem List:     Active Hospital Problems   No active problems to display.      Resolved Hospital Problems    Diagnosis Date Resolved POA    **Non-sustained ventricular tachycardia [I47.29] 2025 Yes     Presenting Problem:    Chief Complaint   Patient presents with    Shortness of Breath      Consults:     Consulting Physician(s)                     None              Procedures Performed:        History of presenting illness/Hospital Course:    Per H&P \"Patient is a chronically ill unfortunate 64-year-old female with a history of chronic respiratory failure with hypoxia, status post tracheostomy, PEG tube, indwelling Sanchez catheter, nonverbal status from anoxic brain injury, osteomyelitis/skin ulcerations, prior CVA,  who presented via EMS due to reported low oxygen.  Patient resident of nursing facility, they noted increased secretions, she had been short of breath prior but after suctioning shortness of breath had improved per report.  Typically wears 4 L of oxygen through trach mask.  Patient at baseline is nonverbal and history otherwise limited at this time.  Of note, patient was treated in January at  secondary to osteomyelitis/ulcerations of sacrum, following with ID at .     In the ER, she was overall hemodynamically stable with no fever and blood pressure normal.  She did have evidence of " "nonsustained ventricular tachycardia, currently on amiodarone drip.  Received small fluid bolus.  X-ray does not show overt opacity, difficult study noted.  Urinalysis concerning for UTI.  Elevated procalcitonin noted.  Secondary concern for pneumonia/tracheitis/UTI, we were asked to admit\"    Nonsustained V. tach:  -Echo from last year with normal ejection fraction, some diastolic dysfunction noted.  -May be related to underlying hypoxia, early sepsis potentially with multiple infectious processes at play.    -Patient placed on amiodarone on admission. Continue po amiodarone 200mg BID with 200mg daily thereafter.   -2D echocardiogram revealed EF 60 to 65% with normal diastolic function.  No significant valvular abnormalities.     Pneumonia/tracheitis  Gram-negative bacillus UTI:  -Patient has a history of complicated pneumonia with parapneumonic effusion, increased secretions currently requiring frequent suctioning.  Continue with ropinirole and reportedly  -Urine culture growing Proteus Mirabilis   -Empiric Zosyn ; vancomycin discontinued due to negative MRSA screen   -Ordered Augmentin at discharge to complete course     Staph epi bacteremia  -Contaminant; no further workup indicated     Sacral osteomyelitis:  Has been followed by [facility], was on 6 to 8 weeks of antibiotics per last discharge summary.  Completed 6 weeks of IV abx on 2/27/25.   -CVC removed on discharge this admission.      Acute on chronic anemia  -Patient had decrease of hemoglobin from 8.6-6.2.  This was confirmed with repeat H&H.  -No evidence of gross bleeding.  However, patient noted to have significant amount of RBCs on initial UA.  -1 unit PRBC transfusion 3/20  -Held Eliquis while inpatient - continued at discharge  -H&H stable    Vital Signs:    Temp:  [97.6 °F (36.4 °C)-98.9 °F (37.2 °C)] 97.6 °F (36.4 °C)  Heart Rate:  [103-120] 103  Resp:  [20-32] 20  BP: (111-142)/(47-79) 111/47    Physical Exam:    General Appearance:  Alert, no acute " distress.   Head:  Atraumatic and normocephalic.   Eyes: Conjunctivae and sclerae normal, no icterus. No pallor.   Throat: No oral lesions, no thrush, oral mucosa moist.   Neck: Supple, trachea midline, no thyromegaly.   Lungs:   Breath sounds heard bilaterally equally.  No wheezing or crackles.    Heart:  Normal S1 and S2, no murmur, no gallop, no rub. No JVD.   Abdomen:   PEG tube present, soft, nontender, nondistended   Extremities: Supple, no edema, no cyanosis, no clubbing.   Skin: No bleeding or rash.   Neurologic: Alert, nonverbal.  Extremities contracted.  At baseline.     Pertinent Lab Results:     Results from last 7 days   Lab Units 03/21/25  0644 03/20/25  0744 03/19/25  0643 03/18/25  0345   SODIUM mmol/L 137 138 139 136   POTASSIUM mmol/L 4.0 3.9 4.0 4.7   CHLORIDE mmol/L 101 101 103 97*   CO2 mmol/L 28.4 24.1 24.9 28.4   BUN mg/dL 22 25* 26* 33*   CREATININE mg/dL 0.42* 0.60 0.50* 0.59   CALCIUM mg/dL 8.4* 9.0 8.6 9.0   BILIRUBIN mg/dL  --   --   --  0.2   ALK PHOS U/L  --   --   --  104   ALT (SGPT) U/L  --   --   --  26   AST (SGOT) U/L  --   --   --  21   GLUCOSE mg/dL 185* 196* 231* 189*     Results from last 7 days   Lab Units 03/21/25  0644 03/20/25  0744 03/19/25  0933 03/19/25  0643   WBC 10*3/mm3 14.32* 13.61*  --  11.47*   HEMOGLOBIN g/dL 8.5* 8.6* 6.2* 6.2*   HEMATOCRIT % 26.9* 28.6* 21.7* 21.0*   PLATELETS 10*3/mm3 513* 588*  --  543*                             Results from last 7 days   Lab Units 03/18/25  1453 03/18/25  0458 03/18/25  0352 03/18/25  0345 03/17/25  1300   BLOODCX   --   --  No growth at 3 days Staphylococcus, coagulase negative*  --    URINECX   --  >100,000 CFU/mL Proteus mirabilis*  --   --  >100,000 CFU/mL Proteus mirabilis*   WOUNDCX  Scant growth (1+) Morganella morganii ssp morganii*  Scant growth (1+) Proteus mirabilis*  Scant growth (1+) Normal Skin Jolly  --   --   --   --        Pertinent Radiology Results:    Imaging Results (All)       Procedure Component  Value Units Date/Time    CT Abdomen Pelvis Without Contrast [[id number]] Collected: 03/18/25 1213     Updated: 03/18/25 1219    Narrative:      PROCEDURE: CT ABDOMEN PELVIS WO CONTRAST-     HISTORY: complicated uti, hematuria; J69.0-Pneumonitis due to inhalation  of food and vomit; T83.511A-Infection and inflammatory reaction due to  indwelling urethral catheter, initial encounter; N39.0-Urinary tract  infection, site not specified; I47.29-Other ventricular tachycardia     COMPARISON: September 2024.     PROCEDURE: Axial images were obtained from the lung bases through the  pubic symphysis without intravenous contrast.     FINDINGS:     ABDOMEN: Patient motion artifact limits images. There is streak artifact  from patient's arm position. Minimal bilateral pleural effusions are  seen. The heart size is normal. There are vascular calcifications. The  limited noncontrast images of the liver are normal. The gallbladder is  present. There are no CT visualized gallstones. The spleen is normal. No  adrenal masses are seen.  The pancreas has an unremarkable unenhanced  appearance. The aorta is normal in caliber. There is no significant free  fluid or adenopathy. Hypodense lesion of the lateral right kidney is  stable from the prior exam. There is no nephrolithiasis. There is no  hydronephrosis. A PEG tube is present. Limited noncontrast images of the  bowel are unremarkable.     PELVIS: The appendix is normal. The urinary bladder is collapsed by  Sanchez catheter. There is no significant adenopathy. There is a  significant decubitus ulcer in the posterior tissues overlying the  sacrum which has developed since the prior exam. There is partial  destruction of the distal sacrum and coccygeal segments, new. No  contrast was given, but no definite fluid collection is seen. There is  increased attenuation of the presacral fat, similar to the prior exam  with minimal free fluid.       Impression:      No hydronephrosis or  nephrolithiasis.     Interval development of significant sacral decubitus ulcer with partial  destruction of the distal sacrum and coccygeal segments. No contrast  given, but no definite fluid collection is seen. Findings are concerning  for osteomyelitis.        CTDI: 20.57 mGy  DLP: 962.25 mGy.cm        This study was performed with techniques to keep radiation doses as low  as reasonably achievable (ALARA). Individualized dose reduction  techniques using automated exposure control or adjustment of mA and/or  kV according to the patient size were employed.              Images were reviewed, interpreted, and dictated by Dr. Briana Erickson MD  Transcribed by Shawna Cordero PA-C.     This report was signed and finalized on 3/18/2025 12:17 PM by Briana Erickson MD.       XR Chest 1 View [[id number]] Collected: 03/18/25 0848     Updated: 03/18/25 0852    Narrative:      PROCEDURE: XR CHEST 1 VW-     HISTORY: Dyspnea, shortness of breath     COMPARISON: September 28, 2024..     FINDINGS: Tracheostomy again noted with tip 4 cm above the joby. The  heart is upper limits of normal in size.. There is decreased inspiratory  effort with bibasilar linear densities similar to prior and consistent  with scar. No new area of consolidation seen. There is blunting of the  costophrenic angles bilaterally, small effusion versus scar.. The  mediastinum is unremarkable. There is no pneumothorax.  There are no  acute osseous abnormalities. Apical lordotic positioning noted.        Impression:      Stable position of tracheostomy tube..     Decreased inspiratory effort with stable bibasilar scar and suspected  bilateral pleural scarring versus small effusion.        This report was signed and finalized on 3/18/2025 8:50 AM by Briana Erickson MD.               Echo:    Results for orders placed during the hospital encounter of 03/18/25    Adult Transthoracic Echo Complete W/ Cont if Necessary Per Protocol    Interpretation Summary  1.   Normal left ventricular size and systolic function, LVEF 60-65%.  2.  Normal LV diastolic filling pattern.  3.  Normal right ventricular size and systolic function.  4.  Normal left atrial volume index.  5.  No significant valvular abnormalities.    Condition on Discharge:      Stable.    Code status during the hospital stay:    Code Status and Medical Interventions: CPR (Attempt to Resuscitate); Full Support   Ordered at: 03/18/25 0523     Code Status (Patient has no pulse and is not breathing):    CPR (Attempt to Resuscitate)     Medical Interventions (Patient has pulse or is breathing):    Full Support     Discharge Disposition:    Long Term Care (DC - External)    Discharge Medications:       Discharge Medications        New Medications        Instructions Start Date   amiodarone 200 MG tablet  Commonly known as: PACERONE   200 mg, Oral, Every 12 Hours Scheduled      amoxicillin-clavulanate 875-125 MG per tablet  Commonly known as: AUGMENTIN   1 tablet, Oral, 2 Times Daily             Changes to Medications        Instructions Start Date   Senna 8.8 MG/5ML Syrup  What changed: Another medication with the same name was removed. Continue taking this medication, and follow the directions you see here.   10 mL, Per G Tube, 2 Times Daily             Continue These Medications        Instructions Start Date   acetaminophen 325 MG tablet  Commonly known as: TYLENOL   650 mg, Per G Tube, Every 8 Hours PRN      apixaban 5 MG tablet tablet  Commonly known as: ELIQUIS   5 mg, Every 12 Hours Scheduled      baclofen 10 MG tablet  Commonly known as: LIORESAL   10 mg, Every 8 Hours      budesonide 0.5 MG/2ML nebulizer solution  Commonly known as: PULMICORT   0.5 mg, Nebulization, 2 Times Daily - RT      cetirizine 10 MG tablet  Commonly known as: zyrTEC   10 mg, Daily      docusate sodium 50 mg/5 mL liquid  Commonly known as: COLACE   200 mg, 2 Times Daily      Eucerin cream   1 Application, Topical, As Needed, Apply to  bilateral arms as needed for dry skin      glycopyrrolate 2 MG tablet  Commonly known as: ROBINUL   2 mg, Per G Tube, 3 Times Daily PRN      HYDROcodone-acetaminophen 7.5-325 MG per tablet  Commonly known as: NORCO   1 tablet, Oral, Every 4 Hours      ipratropium-albuterol 0.5-2.5 mg/3 ml nebulizer  Commonly known as: DUO-NEB   3 mL, Nebulization, 4 Times Daily - RT, Takes at [id number]      Lansinoh Lanolin cream   1 Application, Topical, 2 Times Daily, Apply to Both Arms      midodrine 5 MG tablet  Commonly known as: PROAMATINE   5 mg, Per G Tube, 3 Times Daily Before Meals      MiraLax 17 g packet  Generic drug: polyethylene glycol   17 g, Oral, 2 Times Daily      multivitamin with iron-minerals (CENTRUM) liquid   15 mL, Per G Tube, Daily      mupirocin 2 % ointment  Commonly known as: BACTROBAN   1 Application, Topical, 2 Times Daily, Apply to G-tube site twice daily      Omeprazole 2 MG/ML suspension  Commonly known as: PRILOSEC   10 mL, Per G Tube, Daily      saccharomyces boulardii 250 MG capsule  Commonly known as: FLORASTOR   250 mg, Daily      Scopolamine 1 MG/3DAYS patch   1 patch, Transdermal, Every 72 Hours      sodium hypochlorite 0.125 % solution topical solution 0.125%  Commonly known as: DAKIN'S 1/4 STRENGTH   Apply  topically to the appropriate area as directed.      torsemide 20 MG tablet  Commonly known as: DEMADEX   10 mg, Oral, Daily             Stop These Medications      hyoscyamine 0.125 MG tablet  Commonly known as: ANASPAZ,LEVSIN            Discharge Diet:     Diet Instructions       Diet: Tube Feeding; Continuous; Glucerna 1.2 Goal rate 65mL/hr      Discharge Diet: Tube Feeding    Feeding Type: Continuous    Formula & Rate: Glucerna 1.2 Goal rate 65mL/hr          Activity at Discharge:     Activity Instructions       Activity as Tolerated            Follow-up Appointments:    Additional Instructions for the Follow-ups that You Need to Schedule       Discharge Follow-up with PCP    As directed       Currently Documented PCP:    Ranjeet Pina MD    PCP Phone Number:    [phone]     Follow Up Details: 1 week               Contact information for follow-up providers       Ranjeet Pina MD .    Specialty: Family Medicine  Why: going to facility will be followed there  Contact information:  [address] [facility] [address] [phone]                       Contact information for after-discharge care       Destination       [facility] .    Service: Nursing Home  Contact information:  [address] [facility] [address] [phone]                                 No future appointments.  Test Results Pending at Discharge:    Pending Results       None                 Walter Barragan DO  03/21/25  13:21 EDT    Time: I spent >30 minutes on this discharge activity which included: face-to-face encounter with the patient, reviewing the data in the system, coordination of the care with the nursing staff as well as consultants, documentation, and entering orders.     Dictated utilizing Dragon dictation.

## 2025-03-22 LAB
BACTERIA SPEC AEROBE CULT: ABNORMAL
GRAM STN SPEC: ABNORMAL
GRAM STN SPEC: ABNORMAL

## 2025-03-22 NOTE — PAYOR COMM NOTE
"  D/c summary  UR MANAGER; DEEPIKA BLAKE, RN  [phone]  [phone]     TAX ID:  [id number]  NPI:  [id number]           Viji Vargas (64 y.o. Female)       Date of Birth   1960    Social Security Number       Address   [address] [place] [address] [address] [facility] [address]    Home Phone   [phone]    MRN   [id number]       Faith   Jainism    Marital Status   Legally                             Admission Date   3/18/2025    Admission Type   Emergency    Admitting Provider   Jessica Estevez DO    Attending Provider       [facility]       Discharge Date   3/21/2025    Discharge Disposition   Long Term Care (DC - External)    Discharge Destination   Other                              Attending Provider: (none)   Allergies: No Known Allergies    Isolation: None   Infection: MDR Acinetobacter (12/27/23), ESBL E coli (07/16/24), ESBL (02/12/25)   Code Status: Prior    Ht: 162.6 cm (64.02\")   Wt: 73.7 kg (162 lb 7.7 oz)    Admission Cmt: None   Principal Problem: Non-sustained ventricular tachycardia [I47.29]                   Active Insurance as of 3/18/2025       Primary Coverage       Payor Plan Insurance Group Employer/Plan Group    MEDICARE MEDICARE B ONLY        Payor Plan Address Payor Plan Phone Number Payor Plan Fax Number Effective Dates    [address] [address] [phone]  11/1/2023 - None Entered    [facility] [id number]         Subscriber Name Subscriber Birth Date Member ID       VIJI VARGAS 1960 [id number]               Secondary Coverage       Payor Plan Insurance Group Employer/Plan Group    [place]        Payor Plan Address Payor Plan Phone Number Payor Plan Fax Number Effective Dates    [address] [phone]  3/10/2019 - None Entered    [facility] [id number]         Subscriber Name Subscriber Birth Date Member ID       VIJI VARGAS 1960 [id number]               " "      Emergency Contacts        (Rel.) Home Phone Work Phone Mobile Phone    KENIA (POA)WILTON (Daughter) [phone] -- [phone]    timothy esposito (Grandchild) [phone] -- [phone]              Physician Progress Notes (last 24 hours)  Notes from 25 1036 through 25 1036   No notes of this type exist for this encounter.          Discharge Summary        Walter Barragan DO at 25 1321              [facility]   DISCHARGE SUMMARY      Name:  Viji Vargas   Age:  64 y.o.  Sex:  female  :  1960  MRN:  [id number]   Visit Number:  [id number]    Admission Date:  3/18/2025  Date of Discharge:  3/21/2025  Primary Care Physician:  Ranjeet Pina MD      Discharge Diagnoses:     Nonsustained ventricular tachycardia, POA  Suspected pneumonia versus tracheitis, POA  Complicated UTI in setting of indwelling Sanchez catheter  History of sacral osteomyelitis  Nonverbal  PEG tube in place  Anoxic brain injury  Acute on chronic anemia  Staph Epi Bacteremia - contaminan    Problem List:     Active Hospital Problems   No active problems to display.      Resolved Hospital Problems    Diagnosis Date Resolved POA    **Non-sustained ventricular tachycardia [I47.29] 2025 Yes     Presenting Problem:    Chief Complaint   Patient presents with    Shortness of Breath      Consults:     Consulting Physician(s)                     None              Procedures Performed:        History of presenting illness/Hospital Course:    Per H&P \"Patient is a chronically ill unfortunate 64-year-old female with a history of chronic respiratory failure with hypoxia, status post tracheostomy, PEG tube, indwelling Sanchez catheter, nonverbal status from anoxic brain injury, osteomyelitis/skin ulcerations, prior CVA,  who presented via EMS due to reported low oxygen.  Patient resident of nursing facility, they noted increased secretions, she had been short of breath prior but " "after suctioning shortness of breath had improved per report.  Typically wears 4 L of oxygen through trach mask.  Patient at baseline is nonverbal and history otherwise limited at this time.  Of note, patient was treated in January at  secondary to osteomyelitis/ulcerations of sacrum, following with ID at .     In the ER, she was overall hemodynamically stable with no fever and blood pressure normal.  She did have evidence of nonsustained ventricular tachycardia, currently on amiodarone drip.  Received small fluid bolus.  X-ray does not show overt opacity, difficult study noted.  Urinalysis concerning for UTI.  Elevated procalcitonin noted.  Secondary concern for pneumonia/tracheitis/UTI, we were asked to admit\"    Nonsustained V. tach:  -Echo from last year with normal ejection fraction, some diastolic dysfunction noted.  -May be related to underlying hypoxia, early sepsis potentially with multiple infectious processes at play.    -Patient placed on amiodarone on admission. Continue po amiodarone 200mg BID with 200mg daily thereafter.   -2D echocardiogram revealed EF 60 to 65% with normal diastolic function.  No significant valvular abnormalities.     Pneumonia/tracheitis  Gram-negative bacillus UTI:  -Patient has a history of complicated pneumonia with parapneumonic effusion, increased secretions currently requiring frequent suctioning.  Continue with ropinirole and reportedly  -Urine culture growing Proteus Mirabilis   -Empiric Zosyn ; vancomycin discontinued due to negative MRSA screen   -Ordered Augmentin at discharge to complete course     Staph epi bacteremia  -Contaminant; no further workup indicated     Sacral osteomyelitis:  Has been followed by  ID, was on 6 to 8 weeks of antibiotics per last discharge summary.  Completed 6 weeks of IV abx on 2/27/25.   -CVC removed on discharge this admission.      Acute on chronic anemia  -Patient had decrease of hemoglobin from 8.6-6.2.  This was confirmed with " repeat H&H.  -No evidence of gross bleeding.  However, patient noted to have significant amount of RBCs on initial UA.  -1 unit PRBC transfusion 3/20  -Held Eliquis while inpatient - continued at discharge  -H&H stable    Vital Signs:    Temp:  [97.6 °F (36.4 °C)-98.9 °F (37.2 °C)] 97.6 °F (36.4 °C)  Heart Rate:  [103-120] 103  Resp:  [20-32] 20  BP: (111-142)/(47-79) 111/47    Physical Exam:    General Appearance:  Alert, no acute distress.   Head:  Atraumatic and normocephalic.   Eyes: Conjunctivae and sclerae normal, no icterus. No pallor.   Throat: No oral lesions, no thrush, oral mucosa moist.   Neck: Supple, trachea midline, no thyromegaly.   Lungs:   Breath sounds heard bilaterally equally.  No wheezing or crackles.    Heart:  Normal S1 and S2, no murmur, no gallop, no rub. No JVD.   Abdomen:   PEG tube present, soft, nontender, nondistended   Extremities: Supple, no edema, no cyanosis, no clubbing.   Skin: No bleeding or rash.   Neurologic: Alert, nonverbal.  Extremities contracted.  At baseline.     Pertinent Lab Results:     Results from last 7 days   Lab Units 03/21/25  0644 03/20/25  0744 03/19/25  0643 03/18/25  0345   SODIUM mmol/L 137 138 139 136   POTASSIUM mmol/L 4.0 3.9 4.0 4.7   CHLORIDE mmol/L 101 101 103 97*   CO2 mmol/L 28.4 24.1 24.9 28.4   BUN mg/dL 22 25* 26* 33*   CREATININE mg/dL 0.42* 0.60 0.50* 0.59   CALCIUM mg/dL 8.4* 9.0 8.6 9.0   BILIRUBIN mg/dL  --   --   --  0.2   ALK PHOS U/L  --   --   --  104   ALT (SGPT) U/L  --   --   --  26   AST (SGOT) U/L  --   --   --  21   GLUCOSE mg/dL 185* 196* 231* 189*     Results from last 7 days   Lab Units 03/21/25  0644 03/20/25  0744 03/19/25  0933 03/19/25  0643   WBC 10*3/mm3 14.32* 13.61*  --  11.47*   HEMOGLOBIN g/dL 8.5* 8.6* 6.2* 6.2*   HEMATOCRIT % 26.9* 28.6* 21.7* 21.0*   PLATELETS 10*3/mm3 513* 588*  --  543*                             Results from last 7 days   Lab Units 03/18/25  1453 03/18/25  0458 03/18/25  0352 03/18/25  0345  03/17/25  1300   BLOODCX   --   --  No growth at 3 days Staphylococcus, coagulase negative*  --    URINECX   --  >100,000 CFU/mL Proteus mirabilis*  --   --  >100,000 CFU/mL Proteus mirabilis*   WOUNDCX  Scant growth (1+) Morganella morganii ssp morganii*  Scant growth (1+) Proteus mirabilis*  Scant growth (1+) Normal Skin Jolly  --   --   --   --        Pertinent Radiology Results:    Imaging Results (All)       Procedure Component Value Units Date/Time    CT Abdomen Pelvis Without Contrast [[id number]] Collected: 03/18/25 1213     Updated: 03/18/25 1219    Narrative:      PROCEDURE: CT ABDOMEN PELVIS WO CONTRAST-     HISTORY: complicated uti, hematuria; J69.0-Pneumonitis due to inhalation  of food and vomit; T83.511A-Infection and inflammatory reaction due to  indwelling urethral catheter, initial encounter; N39.0-Urinary tract  infection, site not specified; I47.29-Other ventricular tachycardia     COMPARISON: September 2024.     PROCEDURE: Axial images were obtained from the lung bases through the  pubic symphysis without intravenous contrast.     FINDINGS:     ABDOMEN: Patient motion artifact limits images. There is streak artifact  from patient's arm position. Minimal bilateral pleural effusions are  seen. The heart size is normal. There are vascular calcifications. The  limited noncontrast images of the liver are normal. The gallbladder is  present. There are no CT visualized gallstones. The spleen is normal. No  adrenal masses are seen.  The pancreas has an unremarkable unenhanced  appearance. The aorta is normal in caliber. There is no significant free  fluid or adenopathy. Hypodense lesion of the lateral right kidney is  stable from the prior exam. There is no nephrolithiasis. There is no  hydronephrosis. A PEG tube is present. Limited noncontrast images of the  bowel are unremarkable.     PELVIS: The appendix is normal. The urinary bladder is collapsed by  Sanchez catheter. There is no significant  adenopathy. There is a  significant decubitus ulcer in the posterior tissues overlying the  sacrum which has developed since the prior exam. There is partial  destruction of the distal sacrum and coccygeal segments, new. No  contrast was given, but no definite fluid collection is seen. There is  increased attenuation of the presacral fat, similar to the prior exam  with minimal free fluid.       Impression:      No hydronephrosis or nephrolithiasis.     Interval development of significant sacral decubitus ulcer with partial  destruction of the distal sacrum and coccygeal segments. No contrast  given, but no definite fluid collection is seen. Findings are concerning  for osteomyelitis.        CTDI: 20.57 mGy  DLP: 962.25 mGy.cm        This study was performed with techniques to keep radiation doses as low  as reasonably achievable (ALARA). Individualized dose reduction  techniques using automated exposure control or adjustment of mA and/or  kV according to the patient size were employed.              Images were reviewed, interpreted, and dictated by Dr. Briana Erickson MD  Transcribed by Shawna Cordero PA-C.     This report was signed and finalized on 3/18/2025 12:17 PM by Briana Erickson MD.       XR Chest 1 View [[id number]] Collected: 03/18/25 0848     Updated: 03/18/25 0852    Narrative:      PROCEDURE: XR CHEST 1 VW-     HISTORY: Dyspnea, shortness of breath     COMPARISON: September 28, 2024..     FINDINGS: Tracheostomy again noted with tip 4 cm above the joby. The  heart is upper limits of normal in size.. There is decreased inspiratory  effort with bibasilar linear densities similar to prior and consistent  with scar. No new area of consolidation seen. There is blunting of the  costophrenic angles bilaterally, small effusion versus scar.. The  mediastinum is unremarkable. There is no pneumothorax.  There are no  acute osseous abnormalities. Apical lordotic positioning noted.        Impression:      Stable  position of tracheostomy tube..     Decreased inspiratory effort with stable bibasilar scar and suspected  bilateral pleural scarring versus small effusion.        This report was signed and finalized on 3/18/2025 8:50 AM by Briana Erickson MD.               Echo:    Results for orders placed during the hospital encounter of 03/18/25    Adult Transthoracic Echo Complete W/ Cont if Necessary Per Protocol    Interpretation Summary  1.  Normal left ventricular size and systolic function, LVEF 60-65%.  2.  Normal LV diastolic filling pattern.  3.  Normal right ventricular size and systolic function.  4.  Normal left atrial volume index.  5.  No significant valvular abnormalities.    Condition on Discharge:      Stable.    Code status during the hospital stay:    Code Status and Medical Interventions: CPR (Attempt to Resuscitate); Full Support   Ordered at: 03/18/25 0523     Code Status (Patient has no pulse and is not breathing):    CPR (Attempt to Resuscitate)     Medical Interventions (Patient has pulse or is breathing):    Full Support     Discharge Disposition:    Long Term Care (DC - External)    Discharge Medications:       Discharge Medications        New Medications        Instructions Start Date   amiodarone 200 MG tablet  Commonly known as: PACERONE   200 mg, Oral, Every 12 Hours Scheduled      amoxicillin-clavulanate 875-125 MG per tablet  Commonly known as: AUGMENTIN   1 tablet, Oral, 2 Times Daily             Changes to Medications        Instructions Start Date   Senna 8.8 MG/5ML Syrup  What changed: Another medication with the same name was removed. Continue taking this medication, and follow the directions you see here.   10 mL, Per G Tube, 2 Times Daily             Continue These Medications        Instructions Start Date   acetaminophen 325 MG tablet  Commonly known as: TYLENOL   650 mg, Per G Tube, Every 8 Hours PRN      apixaban 5 MG tablet tablet  Commonly known as: ELIQUIS   5 mg, Every 12 Hours  Scheduled      baclofen 10 MG tablet  Commonly known as: LIORESAL   10 mg, Every 8 Hours      budesonide 0.5 MG/2ML nebulizer solution  Commonly known as: PULMICORT   0.5 mg, Nebulization, 2 Times Daily - RT      cetirizine 10 MG tablet  Commonly known as: zyrTEC   10 mg, Daily      docusate sodium 50 mg/5 mL liquid  Commonly known as: COLACE   200 mg, 2 Times Daily      Eucerin cream   1 Application, Topical, As Needed, Apply to bilateral arms as needed for dry skin      glycopyrrolate 2 MG tablet  Commonly known as: ROBINUL   2 mg, Per G Tube, 3 Times Daily PRN      HYDROcodone-acetaminophen 7.5-325 MG per tablet  Commonly known as: NORCO   1 tablet, Oral, Every 4 Hours      ipratropium-albuterol 0.5-2.5 mg/3 ml nebulizer  Commonly known as: DUO-NEB   3 mL, Nebulization, 4 Times Daily - RT, Takes at [phone]      Lansinoh Lanolin cream   1 Application, Topical, 2 Times Daily, Apply to Both Arms      midodrine 5 MG tablet  Commonly known as: PROAMATINE   5 mg, Per G Tube, 3 Times Daily Before Meals      MiraLax 17 g packet  Generic drug: polyethylene glycol   17 g, Oral, 2 Times Daily      multivitamin with iron-minerals (CENTRUM) liquid   15 mL, Per G Tube, Daily      mupirocin 2 % ointment  Commonly known as: BACTROBAN   1 Application, Topical, 2 Times Daily, Apply to G-tube site twice daily      Omeprazole 2 MG/ML suspension  Commonly known as: PRILOSEC   10 mL, Per G Tube, Daily      saccharomyces boulardii 250 MG capsule  Commonly known as: FLORASTOR   250 mg, Daily      Scopolamine 1 MG/3DAYS patch   1 patch, Transdermal, Every 72 Hours      sodium hypochlorite 0.125 % solution topical solution 0.125%  Commonly known as: DAKIN'S 1/4 STRENGTH   Apply  topically to the appropriate area as directed.      torsemide 20 MG tablet  Commonly known as: DEMADEX   10 mg, Oral, Daily             Stop These Medications      hyoscyamine 0.125 MG tablet  Commonly known as: ANASPAZ,LEVSIN            Discharge  Diet:     Diet Instructions       Diet: Tube Feeding; Continuous; Glucerna 1.2 Goal rate 65mL/hr      Discharge Diet: Tube Feeding    Feeding Type: Continuous    Formula & Rate: Glucerna 1.2 Goal rate 65mL/hr          Activity at Discharge:     Activity Instructions       Activity as Tolerated            Follow-up Appointments:    Additional Instructions for the Follow-ups that You Need to Schedule       Discharge Follow-up with PCP   As directed       Currently Documented PCP:    Ranjeet Pina MD    PCP Phone Number:    [phone]     Follow Up Details: 1 week               Contact information for follow-up providers       Ranjeet Pina MD .    Specialty: Family Medicine  Why: going to facility will be followed there  Contact information:  [address] [facility] [address] [phone]                       Contact information for after-discharge care       Destination       [facility] .    Service: Nursing Home  Contact information:  [address] [facility] [address] [phone]                                 No future appointments.  Test Results Pending at Discharge:    Pending Results       None                 Walter Barragan DO  03/21/25  13:21 EDT    Time: I spent >30 minutes on this discharge activity which included: face-to-face encounter with the patient, reviewing the data in the system, coordination of the care with the nursing staff as well as consultants, documentation, and entering orders.     Dictated utilizing Dragon dictation.        Electronically signed by Walter Barragan DO at 03/21/25 3288

## 2025-03-23 LAB — BACTERIA SPEC AEROBE CULT: NORMAL

## 2025-03-29 ENCOUNTER — HOSPITAL ENCOUNTER (EMERGENCY)
Facility: HOSPITAL | Age: 65
Discharge: HOME | End: 2025-03-29
Attending: EMERGENCY MEDICINE
Payer: MEDICARE

## 2025-03-29 VITALS
SYSTOLIC BLOOD PRESSURE: 91 MMHG | WEIGHT: 162.48 LBS | TEMPERATURE: 99.1 F | BODY MASS INDEX: 27.74 KG/M2 | HEIGHT: 64 IN | DIASTOLIC BLOOD PRESSURE: 52 MMHG | OXYGEN SATURATION: 100 % | RESPIRATION RATE: 16 BRPM | HEART RATE: 89 BPM

## 2025-03-29 DIAGNOSIS — J95.03 TRACHEOSTOMY MALFUNCTION: Primary | ICD-10-CM

## 2025-03-29 PROCEDURE — 99283 EMERGENCY DEPT VISIT LOW MDM: CPT | Performed by: EMERGENCY MEDICINE

## 2025-03-29 NOTE — ED NOTES
Trach with adequate placement on arrival.  Pt to return to Nursing Facility per provider.  Report called back to [facility].

## 2025-03-29 NOTE — ED PROVIDER NOTES
"Subjective:  History of Present Illness:    Patient is a 64-year-old female with history of tracheostomy.  Patient was sent to the ER from nursing home when trach became dislodged.  Upon arrival of EMS crew trach was placed without issue.  Patient was brought to the ER for further evaluation.  Upon arrival to the ER trach appears to be functioning properly.  Vital signs are stable.  Does not appear that any other intervention is needed at this time.    Nurses Notes reviewed and agree, including vitals, allergies, social history and prior medical history.     REVIEW OF SYSTEMS: All systems reviewed and not pertinent unless noted.  Review of Systems   All other systems reviewed and are negative.      Past Medical History:   Diagnosis Date    Abdominal distention 2024    Acute and chronic respiratory failure with hypoxia 2024    Adult hypertrophic pyloric stenosis 2024    COPD (chronic obstructive pulmonary disease)     Hypertension     Osteomyelitis     Pneumonia     Stroke        Allergies:    Patient has no known allergies.      Past Surgical History:   Procedure Laterality Date    HYSTERECTOMY      Partial     TRACHEOSTOMY AND PEG TUBE INSERTION N/A 3/20/2019    Procedure: TRACHEOSTOMY AND PERCUTANEOUS ENDOSCOPIC GASTROSTOMY TUBE INSERTION;  Surgeon: Nadira Pandey MD;  Location: [facility];  Service: General         Social History     Socioeconomic History    Marital status: Legally    Tobacco Use    Smoking status: Former     Current packs/day: 1.00     Types: Electronic Cigarette, Cigarettes    Smokeless tobacco: Never   Vaping Use    Vaping status: Unknown   Substance and Sexual Activity    Alcohol use: Not Currently     Comment: wine     Drug use: No    Sexual activity: Defer         No family history on file.    Objective  Physical Exam:  BP 91/52   Pulse 89   Temp 99.1 °F (37.3 °C) (Axillary)   Resp 16   Ht 162.6 cm (64\")   Wt 73.7 kg (162 lb 7.7 oz)   SpO2 100%   BMI 27.89 kg/m²  "     Physical Exam  Vitals and nursing note reviewed.   Constitutional:       Appearance: Normal appearance. She is normal weight.   HENT:      Head: Normocephalic and atraumatic.      Nose: Nose normal.      Mouth/Throat:      Mouth: Mucous membranes are moist. Mucous membranes are dry.      Pharynx: Oropharynx is clear.      Comments: Tracheostomy tube appears to be in the correct placement.  Eyes:      Extraocular Movements: Extraocular movements intact.      Conjunctiva/sclera: Conjunctivae normal.      Pupils: Pupils are equal, round, and reactive to light.   Cardiovascular:      Rate and Rhythm: Normal rate and regular rhythm.      Pulses: Normal pulses.      Heart sounds: Normal heart sounds.   Pulmonary:      Effort: Pulmonary effort is normal.      Breath sounds: Normal breath sounds.   Abdominal:      General: Abdomen is flat. Bowel sounds are normal.      Palpations: Abdomen is soft.   Musculoskeletal:         General: Normal range of motion.   Skin:     General: Skin is warm and dry.      Capillary Refill: Capillary refill takes less than 2 seconds.   Neurological:      General: No focal deficit present.      Mental Status: She is alert and oriented to person, place, and time. Mental status is at baseline.   Psychiatric:         Mood and Affect: Mood normal.         Behavior: Behavior normal.         Thought Content: Thought content normal.         Judgment: Judgment normal.         Procedures    ED Course:         Lab Results (last 24 hours)       ** No results found for the last 24 hours. **             No radiology results from the last 24 hrs       MDM      Initial impression of presenting illness: Patient is a 64-year-old female with history of tracheostomy.  Patient was sent to the ER from nursing home when trach became dislodged.  Upon arrival of EMS crew trach was placed without issue.  Patient was brought to the ER for further evaluation.  Upon arrival to the ER trach appears to be functioning  properly.  Vital signs are stable.  Does not appear that any other intervention is needed at this time.    DDX: includes but is not limited to: Tracheostomy malfunction    Patient arrives stable with vitals interpreted by myself.     Pertinent features from physical exam: Tracheostomy tube appears to be in correct placement.  Appears to be functioning correctly.  Vital signs are stable..    Initial diagnostic plan: N/A    Results from initial plan were reviewed and interpreted by me revealing N/A    Diagnostic information from other sources: Chart review    Interventions / Re-evaluation: Vital signs stable throughout encounter    Results/clinical rationale were discussed with patient    Consultations/Discussion of results with other physicians: N/A    Disposition plan: Patient is hemodynamically stable nontoxic-appearing appropriate discharge.  Outpatient follow-up with PCP within the week.  Follow-up ER for new or worse symptoms.  -----        Final diagnoses:   Tracheostomy malfunction          Josh Dukes, APRN  03/29/25 1737

## 2025-04-15 ENCOUNTER — HOSPITAL ENCOUNTER (EMERGENCY)
Facility: HOSPITAL | Age: 65
Discharge: HOME | End: 2025-04-15
Attending: EMERGENCY MEDICINE | Admitting: EMERGENCY MEDICINE
Payer: MEDICARE

## 2025-04-15 ENCOUNTER — APPOINTMENT (OUTPATIENT)
Dept: GENERAL RADIOLOGY | Facility: HOSPITAL | Age: 65
End: 2025-04-15
Payer: MEDICARE

## 2025-04-15 VITALS
DIASTOLIC BLOOD PRESSURE: 70 MMHG | OXYGEN SATURATION: 100 % | BODY MASS INDEX: 27.74 KG/M2 | HEART RATE: 81 BPM | HEIGHT: 64 IN | RESPIRATION RATE: 16 BRPM | SYSTOLIC BLOOD PRESSURE: 129 MMHG | WEIGHT: 162.48 LBS | TEMPERATURE: 98.1 F

## 2025-04-15 DIAGNOSIS — K94.23 PEG TUBE MALFUNCTION: Primary | ICD-10-CM

## 2025-04-15 DIAGNOSIS — N39.0 CHRONIC UTI: ICD-10-CM

## 2025-04-15 LAB
BACTERIA UR QL AUTO: ABNORMAL /HPF
BILIRUB UR QL STRIP: NEGATIVE
CLARITY UR: ABNORMAL
COLOR UR: ABNORMAL
GLUCOSE UR STRIP-MCNC: NEGATIVE MG/DL
GRAN CASTS URNS QL MICRO: ABNORMAL /LPF
HGB UR QL STRIP.AUTO: ABNORMAL
HYALINE CASTS UR QL AUTO: ABNORMAL /LPF
KETONES UR QL STRIP: NEGATIVE
LEUKOCYTE ESTERASE UR QL STRIP.AUTO: ABNORMAL
NITRITE UR QL STRIP: NEGATIVE
PH UR STRIP.AUTO: 6.5 [PH] (ref 5–8)
PROT UR QL STRIP: ABNORMAL
RBC # UR STRIP: ABNORMAL /HPF
REF LAB TEST METHOD: ABNORMAL
SP GR UR STRIP: 1.01 (ref 1–1.03)
SQUAMOUS #/AREA URNS HPF: ABNORMAL /HPF
TRANS CELLS #/AREA URNS HPF: ABNORMAL /HPF
UROBILINOGEN UR QL STRIP: ABNORMAL
WBC # UR STRIP: ABNORMAL /HPF

## 2025-04-15 PROCEDURE — 81001 URINALYSIS AUTO W/SCOPE: CPT | Performed by: EMERGENCY MEDICINE

## 2025-04-15 PROCEDURE — 99283 EMERGENCY DEPT VISIT LOW MDM: CPT | Performed by: EMERGENCY MEDICINE

## 2025-04-15 PROCEDURE — 87086 URINE CULTURE/COLONY COUNT: CPT | Performed by: EMERGENCY MEDICINE

## 2025-04-15 PROCEDURE — 25510000002 DIATRIZOATE MEGLUMINE & SODIUM PER 1 ML: Performed by: EMERGENCY MEDICINE

## 2025-04-15 PROCEDURE — 74018 RADEX ABDOMEN 1 VIEW: CPT

## 2025-04-15 RX ORDER — DIATRIZOATE MEGLUMINE AND DIATRIZOATE SODIUM 660; 100 MG/ML; MG/ML
30 SOLUTION ORAL; RECTAL
Status: COMPLETED | OUTPATIENT
Start: 2025-04-15 | End: 2025-04-15

## 2025-04-15 RX ADMIN — DIATRIZOATE MEGLUMINE AND DIATRIZOATE SODIUM 30 ML: 660; 100 LIQUID ORAL; RECTAL at 18:13

## 2025-04-15 NOTE — ED PROVIDER NOTES
EMERGENCY DEPARTMENT ENCOUNTER    Pt Name: Viji Vargas  MRN: [id number]  Pt :   1960  Room Number:  1616  Date of encounter:  4/15/2025  PCP: Ranjeet Pina MD  ED Provider: Jorge Healy MD    Historian: EMS, nursing home record      HPI:  Chief Complaint   Patient presents with    peg tube     Nursing home sent the pt here for a peg tube replacement. Per ems they said it was leaking.           Context: Viji Vargas is a 64 y.o. female who presents to the ED c/o PEG tube leaking, patient is nonverbal, has chronic PEG tube in place, apparently is leaking, nursing home sent her here.  Patient unable provide any history all garnered from nursing home record and EMS      PAST MEDICAL HISTORY  Past Medical History:   Diagnosis Date    Abdominal distention     Acute and chronic respiratory failure with hypoxia     Adult hypertrophic pyloric stenosis     COPD (chronic obstructive pulmonary disease)     Hypertension     Osteomyelitis     Pneumonia     Stroke          PAST SURGICAL HISTORY  Past Surgical History:   Procedure Laterality Date    HYSTERECTOMY      Partial     TRACHEOSTOMY AND PEG TUBE INSERTION N/A 3/20/2019    Procedure: TRACHEOSTOMY AND PERCUTANEOUS ENDOSCOPIC GASTROSTOMY TUBE INSERTION;  Surgeon: Nadira Pandey MD;  Location: [facility];  Service: General         FAMILY HISTORY  History reviewed. No pertinent family history.      SOCIAL HISTORY  Social History     Socioeconomic History    Marital status: Legally    Tobacco Use    Smoking status: Former     Current packs/day: 1.00     Types: Electronic Cigarette, Cigarettes    Smokeless tobacco: Never   Vaping Use    Vaping status: Unknown   Substance and Sexual Activity    Alcohol use: Not Currently     Comment: wine     Drug use: No    Sexual activity: Defer         ALLERGIES  Patient has no known allergies.        REVIEW OF SYSTEMS  Review of Systems   Unable to perform ROS: Patient nonverbal        All  systems reviewed and negative except for those discussed in HPI.       PHYSICAL EXAM    I have reviewed the triage vital signs and nursing notes.    ED Triage Vitals [04/15/25 1702]   Temp Heart Rate Resp BP SpO2   98.1 °F (36.7 °C) 98 16 131/70 100 %      Temp src Heart Rate Source Patient Position BP Location FiO2 (%)   Axillary -- -- -- --       Physical Exam  Constitutional:       Appearance: Normal appearance. She is not ill-appearing.   HENT:      Head: Normocephalic and atraumatic.      Right Ear: External ear normal.      Left Ear: External ear normal.      Nose: Nose normal.      Mouth/Throat:      Mouth: Mucous membranes are moist.      Pharynx: Oropharynx is clear.   Eyes:      Extraocular Movements: Extraocular movements intact.      Conjunctiva/sclera: Conjunctivae normal.      Pupils: Pupils are equal, round, and reactive to light.   Cardiovascular:      Rate and Rhythm: Normal rate and regular rhythm.      Pulses:           Radial pulses are 2+ on the right side and 2+ on the left side.   Pulmonary:      Effort: Pulmonary effort is normal.      Breath sounds: Normal breath sounds.   Abdominal:      General: There is no distension.      Palpations: Abdomen is soft.      Tenderness: There is no abdominal tenderness.       Genitourinary:     Comments: Sanchez catheter in place with concentrated urine  Musculoskeletal:         General: No tenderness or deformity. Normal range of motion.      Cervical back: Normal range of motion and neck supple.      Right lower leg: No edema.      Left lower leg: No edema.   Skin:     General: Skin is warm and dry.      Capillary Refill: Capillary refill takes less than 2 seconds.   Neurological:      General: No focal deficit present.      Mental Status: Mental status is at baseline. She is disoriented.            LAB RESULTS  Recent Results (from the past 24 hours)   Urinalysis With Culture If Indicated - Urine, Catheter    Collection Time: 04/15/25  5:57 PM    Specimen:  Urine, Catheter   Result Value Ref Range    Color, UA Dark Yellow (A) Yellow, Straw    Appearance, UA Turbid (A) Clear    pH, UA 6.5 5.0 - 8.0    Specific Gravity, UA 1.014 1.005 - 1.030    Glucose, UA Negative Negative    Ketones, UA Negative Negative    Bilirubin, UA Negative Negative    Blood, UA Moderate (2+) (A) Negative    Protein, UA 30 mg/dL (1+) (A) Negative    Leuk Esterase, UA Large (3+) (A) Negative    Nitrite, UA Negative Negative    Urobilinogen, UA 1.0 E.U./dL 0.2 - 1.0 E.U./dL       If labs were ordered, I independently reviewed the results and considered them in treating the patient.        RADIOLOGY  XR Abdomen KUB  Result Date: 4/15/2025  FINAL REPORT TECHNIQUE: null CLINICAL HISTORY: replaced feeding tube COMPARISON: null FINDINGS: 1 view abdomen Comparison: CR - XR ABDOMEN KUB - 10/22/22 04:35 EDT CR - XR ABDOMEN KUB - 1/10/22 03:32 EST Findings: There is a percutaneous gastrostomy tube with the tip located in the body of the stomach. There is contrast in the stomach and proximal small bowel. No evidence for extraluminal extravasation of contrast. No pneumoperitoneum or pneumatosis.     IMPRESSION: Percutaneous gastrostomy tube tip located in the body of the stomach. Authenticated and Electronically Signed by PRUDENCIO Pompa MD on 04/15/2025 06:36:57 PM          PROCEDURES    Feeding Tube Replacement    Date/Time: 4/15/2025 6:53 PM    Performed by: Jorge Healy MD  Authorized by: Jorge Healy MD    Consent:     Consent obtained:  Emergent situation    Consent given by:  Healthcare agent    Risks discussed:  Infection  Pre-procedure details:     Old tube type:  Gastrostomy    Old tube size:  18 Fr  Sedation:     Sedation type:  None  Anesthesia:     Anesthesia method:  None  Procedure details:     Patient position:  Supine    Procedure type:  Replacement    Tube type:  Gastrostomy    Tube size:  18 Fr    Bulb inflation volume:  7    Bulb inflation fluid:  Normal  saline  Post-procedure details:     Placement/position confirmation:  X-ray    Placement difficulty:  None    Bleeding:  None    Procedure completion:  Tolerated well, no immediate complications      Interpretations    O2 Sat: The patient's oxygen saturation was 100% on Room Air.  This was independently interpreted by me as Normal      Radiology: I ordered and independently reviewed the above noted radiographic studies.  I viewed images of Xray of the abdomen  which showed  PEG tube in place  per my independent interpretation. See radiologist's dictation for official interpretation.         MEDICATIONS GIVEN IN ER    Medications   diatrizoate meglumine-sodium (GASTROGRAFIN) 66-10 % oral solution 30 mL (30 mL Per PEG Tube Given 4/15/25 1813)         MEDICAL DECISION MAKING, PROGRESS, and CONSULTS    All labs, if obtained, have been independently reviewed by me.  All radiology studies, if obtained, have been reviewed by me and the radiologist dictating the report.  All EKG's, if obtained, have been independently viewed and interpreted by me      Discussion below represents my analysis of pertinent findings related to patient's condition, differential diagnosis, treatment plan and final disposition.      Differential diagnosis:    64-year-old female chronically with PEG tube presenting to the ED with PEG tube leaking, will replace the PEG tube, replaced the Sanchez as the urine looks quite concentrated, sent of urinalysis and urine culture.    Additional Sources:  External (non-ED) record review:  Discharge summary 3/21/2025 for nonsustained ventricular tachycardia       Orders placed during this visit:  Orders Placed This Encounter   Procedures    Urine Culture - Urine,    XR Abdomen KUB    Urinalysis With Culture If Indicated - Urine, Catheter    Urinalysis, Microscopic Only - Urine, Clean Catch    Replace Current Indwelling Urinary Catheter Prior to Collecting Urine Specimen    Assess Need for Indwelling Urinary  Catheter - Follow Removal Protocol    Urinary Catheter Care         Additional orders considered but not ordered:  None    ED Course:    Consultants:  None    ED Course as of 04/15/25 1849   Tue Apr 15, 2025   1839 Urinalysis With Culture If Indicated - Urine, Catheter(!)  Patient's urine looks stable from prior if not improved, given the patient is afebrile, normal vital signs, at her baseline, I will not empirically treat especially given the patient's known colonization, reviewed prior urine culture which showed Proteus which was thought to be colonized, we will send off urine culture. [CS]   1839 X-ray shows G-tube in place, patient's Sanchez catheter has been changed, urine culture pending, will discharge patient home with outpatient follow-up [CS]      ED Course User Index  [CS] Jorge Healy MD           After my consideration of clinical presentation and any laboratory/radiology studies obtained, I discussed the findings with the patient/patient representative who is in agreement with the treatment plan and the final disposition. Risks and benefits of discharge were discussed.     AS OF 18:49 EDT VITALS:    BP - 131/70  HR - 98  TEMP - 98.1 °F (36.7 °C) (Axillary)  O2 SATS - 100%    I reviewed the patient's prescription monitoring report if available prior to discharge    DIAGNOSIS  Final diagnoses:   PEG tube malfunction   Chronic UTI         DISPOSITION  ED Disposition       ED Disposition   Discharge    Condition   Stable    Comment   --                   Please note that portions of this document were completed with voice recognition software.        Jorge Healy MD  04/15/25 2551

## 2025-04-17 LAB — BACTERIA SPEC AEROBE CULT: NORMAL

## 2025-05-22 ENCOUNTER — APPOINTMENT (OUTPATIENT)
Dept: GENERAL RADIOLOGY | Facility: HOSPITAL | Age: 65
End: 2025-05-22
Payer: MEDICARE

## 2025-05-22 ENCOUNTER — HOSPITAL ENCOUNTER (EMERGENCY)
Facility: HOSPITAL | Age: 65
Discharge: NURSING, REHAB OR LONG-TERM CARE | End: 2025-05-22
Attending: EMERGENCY MEDICINE
Payer: MEDICARE

## 2025-05-22 ENCOUNTER — APPOINTMENT (OUTPATIENT)
Dept: CT IMAGING | Facility: HOSPITAL | Age: 65
End: 2025-05-22
Payer: MEDICARE

## 2025-05-22 VITALS
SYSTOLIC BLOOD PRESSURE: 128 MMHG | OXYGEN SATURATION: 100 % | DIASTOLIC BLOOD PRESSURE: 71 MMHG | TEMPERATURE: 98.8 F | RESPIRATION RATE: 16 BRPM | HEART RATE: 88 BPM

## 2025-05-22 DIAGNOSIS — K63.89 MESENTERIC MASS: Primary | ICD-10-CM

## 2025-05-22 DIAGNOSIS — K94.23 PEG TUBE MALFUNCTION: ICD-10-CM

## 2025-05-22 LAB
ALBUMIN SERPL-MCNC: 3.5 G/DL (ref 3.5–5.2)
ALBUMIN/GLOB SERPL: 0.9 G/DL
ALP SERPL-CCNC: 85 U/L (ref 39–117)
ALT SERPL W P-5'-P-CCNC: 16 U/L (ref 1–33)
ANION GAP SERPL CALCULATED.3IONS-SCNC: 12.1 MMOL/L (ref 5–15)
AST SERPL-CCNC: 14 U/L (ref 1–32)
BASOPHILS # BLD AUTO: 0.04 10*3/MM3 (ref 0–0.2)
BASOPHILS NFR BLD AUTO: 0.4 % (ref 0–1.5)
BILIRUB SERPL-MCNC: 0.5 MG/DL (ref 0–1.2)
BUN SERPL-MCNC: 22 MG/DL (ref 8–23)
BUN/CREAT SERPL: 23.2 (ref 7–25)
CALCIUM SPEC-SCNC: 10.2 MG/DL (ref 8.6–10.5)
CHLORIDE SERPL-SCNC: 98 MMOL/L (ref 98–107)
CO2 SERPL-SCNC: 30.9 MMOL/L (ref 22–29)
CREAT SERPL-MCNC: 0.95 MG/DL (ref 0.57–1)
DEPRECATED RDW RBC AUTO: 57.1 FL (ref 37–54)
EGFRCR SERPLBLD CKD-EPI 2021: 66.2 ML/MIN/1.73
EOSINOPHIL # BLD AUTO: 0.41 10*3/MM3 (ref 0–0.4)
EOSINOPHIL NFR BLD AUTO: 4.3 % (ref 0.3–6.2)
ERYTHROCYTE [DISTWIDTH] IN BLOOD BY AUTOMATED COUNT: 17.8 % (ref 12.3–15.4)
GLOBULIN UR ELPH-MCNC: 3.8 GM/DL
GLUCOSE SERPL-MCNC: 103 MG/DL (ref 65–99)
HCT VFR BLD AUTO: 26 % (ref 34–46.6)
HGB BLD-MCNC: 8 G/DL (ref 12–15.9)
IMM GRANULOCYTES # BLD AUTO: 0.04 10*3/MM3 (ref 0–0.05)
IMM GRANULOCYTES NFR BLD AUTO: 0.4 % (ref 0–0.5)
LIPASE SERPL-CCNC: 18 U/L (ref 13–60)
LYMPHOCYTES # BLD AUTO: 1.81 10*3/MM3 (ref 0.7–3.1)
LYMPHOCYTES NFR BLD AUTO: 19.2 % (ref 19.6–45.3)
MCH RBC QN AUTO: 27.6 PG (ref 26.6–33)
MCHC RBC AUTO-ENTMCNC: 30.8 G/DL (ref 31.5–35.7)
MCV RBC AUTO: 89.7 FL (ref 79–97)
MONOCYTES # BLD AUTO: 0.88 10*3/MM3 (ref 0.1–0.9)
MONOCYTES NFR BLD AUTO: 9.3 % (ref 5–12)
NEUTROPHILS NFR BLD AUTO: 6.26 10*3/MM3 (ref 1.7–7)
NEUTROPHILS NFR BLD AUTO: 66.4 % (ref 42.7–76)
NRBC BLD AUTO-RTO: 0 /100 WBC (ref 0–0.2)
PLATELET # BLD AUTO: 285 10*3/MM3 (ref 140–450)
PMV BLD AUTO: 11.8 FL (ref 6–12)
POTASSIUM SERPL-SCNC: 3.9 MMOL/L (ref 3.5–5.2)
PROT SERPL-MCNC: 7.3 G/DL (ref 6–8.5)
RBC # BLD AUTO: 2.9 10*6/MM3 (ref 3.77–5.28)
SODIUM SERPL-SCNC: 141 MMOL/L (ref 136–145)
WBC NRBC COR # BLD AUTO: 9.44 10*3/MM3 (ref 3.4–10.8)

## 2025-05-22 PROCEDURE — 25510000002 DIATRIZOATE MEGLUMINE & SODIUM PER 1 ML: Performed by: EMERGENCY MEDICINE

## 2025-05-22 PROCEDURE — 85025 COMPLETE CBC W/AUTO DIFF WBC: CPT | Performed by: PHYSICIAN ASSISTANT

## 2025-05-22 PROCEDURE — 25510000001 IOPAMIDOL 61 % SOLUTION: Performed by: EMERGENCY MEDICINE

## 2025-05-22 PROCEDURE — 74018 RADEX ABDOMEN 1 VIEW: CPT

## 2025-05-22 PROCEDURE — 74177 CT ABD & PELVIS W/CONTRAST: CPT

## 2025-05-22 PROCEDURE — 99285 EMERGENCY DEPT VISIT HI MDM: CPT | Performed by: EMERGENCY MEDICINE

## 2025-05-22 PROCEDURE — 80053 COMPREHEN METABOLIC PANEL: CPT | Performed by: PHYSICIAN ASSISTANT

## 2025-05-22 PROCEDURE — 83690 ASSAY OF LIPASE: CPT | Performed by: PHYSICIAN ASSISTANT

## 2025-05-22 RX ORDER — DIATRIZOATE MEGLUMINE AND DIATRIZOATE SODIUM 660; 100 MG/ML; MG/ML
30 SOLUTION ORAL; RECTAL
Status: COMPLETED | OUTPATIENT
Start: 2025-05-22 | End: 2025-05-22

## 2025-05-22 RX ORDER — IOPAMIDOL 612 MG/ML
100 INJECTION, SOLUTION INTRAVASCULAR
Status: COMPLETED | OUTPATIENT
Start: 2025-05-22 | End: 2025-05-22

## 2025-05-22 RX ADMIN — IOPAMIDOL 100 ML: 612 INJECTION, SOLUTION INTRAVENOUS at 18:20

## 2025-05-22 RX ADMIN — DIATRIZOATE MEGLUMINE AND DIATRIZOATE SODIUM 30 ML: 660; 100 LIQUID ORAL; RECTAL at 20:29

## 2025-05-22 NOTE — ED NOTES
This RN called [facility] to get more information on pt. LPN at [facility] [place] stated that pt has had a distended abdomen for a while and they any time they try to put tube feed or meds through g tube it regurgitates back out. Pt was sent to  where, per the nursing home, they only gave her an enema and was [facility]. Provider notified of the above.

## 2025-05-22 NOTE — ED PROVIDER NOTES
Subjective:  Chief Complaint:  abdominal distention    History of Present Illness:  Patient is a 64-year-old female with history of COPD, hypertension, osteomyelitis, stroke, among others presenting to the ER with complaints of abdominal distention and dysfunction of PEG tube.  Nursing home reports that her abdomen has been distended for a while and she was recently taken to  on 5-, admitted for abdominal distention.  Patient ultimately discharged but sent here after nursing home staff was concerned about abdominal distention and reportedly cannot get the PEG tube to work.  Patient has had chronic PEG tube.  Was seen here in April for PEG tube malfunction, leaking around the tube.  Patient is nonverbal and unable provide any history.  No one at bedside.  She is stable and abdomen is soft.      Nurses Notes reviewed and agree, including vitals, allergies, social history and prior medical history.     REVIEW OF SYSTEMS: All systems reviewed and not pertinent unless noted.  Review of Systems   Gastrointestinal:  Positive for abdominal distention.        PEG tube malfunction   All other systems reviewed and are negative.      Past Medical History:   Diagnosis Date    Abdominal distention 2024    Acute and chronic respiratory failure with hypoxia 2024    Adult hypertrophic pyloric stenosis 2024    COPD (chronic obstructive pulmonary disease)     Hypertension     Osteomyelitis     Pneumonia     Stroke        Allergies:    Patient has no known allergies.      Past Surgical History:   Procedure Laterality Date    HYSTERECTOMY      Partial     TRACHEOSTOMY AND PEG TUBE INSERTION N/A 3/20/2019    Procedure: TRACHEOSTOMY AND PERCUTANEOUS ENDOSCOPIC GASTROSTOMY TUBE INSERTION;  Surgeon: Nadira Pandey MD;  Location: [facility];  Service: General         Social History     Socioeconomic History    Marital status: Legally    Tobacco Use    Smoking status: Former     Current packs/day: 1.00     Types:  Electronic Cigarette, Cigarettes    Smokeless tobacco: Never   Vaping Use    Vaping status: Unknown   Substance and Sexual Activity    Alcohol use: Not Currently     Comment: wine     Drug use: No    Sexual activity: Defer         No family history on file.    Objective  Physical Exam:  /87   Pulse 88   Temp 98.8 °F (37.1 °C) (Axillary)   Resp 16   SpO2 100%      Physical Exam  Vitals and nursing note reviewed.   Constitutional:       General: She is not in acute distress.     Appearance: She is not toxic-appearing.   HENT:      Head: Normocephalic and atraumatic.   Eyes:      Extraocular Movements: Extraocular movements intact.   Cardiovascular:      Rate and Rhythm: Normal rate.      Heart sounds: Normal heart sounds.   Pulmonary:      Effort: Pulmonary effort is normal. No respiratory distress.   Abdominal:      General: There is distension.      Palpations: Abdomen is soft.      Tenderness: There is no abdominal tenderness.   Skin:     General: Skin is warm and dry.   Neurological:      General: No focal deficit present.      Mental Status: She is alert and oriented to person, place, and time.   Psychiatric:         Mood and Affect: Mood normal.         Behavior: Behavior normal.       Feeding Tube Replacement    Date/Time: 5/22/2025 7:26 PM    Performed by: Bart Regan PA-C  Authorized by: Luke Khan MD    Consent:     Consent obtained:  Emergent situation    Consent given by:  Healthcare agent    Risks, benefits, and alternatives were discussed: yes      Risks discussed:  Bleeding, infection and pain    Alternatives discussed:  No treatment  Universal protocol:     Procedure explained and questions answered to patient or proxy's satisfaction: yes      Imaging studies available: yes      Immediately prior to procedure, a time out was called: yes      Patient identity confirmed:  Arm band  Pre-procedure details:     Old tube type:  Gastrostomy    Old tube size:  18 Fr  Sedation:      Sedation type:  None  Anesthesia:     Anesthesia method:  None  Procedure details:     Patient position:  Supine    Procedure type:  Replacement    Tube type:  Gastrostomy    Tube size:  18 Fr    Bulb inflation volume:  8 ml    Bulb inflation fluid:  Normal saline  Post-procedure details:     Placement/position confirmation:  Gastric contents aspirated    Placement difficulty:  None    Bleeding:  None    Procedure completion:  Tolerated well, no immediate complications      ED Course:    ED Course as of 05/22/25 2102   Thu May 22, 2025   1949 I consulted with gastroenterologist Dr. Romero to review the CT scan findings of the possible mesenteric mass, he recommended outpatient general surgery follow-up for this as patient may require exploratory laparoscopy and/or biopsy, he did not feel that the patient required inpatient management for this. [TG]      ED Course User Index  [TG] Bart Regan PA-C       Lab Results (last 24 hours)       Procedure Component Value Units Date/Time    CBC & Differential [[id number]]  (Abnormal) Collected: 05/22/25 1716    Specimen: Blood Updated: 05/22/25 1732    Narrative:      The following orders were created for panel order CBC & Differential.  Procedure                               Abnormality         Status                     ---------                               -----------         ------                     CBC Auto Differential[[id number]]        Abnormal            Final result                 Please view results for these tests on the individual orders.    Comprehensive Metabolic Panel [[id number]]  (Abnormal) Collected: 05/22/25 1716    Specimen: Blood Updated: 05/22/25 1738     Glucose 103 mg/dL      BUN 22 mg/dL      Creatinine 0.95 mg/dL      Sodium 141 mmol/L      Potassium 3.9 mmol/L      Chloride 98 mmol/L      CO2 30.9 mmol/L      Calcium 10.2 mg/dL      Total Protein 7.3 g/dL      Albumin 3.5 g/dL      ALT (SGPT) 16 U/L      AST (SGOT) 14 U/L      Alkaline  Phosphatase 85 U/L      Total Bilirubin 0.5 mg/dL      Globulin 3.8 gm/dL      A/G Ratio 0.9 g/dL      BUN/Creatinine Ratio 23.2     Anion Gap 12.1 mmol/L      eGFR 66.2 mL/min/1.73     Narrative:      GFR Categories in Chronic Kidney Disease (CKD)              GFR Category          GFR (mL/min/1.73)    Interpretation  G1                    90 or greater        Normal or high (1)  G2                    60-89                Mild decrease (1)  G3a                   45-59                Mild to moderate decrease  G3b                   30-44                Moderate to severe decrease  G4                    15-29                Severe decrease  G5                    14 or less           Kidney failure    (1)In the absence of evidence of kidney disease, neither GFR category G1 or G2 fulfill the criteria for CKD.    eGFR calculation 2021 CKD-EPI creatinine equation, which does not include race as a factor    Lipase [[id number]]  (Normal) Collected: 05/22/25 1716    Specimen: Blood Updated: 05/22/25 1738     Lipase 18 U/L     CBC Auto Differential [[id number]]  (Abnormal) Collected: 05/22/25 1716    Specimen: Blood Updated: 05/22/25 1732     WBC 9.44 10*3/mm3      RBC 2.90 10*6/mm3      Hemoglobin 8.0 g/dL      Hematocrit 26.0 %      MCV 89.7 fL      MCH 27.6 pg      MCHC 30.8 g/dL      RDW 17.8 %      RDW-SD 57.1 fl      MPV 11.8 fL      Platelets 285 10*3/mm3      Neutrophil % 66.4 %      Lymphocyte % 19.2 %      Monocyte % 9.3 %      Eosinophil % 4.3 %      Basophil % 0.4 %      Immature Grans % 0.4 %      Neutrophils, Absolute 6.26 10*3/mm3      Lymphocytes, Absolute 1.81 10*3/mm3      Monocytes, Absolute 0.88 10*3/mm3      Eosinophils, Absolute 0.41 10*3/mm3      Basophils, Absolute 0.04 10*3/mm3      Immature Grans, Absolute 0.04 10*3/mm3      nRBC 0.0 /100 WBC              No radiology results from the last 24 hrs       MDM     Amount and/or Complexity of Data Reviewed  Clinical lab tests: reviewed  Tests in the  radiology section of CPT®: reviewed  Decide to obtain previous medical records or to obtain history from someone other than the patient: yes    Differential diagnosis included was not limited to PEG tube malfunction, PEG tube complication, intra-abdominal free air, colitis, mesenteric adenitis, malignancy, SBO, constipation    Patient evaluated in the ER for abdominal distention, chronic PEG tube with reported malfunction by nursing home staff.  Patient hemodynamically stable, afebrile, nontoxic-appearing on exam.  Differential diagnosis includes but not limited to bowel obstruction, PEG tube malfunction, among others.  Initial plan includes CBC, CMP, lipase, CT abdomen pelvis with contrast.    Labs unremarkable, chronic anemia noted.  Patient care transferred to Bart Regan PA-C, pending CT report and final disposition.  Discussed that PEG tube may need to be exchanged if not flushing appropriately or if displaced.      Addendum from Bart Regan PA-C: This patient was personally seen and evaluated by me and I handle the patient's final disposition.  On evaluation the patient's PEG tube was not appropriately functioning.  I personally replaced the patient's PEG tube which then was correctly drawing and flushing and a KUB x-ray with Gastrografin was performed and interpreted by radiologist showing satisfactory placement and functioning of PEG tube.  CT abdomen and pelvis scan showed findings consistent with a mesenteric mass but no other acute findings.  I discussed this with gastroenterologist Dr. Romero who felt that the patient did not require any inpatient management for this but rather can follow-up on an outpatient basis with general surgery for further evaluation.  Given reassuring lab studies and CT scan I feel the patient is now stable for discharge back to nursing home care.  Follow-up    Final diagnoses:   Abdominal distention          Bart Regan PA-C  05/22/25 6517

## 2025-05-23 NOTE — DISCHARGE INSTRUCTIONS
The patient's CT scan today showed PEG tube appropriately placed.  The patient's PEG tube was replaced today and is functioning normally at this time.  The patient's CT scan showed findings that are concerning for a mesenteric abdominal mass.      This mesenteric mass on CT scan today will require further workup with a general surgeon and we have provided a referral for her to follow-up with general surgeon Dr. Wilson.  We recommend calling their office and scheduling first available appointment for this patient.  Return the patient to the ER for any acute changes or worsening of her condition.  There were no other complications seen on the patient's CT scan.

## 2025-05-28 ENCOUNTER — HOSPITAL ENCOUNTER (EMERGENCY)
Facility: HOSPITAL | Age: 65
Discharge: HOME | End: 2025-05-29
Attending: STUDENT IN AN ORGANIZED HEALTH CARE EDUCATION/TRAINING PROGRAM
Payer: MEDICARE

## 2025-05-28 ENCOUNTER — APPOINTMENT (OUTPATIENT)
Dept: CT IMAGING | Facility: HOSPITAL | Age: 65
End: 2025-05-28
Payer: MEDICARE

## 2025-05-28 VITALS
SYSTOLIC BLOOD PRESSURE: 123 MMHG | HEIGHT: 64 IN | TEMPERATURE: 97.5 F | WEIGHT: 162.48 LBS | OXYGEN SATURATION: 100 % | DIASTOLIC BLOOD PRESSURE: 70 MMHG | BODY MASS INDEX: 27.74 KG/M2 | HEART RATE: 90 BPM | RESPIRATION RATE: 18 BRPM

## 2025-05-28 DIAGNOSIS — H92.22 EAR BLEEDING, LEFT: Primary | ICD-10-CM

## 2025-05-28 LAB
ALBUMIN SERPL-MCNC: 3.3 G/DL (ref 3.5–5.2)
ALBUMIN/GLOB SERPL: 0.9 G/DL
ALP SERPL-CCNC: 89 U/L (ref 39–117)
ALT SERPL W P-5'-P-CCNC: 11 U/L (ref 1–33)
ANION GAP SERPL CALCULATED.3IONS-SCNC: 11.9 MMOL/L (ref 5–15)
AST SERPL-CCNC: 14 U/L (ref 1–32)
BASOPHILS # BLD AUTO: 0.04 10*3/MM3 (ref 0–0.2)
BASOPHILS NFR BLD AUTO: 0.4 % (ref 0–1.5)
BILIRUB SERPL-MCNC: 0.4 MG/DL (ref 0–1.2)
BUN SERPL-MCNC: 16 MG/DL (ref 8–23)
BUN/CREAT SERPL: 19.8 (ref 7–25)
CALCIUM SPEC-SCNC: 10 MG/DL (ref 8.6–10.5)
CHLORIDE SERPL-SCNC: 97 MMOL/L (ref 98–107)
CO2 SERPL-SCNC: 32.1 MMOL/L (ref 22–29)
CREAT SERPL-MCNC: 0.81 MG/DL (ref 0.57–1)
DEPRECATED RDW RBC AUTO: 59.6 FL (ref 37–54)
EGFRCR SERPLBLD CKD-EPI 2021: 80.2 ML/MIN/1.73
EOSINOPHIL # BLD AUTO: 0.56 10*3/MM3 (ref 0–0.4)
EOSINOPHIL NFR BLD AUTO: 6 % (ref 0.3–6.2)
ERYTHROCYTE [DISTWIDTH] IN BLOOD BY AUTOMATED COUNT: 18.2 % (ref 12.3–15.4)
GLOBULIN UR ELPH-MCNC: 3.8 GM/DL
GLUCOSE SERPL-MCNC: 109 MG/DL (ref 65–99)
HCT VFR BLD AUTO: 27.7 % (ref 34–46.6)
HGB BLD-MCNC: 8.5 G/DL (ref 12–15.9)
IMM GRANULOCYTES # BLD AUTO: 0.07 10*3/MM3 (ref 0–0.05)
IMM GRANULOCYTES NFR BLD AUTO: 0.7 % (ref 0–0.5)
LYMPHOCYTES # BLD AUTO: 1.65 10*3/MM3 (ref 0.7–3.1)
LYMPHOCYTES NFR BLD AUTO: 17.6 % (ref 19.6–45.3)
MCH RBC QN AUTO: 27.7 PG (ref 26.6–33)
MCHC RBC AUTO-ENTMCNC: 30.7 G/DL (ref 31.5–35.7)
MCV RBC AUTO: 90.2 FL (ref 79–97)
MONOCYTES # BLD AUTO: 0.77 10*3/MM3 (ref 0.1–0.9)
MONOCYTES NFR BLD AUTO: 8.2 % (ref 5–12)
NEUTROPHILS NFR BLD AUTO: 6.28 10*3/MM3 (ref 1.7–7)
NEUTROPHILS NFR BLD AUTO: 67.1 % (ref 42.7–76)
NRBC BLD AUTO-RTO: 0 /100 WBC (ref 0–0.2)
PLATELET # BLD AUTO: 374 10*3/MM3 (ref 140–450)
PMV BLD AUTO: 11.5 FL (ref 6–12)
POTASSIUM SERPL-SCNC: 4.1 MMOL/L (ref 3.5–5.2)
PROT SERPL-MCNC: 7.1 G/DL (ref 6–8.5)
RBC # BLD AUTO: 3.07 10*6/MM3 (ref 3.77–5.28)
SODIUM SERPL-SCNC: 141 MMOL/L (ref 136–145)
WBC NRBC COR # BLD AUTO: 9.37 10*3/MM3 (ref 3.4–10.8)

## 2025-05-28 PROCEDURE — 99284 EMERGENCY DEPT VISIT MOD MDM: CPT | Performed by: STUDENT IN AN ORGANIZED HEALTH CARE EDUCATION/TRAINING PROGRAM

## 2025-05-28 PROCEDURE — 36415 COLL VENOUS BLD VENIPUNCTURE: CPT

## 2025-05-28 PROCEDURE — 85025 COMPLETE CBC W/AUTO DIFF WBC: CPT | Performed by: NURSE PRACTITIONER

## 2025-05-28 PROCEDURE — 70450 CT HEAD/BRAIN W/O DYE: CPT

## 2025-05-28 PROCEDURE — 80053 COMPREHEN METABOLIC PANEL: CPT | Performed by: NURSE PRACTITIONER

## 2025-05-28 RX ADMIN — AMOXICILLIN AND CLAVULANATE POTASSIUM 1 TABLET: 875; 125 TABLET, FILM COATED ORAL at 23:21

## 2025-05-29 NOTE — ED PROVIDER NOTES
Pt Name: Viji Vargas  MRN: [id number]  : 1960  Date of Encounter: 2025    PCP: Ranjeet Pina MD      Subjective    History of Present Illness:    Chief Complaint: Left ear bleeding    History of Present Illness: Viji Vargas is a 64 y.o. female who presents to the ER complaining of left ear bleed that started just prior to arrival.  Patient is a resident of a local skilled nursing facility.  EMS states nursing staff was caring for patient when they noted that she had bleeding coming from her left ear.  EMS states that nursing staff states that the patient has a history of a stroke, has a tracheostomy, does not walk, does not move.  EMS states skilled nursing facility staff said there was no traumatic injury.    Triage Vitals:    ED Triage Vitals   Temp Heart Rate Resp BP SpO2   25   97.5 °F (36.4 °C) 90 18 123/70 (!) 10 %      Temp src Heart Rate Source Patient Position BP Location FiO2 (%)   25 --   Rectal Monitor Lying Left arm        Nurses Notes reviewed and agree, including vitals, allergies, social history and prior medical history.     Patient has no known allergies.    Past Medical History:   Diagnosis Date    Abdominal distention     Acute and chronic respiratory failure with hypoxia     Adult hypertrophic pyloric stenosis     COPD (chronic obstructive pulmonary disease)     Hypertension     Osteomyelitis     Pneumonia     Stroke        Past Surgical History:   Procedure Laterality Date    HYSTERECTOMY      Partial     TRACHEOSTOMY AND PEG TUBE INSERTION N/A 3/20/2019    Procedure: TRACHEOSTOMY AND PERCUTANEOUS ENDOSCOPIC GASTROSTOMY TUBE INSERTION;  Surgeon: Nadira Pandey MD;  Location: [facility];  Service: General       Social History     Socioeconomic History    Marital status: Legally    Tobacco Use    Smoking status: Former     Current  packs/day: 1.00     Types: Electronic Cigarette, Cigarettes    Smokeless tobacco: Never   Vaping Use    Vaping status: Unknown   Substance and Sexual Activity    Alcohol use: Not Currently     Comment: wine     Drug use: No    Sexual activity: Defer       History reviewed. No pertinent family history.    REVIEW OF SYSTEMS:     All systems reviewed and not pertinent unless noted.    Review of Systems   Unable to perform ROS: Patient nonverbal       Objective    Physical Exam  Vitals and nursing note reviewed.   HENT:      Head: Normocephalic and atraumatic.      Comments: Tracheostomy site clean dry and intact     Right Ear: No hemotympanum. Tympanic membrane is bulging. Tympanic membrane is not erythematous.      Left Ear: There is hemotympanum.      Ears:      Comments: Moderate amount of blood in left ear canal, tympanic membrane only slightly visualized, ear mid ear canal edema noted,  Eyes:      Extraocular Movements: Extraocular movements intact.      Pupils: Pupils are equal, round, and reactive to light.   Cardiovascular:      Rate and Rhythm: Normal rate and regular rhythm.      Pulses: Normal pulses.      Heart sounds: Normal heart sounds.   Pulmonary:      Effort: Pulmonary effort is normal.      Breath sounds: Normal breath sounds.   Abdominal:      General: Abdomen is flat. Bowel sounds are normal.      Palpations: Abdomen is soft.   Musculoskeletal:      Cervical back: Normal range of motion and neck supple.   Skin:     Capillary Refill: Capillary refill takes less than 2 seconds.   Neurological:      General: No focal deficit present.      Mental Status: She is oriented to person, place, and time. Mental status is at baseline.      GCS: GCS eye subscore is 4. GCS verbal subscore is 5. GCS motor subscore is 6.      Sensory: Sensation is intact.      Motor: Motor function is intact.      Gait: Gait is intact.   Psychiatric:         Attention and Perception: Attention and perception normal.         Mood  and Affect: Mood and affect normal.         Speech: Speech normal.         Behavior: Behavior normal. Behavior is cooperative.                           Procedures    ED Course:    No orders to display            Orders placed during this visit:    Orders Placed This Encounter   Procedures    CT Head Without Contrast    Comprehensive Metabolic Panel    CBC Auto Differential    CBC & Differential       LAB Results:    Lab Results (last 24 hours)       Procedure Component Value Units Date/Time    CBC & Differential [[id number]]  (Abnormal) Collected: 05/28/25 2300    Specimen: Blood Updated: 05/28/25 2317    Narrative:      The following orders were created for panel order CBC & Differential.  Procedure                               Abnormality         Status                     ---------                               -----------         ------                     CBC Auto Differential[[id number]]        Abnormal            Final result                 Please view results for these tests on the individual orders.    Comprehensive Metabolic Panel [[id number]]  (Abnormal) Collected: 05/28/25 2300    Specimen: Blood Updated: 05/28/25 2337     Glucose 109 mg/dL      BUN 16.0 mg/dL      Creatinine 0.81 mg/dL      Sodium 141 mmol/L      Potassium 4.1 mmol/L      Comment: Slight hemolysis detected by analyzer. Result may be falsely elevated.        Chloride 97 mmol/L      CO2 32.1 mmol/L      Calcium 10.0 mg/dL      Total Protein 7.1 g/dL      Albumin 3.3 g/dL      ALT (SGPT) 11 U/L      AST (SGOT) 14 U/L      Alkaline Phosphatase 89 U/L      Total Bilirubin 0.4 mg/dL      Globulin 3.8 gm/dL      A/G Ratio 0.9 g/dL      BUN/Creatinine Ratio 19.8     Anion Gap 11.9 mmol/L      eGFR 80.2 mL/min/1.73     Narrative:      GFR Categories in Chronic Kidney Disease (CKD)              GFR Category          GFR (mL/min/1.73)    Interpretation  G1                    90 or greater        Normal or high (1)  G2                    60-89                 Mild decrease (1)  G3a                   45-59                Mild to moderate decrease  G3b                   30-44                Moderate to severe decrease  G4                    15-29                Severe decrease  G5                    14 or less           Kidney failure    (1)In the absence of evidence of kidney disease, neither GFR category G1 or G2 fulfill the criteria for CKD.    eGFR calculation 2021 CKD-EPI creatinine equation, which does not include race as a factor    CBC Auto Differential [[id number]]  (Abnormal) Collected: 05/28/25 2300    Specimen: Blood Updated: 05/28/25 2317     WBC 9.37 10*3/mm3      RBC 3.07 10*6/mm3      Hemoglobin 8.5 g/dL      Hematocrit 27.7 %      MCV 90.2 fL      MCH 27.7 pg      MCHC 30.7 g/dL      RDW 18.2 %      RDW-SD 59.6 fl      MPV 11.5 fL      Platelets 374 10*3/mm3      Neutrophil % 67.1 %      Lymphocyte % 17.6 %      Monocyte % 8.2 %      Eosinophil % 6.0 %      Basophil % 0.4 %      Immature Grans % 0.7 %      Neutrophils, Absolute 6.28 10*3/mm3      Lymphocytes, Absolute 1.65 10*3/mm3      Monocytes, Absolute 0.77 10*3/mm3      Eosinophils, Absolute 0.56 10*3/mm3      Basophils, Absolute 0.04 10*3/mm3      Immature Grans, Absolute 0.07 10*3/mm3      nRBC 0.0 /100 WBC              If labs were ordered, I have independently reviewed the results and considered them in the diagnosis and treatment plan for the patient    RADIOLOGY    CT Head Without Contrast  Result Date: 5/28/2025  FINAL REPORT TECHNIQUE: null CLINICAL HISTORY: Left ear bleeding COMPARISON: null FINDINGS: CT head without contrast Comparison: 09/29/2024 Findings: No new intra-axial mass, midline shift, hydrocephalus, or acute hemorrhage. Moderate diffuse atrophy and extensive white matter hypodensity unchanged.. There is no sinus or mastoid fluid. The orbits are within normal limits. There is no acute fracture.     Impression: IMPRESSION: 1. No acute intracranial findings. Authenticated  and Electronically Signed by Eric Nicole on 05/28/2025 11:01:37 PM       If I have ordered, I have independently reviewed the above noted radiographic studies.  Please see the radiologist dictation for the official interpretation    Medications given to patient in the ER    Medications   amoxicillin-clavulanate (AUGMENTIN) 875-125 MG per tablet 1 tablet (1 tablet Oral Given 5/28/25 2326)       AS OF 02:07 EDT VITALS:    BP - 123/70  HR - 90  TEMP - 97.5 °F (36.4 °C) (Rectal)  O2 SATS - 100%         Shared Decision Making: After my consideration of the clinical presentation and laboratory/radiology studies obtained, I have discussed the findings with the patient/patient representative who is in agreement with the treatment plan and final disposition. Risks and benefits of discharge and/or observation admission were discussed.  Final disposition of the patient will be discharged home.  Patient is requested to follow-up with primary care provider and specialist in 1 week following final discharge.      Medical Decision Making  Viji Vargas is a 64 y.o. female who presents to the ER complaining of left ear bleed that started just prior to arrival.  Patient is a resident of a local skilled nursing facility.  EMS states nursing staff was caring for patient when they noted that she had bleeding coming from her left ear.  EMS states that nursing staff states that the patient has a history of a stroke, has a tracheostomy, does not walk, does not move.  EMS states skilled nursing facility staff said there was no traumatic injury.    Physical exam the patient patient is chronically ill patient that has had history of multiple strokes, with tracheostomy.  Patient does have moderate amount of blood in the ear canal that has edema.  Unable to ascertained tympanic membrane rupture.  CT scan was obtained which shows no acute fractures, no masses, chronic changes that are stable.    DDX: includes but is not limited to: Ear  trauma, hemotympanums, ruptured tympanic membrane, other    Problems Addressed:  Ear bleeding, left: complicated acute illness or injury    Amount and/or Complexity of Data Reviewed  Labs: ordered. Decision-making details documented in ED Course.     Details: I have personally reviewed and documented all results  Radiology: ordered.     Details: I have personally reviewed and documented all results  Discussion of management or test interpretation with external provider(s): Discussed assessment, treatment and plan with ER attending    Risk  Prescription drug management.  Risk Details: I have discussed with patient the finding of the test preformed today. Patient has been diagnosed with ear bleeding and will be discharged home. Advised on return precautions the importance of close follow-up with primary care provider.  Strict return precautions have been given and patient verbalizes understanding        Final diagnoses:   Ear bleeding, left       Please note that portions of this document were completed using voice recognition dictation software.       Manoj Lubin, APRN  05/29/25 0208

## 2025-07-12 ENCOUNTER — HOSPITAL ENCOUNTER (EMERGENCY)
Facility: HOSPITAL | Age: 65
Discharge: HOME | End: 2025-07-12
Attending: STUDENT IN AN ORGANIZED HEALTH CARE EDUCATION/TRAINING PROGRAM
Payer: MEDICARE

## 2025-07-12 ENCOUNTER — APPOINTMENT (OUTPATIENT)
Dept: GENERAL RADIOLOGY | Facility: HOSPITAL | Age: 65
End: 2025-07-12
Payer: MEDICARE

## 2025-07-12 VITALS
RESPIRATION RATE: 22 BRPM | OXYGEN SATURATION: 93 % | BODY MASS INDEX: 27.66 KG/M2 | TEMPERATURE: 98.3 F | WEIGHT: 162 LBS | HEIGHT: 64 IN | DIASTOLIC BLOOD PRESSURE: 54 MMHG | HEART RATE: 89 BPM | SYSTOLIC BLOOD PRESSURE: 143 MMHG

## 2025-07-12 DIAGNOSIS — J95.03 TRACHEOSTOMY MALFUNCTION: Primary | ICD-10-CM

## 2025-07-12 PROCEDURE — 99283 EMERGENCY DEPT VISIT LOW MDM: CPT | Performed by: STUDENT IN AN ORGANIZED HEALTH CARE EDUCATION/TRAINING PROGRAM

## 2025-07-12 PROCEDURE — 71045 X-RAY EXAM CHEST 1 VIEW: CPT

## 2025-07-12 NOTE — ED PROVIDER NOTES
EMERGENCY DEPARTMENT ENCOUNTER    Pt Name: Viji Vargas  MRN: [id number]  Pt :   1960  Room Number:    Date of encounter:  2025  PCP: Ranjeet Pina MD  ED Provider: Nataly Khanna MD          HPI:    Context: Viji Vargas is a 64 y.o. female who presents from [facility] and rehab due to accidental tracheostomy removal.  Patient has a history of an anoxic brain injury, has significant contractures in all 4 of her extremities.  Reportedly at the facility she was being bagged, and had lower oxygen's after this.  EMS placed her on a nonrebreather, report that her oxygen improved.  Until by nursing the patient has had frequent tracheostomy displacement.      Historian: EMS, nursing report  PAST MEDICAL HISTORY  Past Medical History:   Diagnosis Date    Abdominal distention     Acute and chronic respiratory failure with hypoxia     Adult hypertrophic pyloric stenosis     COPD (chronic obstructive pulmonary disease)     Hypertension     Osteomyelitis     Pneumonia     Stroke          PAST SURGICAL HISTORY  Past Surgical History:   Procedure Laterality Date    HYSTERECTOMY      Partial     TRACHEOSTOMY AND PEG TUBE INSERTION N/A 3/20/2019    Procedure: TRACHEOSTOMY AND PERCUTANEOUS ENDOSCOPIC GASTROSTOMY TUBE INSERTION;  Surgeon: Nadira Pandey MD;  Location: [facility];  Service: General         FAMILY HISTORY  History reviewed. No pertinent family history.      SOCIAL HISTORY  Social History     Socioeconomic History    Marital status: Legally    Tobacco Use    Smoking status: Former     Current packs/day: 1.00     Types: Electronic Cigarette, Cigarettes    Smokeless tobacco: Never   Vaping Use    Vaping status: Unknown   Substance and Sexual Activity    Alcohol use: Not Currently     Comment: wine     Drug use: No    Sexual activity: Defer         ALLERGIES  Patient has no known allergies.        REVIEW OF SYSTEMS  Unable to be assessed, reportedly patient  was in her baseline state of health prior to this event.      PHYSICAL EXAM  Physical Exam    General: Altered  HEENT: Atraumatic, normocephalic, uncuffed Shiley with loose tracheostomy banding around neck, Cassatt stoma, no bleeding  Cardiovascular:  Regular rate, regular rhythm, no murmurs, rubs, or gallops.  Extremities well perfused   Respiratory: Bilateral coarse breath sounds   abdominal:  Soft, nondistended, nontender.  No guarding or rebound.  No palpable masses. No CVA Tenderness   Extremity:  contracted all 4 extremities  Skin:  Warm and dry.  No rashes  Neuro: Altered significant contractures, reportedly at baseline  Psych:  Mood and affect appropriate.      I have reviewed the triage vital signs and nursing notes.    ED Triage Vitals   Temp Heart Rate Resp BP SpO2   07/12/25 1147 07/12/25 1140 07/12/25 1140 07/12/25 1140 07/12/25 1140   98.3 °F (36.8 °C) 89 22 136/74 96 %      Temp src Heart Rate Source Patient Position BP Location FiO2 (%)   07/12/25 1147 07/12/25 1140 07/12/25 1140 07/12/25 1140 --   Axillary Monitor Lying Left arm                MEDICAL DECISION MAKING, PROGRESS, and CONSULTS  Context: Viji Vargas is a 64 y.o. female who presents to the ED with chief complaint of hypoxia and tracheostomy displacement.    Differential diagnosis:    Tracheostomy displacement, mucous plugging, pneumonia, pneumothorax    Orders placed during this visit for evaluation and treatment of patient:  Orders Placed This Encounter   Procedures    XR Chest 1 View     Medications - No data to display    ED COURSE & DISPOSITION     Respiratory care aided in care of this patient, she was suctioned, her trach was replaced.  Chest x-ray was obtained which did show some exploding however no pneumonia, and pneumothorax.  Patient was saturating in the mid 90s on room air status post replacement.  Tracheostomy collar was replaced.  Patient is hemodynamically stable.  Given this we will discharge patient back to  facility.      LAB   No results found for this or any previous visit (from the past 24 hours).    If labs were ordered, I independently reviewed  and interpreted the results and took them into assessment while treating the patient.     RADIOLOGY RADIOLOGY  XR Chest 1 View  Result Date: 7/12/2025  CLINICAL INDICATION:  trach removed, SOA,  EXAMINATION TECHNIQUE: XR CHEST 1 VW-  COMPARISON: Radiograph 3/18/2025  FINDINGS: Tracheostomy tube in appropriate position. Heart is normal in size. Aortic atherosclerosis. Mild bronchial wall thickening and perihilar increased interstitial markings. Mild perihilar and bibasilar atelectasis. No pneumothorax. No large pleural effusion.      Tracheostomy tube in appropriate position. Hypoventilatory changes. Bronchitis and bilateral multifocal subsegmental atelectasis.  Images personally reviewed, interpreted and dictated by SILVIA Chamorro.     This report was signed and finalized on 7/12/2025 12:19 PM by SILVIA Chamorro.        I ordered and independently reviewed the above noted radiographic studies.      Lab and radiology results significant as noted in ED Course.     See radiologist's dictation for official interpretation.        PROCEDURES      Procedures              Please note that portions of this document were completed with voice recognition software.        Nataly Khanna MD  07/12/25 [id number]

## 2025-07-12 NOTE — ED NOTES
[facility] contacted at this time for non-emergent transfer back to [facility]. Awaiting EMS at this time.

## 2025-08-04 ENCOUNTER — LAB REQUISITION (OUTPATIENT)
Dept: LAB | Facility: HOSPITAL | Age: 65
End: 2025-08-04
Payer: MEDICARE

## 2025-08-04 DIAGNOSIS — R14.0 ABDOMINAL DISTENSION (GASEOUS): ICD-10-CM

## 2025-08-04 LAB
BACTERIA UR QL AUTO: ABNORMAL /HPF
BILIRUB UR QL STRIP: NEGATIVE
CLARITY UR: ABNORMAL
COLOR UR: ABNORMAL
GLUCOSE UR STRIP-MCNC: NEGATIVE MG/DL
HGB UR QL STRIP.AUTO: ABNORMAL
HYALINE CASTS UR QL AUTO: ABNORMAL /LPF
KETONES UR QL STRIP: ABNORMAL
LEUKOCYTE ESTERASE UR QL STRIP.AUTO: ABNORMAL
NITRITE UR QL STRIP: NEGATIVE
PH UR STRIP.AUTO: 6 [PH] (ref 5–8)
PROT UR QL STRIP: ABNORMAL
RBC # UR STRIP: ABNORMAL /HPF
REF LAB TEST METHOD: ABNORMAL
SP GR UR STRIP: 1.02 (ref 1–1.03)
SQUAMOUS #/AREA URNS HPF: ABNORMAL /HPF
UROBILINOGEN UR QL STRIP: ABNORMAL
WBC # UR STRIP: ABNORMAL /HPF

## 2025-08-04 PROCEDURE — 87086 URINE CULTURE/COLONY COUNT: CPT | Performed by: FAMILY MEDICINE

## 2025-08-04 PROCEDURE — 81001 URINALYSIS AUTO W/SCOPE: CPT | Performed by: FAMILY MEDICINE

## 2025-08-05 LAB — BACTERIA SPEC AEROBE CULT: NORMAL

## 2025-08-06 ENCOUNTER — LAB REQUISITION (OUTPATIENT)
Dept: LAB | Facility: HOSPITAL | Age: 65
End: 2025-08-06
Payer: MEDICARE

## 2025-08-06 DIAGNOSIS — J96.10 CHRONIC RESPIRATORY FAILURE, UNSPECIFIED WHETHER WITH HYPOXIA OR HYPERCAPNIA: ICD-10-CM

## 2025-08-06 DIAGNOSIS — J96.21 ACUTE AND CHRONIC RESPIRATORY FAILURE WITH HYPOXIA: ICD-10-CM

## 2025-08-06 PROCEDURE — 87070 CULTURE OTHR SPECIMN AEROBIC: CPT | Performed by: FAMILY MEDICINE

## 2025-08-06 PROCEDURE — 87205 SMEAR GRAM STAIN: CPT | Performed by: FAMILY MEDICINE

## 2025-08-08 LAB
BACTERIA SPEC RESP CULT: NORMAL
GRAM STN SPEC: NORMAL

## 2025-08-20 ENCOUNTER — APPOINTMENT (OUTPATIENT)
Dept: GENERAL RADIOLOGY | Facility: HOSPITAL | Age: 65
End: 2025-08-20
Payer: MEDICARE

## 2025-08-20 ENCOUNTER — APPOINTMENT (OUTPATIENT)
Dept: CT IMAGING | Facility: HOSPITAL | Age: 65
End: 2025-08-20
Payer: MEDICARE

## 2025-08-20 ENCOUNTER — HOSPITAL ENCOUNTER (INPATIENT)
Facility: HOSPITAL | Age: 65
LOS: 2 days | Discharge: NURSING, REHAB OR LONG-TERM CARE | End: 2025-08-22
Attending: STUDENT IN AN ORGANIZED HEALTH CARE EDUCATION/TRAINING PROGRAM | Admitting: FAMILY MEDICINE
Payer: MEDICARE

## (undated) DEVICE — SUT SILK 0 SUTUPAK TIES 24IN SA76G

## (undated) DEVICE — YANKAUER,BULB TIP, NO VENT: Brand: ARGYLE

## (undated) DEVICE — 2000CC GUARDIAN II: Brand: GUARDIAN

## (undated) DEVICE — JELLY,LUBE,STERILE,FLIP TOP,TUBE,2-OZ: Brand: MEDLINE

## (undated) DEVICE — GLV SURG SENSICARE W/ALOE PF LF 6.5 STRL

## (undated) DEVICE — SPNG DRN AMD EXCILON 6PLY 4X4IN PK/2

## (undated) DEVICE — CONMED SCOPE SAVER BITE BLOCK, 20X27 MM: Brand: SCOPE SAVER

## (undated) DEVICE — SYR LUER SLPTP 50ML

## (undated) DEVICE — SUT PROLENE 3/0 W/CT 1 NDL 30L ET8422H

## (undated) DEVICE — 3M™ STERI-STRIP™ REINFORCED ADHESIVE SKIN CLOSURES, R1547, 1/2 IN X 4 IN (12 MM X 100 MM), 6 STRIPS/ENVELOPE: Brand: 3M™ STERI-STRIP™

## (undated) DEVICE — PAD,ABDOMINAL,8"X7.5",STERILE,LF,1/PK: Brand: MEDLINE

## (undated) DEVICE — GOWN,REINFORCE,POLY,SIRUS,BREATH SLV,XLG: Brand: MEDLINE

## (undated) DEVICE — PERCUTANEOUS ENDOSCOPIC GASTROSTOMY KIT: Brand: ENDOVIVE STANDARD PEG KIT

## (undated) DEVICE — Device